# Patient Record
Sex: MALE | Race: WHITE | NOT HISPANIC OR LATINO | Employment: OTHER | URBAN - METROPOLITAN AREA
[De-identification: names, ages, dates, MRNs, and addresses within clinical notes are randomized per-mention and may not be internally consistent; named-entity substitution may affect disease eponyms.]

---

## 2017-08-02 ENCOUNTER — OFFICE VISIT (OUTPATIENT)
Dept: FAMILY MEDICINE | Facility: CLINIC | Age: 72
End: 2017-08-02
Payer: COMMERCIAL

## 2017-08-02 VITALS
OXYGEN SATURATION: 95 % | SYSTOLIC BLOOD PRESSURE: 126 MMHG | TEMPERATURE: 97.4 F | HEART RATE: 94 BPM | DIASTOLIC BLOOD PRESSURE: 84 MMHG | HEIGHT: 67 IN | RESPIRATION RATE: 16 BRPM | WEIGHT: 189 LBS | BODY MASS INDEX: 29.66 KG/M2

## 2017-08-02 DIAGNOSIS — R42 DIZZINESS: ICD-10-CM

## 2017-08-02 DIAGNOSIS — Z11.59 NEED FOR HEPATITIS C SCREENING TEST: ICD-10-CM

## 2017-08-02 DIAGNOSIS — R53.83 FATIGUE, UNSPECIFIED TYPE: Primary | ICD-10-CM

## 2017-08-02 DIAGNOSIS — R97.20 ELEVATED PSA: ICD-10-CM

## 2017-08-02 LAB
ALBUMIN SERPL-MCNC: 3.7 G/DL (ref 3.4–5)
ALP SERPL-CCNC: 54 U/L (ref 40–150)
ALT SERPL W P-5'-P-CCNC: 26 U/L (ref 0–70)
ANION GAP SERPL CALCULATED.3IONS-SCNC: 5 MMOL/L (ref 3–14)
ANISOCYTOSIS BLD QL SMEAR: ABNORMAL
AST SERPL W P-5'-P-CCNC: 13 U/L (ref 0–45)
BASO STIPL BLD QL SMEAR: PRESENT
BASOPHILS # BLD AUTO: 0.1 10E9/L (ref 0–0.2)
BASOPHILS NFR BLD AUTO: 0.8 %
BILIRUB SERPL-MCNC: 1 MG/DL (ref 0.2–1.3)
BUN SERPL-MCNC: 18 MG/DL (ref 7–30)
CALCIUM SERPL-MCNC: 9.5 MG/DL (ref 8.5–10.1)
CHLORIDE SERPL-SCNC: 107 MMOL/L (ref 94–109)
CO2 SERPL-SCNC: 30 MMOL/L (ref 20–32)
CREAT SERPL-MCNC: 0.88 MG/DL (ref 0.66–1.25)
DIFFERENTIAL METHOD BLD: ABNORMAL
EOSINOPHIL # BLD AUTO: 0.1 10E9/L (ref 0–0.7)
EOSINOPHIL NFR BLD AUTO: 1.7 %
ERYTHROCYTE [DISTWIDTH] IN BLOOD BY AUTOMATED COUNT: 19.6 % (ref 10–15)
GFR SERPL CREATININE-BSD FRML MDRD: 85 ML/MIN/1.7M2
GLUCOSE SERPL-MCNC: 154 MG/DL (ref 70–99)
HCT VFR BLD AUTO: 40.1 % (ref 40–53)
HGB BLD-MCNC: 12.7 G/DL (ref 13.3–17.7)
IMM GRANULOCYTES # BLD: 0 10E9/L (ref 0–0.4)
IMM GRANULOCYTES NFR BLD: 0.3 %
LYMPHOCYTES # BLD AUTO: 1.3 10E9/L (ref 0.8–5.3)
LYMPHOCYTES NFR BLD AUTO: 16.6 %
MCH RBC QN AUTO: 19 PG (ref 26.5–33)
MCHC RBC AUTO-ENTMCNC: 31.7 G/DL (ref 31.5–36.5)
MCV RBC AUTO: 60 FL (ref 78–100)
MICROCYTES BLD QL SMEAR: PRESENT
MONOCYTES # BLD AUTO: 0.6 10E9/L (ref 0–1.3)
MONOCYTES NFR BLD AUTO: 7.1 %
NEUTROPHILS # BLD AUTO: 5.7 10E9/L (ref 1.6–8.3)
NEUTROPHILS NFR BLD AUTO: 73.5 %
PLATELET # BLD AUTO: 187 10E9/L (ref 150–450)
PLATELET # BLD EST: NORMAL 10*3/UL
POTASSIUM SERPL-SCNC: 3.6 MMOL/L (ref 3.4–5.3)
PROT SERPL-MCNC: 7.2 G/DL (ref 6.8–8.8)
PSA SERPL-ACNC: 25.3 UG/L (ref 0–4)
RBC # BLD AUTO: 6.68 10E12/L (ref 4.4–5.9)
RBC INCLUSIONS BLD: SLIGHT
RBC MORPH BLD: ABNORMAL
SODIUM SERPL-SCNC: 142 MMOL/L (ref 133–144)
TARGETS BLD QL SMEAR: SLIGHT
TSH SERPL DL<=0.005 MIU/L-ACNC: 1.09 MU/L (ref 0.4–4)
WBC # BLD AUTO: 7.7 10E9/L (ref 4–11)

## 2017-08-02 PROCEDURE — 84153 ASSAY OF PSA TOTAL: CPT | Performed by: FAMILY MEDICINE

## 2017-08-02 PROCEDURE — 86803 HEPATITIS C AB TEST: CPT | Performed by: FAMILY MEDICINE

## 2017-08-02 PROCEDURE — 80050 GENERAL HEALTH PANEL: CPT | Performed by: FAMILY MEDICINE

## 2017-08-02 PROCEDURE — 36415 COLL VENOUS BLD VENIPUNCTURE: CPT | Performed by: FAMILY MEDICINE

## 2017-08-02 PROCEDURE — 99214 OFFICE O/P EST MOD 30 MIN: CPT | Performed by: FAMILY MEDICINE

## 2017-08-02 ASSESSMENT — PAIN SCALES - GENERAL: PAINLEVEL: NO PAIN (0)

## 2017-08-02 NOTE — MR AVS SNAPSHOT
After Visit Summary   8/2/2017    Khang Bailon    MRN: [id number]           Patient Information     Date Of Birth          1945        Visit Information        Provider Department      8/2/2017 1:00 PM Cecilio Trejo MD [facility]        Today's Diagnoses     Elevated PSA    -  1    Fatigue, unspecified type        Dizziness        Need for hepatitis C screening test        BPH (benign prostatic hyperplasia)           Follow-ups after your visit        Additional Services     UROLOGY ADULT REFERRAL       Your provider has referred you to: N: [facility] [phone]   [email or web address]    Please be aware that coverage of these services is subject to the terms and limitations of your health insurance plan.  Call member services at your health plan with any benefit or coverage questions.      Please bring the following with you to your appointment:    (1) Any X-Rays, CTs or MRIs which have been performed.  Contact the facility where they were done to arrange for  prior to your scheduled appointment.    (2) List of current medications  (3) This referral request   (4) Any documents/labs given to you for this referral                  Who to contact     If you have questions or need follow up information about today's clinic visit or your schedule please contact [facility] directly at [phone].  Normal or non-critical lab and imaging results will be communicated to you by MyChart, letter or phone within 4 business days after the clinic has received the results. If you do not hear from us within 7 days, please contact the clinic through MyChart or phone. If you have a critical or abnormal lab result, we will notify you by phone as soon as possible.  Submit refill requests through Weebly or call your pharmacy and they will forward the refill request to us. Please allow 3 business days for your refill to be completed.        "   Additional Information About Your Visit        MyChart Information     Vendor Registry lets you send messages to your doctor, view your test results, renew your prescriptions, schedule appointments and more. To sign up, go to [email or web address] . Click on \"Log in\" on the left side of the screen, which will take you to the Welcome page. Then click on \"Sign up Now\" on the right side of the page.     You will be asked to enter the access code listed below, as well as some personal information. Please follow the directions to create your username and password.     Your access code is: [email or web address]  Expires: 10/31/2017  1:43 PM     Your access code will  in 90 days. If you need help or a new code, please call your [facility] or [phone].        Care EveryWhere ID     This is your Care EveryWhere ID. This could be used by other organizations to access your [facility] medical records  [id number]        Your Vitals Were     Pulse Temperature Respirations Height Pulse Oximetry BMI (Body Mass Index)    94 97.4  F (36.3  C) (Temporal) 16 5' 7\" (1.702 m) 95% 29.6 kg/m2       Blood Pressure from Last 3 Encounters:   17 126/84   16 148/80   10/16/16 (!) 180/104    Weight from Last 3 Encounters:   17 189 lb (85.7 kg)   16 188 lb (85.3 kg)   10/16/16 187 lb (84.8 kg)              We Performed the Following     CBC with platelets differential     Comprehensive metabolic panel (BMP + Alb, Alk Phos, ALT, AST, Total. Bili, TP)     Hepatitis C antibody     Prostate spec antigen screen     TSH with free T4 reflex     UROLOGY ADULT REFERRAL        Primary Care Provider Office Phone # Fax #    Cholo Rivera [phone]       [facility] [address] [place] [address]        Equal Access to Services     GOLDY MELENDEZ : Jacquelyn Vital, richard arguello, abeba ferreira, bob heredia. So [facility] [place] [facility] " [phone].    ATENCIÓN: Si gideon segovia, tiene a carrera disposición servicios gratuitos de asistencia lingüística. Jair al [phone].    We comply with applicable federal civil rights laws and [place] laws. We do not discriminate on the basis of race, color, national origin, age, disability sex, sexual orientation or gender identity.            Thank you!     Thank you for choosing [facility]  for your care. Our goal is always to provide you with excellent care. Hearing back from our patients is one way we can continue to improve our services. Please take a few minutes to complete the written survey that you may receive in the mail after your visit with us. Thank you!             Your Updated Medication List - Protect others around you: Learn how to safely use, store and throw away your medicines at www.disposemymeds.org.          This list is accurate as of: 8/2/17  1:43 PM.  Always use your most recent med list.                   Brand Name Dispense Instructions for use Diagnosis    ibuprofen 600 MG tablet    ADVIL/MOTRIN    30 tablet    Take 1 tablet (600 mg) by mouth every 6 hours as needed for moderate pain        MULTI vitamin  MENS Tabs      Take 1 tablet by mouth daily.        OMEGA-3 FISH OIL PO      Take  by mouth.        psyllium 58.6 % Powd    METAMUCIL     Take 1 Tablespoonful by mouth daily        VITAMIN D3 PO      Take 10,000 Units by mouth daily.

## 2017-08-02 NOTE — PROGRESS NOTES
SUBJECTIVE:                                                    Khang Bailon is a 72 year old male who presents to clinic today for the following health issues:      Chief Complaint   Patient presents with     Neck Pain     Edema     swelling in legs     Fatigue     says he is always tired     Cough     cough and sob             Problem list and histories reviewed & adjusted, as indicated.  Additional history: as documented        Reviewed and updated as needed this visit by clinical staff  Tobacco  Allergies  Meds       Reviewed and updated as needed this visit by Provider        SUBJECTIVE:  Khang  is a 72 year old male who presents for:  Several issues that of an bothering him. He hasn't been seen for quite a while. I have only seen him on several occasions in the last year for acute follow-up situations. A patient of a former retired partner of mine. He really is not on any medications for any conditions. He does have a pacemaker. Nose he has an elevated PSA needs to follow up on this. Complaining of some swelling in his legs. He is complaining of fatigue all the time. Complains of shortness of breath and cough. He is overdue for some lab work.    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    pacemaker at [facility]     Diverticulitis      Injury, other and unspecified, elbow, forearm, and wrist 1970s    Broke right wrist     Past Surgical History:   Procedure Laterality Date     COLONOSCOPY       COLONOSCOPY  12/10/2012     IMPLANT PACEMAKER       Social History   Substance Use Topics     Smoking status: Former Smoker     Years: 50.00     Types: Cigarettes     Quit date: 7/27/2012     Smokeless tobacco: Never Used      Comment: Quit in July     Alcohol use 0.0 oz/week     0 Standard drinks or equivalent per week      Comment: Glass of wine or two daily     Current Outpatient Prescriptions   Medication Sig Dispense Refill     psyllium (METAMUCIL) 58.6 % POWD Take 1 Tablespoonful by mouth daily  "      Cholecalciferol (VITAMIN D3 PO) Take 10,000 Units by mouth daily.       Omega-3 Fatty Acids (OMEGA-3 FISH OIL PO) Take  by mouth.       Multiple Vitamin (MULTI VITAMIN  MENS) TABS Take 1 tablet by mouth daily.       ibuprofen (ADVIL,MOTRIN) 600 MG tablet Take 1 tablet (600 mg) by mouth every 6 hours as needed for moderate pain 30 tablet 0       REVIEW OF SYSTEMS:   5 point ROS negative except as noted above in HPI, including Gen., Resp, CV, GI &  system review.     OBJECTIVE:  Vitals: /84  Pulse 94  Temp 97.4  F (36.3  C) (Temporal)  Resp 16  Ht 5' 7\" (1.702 m)  Wt 189 lb (85.7 kg)  SpO2 95%  BMI 29.6 kg/m2  BMI= Body mass index is 29.6 kg/(m^2).  He is alert and oriented. Appears in no distress. Eyes PERRLA. Mucous membranes moist throat clear. Neck supple. Lungs are clear to auscultation. Heart regular rhythm. Abdomen soft no tenderness. Extremities with minimal edema at this time. Skin clear.    ASSESSMENT:  #1 fatigue #2 history of elevated PSA #3 multiple joint pains #4 history of third-degree heart block status post pacemaker    PLAN:  He needs urology consult and we'll set him up with this. He'll come in fasting for lab work and we'll notify him of the results of this and progress forward as needed. Multiple issues were discussed and addressed today were 25 minutes was spent on this encounter over half of which was face-to-face with patient and his wife discussing need for follow-up in coordinating care.        Cecilio Trejo MD  [facility]            "

## 2017-08-02 NOTE — NURSING NOTE
"Chief Complaint   Patient presents with     Neck Pain     Edema     swelling in legs     Fatigue     says he is always tired     Cough     cough and sob       Initial /84  Pulse 94  Temp 97.4  F (36.3  C) (Temporal)  Resp 16  Ht 5' 7\" (1.702 m)  Wt 189 lb (85.7 kg)  SpO2 95%  BMI 29.6 kg/m2 Estimated body mass index is 29.6 kg/(m^2) as calculated from the following:    Height as of this encounter: 5' 7\" (1.702 m).    Weight as of this encounter: 189 lb (85.7 kg).  Medication Reconciliation: complete    "

## 2017-08-02 NOTE — LETTER
Khang Bailon  [address] [facility] [address]        August 10, 2017          Dear ,    We are writing to inform you of your test results.    Hemoglobin remains slightly low at 12.7. Blood sugar was elevated 154. This needs to be rechecked (fasting) and a hemoglobin A1c to rule out diabetes. Thyroid test was normal.    Resulted Orders   CBC with platelets differential   Result Value Ref Range    WBC 7.7 4.0 - 11.0 10e9/L    RBC Count 6.68 (H) 4.4 - 5.9 10e12/L    Hemoglobin 12.7 (L) 13.3 - 17.7 g/dL    Hematocrit 40.1 40.0 - 53.0 %    MCV 60 (L) 78 - 100 fl    MCH 19.0 (L) 26.5 - 33.0 pg    MCHC 31.7 31.5 - 36.5 g/dL    RDW 19.6 (H) 10.0 - 15.0 %    Platelet Count 187 150 - 450 10e9/L    Diff Method Automated Method     % Neutrophils 73.5 %    % Lymphocytes 16.6 %    % Monocytes 7.1 %    % Eosinophils 1.7 %    % Basophils 0.8 %    % Immature Granulocytes 0.3 %    Absolute Neutrophil 5.7 1.6 - 8.3 10e9/L    Absolute Lymphocytes 1.3 0.8 - 5.3 10e9/L    Absolute Monocytes 0.6 0.0 - 1.3 10e9/L    Absolute Eosinophils 0.1 0.0 - 0.7 10e9/L    Absolute Basophils 0.1 0.0 - 0.2 10e9/L    Abs Immature Granulocytes 0.0 0 - 0.4 10e9/L    Anisocytosis Moderate     RBC Fragments Slight     Target Cells Slight     Microcytes Present     Basophilic Stipling Present     RBC Morphology Consistent with reported results     Platelet Estimate Normal    Comprehensive metabolic panel (BMP + Alb, Alk Phos, ALT, AST, Total. Bili, TP)   Result Value Ref Range    Sodium 142 133 - 144 mmol/L    Potassium 3.6 3.4 - 5.3 mmol/L    Chloride 107 94 - 109 mmol/L    Carbon Dioxide 30 20 - 32 mmol/L    Anion Gap 5 3 - 14 mmol/L    Glucose 154 (H) 70 - 99 mg/dL    Urea Nitrogen 18 7 - 30 mg/dL    Creatinine 0.88 0.66 - 1.25 mg/dL    GFR Estimate 85 >60 mL/min/1.7m2      Comment:      Non  GFR Calc    GFR Estimate If Black >90   GFR Calc   >60 mL/min/1.7m2    Calcium 9.5 8.5 - 10.1 mg/dL     Bilirubin Total 1.0 0.2 - 1.3 mg/dL    Albumin 3.7 3.4 - 5.0 g/dL    Protein Total 7.2 6.8 - 8.8 g/dL    Alkaline Phosphatase 54 40 - 150 U/L    ALT 26 0 - 70 U/L    AST 13 0 - 45 U/L   Prostate spec antigen screen   Result Value Ref Range    PSA 25.30 (H) 0 - 4 ug/L      Comment:      Assay Method:  Chemiluminescence using Siemens Vista analyzer   TSH with free T4 reflex   Result Value Ref Range    TSH 1.09 0.40 - 4.00 mU/L   Hepatitis C antibody   Result Value Ref Range    Hepatitis C Antibody  NR     Nonreactive   Assay performance characteristics have not been established for newborns,   infants, and children         If you have any questions or concerns, please call the clinic at the number listed above.       Sincerely,        Cecilio Trejo MD

## 2017-08-03 LAB — HCV AB SERPL QL IA: NORMAL

## 2017-08-10 NOTE — PROGRESS NOTES
Hemoglobin remains slightly low at 12.7. Blood sugar was elevated 154. This needs to be rechecked fasting and a hemoglobin A1c to rule out diabetes. Thyroid test was normal.

## 2017-08-10 NOTE — PROGRESS NOTES
Letter sent to patient informing of lab results/recommendations.  ................Isrrael Glynn LPN,   August 10, 2017,      4:47 PM,   [facility]

## 2017-08-22 ENCOUNTER — OFFICE VISIT (OUTPATIENT)
Dept: INTERNAL MEDICINE | Facility: CLINIC | Age: 72
End: 2017-08-22
Payer: COMMERCIAL

## 2017-08-22 VITALS
WEIGHT: 188 LBS | HEART RATE: 82 BPM | TEMPERATURE: 97.6 F | RESPIRATION RATE: 20 BRPM | DIASTOLIC BLOOD PRESSURE: 102 MMHG | SYSTOLIC BLOOD PRESSURE: 152 MMHG | BODY MASS INDEX: 29.44 KG/M2 | OXYGEN SATURATION: 98 %

## 2017-08-22 DIAGNOSIS — R06.02 SOB (SHORTNESS OF BREATH): ICD-10-CM

## 2017-08-22 DIAGNOSIS — R05.9 COUGH: ICD-10-CM

## 2017-08-22 DIAGNOSIS — R39.12 BENIGN PROSTATIC HYPERPLASIA WITH WEAK URINARY STREAM: ICD-10-CM

## 2017-08-22 DIAGNOSIS — R97.20 ELEVATED PROSTATE SPECIFIC ANTIGEN (PSA): ICD-10-CM

## 2017-08-22 DIAGNOSIS — J44.9 CHRONIC OBSTRUCTIVE PULMONARY DISEASE, UNSPECIFIED COPD TYPE (H): Primary | ICD-10-CM

## 2017-08-22 DIAGNOSIS — N40.1 BENIGN PROSTATIC HYPERPLASIA WITH WEAK URINARY STREAM: ICD-10-CM

## 2017-08-22 PROCEDURE — 99214 OFFICE O/P EST MOD 30 MIN: CPT | Performed by: INTERNAL MEDICINE

## 2017-08-22 RX ORDER — TAMSULOSIN HYDROCHLORIDE 0.4 MG/1
0.4 CAPSULE ORAL DAILY
Qty: 30 CAPSULE | Refills: 1 | Status: SHIPPED | OUTPATIENT
Start: 2017-08-22 | End: 2017-11-16

## 2017-08-22 RX ORDER — ALBUTEROL SULFATE 90 UG/1
2 AEROSOL, METERED RESPIRATORY (INHALATION) EVERY 6 HOURS PRN
Qty: 1 INHALER | Refills: 3 | Status: SHIPPED | OUTPATIENT
Start: 2017-08-22 | End: 2018-09-12

## 2017-08-22 ASSESSMENT — PAIN SCALES - GENERAL: PAINLEVEL: NO PAIN (0)

## 2017-08-22 NOTE — MR AVS SNAPSHOT
"              After Visit Summary   2017    Khang Bailon    MRN: [id number]           Patient Information     Date Of Birth          1945        Visit Information        Provider Department      2017 10:15 AM Cholo Rivera MD [facility]         Follow-ups after your visit        Who to contact     If you have questions or need follow up information about today's clinic visit or your schedule please contact [facility] directly at [phone].  Normal or non-critical lab and imaging results will be communicated to you by Sphere 3dhart, letter or phone within 4 business days after the clinic has received the results. If you do not hear from us within 7 days, please contact the clinic through Sphere 3dhart or phone. If you have a critical or abnormal lab result, we will notify you by phone as soon as possible.  Submit refill requests through State or call your pharmacy and they will forward the refill request to us. Please allow 3 business days for your refill to be completed.          Additional Information About Your Visit        State Information     State lets you send messages to your doctor, view your test results, renew your prescriptions, schedule appointments and more. To sign up, go to [email or web address] . Click on \"Log in\" on the left side of the screen, which will take you to the Welcome page. Then click on \"Sign up Now\" on the right side of the page.     You will be asked to enter the access code listed below, as well as some personal information. Please follow the directions to create your username and password.     Your access code is: [email or web address]  Expires: 10/31/2017  1:43 PM     Your access code will  in 90 days. If you need help or a new code, please call your [facility] or [phone].        Care EveryWhere ID     This is your Care EveryWhere ID. This could be used by other organizations to access your [facility] medical " records  [id number]        Your Vitals Were     Pulse Temperature Respirations Pulse Oximetry BMI (Body Mass Index)       82 97.6  F (36.4  C) (Temporal) 20 98% 29.44 kg/m2        Blood Pressure from Last 3 Encounters:   08/22/17 (!) 152/102   08/02/17 126/84   12/21/16 148/80    Weight from Last 3 Encounters:   08/22/17 188 lb (85.3 kg)   08/02/17 189 lb (85.7 kg)   12/21/16 188 lb (85.3 kg)              Today, you had the following     No orders found for display       Primary Care Provider Office Phone # Fax #    Cholo Rivera [phone]       [facility] [address] [place] [address]        Equal Access to Services     [facility] Hospital[facility]RIS : Hadii shwetha vega hadasho Soomaali, waaxda luqadaha, qaybta kaalmada adeegyada, bob aguillon . So [facility] [phone].    ATENCIÓN: Si habla español, tiene a carrera disposición servicios gratuitos de asistencia lingüística. Jair al [phone].    We comply with applicable federal civil rights laws and [place] laws. We do not discriminate on the basis of race, color, national origin, age, disability sex, sexual orientation or gender identity.            Thank you!     Thank you for choosing [facility]  for your care. Our goal is always to provide you with excellent care. Hearing back from our patients is one way we can continue to improve our services. Please take a few minutes to complete the written survey that you may receive in the mail after your visit with us. Thank you!             Your Updated Medication List - Protect others around you: Learn how to safely use, store and throw away your medicines at www.disposemymeds.org.          This list is accurate as of: 8/22/17 10:18 AM.  Always use your most recent med list.                   Brand Name Dispense Instructions for use Diagnosis    ibuprofen 600 MG tablet    ADVIL/MOTRIN    30 tablet    Take 1 tablet (600 mg) by mouth every 6 hours as needed  for moderate pain        MULTI vitamin  MENS Tabs      Take 1 tablet by mouth daily.        OMEGA-3 FISH OIL PO      Take  by mouth.        psyllium 58.6 % Powd    METAMUCIL     Take 1 Tablespoonful by mouth daily        VITAMIN D3 PO      Take 10,000 Units by mouth daily.

## 2017-08-22 NOTE — PROGRESS NOTES
"  SUBJECTIVE:   Khang Bailon is a 72 year old male who presents to clinic today for the following health issues:    Chief Complaint   Patient presents with     Prostate Problem     Breathing Problem     \"can't breath\"     Can't breath , he walks up steps and gets sob and some wheezing, can't do gardening.  No chest pain, no tightness, wife can hear him wheezing and coughing up junk.  No fevers or sweats.  No medications.      He did smoke for 52 years and none for the last 5 years.      PSA is high and has been for some time at 23 to 25.  Now having symptoms, takes a long time to urinate, slow stream.          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      Social History   Substance Use Topics     Smoking status: Former Smoker     Years: 50.00     Types: Cigarettes     Quit date: 7/27/2012     Smokeless tobacco: Never Used      Comment: Quit in July     Alcohol use 0.0 oz/week     0 Standard drinks or equivalent per week      Comment: Glass of wine or two daily     Review of Systems  Constitutional-No fevers, chills, or weight changes..  Cardiac-Exertional SOB.  Respiratory-Cough without sputum and SOB.  GI-No nausea, vomitting, diarrhea, constipation, or blood in the stool.  Musculoskeletal-No muscles aches or joint pains.    Physical Exam  BP (!) 152/102  Pulse 82  Temp 97.6  F (36.4  C) (Temporal)  Resp 20  Wt 188 lb (85.3 kg)  SpO2 98%  BMI 29.44 kg/m2  General Appearance-alert, no distress  Cardiac-regular rate and rhythm "  with normal S1, S2 ; no murmur, rub or gallops  Lungs-moderate to severe decreased air movement and moderate to severe expiratory wheezes  Extremities-no peripheral edema, peripheral pulses normal    ASSESSMENT:  Patient is here with his wife. He is doing okay but having more shortness of breath. He does have severe COPD from 52 years of smoking. Thankfully he quit 5 years ago. We will try him on albuterol inhaler. For his cough and shortness of breath I will also do a chest CT. I like to follow-up in one month. I would recommend formal PFTs but he is hesitant for too many tests.    Patient also has had an elevated PSA for quite some time 23 with most recent reading of 25. He also has BPH symptoms with decreased urinary stream. He is not interested in a rectal exam. I will treat him empirically with Flomax and refer him to urology.    The patient has some high blood pressure today at 150/102 oh recheck this in a month. The Flomax may help reduce this.      Electronically signed by Cholo Rivera MD

## 2017-08-22 NOTE — NURSING NOTE
"Chief Complaint   Patient presents with     Prostate Problem     Breathing Problem     \"can't breath\"       Initial BP (!) 152/102  Pulse 82  Temp 97.6  F (36.4  C) (Temporal)  Resp 20  Wt 188 lb (85.3 kg)  SpO2 98%  BMI 29.44 kg/m2 Estimated body mass index is 29.44 kg/(m^2) as calculated from the following:    Height as of 8/2/17: 5' 7\" (1.702 m).    Weight as of this encounter: 188 lb (85.3 kg).  Medication Reconciliation: complete    "

## 2017-08-23 ENCOUNTER — HOSPITAL ENCOUNTER (OUTPATIENT)
Dept: CT IMAGING | Facility: CLINIC | Age: 72
Discharge: HOME | End: 2017-08-23
Attending: INTERNAL MEDICINE | Admitting: INTERNAL MEDICINE
Payer: COMMERCIAL

## 2017-08-23 DIAGNOSIS — J44.9 CHRONIC OBSTRUCTIVE PULMONARY DISEASE, UNSPECIFIED COPD TYPE (H): ICD-10-CM

## 2017-08-23 DIAGNOSIS — R05.9 COUGH: ICD-10-CM

## 2017-08-23 DIAGNOSIS — R06.02 SOB (SHORTNESS OF BREATH): ICD-10-CM

## 2017-08-23 PROCEDURE — 25000125 ZZHC RX 250: Performed by: INTERNAL MEDICINE

## 2017-08-23 PROCEDURE — 25000128 H RX IP 250 OP 636: Performed by: INTERNAL MEDICINE

## 2017-08-23 PROCEDURE — 71260 CT THORAX DX C+: CPT

## 2017-08-23 RX ORDER — IOPAMIDOL 755 MG/ML
500 INJECTION, SOLUTION INTRAVASCULAR ONCE
Status: COMPLETED | OUTPATIENT
Start: 2017-08-23 | End: 2017-08-23

## 2017-08-23 RX ADMIN — IOPAMIDOL 80 ML: 755 INJECTION, SOLUTION INTRAVENOUS at 09:09

## 2017-08-23 RX ADMIN — SODIUM CHLORIDE 60 ML: 9 INJECTION, SOLUTION INTRAVENOUS at 09:09

## 2017-08-31 ENCOUNTER — TRANSFERRED RECORDS (OUTPATIENT)
Dept: HEALTH INFORMATION MANAGEMENT | Facility: CLINIC | Age: 72
End: 2017-08-31

## 2017-08-31 DIAGNOSIS — R97.20 ELEVATED PROSTATE SPECIFIC ANTIGEN (PSA): Primary | ICD-10-CM

## 2017-11-16 ENCOUNTER — OFFICE VISIT (OUTPATIENT)
Dept: FAMILY MEDICINE | Facility: OTHER | Age: 72
End: 2017-11-16
Payer: COMMERCIAL

## 2017-11-16 VITALS
SYSTOLIC BLOOD PRESSURE: 138 MMHG | RESPIRATION RATE: 20 BRPM | DIASTOLIC BLOOD PRESSURE: 88 MMHG | BODY MASS INDEX: 29.4 KG/M2 | TEMPERATURE: 98.2 F | WEIGHT: 187.7 LBS | HEART RATE: 100 BPM

## 2017-11-16 DIAGNOSIS — J20.9 ACUTE BRONCHITIS WITH SYMPTOMS GREATER THAN 10 DAYS: Primary | ICD-10-CM

## 2017-11-16 DIAGNOSIS — J43.9 PULMONARY EMPHYSEMA, UNSPECIFIED EMPHYSEMA TYPE (H): ICD-10-CM

## 2017-11-16 PROCEDURE — 99213 OFFICE O/P EST LOW 20 MIN: CPT | Performed by: FAMILY MEDICINE

## 2017-11-16 RX ORDER — AMOXICILLIN 500 MG/1
500 CAPSULE ORAL 3 TIMES DAILY
Qty: 30 CAPSULE | Refills: 0 | Status: SHIPPED | OUTPATIENT
Start: 2017-11-16 | End: 2018-03-14

## 2017-11-16 ASSESSMENT — PAIN SCALES - GENERAL: PAINLEVEL: MODERATE PAIN (4)

## 2017-11-16 NOTE — NURSING NOTE
"Chief Complaint   Patient presents with     Cough     x's one month       Initial /88 (BP Location: Right arm, Patient Position: Chair, Cuff Size: Adult Large)  Pulse 100  Temp 98.2  F (36.8  C) (Temporal)  Resp 20  Wt 187 lb 11.2 oz (85.1 kg)  BMI 29.4 kg/m2 Estimated body mass index is 29.4 kg/(m^2) as calculated from the following:    Height as of 8/2/17: 5' 7\" (1.702 m).    Weight as of this encounter: 187 lb 11.2 oz (85.1 kg).  Medication Reconciliation: complete  "

## 2017-11-16 NOTE — PROGRESS NOTES
SUBJECTIVE:   Khang Bailon is a 72 year old male who presents to clinic today for the following health issues:      RESPIRATORY SYMPTOMS      Duration: one month    Description  nasal congestion, cough, wheezing, fatigue/malaise, hoarse voice and myalgias    Severity: moderate    Accompanying signs and symptoms: None    History (predisposing factors):  COPD    Precipitating or alleviating factors: None    Therapies tried and outcome:  none      Khang Bailon is a 72 year old male  who presents to the clinic today with a cough with purulent sputum for one month.  No chest pain, shortness of breath or hemoptysis.  He recently had a chest ct which showed emphysema.  He no longer smokes        Patient Active Problem List   Diagnosis     Painful respiration     Personal history of tobacco use, presenting hazards to health     Advanced directives, counseling/discussion     Thalassemia     Hyperlipidemia LDL goal <130     Third degree heart block (H) - s/p pacemaker     Hypertension goal BP (blood pressure) < 140/90     Elevated PSA     Loss of weight     Health Care Home     Elevated prostate specific antigen (PSA)     Multilevel degenerative disc disease     Sigmoid diverticulitis     History of CVA (cerebrovascular accident) - old left internal capsule lacunar infarct on CT 7/2016     Vomiting     Benign neoplasm of colon, unspecified part of colon     Pulmonary emphysema, unspecified emphysema type (H)           Past Medical History:   Diagnosis Date     Alpha thalassemia (H)      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    pacemaker at [facility]     Diverticulitis      Injury, other and unspecified, elbow, forearm, and wrist 1970s    Broke right wrist           Current Outpatient Prescriptions on File Prior to Visit:  albuterol (PROAIR HFA/PROVENTIL HFA/VENTOLIN HFA) 108 (90 BASE) MCG/ACT Inhaler Inhale 2 puffs into the lungs every 6 hours as needed for shortness of breath / dyspnea or wheezing   ibuprofen (ADVIL,MOTRIN) 600 MG tablet Take  1 tablet (600 mg) by mouth every 6 hours as needed for moderate pain   Multiple Vitamin (MULTI VITAMIN  MENS) TABS Take 1 tablet by mouth daily.     No current facility-administered medications on file prior to visit.         Social History     Social History     Marital status:      Spouse name: Nikki     Number of children: 3     Years of education: N/A     Occupational History      Self     Social History Main Topics     Smoking status: Former Smoker     Years: 50.00     Types: Cigarettes     Quit date: 7/27/2012     Smokeless tobacco: Never Used      Comment: Quit in July     Alcohol use 0.0 oz/week     0 Standard drinks or equivalent per week      Comment: Glass of wine or two daily     Drug use: No     Sexual activity: No     Other Topics Concern     Parent/Sibling W/ Cabg, Mi Or Angioplasty Before 65f 55m? No     Social History Narrative        Exam:   /88 (BP Location: Right arm, Patient Position: Chair, Cuff Size: Adult Large)  Pulse 100  Temp 98.2  F (36.8  C) (Temporal)  Resp 20  Wt 187 lb 11.2 oz (85.1 kg)  BMI 29.4 kg/m2    General: healthy, alert and no distress, appears hydarated, vital signs stable     Ears: normal canals, TMs bilaterally, normal TM mobility    Throat: NORMAL - no erythema, no adenopathy, no exudates.    Neck: supple, non-tender, free range of motion, no adenopathy    Cardiac: NORMAL - regular rate and rhythm without murmur.    Respiratory: Normal - Clear to auscultation without rales, rhonchi, or wheezing.        Assessment: Acute Bronchitis, superimposed on copd    Plan: rx amoxacillin.  Symptomatic measures otherwise encouraged, rest,humidity, extra fluids. Recheck in   4-5 days if not improved, sooner if increasing symptoms.    Medications:    Outpatient Encounter Prescriptions as of 11/16/2017   Medication Sig Dispense Refill     amoxicillin (AMOXIL) 500 MG capsule Take 1 capsule (500 mg) by mouth 3 times daily 30 capsule 0     albuterol (PROAIR  HFA/PROVENTIL HFA/VENTOLIN HFA) 108 (90 BASE) MCG/ACT Inhaler Inhale 2 puffs into the lungs every 6 hours as needed for shortness of breath / dyspnea or wheezing 1 Inhaler 3     ibuprofen (ADVIL,MOTRIN) 600 MG tablet Take 1 tablet (600 mg) by mouth every 6 hours as needed for moderate pain 30 tablet 0     Multiple Vitamin (MULTI VITAMIN  MENS) TABS Take 1 tablet by mouth daily.       [DISCONTINUED] tamsulosin (FLOMAX) 0.4 MG capsule Take 1 capsule (0.4 mg) by mouth daily 30 capsule 1     [DISCONTINUED] psyllium (METAMUCIL) 58.6 % POWD Take 1 Tablespoonful by mouth daily       [DISCONTINUED] Cholecalciferol (VITAMIN D3 PO) Take 10,000 Units by mouth daily.       [DISCONTINUED] Omega-3 Fatty Acids (OMEGA-3 FISH OIL PO) Take  by mouth.       No facility-administered encounter medications on file as of 11/16/2017.      dbue

## 2017-11-16 NOTE — MR AVS SNAPSHOT
After Visit Summary   11/16/2017    Khang Bailon    MRN: [id number]           Patient Information     Date Of Birth          1945        Visit Information        Provider Department      11/16/2017 2:00 PM Lenny Giles MD [facility]        Today's Diagnoses     Acute bronchitis with symptoms greater than 10 days    -  1    Pulmonary emphysema, unspecified emphysema type (H)           Follow-ups after your visit        Your next 10 appointments already scheduled     Jan 05, 2018 11:00 AM CST   LAB with NL LAB [facility] ([facility])    [address] [place] [id number] [phone]           Please do not eat 10-12 hours before your appointment if you are coming in fasting for labs on lipids, cholesterol, or glucose (sugar). This does not apply to pregnant women. Water, hot tea and black coffee (with nothing added) are okay. Do not drink other fluids, diet soda or chew gum.              Who to contact     If you have questions or need follow up information about today's clinic visit or your schedule please contact [facility] directly at [phone].  Normal or non-critical lab and imaging results will be communicated to you by Zingkuhart, letter or phone within 4 business days after the clinic has received the results. If you do not hear from us within 7 days, please contact the clinic through Zingkuhart or phone. If you have a critical or abnormal lab result, we will notify you by phone as soon as possible.  Submit refill requests through [employer] or call your pharmacy and they will forward the refill request to us. Please allow 3 business days for your refill to be completed.          Additional Information About Your Visit        [employer] Information     [employer] lets you send messages to your doctor, view your test results, renew your prescriptions, schedule appointments and more. To sign up, go to  [email or web address] . Click on \"Log in\" on the left side of the screen, which will take you to the Welcome page. Then click on \"Sign up Now\" on the right side of the page.     You will be asked to enter the access code listed below, as well as some personal information. Please follow the directions to create your username and password.     Your access code is: [email or web address]  Expires: 2018  2:51 PM     Your access code will  in 90 days. If you need help or a new code, please call your [place] clinic or [phone].        Care EveryWhere ID     This is your Care EveryWhere ID. This could be used by other organizations to access your [place] medical records  [id number]        Your Vitals Were     Pulse Temperature Respirations BMI (Body Mass Index)          100 98.2  F (36.8  C) (Temporal) 20 29.4 kg/m2         Blood Pressure from Last 3 Encounters:   17 138/88   17 (!) 152/102   17 126/84    Weight from Last 3 Encounters:   17 187 lb 11.2 oz (85.1 kg)   17 188 lb (85.3 kg)   17 189 lb (85.7 kg)              Today, you had the following     No orders found for display         Today's Medication Changes          These changes are accurate as of: 17  2:51 PM.  If you have any questions, ask your nurse or doctor.               Start taking these medicines.        Dose/Directions    amoxicillin 500 MG capsule   Commonly known as:  AMOXIL   Used for:  Acute bronchitis with symptoms greater than 10 days, Pulmonary emphysema, unspecified emphysema type (H)   Started by:  Lenny Giles MD        Dose:  500 mg   Take 1 capsule (500 mg) by mouth 3 times daily   Quantity:  30 capsule   Refills:  0            Where to get your medicines      These medications were sent to [facility] [place] [facility]  [place] - [address]  [address] [address]     Phone:  [phone]     amoxicillin 500 MG capsule                Primary Care Provider " Office Phone # Fax #    Cholo Rivera [phone]       [facility] [address] [place] [address]        Equal Access to Services     GOLDY MELENDEZ : Hadcourt shwetha vega estrada Sobritt, waaxda luqadaha, qaybta kaalmada jhon, bob bermudez balbinazane angulo laRobertpatricia heredia. So [facility] [phone].    ATENCIÓN: Si habla español, tiene a carrera disposición servicios gratuitos de asistencia lingüística. Llame al [phone].    We comply with applicable federal civil rights laws and [place] laws. We do not discriminate on the basis of race, color, national origin, age, disability, sex, sexual orientation, or gender identity.            Thank you!     Thank you for choosing [facility]  for your care. Our goal is always to provide you with excellent care. Hearing back from our patients is one way we can continue to improve our services. Please take a few minutes to complete the written survey that you may receive in the mail after your visit with us. Thank you!             Your Updated Medication List - Protect others around you: Learn how to safely use, store and throw away your medicines at www.disposemymeds.org.          This list is accurate as of: 11/16/17  2:51 PM.  Always use your most recent med list.                   Brand Name Dispense Instructions for use Diagnosis    albuterol 108 (90 BASE) MCG/ACT Inhaler    PROAIR HFA/PROVENTIL HFA/VENTOLIN HFA    1 Inhaler    Inhale 2 puffs into the lungs every 6 hours as needed for shortness of breath / dyspnea or wheezing    Chronic obstructive pulmonary disease, unspecified COPD type (H), SOB (shortness of breath)       amoxicillin 500 MG capsule    AMOXIL    30 capsule    Take 1 capsule (500 mg) by mouth 3 times daily    Acute bronchitis with symptoms greater than 10 days, Pulmonary emphysema, unspecified emphysema type (H)       ibuprofen 600 MG tablet    ADVIL/MOTRIN    30 tablet    Take 1 tablet (600 mg) by mouth every 6  hours as needed for moderate pain        MULTI vitamin  MENS Tabs      Take 1 tablet by mouth daily.

## 2018-02-21 ENCOUNTER — OFFICE VISIT (OUTPATIENT)
Dept: FAMILY MEDICINE | Facility: CLINIC | Age: 73
End: 2018-02-21
Payer: COMMERCIAL

## 2018-02-21 VITALS
OXYGEN SATURATION: 96 % | HEART RATE: 85 BPM | HEIGHT: 67 IN | SYSTOLIC BLOOD PRESSURE: 150 MMHG | BODY MASS INDEX: 30.26 KG/M2 | WEIGHT: 192.8 LBS | TEMPERATURE: 97 F | DIASTOLIC BLOOD PRESSURE: 90 MMHG

## 2018-02-21 DIAGNOSIS — I44.2 THIRD DEGREE HEART BLOCK (H): ICD-10-CM

## 2018-02-21 DIAGNOSIS — J43.9 PULMONARY EMPHYSEMA, UNSPECIFIED EMPHYSEMA TYPE (H): Primary | ICD-10-CM

## 2018-02-21 PROCEDURE — 99214 OFFICE O/P EST MOD 30 MIN: CPT | Performed by: FAMILY MEDICINE

## 2018-02-21 RX ORDER — TIOTROPIUM BROMIDE 18 UG/1
CAPSULE ORAL; RESPIRATORY (INHALATION)
Qty: 30 CAPSULE | Refills: 1 | Status: SHIPPED | OUTPATIENT
Start: 2018-02-21 | End: 2018-03-14

## 2018-02-21 NOTE — NURSING NOTE
"Chief Complaint   Patient presents with     COPD     COPD        Initial /90 (BP Location: Right arm, Patient Position: Chair, Cuff Size: Adult Large)  Pulse 85  Temp 97  F (36.1  C) (Temporal)  Ht 5' 7\" (1.702 m)  Wt 192 lb 12.8 oz (87.5 kg)  SpO2 96%  BMI 30.2 kg/m2 Estimated body mass index is 30.2 kg/(m^2) as calculated from the following:    Height as of this encounter: 5' 7\" (1.702 m).    Weight as of this encounter: 192 lb 12.8 oz (87.5 kg).  Medication Reconciliation: complete     "

## 2018-02-21 NOTE — MR AVS SNAPSHOT
After Visit Summary   2/21/2018    Khang Bailon    MRN: [id number]           Patient Information     Date Of Birth          1945        Visit Information        Provider Department      2/21/2018 4:20 PM Cecilio Trejo MD [facility]        Today's Diagnoses     Pulmonary emphysema, unspecified emphysema type (H)    -  1      Care Instructions    Start on new inhaler it has a capsule that you breathe in once a day.  Keep your albuterol inhaler to use along with it if you need.          Follow-ups after your visit        Your next 10 appointments already scheduled     Feb 22, 2018  9:30 AM CST   (Arrive by 9:15 AM)   CT CHEST W CONTRAST with PHCT1   [facility] CT Scan ([facility])    [address] [place] [id number] [phone]           Please bring any scans or X-rays taken at other hospitals, if similar tests were done. Also bring a list of your medicines, including vitamins, minerals and over-the-counter drugs. It is safest to leave personal items at home.  Be sure to tell your doctor:   If you have any allergies.   If there s any chance you are pregnant.   If you are breastfeeding.    If you have diabetes as your medication may need to be adjusted for this exam.  You will have contrast for this exam. To prepare:   Do not eat or drink for 2 hours before your exam. If you need to take medicine, you may take it with small sips of water. (We may ask you to take liquid medicine as well.)   The day before your exam, drink extra fluids at least six 8-ounce glasses (unless your doctor tells you to restrict your fluids).  Patients over 70 or patients with diabetes or kidney problems:   If you haven t had a blood test (creatinine test) within the last 30 days, the Cardiologist/Radiologist may require you to get this test prior to your exam.  Please wear loose clothing, such as a sweat suit or jogging clothes. Avoid snaps, zippers and other  "metal. We may ask you to undress and put on a hospital gown.  If you have any questions, please call the Imaging Department where you will have your exam.              Future tests that were ordered for you today     Open Future Orders        Priority Expected Expires Ordered    CT Chest w Contrast Routine  2019            Who to contact     If you have questions or need follow up information about today's clinic visit or your schedule please contact [facility] directly at [phone].  Normal or non-critical lab and imaging results will be communicated to you by Thrive Sol[employer]art, letter or phone within 4 business days after the clinic has received the results. If you do not hear from us within 7 days, please contact the clinic through [employer] or phone. If you have a critical or abnormal lab result, we will notify you by phone as soon as possible.  Submit refill requests through [employer] or call your pharmacy and they will forward the refill request to us. Please allow 3 business days for your refill to be completed.          Additional Information About Your Visit        [employer] [facility] [employer] Information     [employer] lets you send messages to your doctor, view your test results, renew your prescriptions, schedule appointments and more. To sign up, go to [email or web address] . Click on \"Log in\" on the left side of the screen, which will take you to the Welcome page. Then click on \"Sign up Now\" on the right side of the page.     You will be asked to enter the access code listed below, as well as some personal information. Please follow the directions to create your username and password.     Your access code is: [email or web address]  Expires: 2018  4:43 PM     Your access code will  in 90 days. If you need help or a new code, please call your [place] clinic or [phone].        Care EveryWhere ID     This is your Care EveryWhere ID. This could be used by other organizations to access your [place] " "medical records  [id number]        Your Vitals Were     Pulse Temperature Height Pulse Oximetry BMI (Body Mass Index)       85 97  F (36.1  C) (Temporal) 5' 7\" (1.702 m) 96% 30.2 kg/m2        Blood Pressure from Last 3 Encounters:   02/21/18 150/90   11/16/17 138/88   08/22/17 (!) 152/102    Weight from Last 3 Encounters:   02/21/18 192 lb 12.8 oz (87.5 kg)   11/16/17 187 lb 11.2 oz (85.1 kg)   08/22/17 188 lb (85.3 kg)                 Today's Medication Changes          These changes are accurate as of 2/21/18  4:49 PM.  If you have any questions, ask your nurse or doctor.               Start taking these medicines.        Dose/Directions    tiotropium 18 MCG capsule   Commonly known as:  SPIRIVA HANDIHALER   Used for:  Pulmonary emphysema, unspecified emphysema type (H)   Started by:  Cecilio Trejo MD        Inhale contents of one capsule daily.   Quantity:  30 capsule   Refills:  1            Where to get your medicines      These medications were sent to [facility] [place], [place] - [address] [place] [address]   [address] [place] [address] [facility] [address]     Phone:  [phone]     tiotropium 18 MCG capsule                Primary Care Provider Office Phone # Fax #    Cecilio Trejo [phone]       [address]   [facility] [address]        Equal Access to Services     GOLDY MELENDEZ AH: Hadii shwetha ku hadasho Soomaali, waaxda luqadaha, qaybta kaalmada adeegyada, bob heredia. So [facility] [phone].    ATENCIÓN: Si habla español, tiene a carrera disposición servicios gratuitos de asistencia lingüística. Llame al [phone].    We comply with applicable federal civil rights laws and [place] laws. We do not discriminate on the basis of race, color, national origin, age, disability, sex, sexual orientation, or gender identity.            Thank you!     Thank you for choosing [facility]  for your care. Our goal is always " to provide you with excellent care. Hearing back from our patients is one way we can continue to improve our services. Please take a few minutes to complete the written survey that you may receive in the mail after your visit with us. Thank you!             Your Updated Medication List - Protect others around you: Learn how to safely use, store and throw away your medicines at www.disposemymeds.org.          This list is accurate as of 2/21/18  4:49 PM.  Always use your most recent med list.                   Brand Name Dispense Instructions for use Diagnosis    albuterol 108 (90 BASE) MCG/ACT Inhaler    PROAIR HFA/PROVENTIL HFA/VENTOLIN HFA    1 Inhaler    Inhale 2 puffs into the lungs every 6 hours as needed for shortness of breath / dyspnea or wheezing    Chronic obstructive pulmonary disease, unspecified COPD type (H), SOB (shortness of breath)       amoxicillin 500 MG capsule    AMOXIL    30 capsule    Take 1 capsule (500 mg) by mouth 3 times daily    Acute bronchitis with symptoms greater than 10 days, Pulmonary emphysema, unspecified emphysema type (H)       ibuprofen 600 MG tablet    ADVIL/MOTRIN    30 tablet    Take 1 tablet (600 mg) by mouth every 6 hours as needed for moderate pain        MULTI vitamin  MENS Tabs      Take 1 tablet by mouth daily.        tiotropium 18 MCG capsule    SPIRIVA HANDIHALER    30 capsule    Inhale contents of one capsule daily.    Pulmonary emphysema, unspecified emphysema type (H)

## 2018-02-21 NOTE — PATIENT INSTRUCTIONS
Start on new inhaler it has a capsule that you breathe in once a day.  Keep your albuterol inhaler to use along with it if you need.

## 2018-02-22 ENCOUNTER — HOSPITAL ENCOUNTER (OUTPATIENT)
Dept: CT IMAGING | Facility: CLINIC | Age: 73
Discharge: HOME | End: 2018-02-22
Attending: FAMILY MEDICINE | Admitting: FAMILY MEDICINE
Payer: COMMERCIAL

## 2018-02-22 DIAGNOSIS — J43.9 PULMONARY EMPHYSEMA, UNSPECIFIED EMPHYSEMA TYPE (H): ICD-10-CM

## 2018-02-22 PROCEDURE — 25000128 H RX IP 250 OP 636: Performed by: FAMILY MEDICINE

## 2018-02-22 PROCEDURE — 25000125 ZZHC RX 250: Performed by: FAMILY MEDICINE

## 2018-02-22 PROCEDURE — 71260 CT THORAX DX C+: CPT

## 2018-02-22 RX ORDER — IOPAMIDOL 755 MG/ML
500 INJECTION, SOLUTION INTRAVASCULAR ONCE
Status: COMPLETED | OUTPATIENT
Start: 2018-02-22 | End: 2018-02-22

## 2018-02-22 RX ADMIN — IOPAMIDOL 81 ML: 755 INJECTION, SOLUTION INTRAVENOUS at 09:27

## 2018-02-22 RX ADMIN — SODIUM CHLORIDE 60 ML: 9 INJECTION, SOLUTION INTRAVENOUS at 09:28

## 2018-02-26 ENCOUNTER — TELEPHONE (OUTPATIENT)
Dept: FAMILY MEDICINE | Facility: CLINIC | Age: 73
End: 2018-02-26

## 2018-02-26 NOTE — TELEPHONE ENCOUNTER
Reason for Call:  Request for results:    Name of test or procedure: CT on lungs    Date of test of procedure: 2/22/18    Location of the test or procedure: [facility] to leave the result message on voice mail or with a family member? YES    Phone number Patient can be reached at:  Home number on file [phone] (home)    Additional comments: patient would like to have a call back with these results as soon as possible.  He is having the same breathing problems and would like it resolved.      Call taken on 2/26/2018 at 10:36 AM by Aida Pradhan

## 2018-02-26 NOTE — TELEPHONE ENCOUNTER
Khang is calling to speak with Lorraine, he forgot some of the information she told him and would like to talk to her again.    Thank you,  Renee LANG

## 2018-02-26 NOTE — TELEPHONE ENCOUNTER
Called pharmacy to see if there are any cheaper alternatives to Spiriva.     Patient has a $300 dollar deductible to meet for the yr and then the Spiriva would be $45.    Ipratropium neb would be cheaper but then he would need a nebulizer $50-$60.    En tanner would be just as expensive.

## 2018-02-26 NOTE — PROGRESS NOTES
CT scan shows severe emphysema throughout the lungs and a few nodules.  These look benign but need to be followed up with future CT scan.  Some gallbladder stones were seen.  Would do nothing with this no problems.

## 2018-02-26 NOTE — TELEPHONE ENCOUNTER
Call out to pt again to advise him of the results:  Notes Recorded by Cecilio Trejo MD on 2/26/2018 at 11:07 AM  CT scan shows severe emphysema throughout the lungs and a few nodules.  These look benign but need to be followed up with future CT scan.  Some gallbladder stones were seen.  Would do nothing with this no problems.    Pt states his breathing is getting worse. Pt couldn't afford the $ 249 dollars inhaler. Pt is questioning if oxygen would be an option for him. Routed to Dr. Trejo to review and advise.

## 2018-02-28 NOTE — TELEPHONE ENCOUNTER
Called patient to discuss if he's interested in staying with the spiriva or would like Dr. Trejo to consider the neb treatment. Patient would like to speak to his wife about the options as she knows more about the direction they would like to go in. Khang will call later once she comes home.

## 2018-02-28 NOTE — TELEPHONE ENCOUNTER
Khang's wife called and is interested in the neb route if Dr. Trejo finds this an appropriate alternative. She would really like to avoid the $300 deductible if at all possible. Given to Dr. Trejo for review.

## 2018-03-14 ENCOUNTER — OFFICE VISIT (OUTPATIENT)
Dept: PHARMACY | Facility: CLINIC | Age: 73
End: 2018-03-14
Payer: COMMERCIAL

## 2018-03-14 DIAGNOSIS — E63.9 NUTRITIONAL DEFICIENCY: ICD-10-CM

## 2018-03-14 DIAGNOSIS — J44.9 CHRONIC OBSTRUCTIVE PULMONARY DISEASE, UNSPECIFIED COPD TYPE (H): Primary | ICD-10-CM

## 2018-03-14 PROCEDURE — 99607 MTMS BY PHARM ADDL 15 MIN: CPT | Performed by: PHARMACIST

## 2018-03-14 PROCEDURE — 99605 MTMS BY PHARM NP 15 MIN: CPT | Performed by: PHARMACIST

## 2018-03-14 RX ORDER — IPRATROPIUM BROMIDE AND ALBUTEROL SULFATE 2.5; .5 MG/3ML; MG/3ML
1 SOLUTION RESPIRATORY (INHALATION) EVERY 6 HOURS PRN
Qty: 360 ML | Refills: 2 | Status: SHIPPED | OUTPATIENT
Start: 2018-03-14 | End: 2018-10-25

## 2018-03-14 NOTE — MR AVS SNAPSHOT
After Visit Summary   3/14/2018    Khang Bailon    MRN: [id number]           Patient Information     Date Of Birth          1945        Visit Information        Provider Department      3/14/2018 10:00 AM [facility] MTM        Today's Diagnoses     Chronic obstructive pulmonary disease, unspecified COPD type (H)    -  1      Care Instructions    Recommendations from today's MTM visit:                                                    MTM (medication therapy management) is a service provided by a clinical pharmacist designed to help you get the most of out of your medicines.   Today we reviewed what your medicines are for, how to know if they are working, that your medicines are safe and how to make your medicine regimen as easy as possible.     1. You will likely receive an explanation of benefits/notice of services not covered by your [facility] insurance. This is not a bill. This visit was covered today, but charges were submitted to [facility] in order for [facility] to then write them off. You will not be charged for this visit.    2. Start ipratropium-albuterol nebulizer - every 6 hours as needed.  Start by taking four times daily and then back down to twice daily.      3. If you change your mind you should contact the [facility] Prescription Assistance Program at [phone] to discuss if you qualify for any  programs.  The program is run by Denise Doyle and her assistant Sara.      Next MTM visit: We will call you in 2-4 weeks to check in how things are going.     To schedule another MTM appointment, please call the clinic directly or you may call the MTM scheduling line at [phone] or toll-free at [phone].     My Clinical Pharmacist's contact information:                                                      It was a pleasure seeing you today!  Please feel free to contact me with any questions or concerns you have.      Pepe Philip,  "FELI Teixeira  Medication Therapy Management Pharmacist  Pager: [phone]    You may receive a survey about the [facility] services you received.  I would appreciate your feedback to help me serve you better in the future. Please fill it out and return it when you can. Your comments will be anonymous.                  Follow-ups after your visit        Who to contact     If you have questions or need follow up information about today's clinic visit or your schedule please contact [facility] directly at [phone].  Normal or non-critical lab and imaging results will be communicated to you by Omazehart, letter or phone within 4 business days after the clinic has received the results. If you do not hear from us within 7 days, please contact the clinic through Omazehart or phone. If you have a critical or abnormal lab result, we will notify you by phone as soon as possible.  Submit refill requests through Eventdoo or call your pharmacy and they will forward the refill request to us. Please allow 3 business days for your refill to be completed.          Additional Information About Your Visit        My[employer]Kiva Systems Information     Eventdoo lets you send messages to your doctor, view your test results, renew your prescriptions, schedule appointments and more. To sign up, go to [email or web address] . Click on \"Log in\" on the left side of the screen, which will take you to the Welcome page. Then click on \"Sign up Now\" on the right side of the page.     You will be asked to enter the access code listed below, as well as some personal information. Please follow the directions to create your username and password.     Your access code is: [email or web address]  Expires: 2018  5:43 PM     Your access code will  in 90 days. If you need help or a new code, please call your [facility] or [phone].        Care EveryWhere ID     This is your Care EveryWhere ID. This could be used by other organizations to access " your [facility] medical records  [id number]         Blood Pressure from Last 3 Encounters:   02/21/18 150/90   11/16/17 138/88   08/22/17 (!) 152/102    Weight from Last 3 Encounters:   02/21/18 192 lb 12.8 oz (87.5 kg)   11/16/17 187 lb 11.2 oz (85.1 kg)   08/22/17 188 lb (85.3 kg)              Today, you had the following     No orders found for display         Today's Medication Changes          These changes are accurate as of 3/14/18 10:49 AM.  If you have any questions, ask your nurse or doctor.               Start taking these medicines.        Dose/Directions    ipratropium - albuterol 0.5 mg/2.5 mg/3 mL 0.5-2.5 (3) MG/3ML neb solution   Commonly known as:  DUONEB   Used for:  Chronic obstructive pulmonary disease, unspecified COPD type (H)        Dose:  1 vial   Take 1 vial (3 mLs) by nebulization every 6 hours as needed for shortness of breath / dyspnea or wheezing   Quantity:  360 mL   Refills:  2            Where to get your medicines      These medications were sent to [facility] [place] [id number]   [address] [place] [address] [facility] [address]     Phone:  [phone]     ipratropium - albuterol 0.5 mg/2.5 mg/3 mL 0.5-2.5 (3) MG/3ML neb solution                Primary Care Provider Office Phone # Fax #    Cecilio Trejo [phone]       [address]   [facility] [place] [address]        Equal Access to Services     [facility] NORA : Hadii shwetha vega hadasho Sobritt, waaxda luqadaha, qaybta kaalmada bob ferreira. So [facility] [phone].    ATENCIÓN: Si habla español, tiene a carrera disposición servicios gratuitos de asistencia lingüística. Llame al [phone].    We comply with applicable federal civil rights laws and [place] laws. We do not discriminate on the basis of race, color, national origin, age, disability, sex, sexual orientation, or gender identity.            Thank you!     Thank you for  choosing [facility]  for your care. Our goal is always to provide you with excellent care. Hearing back from our patients is one way we can continue to improve our services. Please take a few minutes to complete the written survey that you may receive in the mail after your visit with us. Thank you!             Your Updated Medication List - Protect others around you: Learn how to safely use, store and throw away your medicines at www.disposemymeds.org.          This list is accurate as of 3/14/18 10:49 AM.  Always use your most recent med list.                   Brand Name Dispense Instructions for use Diagnosis    albuterol 108 (90 BASE) MCG/ACT Inhaler    PROAIR HFA/PROVENTIL HFA/VENTOLIN HFA    1 Inhaler    Inhale 2 puffs into the lungs every 6 hours as needed for shortness of breath / dyspnea or wheezing    Chronic obstructive pulmonary disease, unspecified COPD type (H), SOB (shortness of breath)       B COMPLEX PO      Take by mouth daily        CALCIUM PLUS ADVANCED PO      Take by mouth daily        DIGESTIVE ENZYMES PO      Take by mouth daily        ibuprofen 600 MG tablet    ADVIL/MOTRIN    30 tablet    Take 1 tablet (600 mg) by mouth every 6 hours as needed for moderate pain        ipratropium - albuterol 0.5 mg/2.5 mg/3 mL 0.5-2.5 (3) MG/3ML neb solution    DUONEB    360 mL    Take 1 vial (3 mLs) by nebulization every 6 hours as needed for shortness of breath / dyspnea or wheezing    Chronic obstructive pulmonary disease, unspecified COPD type (H)       MULTI vitamin  MENS Tabs      Take 1 tablet by mouth daily.        ONE-A-DAY PROSTATE PO      Take by mouth daily        * UNABLE TO FIND      MEDICATION NAME: Isotonix        * UNABLE TO FIND      MEDICATION NAME: OPC-3        VITAMIN C PO      Take by mouth daily        * Notice:  This list has 2 medication(s) that are the same as other medications prescribed for you. Read the directions carefully, and ask your doctor or other care  provider to review them with you.

## 2018-03-14 NOTE — PATIENT INSTRUCTIONS
Recommendations from today's MTM visit:                                                    MTM (medication therapy management) is a service provided by a clinical pharmacist designed to help you get the most of out of your medicines.   Today we reviewed what your medicines are for, how to know if they are working, that your medicines are safe and how to make your medicine regimen as easy as possible.     1. You will likely receive an explanation of benefits/notice of services not covered by your [facility] insurance. This is not a bill. This visit was covered today, but charges were submitted to [facility] in order for [facility] to then write them off. You will not be charged for this visit.    2. Start ipratropium-albuterol nebulizer - every 6 hours as needed.  Start by taking four times daily and then back down to twice daily.      3. If you change your mind you should contact the [facility] Prescription Assistance Program at [phone] to discuss if you qualify for any  programs.  The program is run by Denise Doyle and her assistant Sara.      Next MTM visit: We will call you in 2-4 weeks to check in how things are going.     To schedule another MTM appointment, please call the clinic directly or you may call the MTM scheduling line at [phone] or toll-free at [phone].     My Clinical Pharmacist's contact information:                                                      It was a pleasure seeing you today!  Please feel free to contact me with any questions or concerns you have.      Pepe Philip, PharmD, [facility]  Medication Therapy Management Pharmacist  Pager: [phone]    You may receive a survey about the MTM services you received.  I would appreciate your feedback to help me serve you better in the future. Please fill it out and return it when you can. Your comments will be anonymous.

## 2018-03-14 NOTE — PROGRESS NOTES
SUBJECTIVE/OBJECTIVE:                           Khang Bailon is a 73 year old male coming in for an initial visit for Medication Therapy Management.  He was referred to me from Dr. Trejo. Wife katie and daughter May.     Chief Complaint: COPD - patient is having difficulty breathing, but does not like medications and did not want to pay high cost of prescribed controller inhaler.     Allergies/ADRs: Reviewed in Epic  Tobacco: History of tobacco dependence - quit 6 years ago  Alcohol: 10 or more beverages /week  Caffeine: 3-4 cups/day of coffee  Activity: Limited activity due to breathing.    PMH: Reviewed in Epic    Medication Adherence/Access:  Patient takes medications directly from bottles.  Patient takes medications 1 time(s) per day.  Per patient, misses medication 0 times per week.   Medication barriers: fears/concerns about prescriptions medications/government/drug manufacturers and affording medications.   The patient fills medications at [facility]: YES.     COPD: Current medications: Albuterol MDI PRN - rarely uses.  Was prescribed Spiriva, but cost was greater than $350 so he did not  or start. They have a nebulizer machine at home and they are wondering if this would be an option.      Pt is not experiencing side effects.   Pt reports the following symptoms: increased SOB at rest and increased SOB upon exertion. Pt's wife and daughter say his breathing is getting worse.  They say he breathes heavily at rest and has a productive cough especially during the morning.   Pt does not have an COPD Action Plan on file.   Has spirometry been completed: No    Supplements: Pt's daughter is a distributor of [employer]oton[employer] vitamins/supplements. She has a history of Crohn's and was able to get of all her prescription medications by making these changes.  Patient states he feels much better since being on these medications and has no interest in stopping them.  Currently he is taking from [employer] the following  "products: OPC-3, Multivitamin, Calcium Plus, Activated B Complex, Digestive Enzymes with Probiotics, Vitamin D with K2, Vitamin C. Also takes an \"MCP Prostate\" supplement and something called the \"Daisha of the [place]\".     Current labs include:  Today's Vitals: There were no vitals taken for this visit. - vitals declined.  BP Readings from Last 3 Encounters:   02/21/18 150/90   11/16/17 138/88   08/22/17 (!) 152/102     Lab Results   Component Value Date    CHOL 198 08/10/2012     Lab Results   Component Value Date    TRIG 202 08/10/2012     Lab Results   Component Value Date    HDL 43 08/10/2012     Lab Results   Component Value Date     08/10/2012       Liver Function Studies -   Recent Labs   Lab Test  08/02/17   1354   PROTTOTAL  7.2   ALBUMIN  3.7   BILITOTAL  1.0   ALKPHOS  54   AST  13   ALT  26     Last Basic Metabolic Panel:  Lab Results   Component Value Date     08/02/2017      Lab Results   Component Value Date    POTASSIUM 3.6 08/02/2017     Lab Results   Component Value Date    CHLORIDE 107 08/02/2017     Lab Results   Component Value Date    BUN 18 08/02/2017     Lab Results   Component Value Date    CR 0.88 08/02/2017     GFR Estimate   Date Value Ref Range Status   08/02/2017 85 >60 mL/min/1.7m2 Final     Comment:     Non  GFR Calc   07/15/2016 79 >60 mL/min/1.7m2 Final     Comment:     Non  GFR Calc   07/14/2016 67 >60 mL/min/1.7m2 Final     Comment:     Non  GFR Calc     TSH   Date Value Ref Range Status   08/02/2017 1.09 0.40 - 4.00 mU/L Final       There is no immunization history on file for this patient.    ASSESSMENT:                             Current medications were reviewed today.     Medication Adherence: excellent, no issues identified    COPD: Needs Improvement. Patient would benefit from starting MARYCHUY/BRYANT nebulizer due to potential to be covered under Medicare Part B and despite multiple daily dosing patient prefers this " over other controller medications/expresses willingness to use.    Supplements: Stable. Pt is on multiple supplements and likely are contributing to limited funds to put towards breathing medications.   Pt is not interested in discussing stopping any of these products and his family shares this viewpoint. No safety concerns identified at this time.     PLAN:                            1. Stop Spiriva.  2. Start Duonebs every 6 hours PRN (plan patient to start taking 2-4 times daily).  3. Order for DME - nebulizer tubing placed    I spent 40 minutes with this patient today. All changes were made via collaborative practice agreement with Cecilio Trejo. A copy of the visit note was provided to the patient's primary care provider.    Will follow up in 2-4 weeks.    The patient was given a summary of these recommendations as an after visit summary.     Pepe Philip, PharmD, BCACP  Medication Therapy Management Pharmacist  Pager: [phone]

## 2018-04-02 ENCOUNTER — ALLIED HEALTH/NURSE VISIT (OUTPATIENT)
Dept: PHARMACY | Facility: CLINIC | Age: 73
End: 2018-04-02
Payer: COMMERCIAL

## 2018-04-02 DIAGNOSIS — J44.9 CHRONIC OBSTRUCTIVE PULMONARY DISEASE, UNSPECIFIED COPD TYPE (H): Primary | ICD-10-CM

## 2018-04-02 PROCEDURE — 99606 MTMS BY PHARM EST 15 MIN: CPT | Performed by: PHARMACIST

## 2018-04-02 NOTE — MR AVS SNAPSHOT
"              After Visit Summary   2018    Khang Bailon    MRN: [id number]           Patient Information     Date Of Birth          1945        Visit Information        Provider Department      2018 1:00 PM Jefferson Philip, [facility]        Today's Diagnoses     Chronic obstructive pulmonary disease, unspecified COPD type (H)    -  1      Care Instructions    Continue current medications.          Follow-ups after your visit        Who to contact     If you have questions or need follow up information about today's clinic visit or your schedule please contact [facility] directly at [phone].  Normal or non-critical lab and imaging results will be communicated to you by [employer], letter or phone within 4 business days after the clinic has received the results. If you do not hear from us within 7 days, please contact the clinic through Caipiaobaot or phone. If you have a critical or abnormal lab result, we will notify you by phone as soon as possible.  Submit refill requests through [employer] or call your pharmacy and they will forward the refill request to us. Please allow 3 business days for your refill to be completed.          Additional Information About Your Visit        MyChart Information     [employer] lets you send messages to your doctor, view your test results, renew your prescriptions, schedule appointments and more. To sign up, go to [email or web address] . Click on \"Log in\" on the left side of the screen, which will take you to the Welcome page. Then click on \"Sign up Now\" on the right side of the page.     You will be asked to enter the access code listed below, as well as some personal information. Please follow the directions to create your username and password.     Your access code is: [email or web address]  Expires: 2018  5:43 PM     Your access code will  in 90 days. If you need help or a new code, please call your [facility] or " [phone].        Care EveryWhere ID     This is your Care EveryWhere ID. This could be used by other organizations to access your [place] medical records  [id number]         Blood Pressure from Last 3 Encounters:   02/21/18 150/90   11/16/17 138/88   08/22/17 (!) 152/102    Weight from Last 3 Encounters:   02/21/18 192 lb 12.8 oz (87.5 kg)   11/16/17 187 lb 11.2 oz (85.1 kg)   08/22/17 188 lb (85.3 kg)              Today, you had the following     No orders found for display       Primary Care Provider Office Phone # Fax #    Cecilio Trejo [phone]        [facility] [address]        Equal Access to Services     [facility]: Hadii aad gary hadasho Soomaali, waaxda luqadaha, qaybta kaalmada adeegyada, bob alexander haypatricia aguillon . So [facility] [phone].    ATENCIÓN: Si habla español, tiene a carrera disposición servicios gratuitos de asistencia lingüística. Jair al [phone].    We comply with applicable federal civil rights laws and [place] laws. We do not discriminate on the basis of race, color, national origin, age, disability, sex, sexual orientation, or gender identity.            Thank you!     Thank you for choosing [facility]  for your care. Our goal is always to provide you with excellent care. Hearing back from our patients is one way we can continue to improve our services. Please take a few minutes to complete the written survey that you may receive in the mail after your visit with us. Thank you!             Your Updated Medication List - Protect others around you: Learn how to safely use, store and throw away your medicines at www.disposemymeds.org.          This list is accurate as of 4/2/18  2:43 PM.  Always use your most recent med list.                   Brand Name Dispense Instructions for use Diagnosis    albuterol 108 (90 BASE) MCG/ACT Inhaler    PROAIR HFA/PROVENTIL HFA/VENTOLIN HFA    1 Inhaler    Inhale 2  puffs into the lungs every 6 hours as needed for shortness of breath / dyspnea or wheezing    Chronic obstructive pulmonary disease, unspecified COPD type (H), SOB (shortness of breath)       B COMPLEX PO      Take 1 capful by mouth daily (Isotonix)        CALCIUM PLUS ADVANCED PO      Take 1 capful by mouth daily (Isotonix)        DIGESTIVE ENZYMES PO      Take 1 capful by mouth daily (Isotonix - w/ probiotics)        ibuprofen 600 MG tablet    ADVIL/MOTRIN    30 tablet    Take 1 tablet (600 mg) by mouth every 6 hours as needed for moderate pain        ipratropium - albuterol 0.5 mg/2.5 mg/3 mL 0.5-2.5 (3) MG/3ML neb solution    DUONEB    360 mL    Take 1 vial (3 mLs) by nebulization every 6 hours as needed for shortness of breath / dyspnea or wheezing    Chronic obstructive pulmonary disease, unspecified COPD type (H)       MULTI vitamin  MENS Tabs      Take 1 capful by mouth daily (Isotonix)        ONE-A-DAY PROSTATE PO      Take 1 capsule by mouth daily        order for DME     1 Tube    Equipment being ordered: Nebulizer hose    Chronic obstructive pulmonary disease, unspecified COPD type (H)       * UNABLE TO FIND      Take 1 capful by mouth daily MEDICATION NAME: Daisha of the [place]        * UNABLE TO FIND      Take 1 capful by mouth daily MEDICATION NAME: OPC-3 (Isotonix)        VITAMIN C PO      Take 1 capful by mouth daily (Isotonix)        VITAMIN K2-VITAMIN D3 PO      Take 1 capful by mouth daily (Isotonix)        * Notice:  This list has 2 medication(s) that are the same as other medications prescribed for you. Read the directions carefully, and ask your doctor or other care provider to review them with you.

## 2018-04-02 NOTE — PROGRESS NOTES
SUBJECTIVE/OBJECTIVE:                Khang Bailon is a 73 year old male called for a follow-up visit for Medication Therapy Management.  He was referred to me from Dr. Trejo.     Chief Complaint: Follow up from our visit on 3/14/18.  COPD/Neb follow-up  Tobacco: History of tobacco dependence - quit 6 years ago  Alcohol: 10 or more beverages /week    Medication Adherence/Access:  Patient takes medications directly from bottles.  Patient takes medications 1 time(s) per day.  Per patient, misses medication 0 times per week.   Medication barriers: fears/concerns about prescriptions medications/government/drug manufacturers and affording medications.   The patient fills medications at [facility]: YES.     COPD: Current medications: Duonebs three times daily and Albuterol MDI PRN - rarely uses.  Cost of nebulizer/supplies was ~$38 per patient report compared with $350 dollars. Since starting he has felt better at rest and with activity.  Less shortness of breath. Still coughing in the morning, but not as long and much better after using first neb of the day.  Pt is not experiencing side effects.   Pt reports the following symptoms: improving  Pt does not have an COPD Action Plan on file.   Has spirometry been completed: No    Current labs include:  Today's Vitals: There were no vitals taken for this visit. - telephone encounter, no vitals  BP Readings from Last 3 Encounters:   02/21/18 150/90   11/16/17 138/88   08/22/17 (!) 152/102     No results found for: A1C.  Lab Results   Component Value Date    CHOL 198 08/10/2012     Lab Results   Component Value Date    TRIG 202 08/10/2012     Lab Results   Component Value Date    HDL 43 08/10/2012     Lab Results   Component Value Date     08/10/2012       Liver Function Studies -   Recent Labs   Lab Test  08/02/17   1354   PROTTOTAL  7.2   ALBUMIN  3.7   BILITOTAL  1.0   ALKPHOS  54   AST  13   ALT  26     Last Basic Metabolic Panel:  Lab Results   Component Value Date      08/02/2017      Lab Results   Component Value Date    POTASSIUM 3.6 08/02/2017     Lab Results   Component Value Date    CHLORIDE 107 08/02/2017     Lab Results   Component Value Date    BUN 18 08/02/2017     Lab Results   Component Value Date    CR 0.88 08/02/2017     GFR Estimate   Date Value Ref Range Status   08/02/2017 85 >60 mL/min/1.7m2 Final     Comment:     Non  GFR Calc   07/15/2016 79 >60 mL/min/1.7m2 Final     Comment:     Non  GFR Calc   07/14/2016 67 >60 mL/min/1.7m2 Final     Comment:     Non  GFR Calc     TSH   Date Value Ref Range Status   08/02/2017 1.09 0.40 - 4.00 mU/L Final     ASSESSMENT:              Current medications were reviewed today as discussed above.      Medication Adherence: good, no issues identified    COPD: Improved.     PLAN:                  1. Continue current medications    I spent 15 minutes with this patient today. A copy of the visit note was provided to the patient's primary care provider.     Will follow up in 6 months or sooner if needed.    The patient declined a summary of these recommendations as an after visit summary.    Pepe Philip, AlvaD, BCACP  Medication Therapy Management Pharmacist  Pager: [phone]

## 2018-05-16 ENCOUNTER — HOSPITAL ENCOUNTER (EMERGENCY)
Facility: CLINIC | Age: 73
Discharge: HOME | End: 2018-05-16
Attending: FAMILY MEDICINE | Admitting: FAMILY MEDICINE
Payer: COMMERCIAL

## 2018-05-16 ENCOUNTER — APPOINTMENT (OUTPATIENT)
Dept: CT IMAGING | Facility: CLINIC | Age: 73
End: 2018-05-16
Attending: FAMILY MEDICINE
Payer: COMMERCIAL

## 2018-05-16 ENCOUNTER — TELEPHONE (OUTPATIENT)
Dept: FAMILY MEDICINE | Facility: CLINIC | Age: 73
End: 2018-05-16

## 2018-05-16 VITALS
TEMPERATURE: 99.7 F | HEART RATE: 98 BPM | DIASTOLIC BLOOD PRESSURE: 95 MMHG | SYSTOLIC BLOOD PRESSURE: 147 MMHG | RESPIRATION RATE: 16 BRPM | OXYGEN SATURATION: 93 %

## 2018-05-16 DIAGNOSIS — I10 HYPERTENSION GOAL BP (BLOOD PRESSURE) < 140/90: ICD-10-CM

## 2018-05-16 DIAGNOSIS — N40.0 PROSTATE ENLARGEMENT: ICD-10-CM

## 2018-05-16 DIAGNOSIS — R97.20 ELEVATED PSA: ICD-10-CM

## 2018-05-16 DIAGNOSIS — R33.9 URINARY RETENTION: ICD-10-CM

## 2018-05-16 DIAGNOSIS — N30.01 ACUTE CYSTITIS WITH HEMATURIA: ICD-10-CM

## 2018-05-16 LAB
ALBUMIN UR-MCNC: 30 MG/DL
APPEARANCE UR: ABNORMAL
BACTERIA #/AREA URNS HPF: ABNORMAL /HPF
BILIRUB UR QL STRIP: NEGATIVE
COLOR UR AUTO: YELLOW
GLUCOSE UR STRIP-MCNC: NEGATIVE MG/DL
HGB UR QL STRIP: ABNORMAL
KETONES UR STRIP-MCNC: NEGATIVE MG/DL
LEUKOCYTE ESTERASE UR QL STRIP: ABNORMAL
MUCOUS THREADS #/AREA URNS LPF: PRESENT /LPF
NITRATE UR QL: NEGATIVE
PH UR STRIP: 8 PH (ref 5–7)
RBC #/AREA URNS AUTO: >182 /HPF (ref 0–2)
SOURCE: ABNORMAL
SP GR UR STRIP: 1.01 (ref 1–1.03)
UROBILINOGEN UR STRIP-MCNC: 0 MG/DL (ref 0–2)
WBC #/AREA URNS AUTO: 69 /HPF (ref 0–5)

## 2018-05-16 PROCEDURE — 87186 SC STD MICRODIL/AGAR DIL: CPT | Performed by: FAMILY MEDICINE

## 2018-05-16 PROCEDURE — 81001 URINALYSIS AUTO W/SCOPE: CPT | Performed by: FAMILY MEDICINE

## 2018-05-16 PROCEDURE — 87088 URINE BACTERIA CULTURE: CPT | Performed by: FAMILY MEDICINE

## 2018-05-16 PROCEDURE — 99285 EMERGENCY DEPT VISIT HI MDM: CPT | Mod: 25 | Performed by: FAMILY MEDICINE

## 2018-05-16 PROCEDURE — 51798 US URINE CAPACITY MEASURE: CPT | Performed by: FAMILY MEDICINE

## 2018-05-16 PROCEDURE — 87086 URINE CULTURE/COLONY COUNT: CPT | Performed by: FAMILY MEDICINE

## 2018-05-16 PROCEDURE — 74176 CT ABD & PELVIS W/O CONTRAST: CPT

## 2018-05-16 PROCEDURE — 99284 EMERGENCY DEPT VISIT MOD MDM: CPT | Mod: Z6 | Performed by: FAMILY MEDICINE

## 2018-05-16 RX ORDER — TAMSULOSIN HYDROCHLORIDE 0.4 MG/1
0.4 CAPSULE ORAL DAILY
Qty: 30 CAPSULE | Refills: 0 | Status: SHIPPED | OUTPATIENT
Start: 2018-05-16 | End: 2018-06-15

## 2018-05-16 RX ORDER — CIPROFLOXACIN 500 MG/1
500 TABLET, FILM COATED ORAL 2 TIMES DAILY
Qty: 20 TABLET | Refills: 0 | Status: SHIPPED | OUTPATIENT
Start: 2018-05-16 | End: 2018-05-22 | Stop reason: ALTCHOICE

## 2018-05-16 ASSESSMENT — ENCOUNTER SYMPTOMS
CHILLS: 0
LIGHT-HEADEDNESS: 0
VOMITING: 0
DYSURIA: 1
POLYDIPSIA: 0
SHORTNESS OF BREATH: 0
NERVOUS/ANXIOUS: 0
FREQUENCY: 1
HEMATURIA: 0
FEVER: 0
DIZZINESS: 0
CONFUSION: 0
BACK PAIN: 1
DIARRHEA: 0
WEAKNESS: 0
NAUSEA: 0
DIFFICULTY URINATING: 1
EYE PAIN: 0
COUGH: 0
FLANK PAIN: 1
FATIGUE: 0
ABDOMINAL PAIN: 0

## 2018-05-16 NOTE — ED AVS SNAPSHOT
[facility] Emergency Department    [address] [place] [address]    Phone:  [phone]    Fax:  [phone]                                       Khang Bailon   MRN: [id number]    Department:  [facility] Emergency Department   Date of Visit:  5/16/2018           After Visit Summary Signature Page     I have received my discharge instructions, and my questions have been answered. I have discussed any challenges I see with this plan with the nurse or doctor.    ..........................................................................................................................................  Patient/Patient Representative Signature      ..........................................................................................................................................  Patient Representative Print Name and Relationship to Patient    ..................................................               ................................................  Date                                            Time    ..........................................................................................................................................  Reviewed by Signature/Title    ...................................................              ..............................................  Date                                                            Time

## 2018-05-16 NOTE — ED PROVIDER NOTES
"  History     Chief Complaint   Patient presents with     Urinary Frequency     HPI  Khang Bailon is a 73 year old male with history of COPD, 50-pack-year smoking history, diverticulitis with sepsis, elevated PSA since at least 2010, history of urinary retention and UTIs, third-degree heart block status post pacemaker, history of CVA 2016, anemia and alpha  Thalassemia who presents to the emergency room with frequency and dysuria starting yesterday. Patient usually gets up frequently at night as he has for many years. He is doing this more last night and it was burning on urination. He had been on Flomax in the past but quit taking it as \"it was taken off the market or something\". He takes an over-the-counter herbal but does not recall the name. He has a history of sepsis related to his diverticulitis in 2016. He has a history of frequent UTIs. She last saw urology in 2013. He thinks he passed a kidney stone once at home. He has had a left lower lumbar intake which she is attributed to a minor backache which has been present and unchanged for at least 2 months. This bothers him if he lays in a wrong position but otherwise does not cause problems. He denies any hematuria. He and wife state he drinks lots of fluids and his urine is usually yellow.    Problem List:    Patient Active Problem List    Diagnosis Date Noted     Pulmonary emphysema, unspecified emphysema type (H) 11/16/2017     Priority: Medium     Benign neoplasm of colon, unspecified part of colon 07/26/2016     Priority: Medium     Sigmoid diverticulitis 07/12/2016     Priority: Medium     History of CVA (cerebrovascular accident) - old left internal capsule lacunar infarct on CT 7/2016 07/12/2016     Priority: Medium     Vomiting 07/12/2016     Priority: Medium     Multilevel degenerative disc disease 09/21/2015     Priority: Medium     Elevated prostate specific antigen (PSA) 09/11/2015     Priority: Medium     Health Care Home 01/24/2013     Priority: " Medium     Racquel Ortiz RN-PHN  FPA / [facility]   [phone]  DX V65.8 REPLACED WITH [id number] (04/08/2013)       Elevated PSA 08/27/2012     Priority: Medium     Loss of weight 08/27/2012     Priority: Medium     Third degree heart block (H) - s/p pacemaker 08/10/2012     Priority: Medium     Hypertension goal BP (blood pressure) < 140/90 08/10/2012     Priority: Medium     Advanced directives, counseling/discussion 07/27/2012     Priority: Medium     Discussed advance care planning with patient; however, patient declined at this time. 7/27/2012          Thalassemia 07/27/2012     Priority: Medium     Diagnosis updated by automated process. Provider to review and confirm.       Hyperlipidemia LDL goal <130 07/27/2012     Priority: Medium     Painful respiration 03/31/2004     Priority: Medium     Personal history of tobacco use, presenting hazards to health 03/31/2004     Priority: Medium        Past Medical History:    Past Medical History:   Diagnosis Date     Alpha thalassemia (H)      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     Diverticulitis      Injury, other and unspecified, elbow, forearm, and wrist 1970s       Past Surgical History:    Past Surgical History:   Procedure Laterality Date     COLONOSCOPY       COLONOSCOPY  12/10/2012     IMPLANT PACEMAKER         Family History:    No family history on file.    Social History:  Marital Status:   [2]  Social History   Substance Use Topics     Smoking status: Former Smoker     Years: 50.00     Types: Cigarettes     Quit date: 7/27/2012     Smokeless tobacco: Never Used      Comment: Quit in July     Alcohol use No        Medications:      ciprofloxacin (CIPRO) 500 MG tablet   tamsulosin (FLOMAX) 0.4 MG capsule   albuterol (PROAIR HFA/PROVENTIL HFA/VENTOLIN HFA) 108 (90 BASE) MCG/ACT Inhaler   Ascorbic Acid (VITAMIN C PO)   B Complex Vitamins (B COMPLEX PO)   DIGESTIVE ENZYMES PO   ibuprofen (ADVIL,MOTRIN) 600 MG tablet   ipratropium  - albuterol 0.5 mg/2.5 mg/3 mL (DUONEB) 0.5-2.5 (3) MG/3ML neb solution   Misc Natural Products (CALCIUM PLUS ADVANCED PO)   Multiple Vitamin (MULTI VITAMIN  MENS) TABS   order for DME   Specialty Vitamins Products (ONE-A-DAY PROSTATE PO)   UNABLE TO FIND   UNABLE TO FIND   Vitamin D-Vitamin K (VITAMIN K2-VITAMIN D3 PO)         Review of Systems   Constitutional: Negative for chills, fatigue and fever.   HENT: Negative for congestion.    Eyes: Negative for pain.   Respiratory: Negative for cough and shortness of breath.    Cardiovascular: Negative for chest pain.   Gastrointestinal: Negative for abdominal pain, diarrhea, nausea and vomiting.   Endocrine: Negative for polydipsia and polyuria.   Genitourinary: Positive for difficulty urinating, dysuria, flank pain and frequency. Negative for decreased urine volume, discharge and hematuria.   Musculoskeletal: Positive for back pain.   Skin: Negative for rash.   Allergic/Immunologic: Negative for immunocompromised state.   Neurological: Negative for dizziness, weakness and light-headedness.   Psychiatric/Behavioral: Negative for confusion. The patient is not nervous/anxious.        Physical Exam   BP: (!) 185/107  Pulse: 98  Heart Rate: 100  Temp: 99.7  F (37.6  C)  Resp: 16  SpO2: 93 %      Physical Exam   Constitutional: He is oriented to person, place, and time. He appears well-developed and well-nourished. No distress.   HENT:   Head: Normocephalic and atraumatic.   Eyes: Conjunctivae are normal.   Neck: Normal range of motion. Neck supple.   Cardiovascular: Normal rate, regular rhythm and normal heart sounds.    Pulmonary/Chest: Effort normal and breath sounds normal.   Abdominal: Soft. There is no tenderness.   Genitourinary:   Genitourinary Comments: Rectal refused   Musculoskeletal: Normal range of motion.   Neurological: He is alert and oriented to person, place, and time.   Skin: Skin is warm and dry.   Psychiatric: He has a normal mood and affect. His  behavior is normal.   Nursing note and vitals reviewed.      ED Course     ED Course     Results for orders placed or performed during the hospital encounter of 05/16/18 (from the past 48 hour(s))   UA with Microscopic   Result Value Ref Range    Color Urine Yellow     Appearance Urine Cloudy     Glucose Urine Negative NEG^Negative mg/dL    Bilirubin Urine Negative NEG^Negative    Ketones Urine Negative NEG^Negative mg/dL    Specific Gravity Urine 1.013 1.003 - 1.035    Blood Urine Large (A) NEG^Negative    pH Urine 8.0 (H) 5.0 - 7.0 pH    Protein Albumin Urine 30 (A) NEG^Negative mg/dL    Urobilinogen mg/dL 0.0 0.0 - 2.0 mg/dL    Nitrite Urine Negative NEG^Negative    Leukocyte Esterase Urine Large (A) NEG^Negative    Source Midstream Urine     WBC Urine 69 (H) 0 - 5 /HPF    RBC Urine >182 (H) 0 - 2 /HPF    Bacteria Urine Many (A) NEG^Negative /HPF    Mucous Urine Present (A) NEG^Negative /LPF   CT Abdomen Pelvis w/o Contrast    Narrative    CT ABDOMEN AND PELVIS WITHOUT CONTRAST  5/16/2018 11:34 AM    HISTORY:  Right flank pain, history of stones, urinary tract  infection.     TECHNIQUE: Scans obtained from the diaphragm through the pelvis  without oral or IV contrast.   Radiation dose for this scan was reduced using automated exposure  control, adjustment of the mA and/or kV according to patient size, or  iterative reconstruction technique.    COMPARISON:  CT abdomen and pelvis dated 7/12/2016 and 12/19/2012.    FINDINGS:  Bulla is again seen in the medial left lung base.  Visualized portions of the lung bases are otherwise unremarkable.  Probable pacer wires are projected over the right ventricle. There are  coronary artery calcifications and/or stents. Aortic calcifications  are noted. Visualized mediastinal contents are otherwise unremarkable.  Degenerative changes are noted in the spine and to a lesser extent in  the SI and hip joints. There are no aggressive osseous lesions.    Large gallstones are again  noted, similar to the prior study. The  largest measures up to 3.5 cm. The smaller stone appears to be in the  gallbladder neck, measures up to 1.4 cm and could be impacted in this  region.    The liver, pancreas, spleen and bilateral adrenal glands are grossly  normal in appearance for noncontrast CT. Multiple low-attenuation  lesions in bilateral kidneys are again noted. These are most  consistent with cysts. No hydronephrosis, nephrolithiasis, hydroureter  or ureteral calculus is identified. Urinary bladder is grossly  unremarkable but is indented posteriorly and inferiorly by an enlarged  prostate gland. The prostate gland does contain some dystrophic  calcifications but does not appear significantly changed since the  prior study from 2016.    No adenopathy, free fluid or free air is seen in the peritoneal  cavity. There is mild aneurysmal dilatation of the   infrarenal  abdominal aorta measuring up to 3.1 cm in cross-section. This does not  appear significantly changed since the prior study. Extensive  atherosclerosis is noted.    There are scattered colonic diverticuli. These are most prominently  noted in the sigmoid colon. Areas of gas collections are seen around  the sigmoid colon likely in diverticuli. The largest is adjacent to  the left side of the sigmoid diverticulum (image 53 series 2). This is  similar to the prior study dated 2012 and likely represents a large  diverticulum. Appendix is grossly normal in appearance. Colon is  otherwise unremarkable. The small bowel is of normal caliber. Stomach  is mostly decompressed but otherwise unremarkable.      Impression    IMPRESSION:  1. Extensive colonic diverticulosis, worst in the sigmoid colon. Focal  gas collection adjacent sigmoid colon likely represents gas in the  large diverticulum and is similar in size as compared to the prior  study dated 12/19/2012. A superimposed acute mild diverticulitis is  difficult to entirely exclude but is not  definitely seen  2. No evidence for urinary system calculus or obstruction.  3. Infrarenal abdominal aortic aneurysm measuring up to 3.1 cm and is  similar in appearance to most recent prior study.  4. Large gallstones are again noted. The largest measures up to 3.5  cm. The other gallstone appears to be impacted in the neck of the  gallbladder. Recommend clinical correlation.  5. No other etiology for patient's symptoms is seen.  6. Evaluation of the solid organs of the abdomen and pelvis is limited  due to lack of IV contrast.         Procedures               Critical Care time:  none               Results for orders placed or performed during the hospital encounter of 05/16/18 (from the past 24 hour(s))   UA with Microscopic   Result Value Ref Range    Color Urine Yellow     Appearance Urine Cloudy     Glucose Urine Negative NEG^Negative mg/dL    Bilirubin Urine Negative NEG^Negative    Ketones Urine Negative NEG^Negative mg/dL    Specific Gravity Urine 1.013 1.003 - 1.035    Blood Urine Large (A) NEG^Negative    pH Urine 8.0 (H) 5.0 - 7.0 pH    Protein Albumin Urine 30 (A) NEG^Negative mg/dL    Urobilinogen mg/dL 0.0 0.0 - 2.0 mg/dL    Nitrite Urine Negative NEG^Negative    Leukocyte Esterase Urine Large (A) NEG^Negative    Source Midstream Urine     WBC Urine 69 (H) 0 - 5 /HPF    RBC Urine >182 (H) 0 - 2 /HPF    Bacteria Urine Many (A) NEG^Negative /HPF    Mucous Urine Present (A) NEG^Negative /LPF   CT Abdomen Pelvis w/o Contrast    Narrative    CT ABDOMEN AND PELVIS WITHOUT CONTRAST  5/16/2018 11:34 AM    HISTORY:  Right flank pain, history of stones, urinary tract  infection.     TECHNIQUE: Scans obtained from the diaphragm through the pelvis  without oral or IV contrast.   Radiation dose for this scan was reduced using automated exposure  control, adjustment of the mA and/or kV according to patient size, or  iterative reconstruction technique.    COMPARISON:  CT abdomen and pelvis dated 7/12/2016 and  12/19/2012.    FINDINGS:  Bulla is again seen in the medial left lung base.  Visualized portions of the lung bases are otherwise unremarkable.  Probable pacer wires are projected over the right ventricle. There are  coronary artery calcifications and/or stents. Aortic calcifications  are noted. Visualized mediastinal contents are otherwise unremarkable.  Degenerative changes are noted in the spine and to a lesser extent in  the SI and hip joints. There are no aggressive osseous lesions.    Large gallstones are again noted, similar to the prior study. The  largest measures up to 3.5 cm. The smaller stone appears to be in the  gallbladder neck, measures up to 1.4 cm and could be impacted in this  region.    The liver, pancreas, spleen and bilateral adrenal glands are grossly  normal in appearance for noncontrast CT. Multiple low-attenuation  lesions in bilateral kidneys are again noted. These are most  consistent with cysts. No hydronephrosis, nephrolithiasis, hydroureter  or ureteral calculus is identified. Urinary bladder is grossly  unremarkable but is indented posteriorly and inferiorly by an enlarged  prostate gland. The prostate gland does contain some dystrophic  calcifications but does not appear significantly changed since the  prior study from 2016.    No adenopathy, free fluid or free air is seen in the peritoneal  cavity. There is mild aneurysmal dilatation of the   infrarenal  abdominal aorta measuring up to 3.1 cm in cross-section. This does not  appear significantly changed since the prior study. Extensive  atherosclerosis is noted.    There are scattered colonic diverticuli. These are most prominently  noted in the sigmoid colon. Areas of gas collections are seen around  the sigmoid colon likely in diverticuli. The largest is adjacent to  the left side of the sigmoid diverticulum (image 53 series 2). This is  similar to the prior study dated 2012 and likely represents a large  diverticulum. Appendix is  grossly normal in appearance. Colon is  otherwise unremarkable. The small bowel is of normal caliber. Stomach  is mostly decompressed but otherwise unremarkable.      Impression    IMPRESSION:  1. Extensive colonic diverticulosis, worst in the sigmoid colon. Focal  gas collection adjacent sigmoid colon likely represents gas in the  large diverticulum and is similar in size as compared to the prior  study dated 12/19/2012. A superimposed acute mild diverticulitis is  difficult to entirely exclude but is not definitely seen  2. No evidence for urinary system calculus or obstruction.  3. Infrarenal abdominal aortic aneurysm measuring up to 3.1 cm and is  similar in appearance to most recent prior study.  4. Large gallstones are again noted. The largest measures up to 3.5  cm. The other gallstone appears to be impacted in the neck of the  gallbladder. Recommend clinical correlation.  5. No other etiology for patient's symptoms is seen.  6. Evaluation of the solid organs of the abdomen and pelvis is limited  due to lack of IV contrast.       Medications - No data to display    Assessments & Plan (with Medical Decision Making)   MDM--Khang is a pleasant 73-year-old male who presents to the emergency room with difficulty urinating and painful urination. He has had known prostate enlargement for the past 8-10 years. He has had a steady increase in his PSA. Last checked was 8/2/17 when it was 25.3. He has seen urology 5 years ago in 2013. He is not followed up despite this being recommended to him. The patient was on Flomax but discontinued it. He is not sure why. He thinks the prescription just ran out. He gets up numerous times during the night. His bladder scan here post void had 350 cc of urine. The patient refuses any catheter and they think at this point it would be best not to use 1. He definitely has a UTI with hematuria. I also did a CT scan to rule out stone with his hematuria, history of stones and low back pain.  He has extensive diverticulosis without diverticulitis at this time. He has no fever or chills and does not feel sick. We'll treat his urinary tract infection with Cipro 500 b.i.d. and for 10 days given his enlarged prostate. He also agreed to restart Flomax 0.4 mg a day and may have to increase to 2 tablets a day which is what he was on previously. And most importantly I discussed with him the need to follow-up with urology. His wife verbalizes that she will make the appointment. He has a history of sepsis related to his diverticulitis and if he develops fever he needs reevaluation in the emergency room immediately. Patient and wife verbalized understanding and are discharged in good condition.  I have reviewed the nursing notes.    I have reviewed the findings, diagnosis, plan and need for follow up with the patient.       Discharge Medication List as of 5/16/2018 12:10 PM      START taking these medications    Details   ciprofloxacin (CIPRO) 500 MG tablet Take 1 tablet (500 mg) by mouth 2 times daily for 10 days, Disp-20 tablet, R-0, E-Prescribe      tamsulosin (FLOMAX) 0.4 MG capsule Take 1 capsule (0.4 mg) by mouth daily for 30 doses, Disp-30 capsule, R-0, E-Prescribe             Final diagnoses:   Acute cystitis with hematuria   Urinary retention   Prostate enlargement   Elevated PSA   Hypertension goal BP (blood pressure) < 140/90       5/16/2018   [facility] EMERGENCY DEPARTMENT     Marino, Luis PÉREZ MD  05/16/18 [id number]

## 2018-05-16 NOTE — ED AVS SNAPSHOT
" [facility] Emergency Department    [address] [place] [address]    Phone:  [phone]    Fax:  [phone]                                       Khang Bailon   MRN: [id number]    Department:  [facility] Emergency Department   Date of Visit:  5/16/2018           Patient Information     Date Of Birth          1945        Your diagnoses for this visit were:     Acute cystitis with hematuria     Urinary retention     Prostate enlargement     Elevated PSA     Hypertension goal BP (blood pressure) < 140/90        You were seen by MarinoLuis MD.      Follow-up Information     Follow up with Osei Freeman MD. Schedule an appointment as soon as possible for a visit in 1 week.    Specialty:  Urology    Contact information:    [facility]  [address]  [place] [address] [phone]          Follow up with [facility] Emergency Department.    Specialty:  EMERGENCY MEDICINE    Why:  If symptoms worsen    Contact information:    [address] [place] [address] [phone]    Additional information:    From [address]: Exit at [address] on [address] side of [place]. Turn right on [address]. Turn left at stoplight on [email or web address]. [facility] will be in view two blocks ahead        Discharge Instructions          * BLADDER INFECTION: Male (Adult)    A bladder infection (\"cystitis\" or \"UTI\") usually causes a constant urge to urinate, and a burning when passing urine. Urine may be cloudy, smelly or dark. There may be also be pain in the lower abdomen.  Cystitis in males is not common. It may be caused by a partial blockage in the urinary system that keeps the bladder from emptying completely. This is most often related to an enlarged prostate gland.  HOME CARE:  1. Drink lots of fluids (at least 6-8 glasses a day). This will flush the bacteria out of your bladder. Cranberry juice has been shown to help clear " out the bacteria.  2. Avoid sexual intercourse until your symptoms are gone.  3. A bladder infection is treated with antibiotics. You may also be given Pyridium (generic - phenazopyridine) to reduce burning with urination. This will cause urine to become a bright orange color, which can stain clothing.  FOLLOW UP with your doctor or this facility if ALL symptoms have not cleared within five days. It is important to keep your follow up appointment to discuss with your doctor the need for further tests of the urinary tract.  GET PROMPT MEDICAL ATTENTION if any of the following occur:    Fever over 101  F (38.3  C)    No improvement by the third day of treatment    Increasing back or abdominal pain    Repeated vomiting; unable to keep medicine down    Weakness, dizziness or fainting    9966-2552 [employer]. [address] [place] [address], [place] [address]. All rights reserved. This information is not intended as a substitute for professional medical care. Always follow your healthcare professional's instructions.  This information has been modified by your health care provider with permission from the publisher.      BPH (Enlarged Prostate)  The prostate is a gland at the base of the bladder. As some men get older, the prostate may get bigger in size. This problem is called benign prostatic hyperplasia (BPH). BPH puts pressure on the urethra. This is the tube that carries urine from the bladder to the penis. It may interfere with the flow of urine. It may also keep the bladder from emptying fully.    Symptoms of BPH include trouble starting urination and feeling as though the bladder isn t emptying all the way. It also includes a weak urine stream, dribbling and leaking of urine, and frequent and urgent urination (especially at night). BPH can increase the risk of urinary infections. It can also block off urine flow completely. If this occurs, a thin tube (catheter) may be passed into the bladder to help drain  urine.  If symptoms are mild, no treatment may be needed right now. If symptoms are more severe, treatment is likely needed. The goal of treatment is to improve urine flow and reduce symptoms. Treatments can include medicine and procedures. Your healthcare provider will discuss treatment options with you as needed.  Home care  The following guidelines will help you care for yourself at home:    Urinate as soon as you feel the urge. Don't try to hold your urine.    Don't limit your fluid intake during the day. Drink 6 to 8 glasses of water or liquids a day. This prevents bacteria from building up in the bladder.    Avoid drinking fluids after dinner to help reduce urination during the night.    Avoid medicines that can worsen your symptoms. These include certain cold and allergy medicines and antidepressants. Diuretics used for high blood pressure can also worsen symptoms. Talk to your doctor about the medicines you take. Other choices may work better for you.  Prostate cancer screening  BPH does not increase the risk of prostate cancer. But because prostate cancer is a common cancer in men, screening is sometimes recommended. This may help detect the cancer in its early stages when treatment is most effective. Factors that can increase the risk of prostate cancer include being -American or having a father or brother who had prostate cancer. A high-fat diet may also increase the risk of prostate cancer. Talk to your healthcare provider to see whether you should be screened for prostate cancer.  Follow-up care  Follow up with your healthcare provider, or as advised  To learn more, go to:    National Kidney & Urologic Diseases Information Clearinghouse  kidney.niddk.nih.gov, 884.532.4863  When to seek medical advice  Call your healthcare provider right away if any of these occur:    Fever of 100.4 F (38.0 C) or higher, or as advised    Unable to pass urine for 8 hours    Increasing pressure or pain in your  bladder (lower abdomen)    Blood in the urine    Increasing low back pain, not related to injury    Symptoms of urinary infection (increased urge to urinate, burning when passing urine, foul-smelling urine)  Date Last Reviewed: 7/1/2016 2000-2017 [employer]. [address] [place] [address]. All rights reserved. This information is not intended as a substitute for professional medical care. Always follow your healthcare professional's instructions.          24 Hour Appointment Hotline       To make an appointment at any [facility], call [phone] ([phone]). If you don't have a family doctor or clinic, we will help you find one. [facility] are conveniently located to serve the needs of you and your family.             Review of your medicines      START taking        Dose / Directions Last dose taken    ciprofloxacin 500 MG tablet   Commonly known as:  CIPRO   Dose:  500 mg   Quantity:  20 tablet        Take 1 tablet (500 mg) by mouth 2 times daily for 10 days   Refills:  0        tamsulosin 0.4 MG capsule   Commonly known as:  FLOMAX   Dose:  0.4 mg   Quantity:  30 capsule        Take 1 capsule (0.4 mg) by mouth daily for 30 doses   Refills:  0          Our records show that you are taking the medicines listed below. If these are incorrect, please call your family doctor or clinic.        Dose / Directions Last dose taken    albuterol 108 (90 Base) MCG/ACT Inhaler   Commonly known as:  PROAIR HFA/PROVENTIL HFA/VENTOLIN HFA   Dose:  2 puff   Quantity:  1 Inhaler        Inhale 2 puffs into the lungs every 6 hours as needed for shortness of breath / dyspnea or wheezing   Refills:  3        B COMPLEX PO   Dose:  1 capful        Take 1 capful by mouth daily (Isotonix)   Refills:  0        CALCIUM PLUS ADVANCED PO   Dose:  1 capful        Take 1 capful by mouth daily (Isotonix)   Refills:  0        DIGESTIVE ENZYMES PO   Dose:  1 capful        Take 1 capful by mouth daily  (Isotonix - w/ probiotics)   Refills:  0        ibuprofen 600 MG tablet   Commonly known as:  ADVIL/MOTRIN   Dose:  600 mg   Quantity:  30 tablet        Take 1 tablet (600 mg) by mouth every 6 hours as needed for moderate pain   Refills:  0        ipratropium - albuterol 0.5 mg/2.5 mg/3 mL 0.5-2.5 (3) MG/3ML neb solution   Commonly known as:  DUONEB   Dose:  1 vial   Quantity:  360 mL        Take 1 vial (3 mLs) by nebulization every 6 hours as needed for shortness of breath / dyspnea or wheezing   Refills:  2        MULTI vitamin  MENS Tabs   Dose:  1 capful        Take 1 capful by mouth daily (Isotonix)   Refills:  0        ONE-A-DAY PROSTATE PO   Dose:  1 capsule        Take 1 capsule by mouth daily   Refills:  0        order for DME   Quantity:  1 Tube        Equipment being ordered: Nebulizer hose   Refills:  11        * UNABLE TO FIND   Dose:  1 capful        Take 1 capful by mouth daily MEDICATION NAME: [facility]   Refills:  0        * UNABLE TO FIND   Dose:  1 capful        Take 1 capful by mouth daily MEDICATION NAME: OPC-3 (Isotonix)   Refills:  0        VITAMIN C PO   Dose:  1 capful        Take 1 capful by mouth daily (Isotonix)   Refills:  0        VITAMIN K2-VITAMIN D3 PO   Dose:  1 capful        Take 1 capful by mouth daily (Isotonix)   Refills:  0        * Notice:  This list has 2 medication(s) that are the same as other medications prescribed for you. Read the directions carefully, and ask your doctor or other care provider to review them with you.            Prescriptions were sent or printed at these locations (2 Prescriptions)                   [facility], [place] - [address]    [address] [facility] [address]    Telephone:  [phone]   Fax:  [phone]   Hours:                  E-Prescribed (2 of 2)         ciprofloxacin (CIPRO) 500 MG tablet               tamsulosin (FLOMAX) 0.4 MG capsule                Procedures and tests performed during your  "visit     Bladder scan    CT Abdomen Pelvis w/o Contrast    UA with Microscopic    Urine Culture      Orders Needing Specimen Collection     None      Pending Results     Date and Time Order Name Status Description    2018 1119 CT Abdomen Pelvis w/o Contrast Preliminary     2018 1106 Urine Culture In process             Pending Culture Results     Date and Time Order Name Status Description    2018 1106 Urine Culture In process             Pending Results Instructions     If you had any lab results that were not finalized at the time of your Discharge, you can call the ED Lab Result RN at [phone]. You will be contacted by this team for any positive Lab results or changes in treatment. The nurses are available 7 days a week from 10A to 6:30P.  You can leave a message 24 hours per day and they will return your call.        Thank you for choosing [facility]       Thank you for choosing [facility] for your care. Our goal is always to provide you with excellent care. Hearing back from our patients is one way we can continue to improve our services. Please take a few minutes to complete the written survey that you may receive in the mail after you visit with us. Thank you!        Medication Review Information     Medication Review lets you send messages to your doctor, view your test results, renew your prescriptions, schedule appointments and more. To sign up, go to [email or web address] . Click on \"Log in\" on the left side of the screen, which will take you to the Welcome page. Then click on \"Sign up Now\" on the right side of the page.     You will be asked to enter the access code listed below, as well as some personal information. Please follow the directions to create your username and password.     Your access code is: [email or web address]  Expires: 2018  5:43 PM     Your access code will  in 90 days. If you need help or a new code, please call your [facility] clinic or [phone].        Care EveryWhere ID     " This is your Care EveryWhere ID. This could be used by other organizations to access your [facility] medical records  [id number]        Equal Access to Services     GOLDY MELENDEZ : Jacquelyn Vital, richard arguello, bob dawson. So [facility] [phone].    ATENCIÓN: Si habla español, tiene a carrera disposición servicios gratuitos de asistencia lingüística. Llame al [phone].    We comply with applicable federal civil rights laws and [place] laws. We do not discriminate on the basis of race, color, national origin, age, disability, sex, sexual orientation, or gender identity.            After Visit Summary       This is your record. Keep this with you and show to your community pharmacist(s) and doctor(s) at your next visit.

## 2018-05-16 NOTE — TELEPHONE ENCOUNTER
": 1945  PHONE #'s: [phone] (home)     PRESENTING PROBLEM:  Unable to urinate  NURSING ASSESSMENT  Description: \" My  just told me has bee up all night as unable to urinate. What should we do?\"  Onset/duration:   He last urinated yesterday.   Precip. factors:   \" I know he has an enlarged prostate.\"  Assoc. Sx:  \" He said it burns when some urine tries to come out. \"   Improves/worsens Sx:   WORSE  Pain scale (1-10)   NA- speaking with wife.  sitting on toilet trying to void. Unable.   Sx specific meds:  NA  Last exam/Tx:   Has NOT been seen for this.     RECOMMENDED DISPOSITION:  To ED, another person to drive - His wife will bring him in .   Will comply with recommendation: YES  If further questions/concerns or if Sx do not improve, worsen or new Sx develop, call your PCP or [facility] as soon as possible.    NOTES:  Disposition was determined by the first positive assessment question, therefore all previous assessment questions were negative.  Informed to check provider manual or call insurance company to assure coverage.    Guideline used: Urination, Difficult.   Telephone Triage Protocols for Nurses, Fifth Edition, Mellisa Alcala RN  May 16, 2018    "

## 2018-05-16 NOTE — DISCHARGE INSTRUCTIONS
"   * BLADDER INFECTION: Male (Adult)    A bladder infection (\"cystitis\" or \"UTI\") usually causes a constant urge to urinate, and a burning when passing urine. Urine may be cloudy, smelly or dark. There may be also be pain in the lower abdomen.  Cystitis in males is not common. It may be caused by a partial blockage in the urinary system that keeps the bladder from emptying completely. This is most often related to an enlarged prostate gland.  HOME CARE:  1. Drink lots of fluids (at least 6-8 glasses a day). This will flush the bacteria out of your bladder. Cranberry juice has been shown to help clear out the bacteria.  2. Avoid sexual intercourse until your symptoms are gone.  3. A bladder infection is treated with antibiotics. You may also be given Pyridium (generic - phenazopyridine) to reduce burning with urination. This will cause urine to become a bright orange color, which can stain clothing.  FOLLOW UP with your doctor or this facility if ALL symptoms have not cleared within five days. It is important to keep your follow up appointment to discuss with your doctor the need for further tests of the urinary tract.  GET PROMPT MEDICAL ATTENTION if any of the following occur:    Fever over 101  F (38.3  C)    No improvement by the third day of treatment    Increasing back or abdominal pain    Repeated vomiting; unable to keep medicine down    Weakness, dizziness or fainting    0951-2369 [employer] [address] [place] [address]. All rights reserved. This information is not intended as a substitute for professional medical care. Always follow your healthcare professional's instructions.  This information has been modified by your health care provider with permission from the publisher.      BPH (Enlarged Prostate)  The prostate is a gland at the base of the bladder. As some men get older, the prostate may get bigger in size. This problem is called benign prostatic hyperplasia (BPH). BPH " puts pressure on the urethra. This is the tube that carries urine from the bladder to the penis. It may interfere with the flow of urine. It may also keep the bladder from emptying fully.    Symptoms of BPH include trouble starting urination and feeling as though the bladder isn t emptying all the way. It also includes a weak urine stream, dribbling and leaking of urine, and frequent and urgent urination (especially at night). BPH can increase the risk of urinary infections. It can also block off urine flow completely. If this occurs, a thin tube (catheter) may be passed into the bladder to help drain urine.  If symptoms are mild, no treatment may be needed right now. If symptoms are more severe, treatment is likely needed. The goal of treatment is to improve urine flow and reduce symptoms. Treatments can include medicine and procedures. Your healthcare provider will discuss treatment options with you as needed.  Home care  The following guidelines will help you care for yourself at home:    Urinate as soon as you feel the urge. Don't try to hold your urine.    Don't limit your fluid intake during the day. Drink 6 to 8 glasses of water or liquids a day. This prevents bacteria from building up in the bladder.    Avoid drinking fluids after dinner to help reduce urination during the night.    Avoid medicines that can worsen your symptoms. These include certain cold and allergy medicines and antidepressants. Diuretics used for high blood pressure can also worsen symptoms. Talk to your doctor about the medicines you take. Other choices may work better for you.  Prostate cancer screening  BPH does not increase the risk of prostate cancer. But because prostate cancer is a common cancer in men, screening is sometimes recommended. This may help detect the cancer in its early stages when treatment is most effective. Factors that can increase the risk of prostate cancer include being -American or having a father or  brother who had prostate cancer. A high-fat diet may also increase the risk of prostate cancer. Talk to your healthcare provider to see whether you should be screened for prostate cancer.  Follow-up care  Follow up with your healthcare provider, or as advised  To learn more, go to:    National Kidney & Urologic Diseases Information Clearinghouse  kidney.niddk.nih.gov, 491.355.4788  When to seek medical advice  Call your healthcare provider right away if any of these occur:    Fever of 100.4 F (38.0 C) or higher, or as advised    Unable to pass urine for 8 hours    Increasing pressure or pain in your bladder (lower abdomen)    Blood in the urine    Increasing low back pain, not related to injury    Symptoms of urinary infection (increased urge to urinate, burning when passing urine, foul-smelling urine)  Date Last Reviewed: 7/1/2016 2000-2017 [employer]. [address] [place] [address], [place] [address]. All rights reserved. This information is not intended as a substitute for professional medical care. Always follow your healthcare professional's instructions.

## 2018-05-18 ENCOUNTER — TELEPHONE (OUTPATIENT)
Dept: EMERGENCY MEDICINE | Facility: CLINIC | Age: 73
End: 2018-05-18

## 2018-05-18 ENCOUNTER — HOSPITAL ENCOUNTER (EMERGENCY)
Facility: CLINIC | Age: 73
Discharge: HOME | End: 2018-05-18
Attending: EMERGENCY MEDICINE | Admitting: EMERGENCY MEDICINE
Payer: COMMERCIAL

## 2018-05-18 VITALS
SYSTOLIC BLOOD PRESSURE: 150 MMHG | BODY MASS INDEX: 29.76 KG/M2 | WEIGHT: 190 LBS | DIASTOLIC BLOOD PRESSURE: 104 MMHG | OXYGEN SATURATION: 96 % | TEMPERATURE: 97.4 F | HEART RATE: 94 BPM | RESPIRATION RATE: 20 BRPM

## 2018-05-18 DIAGNOSIS — N39.0 URINARY TRACT INFECTION: ICD-10-CM

## 2018-05-18 DIAGNOSIS — R33.8 ACUTE URINARY RETENTION: ICD-10-CM

## 2018-05-18 LAB
ALBUMIN UR-MCNC: 30 MG/DL
APPEARANCE UR: ABNORMAL
BACTERIA #/AREA URNS HPF: ABNORMAL /HPF
BACTERIA SPEC CULT: ABNORMAL
BILIRUB UR QL STRIP: NEGATIVE
COLOR UR AUTO: YELLOW
GLUCOSE UR STRIP-MCNC: NEGATIVE MG/DL
HGB UR QL STRIP: ABNORMAL
KETONES UR STRIP-MCNC: 20 MG/DL
LEUKOCYTE ESTERASE UR QL STRIP: ABNORMAL
Lab: ABNORMAL
NITRATE UR QL: NEGATIVE
PH UR STRIP: 5 PH (ref 5–7)
RBC #/AREA URNS AUTO: 38 /HPF (ref 0–2)
SOURCE: ABNORMAL
SP GR UR STRIP: 1.01 (ref 1–1.03)
SPECIMEN SOURCE: ABNORMAL
SQUAMOUS #/AREA URNS AUTO: <1 /HPF (ref 0–1)
UROBILINOGEN UR STRIP-MCNC: 0 MG/DL (ref 0–2)
WBC #/AREA URNS AUTO: 131 /HPF (ref 0–5)
WBC CLUMPS #/AREA URNS HPF: PRESENT /HPF

## 2018-05-18 PROCEDURE — 25000125 ZZHC RX 250: Performed by: EMERGENCY MEDICINE

## 2018-05-18 PROCEDURE — 25000128 H RX IP 250 OP 636: Performed by: EMERGENCY MEDICINE

## 2018-05-18 PROCEDURE — 81001 URINALYSIS AUTO W/SCOPE: CPT | Performed by: EMERGENCY MEDICINE

## 2018-05-18 PROCEDURE — 87086 URINE CULTURE/COLONY COUNT: CPT | Performed by: EMERGENCY MEDICINE

## 2018-05-18 PROCEDURE — 96375 TX/PRO/DX INJ NEW DRUG ADDON: CPT | Performed by: EMERGENCY MEDICINE

## 2018-05-18 PROCEDURE — 99285 EMERGENCY DEPT VISIT HI MDM: CPT | Mod: Z6 | Performed by: EMERGENCY MEDICINE

## 2018-05-18 PROCEDURE — 51798 US URINE CAPACITY MEASURE: CPT | Performed by: EMERGENCY MEDICINE

## 2018-05-18 PROCEDURE — 96374 THER/PROPH/DIAG INJ IV PUSH: CPT | Performed by: EMERGENCY MEDICINE

## 2018-05-18 PROCEDURE — 99285 EMERGENCY DEPT VISIT HI MDM: CPT | Mod: 25 | Performed by: EMERGENCY MEDICINE

## 2018-05-18 PROCEDURE — 51702 INSERT TEMP BLADDER CATH: CPT | Performed by: EMERGENCY MEDICINE

## 2018-05-18 PROCEDURE — 87186 SC STD MICRODIL/AGAR DIL: CPT | Performed by: EMERGENCY MEDICINE

## 2018-05-18 PROCEDURE — 87088 URINE BACTERIA CULTURE: CPT | Performed by: EMERGENCY MEDICINE

## 2018-05-18 RX ORDER — LORAZEPAM 2 MG/ML
1 INJECTION INTRAMUSCULAR ONCE
Status: COMPLETED | OUTPATIENT
Start: 2018-05-18 | End: 2018-05-18

## 2018-05-18 RX ORDER — HYDROMORPHONE HYDROCHLORIDE 1 MG/ML
0.5 INJECTION, SOLUTION INTRAMUSCULAR; INTRAVENOUS; SUBCUTANEOUS
Status: DISCONTINUED | OUTPATIENT
Start: 2018-05-18 | End: 2018-05-18 | Stop reason: HOSPADM

## 2018-05-18 RX ADMIN — LIDOCAINE HYDROCHLORIDE 10 ML: 20 JELLY TOPICAL at 11:59

## 2018-05-18 RX ADMIN — HYDROMORPHONE HYDROCHLORIDE 0.5 MG: 1 INJECTION, SOLUTION INTRAMUSCULAR; INTRAVENOUS; SUBCUTANEOUS at 13:49

## 2018-05-18 RX ADMIN — LORAZEPAM 1 MG: 2 INJECTION INTRAMUSCULAR; INTRAVENOUS at 13:28

## 2018-05-18 NOTE — ED NOTES
Pt and his wife are instructed on caring for his murrell catheter at home, including cleaning/hygiene, care of tube, changing from leg bag to standard bag, and emptying the bag.

## 2018-05-18 NOTE — ED AVS SNAPSHOT
[facility] Emergency Department    [address] [place] [address]    Phone:  [phone]    Fax:  [phone]                                       Khang Bailon   MRN: [id number]    Department:  [facility] Emergency Department   Date of Visit:  5/18/2018           After Visit Summary Signature Page     I have received my discharge instructions, and my questions have been answered. I have discussed any challenges I see with this plan with the nurse or doctor.    ..........................................................................................................................................  Patient/Patient Representative Signature      ..........................................................................................................................................  Patient Representative Print Name and Relationship to Patient    ..................................................               ................................................  Date                                            Time    ..........................................................................................................................................  Reviewed by Signature/Title    ...................................................              ..............................................  Date                                                            Time

## 2018-05-18 NOTE — ED PROVIDER NOTES
History     Chief Complaint   Patient presents with     Dysuria     The history is provided by the patient and medical records.     This is a 73-year-old male with history of COPD, sepsis secondary to diverticulitis, AV block status post pacemaker placement, CVA, kidney stones and prostate hypertrophy presenting with dysuria.  Patient has been having lower abdominal/suprapubic pain and difficulty urinating for the last 3 days.  He was seen in this ED on 5/16/18 with these symptoms.  Urine analysis and CT were done.  CT showed no acute findings.  Urine showed evidence of possible UTI.  He did not want to have a catheter placed.  He was started on Cipro and Flomax.  He has a follow-up appointment on Monday, 3 days from now with Dr. Freeman.    Patient has not been able to make urine with any steady stream since last night.  He has not noted blood in the urine.  He has been taking his medications as prescribed.  He feels significant lower abdominal/suprapubic pain.  He has had decreased oral intake because of it.  He has bladder spasms.  He has not been able to have a bowel movement because of the discomfort.  He denies any history of bladder or penis surgeries.  He did have a catheter placed during hospitalization 2 years ago.  No fevers or chills, vomiting, chest pain.  He has baseline shortness of breath secondary to emphysema.        ED note 5/16/18:  Assessments & Plan (with Medical Decision Making)   SALVADOR--Khang is a pleasant 73-year-old male who presents to the emergency room with difficulty urinating and painful urination. He has had known prostate enlargement for the past 8-10 years. He has had a steady increase in his PSA. Last checked was 8/2/17 when it was 25.3. He has seen urology 5 years ago in 2013. He is not followed up despite this being recommended to him. The patient was on Flomax but discontinued it. He is not sure why. He thinks the prescription just ran out. He gets up numerous times during the  night. His bladder scan here post void had 350 cc of urine. The patient refuses any catheter and they think at this point it would be best not to use 1. He definitely has a UTI with hematuria. I also did a CT scan to rule out stone with his hematuria, history of stones and low back pain. He has extensive diverticulosis without diverticulitis at this time. He has no fever or chills and does not feel sick. We'll treat his urinary tract infection with Cipro 500 b.i.d. and for 10 days given his enlarged prostate. He also agreed to restart Flomax 0.4 mg a day and may have to increase to 2 tablets a day which is what he was on previously. And most importantly I discussed with him the need to follow-up with urology. His wife verbalizes that she will make the appointment. He has a history of sepsis related to his diverticulitis and if he develops fever he needs reevaluation in the emergency room immediately. Patient and wife verbalized understanding and are discharged in good condition.      Problem List:    Patient Active Problem List    Diagnosis Date Noted     Pulmonary emphysema, unspecified emphysema type (H) 11/16/2017     Priority: Medium     Benign neoplasm of colon, unspecified part of colon 07/26/2016     Priority: Medium     Sigmoid diverticulitis 07/12/2016     Priority: Medium     History of CVA (cerebrovascular accident) - old left internal capsule lacunar infarct on CT 7/2016 07/12/2016     Priority: Medium     Vomiting 07/12/2016     Priority: Medium     Multilevel degenerative disc disease 09/21/2015     Priority: Medium     Elevated prostate specific antigen (PSA) 09/11/2015     Priority: Medium     Health Care Home 01/24/2013     Priority: Medium     Racquel Ortiz RN-PHN  FPA / [facility]   [phone] b  DX V65.8 REPLACED WITH [facility]ALTH CARE HOME (04/08/2013)       Elevated PSA 08/27/2012     Priority: Medium     Loss of weight 08/27/2012     Priority: Medium     Third  degree heart block (H) - s/p pacemaker 08/10/2012     Priority: Medium     Hypertension goal BP (blood pressure) < 140/90 08/10/2012     Priority: Medium     Advanced directives, counseling/discussion 07/27/2012     Priority: Medium     Discussed advance care planning with patient; however, patient declined at this time. 7/27/2012          Thalassemia 07/27/2012     Priority: Medium     Diagnosis updated by automated process. Provider to review and confirm.       Hyperlipidemia LDL goal <130 07/27/2012     Priority: Medium     Painful respiration 03/31/2004     Priority: Medium     Personal history of tobacco use, presenting hazards to health 03/31/2004     Priority: Medium        Past Medical History:    Past Medical History:   Diagnosis Date     Alpha thalassemia (H)      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     Diverticulitis      Injury, other and unspecified, elbow, forearm, and wrist 1970s       Past Surgical History:    Past Surgical History:   Procedure Laterality Date     COLONOSCOPY       COLONOSCOPY  12/10/2012     IMPLANT PACEMAKER         Family History:    No family history on file.    Social History:  Marital Status:   [2]  Social History   Substance Use Topics     Smoking status: Former Smoker     Years: 50.00     Types: Cigarettes     Quit date: 7/27/2012     Smokeless tobacco: Never Used      Comment: Quit in July     Alcohol use No        Medications:      albuterol (PROAIR HFA/PROVENTIL HFA/VENTOLIN HFA) 108 (90 BASE) MCG/ACT Inhaler   Ascorbic Acid (VITAMIN C PO)   B Complex Vitamins (B COMPLEX PO)   ciprofloxacin (CIPRO) 500 MG tablet   DIGESTIVE ENZYMES PO   ipratropium - albuterol 0.5 mg/2.5 mg/3 mL (DUONEB) 0.5-2.5 (3) MG/3ML neb solution   Misc Natural Products (CALCIUM PLUS ADVANCED PO)   Multiple Vitamin (MULTI VITAMIN  MENS) TABS   Specialty Vitamins Products (ONE-A-DAY PROSTATE PO)   tamsulosin (FLOMAX) 0.4 MG capsule   Vitamin D-Vitamin K (VITAMIN K2-VITAMIN D3 PO)   order for DME   UNABLE TO FIND    UNABLE TO FIND         Review of Systems   All other ROS reviewed and are negative or non-contributory except as stated in HPI.     Physical Exam   BP: (!) 150/104  Pulse: 94  Temp: 97.4  F (36.3  C)  Resp: 20  Weight: 86.2 kg (190 lb)  SpO2: 96 %      Physical Exam   Constitutional: He appears well-developed and well-nourished.   Uncomfortable appearing male sitting in the bed   HENT:   Head: Normocephalic.   Nose: Nose normal.   Eyes: Conjunctivae and EOM are normal.   Neck: Normal range of motion.   Cardiovascular: Normal rate, regular rhythm and intact distal pulses.    Pulmonary/Chest: Effort normal.   Mildly tight breath sounds   Abdominal: Soft.   Suprapubic tenderness.  Bladder scan shows 800+ cc   Genitourinary:   Genitourinary Comments: Circumcised   Musculoskeletal: Normal range of motion. He exhibits no edema.   Neurological: He is alert. He exhibits normal muscle tone.   Skin: Skin is warm and dry. He is not diaphoretic.   Psychiatric: His behavior is normal.   Mildly anxious   Vitals reviewed.      ED Course (with Medical Decision Making)    Pt seen and examined by me.  RN and EPIC notes reviewed.      Patient with very retention symptoms.  He had a bladder scan done at bedside which showed greater than 800 cc.  Hernandez catheter attempted to be placed.  Unfortunately this was unsuccessful.  We tried with a smaller catheter but nothing seemed to advance past the glands.  Even a pediatric catheter was tried.    Patient had significant pain, IV placed for pain medications.    He was able to pass a small amount of urine.  It seems to be coming from the underside of the glands.    Initial plan was to do suprapubic catheter as we are unable to pass any catheter through the urethra.  Finally, Lisa Colorado CNP, noted that there was a small opening at the base of the glands.  The pediatric catheter was placed and patient had a large amount of urine in the bag.  This was sent for lab studies.  Unfortunately, I  did not notice that he had abnormal findings from previous urine that were not covered by Cipro until late in the evening.  I will call him tomorrow to change his antibiotics.  He has an appointment already with Dr. Freeman, urology, on Monday and 3 days.  Hernandez catheter care as discussed.  Hernandez will stay in place until he is seen in follow-up.  Return at any time for concerns.  Recommend continuing Flomax.     Procedures    Results for orders placed or performed during the hospital encounter of 05/18/18 (from the past 24 hour(s))   Routine UA with microscopic   Result Value Ref Range    Color Urine Yellow     Appearance Urine Slightly Cloudy     Glucose Urine Negative NEG^Negative mg/dL    Bilirubin Urine Negative NEG^Negative    Ketones Urine 20 (A) NEG^Negative mg/dL    Specific Gravity Urine 1.013 1.003 - 1.035    Blood Urine Moderate (A) NEG^Negative    pH Urine 5.0 5.0 - 7.0 pH    Protein Albumin Urine 30 (A) NEG^Negative mg/dL    Urobilinogen mg/dL 0.0 0.0 - 2.0 mg/dL    Nitrite Urine Negative NEG^Negative    Leukocyte Esterase Urine Large (A) NEG^Negative    Source Unspecified Urine     WBC Urine 131 (H) 0 - 5 /HPF    RBC Urine 38 (H) 0 - 2 /HPF    WBC Clumps Present (A) NEG^Negative /HPF    Bacteria Urine Few (A) NEG^Negative /HPF    Squamous Epithelial /HPF Urine <1 0 - 1 /HPF       Medications   lidocaine 2 % (URO-JET) jelly 10 mL (10 mLs Urethral Given 5/18/18 1159)   LORazepam (ATIVAN) injection 1 mg (1 mg Intravenous Given 5/18/18 1328)       Assessments & Plan     I have reviewed the findings, diagnosis, plan and need for follow up with the patient.  Discharge Medication List as of 5/18/2018  3:01 PM          Final diagnoses:   Acute urinary retention     Disposition: Patient discharged home the care of his wife in stable condition.  Plan as above.  Return for concerns.    Note: Chart documentation done in part with Dragon Voice Recognition software. Although reviewed after completion, some word and  grammatical errors may remain.     5/18/2018   [facility] EMERGENCY DEPARTMENT     Nelia Sun MD  05/19/18 0153

## 2018-05-18 NOTE — DISCHARGE INSTRUCTIONS
Continue the antibiotics and Flomax as prescribed.    Follow-up on Monday with Dr. Freeman as scheduled.    If you have any concerns return at any time.    I am so happy that I got to meet you!!!  I hope you have a great weekend!  Blessings to you!

## 2018-05-18 NOTE — ED AVS SNAPSHOT
[facility] Emergency Department    [address] [place] [address]    Phone:  [phone]    Fax:  [phone]                                       Khang Bailon   MRN: [id number]    Department:  [facility] Emergency Department   Date of Visit:  5/18/2018           Patient Information     Date Of Birth          1945        Your diagnoses for this visit were:     Acute urinary retention        You were seen by Nelia Sun MD.      Follow-up Information     Follow up with Osei Freeman MD On 5/21/2018.    Specialty:  Urology    Contact information:    [facility]  [address]  [place] [address] [phone]          Discharge Instructions       Continue the antibiotics and Flomax as prescribed.    Follow-up on Monday with Dr. Freeman as scheduled.    If you have any concerns return at any time.    I am so happy that I got to meet you!!!  I hope you have a great weekend!  Blessings to you!    Discharge References/Attachments     URINARY TRACT, MALE ANATOMY (ENGLISH)    URINARY RETENTION, MALE (ENGLISH)    INDWELLING URINARY CATHETER, DISCHARGE INSTRUCTIONS (ENGLISH)      24 Hour Appointment Hotline       To make an appointment at any [facility], call [phone] ([phone]). If you don't have a family doctor or clinic, we will help you find one. [facility] are conveniently located to serve the needs of you and your family.             Review of your medicines      Our records show that you are taking the medicines listed below. If these are incorrect, please call your family doctor or clinic.        Dose / Directions Last dose taken    albuterol 108 (90 Base) MCG/ACT Inhaler   Commonly known as:  PROAIR HFA/PROVENTIL HFA/VENTOLIN HFA   Dose:  2 puff   Quantity:  1 Inhaler        Inhale 2 puffs into the lungs every 6 hours as needed for shortness of breath / dyspnea or wheezing   Refills:  3        B COMPLEX PO    Dose:  1 capful        Take 1 capful by mouth daily (Isotonix)   Refills:  0        CALCIUM PLUS ADVANCED PO   Dose:  1 capful        Take 1 capful by mouth daily (Isotonix)   Refills:  0        ciprofloxacin 500 MG tablet   Commonly known as:  CIPRO   Dose:  500 mg   Quantity:  20 tablet        Take 1 tablet (500 mg) by mouth 2 times daily for 10 days   Refills:  0        DIGESTIVE ENZYMES PO   Dose:  1 capful        Take 1 capful by mouth daily (Isotonix - w/ probiotics)   Refills:  0        ipratropium - albuterol 0.5 mg/2.5 mg/3 mL 0.5-2.5 (3) MG/3ML neb solution   Commonly known as:  DUONEB   Dose:  1 vial   Quantity:  360 mL        Take 1 vial (3 mLs) by nebulization every 6 hours as needed for shortness of breath / dyspnea or wheezing   Refills:  2        MULTI vitamin  MENS Tabs   Dose:  1 capful        Take 1 capful by mouth daily (Isotonix)   Refills:  0        ONE-A-DAY PROSTATE PO   Dose:  1 capsule        Take 1 capsule by mouth daily   Refills:  0        order for DME   Quantity:  1 Tube        Equipment being ordered: Nebulizer hose   Refills:  11        tamsulosin 0.4 MG capsule   Commonly known as:  FLOMAX   Dose:  0.4 mg   Quantity:  30 capsule        Take 1 capsule (0.4 mg) by mouth daily for 30 doses   Refills:  0        * UNABLE TO FIND   Dose:  1 capful        Take 1 capful by mouth daily MEDICATION NAME: Daisha of the [place]   Refills:  0        * UNABLE TO FIND   Dose:  1 capful        Take 1 capful by mouth daily MEDICATION NAME: OPC-3 (Isotonix)   Refills:  0        VITAMIN C PO   Dose:  1 capful        Take 1 capful by mouth daily (Isotonix)   Refills:  0        VITAMIN K2-VITAMIN D3 PO   Dose:  1 capful        Take 1 capful by mouth daily (Isotonix)   Refills:  0        * Notice:  This list has 2 medication(s) that are the same as other medications prescribed for you. Read the directions carefully, and ask your doctor or other care provider to review them with you.      STOP taking         "Dose Reason for stopping Comments    ibuprofen 600 MG tablet   Commonly known as:  ADVIL/MOTRIN                      Procedures and tests performed during your visit     Bladder scan    Indwelling bladder catheter (simple)    Obtain affirmation of informed consent      Orders Needing Specimen Collection     Ordered          05/18/18 1144  Routine UA with microscopic - STAT, Prio: STAT, Needs to be Collected     Scheduled Task Status   05/18/18 1144 Collect Routine UA with microscopic Open   Order Class:  PCU Collect                  Pending Results     No orders found from 5/16/2018 to 5/19/2018.            Pending Culture Results     No orders found from 5/16/2018 to 5/19/2018.            Pending Results Instructions     If you had any lab results that were not finalized at the time of your Discharge, you can call the ED Lab Result RN at [phone]. You will be contacted by this team for any positive Lab results or changes in treatment. The nurses are available 7 days a week from 10A to 6:30P.  You can leave a message 24 hours per day and they will return your call.        Thank you for choosing [facility]       Thank you for choosing [facility] for your care. Our goal is always to provide you with excellent care. Hearing back from our patients is one way we can continue to improve our services. Please take a few minutes to complete the written survey that you may receive in the mail after you visit with us. Thank you!        WhoWantsMe Information     WhoWantsMe lets you send messages to your doctor, view your test results, renew your prescriptions, schedule appointments and more. To sign up, go to [email or web address] . Click on \"Log in\" on the left side of the screen, which will take you to the Welcome page. Then click on \"Sign up Now\" on the right side of the page.     You will be asked to enter the access code listed below, as well as some personal information. Please follow the directions to create your username " and password.     Your access code is: [email or web address]  Expires: 2018  5:43 PM     Your access code will  in 90 days. If you need help or a new code, please call your [facility] clinic or [phone].        Care EveryWhere ID     This is your Care EveryWhere ID. This could be used by other organizations to access your [facility] medical records  [id number]        Equal Access to Services     [facility] : Hadii shwetha vega hadasho Soomaali, waaxda luqadaha, qaybta kaalmada adeegyada, bob aguillon . So [facility] [phone].    ATENCIÓN: Si habla español, tiene a carrera disposición servicios gratuitos de asistencia lingüística. Llame al [phone].    We comply with applicable federal civil rights laws and [place] laws. We do not discriminate on the basis of race, color, national origin, age, disability, sex, sexual orientation, or gender identity.            After Visit Summary       This is your record. Keep this with you and show to your community pharmacist(s) and doctor(s) at your next visit.

## 2018-05-18 NOTE — ED NOTES
Attempted catheter with 16 fr coude, 12 Tongan without success. Dr. Sun into assess. 3rd attempt with 8 Tongan without success. Blood noted after 3rd attempt. No further attempts. Dr. Sun notified. Sabine Richmond RN

## 2018-05-18 NOTE — TELEPHONE ENCOUNTER
"[facility] Emergency Department Lab result notification [Adult-Male]    [facility] ED lab result protocol used  Urine Culture    Reason for call  Notify of lab results, assess symptoms,  review ED providers recommendations/discharge instructions (if necessary) and advise per ED lab result f/u protocol    Lab Result (including Rx patient on, if applicable)  Final Urine Culture Report on 5/18/18  Emergency Dept discharge antibiotic prescribed: Ciprofloxacin (Cipro) 500 mg tablet, 1 tablet (500 mg) by mouth 2 times daily for 10 days.  #1. Bacteria, >100,000 colonies/mL Enterococcus faecalis,  is [NOT TESTED] to antibiotic.   Change in treatment as per [place] ED Lab result protocol.    Information table from ED Provider visit on 5/16/18  ED diagnosis:   Acute cystitis with hematuria   ED provider   Marino, Luis PÉREZ MD   Symptoms reported at ED visit (Chief complaint, HPI) Khang Bailon is a 73 year old male with history of COPD, 50-pack-year smoking history, diverticulitis with sepsis, elevated PSA since at least 2010, history of urinary retention and UTIs, third-degree heart block status post pacemaker, history of CVA 2016, anemia and alpha  Thalassemia who presents to the emergency room with frequency and dysuria starting yesterday. Patient usually gets up frequently at night as he has for many years. He is doing this more last night and it was burning on urination. He had been on Flomax in the past but quit taking it as \"it was taken off the market or something\". He takes an over-the-counter herbal but does not recall the name. He has a history of sepsis related to his diverticulitis in 2016. He has a history of frequent UTIs. She last saw urology in 2013. He thinks he passed a kidney stone once at home. He has had a left lower lumbar intake which she is attributed to a minor backache which has been present and unchanged for at least 2 months. This bothers him if he lays in a wrong position but " otherwise does not cause problems. He denies any hematuria. He and wife state he drinks lots of fluids and his urine is usually yellow.   ED providers Impression and Plan (applicable information) Khang is a pleasant 73-year-old male who presents to the emergency room with difficulty urinating and painful urination. He has had known prostate enlargement for the past 8-10 years. He has had a steady increase in his PSA. Last checked was 8/2/17 when it was 25.3. He has seen urology 5 years ago in 2013. He is not followed up despite this being recommended to him. The patient was on Flomax but discontinued it. He is not sure why. He thinks the prescription just ran out. He gets up numerous times during the night. His bladder scan here post void had 350 cc of urine. The patient refuses any catheter and they think at this point it would be best not to use 1. He definitely has a UTI with hematuria. I also did a CT scan to rule out stone with his hematuria, history of stones and low back pain. He has extensive diverticulosis without diverticulitis at this time. He has no fever or chills and does not feel sick. We'll treat his urinary tract infection with Cipro 500 b.i.d. and for 10 days given his enlarged prostate. He also agreed to restart Flomax 0.4 mg a day and may have to increase to 2 tablets a day which is what he was on previously. And most importantly I discussed with him the need to follow-up with urology. His wife verbalizes that she will make the appointment. He has a history of sepsis related to his diverticulitis and if he develops fever he needs reevaluation in the emergency room immediately. Patient and wife verbalized understanding and are discharged in good condition.   Significant Medical hx, if applicable DDD, elevated PSA, diverticulitis, emphysema, HTN   Coumadin/Warfarin [Yes or No] No   Creatinine Level (mg/dl) Not available   Creatinine clearance (ml/min), if applicable Not available   Allergies Flagyl  "  Weight, if applicable 192 #      RN Assessment (Patient s current Symptoms), include time called.  [Insert Left message here if message left]  Khang in bathroom right now, will see Dr. Freeman on Monday 5/21/18 per wife's report.  \"The medication isn't doing anything\".  Spoke with wife who reports Khang is much worse symptomatically, and \"he's so stubborn\", wasn't willing to come back to ED.  Advised wife if Khang is much worse, he should return to ED.  While on phone, Khang is agreeable.  Wife will bring back in for re evaluation.    Please Contact your PCP clinic or return to the Emergency department if your:    Symptoms return.    Symptoms worsen or other concerning symptom's.    PCP follow-up Questions asked: YES       Kerline Blackwell RN    [place] [facility] RN  Lung Nodule and ED Lab Results F/U RN  Epic pool (ED late result f/u RN) : P [id number]   # [phone]     Copy of Lab result   Order   Urine Culture [[id number]] (Order [id number])   Exam Information   Exam Date Exam Time Accession # Results    5/16/18 11:00 AM [id number]    Component Results   Component Collected Lab   Specimen Description 05/16/2018 11:00    Midstream Urine   Special Requests 05/16/2018 11:00 AM 75   Specimen received in preservative   Culture Micro (Abnormal) 05/16/2018 11:00    >100,000 colonies/mL   Enterococcus faecalis      Culture & Susceptibility   ENTEROCOCCUS FAECALIS   Antibiotic Interpretation Sensitivity Unit Method Status   AMPICILLIN Sensitive <=2 ug/mL DARRELL Final   NITROFURANTOIN Sensitive <=16 ug/mL DARRELL Final   PENICILLIN Sensitive 2 ug/mL DARRELL Final   VANCOMYCIN Sensitive 2 ug/mL DARRELL Final            "

## 2018-05-19 LAB
BACTERIA SPEC CULT: NORMAL
SPECIMEN SOURCE: NORMAL

## 2018-05-21 ENCOUNTER — NURSE TRIAGE (OUTPATIENT)
Dept: NURSING | Facility: CLINIC | Age: 73
End: 2018-05-21

## 2018-05-21 ENCOUNTER — TRANSFERRED RECORDS (OUTPATIENT)
Dept: HEALTH INFORMATION MANAGEMENT | Facility: CLINIC | Age: 73
End: 2018-05-21

## 2018-05-22 ENCOUNTER — TELEPHONE (OUTPATIENT)
Dept: EMERGENCY MEDICINE | Facility: CLINIC | Age: 73
End: 2018-05-22

## 2018-05-22 ENCOUNTER — TELEPHONE (OUTPATIENT)
Dept: INTERNAL MEDICINE | Facility: CLINIC | Age: 73
End: 2018-05-22

## 2018-05-22 DIAGNOSIS — N39.0 URINARY TRACT INFECTION: ICD-10-CM

## 2018-05-22 LAB
BACTERIA SPEC CULT: ABNORMAL
BACTERIA SPEC CULT: ABNORMAL
Lab: ABNORMAL
SPECIMEN SOURCE: ABNORMAL

## 2018-05-22 RX ORDER — NITROFURANTOIN 25; 75 MG/1; MG/1
100 CAPSULE ORAL 2 TIMES DAILY
Qty: 10 CAPSULE | Refills: 0 | Status: SHIPPED | OUTPATIENT
Start: 2018-05-22 | End: 2018-05-27

## 2018-05-22 NOTE — TELEPHONE ENCOUNTER
[facility] Emergency Department Lab result notification:    [facility] lab result protocol used  Urine Culture    Reason for call  Notify of lab results, assess symptoms,  review ED providers recommendations/discharge instructions (if necessary) and advise per ED lab result f/u protocol    Lab Result  Final Urine Culture Report on 5/22/18  Emergency Dept discharge antibiotic prescribed:  Ciprofloxacin (Cipro) 500 mg tablet, 1 tablet (500 mg) by mouth 2 times daily for 10 days  #1. Bacteria, 10,000 to 50,000 colonies/mL Coagulase negative Staphylococcus,  is [RESISTANT] to antibiotic.   Change in treatment as per [facility] Lab result protocol.    Information table from ED Provider visit on 5/18/18  Symptoms reported at ED visit (Chief complaint, HPI) This is a 73-year-old male with history of COPD, sepsis secondary to diverticulitis, AV block status post pacemaker placement, CVA, kidney stones and prostate hypertrophy presenting with dysuria.  Patient has been having lower abdominal/suprapubic pain and difficulty urinating for the last 3 days.  He was seen in this ED on 5/16/18 with these symptoms.  Urine analysis and CT were done.  CT showed no acute findings.  Urine showed evidence of possible UTI.  He did not want to have a catheter placed.  He was started on Cipro and Flomax.  He has a follow-up appointment on Monday, 3 days from now with Dr. Freeman.     Patient has not been able to make urine with any steady stream since last night.  He has not noted blood in the urine.  He has been taking his medications as prescribed.  He feels significant lower abdominal/suprapubic pain.  He has had decreased oral intake because of it.  He has bladder spasms.  He has not been able to have a bowel movement because of the discomfort.  He denies any history of bladder or penis surgeries.  He did have a catheter placed during hospitalization 2 years ago.  No fevers or chills, vomiting, chest pain.  He has  baseline shortness of breath secondary to emphysema.   ED providers Impression and Plan (applicable information) Patient with very retention symptoms.  He had a bladder scan done at bedside which showed greater than 800 cc.  Hernandez catheter attempted to be placed.  Unfortunately this was unsuccessful.  We tried with a smaller catheter but nothing seemed to advance past the glands.  Even a pediatric catheter was tried.     Patient had significant pain, IV placed for pain medications.     He was able to pass a small amount of urine.  It seems to be coming from the underside of the glands.     Initial plan was to do suprapubic catheter as we are unable to pass any catheter through the urethra.  Finally, Lisa Colorado, ANA CRISTINA, noted that there was a small opening at the base of the glands.  The pediatric catheter was placed and patient had a large amount of urine in the bag.  This was sent for lab studies.  Unfortunately, I did not notice that he had abnormal findings from previous urine that were not covered by Cipro until late in the evening.  I will call him tomorrow to change his antibiotics.  He has an appointment already with Dr. Freeman, urology, on Monday and 3 days.  Hernandez catheter care as discussed.  Hernandez will stay in place until he is seen in follow-up.  Return at any time for concerns.  Recommend continuing Flomax.   Miscellaneous information N/A     RN Assessment (Patient s current Symptoms), include time called.  [Insert Left message here if message left]  Did see Dr. Freeman yesterday (5/21/18), is to be on Flomax daily which he had not been doing.  Khang does have some burning with urination which has improved.      RN Recommendations/Instructions per [facility] lab result protocol  Did discuss with wes Purvis to start Nitrofurantoin (100 mg PO BID x 5 days).  Medication ordered to [facility] pharmacy, Khang to stop Cipro initially prescribed.      Please Contact your PCP clinic or return to the Emergency  department if your:    Symptoms return.    Symptoms do not resolve after completing antibiotic.    Symptoms worsen or other concerning symptom's.    PCP follow-up Questions asked: YES       Kerline Blackwell, RN    [facility] RN  Lung Nodule and ED Lab Results F/U RN  Epic pool (ED late result f/u RN) : P [id number]   # [phone]    Copy of Lab result   Order   Urine Culture Aerobic Bacterial [[id number]] (Order [id number])   Exam Information   Exam Date Exam Time Accession # Results    5/18/18  3:20 PM     Component Results   Component Collected Lab   Specimen Description 05/18/2018  3:20    Unspecified Urine   Special Requests 05/18/2018  3:20 PM 75   Specimen received in preservative   Culture Micro (Abnormal) 05/18/2018  3:20    10,000 to 50,000 colonies/mL   Coagulase negative Staphylococcus      Culture Micro 05/18/2018  3:20    <10,000 colonies/mL   urogenital kami   Susceptibility testing not routinely done      Culture & Susceptibility   COAGULASE NEGATIVE STAPHYLOCOCCUS   Antibiotic Interpretation Sensitivity Unit Method Status   CIPROFLOXACIN Resistant >=8 ug/mL DARRELL Final   GENTAMICIN Sensitive <=0.5 ug/mL DARRELL Final   LEVOFLOXACIN Resistant >=8 ug/mL DARRELL Final   NITROFURANTOIN Sensitive <=16 ug/mL DARRELL Final   OXACILLIN Resistant 0.5 ug/mL DARRELL Final   PENICILLIN Resistant 0.12 ug/mL DARRELL Final   TETRACYCLINE Sensitive <=1 ug/mL DARRELL Final   VANCOMYCIN Sensitive <=0.5 ug/mL DARRELL Final

## 2018-05-22 NOTE — TELEPHONE ENCOUNTER
"Clinic Action Needed:No    Reason for Call: Wife calling: \"he was seen by Dr. Lowe today and they removed his catheter\". Wife is not with her  at time of call. She reports that Khang has been having issues with urinary retention and was seen at [facility] today, by Dr. Lowe.  His catheter was removed at office and now Khang cannot urinate. Paged on call provider for [facility] via page  to speak to Khang at [phone].  Dr. Winchester is on call, page sent @ 7:52 pm. Caller advised to call back if they have not heard back from on call provider within 20 minutes.  Caller appears to understand directives and agrees with plan.    Routed to: Not routed.    Violet Arndt RN  [facility]        "

## 2018-05-22 NOTE — TELEPHONE ENCOUNTER
Reason for Call:  Other call back    Detailed comments: Brittany is calling stating she has questions about Khang's medications that they had gotten from the ER on 5/18. Please call her back and advise. She is very confused on what she's supposed to give him and if there's medications she should be holding from him. Please call and advise    Phone Number Patient can be reached at: Home number on file [phone] (home)    Best Time: anytime    Can we leave a detailed message on this number? YES     Call taken on 5/22/2018 at 12:26 PM by Ghazal Will

## 2018-05-22 NOTE — TELEPHONE ENCOUNTER
Rn reviewed to STOP Cipro and start Nitrofurantoin, and continue tamsulosin.    Brittany verbalized understanding. No further questions.    /Che Trejo RN

## 2018-06-06 ENCOUNTER — OFFICE VISIT (OUTPATIENT)
Dept: FAMILY MEDICINE | Facility: CLINIC | Age: 73
End: 2018-06-06
Payer: COMMERCIAL

## 2018-06-06 VITALS
HEIGHT: 67 IN | DIASTOLIC BLOOD PRESSURE: 74 MMHG | HEART RATE: 93 BPM | OXYGEN SATURATION: 97 % | WEIGHT: 189.7 LBS | SYSTOLIC BLOOD PRESSURE: 120 MMHG | BODY MASS INDEX: 29.78 KG/M2 | TEMPERATURE: 97.6 F

## 2018-06-06 DIAGNOSIS — R30.0 DYSURIA: Primary | ICD-10-CM

## 2018-06-06 DIAGNOSIS — R33.9 URINARY RETENTION: ICD-10-CM

## 2018-06-06 LAB
ALBUMIN UR-MCNC: NEGATIVE MG/DL
APPEARANCE UR: CLEAR
BILIRUB UR QL STRIP: NEGATIVE
COLOR UR AUTO: YELLOW
GLUCOSE UR STRIP-MCNC: NEGATIVE MG/DL
HGB UR QL STRIP: NEGATIVE
KETONES UR STRIP-MCNC: NEGATIVE MG/DL
LEUKOCYTE ESTERASE UR QL STRIP: NEGATIVE
MUCOUS THREADS #/AREA URNS LPF: PRESENT /LPF
NITRATE UR QL: NEGATIVE
PH UR STRIP: 5 PH (ref 5–7)
RBC #/AREA URNS AUTO: 1 /HPF (ref 0–2)
SOURCE: ABNORMAL
SP GR UR STRIP: 1.02 (ref 1–1.03)
UROBILINOGEN UR STRIP-MCNC: 0 MG/DL (ref 0–2)
WBC #/AREA URNS AUTO: <1 /HPF (ref 0–5)

## 2018-06-06 PROCEDURE — 81001 URINALYSIS AUTO W/SCOPE: CPT | Performed by: FAMILY MEDICINE

## 2018-06-06 PROCEDURE — 99214 OFFICE O/P EST MOD 30 MIN: CPT | Performed by: FAMILY MEDICINE

## 2018-06-06 RX ORDER — TAMSULOSIN HYDROCHLORIDE 0.4 MG/1
0.4 CAPSULE ORAL DAILY
Qty: 30 CAPSULE | Refills: 3 | Status: SHIPPED | OUTPATIENT
Start: 2018-06-06 | End: 2019-01-30

## 2018-06-06 NOTE — PROGRESS NOTES
SUBJECTIVE:   Khang Bailon is a 73 year old male who presents to clinic today for the following health issues:    Patient saw Dr. Lowe 05/21/2018 and had his cath taken out and was told to follow up in 3-4 weeks. Patient does not wish to see this provider. He was not nice.     ED/UC Followup:    Facility:  [facility]  Date of visit: 05/18/018  Reason for visit: Acute urinary retention   Current Status: a little better.              Problem list and histories reviewed & adjusted, as indicated.  Additional history: as documented        Reviewed and updated as needed this visit by clinical staff       Reviewed and updated as needed this visit by Provider        SUBJECTIVE:  Khang  is a 73 year old male who presents for: Follow-up of his urinary retention.  He was in the emergency room in a very difficult time introducing a catheter.  Catheter was successfully removed by urology.  He has been able to pass his urine now.  He was given a prescription they thought for Flomax but they did not pick it up because it was not in.  He is in for recheck of his urine to discuss future of his urinary retention probably secondary to prostatic hypertrophy.    Past Medical History:   Diagnosis Date     Alpha thalassemia (H)      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    pacemaker at [facility]     Diverticulitis      Injury, other and unspecified, elbow, forearm, and wrist 1970s    Broke right wrist     Past Surgical History:   Procedure Laterality Date     COLONOSCOPY       COLONOSCOPY  12/10/2012     IMPLANT PACEMAKER       Social History   Substance Use Topics     Smoking status: Former Smoker     Years: 50.00     Types: Cigarettes     Quit date: 7/27/2012     Smokeless tobacco: Never Used      Comment: Quit in July     Alcohol use No     Current Outpatient Prescriptions   Medication Sig Dispense Refill     albuterol (PROAIR HFA/PROVENTIL HFA/VENTOLIN HFA) 108 (90 BASE) MCG/ACT Inhaler Inhale 2 puffs into the lungs every 6 hours as needed for shortness of  "breath / dyspnea or wheezing 1 Inhaler 3     Ascorbic Acid (VITAMIN C PO) Take 1 capful by mouth daily (Isotonix)       B Complex Vitamins (B COMPLEX PO) Take 1 capful by mouth daily (Isotonix)       DIGESTIVE ENZYMES PO Take 1 capful by mouth daily (Isotonix - w/ probiotics)       ipratropium - albuterol 0.5 mg/2.5 mg/3 mL (DUONEB) 0.5-2.5 (3) MG/3ML neb solution Take 1 vial (3 mLs) by nebulization every 6 hours as needed for shortness of breath / dyspnea or wheezing 360 mL 2     Misc Natural Products (CALCIUM PLUS ADVANCED PO) Take 1 capful by mouth daily (Isotonix)       order for DME Equipment being ordered: Nebulizer hose 1 Tube 11     Specialty Vitamins Products (ONE-A-DAY PROSTATE PO) Take 1 capsule by mouth daily        tamsulosin (FLOMAX) 0.4 MG capsule Take 1 capsule (0.4 mg) by mouth daily 30 capsule 3     tamsulosin (FLOMAX) 0.4 MG capsule Take 1 capsule (0.4 mg) by mouth daily for 30 doses 30 capsule 0     UNABLE TO FIND Take 1 capful by mouth daily MEDICATION NAME: Daisha of the [place]       Vitamin D-Vitamin K (VITAMIN K2-VITAMIN D3 PO) Take 1 capful by mouth daily (Isotonix)       Multiple Vitamin (MULTI VITAMIN  MENS) TABS Take 1 capful by mouth daily (Isotonix)       UNABLE TO FIND Take 1 capful by mouth daily MEDICATION NAME: OPC-3 (Isotonix)         REVIEW OF SYSTEMS:   5 point ROS negative except as noted above in HPI, including Gen., Resp, CV, GI &  system review.     OBJECTIVE:  Vitals: /74 (BP Location: Right arm, Patient Position: Chair, Cuff Size: Adult Large)  Pulse 93  Temp 97.6  F (36.4  C) (Temporal)  Ht 5' 7\" (1.702 m)  Wt 189 lb 11.2 oz (86 kg)  SpO2 97%  BMI 29.71 kg/m2  BMI= Body mass index is 29.71 kg/(m^2).  He is alert and appears in no distress.  Talkative.  Mucous membranes moist.  Neck supple.  Lungs clear.  Heart regular rhythm.  Extremities normal.  Abdomen is flat bowel sounds present no tenderness.    ASSESSMENT:  #1 UTI #2 urinary retention    PLAN:  We will " going to get him on Flomax 0.4 mg a day.  He refuses to go back to urology at this time.  He needs follow-up on his prostatic hypertrophy.  He is agreed to come back and see me in a month we can discuss this.  Probably get another PSA at that time.        Cecilio Trejo MD  [facility]

## 2018-06-06 NOTE — MR AVS SNAPSHOT
"              After Visit Summary   6/6/2018    Khang Bailon    MRN: [id number]           Patient Information     Date Of Birth          1945        Visit Information        Provider Department      6/6/2018 11:40 AM Cecilio Trejo MD [facility]        Today's Diagnoses     Dysuria    -  1    Urinary retention           Follow-ups after your visit        Who to contact     If you have questions or need follow up information about today's clinic visit or your schedule please contact [facility] directly at [phone].  Normal or non-critical lab and imaging results will be communicated to you by MyChart, letter or phone within 4 business days after the clinic has received the results. If you do not hear from us within 7 days, please contact the clinic through MyChart or phone. If you have a critical or abnormal lab result, we will notify you by phone as soon as possible.  Submit refill requests through Zeo or call your pharmacy and they will forward the refill request to us. Please allow 3 business days for your refill to be completed.          Additional Information About Your Visit        Care EveryWhere ID     This is your Care EveryWhere ID. This could be used by other organizations to access your [place] medical records  [id number]        Your Vitals Were     Pulse Temperature Height Pulse Oximetry BMI (Body Mass Index)       93 97.6  F (36.4  C) (Temporal) 5' 7\" (1.702 m) 97% 29.71 kg/m2        Blood Pressure from Last 3 Encounters:   06/06/18 120/74   05/18/18 (!) 150/104   05/16/18 (!) 147/95    Weight from Last 3 Encounters:   06/06/18 189 lb 11.2 oz (86 kg)   05/18/18 190 lb (86.2 kg)   02/21/18 192 lb 12.8 oz (87.5 kg)              We Performed the Following     *UA reflex to Microscopic and Culture ([place]; [facility]; [facility]; [facility]; [facility] [place]; [facility]; [place]; [place])          Today's Medication Changes          These " changes are accurate as of 6/6/18 12:54 PM.  If you have any questions, ask your nurse or doctor.               These medicines have changed or have updated prescriptions.        Dose/Directions    * tamsulosin 0.4 MG capsule   Commonly known as:  FLOMAX   This may have changed:  Another medication with the same name was added. Make sure you understand how and when to take each.   Changed by:  Cecilio Trejo MD        Dose:  0.4 mg   Take 1 capsule (0.4 mg) by mouth daily for 30 doses   Quantity:  30 capsule   Refills:  0       * tamsulosin 0.4 MG capsule   Commonly known as:  FLOMAX   This may have changed:  You were already taking a medication with the same name, and this prescription was added. Make sure you understand how and when to take each.   Used for:  Urinary retention   Changed by:  Cecilio Trejo MD        Dose:  0.4 mg   Take 1 capsule (0.4 mg) by mouth daily   Quantity:  30 capsule   Refills:  3       * Notice:  This list has 2 medication(s) that are the same as other medications prescribed for you. Read the directions carefully, and ask your doctor or other care provider to review them with you.         Where to get your medicines      These medications were sent to [address]  [address] [facility] [address]     Phone:  [phone]     tamsulosin 0.4 MG capsule                Primary Care Provider Office Phone # Fax #    Cecilio Trejo [phone]        [facility] [address]        Equal Access to Services     DARION MELENDEZ : Hadii shwetha ku hadasho Soomaali, waaxda luqadaha, qaybta kaalmada adeegyada, bob heredia. So [facility] [phone].    ATENCIÓN: Si habla español, tiene a carrera disposición servicios gratuitos de asistencia lingüística. Llame al [phone].    We comply with applicable federal civil rights laws and [place] laws. We do not discriminate on the basis  of race, color, national origin, age, disability, sex, sexual orientation, or gender identity.            Thank you!     Thank you for choosing [facility]  for your care. Our goal is always to provide you with excellent care. Hearing back from our patients is one way we can continue to improve our services. Please take a few minutes to complete the written survey that you may receive in the mail after your visit with us. Thank you!             Your Updated Medication List - Protect others around you: Learn how to safely use, store and throw away your medicines at www.disposemymeds.org.          This list is accurate as of 6/6/18 12:54 PM.  Always use your most recent med list.                   Brand Name Dispense Instructions for use Diagnosis    albuterol 108 (90 Base) MCG/ACT Inhaler    PROAIR HFA/PROVENTIL HFA/VENTOLIN HFA    1 Inhaler    Inhale 2 puffs into the lungs every 6 hours as needed for shortness of breath / dyspnea or wheezing    Chronic obstructive pulmonary disease, unspecified COPD type (H), SOB (shortness of breath)       B COMPLEX PO      Take 1 capful by mouth daily (Isotonix)        CALCIUM PLUS ADVANCED PO      Take 1 capful by mouth daily (Isotonix)        DIGESTIVE ENZYMES PO      Take 1 capful by mouth daily (Isotonix - w/ probiotics)        ipratropium - albuterol 0.5 mg/2.5 mg/3 mL 0.5-2.5 (3) MG/3ML neb solution    DUONEB    360 mL    Take 1 vial (3 mLs) by nebulization every 6 hours as needed for shortness of breath / dyspnea or wheezing    Chronic obstructive pulmonary disease, unspecified COPD type (H)       MULTI vitamin  MENS Tabs      Take 1 capful by mouth daily (Isotonix)        ONE-A-DAY PROSTATE PO      Take 1 capsule by mouth daily        order for DME     1 Tube    Equipment being ordered: Nebulizer hose    Chronic obstructive pulmonary disease, unspecified COPD type (H)       * tamsulosin 0.4 MG capsule    FLOMAX    30 capsule    Take 1 capsule (0.4 mg) by  mouth daily for 30 doses        * tamsulosin 0.4 MG capsule    FLOMAX    30 capsule    Take 1 capsule (0.4 mg) by mouth daily    Urinary retention       * UNABLE TO FIND      Take 1 capful by mouth daily MEDICATION NAME: Daisha of the [place]        * UNABLE TO FIND      Take 1 capful by mouth daily MEDICATION NAME: OPC-3 (Isotonix)        VITAMIN C PO      Take 1 capful by mouth daily (Isotonix)        VITAMIN K2-VITAMIN D3 PO      Take 1 capful by mouth daily (Isotonix)        * Notice:  This list has 4 medication(s) that are the same as other medications prescribed for you. Read the directions carefully, and ask your doctor or other care provider to review them with you.

## 2018-07-24 ENCOUNTER — OFFICE VISIT (OUTPATIENT)
Dept: FAMILY MEDICINE | Facility: OTHER | Age: 73
End: 2018-07-24
Payer: COMMERCIAL

## 2018-07-24 ENCOUNTER — TELEPHONE (OUTPATIENT)
Dept: FAMILY MEDICINE | Facility: CLINIC | Age: 73
End: 2018-07-24

## 2018-07-24 VITALS
HEART RATE: 100 BPM | DIASTOLIC BLOOD PRESSURE: 84 MMHG | RESPIRATION RATE: 20 BRPM | WEIGHT: 185.1 LBS | SYSTOLIC BLOOD PRESSURE: 138 MMHG | BODY MASS INDEX: 28.99 KG/M2 | TEMPERATURE: 98 F

## 2018-07-24 DIAGNOSIS — E78.5 HYPERLIPIDEMIA LDL GOAL <130: ICD-10-CM

## 2018-07-24 DIAGNOSIS — R97.20 ELEVATED PROSTATE SPECIFIC ANTIGEN (PSA): ICD-10-CM

## 2018-07-24 DIAGNOSIS — M54.2 NECK PAIN: Primary | ICD-10-CM

## 2018-07-24 LAB
CHOLEST SERPL-MCNC: 187 MG/DL
HDLC SERPL-MCNC: 42 MG/DL
LDLC SERPL CALC-MCNC: 115 MG/DL
NONHDLC SERPL-MCNC: 145 MG/DL
PSA SERPL-MCNC: 42.2 UG/L (ref 0–4)
TRIGL SERPL-MCNC: 149 MG/DL

## 2018-07-24 PROCEDURE — 84153 ASSAY OF PSA TOTAL: CPT | Performed by: FAMILY MEDICINE

## 2018-07-24 PROCEDURE — 36415 COLL VENOUS BLD VENIPUNCTURE: CPT | Performed by: FAMILY MEDICINE

## 2018-07-24 PROCEDURE — 80061 LIPID PANEL: CPT | Performed by: FAMILY MEDICINE

## 2018-07-24 PROCEDURE — 99214 OFFICE O/P EST MOD 30 MIN: CPT | Performed by: FAMILY MEDICINE

## 2018-07-24 RX ORDER — PREDNISONE 20 MG/1
TABLET ORAL
Qty: 13 TABLET | Refills: 0 | Status: SHIPPED | OUTPATIENT
Start: 2018-07-24 | End: 2019-01-30

## 2018-07-24 ASSESSMENT — PAIN SCALES - GENERAL: PAINLEVEL: SEVERE PAIN (6)

## 2018-07-24 NOTE — MR AVS SNAPSHOT
After Visit Summary   7/24/2018    Khang Bailon    MRN: [id number]           Patient Information     Date Of Birth          1945        Visit Information        Provider Department      7/24/2018 3:00 PM Lenny Giles MD [facility]        Today's Diagnoses     Elevated prostate specific antigen (PSA)    -  1    Hyperlipidemia LDL goal <130        Neck pain           Follow-ups after your visit        Who to contact     If you have questions or need follow up information about today's clinic visit or your schedule please contact [facility] directly at [phone].  Normal or non-critical lab and imaging results will be communicated to you by MyChart, letter or phone within 4 business days after the clinic has received the results. If you do not hear from us within 7 days, please contact the clinic through MyChart or phone. If you have a critical or abnormal lab result, we will notify you by phone as soon as possible.  Submit refill requests through Print[employer] or call your pharmacy and they will forward the refill request to us. Please allow 3 business days for your refill to be completed.          Additional Information About Your Visit        Care EveryWhere ID     This is your Care EveryWhere ID. This could be used by other organizations to access your [place] medical records  [id number]        Your Vitals Were     Pulse Temperature Respirations BMI (Body Mass Index)          100 98  F (36.7  C) (Temporal) 20 28.99 kg/m2         Blood Pressure from Last 3 Encounters:   07/24/18 138/84   06/06/18 120/74   05/18/18 (!) 150/104    Weight from Last 3 Encounters:   07/24/18 185 lb 1.6 oz (84 kg)   06/06/18 189 lb 11.2 oz (86 kg)   05/18/18 190 lb (86.2 kg)              We Performed the Following     Lipid Profile     PSA, tumor marker          Today's Medication Changes          These changes are accurate as of 7/24/18  3:55 PM.  If you have any questions, ask your nurse  or doctor.               Start taking these medicines.        Dose/Directions    predniSONE 20 MG tablet   Commonly known as:  DELTASONE   Used for:  Neck pain   Started by:  eLnny Giles MD        Take 2 tabs (40 mg) by mouth daily x 4 days, 1 tabs (20 mg) daily x 4 days,  then 1/2 tab (10 mg) x 2 days.   Quantity:  13 tablet   Refills:  0            Where to get your medicines      These medications were sent to [facility] [address]   [address] [facility] [address]     Phone:  [phone]     predniSONE 20 MG tablet                Primary Care Provider Office Phone # Fax #    Cecilio Trejo [phone]       [address] [facility] [address]        Equal Access to Services     GOLDY MELENDEZ : Hadii aad ku hadasho Soomaali, waaxda luqadaha, qaybta kaalmada adeegyada, bob aguillon . So [facility] [phone].    ATENCIÓN: Si habla español, tiene a carrera disposición servicios gratuitos de asistencia lingüística. Llame al [phone].    We comply with applicable federal civil rights laws and [place] laws. We do not discriminate on the basis of race, color, national origin, age, disability, sex, sexual orientation, or gender identity.            Thank you!     Thank you for choosing [facility]  for your care. Our goal is always to provide you with excellent care. Hearing back from our patients is one way we can continue to improve our services. Please take a few minutes to complete the written survey that you may receive in the mail after your visit with us. Thank you!             Your Updated Medication List - Protect others around you: Learn how to safely use, store and throw away your medicines at www.disposemymeds.org.          This list is accurate as of 7/24/18  3:55 PM.  Always use your most recent med list.                   Brand Name Dispense Instructions for use Diagnosis    albuterol 108  (90 Base) MCG/ACT Inhaler    PROAIR HFA/PROVENTIL HFA/VENTOLIN HFA    1 Inhaler    Inhale 2 puffs into the lungs every 6 hours as needed for shortness of breath / dyspnea or wheezing    Chronic obstructive pulmonary disease, unspecified COPD type (H), SOB (shortness of breath)       B COMPLEX PO      Take 1 capful by mouth daily (Isotonix)        CALCIUM PLUS ADVANCED PO      Take 1 capful by mouth daily (Isotonix)        DIGESTIVE ENZYMES PO      Take 1 capful by mouth daily (Isotonix - w/ probiotics)        ipratropium - albuterol 0.5 mg/2.5 mg/3 mL 0.5-2.5 (3) MG/3ML neb solution    DUONEB    360 mL    Take 1 vial (3 mLs) by nebulization every 6 hours as needed for shortness of breath / dyspnea or wheezing    Chronic obstructive pulmonary disease, unspecified COPD type (H)       MULTI vitamin  MENS Tabs      Take 1 capful by mouth daily (Isotonix)        ONE-A-DAY PROSTATE PO      Take 1 capsule by mouth daily        order for DME     1 Tube    Equipment being ordered: Nebulizer hose    Chronic obstructive pulmonary disease, unspecified COPD type (H)       predniSONE 20 MG tablet    DELTASONE    13 tablet    Take 2 tabs (40 mg) by mouth daily x 4 days, 1 tabs (20 mg) daily x 4 days,  then 1/2 tab (10 mg) x 2 days.    Neck pain       tamsulosin 0.4 MG capsule    FLOMAX    30 capsule    Take 1 capsule (0.4 mg) by mouth daily    Urinary retention       * UNABLE TO FIND      Take 1 capful by mouth daily MEDICATION NAME: Daisha of the [place]        * UNABLE TO FIND      Take 1 capful by mouth daily MEDICATION NAME: OPC-3 (Isotonix)        VITAMIN C PO      Take 1 capful by mouth daily (Isotonix)        VITAMIN K2-VITAMIN D3 PO      Take 1 capful by mouth daily (Isotonix)        * Notice:  This list has 2 medication(s) that are the same as other medications prescribed for you. Read the directions carefully, and ask your doctor or other care provider to review them with you.

## 2018-07-24 NOTE — TELEPHONE ENCOUNTER
Patient thought that they were going to get a muscle relaxant as well as the prednisone. Call patient when ready

## 2018-07-24 NOTE — PROGRESS NOTES
SUBJECTIVE:   Khang Bailon is a 73 year old male who presents to clinic today for the following health issues:  Chief Complaint   Patient presents with     Shoulder left     Pt c/o pain in left shoulder into neck x's 2 weeks, no known injury     SUBJECTIVE:  Khang Bailon, a 73 year old male scheduled an appointment to discuss the following issues:     Neck pain: no obvious injury; mod/severe pain left neck, rad down into shoulder  Elevated prostate specific antigen (PSA): see recent Ed visit with acute urinary retnetion and  psa 25; now okay with nl urination. Declined to f/u with urology.   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    pacemaker at [facility]     Diverticulitis      Injury, other and unspecified, elbow, forearm, and wrist 1970s    Broke right wrist     Current Outpatient Prescriptions   Medication Sig Dispense Refill     Ascorbic Acid (VITAMIN C PO) Take 1 capful by mouth daily (Isotonix)       B Complex Vitamins (B COMPLEX PO) Take 1 capful by mouth daily (Isotonix)       DIGESTIVE ENZYMES PO Take 1 capful by mouth daily (Isotonix - w/ probiotics)       ipratropium - albuterol 0.5 mg/2.5 mg/3 mL (DUONEB) 0.5-2.5 (3) MG/3ML neb solution Take 1 vial (3 mLs) by nebulization every 6 hours as needed for shortness of breath / dyspnea or wheezing 360 mL 2     Misc Natural Products (CALCIUM PLUS ADVANCED PO) Take 1 capful by mouth daily (Isotonix)       Multiple Vitamin (MULTI VITAMIN  MENS) TABS Take 1 capful by mouth daily (Isotonix)       order for DME Equipment being ordered: Nebulizer hose 1 Tube 11     predniSONE (DELTASONE) 20 MG tablet Take 2 tabs (40 mg) by mouth daily x 4 days, 1 tabs (20 mg) daily x 4 days,  then 1/2 tab (10 mg) x 2 days. 13 tablet 0     Specialty Vitamins Products (ONE-A-DAY PROSTATE PO) Take 1 capsule by mouth daily        tamsulosin (FLOMAX) 0.4 MG capsule Take 1 capsule (0.4 mg) by  mouth daily 30 capsule 3     UNABLE TO FIND Take 1 capful by mouth daily MEDICATION NAME: Daisha of the [place]       UNABLE TO FIND Take 1 capful by mouth daily MEDICATION NAME: OPC-3 (Isotonix)       Vitamin D-Vitamin K (VITAMIN K2-VITAMIN D3 PO) Take 1 capful by mouth daily (Isotonix)       albuterol (PROAIR HFA/PROVENTIL HFA/VENTOLIN HFA) 108 (90 BASE) MCG/ACT Inhaler Inhale 2 puffs into the lungs every 6 hours as needed for shortness of breath / dyspnea or wheezing 1 Inhaler 3       REVIEW OF SYSTEMS:         Respiratory: negative    Cardiovascular: negative    Gastrointestinal: negative    Genitourinary: no urinary issues since starting flomax    Musculoskeletal: see above       EXAM:  /84  Pulse 100  Temp 98  F (36.7  C) (Temporal)  Resp 20  Wt 185 lb 1.6 oz (84 kg)  BMI 28.99 kg/m2  NECK:  Some muscle spasm in the the left cervical and trapezius areas    LUNGS:  Clear to auscultation.   HEART:  Regular rhythm, no murmurs.    NEURO: no obvious weakness    Extr: shoulder bursal area none tender, rom left shoulder nl     IMPRESSION/PLAN:  Encounter Diagnoses   Name Primary?     Elevated prostate specific antigen (PSA):psa repeated      Hyperlipidemia LDL goal <130:lipids drawn      Neck pain: prednisone rx, rest, heat, zanaflex. Pt declined PT. MR unable due to pacemaker      Orders Placed This Encounter     PSA, tumor marker     Lipid Profile     predniSONE (DELTASONE) 20 MG tablet       Lenny Giles      Add: ldl 115 will resume statins    psa 42  Pt declined further eval and urology f/u

## 2018-07-25 RX ORDER — ATORVASTATIN CALCIUM 20 MG/1
20 TABLET, FILM COATED ORAL DAILY
Qty: 90 TABLET | Refills: 3 | Status: SHIPPED | OUTPATIENT
Start: 2018-07-25 | End: 2019-01-30

## 2018-07-25 NOTE — TELEPHONE ENCOUNTER
Patient has been complaining about severe pain going down his back. It feels like a heartbeat in his back. Is overwhelmed with pain and his wife would like you to call him to see if you can explain anything more then she can about the prednisone not working right away. Would prefer that you do not tell him that she is asking you to call him.   Thank you,  Thais Carlton   for [facility]

## 2018-07-26 NOTE — TELEPHONE ENCOUNTER
7/26  Call re back pain  I recommended he go to ED now but he now sts the back pain is improved  He dd not have any back pain when I saw him on Tuesday  He does have diverticulosis and a 3.1 cm AAA so I recommended he go to ED now but he declined  dbue

## 2018-07-26 NOTE — TELEPHONE ENCOUNTER
Rotating to Dr. Giles due to Thais Carlton discussing this patient with Dr. Giles 7/25/18. Dr. Trejo does not have any information with what is going on with this particular situation.  Jocelyn Melendez MA

## 2018-08-04 ENCOUNTER — TELEPHONE (OUTPATIENT)
Dept: FAMILY MEDICINE | Facility: OTHER | Age: 73
End: 2018-08-04

## 2018-08-04 NOTE — TELEPHONE ENCOUNTER
He sts his back pain is improved  He now has neck pain  Offered PT, he declined  Reminded he shd have a f/u colonoscopy, he declined  Advised to CB if more or increasing symptoms  dbue

## 2018-09-12 DIAGNOSIS — R06.02 SOB (SHORTNESS OF BREATH): ICD-10-CM

## 2018-09-12 DIAGNOSIS — J44.9 CHRONIC OBSTRUCTIVE PULMONARY DISEASE, UNSPECIFIED COPD TYPE (H): ICD-10-CM

## 2018-09-12 RX ORDER — ALBUTEROL SULFATE 90 UG/1
2 AEROSOL, METERED RESPIRATORY (INHALATION) EVERY 6 HOURS PRN
Qty: 1 INHALER | Refills: 3 | Status: SHIPPED | OUTPATIENT
Start: 2018-09-12 | End: 2019-05-15

## 2018-09-12 NOTE — TELEPHONE ENCOUNTER
/Prescription approved per [facility] Refill Protocol.  Annette Barnes RN. . .  9/12/2018, 11:00 AM

## 2018-09-12 NOTE — TELEPHONE ENCOUNTER
"Requested Prescriptions   Pending Prescriptions Disp Refills     albuterol (PROAIR HFA/PROVENTIL HFA/VENTOLIN HFA) 108 (90 Base) MCG/ACT inhaler 1 Inhaler 3    Last Written Prescription Date:  8/2/17  Last Fill Quantity: 1 inhaler,  # refills: 3   Last office visit: 7/24/2018 with prescribing provider:  2/21/18  Future Office Visit:     Sig: Inhale 2 puffs into the lungs every 6 hours as needed for shortness of breath / dyspnea or wheezing    Asthma Maintenance Inhalers - Anticholinergics Passed    9/12/2018  9:46 AM       Passed - Patient is age 12 years or older       Passed - Recent (12 mo) or future (30 days) visit within the authorizing provider's specialty    Patient had office visit in the last 12 months or has a visit in the next 30 days with authorizing provider or within the authorizing provider's specialty.  See \"Patient Info\" tab in inbasket, or \"Choose Columns\" in Meds & Orders section of the refill encounter.              "

## 2018-10-22 ENCOUNTER — TELEPHONE (OUTPATIENT)
Dept: FAMILY MEDICINE | Facility: OTHER | Age: 73
End: 2018-10-22

## 2018-10-22 NOTE — TELEPHONE ENCOUNTER
called re psa of 42  Advised urology consult, he declined  Advised he may have prostate caner  He still declined  Also declined follow-up with urology or urologist other than Dr gaspar, whom he did not like,he declined  dbue

## 2018-10-25 DIAGNOSIS — J44.9 CHRONIC OBSTRUCTIVE PULMONARY DISEASE, UNSPECIFIED COPD TYPE (H): ICD-10-CM

## 2018-10-26 RX ORDER — IPRATROPIUM BROMIDE AND ALBUTEROL SULFATE 2.5; .5 MG/3ML; MG/3ML
SOLUTION RESPIRATORY (INHALATION)
Qty: 360 ML | Refills: 2 | Status: SHIPPED | OUTPATIENT
Start: 2018-10-26 | End: 2019-04-17

## 2018-10-26 NOTE — TELEPHONE ENCOUNTER
"duoneb  Last Written Prescription Date:  03/14/2018  Last Fill Quantity:360 mL # refills: 360    Last office visit: 4/2/2018 with prescribing provider:  2    Future Office Visit:      Requested Prescriptions   Pending Prescriptions Disp Refills     ipratropium - albuterol 0.5 mg/2.5 mg/3 mL (DUONEB) 0.5-2.5 (3) MG/3ML neb solution [Pharmacy Med Name: IPRATROPIUM-ALBUTEROL 0.5-2.5 SOLN] 360 mL 2     Sig: NEBULIZE CONTENTS OF ONE VIAL EVERY 6 HOURS AS NEEDED FOR SHORTNESS OF BREATH / DYSPNEA OR WHEEZING    Short-Acting Beta Agonist Inhalers Protocol  Passed    10/25/2018 12:28 PM       Passed - Patient is age 12 or older       Passed - Recent (12 mo) or future (30 days) visit within the authorizing provider's specialty    Patient had office visit in the last 12 months or has a visit in the next 30 days with authorizing provider or within the authorizing provider's specialty.  See \"Patient Info\" tab in inbasket, or \"Choose Columns\" in Meds & Orders section of the refill encounter.                Prescription approved per [facility] Refill Protocol.      Che Trejo RN on 10/26/2018 at 3:30 PM    "

## 2018-11-07 DIAGNOSIS — J43.9 PULMONARY EMPHYSEMA, UNSPECIFIED EMPHYSEMA TYPE (H): Primary | ICD-10-CM

## 2019-01-30 ENCOUNTER — OFFICE VISIT (OUTPATIENT)
Dept: FAMILY MEDICINE | Facility: CLINIC | Age: 74
End: 2019-01-30
Payer: COMMERCIAL

## 2019-01-30 DIAGNOSIS — R97.20 ELEVATED PROSTATE SPECIFIC ANTIGEN (PSA): Primary | ICD-10-CM

## 2019-01-30 DIAGNOSIS — I10 HYPERTENSION GOAL BP (BLOOD PRESSURE) < 140/90: ICD-10-CM

## 2019-01-30 DIAGNOSIS — R33.9 URINARY RETENTION: ICD-10-CM

## 2019-01-30 DIAGNOSIS — I44.2 THIRD DEGREE HEART BLOCK (H): ICD-10-CM

## 2019-01-30 DIAGNOSIS — R97.20 ELEVATED PSA: ICD-10-CM

## 2019-01-30 PROCEDURE — 99214 OFFICE O/P EST MOD 30 MIN: CPT | Performed by: FAMILY MEDICINE

## 2019-01-30 RX ORDER — TAMSULOSIN HYDROCHLORIDE 0.4 MG/1
0.4 CAPSULE ORAL DAILY
Qty: 90 CAPSULE | Refills: 3 | Status: SHIPPED | OUTPATIENT
Start: 2019-01-30 | End: 2019-05-06

## 2019-01-30 RX ORDER — TAMSULOSIN HYDROCHLORIDE 0.4 MG/1
0.4 CAPSULE ORAL DAILY
Qty: 90 CAPSULE | Refills: 3 | Status: SHIPPED | OUTPATIENT
Start: 2019-01-30 | End: 2019-01-30

## 2019-01-30 ASSESSMENT — PAIN SCALES - GENERAL: PAINLEVEL: NO PAIN (0)

## 2019-01-30 ASSESSMENT — MIFFLIN-ST. JEOR: SCORE: 1606.05

## 2019-01-30 NOTE — PROGRESS NOTES
SUBJECTIVE:   Khang Bailon is a 73 year old male who presents to clinic today for the following health issues:      Medication Followup of Tamsulosin    Taking Medication as prescribed: yes    Side Effects:  None    Medication Helping Symptoms:  yes     SUBJECTIVE:  Khang Bailon, a 73 year old male scheduled an appointment to discuss the following issues:     Urinary retention: needs refill of flomax  Elevated prostate specific antigen (PSA): recommended repeat,pt declined  Third degree heart block (H): stable, no symptoms  Hypertension goal BP (blood pressure) < 140/90:  Stable, no med issues      Past Medical History:   Diagnosis Date     Alpha thalassemia (H)      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    pacemaker at [facility]     Diverticulitis      Injury, other and unspecified, elbow, forearm, and wrist 1970s    Broke right wrist     Current Outpatient Medications   Medication Sig Dispense Refill     albuterol (PROAIR HFA/PROVENTIL HFA/VENTOLIN HFA) 108 (90 Base) MCG/ACT inhaler Inhale 2 puffs into the lungs every 6 hours as needed for shortness of breath / dyspnea or wheezing 1 Inhaler 3     Ascorbic Acid (VITAMIN C PO) Take 1 capful by mouth daily (Isotonix)       B Complex Vitamins (B COMPLEX PO) Take 1 capful by mouth daily (Isotonix)       DIGESTIVE ENZYMES PO Take 1 capful by mouth daily (Isotonix - w/ probiotics)       fluticasone (FLOVENT DISKUS) 100 MCG/BLIST AEPB Inhale 1 puff into the lungs 2 times daily 1 Inhaler 1     ipratropium - albuterol 0.5 mg/2.5 mg/3 mL (DUONEB) 0.5-2.5 (3) MG/3ML neb solution NEBULIZE CONTENTS OF ONE VIAL EVERY 6 HOURS AS NEEDED FOR SHORTNESS OF BREATH / DYSPNEA OR WHEEZING 360 mL 2     Multiple Vitamin (MULTI VITAMIN  MENS) TABS Take 1 capful by mouth daily (Isotonix)       order for DME Equipment being ordered: Nebulizer hose 1 Tube 11     tamsulosin (FLOMAX) 0.4 MG capsule Take 1 capsule (0.4 mg) by mouth daily 90 capsule 3     Vitamin D-Vitamin K (VITAMIN K2-VITAMIN D3 PO) Take 1 capful  "by mouth daily (Isotonix)         REVIEW OF SYSTEMS:         Respiratory: stable on current meds;flu shot declined    Cardiovascular: negative    Gastrointestinal: negative    Genitourinary: stable symptoms on flomax      EXAM:  /84   Pulse 84   Temp 96.9  F (36.1  C) (Temporal)   Resp 24   Ht 1.715 m (5' 7.5\")   Wt 89.4 kg (197 lb 3.2 oz)   SpO2 94%   BMI 30.43 kg/m    NECK:  Supple with no masses, adenopathy or thyromegaly.    LUNGS:  Clear to auscultation.   HEART:  slightly irregular rhythm, no murmurs.    ABDOMEN:  The abdomen is soft, nontender, grossly neg  EXTREMITIES: No cyanosis or edema.     IMPRESSION:  Encounter Diagnoses   Name Primary?     Urinary retention      Elevated prostate specific antigen (PSA)      Third degree heart block (H) - s/p pacemaker      Hypertension goal BP (blood pressure) < 140/90      Elevated PSA      PLAN:  Orders Placed This Encounter          tamsulosin (FLOMAX) 0.4 MG capsule refilled   Pt declined all other recommendations (psa ananda, flu shot)    Lenny Giles      "

## 2019-01-31 VITALS
OXYGEN SATURATION: 94 % | RESPIRATION RATE: 24 BRPM | TEMPERATURE: 96.9 F | HEIGHT: 68 IN | HEART RATE: 84 BPM | WEIGHT: 197.2 LBS | DIASTOLIC BLOOD PRESSURE: 84 MMHG | BODY MASS INDEX: 29.89 KG/M2 | SYSTOLIC BLOOD PRESSURE: 138 MMHG

## 2019-03-05 ENCOUNTER — TRANSFERRED RECORDS (OUTPATIENT)
Dept: HEALTH INFORMATION MANAGEMENT | Facility: CLINIC | Age: 74
End: 2019-03-05

## 2019-03-22 ENCOUNTER — TELEPHONE (OUTPATIENT)
Dept: FAMILY MEDICINE | Facility: CLINIC | Age: 74
End: 2019-03-22

## 2019-03-22 ENCOUNTER — HOSPITAL ENCOUNTER (EMERGENCY)
Facility: CLINIC | Age: 74
Discharge: HOME | End: 2019-03-22
Attending: FAMILY MEDICINE | Admitting: FAMILY MEDICINE
Payer: COMMERCIAL

## 2019-03-22 VITALS
DIASTOLIC BLOOD PRESSURE: 65 MMHG | BODY MASS INDEX: 29.16 KG/M2 | OXYGEN SATURATION: 95 % | RESPIRATION RATE: 16 BRPM | WEIGHT: 189 LBS | SYSTOLIC BLOOD PRESSURE: 122 MMHG | TEMPERATURE: 99.1 F | HEART RATE: 68 BPM

## 2019-03-22 DIAGNOSIS — R33.9 URINARY RETENTION: ICD-10-CM

## 2019-03-22 LAB
ALBUMIN UR-MCNC: NEGATIVE MG/DL
APPEARANCE UR: ABNORMAL
BILIRUB UR QL STRIP: NEGATIVE
COLOR UR AUTO: YELLOW
GLUCOSE UR STRIP-MCNC: NEGATIVE MG/DL
HGB UR QL STRIP: ABNORMAL
KETONES UR STRIP-MCNC: 20 MG/DL
LEUKOCYTE ESTERASE UR QL STRIP: ABNORMAL
MUCOUS THREADS #/AREA URNS LPF: PRESENT /LPF
NITRATE UR QL: NEGATIVE
PH UR STRIP: 6 PH (ref 5–7)
RBC #/AREA URNS AUTO: 112 /HPF (ref 0–2)
SOURCE: ABNORMAL
SP GR UR STRIP: 1.02 (ref 1–1.03)
UROBILINOGEN UR STRIP-MCNC: 2 MG/DL (ref 0–2)
WBC #/AREA URNS AUTO: 39 /HPF (ref 0–5)

## 2019-03-22 PROCEDURE — 81001 URINALYSIS AUTO W/SCOPE: CPT | Performed by: FAMILY MEDICINE

## 2019-03-22 PROCEDURE — 87186 SC STD MICRODIL/AGAR DIL: CPT | Performed by: FAMILY MEDICINE

## 2019-03-22 PROCEDURE — 99284 EMERGENCY DEPT VISIT MOD MDM: CPT | Mod: Z6 | Performed by: FAMILY MEDICINE

## 2019-03-22 PROCEDURE — 51702 INSERT TEMP BLADDER CATH: CPT | Performed by: FAMILY MEDICINE

## 2019-03-22 PROCEDURE — 51798 US URINE CAPACITY MEASURE: CPT | Performed by: FAMILY MEDICINE

## 2019-03-22 PROCEDURE — 99283 EMERGENCY DEPT VISIT LOW MDM: CPT | Mod: 25 | Performed by: FAMILY MEDICINE

## 2019-03-22 PROCEDURE — 25000125 ZZHC RX 250: Performed by: FAMILY MEDICINE

## 2019-03-22 PROCEDURE — 87086 URINE CULTURE/COLONY COUNT: CPT | Performed by: FAMILY MEDICINE

## 2019-03-22 RX ORDER — CEPHALEXIN 500 MG/1
500 CAPSULE ORAL 2 TIMES DAILY
Qty: 20 CAPSULE | Refills: 0 | Status: SHIPPED | OUTPATIENT
Start: 2019-03-22 | End: 2019-03-26

## 2019-03-22 RX ADMIN — LIDOCAINE HYDROCHLORIDE 10 ML: 20 JELLY TOPICAL at 15:53

## 2019-03-22 NOTE — ED NOTES
Bladder scanned patient and noted 595ml Patient tried to void and was able to void 10cc. Did a second bladder scan 30 minutes later and noted 793ml

## 2019-03-22 NOTE — ED PROVIDER NOTES
History     Chief Complaint   Patient presents with     Urinary Retention     HPI  Khang Bailon is a 74 year old male who presents with concerns of to urinate.  Patient states for last 2 days he has been having increasing trouble urinating.  He states every time he tries to urinate he just gets a little dribbles out.  Patient denies any blood in the urine.  He had a similar problem once before which they think was secondary to a big prostate.  He has been on prostate medications but recently his doctor decreased the dose.  Patient denies any fevers or chills, denies any back pain.    Allergies:  Allergies   Allergen Reactions     Flagyl [Metronidazole] GI Disturbance       Problem List:    Patient Active Problem List    Diagnosis Date Noted     Pulmonary emphysema, unspecified emphysema type (H) 11/16/2017     Priority: Medium     Benign neoplasm of colon, unspecified part of colon 07/26/2016     Priority: Medium     Sigmoid diverticulitis 07/12/2016     Priority: Medium     History of CVA (cerebrovascular accident) - old left internal capsule lacunar infarct on CT 7/2016 07/12/2016     Priority: Medium     Vomiting 07/12/2016     Priority: Medium     Multilevel degenerative disc disease 09/21/2015     Priority: Medium     Elevated prostate specific antigen (PSA) 09/11/2015     Priority: Medium     Health Care Home 01/24/2013     Priority: Medium     Racquel Ortiz RN-PHN  FPA / [facility]   [phone] b  DX V65.8 REPLACED WITH [id number] (04/08/2013)       Elevated PSA 08/27/2012     Priority: Medium     Loss of weight 08/27/2012     Priority: Medium     Third degree heart block (H) - s/p pacemaker 08/10/2012     Priority: Medium     Hypertension goal BP (blood pressure) < 140/90 08/10/2012     Priority: Medium     Advanced directives, counseling/discussion 07/27/2012     Priority: Medium     Discussed advance care planning with patient; however, patient declined at this  time. 2012          Thalassemia 2012     Priority: Medium     Diagnosis updated by automated process. Provider to review and confirm.       Hyperlipidemia LDL goal <130 2012     Priority: Medium     Painful respiration 2004     Priority: Medium     Personal history of tobacco use, presenting hazards to health 2004     Priority: Medium        Past Medical History:    Past Medical History:   Diagnosis Date     Alpha thalassemia (H)      AV block 2012     Diverticulitis      Injury, other and unspecified, elbow, forearm, and wrist 1970s       Past Surgical History:    Past Surgical History:   Procedure Laterality Date     COLONOSCOPY       COLONOSCOPY  12/10/2012     IMPLANT PACEMAKER         Family History:    History reviewed. No pertinent family history.    Social History:  Marital Status:   [2]  Social History     Tobacco Use     Smoking status: Former Smoker     Years: 50.00     Types: Cigarettes     Last attempt to quit: 2012     Years since quittin.6     Smokeless tobacco: Never Used     Tobacco comment: Quit in July   Substance Use Topics     Alcohol use: No     Alcohol/week: 0.0 oz     Drug use: No        Medications:      albuterol (PROAIR HFA/PROVENTIL HFA/VENTOLIN HFA) 108 (90 Base) MCG/ACT inhaler   Ascorbic Acid (VITAMIN C PO)   B Complex Vitamins (B COMPLEX PO)   DIGESTIVE ENZYMES PO   fluticasone (FLOVENT DISKUS) 100 MCG/BLIST AEPB   ipratropium - albuterol 0.5 mg/2.5 mg/3 mL (DUONEB) 0.5-2.5 (3) MG/3ML neb solution   Multiple Vitamin (MULTI VITAMIN  MENS) TABS   order for DME   tamsulosin (FLOMAX) 0.4 MG capsule   Vitamin D-Vitamin K (VITAMIN K2-VITAMIN D3 PO)         Review of Systems   All other systems reviewed and are negative.      Physical Exam   BP: 123/74  Heart Rate: 88  Temp: 99.1  F (37.3  C)  Resp: 16  Weight: 85.7 kg (189 lb)  SpO2: 95 %      Physical Exam   Constitutional: He appears well-developed and well-nourished. No distress.   HENT:    Head: Normocephalic and atraumatic.   Cardiovascular: Normal rate, regular rhythm and normal heart sounds.   No murmur heard.  Pulmonary/Chest: Effort normal and breath sounds normal. No stridor. No respiratory distress. He has no wheezes.   Abdominal: Soft. Bowel sounds are normal. He exhibits no distension. There is no tenderness. There is no guarding.   Skin: He is not diaphoretic.   Nursing note and vitals reviewed.      ED Course        Procedures           .  Results for orders placed or performed during the hospital encounter of 03/22/19   UA reflex to Microscopic and Culture   Result Value Ref Range    Color Urine Yellow     Appearance Urine Cloudy     Glucose Urine Negative NEG^Negative mg/dL    Bilirubin Urine Negative NEG^Negative    Ketones Urine 20 (A) NEG^Negative mg/dL    Specific Gravity Urine 1.019 1.003 - 1.035    Blood Urine Moderate (A) NEG^Negative    pH Urine 6.0 5.0 - 7.0 pH    Protein Albumin Urine Negative NEG^Negative mg/dL    Urobilinogen mg/dL 2.0 0.0 - 2.0 mg/dL    Nitrite Urine Negative NEG^Negative    Leukocyte Esterase Urine Small (A) NEG^Negative    Source Midstream Urine     RBC Urine 112 (H) 0 - 2 /HPF    WBC Urine 39 (H) 0 - 5 /HPF    Mucous Urine Present (A) NEG^Negative /LPF     Urine came back and does show signs of infection including white cells and other infectious markers.  We will plan on discharging the patient home on a course of Keflex.  Patient will follow up with Dr. Freeman in a week to have things reevaluated.  Patient was sent home with a leg bag.    Assessments & Plan (with Medical Decision Making)  Urinary retention     I have reviewed the nursing notes.    I have reviewed the findings, diagnosis, plan and need for follow up with the patient.              3/22/2019   [facility] EMERGENCY DEPARTMENT     Dariusz Naranjo MD  03/22/19 7621

## 2019-03-22 NOTE — TELEPHONE ENCOUNTER
Reason for Call:  Same Day Appointment, Requested Provider:  Cecilio Trejo M.D.    PCP: Cecilio Trejo    Reason for visit: COPD-UTI issues. Pt is shaking.    Duration of symptoms: 1 day    Have you been treated for this in the past? Yes    Additional comments: pts spouse stated pt needs to be seen today    Can we leave a detailed message on this number? YES    Phone number patient can be reached at: Home number on file [phone] (home)    Best Time:     Call taken on 3/22/2019 at 9:19 AM by Aurelia Mathews

## 2019-03-22 NOTE — TELEPHONE ENCOUNTER
Khang Bailon is a 74 year old male who calls with urinary retention.  He states he runs to the bathroom 40 times a day and nothing comes out.    NURSING ASSESSMENT:  Description:  Ongoing urinary retention  Onset/duration:  Two days  Precip. factors:  On tamsulosin  Associated symptoms:  None. Denies fever. Denies pain with urination, abd pain or blood in urine.  Improves/worsens symptoms:  None.  Pain scale (0-10)   0/10  LMP/preg/breast feeding:     Last exam/Treatment:  1/30  Allergies:   Allergies   Allergen Reactions     Flagyl [Metronidazole] GI Disturbance         RECOMMENDED DISPOSITION:  See in 24 hours. No appt avail in [facility] or [facility] today. Patient not able to travel around.  Was fine waiting until Tuesday for appt with Dr Giles.  He was advised If worsens over weekend could go to ED.  Will comply with recommendation: Yes  If further questions/concerns or if symptoms do not improve, worsen or new symptoms develop, call your PCP or [facility] as soon as possible.      Guideline used:  Telephone Triage Protocols for Nurses, Fifth Edition, Mellisa Trejo RN

## 2019-03-22 NOTE — TELEPHONE ENCOUNTER
Per Dr. Trejo patient is non compliant with following urology for retention. Please triage patient. Patient can not be seen by Dr. Trejo in clinic, patient should be seen in ED for further evaluation.   Thank you  Jocelyn Melendez MA

## 2019-03-22 NOTE — ED AVS SNAPSHOT
[facility] Emergency Department  [address] [place] [address]  Phone:  [phone]  Fax:  [phone]                                    Khang Bailon   MRN: [id number]    Department:  [facility] Emergency Department   Date of Visit:  3/22/2019           After Visit Summary Signature Page    I have received my discharge instructions, and my questions have been answered. I have discussed any challenges I see with this plan with the nurse or doctor.    ..........................................................................................................................................  Patient/Patient Representative Signature      ..........................................................................................................................................  Patient Representative Print Name and Relationship to Patient    ..................................................               ................................................  Date                                   Time    ..........................................................................................................................................  Reviewed by Signature/Title    ...................................................              ..............................................  Date                                               Time          22EPIC Rev 08/18

## 2019-03-26 ENCOUNTER — OFFICE VISIT (OUTPATIENT)
Dept: FAMILY MEDICINE | Facility: OTHER | Age: 74
End: 2019-03-26
Payer: COMMERCIAL

## 2019-03-26 VITALS
DIASTOLIC BLOOD PRESSURE: 78 MMHG | SYSTOLIC BLOOD PRESSURE: 138 MMHG | TEMPERATURE: 98 F | OXYGEN SATURATION: 95 % | BODY MASS INDEX: 29.98 KG/M2 | WEIGHT: 194.3 LBS | HEART RATE: 88 BPM | RESPIRATION RATE: 20 BRPM

## 2019-03-26 DIAGNOSIS — R33.9 URINARY RETENTION: ICD-10-CM

## 2019-03-26 DIAGNOSIS — R31.9 URINARY TRACT INFECTION WITH HEMATURIA, SITE UNSPECIFIED: Primary | ICD-10-CM

## 2019-03-26 DIAGNOSIS — N39.0 URINARY TRACT INFECTION WITH HEMATURIA, SITE UNSPECIFIED: Primary | ICD-10-CM

## 2019-03-26 LAB
BACTERIA SPEC CULT: ABNORMAL
Lab: ABNORMAL
SPECIMEN SOURCE: ABNORMAL

## 2019-03-26 PROCEDURE — 99213 OFFICE O/P EST LOW 20 MIN: CPT | Performed by: FAMILY MEDICINE

## 2019-03-26 RX ORDER — CEPHALEXIN 500 MG/1
500 CAPSULE ORAL 2 TIMES DAILY
Qty: 20 CAPSULE | Refills: 0 | Status: SHIPPED | OUTPATIENT
Start: 2019-03-26 | End: 2021-03-30

## 2019-03-26 ASSESSMENT — PAIN SCALES - GENERAL: PAINLEVEL: MILD PAIN (2)

## 2019-03-26 NOTE — PROGRESS NOTES
SUBJECTIVE:   Khang Bailon is a 74 year old male who presents to clinic today for the following health issues:      ED/UC Followup:    Facility:  [facility]  Date of visit: 03/22/19  Reason for visit: Urinary Retention  Current Status: murrell catheter in place     SUBJECTIVE:  Khang Bailon, a 74 year old male scheduled an appointment to discuss the following issues:     Urinary tract infection with hematuria, site unspecified  Urinary retention   He now has a murrell cath in place  Has a small amount of old blood coming from around the catheter  His urine culture showed likley sensitivity to keflex, which he is on now  He has not had fevers  He has a HO elevated psa, has declined f/u or evaluation of this    Past Medical History:   Diagnosis Date     Alpha thalassemia (H)      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    pacemaker at [facility]     Diverticulitis      Injury, other and unspecified, elbow, forearm, and wrist 1970s    Broke right wrist     Current Outpatient Medications   Medication Sig Dispense Refill     albuterol (PROAIR HFA/PROVENTIL HFA/VENTOLIN HFA) 108 (90 Base) MCG/ACT inhaler Inhale 2 puffs into the lungs every 6 hours as needed for shortness of breath / dyspnea or wheezing 1 Inhaler 3     Ascorbic Acid (VITAMIN C PO) Take 1 capful by mouth daily (Isotonix)       B Complex Vitamins (B COMPLEX PO) Take 1 capful by mouth daily (Isotonix)       cephALEXin (KEFLEX) 500 MG capsule Take 1 capsule (500 mg) by mouth 2 times daily 20 capsule 0     DIGESTIVE ENZYMES PO Take 1 capful by mouth daily (Isotonix - w/ probiotics)       fluticasone (FLOVENT DISKUS) 100 MCG/BLIST AEPB Inhale 1 puff into the lungs 2 times daily 1 Inhaler 1     ipratropium - albuterol 0.5 mg/2.5 mg/3 mL (DUONEB) 0.5-2.5 (3) MG/3ML neb solution NEBULIZE CONTENTS OF ONE VIAL EVERY 6 HOURS AS NEEDED FOR SHORTNESS OF BREATH / DYSPNEA OR WHEEZING 360 mL 2     Multiple Vitamin (MULTI VITAMIN  MENS) TABS Take 1 capful by mouth daily (Isotonix)       order  for DME Equipment being ordered: Nebulizer hose 1 Tube 11     tamsulosin (FLOMAX) 0.4 MG capsule Take 1 capsule (0.4 mg) by mouth daily 90 capsule 3     Vitamin D-Vitamin K (VITAMIN K2-VITAMIN D3 PO) Take 1 capful by mouth daily (Isotonix)           EXAM:  /78   Pulse 88   Temp 98  F (36.7  C) (Temporal)   Resp 20   Wt 88.1 kg (194 lb 4.8 oz)   SpO2 95%   BMI 29.98 kg/m    ABDOMEN:  The abdomen is soft, nontender, no hepatosplenomegaly or masses appreciated.  As mentioned above he has a murrell cath in place  IMPRESSION:  Encounter Diagnoses   Name Primary?     Urinary tract infection with hematuria, site unspecified Yes     Urinary retention      PLAN:  Continue present regimen  Urology conult scheduled  Orders Placed This Encounter     cephALEXin (KEFLEX) 500 MG capsule     Lenny Giles

## 2019-03-26 NOTE — RESULT ENCOUNTER NOTE
Final Urine Culture Report on 3/26/19  Emergency Dept/Urgent Care discharge antibiotic prescribed: Cephalexin (Keflex) 500 mg capsule, 1 capsule (500 mg) by mouth 2 times daily for 10 days.  #1. Bacteria, >100,000 colonies/mL aerococcus urinae, is SUSCEPTIBLE to Antibiotic.    As per [facility] ED Lab Result protocol, no change in antibiotic therapy.

## 2019-04-10 ENCOUNTER — OFFICE VISIT (OUTPATIENT)
Dept: UROLOGY | Facility: OTHER | Age: 74
End: 2019-04-10
Payer: COMMERCIAL

## 2019-04-10 VITALS
OXYGEN SATURATION: 94 % | HEART RATE: 94 BPM | SYSTOLIC BLOOD PRESSURE: 147 MMHG | TEMPERATURE: 98.4 F | DIASTOLIC BLOOD PRESSURE: 89 MMHG

## 2019-04-10 DIAGNOSIS — R97.20 ELEVATED PROSTATE SPECIFIC ANTIGEN (PSA): Primary | ICD-10-CM

## 2019-04-10 DIAGNOSIS — N40.1 BENIGN PROSTATIC HYPERPLASIA WITH URINARY RETENTION: ICD-10-CM

## 2019-04-10 DIAGNOSIS — R33.8 BENIGN PROSTATIC HYPERPLASIA WITH URINARY RETENTION: ICD-10-CM

## 2019-04-10 LAB — PSA SERPL-MCNC: 44.4 UG/L (ref 0–4)

## 2019-04-10 PROCEDURE — 99204 OFFICE O/P NEW MOD 45 MIN: CPT | Mod: 25 | Performed by: UROLOGY

## 2019-04-10 PROCEDURE — 36415 COLL VENOUS BLD VENIPUNCTURE: CPT | Performed by: UROLOGY

## 2019-04-10 PROCEDURE — 87077 CULTURE AEROBIC IDENTIFY: CPT | Performed by: UROLOGY

## 2019-04-10 PROCEDURE — 87186 SC STD MICRODIL/AGAR DIL: CPT | Performed by: UROLOGY

## 2019-04-10 PROCEDURE — 87070 CULTURE OTHR SPECIMN AEROBIC: CPT | Performed by: UROLOGY

## 2019-04-10 PROCEDURE — 84153 ASSAY OF PSA TOTAL: CPT | Performed by: UROLOGY

## 2019-04-10 PROCEDURE — 51700 IRRIGATION OF BLADDER: CPT | Performed by: UROLOGY

## 2019-04-10 ASSESSMENT — PAIN SCALES - GENERAL: PAINLEVEL: NO PAIN (1)

## 2019-04-10 ASSESSMENT — MIFFLIN-ST. JEOR: SCORE: 1558.76

## 2019-04-10 NOTE — PROGRESS NOTES
S: Khang Bailon is a pleasant  74 year old male who was requested to be seen by  Cecilio Trejo for a consult with regard to patient's urinary complaints.  Patient complains of retention of urine recently.  He was seen emergency room and had a Hernandez catheter placed.  This happened once in the past.  He is currently on finasteride and Flomax.  He is also currently being treated for urinary tract infection.  He has history of elevated PSA.  He has history of positive biopsy in the past which she does not remember when.  Symptoms have been on going for   many years(s).  Seems to be worsened over time.  His recent PSA was found to be   PSA   Date Value Ref Range Status   07/24/2018 42.20 (H) 0 - 4 ug/L Final     Comment:     Assay Method:  Chemiluminescence using Siemens Vista analyzer     Current Outpatient Medications   Medication Sig Dispense Refill     albuterol (PROAIR HFA/PROVENTIL HFA/VENTOLIN HFA) 108 (90 Base) MCG/ACT inhaler Inhale 2 puffs into the lungs every 6 hours as needed for shortness of breath / dyspnea or wheezing 1 Inhaler 3     Ascorbic Acid (VITAMIN C PO) Take 1 capful by mouth daily (Isotonix)       B Complex Vitamins (B COMPLEX PO) Take 1 capful by mouth daily (Isotonix)       cephALEXin (KEFLEX) 500 MG capsule Take 1 capsule (500 mg) by mouth 2 times daily 20 capsule 0     DIGESTIVE ENZYMES PO Take 1 capful by mouth daily (Isotonix - w/ probiotics)       fluticasone (FLOVENT DISKUS) 100 MCG/BLIST AEPB Inhale 1 puff into the lungs 2 times daily 1 Inhaler 1     ipratropium - albuterol 0.5 mg/2.5 mg/3 mL (DUONEB) 0.5-2.5 (3) MG/3ML neb solution NEBULIZE CONTENTS OF ONE VIAL EVERY 6 HOURS AS NEEDED FOR SHORTNESS OF BREATH / DYSPNEA OR WHEEZING 360 mL 2     Multiple Vitamin (MULTI VITAMIN  MENS) TABS Take 1 capful by mouth daily (Isotonix)       order for DME Equipment being ordered: Nebulizer hose 1 Tube 11     tamsulosin (FLOMAX) 0.4 MG capsule Take 1 capsule (0.4 mg) by mouth daily 90 capsule  3     Vitamin D-Vitamin K (VITAMIN K2-VITAMIN D3 PO) Take 1 capful by mouth daily (Isotonix)       Allergies   Allergen Reactions     Flagyl [Metronidazole] GI Disturbance     Past Medical History:   Diagnosis Date     Alpha thalassemia (H)      AV block 2012    pacemaker at [facility]     Diverticulitis      Injury, other and unspecified, elbow, forearm, and wrist 1970s    Broke right wrist     Past Surgical History:   Procedure Laterality Date     COLONOSCOPY       COLONOSCOPY  12/10/2012     IMPLANT PACEMAKER        History reviewed. No pertinent family history.  He does not have a family history of prostate cancer.  Social History     Socioeconomic History     Marital status:      Spouse name: Nikki     Number of children: 3     Years of education: None     Highest education level: None   Occupational History     Employer: SELF   Social Needs     Financial resource strain: None     Food insecurity:     Worry: None     Inability: None     Transportation needs:     Medical: None     Non-medical: None   Tobacco Use     Smoking status: Former Smoker     Years: 50.00     Types: Cigarettes     Last attempt to quit: 2012     Years since quittin.7     Smokeless tobacco: Never Used     Tobacco comment: Quit in July   Substance and Sexual Activity     Alcohol use: No     Alcohol/week: 0.0 oz     Drug use: No     Sexual activity: Never   Lifestyle     Physical activity:     Days per week: None     Minutes per session: None     Stress: None   Relationships     Social connections:     Talks on phone: None     Gets together: None     Attends Jewish service: None     Active member of club or organization: None     Attends meetings of clubs or organizations: None     Relationship status: None     Intimate partner violence:     Fear of current or ex partner: None     Emotionally abused: None     Physically abused: None     Forced sexual activity: None   Other Topics Concern     Parent/sibling w/ CABG, MI or  "angioplasty before 65F 55M? No   Social History Narrative     None        REVIEW OF SYSTEMS  =================  C: NEGATIVE for fever, chills, change in weight  I: NEGATIVE for worrisome rashes, moles or lesions  E/M: NEGATIVE for ear, mouth and throat problems  R: NEGATIVE for significant cough or SHORTNESS OF BREATH  CV:  NEGATIVE for chest pain, palpitations or peripheral edema  GI: NEGATIVE for nausea, abdominal pain, heartburn, or change in bowel habits  NEURO: NEGATIVE numbness/weakness  : see HPI  PSYCH: NEGATIVE depression/anxiety  LYmph: no new enlarged lymph nodes  Ortho: no new trauma/movements           O: Exam:/89   Pulse 94   Temp 98.4  F (36.9  C) (Oral)   Ht (P) 1.67 m (5' 5.75\")   Wt (P) 88 kg (194 lb)   SpO2 94%   BMI (P) 31.55 kg/m     Constitutional: healthy, alert and no distress  Cardiovascular: negative, PMI normal.   Respiratory: negative, no evidence of respiratory distress  Gastrointestinal: Abdomen soft, non-tender. BS normal. No masses, organomegaly  : Penis with Hernandez cath in place clear urine.  Testes without any masses.  No scrotal skin lesion.  Prostate measures about 50 g plus.  Without abnormal nodule in the right side of the prostate.  Musculoskeletal: extremities normal- no gross deformities noted, gait normal and normal muscle tone  Skin: no suspicious lesions or rashes  Neurologic: Alert and oriented  Psychiatric: mentation appears normal. and affect normal/bright  Hematologic/Lymphatic/Immunologic: normal ant/post cervical, axillary, supraclavicular and inguinal nodes    Assessment/Plan:   (N40.1,  R33.8) Benign prostatic hyperplasia with urinary retention  (primary encounter diagnosis)  Comment: We will do a trial void today.  Patient passed his trial void successfully.  Plan: Continue with Flomax and finasteride.  He probably will need a TURP procedure in the future.    (R97.20) Elevated prostate specific antigen (PSA)  Comment: Discussed prostate cancer.  PSA " is quite elevated since last year.  We will repeat his PSA today.  Schedule patient for a transrectal ultrasound biopsy of the prostate.  Risks of bleeding infection discussed.

## 2019-04-10 NOTE — NURSING NOTE
Patient arrives for catheter removal and trial of void.  300 ml of sterile water was instilled into the bladder through existing murrell catheter. Balloon was deflated and catheter was removed without problem. Patient voided 250 ml clear urine. MD was notified. Patient will return to clinic, or seek medical attention, if unable to urinate.     Nargis Trejo MA

## 2019-04-12 LAB
BACTERIA SPEC CULT: ABNORMAL
Lab: ABNORMAL
SPECIMEN SOURCE: ABNORMAL

## 2019-04-15 ENCOUNTER — TELEPHONE (OUTPATIENT)
Dept: FAMILY MEDICINE | Facility: CLINIC | Age: 74
End: 2019-04-15

## 2019-04-15 DIAGNOSIS — R97.20 ELEVATED PROSTATE SPECIFIC ANTIGEN (PSA): Primary | ICD-10-CM

## 2019-04-15 RX ORDER — SULFAMETHOXAZOLE/TRIMETHOPRIM 800-160 MG
1 TABLET ORAL 2 TIMES DAILY
Qty: 6 TABLET | Refills: 0 | Status: SHIPPED | OUTPATIENT
Start: 2019-04-15 | End: 2021-03-30

## 2019-04-15 NOTE — TELEPHONE ENCOUNTER
Called yesterday, discussed  He has opted to not have biopsy or any treatment at this point.  I discussed the risks of this decision and he understands.  He will f/u with me in the next few months, will consider bone survey, follow-up labs.  DBue      Reason for Call:  Other     Detailed comments: Pt would like a call from Dr. Giles about a procedure he had recently through a different provider. Would not go into detail further with me.     Phone Number Patient can be reached at: Home number on file [phone] (home)    Best Time: any     Can we leave a detailed message on this number? yes    Call taken on 4/15/2019 at 6:51 PM by Arabella José

## 2019-04-17 DIAGNOSIS — J44.9 CHRONIC OBSTRUCTIVE PULMONARY DISEASE, UNSPECIFIED COPD TYPE (H): ICD-10-CM

## 2019-04-19 RX ORDER — IPRATROPIUM BROMIDE AND ALBUTEROL SULFATE 2.5; .5 MG/3ML; MG/3ML
SOLUTION RESPIRATORY (INHALATION)
Qty: 360 ML | Refills: 2 | Status: SHIPPED | OUTPATIENT
Start: 2019-04-19 | End: 2019-10-14

## 2019-04-19 NOTE — TELEPHONE ENCOUNTER
"DUO neb  Last Written Prescription Date:  10/26/2018  Last Fill Quantity: 360,  # refills: 2   Last office visit: 3/26/2019  Future Office Visit:      Requested Prescriptions   Pending Prescriptions Disp Refills     ipratropium - albuterol 0.5 mg/2.5 mg/3 mL (DUONEB) 0.5-2.5 (3) MG/3ML neb solution [Pharmacy Med Name: IPRATROPIUM-ALBUTEROL 0.5-2.5 SOLN] 360 mL 2     Sig: NEBULIZE CONTENTS OF ONE VIAL EVERY 6 HOURS AS NEEDED FOR SHORTNESS OF BREATH / DYSPNEA OR WHEEZING       Short-Acting Beta Agonist Inhalers Protocol  Passed - 4/19/2019 12:03 PM        Passed - Patient is age 12 or older        Passed - Recent (12 mo) or future (30 days) visit within the authorizing provider's specialty     Patient had office visit in the last 12 months or has a visit in the next 30 days with authorizing provider or within the authorizing provider's specialty.  See \"Patient Info\" tab in inbasket, or \"Choose Columns\" in Meds & Orders section of the refill encounter.              Passed - Medication is active on med list           Prescription approved per [facility] Refill Protocol.      Che Trejo RN on 4/19/2019 at 12:15 PM    "

## 2019-04-19 NOTE — TELEPHONE ENCOUNTER
Wife is stating he needs this right now please. Urgently needed, thus reason for High Priority Status.       Thanks  Antonette Reyes [facility]   [phone]

## 2019-05-03 DIAGNOSIS — R33.9 URINARY RETENTION: ICD-10-CM

## 2019-05-06 RX ORDER — TAMSULOSIN HYDROCHLORIDE 0.4 MG/1
0.4 CAPSULE ORAL DAILY
Qty: 90 CAPSULE | Refills: 2 | Status: SHIPPED | OUTPATIENT
Start: 2019-05-06 | End: 2019-05-28

## 2019-05-06 NOTE — TELEPHONE ENCOUNTER
"Requested Prescriptions   Pending Prescriptions Disp Refills     tamsulosin (FLOMAX) 0.4 MG capsule 90 capsule 3     Sig: Take 1 capsule (0.4 mg) by mouth daily   Last Written Prescription Date:  1/30/19  Last Fill Quantity: 90,  # refills: 3   Last office visit: 3/26/2019 with prescribing provider:     Future Office Visit:        Alpha Blockers Failed - 5/3/2019  9:30 AM        Failed - Blood pressure under 140/90 in past 12 months     BP Readings from Last 3 Encounters:   04/10/19 147/89   03/26/19 138/78   03/22/19 122/65           Passed - Recent (12 mo) or future (30 days) visit within the authorizing provider's specialty     Patient had office visit in the last 12 months or has a visit in the next 30 days with authorizing provider or within the authorizing provider's specialty.  See \"Patient Info\" tab in inbasket, or \"Choose Columns\" in Meds & Orders section of the refill encounter.          Passed - Patient does not have Tadalafil, Vardenafil, or Sildenafil on their medication list        Passed - Medication is active on med list        Passed - Patient is 18 years of age or older      Sent current refills to new pharmacy requested.  Jessica Barlow RN    "

## 2019-05-15 ENCOUNTER — OFFICE VISIT (OUTPATIENT)
Dept: FAMILY MEDICINE | Facility: CLINIC | Age: 74
End: 2019-05-15
Payer: COMMERCIAL

## 2019-05-15 ENCOUNTER — APPOINTMENT (OUTPATIENT)
Dept: CT IMAGING | Facility: CLINIC | Age: 74
End: 2019-05-15
Attending: FAMILY MEDICINE
Payer: COMMERCIAL

## 2019-05-15 ENCOUNTER — APPOINTMENT (OUTPATIENT)
Dept: GENERAL RADIOLOGY | Facility: CLINIC | Age: 74
End: 2019-05-15
Attending: FAMILY MEDICINE
Payer: COMMERCIAL

## 2019-05-15 ENCOUNTER — HOSPITAL ENCOUNTER (EMERGENCY)
Facility: CLINIC | Age: 74
Discharge: HOME | End: 2019-05-15
Attending: FAMILY MEDICINE | Admitting: FAMILY MEDICINE
Payer: COMMERCIAL

## 2019-05-15 VITALS
SYSTOLIC BLOOD PRESSURE: 165 MMHG | OXYGEN SATURATION: 92 % | DIASTOLIC BLOOD PRESSURE: 103 MMHG | RESPIRATION RATE: 16 BRPM | HEART RATE: 67 BPM | TEMPERATURE: 99 F

## 2019-05-15 DIAGNOSIS — J44.9 CHRONIC OBSTRUCTIVE PULMONARY DISEASE, UNSPECIFIED COPD TYPE (H): ICD-10-CM

## 2019-05-15 DIAGNOSIS — S20.212A RIB CONTUSION, LEFT, INITIAL ENCOUNTER: ICD-10-CM

## 2019-05-15 DIAGNOSIS — S20.212A CONTUSION OF LEFT CHEST WALL, INITIAL ENCOUNTER: Primary | ICD-10-CM

## 2019-05-15 DIAGNOSIS — R06.02 SOB (SHORTNESS OF BREATH): ICD-10-CM

## 2019-05-15 DIAGNOSIS — R07.89 CHEST WALL PAIN: ICD-10-CM

## 2019-05-15 LAB
ANION GAP SERPL CALCULATED.3IONS-SCNC: 6 MMOL/L (ref 3–14)
BASOPHILS # BLD AUTO: 0.1 10E9/L (ref 0–0.2)
BASOPHILS NFR BLD AUTO: 0.8 %
BUN SERPL-MCNC: 16 MG/DL (ref 7–30)
CALCIUM SERPL-MCNC: 9 MG/DL (ref 8.5–10.1)
CHLORIDE SERPL-SCNC: 105 MMOL/L (ref 94–109)
CO2 SERPL-SCNC: 32 MMOL/L (ref 20–32)
CREAT SERPL-MCNC: 0.93 MG/DL (ref 0.66–1.25)
DIFFERENTIAL METHOD BLD: ABNORMAL
EOSINOPHIL NFR BLD AUTO: 3 %
ERYTHROCYTE [DISTWIDTH] IN BLOOD BY AUTOMATED COUNT: 18.7 % (ref 10–15)
GFR SERPL CREATININE-BSD FRML MDRD: 81 ML/MIN/{1.73_M2}
GLUCOSE SERPL-MCNC: 103 MG/DL (ref 70–99)
HCT VFR BLD AUTO: 39.6 % (ref 40–53)
HGB BLD-MCNC: 12.1 G/DL (ref 13.3–17.7)
IMM GRANULOCYTES # BLD: 0 10E9/L (ref 0–0.4)
IMM GRANULOCYTES NFR BLD: 0.5 %
LYMPHOCYTES # BLD AUTO: 1.2 10E9/L (ref 0.8–5.3)
LYMPHOCYTES NFR BLD AUTO: 15.1 %
MCH RBC QN AUTO: 18.9 PG (ref 26.5–33)
MCHC RBC AUTO-ENTMCNC: 30.6 G/DL (ref 31.5–36.5)
MCV RBC AUTO: 62 FL (ref 78–100)
MONOCYTES # BLD AUTO: 0.7 10E9/L (ref 0–1.3)
MONOCYTES NFR BLD AUTO: 8.1 %
NEUTROPHILS # BLD AUTO: 5.8 10E9/L (ref 1.6–8.3)
NEUTROPHILS NFR BLD AUTO: 72.5 %
NRBC # BLD AUTO: 0 10*3/UL
NRBC BLD AUTO-RTO: 0 /100
PLATELET # BLD AUTO: 206 10E9/L (ref 150–450)
POTASSIUM SERPL-SCNC: 4 MMOL/L (ref 3.4–5.3)
RBC # BLD AUTO: 6.4 10E12/L (ref 4.4–5.9)
SODIUM SERPL-SCNC: 143 MMOL/L (ref 133–144)
WBC # BLD AUTO: 8 10E9/L (ref 4–11)

## 2019-05-15 PROCEDURE — 96374 THER/PROPH/DIAG INJ IV PUSH: CPT | Performed by: FAMILY MEDICINE

## 2019-05-15 PROCEDURE — 96375 TX/PRO/DX INJ NEW DRUG ADDON: CPT | Performed by: FAMILY MEDICINE

## 2019-05-15 PROCEDURE — 85025 COMPLETE CBC W/AUTO DIFF WBC: CPT | Performed by: FAMILY MEDICINE

## 2019-05-15 PROCEDURE — 99285 EMERGENCY DEPT VISIT HI MDM: CPT | Mod: Z6 | Performed by: FAMILY MEDICINE

## 2019-05-15 PROCEDURE — 80048 BASIC METABOLIC PNL TOTAL CA: CPT | Performed by: FAMILY MEDICINE

## 2019-05-15 PROCEDURE — 71046 X-RAY EXAM CHEST 2 VIEWS: CPT | Mod: TC

## 2019-05-15 PROCEDURE — 71260 CT THORAX DX C+: CPT

## 2019-05-15 PROCEDURE — 99285 EMERGENCY DEPT VISIT HI MDM: CPT | Mod: 25 | Performed by: FAMILY MEDICINE

## 2019-05-15 PROCEDURE — 25000128 H RX IP 250 OP 636: Performed by: FAMILY MEDICINE

## 2019-05-15 RX ORDER — ALBUTEROL SULFATE 90 UG/1
2 AEROSOL, METERED RESPIRATORY (INHALATION) EVERY 6 HOURS PRN
Qty: 3 G | Refills: 3 | Status: SHIPPED | OUTPATIENT
Start: 2019-05-15 | End: 2021-09-30

## 2019-05-15 RX ORDER — IOPAMIDOL 755 MG/ML
100 INJECTION, SOLUTION INTRAVASCULAR ONCE
Status: COMPLETED | OUTPATIENT
Start: 2019-05-15 | End: 2019-05-15

## 2019-05-15 RX ORDER — HYDROCODONE BITARTRATE AND ACETAMINOPHEN 5; 325 MG/1; MG/1
1-2 TABLET ORAL EVERY 6 HOURS PRN
Qty: 18 TABLET | Refills: 0 | Status: SHIPPED | OUTPATIENT
Start: 2019-05-15 | End: 2019-05-18

## 2019-05-15 RX ORDER — KETOROLAC TROMETHAMINE 15 MG/ML
15 INJECTION, SOLUTION INTRAMUSCULAR; INTRAVENOUS ONCE
Status: COMPLETED | OUTPATIENT
Start: 2019-05-15 | End: 2019-05-15

## 2019-05-15 RX ADMIN — HYDROMORPHONE HYDROCHLORIDE 1 MG: 1 INJECTION, SOLUTION INTRAMUSCULAR; INTRAVENOUS; SUBCUTANEOUS at 17:51

## 2019-05-15 RX ADMIN — IOPAMIDOL 75 ML: 755 INJECTION, SOLUTION INTRAVENOUS at 19:01

## 2019-05-15 RX ADMIN — KETOROLAC TROMETHAMINE 15 MG: 15 INJECTION, SOLUTION INTRAMUSCULAR; INTRAVENOUS at 18:38

## 2019-05-15 NOTE — ED PROVIDER NOTES
[facility] ED Provider Note   Patient: Khang Bailon  MRN #:  [id number]  Date of Visit: May 15, 2019    CC:     Chief Complaint   Patient presents with     Chest Wall Pain     HPI:  Khang Bailon is a 74 year old male with a history of heart block, status post pacemaker who presented to the emergency department with 2-day history of acute left upper lateral chest wall pain.  Patient states that he and his wife were babysitting their daughter's puppy.  He had a squeaky toy that he dropped over the back of the couch.  Patient was reaching over 2 nights ago, and had a sudden pain in the left upper chest.  The pain has increased each day and he is feeling shortness of breath.  Pain is exacerbated by coughing and deep breathing.  He has some relief when he was able to bring his arm over his head.  Current pain levels rated 10/10.  Patient was seen in the clinic by Dr. Giles and directed to the emergency department for further evaluation.  Patient was complaining of some left-sided neck pain radiating to the left shoulder prior to this.  He has a history of COPD, but has quit smoking for the past 7 years.    Problem List:  Patient Active Problem List    Diagnosis Date Noted     Pulmonary emphysema, unspecified emphysema type (H) 11/16/2017     Priority: Medium     Benign neoplasm of colon, unspecified part of colon 07/26/2016     Priority: Medium     Sigmoid diverticulitis 07/12/2016     Priority: Medium     History of CVA (cerebrovascular accident) - old left internal capsule lacunar infarct on CT 7/2016 07/12/2016     Priority: Medium     Vomiting 07/12/2016     Priority: Medium     Multilevel degenerative disc disease 09/21/2015     Priority: Medium     Elevated prostate specific antigen (PSA) 09/11/2015     Priority: Medium     Health Care Home 01/24/2013     Priority: Medium     Racquel Ortiz RN-PHN  FPLIDIA / EL[facility] Case  Manager  [phone] b  DX V65.8 REPLACED WITH [id number]ALTH CARE HOME (04/08/2013)       Elevated PSA 08/27/2012     Priority: Medium     Loss of weight 08/27/2012     Priority: Medium     Third degree heart block (H) - s/p pacemaker 08/10/2012     Priority: Medium     Hypertension goal BP (blood pressure) < 140/90 08/10/2012     Priority: Medium     Advanced directives, counseling/discussion 07/27/2012     Priority: Medium     Discussed advance care planning with patient; however, patient declined at this time. 7/27/2012          Thalassemia 07/27/2012     Priority: Medium     Diagnosis updated by automated process. Provider to review and confirm.       Hyperlipidemia LDL goal <130 07/27/2012     Priority: Medium     Painful respiration 03/31/2004     Priority: Medium     Personal history of tobacco use, presenting hazards to health 03/31/2004     Priority: Medium       Past Medical History:   Diagnosis Date     Alpha thalassemia (H)      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     Diverticulitis      Injury, other and unspecified, elbow, forearm, and wrist 1970s       MEDS:     HYDROcodone-acetaminophen (NORCO) 5-325 MG tablet   albuterol (PROAIR HFA/PROVENTIL HFA/VENTOLIN HFA) 108 (90 Base) MCG/ACT inhaler   Ascorbic Acid (VITAMIN C PO)   B Complex Vitamins (B COMPLEX PO)   cephALEXin (KEFLEX) 500 MG capsule   DIGESTIVE ENZYMES PO   fluticasone (FLOVENT DISKUS) 100 MCG/BLIST AEPB   ipratropium - albuterol 0.5 mg/2.5 mg/3 mL (DUONEB) 0.5-2.5 (3) MG/3ML neb solution   Multiple Vitamin (MULTI VITAMIN  MENS) TABS   order for DME   sulfamethoxazole-trimethoprim (BACTRIM DS/SEPTRA DS) 800-160 MG tablet   tamsulosin (FLOMAX) 0.4 MG capsule   Vitamin D-Vitamin K (VITAMIN K2-VITAMIN D3 PO)       ALLERGIES:    Allergies   Allergen Reactions     Flagyl [Metronidazole] GI Disturbance       Past Surgical History:   Procedure Laterality Date     COLONOSCOPY       COLONOSCOPY  12/10/2012     IMPLANT PACEMAKER         Social History     Tobacco Use      Smoking status: Former Smoker     Years: 50.00     Types: Cigarettes     Last attempt to quit: 2012     Years since quittin.8     Smokeless tobacco: Never Used     Tobacco comment: Quit in July   Substance Use Topics     Alcohol use: No     Alcohol/week: 0.0 oz     Drug use: No         Review of Systems   Except as noted in HPI, all other systems were reviewed and are negative    Physical Exam     Vitals were reviewed  Patient Vitals for the past 8 hrs:   BP Temp Temp src Pulse Heart Rate Resp SpO2   05/15/19 2007 (!) 165/103 -- -- 67 67 16 92 %   05/15/19 1755 (!) 165/99 -- -- 75 -- -- --   05/15/19 1729 (!) 200/109 99  F (37.2  C) Oral -- 80 -- 96 %     GENERAL APPEARANCE: Alert, moderate to severe distress due to pain  FACE: normal facies  EYES: Pupils are equal  HENT: normal external exam  RESP: normal respiratory effort; clear breath sounds bilaterally  CHEST: Localized left upper lateral chest wall tenderness.  No crepitus; pacemaker in the left upper quadrant is nontender.  CV: regular rate and rhythm; no significant murmurs, gallops or rubs  ABD: soft, no tenderness; no rebound or guarding; bowel sounds are normal  MS: no gross deformities noted; normal muscle tone.  EXT: No calf tenderness or pitting edema  SKIN: no worrisome rash  NEURO: no facial droop; no focal deficits, speech is normal        Available Lab/Imaging Results     Results for orders placed or performed during the hospital encounter of 05/15/19 (from the past 24 hour(s))   XR Chest 2 Views    Narrative    CHEST TWO VIEWS  5/15/2019 6:02 PM     HISTORY: Left upper lateral chest wall pain. History of minor trauma.    COMPARISON: Chest CT 2018.      Impression    IMPRESSION: Left chest wall pacemaker and pacer wires remain in place.  There is blunting of the costophrenic angles and hyperaeration of the  lungs suggestive of COPD. No focal airspace opacities are appreciated.  Previously seen nodules on chest CT are not  appreciated by chest  x-ray. Mild degenerative changes in the spine. Cardiac silhouette is  within normal limits. No significant pleural effusion or pneumothorax.      BAILEY COMER MD   CBC with platelets differential   Result Value Ref Range    WBC 8.0 4.0 - 11.0 10e9/L    RBC Count 6.40 (H) 4.4 - 5.9 10e12/L    Hemoglobin 12.1 (L) 13.3 - 17.7 g/dL    Hematocrit 39.6 (L) 40.0 - 53.0 %    MCV 62 (L) 78 - 100 fl    MCH 18.9 (L) 26.5 - 33.0 pg    MCHC 30.6 (L) 31.5 - 36.5 g/dL    RDW 18.7 (H) 10.0 - 15.0 %    Platelet Count 206 150 - 450 10e9/L    Diff Method Automated Method     % Neutrophils 72.5 %    % Lymphocytes 15.1 %    % Monocytes 8.1 %    % Eosinophils 3.0 %    % Basophils 0.8 %    % Immature Granulocytes 0.5 %    Nucleated RBCs 0 0 /100    Absolute Neutrophil 5.8 1.6 - 8.3 10e9/L    Absolute Lymphocytes 1.2 0.8 - 5.3 10e9/L    Absolute Monocytes 0.7 0.0 - 1.3 10e9/L    Absolute Basophils 0.1 0.0 - 0.2 10e9/L    Abs Immature Granulocytes 0.0 0 - 0.4 10e9/L    Absolute Nucleated RBC 0.0    Basic metabolic panel   Result Value Ref Range    Sodium 143 133 - 144 mmol/L    Potassium 4.0 3.4 - 5.3 mmol/L    Chloride 105 94 - 109 mmol/L    Carbon Dioxide 32 20 - 32 mmol/L    Anion Gap 6 3 - 14 mmol/L    Glucose 103 (H) 70 - 99 mg/dL    Urea Nitrogen 16 7 - 30 mg/dL    Creatinine 0.93 0.66 - 1.25 mg/dL    GFR Estimate 81 >60 mL/min/[1.73_m2]    GFR Estimate If Black >90 >60 mL/min/[1.73_m2]    Calcium 9.0 8.5 - 10.1 mg/dL   CT CHEST W CONTRAST    Narrative    CT CHEST WITH CONTRAST  5/15/2019 7:14 PM     HISTORY: Chest trauma, blunt. Low energy. Negative x-ray. Normal exam  and mental status.    COMPARISON: February 22, 2018    TECHNIQUE: Volumetric helical acquisition of CT images of the chest  from the clavicles to the kidneys were acquired after the  administration of 75 mL Isovue-370 IV contrast. Radiation dose for  this scan was reduced using automated exposure control, adjustment of  the mA and/or kV according  to patient size, or iterative  reconstruction technique.    FINDINGS: At least moderate emphysema. No pneumothorax. No pleural or  pericardial effusion. Small amount of fluid around the heart, likely  physiologic. Centrally calcified nodule in the superior segment of the  right lower lobe is stable, other nodules not significantly changed.  No acute infiltrates. No acute findings in the visualized upper  abdomen. Cholelithiasis without cholecystitis. Renal cysts.      Impression    IMPRESSION: No definite posttraumatic change is demonstrated in the  chest. No definite rib fractures demonstrated.              Impression     Final diagnoses:   Chest wall pain   Rib contusion, left, initial encounter         ED Course & Medical Decision Making   Khang Bailon is a 74 year old male who presented to the emergency department with acute left upper chest wall pain and tenderness.  Pain is worsened by deep breathing, and coughing, and improved with bringing the left arm over his head.  Patient did have some minor trauma, and a chest x-ray was ordered.  Patient received a milligram of Dilaudid IM and reported no significant improvement.  We discussed pursuing further evaluation and treatment.  Patient received a peripheral IV, and we checked his CBC and basic metabolic panel.  White blood count came back at 8.0 with 72% neutrophils.  Hemoglobin is 12.1.  Basic metabolic panel is normal except for a nonfasting glucose of 103.  Kidney function reveals a creatinine of 0.93.  Patient underwent CT imaging of the chest with contrast.  There is at least moderate emphysema.  No pneumothorax, pleural or pericardial effusion.  There is a small amount of fluid around the heart likely physiologic.  There is a centrally calcified nodule in the superior segment of the right lower lobe which is stable.  No acute infiltrates.  No acute findings in the visualized upper abdomen.  She has cholelithiasis without cholecystitis.  There are some  "renal cysts.  Patient received a dose of Toradol 50 mg IV.  Pain was slightly better but he was still having pain with deep breathing and coughing.  I reviewed results of his visit today.  There does not appear to be a serious injury to the chest.  Patient likely has a rib contusion causing his pain.  We discussed his prostate problem, with suspicion for prostate cancer.  He refused to have prostate biopsy and is self treating with molasses and baking soda.  He is still getting up 2-3 times at night to urinate.  I we will have the patient reserve Vicodin for nighttime use for the next several days to treat suspected rib contusion.  Follow-up with Dr. Giles if not better in 5-7 days.  Return to the ED at any time if symptoms worsen.    Addendum: Just before discharge, patient became dizzy and pale.  We sent the patient down, and repeated the blood pressure both sitting and standing.  His standing blood pressure is 191/127.  I discussed the consistently high blood pressures that we obtained here in the ED.  He and his wife insisted that at home, his blood pressure has not been a problem.  Do have a blood pressure cuff and it typically runs around 120/80.  They report that when he has pain, the blood pressure is higher.  He feels better now.  I insisted that he check blood pressure readings more frequently over the next several days and to follow-up with his primary care provider if his blood pressure remains at all elevated.  Patient understands that he is at risk for stroke or heart attack.  He and his wife would prefer to treat \"naturally.\"  Patient was discharged home with instructions for short-term follow-up.           Written after-visit summary and instructions were given at the time of discharge.    Follow up Plan:   Lenny Giles MD  [address] [place] [id number] [phone]    In 5 days  if not improving      Discharge Medications: Vicodin         Disclaimer: This note consists of words and " symbols derived from keyboarding and dictation using voice recognition software.  As a result, there may be errors that have gone undetected.  Please consider this when interpreting information found in this note.       Marisel Tompkins MD  05/15/19 2004       Marisel Tompkins MD  05/15/19 2034

## 2019-05-15 NOTE — ED AVS SNAPSHOT
[facility] Emergency Department  [address] [place] [address]  Phone:  [phone]  Fax:  [phone]                                    Khang Bailon   MRN: [id number]    Department:  [facility] Emergency Department   Date of Visit:  5/15/2019           After Visit Summary Signature Page    I have received my discharge instructions, and my questions have been answered. I have discussed any challenges I see with this plan with the nurse or doctor.    ..........................................................................................................................................  Patient/Patient Representative Signature      ..........................................................................................................................................  Patient Representative Print Name and Relationship to Patient    ..................................................               ................................................  Date                                   Time    ..........................................................................................................................................  Reviewed by Signature/Title    ...................................................              ..............................................  Date                                               Time          22EPIC Rev 08/18

## 2019-05-15 NOTE — ED TRIAGE NOTES
"Pt states \"The day before yesterday, I reached over the couch to get the dog toy.  And I must have it hit something, and boom it hurt.  Now its getting worse and worse.  I am having trouble breathing and when I cough it hurts.\"  "

## 2019-05-16 ENCOUNTER — TELEPHONE (OUTPATIENT)
Dept: FAMILY MEDICINE | Facility: OTHER | Age: 74
End: 2019-05-16

## 2019-05-16 DIAGNOSIS — R97.20 ELEVATED PROSTATE SPECIFIC ANTIGEN (PSA): Primary | ICD-10-CM

## 2019-05-16 NOTE — TELEPHONE ENCOUNTER
Called  His pain is still present, but managable, likely due to a contusion  Also discussed his psa; he declined bx, wishes to have psa repeated, will order. CT last night showed no mets in his chest.  dbue

## 2019-05-16 NOTE — DISCHARGE INSTRUCTIONS
You have a rib contusion to the left side of the chest wall.  Use over-the-counter lidocaine patch and leave this on for no more than 12 hours each day.  Add Vicodin 1-2 tablets especially at nighttime to help with your severe pain.  Follow-up in 5-7 days if not improving.

## 2019-05-16 NOTE — ED NOTES
Patient now tolerating ambulating hallway, c/o L sided chest pain with deep breathing/coughing. Dizziness has subsided. Color improved and no longer nauseous.

## 2019-05-16 NOTE — ED NOTES
While patient standing to discharge, he became dizzy, pale and nauseous. MD notified. Ortho BP's done. Dr. Tompkins speaking with patient and family now. Patient states his dizziness has subsided and color is better. 's.

## 2019-05-24 DIAGNOSIS — R33.9 URINARY RETENTION: ICD-10-CM

## 2019-05-24 NOTE — TELEPHONE ENCOUNTER
Reason for Call:  Other call back and prescription    Detailed comments: Patient wife called back and stated that he was told to take 2 pills a day and is now 30 days short because the dose was never changed. Please change the dose and sent the new script to [facility]AP. Any questions please call the patient or his wife.     Phone Number Patient can be reached at: Home number on file [phone] (home)    Best Time: any    Can we leave a detailed message on this number? YES    Call taken on 5/24/2019 at 12:53 PM by Meliza Rob

## 2019-05-24 NOTE — TELEPHONE ENCOUNTER
Patient requesting change in dose of medication and for prescription to be resent to pharmacy as soon as possible.    This RN reviewed 1/3/2019, 3/26/2019, 5/15/2019 OV and did not see documentation on change in dose.    Will forward to PCP for review.    Jessica Barlow RN

## 2019-05-24 NOTE — TELEPHONE ENCOUNTER
Brittany called reporting that [facility] has not received the prescription for Tamsulosin and wondering if it can be sent today.

## 2019-05-28 RX ORDER — TAMSULOSIN HYDROCHLORIDE 0.4 MG/1
0.4 CAPSULE ORAL DAILY
Qty: 90 CAPSULE | Refills: 2 | Status: SHIPPED | OUTPATIENT
Start: 2019-05-28 | End: 2020-03-17

## 2019-10-14 DIAGNOSIS — J44.9 CHRONIC OBSTRUCTIVE PULMONARY DISEASE, UNSPECIFIED COPD TYPE (H): ICD-10-CM

## 2019-10-15 RX ORDER — IPRATROPIUM BROMIDE AND ALBUTEROL SULFATE 2.5; .5 MG/3ML; MG/3ML
SOLUTION RESPIRATORY (INHALATION)
Qty: 360 ML | Refills: 5 | Status: SHIPPED | OUTPATIENT
Start: 2019-10-15 | End: 2020-06-15

## 2019-10-15 NOTE — TELEPHONE ENCOUNTER
"Duoneb  Last Written Prescription Date:  04/19/2019  Last Fill Quantity: 360,  # refills: 2   Last office visit: 05/15/2019 with prescribing provider:  Chan   Future Office Visit:      Requested Prescriptions   Pending Prescriptions Disp Refills     ipratropium - albuterol 0.5 mg/2.5 mg/3 mL (DUONEB) 0.5-2.5 (3) MG/3ML neb solution [Pharmacy Med Name: IPRATROPIUM-ALBUTEROL 0.5-2.5 SOLN] 360 mL 2     Sig: NEBULIZE CONTENTS OF ONE VIAL EVERY 6 HOURS AS NEEDED FOR SHORTNESS OF BREATH / DYSPNEA OR WHEEZING       Short-Acting Beta Agonist Inhalers Protocol  Passed - 10/14/2019 12:01 PM        Passed - Patient is age 12 or older        Passed - Recent (12 mo) or future (30 days) visit within the authorizing provider's specialty     Patient has had an office visit with the authorizing provider or a provider within the authorizing providers department within the previous 12 mos or has a future within next 30 days. See \"Patient Info\" tab in inbasket, or \"Choose Columns\" in Meds & Orders section of the refill encounter.              Passed - Medication is active on med list          "

## 2019-11-18 ENCOUNTER — TELEPHONE (OUTPATIENT)
Dept: FAMILY MEDICINE | Facility: CLINIC | Age: 74
End: 2019-11-18

## 2019-11-18 NOTE — TELEPHONE ENCOUNTER
Spoke with patient who reports  Burning, swelling, and itching  in his left back area that began about a month ago.  He denies injury and reports that it hurts mostly at night.  Patient states that he had a cyst drained in the area a while back and he thinks it has returned.  Patient wife will watch area for signs of infection and fever.  Patient has an appointment with provider next week and will call sooner if any changes or concerns.  Mae Smith RN BSN

## 2019-11-26 ENCOUNTER — ANCILLARY PROCEDURE (OUTPATIENT)
Dept: GENERAL RADIOLOGY | Facility: OTHER | Age: 74
End: 2019-11-26
Attending: FAMILY MEDICINE
Payer: COMMERCIAL

## 2019-11-26 ENCOUNTER — OFFICE VISIT (OUTPATIENT)
Dept: FAMILY MEDICINE | Facility: OTHER | Age: 74
End: 2019-11-26
Payer: COMMERCIAL

## 2019-11-26 VITALS
OXYGEN SATURATION: 93 % | TEMPERATURE: 98 F | SYSTOLIC BLOOD PRESSURE: 128 MMHG | WEIGHT: 183.1 LBS | DIASTOLIC BLOOD PRESSURE: 72 MMHG | HEART RATE: 88 BPM | BODY MASS INDEX: 28.25 KG/M2 | RESPIRATION RATE: 20 BRPM

## 2019-11-26 DIAGNOSIS — R07.81 RIB PAIN ON LEFT SIDE: ICD-10-CM

## 2019-11-26 DIAGNOSIS — R53.83 FATIGUE, UNSPECIFIED TYPE: Primary | ICD-10-CM

## 2019-11-26 DIAGNOSIS — R63.4 LOSS OF WEIGHT: ICD-10-CM

## 2019-11-26 DIAGNOSIS — R41.3 MEMORY LOSS: ICD-10-CM

## 2019-11-26 DIAGNOSIS — R97.20 ELEVATED PSA: ICD-10-CM

## 2019-11-26 LAB
ERYTHROCYTE [SEDIMENTATION RATE] IN BLOOD BY WESTERGREN METHOD: 7 MM/H (ref 0–20)
HGB BLD-MCNC: 12.8 G/DL (ref 13.3–17.7)
TSH SERPL DL<=0.005 MIU/L-ACNC: 1.12 MU/L (ref 0.4–4)
VIT B12 SERPL-MCNC: 640 PG/ML (ref 193–986)

## 2019-11-26 PROCEDURE — 85652 RBC SED RATE AUTOMATED: CPT | Performed by: FAMILY MEDICINE

## 2019-11-26 PROCEDURE — 36415 COLL VENOUS BLD VENIPUNCTURE: CPT | Performed by: FAMILY MEDICINE

## 2019-11-26 PROCEDURE — 84443 ASSAY THYROID STIM HORMONE: CPT | Performed by: FAMILY MEDICINE

## 2019-11-26 PROCEDURE — 82607 VITAMIN B-12: CPT | Performed by: FAMILY MEDICINE

## 2019-11-26 PROCEDURE — 71101 X-RAY EXAM UNILAT RIBS/CHEST: CPT | Mod: LT

## 2019-11-26 PROCEDURE — 85018 HEMOGLOBIN: CPT | Performed by: FAMILY MEDICINE

## 2019-11-26 PROCEDURE — 99214 OFFICE O/P EST MOD 30 MIN: CPT | Performed by: FAMILY MEDICINE

## 2019-11-26 ASSESSMENT — PAIN SCALES - GENERAL: PAINLEVEL: SEVERE PAIN (6)

## 2019-11-26 NOTE — PROGRESS NOTES
Subjective     Khang Bailon is a 74 year old male who presents to clinic today for the following health issues:      Chief Complaint   Patient presents with     Back Pain     x's 1 month, no known injury

## 2019-11-29 PROBLEM — R41.3 MEMORY LOSS: Status: ACTIVE | Noted: 2019-11-29

## 2019-11-29 PROBLEM — R53.83 FATIGUE, UNSPECIFIED TYPE: Status: ACTIVE | Noted: 2019-11-29

## 2019-11-29 NOTE — PROGRESS NOTES
Subjective     Khang Bailon is a 74 year old male who presents to clinic today for the following health issues:      Chief Complaint   Patient presents with     Back Pain     x's 1 month, no known injury     (R53.83) Fatigue, unspecified type  (primary encounter diagnosis)  Comment: no obvious reason  ROS,(except for back pain) and exam neg  Plan: Erythrocyte sedimentation rate auto,         Hemoglobin, TSH, Vitamin B12        He declined imaging       He has a very elevated psa, has seen urology, declines f/u    (R07.81) Rib pain on left side  Comment: left posterior rib and periscapular pain, somewhat chronic, no obvious injury or overuse  EXAM:   /72   Pulse 88   Temp 98  F (36.7  C) (Temporal)   Resp 20   Wt 83.1 kg (183 lb 1.6 oz)   SpO2 93%   BMI (P) 29.78 kg/m     Neck,heart, lungs all clear  Plan:   XR Ribs & Chest Left G/E 3 Views neg       Also reviewed recent chest CT neg     Likely he has disk disease, declines PT; symptoms could also be related to his high psa, but pt declines to have further evaluation or treatment of what is likely prostate cancer      (R41.3) Memory loss  Comment: gradual onset, mild  Plan: Vitamin B12, esr, b12        Consider neurology consult, mri brain, but pt will likely decline    PSA Elevation:   pt has seen urology, declines f/u     dbue    ADD: 11/30/19    Called with [facility] labs    Offered PT for his back, he declined    Offered further evaluation for his fatigue and memory issues, he declined   offered other urology consultaiton re his PSA, he declined    Advised to CB if he changes his mind on any of these issues  dbue

## 2020-02-11 ENCOUNTER — TRANSFERRED RECORDS (OUTPATIENT)
Dept: HEALTH INFORMATION MANAGEMENT | Facility: CLINIC | Age: 75
End: 2020-02-11

## 2020-03-16 ENCOUNTER — TELEPHONE (OUTPATIENT)
Dept: FAMILY MEDICINE | Facility: CLINIC | Age: 75
End: 2020-03-16

## 2020-03-16 DIAGNOSIS — R31.9 URINARY TRACT INFECTION WITH HEMATURIA, SITE UNSPECIFIED: ICD-10-CM

## 2020-03-16 DIAGNOSIS — R33.9 URINARY RETENTION: ICD-10-CM

## 2020-03-16 DIAGNOSIS — R33.9 URINARY RETENTION: Primary | ICD-10-CM

## 2020-03-16 DIAGNOSIS — J20.9 ACUTE BRONCHITIS WITH SYMPTOMS GREATER THAN 10 DAYS: ICD-10-CM

## 2020-03-16 DIAGNOSIS — J43.9 PULMONARY EMPHYSEMA, UNSPECIFIED EMPHYSEMA TYPE (H): ICD-10-CM

## 2020-03-16 DIAGNOSIS — N39.0 URINARY TRACT INFECTION WITH HEMATURIA, SITE UNSPECIFIED: ICD-10-CM

## 2020-03-16 RX ORDER — TAMSULOSIN HYDROCHLORIDE 0.4 MG/1
0.4 CAPSULE ORAL DAILY
Qty: 90 CAPSULE | Refills: 2 | Status: CANCELLED | OUTPATIENT
Start: 2020-03-16

## 2020-03-16 NOTE — TELEPHONE ENCOUNTER
"Reason for Call:  Other prescription    Detailed comments: Requesting Dr Giles call [facility] to refill his   tamsulosin (FLOMAX) 0.4 MG capsule .  He has enough for about a week at this time.    Phone Number Patient can be reached at: Home number on file [phone] (home)    Khang wife would also like Dr Giles to know that Khang is coughing more than normal and using his nebulizer a lot. Brtitany is asking if Dr Giles will call him to \"check in\" with him about this and not let Khang know that Brittany asked him to do this.  Khang just gets upset with Brittany when she mentions this.    Best Time: any    Can we leave a detailed message on this number? YES    Call taken on 3/16/2020 at 10:38 AM by Anai Arreola      "

## 2020-03-16 NOTE — TELEPHONE ENCOUNTER
"Prescription ordered on another refill. Dr. Giles please address other concern.    Khang wife would also like Dr Giles to know that Khang is coughing more than normal and using his nebulizer a lot. Brittany is asking if Dr Giles will call him to \"check in\" with him about this and not let Khang know that Brittany asked him to do this.  Khang just gets upset with Brittany when she mentions this.  "

## 2020-03-17 RX ORDER — TAMSULOSIN HYDROCHLORIDE 0.4 MG/1
0.4 CAPSULE ORAL DAILY
Qty: 90 CAPSULE | Refills: 3 | Status: SHIPPED | OUTPATIENT
Start: 2020-03-17 | End: 2021-03-26

## 2020-03-17 RX ORDER — AMOXICILLIN 500 MG/1
500 CAPSULE ORAL 3 TIMES DAILY
Qty: 30 CAPSULE | Refills: 0 | Status: SHIPPED | OUTPATIENT
Start: 2020-03-17 | End: 2021-03-30

## 2020-06-15 DIAGNOSIS — J44.9 CHRONIC OBSTRUCTIVE PULMONARY DISEASE, UNSPECIFIED COPD TYPE (H): ICD-10-CM

## 2020-06-16 RX ORDER — IPRATROPIUM BROMIDE AND ALBUTEROL SULFATE 2.5; .5 MG/3ML; MG/3ML
SOLUTION RESPIRATORY (INHALATION)
Qty: 360 ML | Refills: 5 | Status: SHIPPED | OUTPATIENT
Start: 2020-06-16 | End: 2021-03-26

## 2020-06-16 NOTE — TELEPHONE ENCOUNTER
Routing refill request to provider for review/approval because:  Patient needs to be seen because it has been more than 1 year since last office visit with PCP  Last Written Prescription Date:  10/15/19  Last Fill Quantity: 360 vials,  # refills: 5   Last office visit: 5/15/2019 with prescribing provider:     Future Office Visit:      BERNIE SandovalN, RN

## 2021-03-24 DIAGNOSIS — J44.9 CHRONIC OBSTRUCTIVE PULMONARY DISEASE, UNSPECIFIED COPD TYPE (H): ICD-10-CM

## 2021-03-24 DIAGNOSIS — R33.9 URINARY RETENTION: ICD-10-CM

## 2021-03-26 RX ORDER — IPRATROPIUM BROMIDE AND ALBUTEROL SULFATE 2.5; .5 MG/3ML; MG/3ML
SOLUTION RESPIRATORY (INHALATION)
Qty: 360 ML | Refills: 5 | Status: SHIPPED | OUTPATIENT
Start: 2021-03-26 | End: 2021-07-12

## 2021-03-26 RX ORDER — TAMSULOSIN HYDROCHLORIDE 0.4 MG/1
CAPSULE ORAL
Qty: 90 CAPSULE | Refills: 0 | Status: ON HOLD | OUTPATIENT
Start: 2021-03-26 | End: 2021-11-10

## 2021-03-26 NOTE — TELEPHONE ENCOUNTER
Routing refill request to provider for review/approval because:  Marta given x1 and patient did not follow up, please advise  Patient needs to be seen because it has been more than 1 year since last office visit.    BERNIE GriggsN, RN  [facility]

## 2021-03-26 NOTE — TELEPHONE ENCOUNTER
Routing refill request to provider for review/approval because:  Marta given x1 and patient did not follow up, please advise  Labs not current:  BP  Patient needs to be seen because it has been more than 1 year since last office visit.  Has not been seen since 2019    Pallavi Gilman, BERNIEN, RN  [facility]

## 2021-03-30 ENCOUNTER — OFFICE VISIT (OUTPATIENT)
Dept: FAMILY MEDICINE | Facility: CLINIC | Age: 76
End: 2021-03-30
Payer: COMMERCIAL

## 2021-03-30 VITALS
SYSTOLIC BLOOD PRESSURE: 138 MMHG | WEIGHT: 179.4 LBS | HEART RATE: 91 BPM | DIASTOLIC BLOOD PRESSURE: 82 MMHG | RESPIRATION RATE: 14 BRPM | OXYGEN SATURATION: 96 % | BODY MASS INDEX: 27.68 KG/M2 | TEMPERATURE: 96.9 F

## 2021-03-30 DIAGNOSIS — K42.9 UMBILICAL HERNIA WITHOUT OBSTRUCTION AND WITHOUT GANGRENE: Primary | ICD-10-CM

## 2021-03-30 PROCEDURE — 99213 OFFICE O/P EST LOW 20 MIN: CPT | Performed by: PHYSICIAN ASSISTANT

## 2021-03-30 NOTE — PROGRESS NOTES
Assessment & Plan     Umbilical hernia without obstruction and without gangrene    Discussed that his hernia was removed produced today.  If he notes that it protrudes again advised that he attempt reduction at home.  If he is not able to reduce this or if he has associated pain, he will follow-up in the emergency room.  At this time, surgical repair is not indicated however if symptoms are recurrent it may be warranted in the future.      20 minutes spent on the date of the encounter doing chart review, patient visit and documentation     No follow-ups on file.    ANDRAE Cadena [facility]    Mikaela Quiñonez is a 76 year old who presents for the following health issues     HPI     Naval swollen and painful  Onset/Duration: x 2 1/2 days  Description  Location: naval  Color: pink  Border description: red, inflamed  Character: painful  Itching: no  Bleeding:  no  Intensity:  mild  Progression of Symptoms:  worsening  Accompanying signs and symptoms:   Bleeding: no  Scaling: no  Excessive sun exposure/tanning: no    History:           Any previous history of skin cancer: no  Any family history of melanoma: no  Previous episodes of similar lesion: no  Precipitating or alleviating factors: none  Therapies tried and outcome: none      Khang presents to the clinic today for evaluation of a painful lump just superior to his bellybutton.  She notes its been there for about 2-1/2 days the pain seems to be worsening.  It is painful even to light palpation.  He has a history of a hiatal hernia but no other hernias have previously been diagnosed.  He has not had any problems with eating and drinking.    Review of Systems   ROS negative except as noted above      Objective    /82 (BP Location: Left arm)   Pulse 91   Temp 96.9  F (36.1  C) (Temporal)   Resp 14   Wt 81.4 kg (179 lb 6.4 oz)   SpO2 96%   BMI (P) 29.18 kg/m    Body mass index is 29.18 kg/m  (pended).  Physical Exam    GENERAL: healthy, alert and no distress  ABDOMEN: Round palpable nodule about 1 inch in diameter is palpable just superior to the umbilicus.  With gentle massage this was reduced.  A small defect is palpable.  Patient notes that his pain improved significantly after reduction.  Remainder of abdomen is soft, nontender, no hepatosplenomegaly, no masses and bowel sounds normal.

## 2021-03-30 NOTE — PATIENT INSTRUCTIONS
Umbilical hernia was reduced today  Monitor symptoms  If this comes back, try pushing it back in at home  If you're not able to get it back in and it's painful, go to the ER

## 2021-06-27 ENCOUNTER — HOSPITAL ENCOUNTER (EMERGENCY)
Facility: CLINIC | Age: 76
Discharge: HOME | End: 2021-06-27
Attending: FAMILY MEDICINE | Admitting: FAMILY MEDICINE
Payer: COMMERCIAL

## 2021-06-27 ENCOUNTER — APPOINTMENT (OUTPATIENT)
Dept: CT IMAGING | Facility: CLINIC | Age: 76
End: 2021-06-27
Attending: FAMILY MEDICINE
Payer: COMMERCIAL

## 2021-06-27 ENCOUNTER — APPOINTMENT (OUTPATIENT)
Dept: GENERAL RADIOLOGY | Facility: CLINIC | Age: 76
End: 2021-06-27
Attending: FAMILY MEDICINE
Payer: COMMERCIAL

## 2021-06-27 VITALS
TEMPERATURE: 101.6 F | HEART RATE: 95 BPM | OXYGEN SATURATION: 94 % | BODY MASS INDEX: 27.78 KG/M2 | SYSTOLIC BLOOD PRESSURE: 157 MMHG | RESPIRATION RATE: 24 BRPM | DIASTOLIC BLOOD PRESSURE: 81 MMHG | WEIGHT: 180 LBS

## 2021-06-27 DIAGNOSIS — J18.9 PNEUMONIA DUE TO INFECTIOUS ORGANISM, UNSPECIFIED LATERALITY, UNSPECIFIED PART OF LUNG: ICD-10-CM

## 2021-06-27 LAB
ALBUMIN SERPL-MCNC: 4 G/DL (ref 3.4–5)
ALBUMIN UR-MCNC: NEGATIVE MG/DL
ALP SERPL-CCNC: 70 U/L (ref 40–150)
ALT SERPL W P-5'-P-CCNC: 29 U/L (ref 0–70)
ANION GAP SERPL CALCULATED.3IONS-SCNC: 5 MMOL/L (ref 3–14)
ANISOCYTOSIS BLD QL SMEAR: ABNORMAL
APPEARANCE UR: CLEAR
AST SERPL W P-5'-P-CCNC: 16 U/L (ref 0–45)
BASOPHILS # BLD AUTO: 0.1 10E9/L (ref 0–0.2)
BASOPHILS NFR BLD AUTO: 0.4 %
BILIRUB SERPL-MCNC: 0.7 MG/DL (ref 0.2–1.3)
BILIRUB UR QL STRIP: NEGATIVE
BUN SERPL-MCNC: 21 MG/DL (ref 7–30)
CALCIUM SERPL-MCNC: 8.8 MG/DL (ref 8.5–10.1)
CHLORIDE SERPL-SCNC: 104 MMOL/L (ref 94–109)
CO2 SERPL-SCNC: 29 MMOL/L (ref 20–32)
COLOR UR AUTO: YELLOW
CREAT SERPL-MCNC: 0.99 MG/DL (ref 0.66–1.25)
CRP SERPL-MCNC: 11.8 MG/L (ref 0–8)
DACRYOCYTES BLD QL SMEAR: SLIGHT
DIFFERENTIAL METHOD BLD: ABNORMAL
EOSINOPHIL # BLD AUTO: 0.1 10E9/L (ref 0–0.7)
EOSINOPHIL NFR BLD AUTO: 0.6 %
ERYTHROCYTE [DISTWIDTH] IN BLOOD BY AUTOMATED COUNT: 19.3 % (ref 10–15)
GFR SERPL CREATININE-BSD FRML MDRD: 74 ML/MIN/{1.73_M2}
GLUCOSE SERPL-MCNC: 213 MG/DL (ref 70–99)
GLUCOSE UR STRIP-MCNC: NEGATIVE MG/DL
HCT VFR BLD AUTO: 40.1 % (ref 40–53)
HGB BLD-MCNC: 12.3 G/DL (ref 13.3–17.7)
HGB UR QL STRIP: NEGATIVE
IMM GRANULOCYTES # BLD: 0.1 10E9/L (ref 0–0.4)
IMM GRANULOCYTES NFR BLD: 0.4 %
KETONES UR STRIP-MCNC: NEGATIVE MG/DL
LABORATORY COMMENT REPORT: NORMAL
LACTATE BLD-SCNC: 2.1 MMOL/L (ref 0.7–2)
LEUKOCYTE ESTERASE UR QL STRIP: NEGATIVE
LYMPHOCYTES # BLD AUTO: 0.9 10E9/L (ref 0.8–5.3)
LYMPHOCYTES NFR BLD AUTO: 6.5 %
MCH RBC QN AUTO: 18.8 PG (ref 26.5–33)
MCHC RBC AUTO-ENTMCNC: 30.7 G/DL (ref 31.5–36.5)
MCV RBC AUTO: 61 FL (ref 78–100)
MICROCYTES BLD QL SMEAR: PRESENT
MONOCYTES # BLD AUTO: 1.2 10E9/L (ref 0–1.3)
MONOCYTES NFR BLD AUTO: 8.3 %
NEUTROPHILS # BLD AUTO: 11.8 10E9/L (ref 1.6–8.3)
NEUTROPHILS NFR BLD AUTO: 83.8 %
NITRATE UR QL: NEGATIVE
NRBC # BLD AUTO: 0 10*3/UL
NRBC BLD AUTO-RTO: 0 /100
PH UR STRIP: 5 PH (ref 5–7)
PLATELET # BLD AUTO: 182 10E9/L (ref 150–450)
PLATELET # BLD EST: ABNORMAL 10*3/UL
POIKILOCYTOSIS BLD QL SMEAR: SLIGHT
POTASSIUM SERPL-SCNC: 4.2 MMOL/L (ref 3.4–5.3)
PROT SERPL-MCNC: 7.2 G/DL (ref 6.8–8.8)
RBC # BLD AUTO: 6.53 10E12/L (ref 4.4–5.9)
RBC INCLUSIONS BLD: SLIGHT
RBC MORPH BLD: ABNORMAL
SARS-COV-2 RNA RESP QL NAA+PROBE: NEGATIVE
SODIUM SERPL-SCNC: 138 MMOL/L (ref 133–144)
SOURCE: NORMAL
SP GR UR STRIP: 1.02 (ref 1–1.03)
SPECIMEN SOURCE: NORMAL
STOMATOCYTES BLD QL SMEAR: SLIGHT
UROBILINOGEN UR STRIP-MCNC: 0 MG/DL (ref 0–2)
WBC # BLD AUTO: 14.1 10E9/L (ref 4–11)

## 2021-06-27 PROCEDURE — 85025 COMPLETE CBC W/AUTO DIFF WBC: CPT | Performed by: FAMILY MEDICINE

## 2021-06-27 PROCEDURE — 99285 EMERGENCY DEPT VISIT HI MDM: CPT | Performed by: FAMILY MEDICINE

## 2021-06-27 PROCEDURE — 83605 ASSAY OF LACTIC ACID: CPT | Performed by: FAMILY MEDICINE

## 2021-06-27 PROCEDURE — 86140 C-REACTIVE PROTEIN: CPT | Performed by: FAMILY MEDICINE

## 2021-06-27 PROCEDURE — 99285 EMERGENCY DEPT VISIT HI MDM: CPT | Mod: 25 | Performed by: FAMILY MEDICINE

## 2021-06-27 PROCEDURE — 87040 BLOOD CULTURE FOR BACTERIA: CPT | Performed by: FAMILY MEDICINE

## 2021-06-27 PROCEDURE — 81003 URINALYSIS AUTO W/O SCOPE: CPT | Performed by: FAMILY MEDICINE

## 2021-06-27 PROCEDURE — 250N000011 HC RX IP 250 OP 636: Performed by: FAMILY MEDICINE

## 2021-06-27 PROCEDURE — 80053 COMPREHEN METABOLIC PANEL: CPT | Performed by: FAMILY MEDICINE

## 2021-06-27 PROCEDURE — C9803 HOPD COVID-19 SPEC COLLECT: HCPCS | Performed by: FAMILY MEDICINE

## 2021-06-27 PROCEDURE — 258N000003 HC RX IP 258 OP 636: Performed by: FAMILY MEDICINE

## 2021-06-27 PROCEDURE — 250N000013 HC RX MED GY IP 250 OP 250 PS 637: Performed by: FAMILY MEDICINE

## 2021-06-27 PROCEDURE — 71250 CT THORAX DX C-: CPT

## 2021-06-27 PROCEDURE — 96365 THER/PROPH/DIAG IV INF INIT: CPT | Performed by: FAMILY MEDICINE

## 2021-06-27 PROCEDURE — 87635 SARS-COV-2 COVID-19 AMP PRB: CPT | Performed by: FAMILY MEDICINE

## 2021-06-27 PROCEDURE — 71045 X-RAY EXAM CHEST 1 VIEW: CPT

## 2021-06-27 PROCEDURE — 96361 HYDRATE IV INFUSION ADD-ON: CPT | Performed by: FAMILY MEDICINE

## 2021-06-27 RX ORDER — CEFDINIR 300 MG/1
300 CAPSULE ORAL 2 TIMES DAILY
Qty: 20 CAPSULE | Refills: 0 | Status: SHIPPED | OUTPATIENT
Start: 2021-06-27 | End: 2021-07-12

## 2021-06-27 RX ORDER — CEFTRIAXONE 1 G/1
1 INJECTION, POWDER, FOR SOLUTION INTRAMUSCULAR; INTRAVENOUS ONCE
Status: COMPLETED | OUTPATIENT
Start: 2021-06-27 | End: 2021-06-27

## 2021-06-27 RX ORDER — ACETAMINOPHEN 500 MG
1000 TABLET ORAL ONCE
Status: COMPLETED | OUTPATIENT
Start: 2021-06-27 | End: 2021-06-27

## 2021-06-27 RX ADMIN — SODIUM CHLORIDE 1000 ML: 9 INJECTION, SOLUTION INTRAVENOUS at 20:19

## 2021-06-27 RX ADMIN — ACETAMINOPHEN 1000 MG: 500 TABLET, FILM COATED ORAL at 19:35

## 2021-06-27 RX ADMIN — SODIUM CHLORIDE 1000 ML: 9 INJECTION, SOLUTION INTRAVENOUS at 19:36

## 2021-06-27 RX ADMIN — CEFTRIAXONE SODIUM 1 G: 1 INJECTION, POWDER, FOR SOLUTION INTRAMUSCULAR; INTRAVENOUS at 20:20

## 2021-06-28 NOTE — ED PROVIDER NOTES
[facility] ED Provider Note   Patient: Khang Bailon  MRN #:  [id number]  Date of Visit: June 27, 2021    CC:     Chief Complaint   Patient presents with     Cough     HPI:  Khang Bailon is a 76 year old male who presented to the emergency department private vehicle with acute onset of shortness of breath with nonproductive cough and labored breathing that started acutely last night.  Patient states that he first developed a sore throat 2 nights ago.  He states that he prayed, and by the next day the sore throat went away.  Last night, he started to cough, had difficulty breathing, and noted that he was short of breath even at rest.  He has a history of emphysema, having smoked for 52 years, but he has not smoked for the past 25 years.  He did not realize that he had a fever until he arrived in the ED.  Patient has not had any other complaints such as headache, chest pain, nausea, vomiting, diarrhea.  He reports urinary frequency related to his enlarged prostate.  Patient has not noticed loss of taste or smell.  He has not had coronavirus infection nor has he had the vaccination.  Past medical history of alpha thalassemia, diverticulitis, hypertension, history of heart block, status post pacemaker.    Problem List:  Patient Active Problem List    Diagnosis Date Noted     Fatigue, unspecified type 11/29/2019     Priority: Medium     Memory loss 11/29/2019     Priority: Medium     Pulmonary emphysema, unspecified emphysema type (H) 11/16/2017     Priority: Medium     Benign neoplasm of colon, unspecified part of colon 07/26/2016     Priority: Medium     Sigmoid diverticulitis 07/12/2016     Priority: Medium     History of CVA (cerebrovascular accident) - old left internal capsule lacunar infarct on CT 7/2016 07/12/2016     Priority: Medium     Multilevel degenerative disc disease 09/21/2015     Priority: Medium     Elevated prostate specific  antigen (PSA) 09/11/2015     Priority: Medium     Health Care Home 01/24/2013     Priority: Medium     Racquel Ortiz RN-PHN  FPA / [facility]   [phone]  DX V65.8 REPLACED WITH [id number] (04/08/2013)       Elevated PSA 08/27/2012     Priority: Medium     Loss of weight 08/27/2012     Priority: Medium     Third degree heart block (H) - s/p pacemaker 08/10/2012     Priority: Medium     Hypertension goal BP (blood pressure) < 140/90 08/10/2012     Priority: Medium     Advanced directives, counseling/discussion 07/27/2012     Priority: Medium     Discussed advance care planning with patient; however, patient declined at this time. 7/27/2012          Thalassemia 07/27/2012     Priority: Medium     Diagnosis updated by automated process. Provider to review and confirm.       Hyperlipidemia LDL goal <130 07/27/2012     Priority: Medium     Painful respiration 03/31/2004     Priority: Medium     Personal history of tobacco use, presenting hazards to health 03/31/2004     Priority: Medium       Past Medical History:   Diagnosis Date     Alpha thalassemia (H)      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     Diverticulitis      Injury, other and unspecified, elbow, forearm, and wrist 1970s       MEDS: cefdinir (OMNICEF) 300 MG capsule  ipratropium - albuterol 0.5 mg/2.5 mg/3 mL (DUONEB) 0.5-2.5 (3) MG/3ML neb solution  albuterol (PROAIR HFA/PROVENTIL HFA/VENTOLIN HFA) 108 (90 Base) MCG/ACT inhaler  Ascorbic Acid (VITAMIN C PO)  B Complex Vitamins (B COMPLEX PO)  DIGESTIVE ENZYMES PO  fluticasone (FLOVENT DISKUS) 100 MCG/BLIST AEPB  Multiple Vitamin (MULTI VITAMIN  MENS) TABS  order for DME  tamsulosin (FLOMAX) 0.4 MG capsule  Vitamin D-Vitamin K (VITAMIN K2-VITAMIN D3 PO)        ALLERGIES:    Allergies   Allergen Reactions     Flagyl [Metronidazole] GI Disturbance       Past Surgical History:   Procedure Laterality Date     COLONOSCOPY       COLONOSCOPY  12/10/2012     IMPLANT PACEMAKER         Social  History     Tobacco Use     Smoking status: Former Smoker     Years: 50.00     Types: Cigarettes     Quit date: 2012     Years since quittin.9     Smokeless tobacco: Never Used     Tobacco comment: Quit in July   Substance Use Topics     Alcohol use: No     Alcohol/week: 0.0 standard drinks     Drug use: No         Review of Systems   Except as noted in HPI, all other systems were reviewed and are negative    Physical Exam     Vitals were reviewed  Patient Vitals for the past 12 hrs:   BP Temp Temp src Pulse Resp SpO2 Weight   21 (!) 157/81 -- -- 95 -- 94 % --   21 (!) 154/86 -- -- 103 24 95 % --   21 138/80 101.6  F (38.7  C) Oral 109 (!) 35 94 % 81.6 kg (180 lb)     GENERAL APPEARANCE: Alert and oriented x3, mild dyspnea at rest  FACE: normal facies  EYES: Pupils are equal  HENT: normal external exam; the oropharynx is noninjected and patent  NECK: no adenopathy or asymmetry  RESP: Increased respiratory effort, tachypnea, expiratory wheeze with coarse rhonchi  CV: Regular rhythm, rapid rate, no appreciable murmur  ABD: soft, no tenderness; no rebound or guarding; bowel sounds are normal  MS: no gross deformities noted; normal muscle tone.  EXT: No calf tenderness; slight pitting edema around the ankle  SKIN: no worrisome rash  NEURO: no facial droop; no focal deficits, speech is labored  PSYCH: normal mood and affect      Available Lab/Imaging Results     Results for orders placed or performed during the hospital encounter of 21 (from the past 24 hour(s))   CBC with platelets differential   Result Value Ref Range    WBC 14.1 (H) 4.0 - 11.0 10e9/L    RBC Count 6.53 (H) 4.4 - 5.9 10e12/L    Hemoglobin 12.3 (L) 13.3 - 17.7 g/dL    Hematocrit 40.1 40.0 - 53.0 %    MCV 61 (L) 78 - 100 fl    MCH 18.8 (L) 26.5 - 33.0 pg    MCHC 30.7 (L) 31.5 - 36.5 g/dL    RDW 19.3 (H) 10.0 - 15.0 %    Platelet Count 182 150 - 450 10e9/L    Diff Method Automated Method     % Neutrophils  83.8 %    % Lymphocytes 6.5 %    % Monocytes 8.3 %    % Eosinophils 0.6 %    % Basophils 0.4 %    % Immature Granulocytes 0.4 %    Nucleated RBCs 0 0 /100    Absolute Neutrophil 11.8 (H) 1.6 - 8.3 10e9/L    Absolute Lymphocytes 0.9 0.8 - 5.3 10e9/L    Absolute Monocytes 1.2 0.0 - 1.3 10e9/L    Absolute Eosinophils 0.1 0.0 - 0.7 10e9/L    Absolute Basophils 0.1 0.0 - 0.2 10e9/L    Abs Immature Granulocytes 0.1 0 - 0.4 10e9/L    Absolute Nucleated RBC 0.0     Anisocytosis Moderate     Poikilocytosis Slight     RBC Fragments Slight     Teardrop Cells Slight     Stomatocytes Slight     Microcytes Present     RBC Morphology Consistent with reported results     Platelet Estimate       Automated count confirmed.  Platelet morphology is normal.   Comprehensive metabolic panel   Result Value Ref Range    Sodium 138 133 - 144 mmol/L    Potassium 4.2 3.4 - 5.3 mmol/L    Chloride 104 94 - 109 mmol/L    Carbon Dioxide 29 20 - 32 mmol/L    Anion Gap 5 3 - 14 mmol/L    Glucose 213 (H) 70 - 99 mg/dL    Urea Nitrogen 21 7 - 30 mg/dL    Creatinine 0.99 0.66 - 1.25 mg/dL    GFR Estimate 74 >60 mL/min/[1.73_m2]    GFR Estimate If Black 86 >60 mL/min/[1.73_m2]    Calcium 8.8 8.5 - 10.1 mg/dL    Bilirubin Total 0.7 0.2 - 1.3 mg/dL    Albumin 4.0 3.4 - 5.0 g/dL    Protein Total 7.2 6.8 - 8.8 g/dL    Alkaline Phosphatase 70 40 - 150 U/L    ALT 29 0 - 70 U/L    AST 16 0 - 45 U/L   Lactic acid whole blood   Result Value Ref Range    Lactic Acid 2.1 (H) 0.7 - 2.0 mmol/L   CRP inflammation   Result Value Ref Range    CRP Inflammation 11.8 (H) 0.0 - 8.0 mg/L   Symptomatic SARS-CoV-2 COVID-19 Virus (Coronavirus) by PCR    Specimen: Nasopharyngeal   Result Value Ref Range    SARS-CoV-2 Virus Specimen Source Nasopharyngeal     SARS-CoV-2 PCR Result NEGATIVE     SARS-CoV-2 PCR Comment (Note)    XR Chest Port 1 View    Narrative    XR PORTABLE CHEST ONE VIEW   6/27/2021 7:51 PM     HISTORY: Short of breath and cough.    COMPARISON: Chest x-ray  11/26/2019.      Impression    IMPRESSION: Portable chest. Lungs are clear. Heart is normal in size.  No pneumothorax. No definite pleural effusions. Implantable cardiac  device and both leads are unchanged.    LEA YOUNG MD   Chest CT w/o contrast    Narrative    CT CHEST WITHOUT CONTRAST 6/27/2021 9:05 PM    CLINICAL HISTORY: Respiratory illness, nondiagnostic x-ray.    TECHNIQUE: CT chest without IV contrast. Multiplanar reformats were  obtained. Dose reduction techniques were used.  CONTRAST: None.    COMPARISON: CT chest 5/15/2019.    FINDINGS:   LUNGS AND PLEURA: Severe centrilobular emphysema. Calcified granuloma  noted in the right lower lobe on image 35 of series 3. Fissural lymph  node left major fissure noted on image 48. Lungs are otherwise clear.  No infiltrate or consolidation. No pleural effusions.    MEDIASTINUM/AXILLAE: Heart is normal in size. Small pericardial  effusion slightly decreased in size since prior CT. Severe coronary  artery calcification is present. Calcified plaque thoracic aorta  without aneurysm. Implantable cardiac device left upper chest with  leads in the right atrium and right ventricle again noted. Esophagus  is unremarkable. Thyroid gland is normal in size. 1-2 cm subcutaneous  nodules are noted in the upper back on series 2, image 2 and image 14  possibly sebaceous cysts.    UPPER ABDOMEN: Large calcified gallstones are noted in the  gallbladder. Liver and gallbladder are otherwise unremarkable.  Partially imaged renal cortical cysts also noted. Little interval  change.    MUSCULOSKELETAL: Degenerative spine changes. No destructive bone  lesions.      Impression    IMPRESSION:   1.  Severe centrilobular emphysema. No infiltrate or consolidation.  Calcified granuloma right lower lobe.    2.  Severe coronary artery calcification. Interval decrease in the  size of the small pericardial effusion.    3.  Stable cholelithiasis and renal cortical cysts where imaged.   UA reflex to  Microscopic   Result Value Ref Range    Color Urine Yellow     Appearance Urine Clear     Glucose Urine Negative NEG^Negative mg/dL    Bilirubin Urine Negative NEG^Negative    Ketones Urine Negative NEG^Negative mg/dL    Specific Gravity Urine 1.016 1.003 - 1.035    Blood Urine Negative NEG^Negative    pH Urine 5.0 5.0 - 7.0 pH    Protein Albumin Urine Negative NEG^Negative mg/dL    Urobilinogen mg/dL 0.0 0.0 - 2.0 mg/dL    Nitrite Urine Negative NEG^Negative    Leukocyte Esterase Urine Negative NEG^Negative    Source Unspecified Urine                 Impression     Final diagnoses:   Pneumonia due to infectious organism, unspecified laterality, unspecified part of lung         ED Course & Medical Decision Making   Khang Bailon is a 76 year old male who presented to the emergency department with acute respiratory symptoms that started 2 nights ago initially with sore throat, resolving by the next day, but since last night he has had nonproductive cough, difficulty breathing, and chills.  He did not note that he had a fever until he arrived in the emergency department when his temperature was 101.6.  Patient was tachypneic but maintaining oxygen saturation of 94-95%.  Patient was initially tachycardic and tachypneic, raising concern for sepsis.  We initiated a sepsis work-up including a CBC, lactic acid level, and administration of fluids and antibiotics when his lactic acid level came back slightly elevated at 2.1.  Patient had a total of 2 L of normal saline.  We performed a Covid swab and he tested negative.  His white blood count was elevated at 14.1 with 83% neutrophils.  Hemoglobin is 12.3.  Comprehensive metabolic panel is normal except for glucose of 213.  Patient has no known history of diabetes.  He did report polyuria.  His initial chest x-ray revealed clear lungs with no pneumothorax or definite pleural effusions.  He has an implantable cardiac device.  Given the concern for underlying pneumonia, CT  scan of the chest was added.  This reveals severe centrilobular emphysema.  No infiltrate or consolidation.  Calcified granuloma right lower lobe.  There is severe coronary artery calcification.  Interval decrease in the size of the small pericardial effusion.  Stable cholelithiasis and renal cortical cysts.    Patient received a dose of Rocephin 1 g IV shortly after we suspected sepsis due to pneumonia.  Patient returned from CT imaging, removed his IV, and there was blood all over the floor and in the room.  Patient was going to leave AMA, possibly from pressure he was getting from his wife who is still waiting in the car.  Because of his potential Covid status, we were not able to allow her into the ED.  Patient's clinical picture has improved.  He is not as dyspneic, and his vital signs remained stable.  We treated his fever with Tylenol, and he had his first dose of Rocephin.  I would like him to begin Omnicef 300 mg twice a day for 10 days.  Given that his CT scan was unremarkable, his symptoms are more likely related to a COPD exacerbation versus clinical pneumonia.  I had initially advised hospitalization given his initial presentation and concern for acute worsening.  He is refusing hospitalization and wanted to leave.  I asked him to return at any time if his symptoms worsen.  There should be a low threshold for reassessment in the emergency department.  Patient understood the risks of returning home, including acute worsening and he acknowledged that he could die at home.  Patient did not receive nebulization treatments or steroid which would be something we can add easily if his symptoms do not improve within the next 1-2 days.        Written after-visit summary and instructions were given at the time of discharge.      Discharge Instructions:   Begin Omnicef 300 mg twice a day for 10 days.  Return to the emergency department immediately if your symptoms worsen.       Disclaimer: This note consists of  words and symbols derived from keyboarding and dictation using voice recognition software.  As a result, there may be errors that have gone undetected.  Please consider this when interpreting information found in this note.       Marisel Tompkins MD  06/27/21 [id number]

## 2021-06-28 NOTE — ED TRIAGE NOTES
Patient has had cough and feeling poorly for 2 days really bad over the last 24 hours. Has not had the COVID Vaccine, COPD history.

## 2021-06-28 NOTE — DISCHARGE INSTRUCTIONS
Begin Omnicef 300 mg twice a day for 10 days.  Return to the emergency department immediately if your symptoms worsen.

## 2021-07-03 LAB
BACTERIA SPEC CULT: NO GROWTH
SPECIMEN SOURCE: NORMAL

## 2021-07-12 ENCOUNTER — OFFICE VISIT (OUTPATIENT)
Dept: FAMILY MEDICINE | Facility: CLINIC | Age: 76
End: 2021-07-12
Payer: COMMERCIAL

## 2021-07-12 ENCOUNTER — TELEPHONE (OUTPATIENT)
Dept: FAMILY MEDICINE | Facility: CLINIC | Age: 76
End: 2021-07-12

## 2021-07-12 VITALS
TEMPERATURE: 97.1 F | OXYGEN SATURATION: 95 % | HEIGHT: 68 IN | HEART RATE: 70 BPM | BODY MASS INDEX: 26.98 KG/M2 | DIASTOLIC BLOOD PRESSURE: 80 MMHG | WEIGHT: 178 LBS | RESPIRATION RATE: 18 BRPM | SYSTOLIC BLOOD PRESSURE: 118 MMHG

## 2021-07-12 DIAGNOSIS — J44.9 CHRONIC OBSTRUCTIVE PULMONARY DISEASE, UNSPECIFIED COPD TYPE (H): Primary | ICD-10-CM

## 2021-07-12 DIAGNOSIS — J44.9 CHRONIC OBSTRUCTIVE PULMONARY DISEASE, UNSPECIFIED COPD TYPE (H): ICD-10-CM

## 2021-07-12 PROCEDURE — 99214 OFFICE O/P EST MOD 30 MIN: CPT | Performed by: PHYSICIAN ASSISTANT

## 2021-07-12 RX ORDER — IPRATROPIUM BROMIDE AND ALBUTEROL SULFATE 2.5; .5 MG/3ML; MG/3ML
1 SOLUTION RESPIRATORY (INHALATION) EVERY 6 HOURS PRN
Qty: 360 ML | Refills: 5 | Status: SHIPPED | OUTPATIENT
Start: 2021-07-12 | End: 2022-08-05

## 2021-07-12 RX ORDER — PREDNISONE 20 MG/1
40 TABLET ORAL DAILY
Qty: 10 TABLET | Refills: 0 | Status: SHIPPED | OUTPATIENT
Start: 2021-07-12 | End: 2021-07-17

## 2021-07-12 RX ORDER — FLUTICASONE PROPIONATE 220 UG/1
1 AEROSOL, METERED RESPIRATORY (INHALATION) 2 TIMES DAILY
Qty: 36 G | Refills: 3 | Status: SHIPPED | OUTPATIENT
Start: 2021-07-12 | End: 2021-09-30

## 2021-07-12 RX ORDER — CEFDINIR 300 MG/1
300 CAPSULE ORAL 2 TIMES DAILY
Qty: 20 CAPSULE | Refills: 0 | Status: SHIPPED | OUTPATIENT
Start: 2021-07-12 | End: 2021-09-30

## 2021-07-12 ASSESSMENT — MIFFLIN-ST. JEOR: SCORE: 1511.9

## 2021-07-12 NOTE — PATIENT INSTRUCTIONS
Start Cefdinir - take this twice daily with meals    Start Prednisone - take 2 pills once daily for 5 days (take this in the morning)    Restart Flovent inhaler twice daily (this is an increased dose)    Continue nebulizer every 4-6 hours as needed

## 2021-07-12 NOTE — PROGRESS NOTES
Mikaela Quiñonez is a 76 year old who presents for the following health issues    HPI     COPD Follow-Up    Overall, how are your COPD symptoms since your last clinic visit?  Much worse    How much fatigue or shortness of breath do you have when you are walking?  A lot more than usual    How much shortness of breath do you have when you are resting?  Same as usual    How often do you cough? All the time    Have you noticed any change in your sputum/phlegm?  No    Have you experienced a recent fever? Yes June 21    Please describe how far you can walk without stopping to rest:  Less than 1 block    How many flights of stairs are you able to walk up without stopping?  1  Have you had any Emergency Room Visits, Urgent Care Visits, or Hospital Admissions because of your COPD since your last office visit?  Yes      How many times have you gone to the ED, Urgent Care, or been hospitalized for COPD since your last clinic visit?  1     Patient presents today with his wife for follow-up regarding breathing concerns. Patient was recently seen in the ER on 6/27/2021 for cough and shortness of breath. While in the ER he tested negative for COVID. His CXR was negative so a CT was ordered given the concern for pneumonia. This was negative for acute infiltrate or consolidation but dd show severe centrilobular emphysema. Patient does have a 50 pack year smoking history. He was given a dose of IV Rocephon and was started on a course of Omnicef. He reports he was starting to feel better while taking this but as soon as the medication ran out symptoms started back again. He report a productive cough with thick sputum production, wheezing, chest tightness and shortness of breath. He has been using his nebulizer which does help but running low on solution. He denies any fevers or ill contacts. His medication list shows he is on Flovent but patient states he was never given this and does not use a daily inhaler. She has quit smoking  "in 2012.     History   Smoking Status     Former Smoker     Years: 50.00     Types: Cigarettes     Quit date: 7/27/2012   Smokeless Tobacco     Never Used     Comment: Quit in July       Review of Systems   Constitutional, HEENT, cardiovascular, pulmonary, GI, , musculoskeletal, neuro, skin, endocrine and psych systems are negative, except as otherwise noted.      Objective    /80   Pulse 70   Temp 97.1  F (36.2  C) (Temporal)   Resp 18   Ht 1.727 m (5' 8\")   Wt 80.7 kg (178 lb)   SpO2 95%   BMI 27.06 kg/m    Body mass index is 27.06 kg/m .  Physical Exam   GENERAL: healthy, alert and no distress  HENT: ear canals and TM's normal, nose and mouth without ulcers or lesions  NECK: no adenopathy, no asymmetry, masses, or scars and thyroid normal to palpation  RESP: expiratory wheezes throughout and decreased breath sounds throughout  CV: regular rate and rhythm, normal S1 S2, no S3 or S4, no murmur, click or rub, no peripheral edema and peripheral pulses strong  SKIN: no suspicious lesions or rashes  PSYCH: mentation appears normal, affect normal/bright      Assessment & Plan     Chronic obstructive pulmonary disease, unspecified COPD type (H)  Patient with symptoms consistent with COPD exacerbation. Vitals are stable today. Discussed he ideally needs to be started on a daily controller inhaler. Will start additional course of antibiotics as well as a steroid at this time. Neb solution refilled. Will also send Flovent inhaler. Discussed we may need to do step-up therapy with this pending his symptoms. We reviewed red flag symptoms that should prompt immediate care in the ER. Patient was otherwise recommended to follow-up in 4-6 weeks.   - ipratropium - albuterol 0.5 mg/2.5 mg/3 mL (DUONEB) 0.5-2.5 (3) MG/3ML neb solution; Take 1 vial (3 mLs) by nebulization every 6 hours as needed for shortness of breath / dyspnea or wheezing  - fluticasone (FLOVENT HFA) 220 MCG/ACT inhaler; Inhale 1 puff into the lungs 2 " times daily  - cefdinir (OMNICEF) 300 MG capsule; Take 1 capsule (300 mg) by mouth 2 times daily  - predniSONE (DELTASONE) 20 MG tablet; Take 2 tablets (40 mg) by mouth daily for 5 days    The patient indicates understanding of these issues and agrees with the plan.    Dorcas Carbone PA-C  [facility]

## 2021-07-12 NOTE — TELEPHONE ENCOUNTER
Prior Authorization Retail Medication Request    Medication/Dose: flovent  ICD code (if different than what is on RX):     Previously Tried and Failed:     Rationale:       Insurance Name:  [employer]  Insurance ID:  [id number]      Pharmacy Information (if different than what is on RX)  Name:     Phone:

## 2021-07-12 NOTE — TELEPHONE ENCOUNTER
I would be able to see patient on Wednesday morning at 8:40 AM but unfortunately no availability sooner.     Dorcas Crabone PA-C

## 2021-07-12 NOTE — TELEPHONE ENCOUNTER
Patient wanting to be worked in for his COPD. Informed the first available appointment is 8/5. Spouse stating that is to far out. Informed we send a message. Did also inform that she is out of the clinic all next week. Holly Rasheed LPN

## 2021-07-13 NOTE — TELEPHONE ENCOUNTER
Central Prior Authorization Team   Phone: [phone]      PA Initiation    Medication: flovent  Insurance Company: [employer] - Phone [phone] Fax [phone]  Pharmacy Filling the Rx: [address]   Filling Pharmacy Phone: [phone]  Filling Pharmacy Fax:    Start Date: 7/13/2021

## 2021-07-13 NOTE — TELEPHONE ENCOUNTER
PRIOR AUTHORIZATION DENIED    Medication: flovent    Denial Date: 7/13/2021    Denial Rational:  Patient must have a history of trial & failure to the formulary alternative(s) or have a contraindication or intolerance to the formulary alternatives: Qvar & Asmanex.                 Appeal Information:    If you would like to appeal, please supply P/A team with a letter of medical necessity with clinical reason.

## 2021-07-14 NOTE — TELEPHONE ENCOUNTER
I have sent a prescription to the pharmacy for Qvar.  This is at the urgency of his insurance company.    Aye

## 2021-07-15 PROBLEM — J44.9 CHRONIC OBSTRUCTIVE PULMONARY DISEASE, UNSPECIFIED COPD TYPE (H): Status: ACTIVE | Noted: 2017-11-16

## 2021-09-10 ENCOUNTER — TELEPHONE (OUTPATIENT)
Dept: FAMILY MEDICINE | Facility: CLINIC | Age: 76
End: 2021-09-10

## 2021-09-10 DIAGNOSIS — J44.9 CHRONIC OBSTRUCTIVE PULMONARY DISEASE, UNSPECIFIED COPD TYPE (H): Primary | ICD-10-CM

## 2021-09-10 NOTE — TELEPHONE ENCOUNTER
Reason for Call: Request for an order or referral:    Order or referral being requested: Pulmonary consult    Date needed: as soon as possible    Has the patient been seen by the PCP for this problem? YES    Additional comments: Family is wanting him to be seen by pulmonary medicine because he has been in the ER a few times and is just not feeling well    Phone number Patient can be reached at:  Home number on file [phone] (home)    Best Time:  any    Can we leave a detailed message on this number?  YES    Call taken on 9/10/2021 at 2:33 PM by Dorcas Potter

## 2021-09-30 ENCOUNTER — HOSPITAL ENCOUNTER (EMERGENCY)
Facility: CLINIC | Age: 76
Discharge: HOME | End: 2021-09-30
Attending: NURSE PRACTITIONER | Admitting: NURSE PRACTITIONER
Payer: COMMERCIAL

## 2021-09-30 VITALS
BODY MASS INDEX: 26 KG/M2 | HEART RATE: 90 BPM | RESPIRATION RATE: 24 BRPM | OXYGEN SATURATION: 96 % | DIASTOLIC BLOOD PRESSURE: 73 MMHG | SYSTOLIC BLOOD PRESSURE: 141 MMHG | TEMPERATURE: 98.6 F | WEIGHT: 171 LBS

## 2021-09-30 DIAGNOSIS — R33.9 URINARY RETENTION: ICD-10-CM

## 2021-09-30 DIAGNOSIS — N39.0 UTI (URINARY TRACT INFECTION) WITH PYURIA: ICD-10-CM

## 2021-09-30 LAB
ALBUMIN UR-MCNC: 100 MG/DL
ANION GAP SERPL CALCULATED.3IONS-SCNC: 7 MMOL/L (ref 3–14)
APPEARANCE UR: ABNORMAL
BASOPHILS # BLD AUTO: 0 10E3/UL (ref 0–0.2)
BASOPHILS NFR BLD AUTO: 0 %
BILIRUB UR QL STRIP: NEGATIVE
BUN SERPL-MCNC: 21 MG/DL (ref 7–30)
CALCIUM SERPL-MCNC: 9 MG/DL (ref 8.5–10.1)
CHLORIDE BLD-SCNC: 105 MMOL/L (ref 94–109)
CO2 SERPL-SCNC: 24 MMOL/L (ref 20–32)
COLOR UR AUTO: ABNORMAL
CREAT SERPL-MCNC: 0.93 MG/DL (ref 0.66–1.25)
EOSINOPHIL # BLD AUTO: 0 10E3/UL (ref 0–0.7)
EOSINOPHIL NFR BLD AUTO: 0 %
ERYTHROCYTE [DISTWIDTH] IN BLOOD BY AUTOMATED COUNT: 20 % (ref 10–15)
GFR SERPL CREATININE-BSD FRML MDRD: 79 ML/MIN/1.73M2
GLUCOSE BLD-MCNC: 129 MG/DL (ref 70–99)
GLUCOSE UR STRIP-MCNC: NEGATIVE MG/DL
HCT VFR BLD AUTO: 41.7 % (ref 40–53)
HGB BLD-MCNC: 13 G/DL (ref 13.3–17.7)
HGB UR QL STRIP: ABNORMAL
IMM GRANULOCYTES # BLD: 0.2 10E3/UL
IMM GRANULOCYTES NFR BLD: 1 %
KETONES UR STRIP-MCNC: 20 MG/DL
LACTATE SERPL-SCNC: 0.7 MMOL/L (ref 0.7–2)
LEUKOCYTE ESTERASE UR QL STRIP: ABNORMAL
LYMPHOCYTES # BLD AUTO: 0.8 10E3/UL (ref 0.8–5.3)
LYMPHOCYTES NFR BLD AUTO: 3 %
MCH RBC QN AUTO: 18.9 PG (ref 26.5–33)
MCHC RBC AUTO-ENTMCNC: 31.2 G/DL (ref 31.5–36.5)
MCV RBC AUTO: 61 FL (ref 78–100)
MONOCYTES # BLD AUTO: 1.4 10E3/UL (ref 0–1.3)
MONOCYTES NFR BLD AUTO: 5 %
MUCOUS THREADS #/AREA URNS LPF: PRESENT /LPF
NEUTROPHILS # BLD AUTO: 24.5 10E3/UL (ref 1.6–8.3)
NEUTROPHILS NFR BLD AUTO: 91 %
NITRATE UR QL: NEGATIVE
NRBC # BLD AUTO: 0 10E3/UL
NRBC BLD AUTO-RTO: 0 /100
PH UR STRIP: 6 [PH] (ref 5–7)
PLATELET # BLD AUTO: 225 10E3/UL (ref 150–450)
POTASSIUM BLD-SCNC: 3.9 MMOL/L (ref 3.4–5.3)
RBC # BLD AUTO: 6.88 10E6/UL (ref 4.4–5.9)
RBC URINE: 77 /HPF
SODIUM SERPL-SCNC: 136 MMOL/L (ref 133–144)
SP GR UR STRIP: 1.02 (ref 1–1.03)
UROBILINOGEN UR STRIP-MCNC: NORMAL MG/DL
WBC # BLD AUTO: 26.9 10E3/UL (ref 4–11)
WBC CLUMPS #/AREA URNS HPF: PRESENT /HPF
WBC URINE: >182 /HPF

## 2021-09-30 PROCEDURE — 80048 BASIC METABOLIC PNL TOTAL CA: CPT | Performed by: NURSE PRACTITIONER

## 2021-09-30 PROCEDURE — 99284 EMERGENCY DEPT VISIT MOD MDM: CPT | Mod: 25 | Performed by: NURSE PRACTITIONER

## 2021-09-30 PROCEDURE — 96365 THER/PROPH/DIAG IV INF INIT: CPT | Performed by: NURSE PRACTITIONER

## 2021-09-30 PROCEDURE — 250N000009 HC RX 250: Performed by: NURSE PRACTITIONER

## 2021-09-30 PROCEDURE — 99284 EMERGENCY DEPT VISIT MOD MDM: CPT | Performed by: NURSE PRACTITIONER

## 2021-09-30 PROCEDURE — 83605 ASSAY OF LACTIC ACID: CPT | Performed by: NURSE PRACTITIONER

## 2021-09-30 PROCEDURE — 51702 INSERT TEMP BLADDER CATH: CPT | Performed by: NURSE PRACTITIONER

## 2021-09-30 PROCEDURE — 250N000011 HC RX IP 250 OP 636: Performed by: NURSE PRACTITIONER

## 2021-09-30 PROCEDURE — 81001 URINALYSIS AUTO W/SCOPE: CPT | Performed by: NURSE PRACTITIONER

## 2021-09-30 PROCEDURE — 87086 URINE CULTURE/COLONY COUNT: CPT | Performed by: NURSE PRACTITIONER

## 2021-09-30 PROCEDURE — 36415 COLL VENOUS BLD VENIPUNCTURE: CPT | Performed by: NURSE PRACTITIONER

## 2021-09-30 PROCEDURE — 96361 HYDRATE IV INFUSION ADD-ON: CPT | Mod: 59 | Performed by: NURSE PRACTITIONER

## 2021-09-30 PROCEDURE — 258N000003 HC RX IP 258 OP 636: Performed by: NURSE PRACTITIONER

## 2021-09-30 PROCEDURE — 85025 COMPLETE CBC W/AUTO DIFF WBC: CPT | Performed by: NURSE PRACTITIONER

## 2021-09-30 RX ORDER — CEFTRIAXONE 1 G/1
1 INJECTION, POWDER, FOR SOLUTION INTRAMUSCULAR; INTRAVENOUS ONCE
Status: COMPLETED | OUTPATIENT
Start: 2021-09-30 | End: 2021-09-30

## 2021-09-30 RX ORDER — LIDOCAINE HYDROCHLORIDE 20 MG/ML
JELLY TOPICAL ONCE
Status: COMPLETED | OUTPATIENT
Start: 2021-09-30 | End: 2021-09-30

## 2021-09-30 RX ORDER — CEFDINIR 300 MG/1
300 CAPSULE ORAL 2 TIMES DAILY
Qty: 20 CAPSULE | Refills: 0 | Status: SHIPPED | OUTPATIENT
Start: 2021-09-30 | End: 2021-10-03

## 2021-09-30 RX ADMIN — CEFTRIAXONE SODIUM 1 G: 1 INJECTION, POWDER, FOR SOLUTION INTRAMUSCULAR; INTRAVENOUS at 13:36

## 2021-09-30 RX ADMIN — LIDOCAINE HYDROCHLORIDE: 20 JELLY TOPICAL at 12:30

## 2021-09-30 RX ADMIN — SODIUM CHLORIDE 1000 ML: 9 INJECTION, SOLUTION INTRAVENOUS at 13:47

## 2021-09-30 NOTE — ED PROVIDER NOTES
History     Chief Complaint   Patient presents with     anuria     HPI  Khang Bailon is a 76 year old male with history of third-degree heart block (s/p pacemaker), COPD, Htn, CVA, memory loss, hyperlipidemia, alpha thalassemiaelevated PSA, history of urinary retention and UTI who presents with his wife for evaluation of no void in 2 days. Complains of bladder distention and feeling need to void.  He reports dysuria when he has some small amounts of dribbling.  Patient fell during the night d/t dizziness, no injury.   Denies fever or chills.  Denies nausea or vomiting.  Denies chest pain or shortness of breath.  Wife and patient are concerned for bladder infection.  Patient has history of urinary retention with UTI in the past.  I noted in his chart he had UTI with retention in 2019 and required Hernandez catheter placement.  Patient is currently on Flomax. He last saw urology I April 2019.    Pneumonia in July.  Follows with [facility], pacemaker last interrogated 7/30/2021.    Allergies:  Allergies   Allergen Reactions     Flagyl [Metronidazole] GI Disturbance       Problem List:    Patient Active Problem List    Diagnosis Date Noted     Fatigue, unspecified type 11/29/2019     Priority: Medium     Memory loss 11/29/2019     Priority: Medium     Chronic obstructive pulmonary disease, unspecified COPD type (H) 11/16/2017     Priority: Medium     Benign neoplasm of colon, unspecified part of colon 07/26/2016     Priority: Medium     Sigmoid diverticulitis 07/12/2016     Priority: Medium     History of CVA (cerebrovascular accident) - old left internal capsule lacunar infarct on CT 7/2016 07/12/2016     Priority: Medium     Multilevel degenerative disc disease 09/21/2015     Priority: Medium     Elevated prostate specific antigen (PSA) 09/11/2015     Priority: Medium     Health Care Home 01/24/2013     Priority: Medium     Racquel Ortiz RN-PHN  FPA / [facility]   [phone]  b  DX V65.8 REPLACED WITH [id number] CARE HOME (2013)       Elevated PSA 2012     Priority: Medium     Loss of weight 2012     Priority: Medium     Third degree heart block (H) - s/p pacemaker 08/10/2012     Priority: Medium     Hypertension goal BP (blood pressure) < 140/90 08/10/2012     Priority: Medium     Advanced directives, counseling/discussion 2012     Priority: Medium     Discussed advance care planning with patient; however, patient declined at this time. 2012          Thalassemia 2012     Priority: Medium     Diagnosis updated by automated process. Provider to review and confirm.       Hyperlipidemia LDL goal <130 2012     Priority: Medium     Painful respiration 2004     Priority: Medium     Personal history of tobacco use, presenting hazards to health 2004     Priority: Medium        Past Medical History:    Past Medical History:   Diagnosis Date     Alpha thalassemia (H)      AV block 2012     Diverticulitis      Injury, other and unspecified, elbow, forearm, and wrist 1970s       Past Surgical History:    Past Surgical History:   Procedure Laterality Date     COLONOSCOPY       COLONOSCOPY  12/10/2012     IMPLANT PACEMAKER         Family History:    No family history on file.    Social History:  Marital Status:   [2]  Social History     Tobacco Use     Smoking status: Former Smoker     Years: 50.00     Types: Cigarettes     Quit date: 2012     Years since quittin.1     Smokeless tobacco: Never Used     Tobacco comment: Quit in July   Substance Use Topics     Alcohol use: No     Alcohol/week: 0.0 standard drinks     Drug use: No        Medications:    Ascorbic Acid (VITAMIN C PO)  B Complex Vitamins (B COMPLEX PO)  cefdinir (OMNICEF) 300 MG capsule  DIGESTIVE ENZYMES PO  ipratropium - albuterol 0.5 mg/2.5 mg/3 mL (DUONEB) 0.5-2.5 (3) MG/3ML neb solution  Multiple Vitamin (MULTI VITAMIN  MENS) TABS  tamsulosin (FLOMAX) 0.4 MG  capsule  Vitamin D-Vitamin K (VITAMIN K2-VITAMIN D3 PO)  order for DME          Review of Systems  As mentioned above in the history present illness. All other systems were reviewed and are negative.    Physical Exam   BP: (!) 148/90  Pulse: 81  Temp: 98.6  F (37  C)  Resp: 24  Weight: 77.6 kg (171 lb)  SpO2: 93 %      Physical Exam  Constitutional:       General: He is not in acute distress.     Appearance: He is well-developed.   HENT:      Head: Normocephalic and atraumatic.      Right Ear: External ear normal.      Left Ear: External ear normal.      Nose: Nose normal.      Mouth/Throat:      Pharynx: No oropharyngeal exudate.   Eyes:      Conjunctiva/sclera: Conjunctivae normal.   Cardiovascular:      Rate and Rhythm: Normal rate and regular rhythm.      Heart sounds: Normal heart sounds. No murmur heard.     Pulmonary:      Effort: Pulmonary effort is normal. No tachypnea or respiratory distress.      Breath sounds: Decreased air movement (bilateral bases (hx: COPD)) present. Examination of the right-lower field reveals decreased breath sounds. Examination of the left-lower field reveals decreased breath sounds. Decreased breath sounds present.   Abdominal:      General: Bowel sounds are normal. There is distension (suprapubic).      Palpations: Abdomen is soft.      Tenderness: There is abdominal tenderness in the suprapubic area.   Musculoskeletal:         General: Normal range of motion.   Skin:     General: Skin is warm and dry.      Findings: No rash.   Neurological:      General: No focal deficit present.      Mental Status: He is alert and oriented to person, place, and time.         ED Course        Procedures            Results for orders placed or performed during the hospital encounter of 09/30/21 (from the past 24 hour(s))   CBC with platelets differential    Narrative    The following orders were created for panel order CBC with platelets differential.  Procedure                                Abnormality         Status                     ---------                               -----------         ------                     CBC with platelets and d...[[id number]]  Abnormal            Final result                 Please view results for these tests on the individual orders.   Basic metabolic panel   Result Value Ref Range    Sodium 136 133 - 144 mmol/L    Potassium 3.9 3.4 - 5.3 mmol/L    Chloride 105 94 - 109 mmol/L    Carbon Dioxide (CO2) 24 20 - 32 mmol/L    Anion Gap 7 3 - 14 mmol/L    Urea Nitrogen 21 7 - 30 mg/dL    Creatinine 0.93 0.66 - 1.25 mg/dL    Calcium 9.0 8.5 - 10.1 mg/dL    Glucose 129 (H) 70 - 99 mg/dL    GFR Estimate 79 >60 mL/min/1.73m2   CBC with platelets and differential   Result Value Ref Range    WBC Count 26.9 (H) 4.0 - 11.0 10e3/uL    RBC Count 6.88 (H) 4.40 - 5.90 10e6/uL    Hemoglobin 13.0 (L) 13.3 - 17.7 g/dL    Hematocrit 41.7 40.0 - 53.0 %    MCV 61 (L) 78 - 100 fL    MCH 18.9 (L) 26.5 - 33.0 pg    MCHC 31.2 (L) 31.5 - 36.5 g/dL    RDW 20.0 (H) 10.0 - 15.0 %    Platelet Count 225 150 - 450 10e3/uL    % Neutrophils 91 %    % Lymphocytes 3 %    % Monocytes 5 %    % Eosinophils 0 %    % Basophils 0 %    % Immature Granulocytes 1 %    NRBCs per 100 WBC 0 <1 /100    Absolute Neutrophils 24.5 (H) 1.6 - 8.3 10e3/uL    Absolute Lymphocytes 0.8 0.8 - 5.3 10e3/uL    Absolute Monocytes 1.4 (H) 0.0 - 1.3 10e3/uL    Absolute Eosinophils 0.0 0.0 - 0.7 10e3/uL    Absolute Basophils 0.0 0.0 - 0.2 10e3/uL    Absolute Immature Granulocytes 0.2 (H) <=0.0 10e3/uL    Absolute NRBCs 0.0 10e3/uL   UA with Microscopic reflex to Culture    Specimen: Urine, Hernandez Catheter   Result Value Ref Range    Color Urine Anny (A) Colorless, Straw, Light Yellow, Yellow    Appearance Urine Cloudy (A) Clear    Glucose Urine Negative Negative mg/dL    Bilirubin Urine Negative Negative    Ketones Urine 20  (A) Negative mg/dL    Specific Gravity Urine 1.018 1.003 - 1.035    Blood Urine Moderate (A) Negative    pH  Urine 6.0 5.0 - 7.0    Protein Albumin Urine 100  (A) Negative mg/dL    Urobilinogen Urine Normal Normal, 2.0 mg/dL    Nitrite Urine Negative Negative    Leukocyte Esterase Urine Large (A) Negative    WBC Clumps Urine Present (A) None Seen /HPF    Mucus Urine Present (A) None Seen /LPF    RBC Urine 77 (H) <=2 /HPF    WBC Urine >182 (H) <=5 /HPF    Narrative    Urine Culture ordered based on laboratory criteria   Lactic acid whole blood   Result Value Ref Range    Lactic Acid 0.7 0.7 - 2.0 mmol/L       Medications   0.9% sodium chloride BOLUS (1,000 mLs Intravenous New Bag 9/30/21 1347)   lidocaine (XYLOCAINE) 2 % external gel ( Topical Given 9/30/21 1230)   cefTRIAXone (ROCEPHIN) 1 g vial to attach to  mL bag for ADULTS or NS 50 mL bag for PEDS (0 g Intravenous Stopped 9/30/21 1406)     1310: at bedside, assisted RN to get murrell catheter (14 fr) placed. He is a difficult cath.   1325: 775 ml urine output. UA sent.  WBC markedly elevated at 26.9. Lactic Acid ordered.      Assessments & Plan (with Medical Decision Making)   Brief HPI:    76-year-old male who presents with 2 days of urinary retention and concern for bladder infection.  He has a history of urinary retention with UTI and has required Murrell catheter placement.  He denies fever or chills.  Denies nausea or vomiting.    Exam/Lab/Imaging findings:  Patient is afebrile and vital signs stable.    Exam as above, notable for suprapubic tenderness and distention.      Pertinent lab findings WBC 26.9.  Hemoglobin 13.0.  UA with 20 ketones, moderate blood, 100 protein, large leuk esterase, 77 RBCs,> 182 WBCs, and WBC clumps.    Lactic acid is normal.  Electrolytes are normal.  Kidney function labs are normal.      I discussed results with patient and his wife.    ED Course/Procedures:    Murrell catheter was placed given his inability to void and 775 mL return.  Patient was given IV Rocephin 1 g and IV normal saline 1 L bolus.    Patient here in ED for 3 hours.  He has had no fever. No hypotension or tachycardia to suggest urosepsis. I am concerned about his markedly elevated WBC count. Hospital beds are scarce anywhere at this time. Given he appears well and I was able to get him a 24 hour follow-up in clinic he will be discharge home. I did order CBC for recheck tomorrow.    Plan:  Cefdinir 300 mg twice a day for 10 days.  Follow-up in clinic tomorrow for recheck with Dr. Trejo at 11:40 AM and possibly recheck of blood work.  Hernandez catheter left in place.  Urology referral has been placed.  They should call you to set up this appointment.  If you do not hear from them by Tuesday call [phone].  Return to the emergency department for fevers, vomiting, abdominal pain, or worse in any way.    I have reviewed the nursing notes.    I have reviewed the findings, diagnosis, plan and need for follow up with the patient.      New Prescriptions    CEFDINIR (OMNICEF) 300 MG CAPSULE    Take 1 capsule (300 mg) by mouth 2 times daily for 10 days       Final diagnoses:   UTI (urinary tract infection) with pyuria   Urinary retention       9/30/2021   [facility] EMERGENCY DEPT     Marina, VALENTINE Leal CNP  09/30/21 [id number]

## 2021-09-30 NOTE — DISCHARGE INSTRUCTIONS
Cefdinir 300 mg twice a day for 10 days.  Follow-up in clinic tomorrow for recheck with Dr. Trejo at 11:40 AM and possibly recheck of blood work.  Hernandez catheter left in place.  Urology referral has been placed.  They should call you to set up this appointment.  If you do not hear from them by Tuesday call [phone].  Return to the emergency department for fevers, vomiting, abdominal pain, or worse in any way.

## 2021-09-30 NOTE — PROGRESS NOTES
Does Khang have home oxygen?  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  He was taking Qvar in fact.  Currently he is only on duo nebs, and taking that twice a day.  Given his level of disease which is likely somewhere between moderate and severe COPD, this is likely undertreated. I think Trelegy 100 mcg would be a reasonable inhaler for his level of disease and symptoms. I spoke with him about this and he agrees to try taking this medication.  He is concerned about cost.   I told him to ask his pharmacist what the cost to him would be.     His oxygen saturation on room air is 95%.  I would like to get an overnight oximetry study on room air.      He has a few possible co-morbid conditions that may also be affecting his breathing. Coronary artery disease, obstructive sleep apnea.  I offered to refer to cardiology or the sleep clinic and he declined.      Things to consider at future visits would be ECHO, pulmonary rehab, lung cancer screening.     He had many questions about COVID19.  I answered these as best as I could.  He is not immunized.     From a pre-operative standpoint prior to his urologic procedure, he is at an elevated but not prohibitive risk for a respiratory complication in the perioperative period.  Hopefully this can be mitigated with the use of his inhaler in the days leading up to surgery.     RTC in 2 months.       Fransisco Avina MD          Today's visit note:     Chief Complaint: Khang Bailon is a 76 year old year old male who is being seen for Consult (COPD)      HISTORY OF PRESENT ILLNESS:    He has had respiratory symptoms for years now.  Shortness of breath, cough and sputum production.  He  uses duo nebs twice a day to help out with this.  He has been on Qvar in the past, but it did not help his symptoms after couple days and so he stopped using it. He has a chronic cough with phlegm production.      Fatigue during the day. Will usually take a 1 hour nap in the afternoon.  Snores at night.  Unclear if there are apneic episodes.  Overall lack of energy.      He smoked for 52 years, 1 ppd. Has quit.      Does not have exacerbations of his lung disease requiring antibiotics or steroids.      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  Ran a [employer] in [place].     Currently has an indwelling murrell catheter for urinary obstruction.  Plans for urologic surgery in early November.            Past Medical and Surgical History:     Past Medical History:   Diagnosis Date     Alpha thalassemia (H)      AV block 2012    pacemaker at [facility]     Diverticulitis      Injury, other and unspecified, elbow, forearm, and wrist 1970s    Broke right wrist     Past Surgical History:   Procedure Laterality Date     COLONOSCOPY       COLONOSCOPY  12/10/2012     IMPLANT PACEMAKER             Family History:     No family history of lung disease.          Social History:     Social History     Socioeconomic History     Marital status:      Spouse name: Nikki     Number of children: 3     Years of education: Not on file     Highest education level: Not on file   Occupational History     Employer: SELF   Tobacco Use     Smoking status: Former Smoker     Years: 50.00     Types: Cigarettes     Quit date: 2012     Years since quittin.2     Smokeless tobacco: Never Used     Tobacco comment: Quit in July   Substance and Sexual Activity     Alcohol use: No     Alcohol/week: 0.0 standard drinks     Drug use: No     Sexual activity: Never   Other Topics Concern     Parent/sibling w/ CABG, MI or angioplasty before 65F 55M? No   Social History Narrative     Not on file  "    Social Determinants of Health     Financial Resource Strain:      Difficulty of Paying Living Expenses:    Food Insecurity:      Worried About Running Out of Food in the Last Year:      Ran Out of Food in the Last Year:    Transportation Needs:      Lack of Transportation (Medical):      Lack of Transportation (Non-Medical):    Physical Activity:      Days of Exercise per Week:      Minutes of Exercise per Session:    Stress:      Feeling of Stress :    Social Connections:      Frequency of Communication with Friends and Family:      Frequency of Social Gatherings with Friends and Family:      Attends [employer] Services:      Active Member of Clubs or Organizations:      Attends Club or Organization Meetings:      Marital Status:    Intimate Partner Violence:      Fear of Current or Ex-Partner:      Emotionally Abused:      Physically Abused:      Sexually Abused:             Medications:     Current Outpatient Medications   Medication     Ascorbic Acid (VITAMIN C PO)     B Complex Vitamins (B COMPLEX PO)     DIGESTIVE ENZYMES PO     ipratropium - albuterol 0.5 mg/2.5 mg/3 mL (DUONEB) 0.5-2.5 (3) MG/3ML neb solution     Multiple Vitamin (MULTI VITAMIN  MENS) TABS     order for DME     tamsulosin (FLOMAX) 0.4 MG capsule     Vitamin D-Vitamin K (VITAMIN K2-VITAMIN D3 PO)     No current facility-administered medications for this visit.            Review of Systems:       A complete review of systems was otherwise negative except as noted in the HPI.      PHYSICAL EXAM:  Pulse 86   Temp 97.8  F (36.6  C) (Temporal)   Ht 1.715 m (5' 7.5\")   Wt 80.2 kg (176 lb 11.2 oz)   SpO2 95%   BMI 27.27 kg/m       General: Pleasant, NAD  Eyes: Anicteric  Nose: Nasal mucosa with no edema or hyperemia.  No polyps  Ears: Hearing grossly normal  Mouth: Oral mucosa is moist, without any lesions. No oropharyngeal exudate.  Neck: supple, no thyromegaly  Lymphatics: No cervical or supraclavicular nodes  Respiratory: Good air " movement. No crackles. No rhonchi. No wheezes. Coarse cough noted in the clinic.    Cardiac: RRR, normal S1, S2. No murmurs. Pacemaker in left chest wall.   Abdomen: Soft, NT/ND  Musculoskeletal: Extremities normal. No clubbing. No cyanosis. No edema.  Skin: No rash on limited exam  Neuro: Normal mentation. Normal speech.  Psych:Normal affect           Data:   All laboratory and imaging data reviewed.      PFT: Results are not reproducible, so ATS criteria not met.   10/27/2021: FEV1/FVC ratio 0.47. FEV1 1.58 L (58% predicted). FVC 3.39 (90% predicted).     PFT Interpretation:  Moderate obstructive ventilatory defect.  Valid Maneuver      Chest CT: I have reviewed the images and agree with the radiologist interpretation below:  LUNGS AND PLEURA: Severe centrilobular emphysema. Calcified granuloma  noted in the right lower lobe on image 35 of series 3. Fissural lymph  node left major fissure noted on image 48. Lungs are otherwise clear.  No infiltrate or consolidation. No pleural effusions.     MEDIASTINUM/AXILLAE: Heart is normal in size. Small pericardial  effusion slightly decreased in size since prior CT. Severe coronary  artery calcification is present. Calcified plaque thoracic aorta  without aneurysm. Implantable cardiac device left upper chest with  leads in the right atrium and right ventricle again noted. Esophagus  is unremarkable. Thyroid gland is normal in size. 1-2 cm subcutaneous  nodules are noted in the upper back on series 2, image 2 and image 14  possibly sebaceous cysts.     UPPER ABDOMEN: Large calcified gallstones are noted in the  gallbladder. Liver and gallbladder are otherwise unremarkable.  Partially imaged renal cortical cysts also noted. Little interval  change.     MUSCULOSKELETAL: Degenerative spine changes. No destructive bone  lesions.                                                                      IMPRESSION:   1.  Severe centrilobular emphysema. No infiltrate or  consolidation.  Calcified granuloma right lower lobe.     2.  Severe coronary artery calcification. Interval decrease in the  size of the small pericardial effusion.     3.  Stable cholelithiasis and renal cortical cysts where imaged.    Recent Results (from the past 168 hour(s))   PSA tumor marker    Collection Time: 10/20/21 12:03 PM   Result Value Ref Range    PSA Tumor Marker 75.80 (H) 0.00 - 4.00 ug/L   CBC with platelets and differential    Collection Time: 10/20/21 12:03 PM   Result Value Ref Range    WBC Count 9.3 4.0 - 11.0 10e3/uL    RBC Count 6.31 (H) 4.40 - 5.90 10e6/uL    Hemoglobin 11.9 (L) 13.3 - 17.7 g/dL    Hematocrit 39.3 (L) 40.0 - 53.0 %    MCV 62 (L) 78 - 100 fL    MCH 18.9 (L) 26.5 - 33.0 pg    MCHC 30.3 (L) 31.5 - 36.5 g/dL    RDW 19.6 (H) 10.0 - 15.0 %    Platelet Count 256 150 - 450 10e3/uL    % Neutrophils 80 %    % Lymphocytes 11 %    % Monocytes 6 %    % Eosinophils 1 %    % Basophils 1 %    % Immature Granulocytes 1 %    NRBCs per 100 WBC 0 <1 /100    Absolute Neutrophils 7.6 1.6 - 8.3 10e3/uL    Absolute Lymphocytes 1.0 0.8 - 5.3 10e3/uL    Absolute Monocytes 0.5 0.0 - 1.3 10e3/uL    Absolute Eosinophils 0.1 0.0 - 0.7 10e3/uL    Absolute Basophils 0.1 0.0 - 0.2 10e3/uL    Absolute Immature Granulocytes 0.1 (H) <=0.0 10e3/uL    Absolute NRBCs 0.0 10e3/uL   Spirometry, Breathing Capacity: Normal Order, Clinic Performed    Collection Time: 10/27/21  8:46 AM   Result Value Ref Range    FEV-1      FVC      FEV1/FVC      FEF 25/75

## 2021-10-01 ENCOUNTER — OFFICE VISIT (OUTPATIENT)
Dept: FAMILY MEDICINE | Facility: CLINIC | Age: 76
End: 2021-10-01
Payer: COMMERCIAL

## 2021-10-01 VITALS
DIASTOLIC BLOOD PRESSURE: 72 MMHG | OXYGEN SATURATION: 96 % | TEMPERATURE: 98.6 F | HEART RATE: 92 BPM | RESPIRATION RATE: 20 BRPM | SYSTOLIC BLOOD PRESSURE: 130 MMHG | WEIGHT: 167.9 LBS | BODY MASS INDEX: 25.53 KG/M2

## 2021-10-01 DIAGNOSIS — N39.0 UTI (URINARY TRACT INFECTION) WITH PYURIA: ICD-10-CM

## 2021-10-01 DIAGNOSIS — R33.9 URINARY RETENTION: Primary | ICD-10-CM

## 2021-10-01 DIAGNOSIS — N39.0 URINARY TRACT INFECTION WITH HEMATURIA, SITE UNSPECIFIED: ICD-10-CM

## 2021-10-01 DIAGNOSIS — R31.9 URINARY TRACT INFECTION WITH HEMATURIA, SITE UNSPECIFIED: ICD-10-CM

## 2021-10-01 LAB
BASOPHILS # BLD MANUAL: 0 10E3/UL (ref 0–0.2)
BASOPHILS NFR BLD MANUAL: 0 %
ELLIPTOCYTES BLD QL SMEAR: SLIGHT
EOSINOPHIL # BLD MANUAL: 0 10E3/UL (ref 0–0.7)
EOSINOPHIL NFR BLD MANUAL: 0 %
ERYTHROCYTE [DISTWIDTH] IN BLOOD BY AUTOMATED COUNT: 20.2 % (ref 10–15)
HCT VFR BLD AUTO: 43 % (ref 40–53)
HGB BLD-MCNC: 13.5 G/DL (ref 13.3–17.7)
LYMPHOCYTES # BLD MANUAL: 2.1 10E3/UL (ref 0.8–5.3)
LYMPHOCYTES NFR BLD MANUAL: 7 %
MCH RBC QN AUTO: 19 PG (ref 26.5–33)
MCHC RBC AUTO-ENTMCNC: 31.4 G/DL (ref 31.5–36.5)
MCV RBC AUTO: 61 FL (ref 78–100)
MONOCYTES # BLD MANUAL: 2.7 10E3/UL (ref 0–1.3)
MONOCYTES NFR BLD MANUAL: 9 %
NEUTROPHILS # BLD MANUAL: 24.9 10E3/UL (ref 1.6–8.3)
NEUTROPHILS NFR BLD MANUAL: 84 %
PLAT MORPH BLD: ABNORMAL
PLATELET # BLD AUTO: 285 10E3/UL (ref 150–450)
RBC # BLD AUTO: 7.09 10E6/UL (ref 4.4–5.9)
RBC MORPH BLD: ABNORMAL
TARGETS BLD QL SMEAR: SLIGHT
WBC # BLD AUTO: 29.7 10E3/UL (ref 4–11)

## 2021-10-01 PROCEDURE — 99214 OFFICE O/P EST MOD 30 MIN: CPT | Performed by: FAMILY MEDICINE

## 2021-10-01 PROCEDURE — 85027 COMPLETE CBC AUTOMATED: CPT | Performed by: FAMILY MEDICINE

## 2021-10-01 PROCEDURE — 36415 COLL VENOUS BLD VENIPUNCTURE: CPT | Performed by: FAMILY MEDICINE

## 2021-10-01 ASSESSMENT — PAIN SCALES - GENERAL: PAINLEVEL: WORST PAIN (10)

## 2021-10-01 NOTE — PROGRESS NOTES
"     Assessment & Plan     Urinary retention  Follow-up from the emergency room visit yesterday. He presented in the and put in a urinary catheter and they have left it in. He had infections as noted below elevated white count fever I gave him IV antibiotics and discharged him with some Omnicef which he has not even started yet. He has watery diarrhea now. He has not had much to drink he states as soon as he eats or drinks anything he has diarrhea. We are going to have him get some probiotics. Also some Imodium A-D. He is having urine output but if this slows down he is needs to present again to the emergency room. They actually wanted to admit him for observation but there was no beds available.  - Adult Urology Referral; Future    Urinary tract infection with hematuria, site unspecified  We will  the Omnicef right now at the pharmacy and get going on this. His white count was up to 29,000 from 26,000. However he is afebrile and does not look toxic. He to follow-up with his closely next week. Set him up with urology. Once he gets going on the Omnicef and if things are not settling down will have him come in to have the catheter removed.  - CBC with platelets and differential; Future    UTI (urinary tract infection) with pyuria  See discussion above.  - CBC with Platelets & Differential             BMI:   Estimated body mass index is 25.53 kg/m  as calculated from the following:    Height as of 7/12/21: 1.727 m (5' 8\").    Weight as of this encounter: 76.2 kg (167 lb 14.4 oz).           No follow-ups on file.    Cecilio Trejo MD  [facility]    Mikaela Quiñonez is a 76 year old who presents for the following health issues     HPI     ED/UC Followup:    Facility:  [facility]  Date of visit: 9/30/21  Reason for visit: UTI  Current Status: has a cath in place, has not picked up antibiotic from pharmacy            Review of Systems   Constitutional, HEENT, cardiovascular, pulmonary, " gi and gu systems are negative, except as otherwise noted.      Objective    /72 (BP Location: Right arm, Patient Position: Sitting, Cuff Size: Adult Regular)   Pulse 92   Temp 98.6  F (37  C) (Temporal)   Resp 20   Wt 76.2 kg (167 lb 14.4 oz)   SpO2 96%   BMI 25.53 kg/m    Body mass index is 25.53 kg/m .  Physical Exam   GENERAL: healthy, alert and no distress  EYES: Eyes grossly normal to inspection, PERRL and conjunctivae and sclerae normal  RESP: lungs clear to auscultation - no rales, rhonchi or wheezes  ABDOMEN: soft, nontender, no hepatosplenomegaly, no masses and bowel sounds normal  MS: no gross musculoskeletal defects noted, no edema  SKIN: no suspicious lesions or rashes  NEURO: Normal strength and tone, mentation intact and speech normal  PSYCH: mentation appears normal, affect normal/bright    Results for orders placed or performed in visit on 10/01/21 (from the past 24 hour(s))   CBC with Platelets & Differential    Narrative    The following orders were created for panel order CBC with Platelets & Differential.  Procedure                               Abnormality         Status                     ---------                               -----------         ------                     CBC with platelets and d...[[id number]]  Abnormal            Final result               Manual Differential[[id number]]          Abnormal            Final result                 Please view results for these tests on the individual orders.   CBC with platelets and differential   Result Value Ref Range    WBC Count 29.7 (H) 4.0 - 11.0 10e3/uL    RBC Count 7.09 (H) 4.40 - 5.90 10e6/uL    Hemoglobin 13.5 13.3 - 17.7 g/dL    Hematocrit 43.0 40.0 - 53.0 %    MCV 61 (L) 78 - 100 fL    MCH 19.0 (L) 26.5 - 33.0 pg    MCHC 31.4 (L) 31.5 - 36.5 g/dL    RDW 20.2 (H) 10.0 - 15.0 %    Platelet Count 285 150 - 450 10e3/uL   Manual Differential   Result Value Ref Range    % Neutrophils 84 %    % Lymphocytes 7 %    % Monocytes 9  %    % Eosinophils 0 %    % Basophils 0 %    Absolute Neutrophils 24.9 (H) 1.6 - 8.3 10e3/uL    Absolute Lymphocytes 2.1 0.8 - 5.3 10e3/uL    Absolute Monocytes 2.7 (H) 0.0 - 1.3 10e3/uL    Absolute Eosinophils 0.0 0.0 - 0.7 10e3/uL    Absolute Basophils 0.0 0.0 - 0.2 10e3/uL    RBC Morphology Confirmed RBC Indices     Platelet Assessment  Automated Count Confirmed. Platelet morphology is normal.     Automated Count Confirmed. Platelet morphology is normal.    Elliptocytes Slight (A) None Seen    Target Cells Slight (A) None Seen

## 2021-10-01 NOTE — RESULT ENCOUNTER NOTE
[facility] Emergency Dept discharge antibiotic (if prescribed): Cefdinir (Omnicef) 300 mg capsule, 1 capsule (300 mg) by mouth 2 times daily for 10 days  Date of Rx (if applicable):  9/30/21  No changes in treatment per [facility] ED Lab Result Urine culture protocol.

## 2021-10-03 ENCOUNTER — TELEPHONE (OUTPATIENT)
Dept: EMERGENCY MEDICINE | Facility: CLINIC | Age: 76
End: 2021-10-03

## 2021-10-03 LAB — BACTERIA UR CULT: ABNORMAL

## 2021-10-03 NOTE — TELEPHONE ENCOUNTER
[facility] Emergency Department Lab result notification [Adult-Male]    [place] ED lab result protocol used  Urine cx    Reason for call  Notify of lab results, assess symptoms,  review ED providers recommendations/discharge instructions (if necessary) and advise per ED lab result f/u protocol    Lab Result (including Rx patient on, if applicable)  Final Urine Culture Report on 10/3/21  [facility] Emergency Dept discharge antibiotic prescribed: Cefdinir (Omnicef) 300 mg capsule, 1 capsule (300 mg) by mouth 2 times daily for 10 days  #1. Bacteria, >100,000 CFU/mL Enterococcus faecalis,  is [RESISTANT] to antibiotic.   Recommendations in treatment per [facility] ED lab result Urine Culture protocol.    Information table from Emergency Dept Provider visit on 9/30/21  Symptoms reported at ED visit (Chief complaint, HPI) Chief Complaint   Patient presents with     anuria      HPI  Khang Bailon is a 76 year old male with history of third-degree heart block (s/p pacemaker), COPD, Htn, CVA, memory loss, hyperlipidemia, alpha thalassemiaelevated PSA, history of urinary retention and UTI who presents with his wife for evaluation of no void in 2 days. Complains of bladder distention and feeling need to void.  He reports dysuria when he has some small amounts of dribbling.  Patient fell during the night d/t dizziness, no injury.   Denies fever or chills.  Denies nausea or vomiting.  Denies chest pain or shortness of breath.  Wife and patient are concerned for bladder infection.  Patient has history of urinary retention with UTI in the past.  I noted in his chart he had UTI with retention in 2019 and required Hernandez catheter placement.  Patient is currently on Flomax. He last saw urology I April 2019     Significant Medical hx, if applicable (i.e. CKD, diabetes) reviewed   Allergies Allergies   Allergen Reactions     Flagyl [Metronidazole] GI Disturbance      Weight, if applicable Wt Readings from Last  2 Encounters:   10/01/21 76.2 kg (167 lb 14.4 oz)   09/30/21 77.6 kg (171 lb)      Coumadin/Warfarin [Yes /No] No   Creatinine Level (mg/dl) Creatinine   Date Value Ref Range Status   09/30/2021 0.93 0.66 - 1.25 mg/dL Final   06/27/2021 0.99 0.66 - 1.25 mg/dL Final      Creatinine clearance (ml/min), if applicable Serum creatinine: 0.93 mg/dL 09/30/21 1238  Estimated creatinine clearance: 72.8 mL/min   ED providers Impression and Plan (applicable information) 76-year-old male who presents with 2 days of urinary retention and concern for bladder infection.  He has a history of urinary retention with UTI and has required Hernandez catheter placement.  He denies fever or chills.  Denies nausea or vomiting.     Exam/Lab/Imaging findings:  Patient is afebrile and vital signs stable.    Exam as above, notable for suprapubic tenderness and distention.     Pertinent lab findings WBC 26.9.  Hemoglobin 13.0.  UA with 20 ketones, moderate blood, 100 protein, large leuk esterase, 77 RBCs,> 182 WBCs, and WBC clumps.    Lactic acid is normal.  Electrolytes are normal.  Kidney function labs are normal.   I discussed results with patient and his wife.     ED Course/Procedures:    Hernandez catheter was placed given his inability to void and 775 mL return.  Patient was given IV Rocephin 1 g and IV normal saline 1 L bolus.   Patient here in ED for 3 hours. He has had no fever. No hypotension or tachycardia to suggest urosepsis. I am concerned about his markedly elevated WBC count. Hospital beds are scarce anywhere at this time. Given he appears well and I was able to get him a 24 hour follow-up in clinic he will be discharge home. I did order CBC for recheck tomorrow.     Plan:  Cefdinir 300 mg twice a day for 10 days.  Follow-up in clinic tomorrow for recheck with Dr. Trejo at 11:40 AM and possibly recheck of blood work.  Hernandez catheter left in place.  Urology referral has been placed.  They should call you to set up this appointment.  If  you do not hear from them by Tuesday call [phone].  Return to the emergency department for fevers, vomiting, abdominal pain, or worse in any way.   ED diagnosis UTI (urinary tract infection) with pyuria   Urinary retention      ED provider Latanya Gray APRN CNP      RN Assessment (Patient s current Symptoms), include time called.  [Insert Left message here if message left]  Is feeling very tired.  No energy and strength.  I am drinking a lot of fluids.    RN Recommendations/Instructions per [place] ED lab result protocol  Patient notified of lab result and treatment recommendations.  Advised to discontinue the Cefdinir and switch to Augmentin 875/125 mg PO BID for 7 days.  Rx  sent to [Pharmacy - [facility]].      Please Contact your PCP clinic or return to the Emergency department if your:    Symptoms worsen or other concerning symptom's.    PCP follow-up Questions asked: YES       Harmeet Cadena RN  [facility]  Emergency Dept Lab Result RN  Ph# [phone]     Copy of Lab result  Contains abnormal data Urine Culture  Order: [id number]  Collected:  9/30/2021 12:39 PM   Status:  Final result   Visible to patient:  No (inaccessible in MyChart)  Specimen Information: Urine, Hernandez Catheter         3 Result Notes    Culture >100,000 CFU/mL Enterococcus faecalisAbnormal             Resulting Agency: [facility]       Susceptibility     Enterococcus faecalis     DARRELL     Ampicillin <=2.0 ug/mL Susceptible     Nitrofurantoin <=16.0 ug/mL Susceptible     Penicillin 2.0 ug/mL Susceptible     Vancomycin 1.0 ug/mL Susceptible                 Specimen Collected: 09/30/21 12:39 PM   Last Resulted: 10/03/21  2:54 AM

## 2021-10-03 NOTE — RESULT ENCOUNTER NOTE
Final Urine Culture Report on 10/3/21  [facility] Emergency Dept discharge antibiotic prescribed: Cefdinir (Omnicef) 300 mg capsule, 1 capsule (300 mg) by mouth 2 times daily for 10 days  #1. Bacteria, >100,000 CFU/mL Enterococcus faecalis,  is [RESISTANT] to antibiotic.   Recommendations in treatment per [facility] ED lab result Urine Culture protocol.

## 2021-10-03 NOTE — RESULT ENCOUNTER NOTE
Patient notified of lab result and treatment recommendations.  Advised to discontinue the Cefdinir and switch to Augmentin 875/125 mg PO BID for 7 days.  Rx  sent to [Pharmacy - [facility]].

## 2021-10-04 ENCOUNTER — TELEPHONE (OUTPATIENT)
Dept: UROLOGY | Facility: CLINIC | Age: 76
End: 2021-10-04

## 2021-10-04 NOTE — TELEPHONE ENCOUNTER
Reason for Call:  Other call back    Detailed comments: pt is in a great amount of pain with urinary retention- does not want to wait until next opening of 12/01 (next opening in [place]) could pt be worked in?    Phone Number Patient can be reached at: Home number on file [phone] (home)    Best Time: any    Can we leave a detailed message on this number? YES    Call taken on 10/4/2021 at 9:32 AM by Samantha Espinosa

## 2021-10-04 NOTE — TELEPHONE ENCOUNTER
Patient declined 10/5 appointment in [facility].  Patient scheduled 10/6 in [place].  Patient last seen in 2019. Patient cancelled TRUSP biopsy at that time.  Arabella Saldana, CMA

## 2021-10-06 ENCOUNTER — PREP FOR PROCEDURE (OUTPATIENT)
Dept: UROLOGY | Facility: CLINIC | Age: 76
End: 2021-10-06

## 2021-10-06 ENCOUNTER — OFFICE VISIT (OUTPATIENT)
Dept: UROLOGY | Facility: CLINIC | Age: 76
End: 2021-10-06
Payer: COMMERCIAL

## 2021-10-06 ENCOUNTER — TELEPHONE (OUTPATIENT)
Dept: UROLOGY | Facility: CLINIC | Age: 76
End: 2021-10-06

## 2021-10-06 VITALS
OXYGEN SATURATION: 96 % | DIASTOLIC BLOOD PRESSURE: 72 MMHG | SYSTOLIC BLOOD PRESSURE: 128 MMHG | WEIGHT: 175 LBS | BODY MASS INDEX: 26.61 KG/M2

## 2021-10-06 DIAGNOSIS — N40.1 BENIGN PROSTATIC HYPERPLASIA WITH URINARY RETENTION: Primary | ICD-10-CM

## 2021-10-06 DIAGNOSIS — R97.20 ELEVATED PROSTATE SPECIFIC ANTIGEN (PSA): ICD-10-CM

## 2021-10-06 DIAGNOSIS — N21.0 BLADDER STONES: ICD-10-CM

## 2021-10-06 DIAGNOSIS — R33.8 BENIGN PROSTATIC HYPERPLASIA WITH URINARY RETENTION: Primary | ICD-10-CM

## 2021-10-06 DIAGNOSIS — N39.0 URINARY TRACT INFECTION WITHOUT HEMATURIA, SITE UNSPECIFIED: ICD-10-CM

## 2021-10-06 PROCEDURE — 52000 CYSTOURETHROSCOPY: CPT | Performed by: UROLOGY

## 2021-10-06 PROCEDURE — 99215 OFFICE O/P EST HI 40 MIN: CPT | Mod: 25 | Performed by: UROLOGY

## 2021-10-06 PROCEDURE — 51700 IRRIGATION OF BLADDER: CPT | Mod: 59 | Performed by: UROLOGY

## 2021-10-06 RX ORDER — CEPHALEXIN 500 MG/1
500 CAPSULE ORAL 3 TIMES DAILY
Qty: 30 CAPSULE | Refills: 0 | Status: SHIPPED | OUTPATIENT
Start: 2021-10-06 | End: 2021-10-16

## 2021-10-06 RX ORDER — CEFAZOLIN SODIUM 2 G/100ML
2 INJECTION, SOLUTION INTRAVENOUS
Status: CANCELLED | OUTPATIENT
Start: 2021-10-06

## 2021-10-06 RX ORDER — CEFAZOLIN SODIUM 2 G/100ML
2 INJECTION, SOLUTION INTRAVENOUS SEE ADMIN INSTRUCTIONS
Status: CANCELLED | OUTPATIENT
Start: 2021-10-06

## 2021-10-06 ASSESSMENT — PAIN SCALES - GENERAL: PAINLEVEL: NO PAIN (0)

## 2021-10-06 NOTE — PROGRESS NOTES
No chief complaint on file.      Khang Bailon is a 76 year old male who presents today for follow up of No chief complaint on file.  Patient is a 76-year-old male who was seen the office today for a consultation with regard to several urological issues.  Patient was seen emergency room recently with urine retention and has had Hernandez catheter placed.  This has happened many times in the past.  He was also diagnosed with urinary tract infection with urine culture growing Enterococcus faecalis.  He is finishing up his antibiotics.  He has no fever nausea or vomiting.  He has a long history of BPH with urinary tension.  He is currently on Flomax.  His PSA was very elevated at 44 several years ago.  He was scheduled for prostate biopsy but did not go through with it.    Current Outpatient Medications   Medication Sig Dispense Refill     cephALEXin (KEFLEX) 500 MG capsule Take 1 capsule (500 mg) by mouth 3 times daily for 10 days 30 capsule 0     amoxicillin-clavulanate (AUGMENTIN) 875-125 MG tablet Take 1 tablet by mouth 2 times daily for 7 days 14 tablet 0     Ascorbic Acid (VITAMIN C PO) Take 1 capful by mouth daily (Isotonix)       B Complex Vitamins (B COMPLEX PO) Take 1 capful by mouth daily (Isotonix)       DIGESTIVE ENZYMES PO Take 1 capful by mouth daily (Isotonix - w/ probiotics)       ipratropium - albuterol 0.5 mg/2.5 mg/3 mL (DUONEB) 0.5-2.5 (3) MG/3ML neb solution Take 1 vial (3 mLs) by nebulization every 6 hours as needed for shortness of breath / dyspnea or wheezing (Patient taking differently: Take 1 vial by nebulization 2 times daily as needed for shortness of breath / dyspnea or wheezing ) 360 mL 5     Multiple Vitamin (MULTI VITAMIN  MENS) TABS Take 1 capful by mouth daily (Isotonix)       order for DME Equipment being ordered: Nebulizer hose 1 Tube 11     tamsulosin (FLOMAX) 0.4 MG capsule TAKE ONE CAPSULE BY MOUTH ONCE DAILY (Patient taking differently: Take 0.4 mg by mouth 2 times daily ) 90  capsule 0     Vitamin D-Vitamin K (VITAMIN K2-VITAMIN D3 PO) Take 1 capful by mouth daily (Isotonix)       Allergies   Allergen Reactions     Flagyl [Metronidazole] GI Disturbance      Past Medical History:   Diagnosis Date     Alpha thalassemia (H)      AV block 2012    pacemaker at [facility]     Diverticulitis      Injury, other and unspecified, elbow, forearm, and wrist 1970s    Broke right wrist     Past Surgical History:   Procedure Laterality Date     COLONOSCOPY       COLONOSCOPY  12/10/2012     IMPLANT PACEMAKER       No family history on file.  Social History     Socioeconomic History     Marital status:      Spouse name: Nikki     Number of children: 3     Years of education: None     Highest education level: None   Occupational History     Employer: SEL[employer]   Tobacco Use     Smoking status: Former Smoker     Years: 50.00     Types: Cigarettes     Quit date: 2012     Years since quittin.2     Smokeless tobacco: Never Used     Tobacco comment: Quit in July   Substance and Sexual Activity     Alcohol use: No     Alcohol/week: 0.0 standard drinks     Drug use: No     Sexual activity: Never   Other Topics Concern     Parent/sibling w/ CABG, MI or angioplasty before 65F 55M? No   Social History Narrative     None     Social Determinants of Health     Financial Resource Strain:      Difficulty of Paying Living Expenses:    Food Insecurity:      Worried About Running Out of Food in the Last Year:      Ran Out of Food in the Last Year:    Transportation Needs:      Lack of Transportation (Medical):      Lack of Transportation (Non-Medical):    Physical Activity:      Days of Exercise per Week:      Minutes of Exercise per Session:    Stress:      Feeling of Stress :    Social Connections:      Frequency of Communication with Friends and Family:      Frequency of Social Gatherings with Friends and Family:      Attends Anabaptist Services:      Active Member of Clubs or Organizations:      Attends  Club or Organization Meetings:      Marital Status:    Intimate Partner Violence:      Fear of Current or Ex-Partner:      Emotionally Abused:      Physically Abused:      Sexually Abused:        REVIEW OF SYSTEMS  =================  C: NEGATIVE for fever, chills, change in weight  I: NEGATIVE for worrisome rashes, moles or lesions  E/M: NEGATIVE for ear, mouth and throat problems  R: NEGATIVE for significant cough or SHORTNESS OF BREATH  CV:  NEGATIVE for chest pain, palpitations or peripheral edema  GI: NEGATIVE for nausea, abdominal pain, heartburn, or change in bowel habits  NEURO: NEGATIVE numbness/weakness  : see HPI  PSYCH: NEGATIVE depression/anxiety  LYmph: no new enlarged lymph nodes  Ortho: no new trauma/movements    Physical Exam:  /72 (BP Location: Right arm, Patient Position: Sitting, Cuff Size: Adult Regular)   Wt 79.4 kg (175 lb)   SpO2 96%   BMI 26.61 kg/m     Patient is pleasant, in no acute distress, good general condition.  Lung: no evidence of respiratory distress    Abdomen: Soft, nondistended, non tender. No masses. No rebound or guarding.   Exam: No CVA tenderness.  Hernandez catheter in place.  Skin: Warm and dry.  No redness.  Psych: normal mood and affect  Neuro: alert and oriented  Musculaskeletal: moving all extremities      Trial void performed today.  Water was placed into the into the bladder and patient was unable to void at all.    Cystoscopy performed today.  Patient is draped and prepped.  Flexible cystoscopy placed under direct vision.      The anterior urethra is normal   The prostatic urethra showed bilateral lobe enlargement.     The length is 3cm,  the coaptation is 3 cm.  Slightly inflamed looking   in the bladder there is trabeculation grade 2.  Multiple small bladder stones seen.    Assessment/Plan:  (N40.1,  R33.8) Benign prostatic hyperplasia with urinary retention  (primary encounter diagnosis)  Comment: Unable to void  Plan: New Hernandez catheter placed.              Discussed TURP.  Pros and cons discussed.  Information about surgery provided.  We will schedule for this in the near future.  Risks of bleeding infection retrograde ejaculation incontinence ED.    (N39.0) Urinary tract infection without hematuria, site unspecified  Comment:    Plan: cephALEXin (KEFLEX) 500 MG capsule        For 10 more days.    (R97.20) Elevated prostate specific antigen (PSA)  Comment: Discussed prostate biopsy which patient declined.  Plan: As needed.    Over 1 hour spent with patient today.    (N21.0) Bladder stones  Comment:    Plan: Small stones seen within the bladder will remove at time of TURP.

## 2021-10-06 NOTE — TELEPHONE ENCOUNTER
Type of surgery: TRANSURETHRAL RESECTION (TUR) PROSTATE, USING KTP LASER (N/A)     Location of surgery: [facility] OR  Date and time of surgery: 11/10  Surgeon: Pat  Pre-Op Appt Date: 11/1  Post-Op Appt Date: msg to urology to schedule   Packet sent out: Yes  Pre-cert/Authorization completed:  Not Applicable  Date: na

## 2021-10-07 DIAGNOSIS — Z11.59 ENCOUNTER FOR SCREENING FOR OTHER VIRAL DISEASES: ICD-10-CM

## 2021-10-18 ENCOUNTER — TELEPHONE (OUTPATIENT)
Dept: UROLOGY | Facility: CLINIC | Age: 76
End: 2021-10-18

## 2021-10-18 ENCOUNTER — TELEPHONE (OUTPATIENT)
Dept: FAMILY MEDICINE | Facility: CLINIC | Age: 76
End: 2021-10-18

## 2021-10-18 ENCOUNTER — NURSE TRIAGE (OUTPATIENT)
Dept: FAMILY MEDICINE | Facility: CLINIC | Age: 76
End: 2021-10-18

## 2021-10-18 DIAGNOSIS — R97.20 ELEVATED PROSTATE SPECIFIC ANTIGEN (PSA): Primary | ICD-10-CM

## 2021-10-18 DIAGNOSIS — R79.89 ABNORMAL CBC: Primary | ICD-10-CM

## 2021-10-18 NOTE — TELEPHONE ENCOUNTER
----- Message from Cecilio Trejo MD sent at 10/12/2021  2:07 PM CDT -----  Blood work from when we carie him last showed some very off numbers.  Count was very high and this may not be related to his infection.  He needs to come in this week for a repeat CBC and differential please put the order in and I will sign it and let them know.

## 2021-10-18 NOTE — TELEPHONE ENCOUNTER
Reason for Call:  Other appointment    Detailed comments: Wife Brittany calling because Khang is having a lot of pain with his catheter and it looks reddish at the site. Wondering if there is something that can be done? Please call them     Phone Number Patient can be reached at: Home number on file [phone] (home)    Best Time: any    Can we leave a detailed message on this number? YES     Call taken on 10/18/2021 at 10:51 AM by Kristina Villalpando

## 2021-10-18 NOTE — TELEPHONE ENCOUNTER
"Patient stated he is having some pain and burning where the catheter is coming out of the opening of his penis.  He states he is having orange colored urine during the day while up and moving around.  He stated he had the catheter placed in the ER this past week and has been having issues with the catheter since he has been home.      Due to symptoms and concerns, will forward to PCP for orders on catheter assessment and/or change for tomorrow or Wednesday October 19 or 20th, 2021.      Will forward to MD Martine for orders on further testing requested by patient.  Patient stated he would like to know what his PSA blood level is, along with biopsies and further testing on any spread of possible cancer with his inflamed prostate.  Patient stated he would like to be more informed on tests and results prior to decisions on surgery.    Reason for Disposition    Tea-colored or slightly red urine lasts > 24 hours that is not cleared by increasing fluid intake, and no recent prostate or bladder surgery    Additional Information    Negative: Shock suspected (e.g., cold/pale/clammy skin, too weak to stand, low BP, rapid pulse)    Negative: Sounds like a life-threatening emergency to the triager    Negative: Catheter was accidentally pulled-out and bright red continuous bleeding    Negative: SEVERE abdominal pain    Negative: Fever > 100.4 F (38.0 C)    Negative: Drinking very little and dehydration suspected (e.g., no urine > 12 hours, very dry mouth, very lightheaded)    Negative: Patient sounds very sick or weak to the triager    Negative: Catheter was accidentally pulled-out    Negative: Bleeding around catheter (e.g., from penis or female urethra)    Negative: Lower abdominal pain or distention    Answer Assessment - Initial Assessment Questions  1. SYMPTOMS: \"What symptoms are you concerned about?\"      Catheter causing pain at end of penis    2. ONSET:  \"When did the symptoms start?\"      Approximately 1 week " "ago    3. FEVER: \"Is there a fever?\" If so, ask: \"What is the temperature, how was it measured, and when did it start?\"      No    4. ABDOMINAL PAIN: \"Is there any abdominal pain?\" (e.g., Scale 1-10; or mild, moderate, severe)      No    5. URINE COLOR: \"What color is the urine?\"  \"Is there blood present in the urine?\" (e.g., clear, yellow, cloudy, tea-colored, blood streaks, bright red)      Night light yellow, during day orange    6. ONSET: \"When was the catheter inserted?\"      Approximately Oct 6th 2021    7. OTHER SYMPTOMS: \"Do you have any other symptoms?\" (e.g., back pain, bad urine odor)       Burns all the time, feels like its leaking out    8. PREGNANCY: \"Is there any chance you are pregnant?\" \"When was your last menstrual period?\"      NA    Protocols used: URINARY CATHETER SYMPTOMS AND [id number]  Jessica Barlow RN      "

## 2021-10-18 NOTE — TELEPHONE ENCOUNTER
Attempted to call patient back and phone just rings, unable to leave a VM.     Mayi MCCURDY RN Urology 10/18/2021 1:36 PM

## 2021-10-19 NOTE — TELEPHONE ENCOUNTER
"Called and spoke with Brittany, wife. She was frustrated that the patient is having such \"a difficult time with his catheter.\" Per her report, catheter is functioning appropriately. No s/s of infection noted per Brittany. She is requsting a PSA level to be ordered so that tomorrow when they are in the lab, they can get this drawn at that time.    Brittany did not know anything about needing/wanting a biopsy done at this time.     She also states he has difficulty with the catheter in regards to pain and irritation at the head of the penis. Writer explained this is common and Dr. Stewart will not change out the catheter. He will come to clinic tomorrow for a leg strap to help with securement of device. Update sent to  urology clinic RN and MA for awareness of this.    Mayi MCCURDY RN Urology 10/19/2021 2:23 PM    "

## 2021-10-19 NOTE — TELEPHONE ENCOUNTER
Per Dr. Stewart, pt does not need to be seen in clinic.     PSA ordered (which will be drawn tomorrow) and pt's wife does not know anything about a biopsy.    Mayi MCCURDY RN Urology 10/19/2021 2:25 PM

## 2021-10-20 ENCOUNTER — LAB (OUTPATIENT)
Dept: LAB | Facility: CLINIC | Age: 76
End: 2021-10-20
Payer: COMMERCIAL

## 2021-10-20 ENCOUNTER — TELEPHONE (OUTPATIENT)
Dept: UROLOGY | Facility: CLINIC | Age: 76
End: 2021-10-20

## 2021-10-20 ENCOUNTER — OFFICE VISIT (OUTPATIENT)
Dept: UROLOGY | Facility: CLINIC | Age: 76
End: 2021-10-20
Payer: COMMERCIAL

## 2021-10-20 DIAGNOSIS — R33.8 BENIGN PROSTATIC HYPERPLASIA WITH URINARY RETENTION: Primary | ICD-10-CM

## 2021-10-20 DIAGNOSIS — N40.1 BENIGN PROSTATIC HYPERPLASIA WITH URINARY RETENTION: Primary | ICD-10-CM

## 2021-10-20 DIAGNOSIS — R79.89 ABNORMAL CBC: ICD-10-CM

## 2021-10-20 DIAGNOSIS — R97.20 ELEVATED PROSTATE SPECIFIC ANTIGEN (PSA): ICD-10-CM

## 2021-10-20 DIAGNOSIS — R97.20 ELEVATED PROSTATE SPECIFIC ANTIGEN (PSA): Primary | ICD-10-CM

## 2021-10-20 LAB
BASOPHILS # BLD AUTO: 0.1 10E3/UL (ref 0–0.2)
BASOPHILS NFR BLD AUTO: 1 %
EOSINOPHIL # BLD AUTO: 0.1 10E3/UL (ref 0–0.7)
EOSINOPHIL NFR BLD AUTO: 1 %
ERYTHROCYTE [DISTWIDTH] IN BLOOD BY AUTOMATED COUNT: 19.6 % (ref 10–15)
HCT VFR BLD AUTO: 39.3 % (ref 40–53)
HGB BLD-MCNC: 11.9 G/DL (ref 13.3–17.7)
IMM GRANULOCYTES # BLD: 0.1 10E3/UL
IMM GRANULOCYTES NFR BLD: 1 %
LYMPHOCYTES # BLD AUTO: 1 10E3/UL (ref 0.8–5.3)
LYMPHOCYTES NFR BLD AUTO: 11 %
MCH RBC QN AUTO: 18.9 PG (ref 26.5–33)
MCHC RBC AUTO-ENTMCNC: 30.3 G/DL (ref 31.5–36.5)
MCV RBC AUTO: 62 FL (ref 78–100)
MONOCYTES # BLD AUTO: 0.5 10E3/UL (ref 0–1.3)
MONOCYTES NFR BLD AUTO: 6 %
NEUTROPHILS # BLD AUTO: 7.6 10E3/UL (ref 1.6–8.3)
NEUTROPHILS NFR BLD AUTO: 80 %
NRBC # BLD AUTO: 0 10E3/UL
NRBC BLD AUTO-RTO: 0 /100
PLATELET # BLD AUTO: 256 10E3/UL (ref 150–450)
PSA SERPL-MCNC: 75.8 UG/L (ref 0–4)
RBC # BLD AUTO: 6.31 10E6/UL (ref 4.4–5.9)
WBC # BLD AUTO: 9.3 10E3/UL (ref 4–11)

## 2021-10-20 PROCEDURE — 85025 COMPLETE CBC W/AUTO DIFF WBC: CPT

## 2021-10-20 PROCEDURE — 36415 COLL VENOUS BLD VENIPUNCTURE: CPT

## 2021-10-20 PROCEDURE — 84153 ASSAY OF PSA TOTAL: CPT

## 2021-10-20 NOTE — PROGRESS NOTES
Patient presents with catheter questions. Patient comes to clinic carrying his attached night bag in his hand. RN changed night bag to leg bag and educated patient on catheter care and how to switch. Patient verbalized understanding. Patient discharged from clinic with no further questions. Shelley Solorzano RN, BSN [facility]

## 2021-10-20 NOTE — TELEPHONE ENCOUNTER
----- Message from Guanaco Stewart MD sent at 10/20/2021  1:26 PM CDT -----  Please CC patient of results.  Schedule patient for bone scan/CT scan next

## 2021-10-20 NOTE — TELEPHONE ENCOUNTER
Left message for patient to call [phone] to advise of PSA result and that patient needs CT and bone scan. Orders entered.  Patient to call back to scheduled follow up after tests set up.  [place] patient.  Arabella Saldana, [facility]

## 2021-10-21 NOTE — TELEPHONE ENCOUNTER
Wife Brittany called back and wanted to get the results of the PSA as well as clarify the CT abd/pelvis and Bone scan.    Mayi MCCURDY RN Urology 10/21/2021 11:17 AM

## 2021-10-21 NOTE — TELEPHONE ENCOUNTER
Patient advised of result and given contact info to schedule CT and bone scan. Patient is asking if his wife can call back and we can explain to her. Will wait for her call.  Arabella Saldana, CMA

## 2021-10-27 ENCOUNTER — TELEPHONE (OUTPATIENT)
Dept: UROLOGY | Facility: CLINIC | Age: 76
End: 2021-10-27

## 2021-10-27 ENCOUNTER — TELEPHONE (OUTPATIENT)
Facility: CLINIC | Age: 76
End: 2021-10-27

## 2021-10-27 ENCOUNTER — OFFICE VISIT (OUTPATIENT)
Dept: PULMONOLOGY | Facility: CLINIC | Age: 76
End: 2021-10-27
Payer: COMMERCIAL

## 2021-10-27 VITALS
BODY MASS INDEX: 26.78 KG/M2 | HEIGHT: 68 IN | OXYGEN SATURATION: 95 % | WEIGHT: 176.7 LBS | HEART RATE: 86 BPM | TEMPERATURE: 97.8 F

## 2021-10-27 DIAGNOSIS — J44.9 MODERATE COPD (CHRONIC OBSTRUCTIVE PULMONARY DISEASE) (H): Primary | ICD-10-CM

## 2021-10-27 LAB
FEF 25/75: NORMAL
FEV-1: NORMAL
FEV1/FVC: NORMAL
FVC: NORMAL

## 2021-10-27 PROCEDURE — 94010 BREATHING CAPACITY TEST: CPT

## 2021-10-27 PROCEDURE — 99205 OFFICE O/P NEW HI 60 MIN: CPT | Mod: 25

## 2021-10-27 ASSESSMENT — MIFFLIN-ST. JEOR: SCORE: 1498.07

## 2021-10-27 NOTE — TELEPHONE ENCOUNTER
Reason for Call:  Other call back    Detailed comments: Patient had an appointment today and nurse wanted to know the name of inhaler Qvar twice a day    Phone Number Patient can be reached at: Home number on file [phone] (home) or Cell number on file:    Telephone Information:   Mobile NONE       Best Time: any    Can we leave a detailed message on this number? YES    Call taken on 10/27/2021 at 10:38 AM by Kerline Raza

## 2021-10-27 NOTE — TELEPHONE ENCOUNTER
"Called Pt. Back to get information on the inhaler that he \"does not like to use\"     Pt. States that it is the Qvar Redihaler 80mcg.    Routing information to .    Angeli Andrade on 10/27/2021 at 10:48 AM      "

## 2021-10-27 NOTE — TELEPHONE ENCOUNTER
Reason for Call:  Other call back    Detailed comments: patient calling with questions regarding procedure with Stewart    Phone Number Patient can be reached at: Home number on file [phone] (home)    Best Time: any    Can we leave a detailed message on this number? YES    Call taken on 10/27/2021 at 9:46 AM by Samantha Espinosa

## 2021-10-28 NOTE — TELEPHONE ENCOUNTER
Called patient back and he states that there is swelling around the head of the penis, slimy discharge ad occassional blood in his catheter bag. After discussion with other urology staff, reiterated to patient that these things are WDL.    Pt is having second thoughts about surgery and if this procedure is the best for him. Virtual appointment made at the request of the patient on 11/4.    Mayi MCCURDY RN Urology 10/28/2021 11:05 AM

## 2021-10-28 NOTE — TELEPHONE ENCOUNTER
Patient question about surgery answered.  Patient has questions about catheter issue.  Will have RN call and address.  Arabella Saldana CMA

## 2021-11-01 ENCOUNTER — OFFICE VISIT (OUTPATIENT)
Dept: FAMILY MEDICINE | Facility: CLINIC | Age: 76
End: 2021-11-01
Payer: COMMERCIAL

## 2021-11-01 VITALS
SYSTOLIC BLOOD PRESSURE: 126 MMHG | WEIGHT: 176.8 LBS | TEMPERATURE: 97.4 F | OXYGEN SATURATION: 96 % | HEART RATE: 82 BPM | BODY MASS INDEX: 27.28 KG/M2 | DIASTOLIC BLOOD PRESSURE: 62 MMHG | RESPIRATION RATE: 18 BRPM

## 2021-11-01 DIAGNOSIS — R21 RASH: ICD-10-CM

## 2021-11-01 DIAGNOSIS — R97.20 BPH WITH ELEVATED PSA: ICD-10-CM

## 2021-11-01 DIAGNOSIS — N40.0 BPH WITH ELEVATED PSA: ICD-10-CM

## 2021-11-01 DIAGNOSIS — Z01.818 PREOP GENERAL PHYSICAL EXAM: Primary | ICD-10-CM

## 2021-11-01 PROCEDURE — 99214 OFFICE O/P EST MOD 30 MIN: CPT | Performed by: FAMILY MEDICINE

## 2021-11-01 RX ORDER — TRIAMCINOLONE ACETONIDE 1 MG/G
CREAM TOPICAL 2 TIMES DAILY
Qty: 15 G | Refills: 0 | Status: SHIPPED | OUTPATIENT
Start: 2021-11-01 | End: 2023-08-13

## 2021-11-01 ASSESSMENT — PAIN SCALES - GENERAL: PAINLEVEL: NO PAIN (0)

## 2021-11-01 NOTE — PATIENT INSTRUCTIONS

## 2021-11-01 NOTE — PROGRESS NOTES
[address] [address]  Phone: [phone]  Fax: [phone]  Primary Provider: Wang Trejo  Pre-op Performing Provider: WANG TREJO      PREOPERATIVE EVALUATION:  Today's date: 11/1/2021    Khang Bailon is a 76 year old male who presents for a preoperative evaluation.    Surgical Information:  Surgery/Procedure: TRANSURETHRAL RESECTION (TUR) PROSTATE  Surgery Location: [facility]   Surgeon: Guanaco Brothers MD  Surgery Date: 11/10/21  Time of Surgery: 1:15  Where patient plans to recover: At home with family  Fax number for surgical facility: Note does not need to be faxed, will be available electronically in Epic.    Type of Anesthesia Anticipated: General    Assessment & Plan     The proposed surgical procedure is considered INTERMEDIATE risk.    Preop general physical exam      BPH with elevated PSA      Rash  This is on the urethral meatus irritation from the catheter.  - triamcinolone (KENALOG) 0.1 % external cream; Apply topically 2 times daily                RECOMMENDATION:  APPROVAL GIVEN to proceed with proposed procedure, without further diagnostic evaluation.                      Subjective     HPI related to upcoming procedure: Prostatic hypertrophy and urinary obstruction.  He has an indwelling catheter.  His PSA is 75.      Preop Questions 11/1/2021   1. Have you ever had a heart attack or stroke? UNKNOWN -    2. Have you ever had surgery on your heart or blood vessels, such as a stent placement, a coronary artery bypass, or surgery on an artery in your head, neck, heart, or legs? No   3. Do you have chest pain with activity? No   4. Do you have a history of  heart failure? No   5. Do you currently have a cold, bronchitis or symptoms of other infection? No   6. Do you have a cough, shortness of breath, or wheezing? YES -    7. Do you or anyone in your family have previous history of blood clots? No   8.  Do you or does anyone in your family have a serious bleeding problem such as prolonged bleeding following surgeries or cuts? No   9. Have you ever had problems with anemia or been told to take iron pills? No   10. Have you had any abnormal blood loss such as black, tarry or bloody stools? No   11. Have you ever had a blood transfusion? No   12. Are you willing to have a blood transfusion if it is medically needed before, during, or after your surgery? Yes   13. Have you or any of your relatives ever had problems with anesthesia? No   14. Do you have sleep apnea, excessive snoring or daytime drowsiness? No   15. Do you have any artifical heart valves or other implanted medical devices like a pacemaker, defibrillator, or continuous glucose monitor? YES -pacemaker   15b. Name of the clinic that manages your device:  [facility]   16. Do you have artificial joints? No   17. Are you allergic to latex? No     Health Care Directive:  Patient has a Health Care Directive on file      Preoperative Review of :   reviewed - no record of controlled substances prescribed.          Review of Systems  Constitutional, neuro, ENT, endocrine, pulmonary, cardiac, gastrointestinal, genitourinary, musculoskeletal, integument and psychiatric systems are negative, except as otherwise noted.    Patient Active Problem List    Diagnosis Date Noted     Fatigue, unspecified type 11/29/2019     Priority: Medium     Memory loss 11/29/2019     Priority: Medium     Moderate COPD (chronic obstructive pulmonary disease) (H) 11/16/2017     Priority: Medium     Benign neoplasm of colon, unspecified part of colon 07/26/2016     Priority: Medium     Sigmoid diverticulitis 07/12/2016     Priority: Medium     History of CVA (cerebrovascular accident) - old left internal capsule lacunar infarct on CT 7/2016 07/12/2016     Priority: Medium     Multilevel degenerative disc disease 09/21/2015     Priority: Medium     Elevated prostate specific antigen  (PSA) 09/11/2015     Priority: Medium     Health Care Home 01/24/2013     Priority: Medium     Racquel Ortiz RN-PHN  [facility]   [phone]  DX V65.8 REPLACED WITH [facility] (04/08/2013)       Elevated PSA 08/27/2012     Priority: Medium     Loss of weight 08/27/2012     Priority: Medium     Third degree heart block (H) - s/p pacemaker 08/10/2012     Priority: Medium     Hypertension goal BP (blood pressure) < 140/90 08/10/2012     Priority: Medium     Advanced directives, counseling/discussion 07/27/2012     Priority: Medium     Discussed advance care planning with patient; however, patient declined at this time. 7/27/2012          Thalassemia 07/27/2012     Priority: Medium     Diagnosis updated by automated process. Provider to review and confirm.       Hyperlipidemia LDL goal <130 07/27/2012     Priority: Medium     Painful respiration 03/31/2004     Priority: Medium     Personal history of tobacco use, presenting hazards to health 03/31/2004     Priority: Medium      Past Medical History:   Diagnosis Date     Alpha thalassemia (H)      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    pacemaker at [facility]     Diverticulitis      Injury, other and unspecified, elbow, forearm, and wrist 1970s    Broke right wrist     Past Surgical History:   Procedure Laterality Date     COLONOSCOPY       COLONOSCOPY  12/10/2012     IMPLANT PACEMAKER       Current Outpatient Medications   Medication Sig Dispense Refill     Ascorbic Acid (VITAMIN C PO) Take 1 capful by mouth daily (Isotonix)       B Complex Vitamins (B COMPLEX PO) Take 1 capful by mouth daily (Isotonix)       DIGESTIVE ENZYMES PO Take 1 capful by mouth daily (Isotonix - w/ probiotics)       Fluticasone-Umeclidin-Vilanterol (TRELEGY ELLIPTA) 100-62.5-25 MCG/INH oral inhaler Inhale 1 puff into the lungs daily 1 each 11     ipratropium - albuterol 0.5 mg/2.5 mg/3 mL (DUONEB) 0.5-2.5 (3) MG/3ML neb solution Take 1 vial (3 mLs) by nebulization every  6 hours as needed for shortness of breath / dyspnea or wheezing (Patient taking differently: Take 1 vial by nebulization 2 times daily as needed for shortness of breath / dyspnea or wheezing ) 360 mL 5     Multiple Vitamin (MULTI VITAMIN  MENS) TABS Take 1 capful by mouth daily (Isotonix)       order for DME Equipment being ordered: Nebulizer hose 1 Tube 11     Vitamin D-Vitamin K (VITAMIN K2-VITAMIN D3 PO) Take 1 capful by mouth daily (Isotonix)       tamsulosin (FLOMAX) 0.4 MG capsule TAKE ONE CAPSULE BY MOUTH ONCE DAILY (Patient not taking: Reported on 2021) 90 capsule 0       Allergies   Allergen Reactions     Flagyl [Metronidazole] GI Disturbance        Social History     Tobacco Use     Smoking status: Former Smoker     Years: 50.00     Types: Cigarettes     Quit date: 2012     Years since quittin.2     Smokeless tobacco: Never Used     Tobacco comment: Quit in July   Substance Use Topics     Alcohol use: No     Alcohol/week: 0.0 standard drinks     No family history on file.  History   Drug Use No         Objective     /62   Pulse 82   Temp 97.4  F (36.3  C) (Temporal)   Resp 18   Wt 80.2 kg (176 lb 12.8 oz)   SpO2 96%   BMI 27.28 kg/m      Physical Exam    GENERAL APPEARANCE: healthy, alert and no distress     EYES: EOMI,  PERRL     HENT: ear canals and TM's normal and nose and mouth without ulcers or lesions     NECK: no adenopathy, no asymmetry, masses, or scars and thyroid normal to palpation     RESP: decreased breath sounds throughout     CV: regular rates and rhythm, normal S1 S2, no S3 or S4 and no murmur, click or rub     ABDOMEN:  soft, nontender, no HSM or masses and bowel sounds normal     MS: extremities normal- no gross deformities noted, no evidence of inflammation in joints, FROM in all extremities.     SKIN: no suspicious lesions or rashes     NEURO: Normal strength and tone, sensory exam grossly normal, mentation intact and speech normal     PSYCH: mentation appears  normal. and affect normal/bright     LYMPHATICS: No cervical adenopathy    Recent Labs   Lab Test 10/20/21  1203 10/01/21  1139 09/30/21  1238 09/30/21  1238 06/27/21  1941   HGB 11.9* 13.5   < > 13.0* 12.3*    285   < > 225 182   NA  --   --   --  136 138   POTASSIUM  --   --   --  3.9 4.2   CR  --   --   --  0.93 0.99    < > = values in this interval not displayed.        Diagnostics:  No labs were ordered during this visit.    EKG  Will schedule future when comes in for Radiology visit Nv. 3rd    Revised Cardiac Risk Index (RCRI):  The patient has the following serious cardiovascular risks for perioperative complications:   - High risk surgery (>5% cardiac complication risk) = 1 point     RCRI Interpretation: 1 point: Class II (low risk - 0.9% complication rate)           Signed Electronically by: Cecilio Trejo MD  Copy of this evaluation report is provided to requesting physician.

## 2021-11-03 ENCOUNTER — HOSPITAL ENCOUNTER (OUTPATIENT)
Dept: NUCLEAR MEDICINE | Facility: CLINIC | Age: 76
Setting detail: NUCLEAR MEDICINE
End: 2021-11-03
Attending: UROLOGY
Payer: COMMERCIAL

## 2021-11-03 ENCOUNTER — TELEPHONE (OUTPATIENT)
Dept: FAMILY MEDICINE | Facility: CLINIC | Age: 76
End: 2021-11-03

## 2021-11-03 ENCOUNTER — HOSPITAL ENCOUNTER (OUTPATIENT)
Dept: CT IMAGING | Facility: CLINIC | Age: 76
End: 2021-11-03
Attending: UROLOGY
Payer: COMMERCIAL

## 2021-11-03 DIAGNOSIS — R97.20 ELEVATED PROSTATE SPECIFIC ANTIGEN (PSA): ICD-10-CM

## 2021-11-03 DIAGNOSIS — R79.89 ABNORMAL CBC: Primary | ICD-10-CM

## 2021-11-03 PROCEDURE — 74177 CT ABD & PELVIS W/CONTRAST: CPT

## 2021-11-03 PROCEDURE — 78306 BONE IMAGING WHOLE BODY: CPT

## 2021-11-03 PROCEDURE — 343N000001 HC RX 343: Performed by: UROLOGY

## 2021-11-03 PROCEDURE — 82565 ASSAY OF CREATININE: CPT

## 2021-11-03 PROCEDURE — A9561 TC99M OXIDRONATE: HCPCS | Performed by: UROLOGY

## 2021-11-03 PROCEDURE — 250N000009 HC RX 250: Performed by: UROLOGY

## 2021-11-03 PROCEDURE — 250N000011 HC RX IP 250 OP 636: Performed by: UROLOGY

## 2021-11-03 RX ORDER — IOPAMIDOL 755 MG/ML
500 INJECTION, SOLUTION INTRAVASCULAR ONCE
Status: COMPLETED | OUTPATIENT
Start: 2021-11-03 | End: 2021-11-03

## 2021-11-03 RX ADMIN — Medication 24.8 MILLICURIE: at 08:50

## 2021-11-03 RX ADMIN — IOPAMIDOL 86 ML: 755 INJECTION, SOLUTION INTRAVENOUS at 10:28

## 2021-11-03 RX ADMIN — SODIUM CHLORIDE 60 ML: 9 INJECTION, SOLUTION INTRAVENOUS at 10:29

## 2021-11-03 NOTE — TELEPHONE ENCOUNTER
Spoke with patient and informed of results below. Patient understood. Orders pended for future lab work. Please sign and close encounter.   Lyla Jacome MA

## 2021-11-03 NOTE — TELEPHONE ENCOUNTER
----- Message from Cecilio Trejo MD sent at 11/3/2021 12:29 PM CDT -----  Forgot to mention to him at his physical the other day that his white blood count is now back to normal from a test 2 weeks ago but his hemoglobin is down some at 11.9.  This should all be rechecked after his surgery.

## 2021-11-04 ENCOUNTER — TELEPHONE (OUTPATIENT)
Dept: UROLOGY | Facility: CLINIC | Age: 76
End: 2021-11-04

## 2021-11-04 NOTE — TELEPHONE ENCOUNTER
Pt cancelled his appointment today thinking it was IN PERSON and he stated he couldn't get there in time.    He was calling to clarify how the TURP procedure will be done. All questions answered.     Mayi MCCURDY RN Urology 11/4/2021 10:56 AM

## 2021-11-04 NOTE — TELEPHONE ENCOUNTER
Reason for Call:  Other call back    Detailed comments: Pt has concerns about upcoming surgery and would like to discuss with Stewart or someone from his care ott. Pt is confused why his appt today was cancelled and would appreciate a call back  Phone Number Patient can be reached at: Home number on file [phone] (home)    Best Time: any    Can we leave a detailed message on this number? YES    Call taken on 11/4/2021 at 9:24 AM by Jodie Valdes

## 2021-11-06 ENCOUNTER — LAB (OUTPATIENT)
Dept: LAB | Facility: CLINIC | Age: 76
End: 2021-11-06
Payer: COMMERCIAL

## 2021-11-06 DIAGNOSIS — Z11.59 ENCOUNTER FOR SCREENING FOR OTHER VIRAL DISEASES: ICD-10-CM

## 2021-11-06 PROCEDURE — U0003 INFECTIOUS AGENT DETECTION BY NUCLEIC ACID (DNA OR RNA); SEVERE ACUTE RESPIRATORY SYNDROME CORONAVIRUS 2 (SARS-COV-2) (CORONAVIRUS DISEASE [COVID-19]), AMPLIFIED PROBE TECHNIQUE, MAKING USE OF HIGH THROUGHPUT TECHNOLOGIES AS DESCRIBED BY CMS-2020-01-R: HCPCS

## 2021-11-06 PROCEDURE — U0005 INFEC AGEN DETEC AMPLI PROBE: HCPCS

## 2021-11-07 LAB — SARS-COV-2 RNA RESP QL NAA+PROBE: NEGATIVE

## 2021-11-09 ENCOUNTER — ANESTHESIA EVENT (OUTPATIENT)
Dept: SURGERY | Facility: CLINIC | Age: 76
End: 2021-11-09
Payer: COMMERCIAL

## 2021-11-09 ASSESSMENT — COPD QUESTIONNAIRES
COPD: 1
CAT_SEVERITY: MODERATE

## 2021-11-09 ASSESSMENT — LIFESTYLE VARIABLES: TOBACCO_USE: 1

## 2021-11-10 ENCOUNTER — ANESTHESIA (OUTPATIENT)
Dept: SURGERY | Facility: CLINIC | Age: 76
End: 2021-11-10
Payer: COMMERCIAL

## 2021-11-10 ENCOUNTER — HOSPITAL ENCOUNTER (OUTPATIENT)
Facility: CLINIC | Age: 76
Discharge: HOME | End: 2021-11-10
Attending: UROLOGY | Admitting: UROLOGY
Payer: COMMERCIAL

## 2021-11-10 VITALS
SYSTOLIC BLOOD PRESSURE: 140 MMHG | TEMPERATURE: 97.9 F | DIASTOLIC BLOOD PRESSURE: 80 MMHG | HEART RATE: 73 BPM | OXYGEN SATURATION: 95 % | RESPIRATION RATE: 16 BRPM

## 2021-11-10 DIAGNOSIS — R33.8 BENIGN PROSTATIC HYPERPLASIA WITH URINARY RETENTION: ICD-10-CM

## 2021-11-10 DIAGNOSIS — N40.1 BENIGN PROSTATIC HYPERPLASIA WITH URINARY RETENTION: ICD-10-CM

## 2021-11-10 LAB
CREAT BLD-MCNC: 0.9 MG/DL (ref 0.7–1.3)
GFR SERPL CREATININE-BSD FRML MDRD: >60 ML/MIN/1.73M2

## 2021-11-10 PROCEDURE — 710N000012 HC RECOVERY PHASE 2, PER MINUTE: Performed by: UROLOGY

## 2021-11-10 PROCEDURE — 999N000141 HC STATISTIC PRE-PROCEDURE NURSING ASSESSMENT: Performed by: UROLOGY

## 2021-11-10 PROCEDURE — 258N000003 HC RX IP 258 OP 636: Performed by: NURSE ANESTHETIST, CERTIFIED REGISTERED

## 2021-11-10 PROCEDURE — 250N000009 HC RX 250: Performed by: NURSE ANESTHETIST, CERTIFIED REGISTERED

## 2021-11-10 PROCEDURE — 250N000011 HC RX IP 250 OP 636: Performed by: UROLOGY

## 2021-11-10 PROCEDURE — 250N000013 HC RX MED GY IP 250 OP 250 PS 637: Performed by: UROLOGY

## 2021-11-10 PROCEDURE — 710N000010 HC RECOVERY PHASE 1, LEVEL 2, PER MIN: Performed by: UROLOGY

## 2021-11-10 PROCEDURE — 258N000003 HC RX IP 258 OP 636: Performed by: UROLOGY

## 2021-11-10 PROCEDURE — 250N000025 HC SEVOFLURANE, PER MIN: Performed by: UROLOGY

## 2021-11-10 PROCEDURE — 250N000011 HC RX IP 250 OP 636: Performed by: NURSE ANESTHETIST, CERTIFIED REGISTERED

## 2021-11-10 PROCEDURE — 250N000013 HC RX MED GY IP 250 OP 250 PS 637: Performed by: NURSE ANESTHETIST, CERTIFIED REGISTERED

## 2021-11-10 PROCEDURE — 370N000017 HC ANESTHESIA TECHNICAL FEE, PER MIN: Performed by: UROLOGY

## 2021-11-10 PROCEDURE — 360N000077 HC SURGERY LEVEL 4, PER MIN: Performed by: UROLOGY

## 2021-11-10 PROCEDURE — 52648 LASER SURGERY OF PROSTATE: CPT | Performed by: UROLOGY

## 2021-11-10 PROCEDURE — 272N000001 HC OR GENERAL SUPPLY STERILE: Performed by: UROLOGY

## 2021-11-10 RX ORDER — FENTANYL CITRATE-0.9 % NACL/PF 10 MCG/ML
PLASTIC BAG, INJECTION (ML) INTRAVENOUS PRN
Status: DISCONTINUED | OUTPATIENT
Start: 2021-11-10 | End: 2021-11-10

## 2021-11-10 RX ORDER — CEFAZOLIN SODIUM 2 G/100ML
2 INJECTION, SOLUTION INTRAVENOUS SEE ADMIN INSTRUCTIONS
Status: DISCONTINUED | OUTPATIENT
Start: 2021-11-10 | End: 2021-11-10 | Stop reason: HOSPADM

## 2021-11-10 RX ORDER — HYDROCODONE BITARTRATE AND ACETAMINOPHEN 5; 325 MG/1; MG/1
1-2 TABLET ORAL ONCE
Status: COMPLETED | OUTPATIENT
Start: 2021-11-10 | End: 2021-11-10

## 2021-11-10 RX ORDER — PROPOFOL 10 MG/ML
INJECTION, EMULSION INTRAVENOUS PRN
Status: DISCONTINUED | OUTPATIENT
Start: 2021-11-10 | End: 2021-11-10

## 2021-11-10 RX ORDER — ALBUTEROL SULFATE 90 UG/1
AEROSOL, METERED RESPIRATORY (INHALATION) PRN
Status: DISCONTINUED | OUTPATIENT
Start: 2021-11-10 | End: 2021-11-10

## 2021-11-10 RX ORDER — ONDANSETRON 2 MG/ML
INJECTION INTRAMUSCULAR; INTRAVENOUS PRN
Status: DISCONTINUED | OUTPATIENT
Start: 2021-11-10 | End: 2021-11-10

## 2021-11-10 RX ORDER — SODIUM CHLORIDE, SODIUM LACTATE, POTASSIUM CHLORIDE, CALCIUM CHLORIDE 600; 310; 30; 20 MG/100ML; MG/100ML; MG/100ML; MG/100ML
INJECTION, SOLUTION INTRAVENOUS CONTINUOUS
Status: DISCONTINUED | OUTPATIENT
Start: 2021-11-10 | End: 2021-11-10 | Stop reason: HOSPADM

## 2021-11-10 RX ORDER — FENTANYL CITRATE-0.9 % NACL/PF 10 MCG/ML
PLASTIC BAG, INJECTION (ML) INTRAVENOUS CONTINUOUS PRN
Status: DISCONTINUED | OUTPATIENT
Start: 2021-11-10 | End: 2021-11-10

## 2021-11-10 RX ORDER — DEXAMETHASONE SODIUM PHOSPHATE 10 MG/ML
INJECTION, SOLUTION INTRAMUSCULAR; INTRAVENOUS PRN
Status: DISCONTINUED | OUTPATIENT
Start: 2021-11-10 | End: 2021-11-10

## 2021-11-10 RX ORDER — LIDOCAINE HYDROCHLORIDE 20 MG/ML
INJECTION, SOLUTION INFILTRATION; PERINEURAL PRN
Status: DISCONTINUED | OUTPATIENT
Start: 2021-11-10 | End: 2021-11-10

## 2021-11-10 RX ORDER — HYDROMORPHONE HYDROCHLORIDE 1 MG/ML
0.5 INJECTION, SOLUTION INTRAMUSCULAR; INTRAVENOUS; SUBCUTANEOUS EVERY 5 MIN PRN
Status: DISCONTINUED | OUTPATIENT
Start: 2021-11-10 | End: 2021-11-10 | Stop reason: HOSPADM

## 2021-11-10 RX ORDER — ONDANSETRON 4 MG/1
4 TABLET, ORALLY DISINTEGRATING ORAL EVERY 30 MIN PRN
Status: DISCONTINUED | OUTPATIENT
Start: 2021-11-10 | End: 2021-11-10 | Stop reason: HOSPADM

## 2021-11-10 RX ORDER — FENTANYL CITRATE 50 UG/ML
INJECTION, SOLUTION INTRAMUSCULAR; INTRAVENOUS PRN
Status: DISCONTINUED | OUTPATIENT
Start: 2021-11-10 | End: 2021-11-10

## 2021-11-10 RX ORDER — SODIUM CHLORIDE, SODIUM LACTATE, POTASSIUM CHLORIDE, CALCIUM CHLORIDE 600; 310; 30; 20 MG/100ML; MG/100ML; MG/100ML; MG/100ML
INJECTION, SOLUTION INTRAVENOUS CONTINUOUS PRN
Status: DISCONTINUED | OUTPATIENT
Start: 2021-11-10 | End: 2021-11-10

## 2021-11-10 RX ORDER — HYDROCODONE BITARTRATE AND ACETAMINOPHEN 5; 325 MG/1; MG/1
1-2 TABLET ORAL EVERY 4 HOURS PRN
Qty: 15 TABLET | Refills: 0 | Status: SHIPPED | OUTPATIENT
Start: 2021-11-10 | End: 2023-08-13

## 2021-11-10 RX ORDER — OXYCODONE HYDROCHLORIDE 5 MG/1
5 TABLET ORAL EVERY 4 HOURS PRN
Status: DISCONTINUED | OUTPATIENT
Start: 2021-11-10 | End: 2021-11-10 | Stop reason: HOSPADM

## 2021-11-10 RX ORDER — ONDANSETRON 2 MG/ML
4 INJECTION INTRAMUSCULAR; INTRAVENOUS EVERY 30 MIN PRN
Status: DISCONTINUED | OUTPATIENT
Start: 2021-11-10 | End: 2021-11-10 | Stop reason: HOSPADM

## 2021-11-10 RX ORDER — FENTANYL CITRATE 50 UG/ML
25 INJECTION, SOLUTION INTRAMUSCULAR; INTRAVENOUS
Status: DISCONTINUED | OUTPATIENT
Start: 2021-11-10 | End: 2021-11-10 | Stop reason: HOSPADM

## 2021-11-10 RX ORDER — FENTANYL CITRATE 50 UG/ML
50 INJECTION, SOLUTION INTRAMUSCULAR; INTRAVENOUS EVERY 5 MIN PRN
Status: DISCONTINUED | OUTPATIENT
Start: 2021-11-10 | End: 2021-11-10 | Stop reason: HOSPADM

## 2021-11-10 RX ORDER — CEFAZOLIN SODIUM 2 G/100ML
2 INJECTION, SOLUTION INTRAVENOUS
Status: COMPLETED | OUTPATIENT
Start: 2021-11-10 | End: 2021-11-10

## 2021-11-10 RX ORDER — CIPROFLOXACIN 500 MG/1
500 TABLET, FILM COATED ORAL 2 TIMES DAILY
Qty: 14 TABLET | Refills: 0 | Status: SHIPPED | OUTPATIENT
Start: 2021-11-10 | End: 2021-11-17

## 2021-11-10 RX ADMIN — ONDANSETRON 4 MG: 2 INJECTION INTRAMUSCULAR; INTRAVENOUS at 13:52

## 2021-11-10 RX ADMIN — CEFAZOLIN SODIUM 2 G: 2 INJECTION, SOLUTION INTRAVENOUS at 12:48

## 2021-11-10 RX ADMIN — LIDOCAINE HYDROCHLORIDE 1 ML: 10 INJECTION, SOLUTION EPIDURAL; INFILTRATION; INTRACAUDAL; PERINEURAL at 12:08

## 2021-11-10 RX ADMIN — Medication 100 MCG: at 13:21

## 2021-11-10 RX ADMIN — Medication 20 MCG/MIN: at 13:38

## 2021-11-10 RX ADMIN — LIDOCAINE HYDROCHLORIDE 80 MG: 20 INJECTION, SOLUTION INFILTRATION; PERINEURAL at 13:08

## 2021-11-10 RX ADMIN — ALBUTEROL SULFATE 8 PUFF: 90 AEROSOL, METERED RESPIRATORY (INHALATION) at 14:03

## 2021-11-10 RX ADMIN — Medication 100 MCG: at 13:23

## 2021-11-10 RX ADMIN — FENTANYL CITRATE 25 MCG: 50 INJECTION, SOLUTION INTRAMUSCULAR; INTRAVENOUS at 15:19

## 2021-11-10 RX ADMIN — SODIUM CHLORIDE, POTASSIUM CHLORIDE, SODIUM LACTATE AND CALCIUM CHLORIDE: 600; 310; 30; 20 INJECTION, SOLUTION INTRAVENOUS at 12:07

## 2021-11-10 RX ADMIN — GENTAMICIN SULFATE 80 MG: 40 INJECTION, SOLUTION INTRAMUSCULAR; INTRAVENOUS at 13:43

## 2021-11-10 RX ADMIN — ROCURONIUM BROMIDE 50 MG: 50 INJECTION, SOLUTION INTRAVENOUS at 13:08

## 2021-11-10 RX ADMIN — HYDROCODONE BITARTRATE AND ACETAMINOPHEN 1 TABLET: 5; 325 TABLET ORAL at 16:25

## 2021-11-10 RX ADMIN — PROPOFOL 160 MG: 10 INJECTION, EMULSION INTRAVENOUS at 13:08

## 2021-11-10 RX ADMIN — SUGAMMADEX 200 MG: 100 INJECTION, SOLUTION INTRAVENOUS at 14:03

## 2021-11-10 RX ADMIN — Medication 150 MCG: at 13:32

## 2021-11-10 RX ADMIN — FENTANYL CITRATE 50 MCG: 50 INJECTION, SOLUTION INTRAMUSCULAR; INTRAVENOUS at 13:17

## 2021-11-10 RX ADMIN — OXYCODONE HYDROCHLORIDE 5 MG: 5 TABLET ORAL at 14:54

## 2021-11-10 RX ADMIN — FENTANYL CITRATE 50 MCG: 50 INJECTION, SOLUTION INTRAMUSCULAR; INTRAVENOUS at 13:08

## 2021-11-10 RX ADMIN — SODIUM CHLORIDE, POTASSIUM CHLORIDE, SODIUM LACTATE AND CALCIUM CHLORIDE: 600; 310; 30; 20 INJECTION, SOLUTION INTRAVENOUS at 12:59

## 2021-11-10 RX ADMIN — DEXAMETHASONE SODIUM PHOSPHATE 4 MG: 10 INJECTION, SOLUTION INTRAMUSCULAR; INTRAVENOUS at 13:19

## 2021-11-10 NOTE — DISCHARGE INSTRUCTIONS
[facility]   Adult Discharge Orders & Instructions     For 24 hours after surgery    1. Get plenty of rest.  A responsible adult must stay with you for at least 24 hours after you leave the hospital.     2. Do not drive or use heavy equipment.  If you have weakness or tingling, don't drive or use heavy equipment until this feeling goes away.    3. Do not drink alcohol.    4. Avoid strenuous or risky activities.  Ask for help when climbing stairs.     5. You may feel lightheaded.  If so, sit for a few minutes before standing.  Have someone help you get up.     6. You may have a slight fever. Call the doctor if your fever is over 100 F (37.7 C) (taken under the tongue) or lasts longer than 24 hours.    7. You may have a dry mouth, a sore throat, muscle aches or trouble sleeping.  These should go away after 24 hours.    8. Do not make important or legal decisions.    Based on the surgery/procedure that you had today, we do not expect that you will have any problems.  However, we want you to know what to do if you have pain, nausea, bleeding, or infection:    To control pain:  Take medicines your physician has prescribed or or over-the counter medicine he or she advises.  Ice packs and periods of rest are often helpful.  For surgery on an arm or leg, raise it on a pillow to ease swelling.  If your pain is not managed with the above methods, contact your physician.    Copyright [employer], Licensed under [employer]    To control nausea:  Take anti-nausea medicine approved by your physician.  Drink clear liquids such as apple juice, ginger ale, broth or 7-Up. Be sure to drink enough fluids.  Move to a regular diet as you feel able.  Rest may also help.        Copyright [employer], Licensed under [employer]    Infection: Please contact your physician if you have any of the following signs:  redness, swelling, heat, increasing pain or foul-smelling drainage at your surgery  site, fever or chills.    Call your doctor for any of the followin.  It has been over 8 to 10 hours since surgery and you are still not able to urinate (pass water).    2.  Headache for over 24 hours.        Nurse advice line: [phone] - 24 hours

## 2021-11-10 NOTE — ANESTHESIA POSTPROCEDURE EVALUATION
Patient: Khang Bailon    Procedure: Procedure(s):  TRANSURETHRAL RESECTION (TUR) PROSTATE, USING KTP LASER  Cystoscopy, Bladder Stone Removal       Diagnosis:Benign prostatic hyperplasia with urinary retention [N40.1, R33.8]  Diagnosis Additional Information: No value filed.    Anesthesia Type:  General    Note:  Disposition: Outpatient   Postop Pain Control: Uneventful            Sign Out: Well controlled pain   PONV: No   Neuro/Psych: Uneventful            Sign Out: Acceptable/Baseline neuro status   Airway/Respiratory: Uneventful            Sign Out: AIRWAY IN SITU/Resp. Support   CV/Hemodynamics: Uneventful            Sign Out: Acceptable CV status   Other NRE: NONE   DID A NON-ROUTINE EVENT OCCUR? No    Event details/Postop Comments:  Pt was happy with anesthesia care.  No complications.  I will follow up with the pt if needed.           Last vitals:  Vitals Value Taken Time   /80 11/10/21 1445   Temp 97.88  F (36.6  C) 11/10/21 1457   Pulse 67 11/10/21 1457   Resp 20 11/10/21 1457   SpO2 96 % 11/10/21 1457   Vitals shown include unvalidated device data.    Electronically Signed By: VALENTINE Benjamin CRNA  November 10, 2021  4:14 PM

## 2021-11-10 NOTE — ANESTHESIA PREPROCEDURE EVALUATION
Anesthesia Pre-Procedure Evaluation    Patient: Khang Bailon   MRN: [id number] : 1945        Preoperative Diagnosis: Benign prostatic hyperplasia with urinary retention [N40.1, R33.8]    Procedure : Procedure(s):  TRANSURETHRAL RESECTION (TUR) PROSTATE, USING KTP LASER          Past Medical History:   Diagnosis Date     Alpha thalassemia (H)      AV block 2012    pacemaker at [facility]     Diverticulitis      Injury, other and unspecified, elbow, forearm, and wrist 1970s    Broke right wrist      Past Surgical History:   Procedure Laterality Date     COLONOSCOPY       COLONOSCOPY  12/10/2012     IMPLANT PACEMAKER        Allergies   Allergen Reactions     Flagyl [Metronidazole] GI Disturbance      Social History     Tobacco Use     Smoking status: Former Smoker     Years: 50.00     Types: Cigarettes     Quit date: 2012     Years since quittin.2     Smokeless tobacco: Never Used     Tobacco comment: Quit in July   Substance Use Topics     Alcohol use: No     Alcohol/week: 0.0 standard drinks      Wt Readings from Last 1 Encounters:   21 80.2 kg (176 lb 12.8 oz)        Anesthesia Evaluation   Pt has had prior anesthetic. Type: MAC.        ROS/MED HX  ENT/Pulmonary:     (+) tobacco use, Past use, 50  Pack-Year Hx,  moderate,  COPD,     Neurologic:     (+) dementia, CVA (old left internal capsule lacunar infarct on CT 2016), date: ,     Cardiovascular:     (+) hypertension-----pacemaker, Reason placed: complete heart block. Previous cardiac testing   Echo: Date:  Results:  EF 65%  Stress Test: Date: Results:    ECG Reviewed: Date:  Results:  Paced SR LBBB  Cath: Date: Results:      METS/Exercise Tolerance:     Hematologic:     (+) anemia (Thalassemia),     Musculoskeletal:  - neg musculoskeletal ROS     GI/Hepatic:  - neg GI/hepatic ROS     Renal/Genitourinary:  - neg Renal ROS     Endo:  - neg endo ROS     Psychiatric/Substance Use:  - neg psychiatric ROS     Infectious Disease:   - neg infectious disease ROS     Malignancy:  - neg malignancy ROS     Other:  - neg other ROS          Physical Exam    Airway  airway exam normal      Mallampati: II   TM distance: > 3 FB   Neck ROM: full   Mouth opening: > 3 cm    Respiratory Devices and Support         Dental       (+) upper dentures and lower dentures      Cardiovascular   cardiovascular exam normal       Rhythm and rate: regular and normal     Pulmonary           breath sounds clear to auscultation   (+) decreased breath sounds           OUTSIDE LABS:  CBC:   Lab Results   Component Value Date    WBC 9.3 10/20/2021    WBC 29.7 (H) 10/01/2021    HGB 11.9 (L) 10/20/2021    HGB 13.5 10/01/2021    HCT 39.3 (L) 10/20/2021    HCT 43.0 10/01/2021     10/20/2021     10/01/2021     BMP:   Lab Results   Component Value Date     09/30/2021     06/27/2021    POTASSIUM 3.9 09/30/2021    POTASSIUM 4.2 06/27/2021    CHLORIDE 105 09/30/2021    CHLORIDE 104 06/27/2021    CO2 24 09/30/2021    CO2 29 06/27/2021    BUN 21 09/30/2021    BUN 21 06/27/2021    CR 0.93 09/30/2021    CR 0.99 06/27/2021     (H) 09/30/2021     (H) 06/27/2021     COAGS:   Lab Results   Component Value Date    PTT 25 07/12/2016    INR 0.98 07/12/2016     POC: No results found for: BGM, HCG, HCGS  HEPATIC:   Lab Results   Component Value Date    ALBUMIN 4.0 06/27/2021    PROTTOTAL 7.2 06/27/2021    ALT 29 06/27/2021    AST 16 06/27/2021    ALKPHOS 70 06/27/2021    BILITOTAL 0.7 06/27/2021     OTHER:   Lab Results   Component Value Date    LACT 0.7 09/30/2021    RENU 9.0 09/30/2021    MAG 2.4 (H) 03/26/2004    LIPASE 36 11/19/2012    TSH 1.12 11/26/2019    CRP 11.8 (H) 06/27/2021    SED 7 11/26/2019       Anesthesia Plan    ASA Status:  3   NPO Status:  NPO Appropriate    Anesthesia Type: General.     - Airway: ETT   Induction: Intravenous, Propofol.   Maintenance: Balanced.        Consents    Anesthesia Plan(s) and associated risks, benefits, and  realistic alternatives discussed. Questions answered and patient/representative(s) expressed understanding.     - Discussed with:  Patient         Postoperative Care    Pain management: IV analgesics, Oral pain medications, Multi-modal analgesia.   PONV prophylaxis: Ondansetron (or other 5HT-3), Promethazine or metoclopramide     Comments:    The risks and benefits of anesthesia, and the alternatives where applicable, have been discussed with the patient, and they wish to proceed.            Sonya Munoz

## 2021-11-10 NOTE — OP NOTE
PREOPERATIVE DIAGNOSIS:  Benign prostatic hyperplasia with obstruction      POSTOPERATIVE DIAGNOSIS:  Benign prostatic hyperplasia with obstruction/bladder stones      PROCEDURE DONE:  XPS laser vaporization of prostate/ multiple bladder stone removal      DESCRIPTION OF PROCEDURE:  He was brought to the operating room and after general anesthesia was induced, he was placed in the dorsal lithotomy position.  He was draped and prepped in the usual sterile surgical fashion.  Preop enema was given.  A cystoscope was then placed into the bladder.  The patient has lateral prostatic obstruction with the lateral lobes measured about 2.5 cm in length.  Using the XPS laser fiber, I proceeded to vaporize the prostate from the bladder neck toward the verumontanum.  A total of [id number] joules of energy was used.  At the end of the procedure, the prostatic fossa was wide open.  He has multiple bladder stones seen in the bladder.  These were evacuated with evacuator.  A 22-Australian 3-way catheter was then placed.  The patient tolerated the procedure well.  There were no complications identified during the procedure.  There was minimal bleeding during the procedure.           SANDRA MILLER MD

## 2021-11-10 NOTE — ANESTHESIA CARE TRANSFER NOTE
Patient: Khang Bailon    Procedure: Procedure(s):  TRANSURETHRAL RESECTION (TUR) PROSTATE, USING KTP LASER  Cystoscopy, Bladder Stone Removal       Diagnosis: Benign prostatic hyperplasia with urinary retention [N40.1, R33.8]  Diagnosis Additional Information: No value filed.    Anesthesia Type:   General     Note:    Oropharynx: oropharynx clear of all foreign objects and spontaneously breathing  Level of Consciousness: drowsy  Oxygen Supplementation: face mask  Level of Supplemental Oxygen (L/min / FiO2): 8  Independent Airway: airway patency satisfactory and stable  Dentition: dentition unchanged  Vital Signs Stable: post-procedure vital signs reviewed and stable  Report to RN Given: handoff report given  Patient transferred to: PACU    Handoff Report: Identifed the Patient, Identified the Reponsible Provider, Reviewed the pertinent medical history, Discussed the surgical course, Reviewed Intra-OP anesthesia mangement and issues during anesthesia, Set expectations for post-procedure period and Allowed opportunity for questions and acknowledgement of understanding      Vitals:  Vitals Value Taken Time   BP     Temp     Pulse 61 11/10/21 1414   Resp 21 11/10/21 1414   SpO2 98 % 11/10/21 1414   Vitals shown include unvalidated device data.    Electronically Signed By: VALENTINE Benjamin CRNA  November 10, 2021  2:16 PM

## 2021-11-10 NOTE — ANESTHESIA PROCEDURE NOTES
Airway       Patient location during procedure: OR       Procedure Start/Stop Times: 11/10/2021 1:12 PM  Staff -        CRNA: Marielle Guillen APRN CRNA       Performed By: with CRNAs and CASSANDRA       Procedure performed by resident/fellow/CRNA in presence of a teaching physician.    Consent for Airway        Urgency: elective  Indications and Patient Condition       Indications for airway management: evelyn-procedural         Mask difficulty assessment: 3 - difficult mask (inadequate, unstable, or two providers) +/- NMBA    Final Airway Details       Final airway type: endotracheal airway       Successful airway: ETT - single  Endotracheal Airway Details        ETT size (mm): 7.5       Successful intubation technique: direct laryngoscopy       DL Blade Type: MAC 3       Grade View of Cords: 1       Adjucts: stylet       Position: Right       Measured from: gums/teeth       Secured at (cm): 20       Bite block used: None    Post intubation assessment        Placement verified by: capnometry, equal breath sounds and chest rise        Number of attempts at approach: 1       Number of other approaches attempted: 0       Secured with: silk tape       Ease of procedure: easy       Dentition: Dental injury

## 2021-11-15 ENCOUNTER — DOCUMENTATION ONLY (OUTPATIENT)
Dept: UROLOGY | Facility: CLINIC | Age: 76
End: 2021-11-15
Payer: COMMERCIAL

## 2021-11-15 NOTE — PROGRESS NOTES
Khang presents to clinic today for a cath removal post TURP DOS 11/10/21.     Per Dr Stewart -  Trial Void . Inserted 250 mL via cath, removed cath .     Patient was unable to void in clinic, I explained to patient and wife they would need to come back tomorrow morning for a recath if patient is unable to void today. Wife and patient understood and agreed with this plan.    I will follow up with patient via phone at 3 pm today to see if he was able to void       Jyothi Pradhan CMA on 11/15/2021 at 11:21 AM

## 2021-12-07 ENCOUNTER — HOSPITAL ENCOUNTER (EMERGENCY)
Facility: CLINIC | Age: 76
Discharge: HOME | End: 2021-12-07
Attending: FAMILY MEDICINE | Admitting: FAMILY MEDICINE
Payer: COMMERCIAL

## 2021-12-07 ENCOUNTER — APPOINTMENT (OUTPATIENT)
Dept: GENERAL RADIOLOGY | Facility: CLINIC | Age: 76
End: 2021-12-07
Attending: FAMILY MEDICINE
Payer: COMMERCIAL

## 2021-12-07 VITALS
WEIGHT: 175 LBS | SYSTOLIC BLOOD PRESSURE: 139 MMHG | TEMPERATURE: 99.5 F | DIASTOLIC BLOOD PRESSURE: 88 MMHG | HEART RATE: 68 BPM | RESPIRATION RATE: 18 BRPM | OXYGEN SATURATION: 95 % | BODY MASS INDEX: 27 KG/M2

## 2021-12-07 DIAGNOSIS — J44.1 COPD EXACERBATION (H): ICD-10-CM

## 2021-12-07 DIAGNOSIS — U07.1 INFECTION DUE TO 2019 NOVEL CORONAVIRUS: ICD-10-CM

## 2021-12-07 DIAGNOSIS — Z20.822 SUSPECTED COVID-19 VIRUS INFECTION: ICD-10-CM

## 2021-12-07 LAB
FLUAV RNA SPEC QL NAA+PROBE: NEGATIVE
FLUBV RNA RESP QL NAA+PROBE: NEGATIVE
SARS-COV-2 RNA RESP QL NAA+PROBE: POSITIVE

## 2021-12-07 PROCEDURE — 258N000003 HC RX IP 258 OP 636: Performed by: FAMILY MEDICINE

## 2021-12-07 PROCEDURE — 87636 SARSCOV2 & INF A&B AMP PRB: CPT | Performed by: FAMILY MEDICINE

## 2021-12-07 PROCEDURE — 99284 EMERGENCY DEPT VISIT MOD MDM: CPT | Mod: 25 | Performed by: FAMILY MEDICINE

## 2021-12-07 PROCEDURE — 250N000011 HC RX IP 250 OP 636: Performed by: FAMILY MEDICINE

## 2021-12-07 PROCEDURE — 250N000013 HC RX MED GY IP 250 OP 250 PS 637: Performed by: FAMILY MEDICINE

## 2021-12-07 PROCEDURE — 94640 AIRWAY INHALATION TREATMENT: CPT | Performed by: FAMILY MEDICINE

## 2021-12-07 PROCEDURE — C9803 HOPD COVID-19 SPEC COLLECT: HCPCS | Performed by: FAMILY MEDICINE

## 2021-12-07 PROCEDURE — M0243 CASIRIVI AND IMDEVI INFUSION: HCPCS

## 2021-12-07 PROCEDURE — 99284 EMERGENCY DEPT VISIT MOD MDM: CPT | Performed by: FAMILY MEDICINE

## 2021-12-07 PROCEDURE — 71045 X-RAY EXAM CHEST 1 VIEW: CPT

## 2021-12-07 RX ORDER — ALBUTEROL SULFATE 90 UG/1
4 AEROSOL, METERED RESPIRATORY (INHALATION) ONCE
Status: COMPLETED | OUTPATIENT
Start: 2021-12-07 | End: 2021-12-07

## 2021-12-07 RX ORDER — ACETAMINOPHEN 500 MG
1000 TABLET ORAL ONCE
Status: COMPLETED | OUTPATIENT
Start: 2021-12-07 | End: 2021-12-07

## 2021-12-07 RX ADMIN — ACETAMINOPHEN 1000 MG: 500 TABLET, FILM COATED ORAL at 13:30

## 2021-12-07 RX ADMIN — CASIRIVIMAB AND IMDEVIMAB: 600; 600 INJECTION, SOLUTION, CONCENTRATE INTRAVENOUS at 15:53

## 2021-12-07 RX ADMIN — ALBUTEROL SULFATE 4 PUFF: 90 AEROSOL, METERED RESPIRATORY (INHALATION) at 13:17

## 2021-12-07 NOTE — ED PROVIDER NOTES
History     Chief Complaint   Patient presents with     Shortness of Breath     HPI  Khang Bailon is a 76 year old male who presents with concerns of worsening cough and increasing shortness of breath over the last couple of days.  Patient wife was recently diagnosed with COVID-19 and is currently at home on home oxygen.  Patient states over the last 3 days he has had a worsening productive cough.  He has been feeling more short of breath with activity.  Denies any nausea or vomiting.  Denies any abdominal pain or back pain.  Nothing makes his symptoms better or worse.  Patient denies any dysuria or hematuria.  Patient has not vaccinated for COVID-19.    Allergies:  Allergies   Allergen Reactions     Flagyl [Metronidazole] GI Disturbance       Problem List:    Patient Active Problem List    Diagnosis Date Noted     BPH with elevated PSA 11/01/2021     Priority: Medium     Fatigue, unspecified type 11/29/2019     Priority: Medium     Memory loss 11/29/2019     Priority: Medium     Moderate COPD (chronic obstructive pulmonary disease) (H) 11/16/2017     Priority: Medium     Benign neoplasm of colon, unspecified part of colon 07/26/2016     Priority: Medium     Sigmoid diverticulitis 07/12/2016     Priority: Medium     History of CVA (cerebrovascular accident) - old left internal capsule lacunar infarct on CT 7/2016 07/12/2016     Priority: Medium     Multilevel degenerative disc disease 09/21/2015     Priority: Medium     Elevated prostate specific antigen (PSA) 09/11/2015     Priority: Medium     Health Care Home 01/24/2013     Priority: Medium     Racquel Ortiz RN-PHN  FPA / [facility]   [phone]  DX V65.8 REPLACED WITH [id number] [facility]ALTH CARE HOME (04/08/2013)       Elevated PSA 08/27/2012     Priority: Medium     Loss of weight 08/27/2012     Priority: Medium     Third degree heart block (H) - s/p pacemaker 08/10/2012     Priority: Medium     Hypertension goal BP (blood pressure) <  140/90 08/10/2012     Priority: Medium     Advanced directives, counseling/discussion 2012     Priority: Medium     Discussed advance care planning with patient; however, patient declined at this time. 2012          Thalassemia 2012     Priority: Medium     Diagnosis updated by automated process. Provider to review and confirm.       Hyperlipidemia LDL goal <130 2012     Priority: Medium     Painful respiration 2004     Priority: Medium     Personal history of tobacco use, presenting hazards to health 2004     Priority: Medium        Past Medical History:    Past Medical History:   Diagnosis Date     Alpha thalassemia (H)      AV block 2012     Diverticulitis      Injury, other and unspecified, elbow, forearm, and wrist 1970s       Past Surgical History:    Past Surgical History:   Procedure Laterality Date     COLONOSCOPY       COLONOSCOPY  12/10/2012     CYSTOSCOPY, LITHOLAPAXY, COMBINED N/A 11/10/2021    Procedure: Cystoscopy, Bladder Stone Removal;  Surgeon: Guanaco Stewart MD;  Location: [facility] OR     IMPLANT PACEMAKER       LASER KTP TRANSURETHRAL RESECTION (TUR) PROSTATE N/A 11/10/2021    Procedure: TRANSURETHRAL RESECTION (TUR) PROSTATE, USING KTP LASER;  Surgeon: Guanaco Stewart MD;  Location: [facility] OR       Family History:    No family history on file.    Social History:  Marital Status:   [2]  Social History     Tobacco Use     Smoking status: Former Smoker     Years: 50.00     Types: Cigarettes     Quit date: 2012     Years since quittin.3     Smokeless tobacco: Never Used     Tobacco comment: Quit in July   Substance Use Topics     Alcohol use: No     Alcohol/week: 0.0 standard drinks     Drug use: No        Medications:    Ascorbic Acid (VITAMIN C PO)  B Complex Vitamins (B COMPLEX PO)  DIGESTIVE ENZYMES PO  Fluticasone-Umeclidin-Vilanterol (TRELEGY ELLIPTA) 100-62.5-25 MCG/INH oral inhaler  HYDROcodone-acetaminophen (NORCO) 5-325 MG  tablet  ipratropium - albuterol 0.5 mg/2.5 mg/3 mL (DUONEB) 0.5-2.5 (3) MG/3ML neb solution  Multiple Vitamin (MULTI VITAMIN  MENS) TABS  order for DME  triamcinolone (KENALOG) 0.1 % external cream  Vitamin D-Vitamin K (VITAMIN K2-VITAMIN D3 PO)          Review of Systems   All other systems reviewed and are negative.      Physical Exam   BP: (!) 152/96  Pulse: 90  Temp: 99.5  F (37.5  C)  Resp: 18  Weight: 79.4 kg (175 lb)  SpO2: 96 %      Physical Exam  Vitals and nursing note reviewed.   Constitutional:       General: He is not in acute distress.     Appearance: He is well-developed. He is not diaphoretic.   HENT:      Head: Normocephalic and atraumatic.   Eyes:      Conjunctiva/sclera: Conjunctivae normal.   Cardiovascular:      Rate and Rhythm: Normal rate and regular rhythm.      Heart sounds: Normal heart sounds. No murmur heard.      Pulmonary:      Effort: Pulmonary effort is normal. No respiratory distress.      Breath sounds: Normal breath sounds. No stridor. No wheezing.   Abdominal:      General: Bowel sounds are normal. There is no distension.      Palpations: Abdomen is soft.      Tenderness: There is no abdominal tenderness. There is no guarding.   Musculoskeletal:         General: Normal range of motion.      Cervical back: Normal range of motion.   Skin:     General: Skin is warm and dry.      Findings: No rash.   Neurological:      Mental Status: He is alert and oriented to person, place, and time.   Psychiatric:         Judgment: Judgment normal.         ED Course       Results for orders placed or performed during the hospital encounter of 12/07/21 (from the past 24 hour(s))   Symptomatic Influenza A/B & SARS-CoV2 (COVID-19) Virus PCR Multiplex Nasopharyngeal    Specimen: Nasopharyngeal; Swab   Result Value Ref Range    Influenza A PCR Negative Negative    Influenza B PCR Negative Negative    SARS CoV2 PCR Positive (A) Negative    Narrative    Testing was performed using the yazan SARS-CoV-2 &  Influenza A/B Assay on the yazan Octavia System. This test should be ordered for the detection of SARS-CoV-2 and influenza viruses in individuals who meet clinical and/or epidemiological criteria. Test performance is unknown in asymptomatic patients. This test is for in vitro diagnostic use under the FDA EUA for laboratories certified under CLIA to perform moderate and/or high complexity testing. This test has not been FDA cleared or approved. A negative result does not rule out the presence of PCR inhibitors in the specimen or target RNA in concentration below the limit of detection for the assay. If only one viral target is positive but coinfection with multiple targets is suspected, the sample should be re-tested with another FDA cleared, approved or authorized test, if coinfection would change clinical management. [facility] are certified under the Clinical Laboratory Improvement Amendments of 1988 (CLIA-88) as  qualified to perform moderate and/or high complexity laboratory testing.   XR Chest Port 1 View    Narrative    XR CHEST PORT 1 VIEW   12/7/2021 2:26 PM     HISTORY: cough, sob, possible covid    COMPARISON: Chest CT 6/27/2021.      Impression    IMPRESSION: Hyperinflation consistent with COPD. Scattered scarring in  both lungs. No acute infiltrate. No pleural effusion. Normal heart  size. Left pacemaker leads unchanged in position. Aortic  calcifications.    JENISE JOHNSON MD         SYSTEM ID:  [id number]       Medications   casirivimab-imdevimab (REGEN-COV) 1,200 mg in sodium chloride 0.9 % 60 mL intermittent infusion (has no administration in time range)   acetaminophen (TYLENOL) tablet 1,000 mg (1,000 mg Oral Given 12/7/21 1330)   albuterol (PROVENTIL HFA/VENTOLIN HFA) inhaler (4 puffs Inhalation Given 12/7/21 1317)       This is a 76-year-old male who has a history of COPD, history of CVA and a history of thalassemia presents with COVID-19.  Patient is on day 4 of symptoms.  Chest  x-ray shows no significant findings.  Covid test did come back positive.  Patient was monitored here for almost 3 hours and maintained his sats above 90%.  Because of his significant risk factors and his age he does qualify for monoclonal antibodies.  I had a long discussion with the patient about this and he would like to proceed.  We will get this ordered and start this here in the emergency department.  This hopefully will help turn him around faster and keep him by the emergency department.  Patient will be discharged after the infusion is done.      Assessments & Plan (with Medical Decision Making)  COVID-19 infection     I have reviewed the nursing notes.    I have reviewed the findings, diagnosis, plan and need for follow up with the patient.              12/7/2021   [facility] EMERGENCY DEPT     Dariusz Naranjo MD  12/07/21 2081

## 2021-12-08 ENCOUNTER — PATIENT OUTREACH (OUTPATIENT)
Dept: CARE COORDINATION | Facility: CLINIC | Age: 76
End: 2021-12-08
Payer: COMMERCIAL

## 2021-12-08 DIAGNOSIS — U07.1 INFECTION DUE TO 2019 NOVEL CORONAVIRUS: Primary | ICD-10-CM

## 2021-12-08 NOTE — PROGRESS NOTES
Clinic Care Coordination Contact  [facility]/Voicemail       Clinical Data: Care Coordinator Outreach  Outreach attempted x 1.  Left message on patient's voicemail with call back information of ([phone]).  Instructed for patient to call if in need of a 24 hour nurse triage.  Also instructed to call and schedule a telephone/virtual visit within the next week.    Plan:  Care Coordinator will do no further outreaches at this time.  [facility] referral placed.  Covid [facility] invitation pending

## 2021-12-09 ENCOUNTER — PATIENT OUTREACH (OUTPATIENT)
Dept: FAMILY MEDICINE | Facility: CLINIC | Age: 76
End: 2021-12-09
Payer: COMMERCIAL

## 2021-12-09 NOTE — PROGRESS NOTES
Care Coordination Hospital/ED Discharge Follow up Note    Hospital/ED Discharge date: 12/8/21    Reason/Diagnosis for Hospital/ED visit: Covid +    Are you feeling better, the same, or worse since your Hospital/ED visit? better    Symptoms:     Cough -  none    Shortness of breath:  no shortness of breath     Chest pain:  No    Fever: No    Current temperature:  not taken but does not feel like he has one.     Headache:  No    Sore throat:  No    Nasal congestion:  No    Nausea/vomiting/diarrhea:  No    Body aches/joint pains:  No    Fatigue:  No    Home treatment measures used and outcome:  eating and staying hydrated.    Pulse Oximeter/Oxygen Questions:    Were you sent home with a pulse oximeter?  No    Oxygen saturation levels in ED/IP:  96%    Were you sent home with home oxygen?  No    RN to delete remaining Oxygen questions if N/A    Medications:    Were you prescribed any new medications?  No    Follow Up:    Do you have a follow up appointment scheduled with your PCP or specialist?  No    Do you have a plan in place in the event of an emergency?  Yes    If patient is established or planning to establish primary care within [facility], Care Coordination was offered. Care Coordination accepted/declined:  No    GetWell Loop invitation received?  No    GetWell Loop invitation accepted, pending patient activation or declined: declined    Gita VALENTINO, RN, PHN, CCM  Primary Clinic Care Coordination    [facility]  Primary Care Clinics  [email or web address]   Office: [phone]  Employed by [employer]

## 2021-12-15 ENCOUNTER — VIRTUAL VISIT (OUTPATIENT)
Dept: UROLOGY | Facility: CLINIC | Age: 76
End: 2021-12-15
Attending: FAMILY MEDICINE
Payer: COMMERCIAL

## 2021-12-15 DIAGNOSIS — R97.20 ELEVATED PROSTATE SPECIFIC ANTIGEN (PSA): ICD-10-CM

## 2021-12-15 DIAGNOSIS — R33.8 BENIGN PROSTATIC HYPERPLASIA WITH URINARY RETENTION: Primary | ICD-10-CM

## 2021-12-15 DIAGNOSIS — N40.1 BENIGN PROSTATIC HYPERPLASIA WITH URINARY RETENTION: Primary | ICD-10-CM

## 2021-12-15 NOTE — PROGRESS NOTES
Khang is a 76 year old who is being evaluated via a billable video visit.      How would you like to obtain your AVS? MyChart  If the video visit is dropped, the invitation should be resent by: Text to cell phone:    Will anyone else be joining your video visit? No      Video Start Time: 1040         Subjective   Khang is a 76 year old who presents for the following health issues f/u post TURP    HPI     Patient is s/p TURP for urinary retention.  He is able to void but has spraying.    Review of Systems   Constitutional, HEENT, cardiovascular, pulmonary, gi and gu systems are negative, except as otherwise noted.      Objective           Vitals:  No vitals were obtained today due to virtual visit.    Physical Exam   GENERAL: Healthy, alert and no distress  EYES: Eyes grossly normal to inspection.  No discharge or erythema, or obvious scleral/conjunctival abnormalities.  RESP: No audible wheeze, cough, or visible cyanosis.  No visible retractions or increased work of breathing.    SKIN: Visible skin clear. No significant rash, abnormal pigmentation or lesions.  NEURO: Cranial nerves grossly intact.  Mentation and speech appropriate for age.  PSYCH: Mentation appears normal, affect normal/bright, judgement and insight intact, normal speech and appearance well-groomed.    BPH: s/p TURP. F/u in 2 months for bladder scan.    Elevated psa:  Patient is not interested in biopsy.             Video-Visit Details    Type of service:  Video Visit    Video End Time:10:49 AM    Originating Location (pt. Location): Home    Distant Location (provider location):  [facility]     Platform used for Video Visit: [employer]

## 2022-01-12 ENCOUNTER — TELEPHONE (OUTPATIENT)
Dept: PULMONOLOGY | Facility: CLINIC | Age: 77
End: 2022-01-12
Payer: COMMERCIAL

## 2022-01-12 NOTE — TELEPHONE ENCOUNTER
Had scheduling call to set up follow up appointment with  with a Sleep study prior.     Pt. Declined to set up sleep study appointment and follow up with .     Taking Pt. Off call list to schedule.     Angeli Andrade on 1/12/2022 at 11:06 AM

## 2022-04-09 ENCOUNTER — HEALTH MAINTENANCE LETTER (OUTPATIENT)
Age: 77
End: 2022-04-09

## 2022-07-29 ENCOUNTER — TELEPHONE (OUTPATIENT)
Dept: FAMILY MEDICINE | Facility: CLINIC | Age: 77
End: 2022-07-29

## 2022-07-29 NOTE — TELEPHONE ENCOUNTER
Reason for Call:  Appt Request    Detailed comments: Would like to see Dorcas Carbone for COPD, earliest appt is 9/19/22. Pt's spouse is wondering if there is a way he can get in ASAP.     Phone Number Patient can be reached at: [phone]    Best Time: ASAP    Can we leave a detailed message on this number? Yes    Call taken on 7/29/2022 at 2:50 PM by Tej Snyder

## 2022-08-01 NOTE — TELEPHONE ENCOUNTER
DEWEY    I called patient and advised him that Dorcas Carbone could see him tomorrow 08/02 at 2:40 pm, arrive at 2:20 pm patient was happy with this.  I hang up the phone start scheduling the appt and notice the patient already has an appt on 08/02 with Dr. Degroot, so I call the patient back and he told me to keep the appt with Dr. Schmidt because his daughter must have made the appointment and we dont want to mess with that.     Anu Herbert CMA

## 2022-08-02 ENCOUNTER — TELEPHONE (OUTPATIENT)
Dept: FAMILY MEDICINE | Facility: CLINIC | Age: 77
End: 2022-08-02

## 2022-08-02 ENCOUNTER — OFFICE VISIT (OUTPATIENT)
Dept: FAMILY MEDICINE | Facility: CLINIC | Age: 77
End: 2022-08-02
Payer: COMMERCIAL

## 2022-08-02 VITALS
RESPIRATION RATE: 24 BRPM | DIASTOLIC BLOOD PRESSURE: 74 MMHG | TEMPERATURE: 97.6 F | OXYGEN SATURATION: 84 % | SYSTOLIC BLOOD PRESSURE: 130 MMHG | HEART RATE: 84 BPM | BODY MASS INDEX: 28.44 KG/M2 | WEIGHT: 184.3 LBS

## 2022-08-02 DIAGNOSIS — I44.2 THIRD DEGREE HEART BLOCK (H): ICD-10-CM

## 2022-08-02 DIAGNOSIS — J44.9 CHRONIC OBSTRUCTIVE PULMONARY DISEASE, UNSPECIFIED COPD TYPE (H): ICD-10-CM

## 2022-08-02 DIAGNOSIS — J44.9 MODERATE COPD (CHRONIC OBSTRUCTIVE PULMONARY DISEASE) (H): Primary | ICD-10-CM

## 2022-08-02 PROCEDURE — 99214 OFFICE O/P EST MOD 30 MIN: CPT | Performed by: STUDENT IN AN ORGANIZED HEALTH CARE EDUCATION/TRAINING PROGRAM

## 2022-08-02 RX ORDER — PREDNISONE 20 MG/1
40 TABLET ORAL DAILY
Qty: 10 TABLET | Refills: 0 | Status: SHIPPED | OUTPATIENT
Start: 2022-08-02 | End: 2022-08-07

## 2022-08-02 ASSESSMENT — PATIENT HEALTH QUESTIONNAIRE - PHQ9
SUM OF ALL RESPONSES TO PHQ QUESTIONS 1-9: 8
SUM OF ALL RESPONSES TO PHQ QUESTIONS 1-9: 8
10. IF YOU CHECKED OFF ANY PROBLEMS, HOW DIFFICULT HAVE THESE PROBLEMS MADE IT FOR YOU TO DO YOUR WORK, TAKE CARE OF THINGS AT HOME, OR GET ALONG WITH OTHER PEOPLE: VERY DIFFICULT

## 2022-08-02 ASSESSMENT — PAIN SCALES - GENERAL: PAINLEVEL: SEVERE PAIN (7)

## 2022-08-02 NOTE — NURSING NOTE
O2 saturation:    Resting without O2- 94%  Walking 2 minutes- 86%  Resting with supplemental oxygen set at 2 LPM- 98%    Fiorella Perez MA     8/2/2022

## 2022-08-02 NOTE — TELEPHONE ENCOUNTER
Inhaler that was prescribed was over $400 - can you send another Rx to see if it is covered.    Pharmacy - [facility]    Lisbeth Arredondo XRO/

## 2022-08-02 NOTE — PROGRESS NOTES
"  Assessment & Plan     Chronic obstructive pulmonary disease, unspecified COPD type (H)  Patient with severe COPD.  Patient with progressive chronic worsening of his breathing with shortness of breath wheezing and sputum production.  We will continue with duo nebs but encouraged to try controller inhaler.  Difficulty with cost and Trelegy but will try another today.  Would also give course of prednisone to see if this improves things for him but will hold off on antibiotics at this time.  Uncertain of acuity and in his worsening of symptoms.  We did discuss possibility of respiratory rehab therapy as well.  I did recommend a follow-up for sleep study and with pulmonology as previously planned.  Patient with face-to-face today and O2 tolerance test done.  Patient clearly would benefit from oxygen use and should work to keep sats above 90.  Given desaturation with minimal exertion he will benefit from this greatly.  Upcoming follow-up with cardiology and I would like to see him back in 2 months.  Sooner if new or worsening issues.  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        BMI:   Estimated body mass index is 28.44 kg/m  as calculated from the following:    Height as of 10/27/21: 1.715 m (5' 7.5\").    Weight as of this encounter: 83.6 kg (184 lb 4.8 oz).       Neville Degroot MD  [facility]    Mikaela Quiñonez is a 77 year old, presenting for the following health issues:  COPD (Discuss need for oxygen)      History of Present Illness       COPD:  He presents for follow up of COPD.  Overall, COPD symptoms are much worse since last visit. He has a lot more than usual fatigue or shortness of breath " with exertion and a lot more than usual shortness of breath at rest.  He often coughs and does have change in sputum. Patient has had recent fever. He can walk the length of 1-2 rooms without stopping to rest. He can not walk a flight of stairs without resting. The patient has had no ED, urgent care, or hospital admissions because of COPD since the last visit.     Reason for visit:  COPD is worst and pain in chest (have pacemaker) I get dizzy all the time and I have difficulty breathing and functioning.    He eats 2-3 servings of fruits and vegetables daily.He consumes 0 sweetened beverage(s) daily.He exercises with enough effort to increase his heart rate 9 or less minutes per day.  He exercises with enough effort to increase his heart rate 3 or less days per week.   He is taking medications regularly.    Today's PHQ-9         PHQ-9 Total Score: 8    PHQ-9 Q9 Thoughts of better off dead/self-harm past 2 weeks :   Not at all    How difficult have these problems made it for you to do your work, take care of things at home, or get along with other people: Very difficult     O2 saturation:    Resting without O2- 94%  Walking 2 minutes- 86%  Resting with supplemental oxygen set at 2 LPM- 98%    Patient with severe COPD and appears to have oxygen requirements now.  Has had lightheadedness with exertion but no increase in chest pain.  Does have history of heart block with pacemaker in place and does have upcoming follow-up with cardiology.  Wheezing with increased sputum production lately.  Has not been on oxygen in the past.  Was previously placed on Trelegy but cannot afford this.  Was also on Qvar in the past without consistent use and discontinued.  Has been doing duo nebs twice daily.  Reports needing these more often lately and daughter and spouse very concerned and with him today.  Quit smoking years ago but did smoke for over 50 years.  No other recent illness or fevers.  Reports not being on prednisone for  exacerbation in the past.  Was recommended to have sleep study and follow-up with pulmonology previously but he did not do so.  He is willing to do home sleep study now.    Review of Systems   Constitutional, HEENT, cardiovascular, pulmonary, gi and gu systems are negative, except as otherwise noted.      Objective    /74 (BP Location: Left arm, Patient Position: Sitting, Cuff Size: Adult Regular)   Pulse 84   Temp 97.6  F (36.4  C) (Temporal)   Resp 24   Wt 83.6 kg (184 lb 4.8 oz)   SpO2 (!) 84%   BMI 28.44 kg/m    Body mass index is 28.44 kg/m .  Physical Exam   GENERAL: Increased work of breathing  EYES: Eyes grossly normal to inspection, PERRL and conjunctivae and sclerae normal  HENT:  nose and mouth without ulcers or lesions  NECK: no adenopathy, no asymmetry, masses, or scars and thyroid normal to palpation  RESP: Diffuse expiratory wheezes  CV: Distant, regular rate and rhythm, normal S1 S2 no murmur, click or rub, no peripheral edema and peripheral pulses strong  ABDOMEN: soft, nontender, no hepatosplenomegaly, no masses and bowel sounds normal  MS: no gross musculoskeletal defects noted, no edema  SKIN: no suspicious lesions or rashes  NEURO: mentation intact and speech normal  PSYCH: mentation appears normal, affect normal/bright              .  ..

## 2022-10-09 ENCOUNTER — HEALTH MAINTENANCE LETTER (OUTPATIENT)
Age: 77
End: 2022-10-09

## 2023-04-18 DIAGNOSIS — J44.9 CHRONIC OBSTRUCTIVE PULMONARY DISEASE, UNSPECIFIED COPD TYPE (H): ICD-10-CM

## 2023-04-19 RX ORDER — IPRATROPIUM BROMIDE AND ALBUTEROL SULFATE 2.5; .5 MG/3ML; MG/3ML
SOLUTION RESPIRATORY (INHALATION)
Qty: 360 ML | Refills: 1 | Status: SHIPPED | OUTPATIENT
Start: 2023-04-19 | End: 2023-07-12

## 2023-04-19 NOTE — TELEPHONE ENCOUNTER
Prescription approved per [facility] Refill Protocol.  Mayi Lowery RN on 4/19/2023 at 1:32 PM

## 2023-04-24 ENCOUNTER — APPOINTMENT (OUTPATIENT)
Dept: GENERAL RADIOLOGY | Facility: CLINIC | Age: 78
End: 2023-04-24
Attending: FAMILY MEDICINE
Payer: COMMERCIAL

## 2023-04-24 ENCOUNTER — HOSPITAL ENCOUNTER (EMERGENCY)
Facility: CLINIC | Age: 78
Discharge: HOME | End: 2023-04-24
Attending: FAMILY MEDICINE | Admitting: FAMILY MEDICINE
Payer: COMMERCIAL

## 2023-04-24 VITALS
TEMPERATURE: 98 F | BODY MASS INDEX: 26.36 KG/M2 | RESPIRATION RATE: 18 BRPM | HEIGHT: 70 IN | HEART RATE: 68 BPM | DIASTOLIC BLOOD PRESSURE: 88 MMHG | SYSTOLIC BLOOD PRESSURE: 153 MMHG | OXYGEN SATURATION: 94 % | WEIGHT: 184.1 LBS

## 2023-04-24 DIAGNOSIS — K80.20 GALLSTONES: ICD-10-CM

## 2023-04-24 DIAGNOSIS — S39.012A BACK STRAIN, INITIAL ENCOUNTER: ICD-10-CM

## 2023-04-24 DIAGNOSIS — K59.00 CONSTIPATION, UNSPECIFIED CONSTIPATION TYPE: ICD-10-CM

## 2023-04-24 LAB
ALBUMIN SERPL BCG-MCNC: 3.8 G/DL (ref 3.5–5.2)
ALP SERPL-CCNC: 55 U/L (ref 40–129)
ALT SERPL W P-5'-P-CCNC: 21 U/L (ref 10–50)
ANION GAP SERPL CALCULATED.3IONS-SCNC: 9 MMOL/L (ref 7–15)
AST SERPL W P-5'-P-CCNC: 24 U/L (ref 10–50)
BASOPHILS # BLD AUTO: 0.1 10E3/UL (ref 0–0.2)
BASOPHILS NFR BLD AUTO: 1 %
BILIRUB DIRECT SERPL-MCNC: <0.2 MG/DL (ref 0–0.3)
BILIRUB SERPL-MCNC: 0.6 MG/DL
BUN SERPL-MCNC: 20 MG/DL (ref 8–23)
CALCIUM SERPL-MCNC: 9.2 MG/DL (ref 8.8–10.2)
CHLORIDE SERPL-SCNC: 103 MMOL/L (ref 98–107)
CREAT SERPL-MCNC: 0.91 MG/DL (ref 0.67–1.17)
DEPRECATED HCO3 PLAS-SCNC: 27 MMOL/L (ref 22–29)
EOSINOPHIL # BLD AUTO: 0.1 10E3/UL (ref 0–0.7)
EOSINOPHIL NFR BLD AUTO: 1 %
ERYTHROCYTE [DISTWIDTH] IN BLOOD BY AUTOMATED COUNT: 19.5 % (ref 10–15)
GFR SERPL CREATININE-BSD FRML MDRD: 86 ML/MIN/1.73M2
GLUCOSE SERPL-MCNC: 96 MG/DL (ref 70–99)
HCT VFR BLD AUTO: 40.1 % (ref 40–53)
HGB BLD-MCNC: 12.2 G/DL (ref 13.3–17.7)
IMM GRANULOCYTES # BLD: 0.1 10E3/UL
IMM GRANULOCYTES NFR BLD: 1 %
LIPASE SERPL-CCNC: 57 U/L (ref 13–60)
LYMPHOCYTES # BLD AUTO: 1.1 10E3/UL (ref 0.8–5.3)
LYMPHOCYTES NFR BLD AUTO: 11 %
MCH RBC QN AUTO: 19 PG (ref 26.5–33)
MCHC RBC AUTO-ENTMCNC: 30.4 G/DL (ref 31.5–36.5)
MCV RBC AUTO: 62 FL (ref 78–100)
MONOCYTES # BLD AUTO: 0.8 10E3/UL (ref 0–1.3)
MONOCYTES NFR BLD AUTO: 8 %
NEUTROPHILS # BLD AUTO: 7.7 10E3/UL (ref 1.6–8.3)
NEUTROPHILS NFR BLD AUTO: 78 %
NRBC # BLD AUTO: 0 10E3/UL
NRBC BLD AUTO-RTO: 0 /100
PLATELET # BLD AUTO: 221 10E3/UL (ref 150–450)
POTASSIUM SERPL-SCNC: 4.4 MMOL/L (ref 3.4–5.3)
PROT SERPL-MCNC: 6.5 G/DL (ref 6.4–8.3)
RBC # BLD AUTO: 6.43 10E6/UL (ref 4.4–5.9)
SODIUM SERPL-SCNC: 139 MMOL/L (ref 136–145)
WBC # BLD AUTO: 9.7 10E3/UL (ref 4–11)

## 2023-04-24 PROCEDURE — 84155 ASSAY OF PROTEIN SERUM: CPT | Performed by: FAMILY MEDICINE

## 2023-04-24 PROCEDURE — 74019 RADEX ABDOMEN 2 VIEWS: CPT

## 2023-04-24 PROCEDURE — 99285 EMERGENCY DEPT VISIT HI MDM: CPT | Performed by: FAMILY MEDICINE

## 2023-04-24 PROCEDURE — 82248 BILIRUBIN DIRECT: CPT | Performed by: FAMILY MEDICINE

## 2023-04-24 PROCEDURE — 83690 ASSAY OF LIPASE: CPT | Performed by: FAMILY MEDICINE

## 2023-04-24 PROCEDURE — 99284 EMERGENCY DEPT VISIT MOD MDM: CPT | Performed by: FAMILY MEDICINE

## 2023-04-24 PROCEDURE — 71101 X-RAY EXAM UNILAT RIBS/CHEST: CPT | Mod: RT

## 2023-04-24 PROCEDURE — 36415 COLL VENOUS BLD VENIPUNCTURE: CPT | Performed by: FAMILY MEDICINE

## 2023-04-24 PROCEDURE — 85025 COMPLETE CBC W/AUTO DIFF WBC: CPT | Performed by: FAMILY MEDICINE

## 2023-04-24 PROCEDURE — 80053 COMPREHEN METABOLIC PANEL: CPT | Performed by: FAMILY MEDICINE

## 2023-04-24 ASSESSMENT — ACTIVITIES OF DAILY LIVING (ADL): ADLS_ACUITY_SCORE: 35

## 2023-04-24 NOTE — DISCHARGE INSTRUCTIONS
1.  For your constipation I want you to  some over-the-counter MiraLAX and take this daily until you have normal bowel movements.  2.  For your back pain, it seems to just be more of a strain of your muscle.  I would recommend applying ice and/or heat to your back.  Alternate Tylenol and Aleve to help with your back pain.  3.  We found incidental gallstones on x-ray, I would recommend follow-up with your doctor to discuss definitive management of this.  I would recommend staying on a low-fat diet until you can follow-up.

## 2023-04-24 NOTE — ED TRIAGE NOTES
PT reports having the diarrhea on Wednesday and Thursday but states he hasn't been able to have a BM since. Pt also reports that on Wednesday when he blew his nose he developed right sided back pain that hasn't gone away.      Triage Assessment     Row Name 04/24/23 1120       Triage Assessment (Adult)    Airway WDL WDL       Respiratory WDL    Respiratory WDL WDL       Skin Circulation/Temperature WDL    Skin Circulation/Temperature WDL WDL       Cardiac WDL    Cardiac WDL WDL       Peripheral/Neurovascular WDL    Peripheral Neurovascular WDL WDL       Cognitive/Neuro/Behavioral WDL    Cognitive/Neuro/Behavioral WDL WDL       [place] Coma Scale    Best Eye Response 4-->(E4) spontaneous    Best Motor Response 6-->(M6) obeys commands    Best Verbal Response 5-->(V5) oriented    [place] Coma Scale Score 15

## 2023-04-24 NOTE — ED PROVIDER NOTES
History     Chief Complaint   Patient presents with     Back Pain     Constipation     HPI  Khang Bailon is a 78 year old male who presents with concerns of constipation and some back pain that started after sneezing.  Patient states he sneezed really hard and started having pretty intense pain in the right side of his back.  Movement and taking a deep breath makes the pain worse.  Patient is also concerned because he had some loose stools last week but for the past 4 days has not been able to have a bowel movement.  Patient has not taken anything like a laxative.  Denies any fevers or chills.  Denies any recent dysuria or hematuria.    Allergies:  Allergies   Allergen Reactions     Flagyl [Metronidazole] GI Disturbance       Problem List:    Patient Active Problem List    Diagnosis Date Noted     BPH with elevated PSA 11/01/2021     Priority: Medium     Fatigue, unspecified type 11/29/2019     Priority: Medium     Memory loss 11/29/2019     Priority: Medium     Moderate COPD (chronic obstructive pulmonary disease) (H) 11/16/2017     Priority: Medium     Benign neoplasm of colon, unspecified part of colon 07/26/2016     Priority: Medium     Sigmoid diverticulitis 07/12/2016     Priority: Medium     History of CVA (cerebrovascular accident) - old left internal capsule lacunar infarct on CT 7/2016 07/12/2016     Priority: Medium     Multilevel degenerative disc disease 09/21/2015     Priority: Medium     Elevated prostate specific antigen (PSA) 09/11/2015     Priority: Medium     Health Care Home 01/24/2013     Priority: Medium     Racquel Ortiz RN-PHN  FPA / [facility]   [phone] b  DX V65.8 REPLACED WITH [id number] CARE HOME (04/08/2013)       Elevated PSA 08/27/2012     Priority: Medium     Loss of weight 08/27/2012     Priority: Medium     Third degree heart block (H) - s/p pacemaker 08/10/2012     Priority: Medium     Hypertension goal BP (blood pressure) < 140/90 08/10/2012      Priority: Medium     Advanced directives, counseling/discussion 07/27/2012     Priority: Medium     Discussed advance care planning with patient; however, patient declined at this time. 7/27/2012          Thalassemia 07/27/2012     Priority: Medium     Diagnosis updated by automated process. Provider to review and confirm.       Hyperlipidemia LDL goal <130 07/27/2012     Priority: Medium     Painful respiration 03/31/2004     Priority: Medium     Personal history of tobacco use, presenting hazards to health 03/31/2004     Priority: Medium        Past Medical History:    Past Medical History:   Diagnosis Date     Alpha thalassemia (H)      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     Diverticulitis      Injury, other and unspecified, elbow, forearm, and wrist 1970s       Past Surgical History:    Past Surgical History:   Procedure Laterality Date     COLONOSCOPY       COLONOSCOPY  12/10/2012     CYSTOSCOPY, LITHOLAPAXY, COMBINED N/A 11/10/2021    Procedure: Cystoscopy, Bladder Stone Removal;  Surgeon: Guanaco Stewart MD;  Location: [facility]     IMPLANT PACEMAKER       LASER KTP TRANSURETHRAL RESECTION (TUR) PROSTATE N/A 11/10/2021    Procedure: TRANSURETHRAL RESECTION (TUR) PROSTATE, USING KTP LASER;  Surgeon: Guanaco Stewart MD;  Location: [facility] OR       Family History:    History reviewed. No pertinent family history.    Social History:  Marital Status:   [2]  Social History     Tobacco Use     Smoking status: Former     Years: 50.00     Types: Cigarettes     Quit date: 7/27/2012     Years since quitting: 10.7     Smokeless tobacco: Never     Tobacco comments:     Quit in July   Vaping Use     Vaping status: Never Used   Substance Use Topics     Alcohol use: No     Alcohol/week: 0.0 standard drinks of alcohol     Drug use: No        Medications:    Ascorbic Acid (VITAMIN C PO)  B Complex Vitamins (B COMPLEX PO)  DIGESTIVE ENZYMES PO  Fluticasone-Umeclidin-Vilanterol (TRELEGY ELLIPTA) 100-62.5-25 MCG/INH oral  "inhaler  HYDROcodone-acetaminophen (NORCO) 5-325 MG tablet  ipratropium - albuterol 0.5 mg/2.5 mg/3 mL (DUONEB) 0.5-2.5 (3) MG/3ML neb solution  Multiple Vitamin (MULTI VITAMIN  MENS) TABS  order for DME  triamcinolone (KENALOG) 0.1 % external cream  umeclidinium (INCRUSE ELLIPTA) 62.5 MCG/INH inhaler  Vitamin D-Vitamin K (VITAMIN K2-VITAMIN D3 PO)          Review of Systems   All other systems reviewed and are negative.      Physical Exam   BP: (!) 161/97  Pulse: 82  Temp: 98  F (36.7  C)  Resp: 20  Height: 177.8 cm (5' 10\")  Weight: 83.5 kg (184 lb 1.6 oz)  SpO2: 95 %      Physical Exam  Vitals and nursing note reviewed.   Constitutional:       General: He is not in acute distress.     Appearance: He is well-developed. He is not diaphoretic.   HENT:      Head: Normocephalic and atraumatic.      Nose: Nose normal.      Mouth/Throat:      Pharynx: No oropharyngeal exudate.   Eyes:      General: No scleral icterus.     Conjunctiva/sclera: Conjunctivae normal.      Pupils: Pupils are equal, round, and reactive to light.   Cardiovascular:      Rate and Rhythm: Normal rate and regular rhythm.      Heart sounds: Normal heart sounds. No murmur heard.     No friction rub.   Pulmonary:      Effort: Pulmonary effort is normal. No respiratory distress.      Breath sounds: Normal breath sounds. No wheezing or rales.   Abdominal:      General: Bowel sounds are normal. There is no distension.      Palpations: Abdomen is soft. There is no mass.      Tenderness: There is no abdominal tenderness. There is no guarding or rebound.   Musculoskeletal:         General: Normal range of motion.      Cervical back: Normal range of motion.      Lumbar back: Spasms and tenderness present.        Back:    Skin:     General: Skin is warm.      Findings: No rash.   Neurological:      Mental Status: He is alert and oriented to person, place, and time.   Psychiatric:         Judgment: Judgment normal.         ED Course               "   Procedures           Results for orders placed or performed during the hospital encounter of 04/24/23 (from the past 24 hour(s))   XR Abdomen 2 Views    Narrative    XR ABDOMEN 2 VIEWS 4/24/2023 12:36 PM    HISTORY: constipation    COMPARISON: 11/3/2021      Impression    IMPRESSION: Large calcified stones are noted within the gallbladder.  Moderate stool noted throughout the colon. No evidence of obstruction.  Scoliosis and degenerative changes of the spine.    TYRONE KELLY MD         SYSTEM ID:  [id number]   Ribs XR, unilat 3 views + PA chest, right    Narrative    XR RIBS & CHEST RT 3VW 4/24/2023 12:36 PM    HISTORY: right rib and back pain    COMPARISON: None.      Impression    IMPRESSION: Left pacemaker. Heart normal in size. Lungs are clear.    TYRONE KELLY MD         SYSTEM ID:  [id number]   CBC with platelets differential    Narrative    The following orders were created for panel order CBC with platelets differential.  Procedure                               Abnormality         Status                     ---------                               -----------         ------                     CBC with platelets and d...[[id number]]  Abnormal            Final result                 Please view results for these tests on the individual orders.   Basic metabolic panel   Result Value Ref Range    Sodium 139 136 - 145 mmol/L    Potassium 4.4 3.4 - 5.3 mmol/L    Chloride 103 98 - 107 mmol/L    Carbon Dioxide (CO2) 27 22 - 29 mmol/L    Anion Gap 9 7 - 15 mmol/L    Urea Nitrogen 20.0 8.0 - 23.0 mg/dL    Creatinine 0.91 0.67 - 1.17 mg/dL    Calcium 9.2 8.8 - 10.2 mg/dL    Glucose 96 70 - 99 mg/dL    GFR Estimate 86 >60 mL/min/1.73m2   Hepatic panel   Result Value Ref Range    Protein Total 6.5 6.4 - 8.3 g/dL    Albumin 3.8 3.5 - 5.2 g/dL    Bilirubin Total 0.6 <=1.2 mg/dL    Alkaline Phosphatase 55 40 - 129 U/L    AST 24 10 - 50 U/L    ALT 21 10 - 50 U/L    Bilirubin Direct <0.20 0.00 - 0.30 mg/dL   Lipase   Result  Value Ref Range    Lipase 57 13 - 60 U/L   CBC with platelets and differential   Result Value Ref Range    WBC Count 9.7 4.0 - 11.0 10e3/uL    RBC Count 6.43 (H) 4.40 - 5.90 10e6/uL    Hemoglobin 12.2 (L) 13.3 - 17.7 g/dL    Hematocrit 40.1 40.0 - 53.0 %    MCV 62 (L) 78 - 100 fL    MCH 19.0 (L) 26.5 - 33.0 pg    MCHC 30.4 (L) 31.5 - 36.5 g/dL    RDW 19.5 (H) 10.0 - 15.0 %    Platelet Count 221 150 - 450 10e3/uL    % Neutrophils 78 %    % Lymphocytes 11 %    % Monocytes 8 %    % Eosinophils 1 %    % Basophils 1 %    % Immature Granulocytes 1 %    NRBCs per 100 WBC 0 <1 /100    Absolute Neutrophils 7.7 1.6 - 8.3 10e3/uL    Absolute Lymphocytes 1.1 0.8 - 5.3 10e3/uL    Absolute Monocytes 0.8 0.0 - 1.3 10e3/uL    Absolute Eosinophils 0.1 0.0 - 0.7 10e3/uL    Absolute Basophils 0.1 0.0 - 0.2 10e3/uL    Absolute Immature Granulocytes 0.1 <=0.4 10e3/uL    Absolute NRBCs 0.0 10e3/uL       Medications - No data to display     X-ray shows patient is constipated but did show significant calcified gallstones.  X-ray of the ribs were unremarkable.  Because of these gallstones I was not sure if this was the cause of his back pain or if it was more muscular like I was initially postulating.  I did blood work but patient has normal liver function test so I think this is muscular like I thought.  For his constipation would have him do MiraLAX until he has normal bowel movements.  For his back recommend conservative care including ice and/or Tylenol and ibuprofen.  Patient will follow-up with his doctor to discuss treatment options for the gallstones that were found.    Assessments & Plan (with Medical Decision Making)  Gallstones, back strain, constipation     I have reviewed the nursing notes.    I have reviewed the findings, diagnosis, plan and need for follow up with the patient.      New Prescriptions    No medications on file       Final diagnoses:   Constipation, unspecified constipation type   Back strain, initial encounter    Gallstones       4/24/2023   [facility] EMERGENCY DEPT     Dariusz Naranjo MD  04/24/23 3623

## 2023-05-21 ENCOUNTER — HEALTH MAINTENANCE LETTER (OUTPATIENT)
Age: 78
End: 2023-05-21

## 2023-07-11 DIAGNOSIS — J44.9 CHRONIC OBSTRUCTIVE PULMONARY DISEASE, UNSPECIFIED COPD TYPE (H): ICD-10-CM

## 2023-07-12 ENCOUNTER — MYC MEDICAL ADVICE (OUTPATIENT)
Dept: FAMILY MEDICINE | Facility: CLINIC | Age: 78
End: 2023-07-12
Payer: COMMERCIAL

## 2023-07-12 RX ORDER — IPRATROPIUM BROMIDE AND ALBUTEROL SULFATE 2.5; .5 MG/3ML; MG/3ML
SOLUTION RESPIRATORY (INHALATION)
Qty: 360 ML | Refills: 0 | Status: SHIPPED | OUTPATIENT
Start: 2023-07-12 | End: 2023-08-25

## 2023-07-12 NOTE — LETTER
7/12/2023        RE: Khang Bailon  [address]  [facility] [address]        We are concerned about your health care.  We recently provided you with a medication refill.  Many medications require routine follow-up with your Doctor.       At this time we ask that: You schedule an appointment for your annual physical. Call the clinic at [phone] Option 1 to schedule.      Your prescription:  Has been filled. Please schedule a follow up visit for first available appointment. Courtesy refills will be given if needed until your scheduled appointment.         Thank you   [facility]  [phone]

## 2023-08-13 ENCOUNTER — HOSPITAL ENCOUNTER (OUTPATIENT)
Facility: CLINIC | Age: 78
Setting detail: OBSERVATION
Discharge: HOME | End: 2023-08-14
Attending: FAMILY MEDICINE | Admitting: STUDENT IN AN ORGANIZED HEALTH CARE EDUCATION/TRAINING PROGRAM
Payer: COMMERCIAL

## 2023-08-13 ENCOUNTER — APPOINTMENT (OUTPATIENT)
Dept: GENERAL RADIOLOGY | Facility: CLINIC | Age: 78
End: 2023-08-13
Attending: FAMILY MEDICINE
Payer: COMMERCIAL

## 2023-08-13 DIAGNOSIS — U07.1 COVID-19: ICD-10-CM

## 2023-08-13 LAB
ALBUMIN SERPL BCG-MCNC: 4 G/DL (ref 3.5–5.2)
ALBUMIN UR-MCNC: NEGATIVE MG/DL
ALP SERPL-CCNC: 68 U/L (ref 40–129)
ALT SERPL W P-5'-P-CCNC: 20 U/L (ref 0–70)
ANION GAP SERPL CALCULATED.3IONS-SCNC: 16 MMOL/L (ref 7–15)
APPEARANCE UR: ABNORMAL
AST SERPL W P-5'-P-CCNC: 25 U/L (ref 0–45)
BASE EXCESS BLDV CALC-SCNC: 0.6 MMOL/L (ref -7.7–1.9)
BASOPHILS # BLD AUTO: 0.1 10E3/UL (ref 0–0.2)
BASOPHILS NFR BLD AUTO: 0 %
BILIRUB SERPL-MCNC: 0.8 MG/DL
BILIRUB UR QL STRIP: NEGATIVE
BUN SERPL-MCNC: 23.7 MG/DL (ref 8–23)
CALCIUM SERPL-MCNC: 9.2 MG/DL (ref 8.8–10.2)
CHLORIDE SERPL-SCNC: 100 MMOL/L (ref 98–107)
COLOR UR AUTO: YELLOW
CREAT SERPL-MCNC: 1.14 MG/DL (ref 0.67–1.17)
CRP SERPL-MCNC: 31.16 MG/L
D DIMER PPP FEU-MCNC: 0.59 UG/ML FEU (ref 0–0.5)
DEPRECATED HCO3 PLAS-SCNC: 22 MMOL/L (ref 22–29)
EOSINOPHIL # BLD AUTO: 0 10E3/UL (ref 0–0.7)
EOSINOPHIL NFR BLD AUTO: 0 %
ERYTHROCYTE [DISTWIDTH] IN BLOOD BY AUTOMATED COUNT: 20.6 % (ref 10–15)
FLUAV RNA SPEC QL NAA+PROBE: NEGATIVE
FLUBV RNA RESP QL NAA+PROBE: NEGATIVE
GFR SERPL CREATININE-BSD FRML MDRD: 66 ML/MIN/1.73M2
GLUCOSE SERPL-MCNC: 121 MG/DL (ref 70–99)
GLUCOSE UR STRIP-MCNC: NEGATIVE MG/DL
HCO3 BLDV-SCNC: 27 MMOL/L (ref 21–28)
HCT VFR BLD AUTO: 42.5 % (ref 40–53)
HGB BLD-MCNC: 13.2 G/DL (ref 13.3–17.7)
HGB UR QL STRIP: NEGATIVE
IMM GRANULOCYTES # BLD: 0.1 10E3/UL
IMM GRANULOCYTES NFR BLD: 1 %
KETONES UR STRIP-MCNC: 20 MG/DL
LACTATE SERPL-SCNC: 0.7 MMOL/L (ref 0.7–2)
LACTATE SERPL-SCNC: 2.2 MMOL/L (ref 0.7–2)
LEUKOCYTE ESTERASE UR QL STRIP: NEGATIVE
LYMPHOCYTES # BLD AUTO: 1.5 10E3/UL (ref 0.8–5.3)
LYMPHOCYTES NFR BLD AUTO: 8 %
MCH RBC QN AUTO: 19.4 PG (ref 26.5–33)
MCHC RBC AUTO-ENTMCNC: 31.1 G/DL (ref 31.5–36.5)
MCV RBC AUTO: 62 FL (ref 78–100)
MONOCYTES # BLD AUTO: 0.5 10E3/UL (ref 0–1.3)
MONOCYTES NFR BLD AUTO: 3 %
MUCOUS THREADS #/AREA URNS LPF: PRESENT /LPF
NEUTROPHILS # BLD AUTO: 16.6 10E3/UL (ref 1.6–8.3)
NEUTROPHILS NFR BLD AUTO: 88 %
NITRATE UR QL: NEGATIVE
NRBC # BLD AUTO: 0.1 10E3/UL
NRBC BLD AUTO-RTO: 0 /100
O2/TOTAL GAS SETTING VFR VENT: 36 %
PCO2 BLDV: 47 MM HG (ref 40–50)
PH BLDV: 7.36 [PH] (ref 7.32–7.43)
PH UR STRIP: 5 [PH] (ref 5–7)
PLAT MORPH BLD: NORMAL
PLATELET # BLD AUTO: 190 10E3/UL (ref 150–450)
PO2 BLDV: 40 MM HG (ref 25–47)
POTASSIUM SERPL-SCNC: 4.4 MMOL/L (ref 3.4–5.3)
PROT SERPL-MCNC: 7 G/DL (ref 6.4–8.3)
RBC # BLD AUTO: 6.81 10E6/UL (ref 4.4–5.9)
RBC MORPH BLD: NORMAL
RBC URINE: 8 /HPF
RSV RNA SPEC NAA+PROBE: NEGATIVE
SARS-COV-2 RNA RESP QL NAA+PROBE: POSITIVE
SODIUM SERPL-SCNC: 138 MMOL/L (ref 136–145)
SP GR UR STRIP: 1.02 (ref 1–1.03)
TROPONIN T SERPL HS-MCNC: 31 NG/L
TROPONIN T SERPL HS-MCNC: 33 NG/L
TROPONIN T SERPL HS-MCNC: 38 NG/L
UROBILINOGEN UR STRIP-MCNC: NORMAL MG/DL
WBC # BLD AUTO: 18.7 10E3/UL (ref 4–11)
WBC URINE: 1 /HPF

## 2023-08-13 PROCEDURE — 258N000003 HC RX IP 258 OP 636: Performed by: FAMILY MEDICINE

## 2023-08-13 PROCEDURE — 83605 ASSAY OF LACTIC ACID: CPT | Performed by: FAMILY MEDICINE

## 2023-08-13 PROCEDURE — 99285 EMERGENCY DEPT VISIT HI MDM: CPT | Mod: 25 | Performed by: FAMILY MEDICINE

## 2023-08-13 PROCEDURE — 82803 BLOOD GASES ANY COMBINATION: CPT | Performed by: FAMILY MEDICINE

## 2023-08-13 PROCEDURE — 96365 THER/PROPH/DIAG IV INF INIT: CPT | Performed by: FAMILY MEDICINE

## 2023-08-13 PROCEDURE — 250N000013 HC RX MED GY IP 250 OP 250 PS 637: Performed by: NURSE PRACTITIONER

## 2023-08-13 PROCEDURE — 81001 URINALYSIS AUTO W/SCOPE: CPT | Performed by: NURSE PRACTITIONER

## 2023-08-13 PROCEDURE — 999N000157 HC STATISTIC RCP TIME EA 10 MIN

## 2023-08-13 PROCEDURE — 87040 BLOOD CULTURE FOR BACTERIA: CPT | Performed by: FAMILY MEDICINE

## 2023-08-13 PROCEDURE — 99207 PR APP CREDIT; MD BILLING SHARED VISIT: CPT | Performed by: NURSE PRACTITIONER

## 2023-08-13 PROCEDURE — 80053 COMPREHEN METABOLIC PANEL: CPT | Performed by: FAMILY MEDICINE

## 2023-08-13 PROCEDURE — 99222 1ST HOSP IP/OBS MODERATE 55: CPT | Mod: FS | Performed by: STUDENT IN AN ORGANIZED HEALTH CARE EDUCATION/TRAINING PROGRAM

## 2023-08-13 PROCEDURE — 250N000013 HC RX MED GY IP 250 OP 250 PS 637: Performed by: FAMILY MEDICINE

## 2023-08-13 PROCEDURE — 36415 COLL VENOUS BLD VENIPUNCTURE: CPT | Performed by: FAMILY MEDICINE

## 2023-08-13 PROCEDURE — 96361 HYDRATE IV INFUSION ADD-ON: CPT | Performed by: FAMILY MEDICINE

## 2023-08-13 PROCEDURE — G0378 HOSPITAL OBSERVATION PER HR: HCPCS

## 2023-08-13 PROCEDURE — 86140 C-REACTIVE PROTEIN: CPT | Performed by: NURSE PRACTITIONER

## 2023-08-13 PROCEDURE — 258N000003 HC RX IP 258 OP 636: Performed by: NURSE PRACTITIONER

## 2023-08-13 PROCEDURE — 96366 THER/PROPH/DIAG IV INF ADDON: CPT | Performed by: FAMILY MEDICINE

## 2023-08-13 PROCEDURE — 87637 SARSCOV2&INF A&B&RSV AMP PRB: CPT | Performed by: FAMILY MEDICINE

## 2023-08-13 PROCEDURE — 96375 TX/PRO/DX INJ NEW DRUG ADDON: CPT | Performed by: FAMILY MEDICINE

## 2023-08-13 PROCEDURE — 84484 ASSAY OF TROPONIN QUANT: CPT | Performed by: NURSE PRACTITIONER

## 2023-08-13 PROCEDURE — 84484 ASSAY OF TROPONIN QUANT: CPT | Mod: 91 | Performed by: FAMILY MEDICINE

## 2023-08-13 PROCEDURE — 36415 COLL VENOUS BLD VENIPUNCTURE: CPT | Performed by: NURSE PRACTITIONER

## 2023-08-13 PROCEDURE — 85025 COMPLETE CBC W/AUTO DIFF WBC: CPT | Performed by: FAMILY MEDICINE

## 2023-08-13 PROCEDURE — 71045 X-RAY EXAM CHEST 1 VIEW: CPT

## 2023-08-13 PROCEDURE — 96372 THER/PROPH/DIAG INJ SC/IM: CPT | Performed by: NURSE PRACTITIONER

## 2023-08-13 PROCEDURE — 93010 ELECTROCARDIOGRAM REPORT: CPT | Performed by: FAMILY MEDICINE

## 2023-08-13 PROCEDURE — 250N000011 HC RX IP 250 OP 636: Mod: JZ | Performed by: FAMILY MEDICINE

## 2023-08-13 PROCEDURE — 82306 VITAMIN D 25 HYDROXY: CPT | Performed by: NURSE PRACTITIONER

## 2023-08-13 PROCEDURE — 85379 FIBRIN DEGRADATION QUANT: CPT | Performed by: NURSE PRACTITIONER

## 2023-08-13 PROCEDURE — 93005 ELECTROCARDIOGRAM TRACING: CPT | Performed by: FAMILY MEDICINE

## 2023-08-13 PROCEDURE — 96376 TX/PRO/DX INJ SAME DRUG ADON: CPT

## 2023-08-13 PROCEDURE — 250N000011 HC RX IP 250 OP 636: Mod: JZ | Performed by: NURSE PRACTITIONER

## 2023-08-13 PROCEDURE — M0247 HC IV INFUSION, SOTROVIMAB, INCL INFUSION AND POST ADMIN MONITORING: HCPCS

## 2023-08-13 RX ORDER — PIPERACILLIN SODIUM, TAZOBACTAM SODIUM 4; .5 G/20ML; G/20ML
4.5 INJECTION, POWDER, LYOPHILIZED, FOR SOLUTION INTRAVENOUS ONCE
Status: COMPLETED | OUTPATIENT
Start: 2023-08-13 | End: 2023-08-13

## 2023-08-13 RX ORDER — IRON/VIT C/FRUCTOOLIGOSACCHARD 30-10-25MG
2 TABLET,CHEWABLE ORAL DAILY
COMMUNITY
End: 2024-01-31

## 2023-08-13 RX ORDER — GUAIFENESIN 600 MG/1
600 TABLET, EXTENDED RELEASE ORAL 2 TIMES DAILY
Status: DISCONTINUED | OUTPATIENT
Start: 2023-08-13 | End: 2023-08-14 | Stop reason: HOSPADM

## 2023-08-13 RX ORDER — GUAIFENESIN/DEXTROMETHORPHAN 100-10MG/5
10 SYRUP ORAL EVERY 4 HOURS PRN
Status: DISCONTINUED | OUTPATIENT
Start: 2023-08-13 | End: 2023-08-13

## 2023-08-13 RX ORDER — PIPERACILLIN SODIUM, TAZOBACTAM SODIUM 3; .375 G/15ML; G/15ML
3.38 INJECTION, POWDER, LYOPHILIZED, FOR SOLUTION INTRAVENOUS EVERY 6 HOURS
Status: DISCONTINUED | OUTPATIENT
Start: 2023-08-13 | End: 2023-08-14 | Stop reason: HOSPADM

## 2023-08-13 RX ORDER — PIPERACILLIN SODIUM, TAZOBACTAM SODIUM 3; .375 G/15ML; G/15ML
3.38 INJECTION, POWDER, LYOPHILIZED, FOR SOLUTION INTRAVENOUS EVERY 6 HOURS
Status: DISCONTINUED | OUTPATIENT
Start: 2023-08-13 | End: 2023-08-13

## 2023-08-13 RX ORDER — DEXAMETHASONE SODIUM PHOSPHATE 10 MG/ML
10 INJECTION, SOLUTION INTRAMUSCULAR; INTRAVENOUS ONCE
Status: COMPLETED | OUTPATIENT
Start: 2023-08-13 | End: 2023-08-13

## 2023-08-13 RX ORDER — VITAMIN B COMPLEX
25 TABLET ORAL DAILY
Status: DISCONTINUED | OUTPATIENT
Start: 2023-08-13 | End: 2023-08-14 | Stop reason: HOSPADM

## 2023-08-13 RX ORDER — BENZONATATE 100 MG/1
100 CAPSULE ORAL 3 TIMES DAILY PRN
Status: DISCONTINUED | OUTPATIENT
Start: 2023-08-13 | End: 2023-08-14 | Stop reason: HOSPADM

## 2023-08-13 RX ORDER — ACETAMINOPHEN 650 MG/1
650 SUPPOSITORY RECTAL EVERY 6 HOURS PRN
Status: DISCONTINUED | OUTPATIENT
Start: 2023-08-13 | End: 2023-08-14 | Stop reason: HOSPADM

## 2023-08-13 RX ORDER — ACETAMINOPHEN 325 MG/1
975 TABLET ORAL EVERY 6 HOURS PRN
Status: DISCONTINUED | OUTPATIENT
Start: 2023-08-13 | End: 2023-08-14 | Stop reason: HOSPADM

## 2023-08-13 RX ORDER — COVID-19 ANTIGEN TEST
440 KIT MISCELLANEOUS DAILY PRN
COMMUNITY
End: 2023-11-27

## 2023-08-13 RX ORDER — ENOXAPARIN SODIUM 100 MG/ML
40 INJECTION SUBCUTANEOUS EVERY 24 HOURS
Status: DISCONTINUED | OUTPATIENT
Start: 2023-08-13 | End: 2023-08-14 | Stop reason: HOSPADM

## 2023-08-13 RX ORDER — GUAIFENESIN 600 MG/1
600 TABLET, EXTENDED RELEASE ORAL 2 TIMES DAILY
Status: DISCONTINUED | OUTPATIENT
Start: 2023-08-13 | End: 2023-08-13

## 2023-08-13 RX ORDER — ASCORBIC ACID 500 MG
500 TABLET ORAL 2 TIMES DAILY
Status: DISCONTINUED | OUTPATIENT
Start: 2023-08-13 | End: 2023-08-14 | Stop reason: HOSPADM

## 2023-08-13 RX ORDER — ONDANSETRON 2 MG/ML
4 INJECTION INTRAMUSCULAR; INTRAVENOUS EVERY 30 MIN PRN
Status: DISCONTINUED | OUTPATIENT
Start: 2023-08-13 | End: 2023-08-14 | Stop reason: HOSPADM

## 2023-08-13 RX ORDER — ALBUTEROL SULFATE 90 UG/1
6 AEROSOL, METERED RESPIRATORY (INHALATION) ONCE
Status: COMPLETED | OUTPATIENT
Start: 2023-08-13 | End: 2023-08-13

## 2023-08-13 RX ORDER — ONDANSETRON 2 MG/ML
4 INJECTION INTRAMUSCULAR; INTRAVENOUS EVERY 6 HOURS PRN
Status: DISCONTINUED | OUTPATIENT
Start: 2023-08-13 | End: 2023-08-14 | Stop reason: HOSPADM

## 2023-08-13 RX ORDER — ZINC SULFATE 50(220)MG
220 CAPSULE ORAL DAILY
Status: DISCONTINUED | OUTPATIENT
Start: 2023-08-13 | End: 2023-08-14 | Stop reason: HOSPADM

## 2023-08-13 RX ORDER — DEXAMETHASONE SODIUM PHOSPHATE 10 MG/ML
8 INJECTION, SOLUTION INTRAMUSCULAR; INTRAVENOUS ONCE
Status: DISCONTINUED | OUTPATIENT
Start: 2023-08-13 | End: 2023-08-13

## 2023-08-13 RX ORDER — ALBUTEROL SULFATE 90 UG/1
2 AEROSOL, METERED RESPIRATORY (INHALATION) EVERY 4 HOURS PRN
Status: DISCONTINUED | OUTPATIENT
Start: 2023-08-13 | End: 2023-08-14 | Stop reason: HOSPADM

## 2023-08-13 RX ADMIN — PIPERACILLIN AND TAZOBACTAM 4.5 G: 4; .5 INJECTION, POWDER, FOR SOLUTION INTRAVENOUS at 15:02

## 2023-08-13 RX ADMIN — Medication 25 MCG: at 18:31

## 2023-08-13 RX ADMIN — ENOXAPARIN SODIUM 40 MG: 40 INJECTION SUBCUTANEOUS at 19:23

## 2023-08-13 RX ADMIN — OXYCODONE HYDROCHLORIDE AND ACETAMINOPHEN 500 MG: 500 TABLET ORAL at 22:05

## 2023-08-13 RX ADMIN — Medication 220 MG: at 18:31

## 2023-08-13 RX ADMIN — REMDESIVIR 200 MG: 100 INJECTION, POWDER, LYOPHILIZED, FOR SOLUTION INTRAVENOUS at 18:35

## 2023-08-13 RX ADMIN — DEXAMETHASONE SODIUM PHOSPHATE 10 MG: 10 INJECTION, SOLUTION INTRAMUSCULAR; INTRAVENOUS at 14:23

## 2023-08-13 RX ADMIN — SODIUM CHLORIDE 50 ML: 9 INJECTION, SOLUTION INTRAVENOUS at 19:33

## 2023-08-13 RX ADMIN — PIPERACILLIN AND TAZOBACTAM 3.38 G: 3; .375 INJECTION, POWDER, FOR SOLUTION INTRAVENOUS at 22:05

## 2023-08-13 RX ADMIN — ONDANSETRON 4 MG: 2 INJECTION INTRAMUSCULAR; INTRAVENOUS at 14:20

## 2023-08-13 RX ADMIN — SODIUM CHLORIDE 1000 ML: 9 INJECTION, SOLUTION INTRAVENOUS at 14:20

## 2023-08-13 RX ADMIN — ALBUTEROL SULFATE 6 PUFF: 90 AEROSOL, METERED RESPIRATORY (INHALATION) at 14:27

## 2023-08-13 RX ADMIN — GUAIFENESIN 600 MG: 600 TABLET ORAL at 22:05

## 2023-08-13 ASSESSMENT — ACTIVITIES OF DAILY LIVING (ADL)
ADLS_ACUITY_SCORE: 35
ADLS_ACUITY_SCORE: 23
DOING_ERRANDS_INDEPENDENTLY_DIFFICULTY: NO
TOILETING_ISSUES: NO
EQUIPMENT_CURRENTLY_USED_AT_HOME: CANE, STRAIGHT;OTHER (SEE COMMENTS)
WALKING_OR_CLIMBING_STAIRS_DIFFICULTY: NO
HEARING_DIFFICULTY_OR_DEAF: NO
ADLS_ACUITY_SCORE: 23
ADLS_ACUITY_SCORE: 35
DRESSING/BATHING_DIFFICULTY: NO
DIFFICULTY_EATING/SWALLOWING: NO
CONCENTRATING,_REMEMBERING_OR_MAKING_DECISIONS_DIFFICULTY: NO
WERE_AUXILIARY_AIDS_OFFERED?: NO
DIFFICULTY_COMMUNICATING: NO
CHANGE_IN_FUNCTIONAL_STATUS_SINCE_ONSET_OF_CURRENT_ILLNESS/INJURY: YES
WEAR_GLASSES_OR_BLIND: YES
FALL_HISTORY_WITHIN_LAST_SIX_MONTHS: NO
ADLS_ACUITY_SCORE: 23

## 2023-08-13 NOTE — ED PROVIDER NOTES
History     Chief Complaint   Patient presents with    Shortness of Breath     HPI  Khang Bailon is a 78 year old male who presents with increasing shortness of breath, chills and body aches and not feeling well.  Patient started feeling like this last night.  Patient's wife was recent diagnosed with COVID and is currently hospitalized.  Patient denies any current chest pain.  Patient has had some nausea and then proceeded to vomit when he arrived in the department.  Patient has not checked his temperature at home with a thermometer.  Denies any back pain.  Is having a little bit of a sore throat.  Patient is having a stuffy nose.  Nothing makes his symptoms better or worse.  Patient does have a history of COPD.  When patient arrived in the room he did have an episode of emesis and appeared to lose consciousness while in the chair.  We got the patient very quickly to the bed and patient came around very quickly.    Allergies:  Allergies   Allergen Reactions    Flagyl [Metronidazole] GI Disturbance       Problem List:    Patient Active Problem List    Diagnosis Date Noted    COVID-19 08/13/2023     Priority: Medium    BPH with elevated PSA 11/01/2021     Priority: Medium    Fatigue, unspecified type 11/29/2019     Priority: Medium    Memory loss 11/29/2019     Priority: Medium    Moderate COPD (chronic obstructive pulmonary disease) (H) 11/16/2017     Priority: Medium    Benign neoplasm of colon, unspecified part of colon 07/26/2016     Priority: Medium    Sigmoid diverticulitis 07/12/2016     Priority: Medium    History of CVA (cerebrovascular accident) - old left internal capsule lacunar infarct on CT 7/2016 07/12/2016     Priority: Medium    Multilevel degenerative disc disease 09/21/2015     Priority: Medium    Elevated prostate specific antigen (PSA) 09/11/2015     Priority: Medium    Health Care Home 01/24/2013     Priority: Medium     Racquel Ortiz RN-PHN  FPA / [facility] Case  Manager  [phone] b  DX V65.8 REPLACED WITH [id number] (2013)      Elevated PSA 2012     Priority: Medium    Loss of weight 2012     Priority: Medium    Third degree heart block (H) - s/p pacemaker 08/10/2012     Priority: Medium    Hypertension goal BP (blood pressure) < 140/90 08/10/2012     Priority: Medium    Advanced directives, counseling/discussion 2012     Priority: Medium     Discussed advance care planning with patient; however, patient declined at this time. 2012         Thalassemia 2012     Priority: Medium     Diagnosis updated by automated process. Provider to review and confirm.      Hyperlipidemia LDL goal <130 2012     Priority: Medium    Painful respiration 2004     Priority: Medium    Personal history of tobacco use, presenting hazards to health 2004     Priority: Medium        Past Medical History:    Past Medical History:   Diagnosis Date    Alpha thalassemia (H)     AV block 2012    Diverticulitis     Injury, other and unspecified, elbow, forearm, and wrist 1970s       Past Surgical History:    Past Surgical History:   Procedure Laterality Date    COLONOSCOPY      COLONOSCOPY  12/10/2012    CYSTOSCOPY, LITHOLAPAXY, COMBINED N/A 11/10/2021    Procedure: Cystoscopy, Bladder Stone Removal;  Surgeon: Guanaco Stewart MD;  Location: [facility] OR    IMPLANT PACEMAKER      LASER KTP TRANSURETHRAL RESECTION (TUR) PROSTATE N/A 11/10/2021    Procedure: TRANSURETHRAL RESECTION (TUR) PROSTATE, USING KTP LASER;  Surgeon: Guanaco Stewart MD;  Location: [facility] OR       Family History:    History reviewed. No pertinent family history.    Social History:  Marital Status:   [2]  Social History     Tobacco Use    Smoking status: Former     Years: 50.00     Types: Cigarettes     Quit date: 2012     Years since quittin.0    Smokeless tobacco: Never    Tobacco comments:     Quit in July   Vaping Use    Vaping Use: Never used    Substance Use Topics    Alcohol use: No     Alcohol/week: 0.0 standard drinks of alcohol    Drug use: No        Medications:    Ascorbic Acid (VITAMIN C PO)  B Complex Vitamins (B COMPLEX PO)  DIGESTIVE ENZYMES PO  ipratropium - albuterol 0.5 mg/2.5 mg/3 mL (DUONEB) 0.5-2.5 (3) MG/3ML neb solution  Iron Carbonyl-Vitamin C-FOS (CHEWABLE IRON) 30-10-25 MG CHEW  Multiple Vitamin (MULTI VITAMIN  MENS) TABS  naproxen sodium 220 MG capsule  order for DME  Vitamin D-Vitamin K (VITAMIN K2-VITAMIN D3 PO)          Review of Systems   All other systems reviewed and are negative.      Physical Exam   BP: 119/75  Pulse: 113  Temp: 98.5  F (36.9  C)  Resp: 26  SpO2: 91 %      Physical Exam  Vitals and nursing note reviewed.   Constitutional:       General: He is not in acute distress.     Appearance: He is well-developed. He is not diaphoretic.   HENT:      Head: Normocephalic and atraumatic.      Nose: Nose normal.      Mouth/Throat:      Pharynx: No oropharyngeal exudate.   Eyes:      General: No scleral icterus.     Conjunctiva/sclera: Conjunctivae normal.      Pupils: Pupils are equal, round, and reactive to light.   Cardiovascular:      Rate and Rhythm: Normal rate and regular rhythm.      Heart sounds: Normal heart sounds. No murmur heard.     No friction rub.   Pulmonary:      Effort: Pulmonary effort is normal. No respiratory distress.      Breath sounds: Rhonchi present. No wheezing or rales.   Abdominal:      General: Bowel sounds are normal. There is no distension.      Palpations: Abdomen is soft. There is no mass.      Tenderness: There is no abdominal tenderness. There is no guarding or rebound.   Musculoskeletal:         General: No tenderness. Normal range of motion.      Cervical back: Normal range of motion.   Skin:     General: Skin is warm.      Findings: No rash.   Neurological:      Mental Status: He is alert and oriented to person, place, and time.   Psychiatric:         Judgment: Judgment normal.          ED Course                 Procedures    EKG: Reviewed by me  Ventricular paced rhythm  No further interpretation available  Impression: Ventricular paced rhythm      Results for orders placed or performed during the hospital encounter of 08/13/23 (from the past 24 hour(s))   CBC with platelets differential    Narrative    The following orders were created for panel order CBC with platelets differential.  Procedure                               Abnormality         Status                     ---------                               -----------         ------                     CBC with platelets and d...[[id number]]  Abnormal            Final result               RBC and Platelet Morphology[[id number]]                      Final result                 Please view results for these tests on the individual orders.   Comprehensive metabolic panel   Result Value Ref Range    Sodium 138 136 - 145 mmol/L    Potassium 4.4 3.4 - 5.3 mmol/L    Chloride 100 98 - 107 mmol/L    Carbon Dioxide (CO2) 22 22 - 29 mmol/L    Anion Gap 16 (H) 7 - 15 mmol/L    Urea Nitrogen 23.7 (H) 8.0 - 23.0 mg/dL    Creatinine 1.14 0.67 - 1.17 mg/dL    Calcium 9.2 8.8 - 10.2 mg/dL    Glucose 121 (H) 70 - 99 mg/dL    Alkaline Phosphatase 68 40 - 129 U/L    AST 25 0 - 45 U/L    ALT 20 0 - 70 U/L    Protein Total 7.0 6.4 - 8.3 g/dL    Albumin 4.0 3.5 - 5.2 g/dL    Bilirubin Total 0.8 <=1.2 mg/dL    GFR Estimate 66 >60 mL/min/1.73m2   Lactic acid whole blood   Result Value Ref Range    Lactic Acid 2.2 (H) 0.7 - 2.0 mmol/L   Troponin T, High Sensitivity   Result Value Ref Range    Troponin T, High Sensitivity 38 (H) <=22 ng/L   Blood gas venous   Result Value Ref Range    pH Venous 7.36 7.32 - 7.43    pCO2 Venous 47 40 - 50 mm Hg    pO2 Venous 40 25 - 47 mm Hg    Bicarbonate Venous 27 21 - 28 mmol/L    Base Excess/Deficit (+/-) 0.6 -7.7 - 1.9 mmol/L    FIO2 36    CBC with platelets and differential   Result Value Ref Range    WBC Count 18.7 (H) 4.0 -  11.0 10e3/uL    RBC Count 6.81 (H) 4.40 - 5.90 10e6/uL    Hemoglobin 13.2 (L) 13.3 - 17.7 g/dL    Hematocrit 42.5 40.0 - 53.0 %    MCV 62 (L) 78 - 100 fL    MCH 19.4 (L) 26.5 - 33.0 pg    MCHC 31.1 (L) 31.5 - 36.5 g/dL    RDW 20.6 (H) 10.0 - 15.0 %    Platelet Count 190 150 - 450 10e3/uL    % Neutrophils 88 %    % Lymphocytes 8 %    % Monocytes 3 %    % Eosinophils 0 %    % Basophils 0 %    % Immature Granulocytes 1 %    NRBCs per 100 WBC 0 <1 /100    Absolute Neutrophils 16.6 (H) 1.6 - 8.3 10e3/uL    Absolute Lymphocytes 1.5 0.8 - 5.3 10e3/uL    Absolute Monocytes 0.5 0.0 - 1.3 10e3/uL    Absolute Eosinophils 0.0 0.0 - 0.7 10e3/uL    Absolute Basophils 0.1 0.0 - 0.2 10e3/uL    Absolute Immature Granulocytes 0.1 <=0.4 10e3/uL    Absolute NRBCs 0.1 10e3/uL   RBC and Platelet Morphology   Result Value Ref Range    Platelet Assessment  Automated Count Confirmed. Platelet morphology is normal.     Automated Count Confirmed. Platelet morphology is normal.    RBC Morphology Confirmed RBC Indices    Symptomatic Influenza A/B, RSV, & SARS-CoV2 PCR (COVID-19) Nasopharyngeal    Specimen: Nasopharyngeal; Swab   Result Value Ref Range    Influenza A PCR Negative Negative    Influenza B PCR Negative Negative    RSV PCR Negative Negative    SARS CoV2 PCR Positive (A) Negative    Narrative    Testing was performed using the Xpert Xpress CoV2/Flu/RSV Assay on the Cepheid GeneXpert Instrument. This test should be ordered for the detection of SARS-CoV-2, influenza, and RSV viruses in individuals who meet clinical and/or epidemiological criteria. Test performance is unknown in asymptomatic patients. This test is for in vitro diagnostic use under the FDA EUA for laboratories certified under CLIA to perform high or moderate complexity testing. This test has not been FDA cleared or approved. A negative result does not rule out the presence of PCR inhibitors in the specimen or target RNA in concentration below the limit of detection for  the assay. If only one viral target is positive but coinfection with multiple targets is suspected, the sample should be re-tested with another FDA cleared, approved, or authorized test, if coinfection would change clinical management. This test was validated by the [facility]. These laboratories are certified under the Clinical Laboratory Improvement Amendments of 1988 (CLIA-88) as qualified to perform high complexity laboratory testing.   Extra Tube    Narrative    The following orders were created for panel order Extra Tube.  Procedure                               Abnormality         Status                     ---------                               -----------         ------                     Extra Blue Top Tube[[id number]]                                                           Please view results for these tests on the individual orders.   XR Chest Port 1 View    Narrative    EXAM: XR CHEST PORT 1 VIEW  LOCATION: [facility]  DATE: 8/13/2023    INDICATION: Shortness of breath  COMPARISON: Chest radiograph 04/24/2023      Impression    IMPRESSION: Stable cardiomediastinal silhouette with pacemaker leads overlying the right atrium and right ventricle. Upper lobe predominant oligemia compatible with known emphysema. No definite airspace consolidation, pleural effusion or pneumothorax. No   acute bony abnormality.   Lactic acid whole blood   Result Value Ref Range    Lactic Acid 0.7 0.7 - 2.0 mmol/L   Troponin T, High Sensitivity   Result Value Ref Range    Troponin T, High Sensitivity 33 (H) <=22 ng/L       Medications   ondansetron (ZOFRAN) injection 4 mg (4 mg Intravenous $Given 8/13/23 1420)   albuterol (PROVENTIL HFA/VENTOLIN HFA) inhaler (6 puffs Inhalation $Given 8/13/23 1427)   0.9% sodium chloride BOLUS (0 mLs Intravenous Stopped 8/13/23 1633)   dexAMETHasone PF (DECADRON) injection 10 mg (10 mg Intravenous $Given 8/13/23 1423)    piperacillin-tazobactam (ZOSYN) 4.5 g vial to attach to  mL bag (4.5 g Intravenous $New Bag 8/13/23 0234)     Labs have come back and patient's COVID did come back positive which is not surprising.  Patient's initial lactic was elevated at 2.2.  I am not sure if patient necessarily is a septic but I did give the patient a liter of fluids and 1 dose of Zosyn to cover for possible bacterial sepsis.  Recheck of the lactic shows that it has normalized down to 0.7.  X-ray was clear and the remainder of the labs were unremarkable.  Patient's troponin was slightly elevated but repeat after 2 hours was unchanged.  We were able to wean the patient down off oxygen but occasionally he would still drift down into the upper 80s.  He does seem better compared to earlier when he first arrived.  At this point because he is still requiring some oxygen at times, would recommend observation in the hospital overnight.  Patient was given 10 of Decadron for the COVID.  I have not started remdesivir or anything like that.  I have discussed the case with the hospitalist and they are okay with admission.    Assessments & Plan (with Medical Decision Making)  COVID-19     I have reviewed the nursing notes.    I have reviewed the findings, diagnosis, plan and need for follow up with the patient.        8/13/2023   [facility] EMERGENCY DEPT       Dariusz Naranjo MD  08/13/23 2527

## 2023-08-13 NOTE — H&P
[facility]    History and Physical - Hospitalist Service       Date of Admission:  8/13/2023    Assessment & Plan   Khang Bailon is a 78 year old male admitted on 8/13/2023 with past medical history of Moderate COPD, pacemaker in situ, History of Atrioventricular block complete presents to ED with complaints of increasing shortness of breath, chills, body aches, diarrhea, vomiting. Patient found to be COVID 19 positive. Patient wife currently admitted inpatient with COVID 19 infection.     # Confirmed COVID-19 infection    Sepsis secondary to above   Leukocytosis   History of moderate COPD      Symptom Onset 8/12/23   Date of 1st Positive Test 8/13/23   Vaccination Status Declines Vaccine       - COVID-19 special precautions, continuous pulse-ox  - currently RA , Oxygen as needed. titrate to keep SpO2 between 90-96%  -encourage use IS   - Labs: Standard COVID admission labs ordered (CBC with diff, CMP, INR, D-dimer, CRP).   -lactic acid on admission 2.2, repeat 0.7  - Imaging: CXR: Stable cardiomediastinal silhouette with pacemaker leads overlying the right atrium and right ventricle. Upper lobe predominant oligemia compatible with known emphysema. No definite airspace consolidation, pleural effusion or pneumothorax. No acute bony abnormality.  - Breathing treatments:  albuterol inhaler as needed  ; avoid nebulizers in favor of MDIs   - IV fluids:  1 L bolus; hydrate cautiously to avoid worsening respiratory status with volume overload.  - Antibiotics:  Zosyn     - COVID-Focused Medications: Dexamethasone 6 mg x 10 days or until hospital discharge, started on 8/13/23 and Remdesivir x 3 days or until hospital discharge, started on 8/13/23  -tele monitoring   -EKG as needed   - DVT Prophylaxis:         - At high risk of thrombotic complications due to COVID-19 (DDimer = N/A )         - - PROPHYLACTIC dosing if d-dimer less than 10x upper limit of normal  -supportive care  -blood  cultures pending  -empiric vitamin C, vitamin D, Zinc     Elevated troponin   -likely secondary to COVID 19 infection  -on admission troponin 38, 33, 31  -No complaints of Chest pain  -will collect another troponin to trend level, no further work up needed unless status change    Pacemaker in situ  History of Atrioventricular block complete     Diet: Regular   DVT Prophylaxis: Enoxaparin (Lovenox) SQ  Hernandez Catheter: Not present  Lines: None     Cardiac Monitoring: None  Code Status: full     Clinically Significant Risk Factors Present on Admission                  # Hypertension: Noted on problem list               Disposition Plan      Expected Discharge Date: 08/14/2023                The patient's care was discussed with the Attending Physician, Dr. Daily .    Elliot Mo, CNP  Hospitalist Service  [facility]  Securely message with Vocera (more info)  Text page via [facility] Paging/Directory     ______________________________________________________________________    Chief Complaint   Increasing shortness of breath, chills, body aches, diarrhea, vomiting.    History is obtained from the patient and medical records.     History of Present Illness   Khang Bailon is a 78 year old male admitted on 8/13/2023 with past medical history of Moderate COPD, pacemaker in situ, History of Atrioventricular block complete presents to ED with complaints of increasing shortness of breath, chills, body aches, diarrhea, vomiting. Patient found to be COVID 19 positive. Patient wife currently admitted inpatient with COVID 19 infection. Patient reported he started not to feel well last night. Patient stated he had some nausea and then proceed to vomit prior to hospital arrival. Patient doesn't know if he had a fever due to him not checking his temperature at home. Patient also reports having stuff nose and unable to taste. Denies any chest ain or back pain. In the ED, provider notes patient had  episode of emesis and appeared to loss consciousness while in the chair. Patient was put in bed and patient came around very quickly.       Past Medical History    Past Medical History:   Diagnosis Date    Alpha thalassemia (H)     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    pacemaker at [facility]    Diverticulitis     Injury, other and unspecified, elbow, forearm, and wrist 1970s    Broke right wrist       Past Surgical History   Past Surgical History:   Procedure Laterality Date    COLONOSCOPY      COLONOSCOPY  12/10/2012    CYSTOSCOPY, LITHOLAPAXY, COMBINED N/A 11/10/2021    Procedure: Cystoscopy, Bladder Stone Removal;  Surgeon: Guanaco Stewart MD;  Location: [facility] OR    IMPLANT PACEMAKER      LASER KTP TRANSURETHRAL RESECTION (TUR) PROSTATE N/A 11/10/2021    Procedure: TRANSURETHRAL RESECTION (TUR) PROSTATE, USING KTP LASER;  Surgeon: Guanaco Stewart MD;  Location: [facility] OR       Prior to Admission Medications   Prior to Admission Medications   Prescriptions Last Dose Informant Patient Reported? Taking?   Ascorbic Acid (VITAMIN C PO) 8/13/2023 at am Self Yes Yes   Sig: Take 1 capful by mouth daily (Isotonix)   B Complex Vitamins (B COMPLEX PO) 8/13/2023 at am Self Yes Yes   Sig: Take 1 capful by mouth daily (Isotonix)   DIGESTIVE ENZYMES PO 8/13/2023 at am Self Yes Yes   Sig: Take 1 capful by mouth daily (Isotonix - w/ probiotics)   Iron Carbonyl-Vitamin C-FOS (CHEWABLE IRON) 30-10-25 MG CHEW 8/13/2023 at am  Yes Yes   Sig: Take 2 chew tab by mouth daily   Multiple Vitamin (MULTI VITAMIN  MENS) TABS 8/13/2023 at am Self Yes Yes   Sig: Take 1 capful by mouth daily (Isotonix)   Vitamin D-Vitamin K (VITAMIN K2-VITAMIN D3 PO) 8/13/2023 at am Self Yes Yes   Sig: Take 1 capful by mouth daily (Isotonix)   ipratropium - albuterol 0.5 mg/2.5 mg/3 mL (DUONEB) 0.5-2.5 (3) MG/3ML neb solution 8/13/2023 at 0620  No Yes   Sig: NEBULIZE CONTENTS OF ONE VIAL EVERY 6 HOURS AS NEEDED FOR SHORTNESS OF BREATH / DYSPNEA OR WHEEZING   Patient taking  differently: Take 1 vial by nebulization every 4 hours as needed for shortness of breath or wheezing   naproxen sodium 220 MG capsule 8/13/2023 at am  Yes Yes   Sig: Take 440 mg by mouth daily as needed (pain)   order for DME 8/13/2023 at am Self No Yes   Sig: Equipment being ordered: Nebulizer hose      Facility-Administered Medications: None          Physical Exam   Vital Signs: Temp: 98.5  F (36.9  C) Temp src: Temporal BP: 127/81 Pulse: 77   Resp: 16 SpO2: 92 % O2 Device: None (Room air)    Weight: 0 lbs 0 oz    Constitutional: no distress, cooperative, alert, oriented, normal mood  Cardiovascular:RRR  Respiratory:  breath sounds rhonchi present. Currently RA.   Gastrointestinal: Abdomen soft, non-tender. BS normal. No masses.  Skin: warm, dry, intact, no edema      Medical Decision Making       70 MINUTES SPENT BY ME on the date of service doing chart review, history, exam, documentation & further activities per the note.  MANAGEMENT DISCUSSED with the following over the past 24 hours: patient and Dr. Daily       Data     I have personally reviewed the following data over the past 24 hrs:    18.7 (H)  \   13.2 (L)   / 190     138 100 23.7 (H) /  121 (H)   4.4 22 1.14 \     ALT: 20 AST: 25 AP: 68 TBILI: 0.8   ALB: 4.0 TOT PROTEIN: 7.0 LIPASE: N/A     Trop: 33 (H) BNP: N/A     Procal: N/A CRP: N/A Lactic Acid: 0.7         Imaging results reviewed over the past 24 hrs:   Recent Results (from the past 24 hour(s))   XR Chest Port 1 View    Narrative    EXAM: XR CHEST PORT 1 VIEW  LOCATION: [facility]  DATE: 8/13/2023    INDICATION: Shortness of breath  COMPARISON: Chest radiograph 04/24/2023      Impression    IMPRESSION: Stable cardiomediastinal silhouette with pacemaker leads overlying the right atrium and right ventricle. Upper lobe predominant oligemia compatible with known emphysema. No definite airspace consolidation, pleural effusion or pneumothorax. No   acute bony  abnormality.

## 2023-08-13 NOTE — MEDICATION SCRIBE - ADMISSION MEDICATION HISTORY
Medication Scribe Admission Medication History    Admission medication history is complete. The information provided in this note is only as accurate as the sources available at the time of the update.    Medication reconciliation/reorder completed by provider prior to medication history? No    Information Source(s): Patient via in-person    Pertinent Information: n/a    Changes made to PTA medication list:  Added: iron and aleve  Deleted: inhalers, cream and norco  Changed: None    Medication Affordability:  Not including over the counter (OTC) medications, was there a time in the past 3 months when you did not take your medications as prescribed because of cost?: Unable to Assess    Allergies reviewed with patient and updates made in EHR: yes    Medication History Completed By: CAESAR ANGELA 8/13/2023 2:37 PM    Prior to Admission medications    Medication Sig Last Dose Taking? Auth Provider Long Term End Date   Ascorbic Acid (VITAMIN C PO) Take 1 capful by mouth daily (Isotonix) 8/13/2023 at am Yes Reported, Patient     B Complex Vitamins (B COMPLEX PO) Take 1 capful by mouth daily (Isotonix) 8/13/2023 at am Yes Reported, Patient     DIGESTIVE ENZYMES PO Take 1 capful by mouth daily (Isotonix - w/ probiotics) 8/13/2023 at am Yes Reported, Patient     ipratropium - albuterol 0.5 mg/2.5 mg/3 mL (DUONEB) 0.5-2.5 (3) MG/3ML neb solution NEBULIZE CONTENTS OF ONE VIAL EVERY 6 HOURS AS NEEDED FOR SHORTNESS OF BREATH / DYSPNEA OR WHEEZING  Patient taking differently: Take 1 vial by nebulization every 4 hours as needed for shortness of breath or wheezing 8/13/2023 at 0620 Yes Dorcas Carbone PA-C Yes    Iron Carbonyl-Vitamin C-FOS (CHEWABLE IRON) 30-10-25 MG CHEW Take 2 chew tab by mouth daily 8/13/2023 at am Yes Reported, Patient     Multiple Vitamin (MULTI VITAMIN  MENS) TABS Take 1 capful by mouth daily (Isotonix) 8/13/2023 at am Yes Reported, Patient     naproxen sodium 220 MG capsule Take 440 mg by mouth daily as  needed (pain) 8/13/2023 at am Yes Reported, Patient     order for DME Equipment being ordered: Nebulizer hose 8/13/2023 at am Yes Cecilio Trejo MD     Vitamin D-Vitamin K (VITAMIN K2-VITAMIN D3 PO) Take 1 capful by mouth daily (Isotonix) 8/13/2023 at am Yes Reported, Patient

## 2023-08-13 NOTE — ED TRIAGE NOTES
COPD worsening sob last few days - nebs and inhaler at home. Coughing, fevers. Pt is tachypneic in triage.      Triage Assessment       Row Name 08/13/23 2611       Triage Assessment (Adult)    Airway WDL WDL       Respiratory WDL    Respiratory WDL WDL       Cardiac WDL    Cardiac WDL WDL

## 2023-08-13 NOTE — PROGRESS NOTES
S-(situation): Patient registered to Observation. Patient arrived to room 250 via cart at 1722 from ED and transferred self to bed with assistance after being weighed.    B-(background): Shortness of breath with aching, chills, sore throat, emesis; Covid positive    A-(assessment): Fatigued and poor appetite. Lungs with pleural rub and loose cough. Afebrile. Saline locked. Weak. Oriented x4.    R-(recommendations): Orders and observation goals reviewed with patient.    Nursing Observation criteria listed below was met:    Skin issues/needs documented:NA  Isolation needs addressed and Signage up: Yes  Fall Prevention: Education given and documented: Yes  Education Assessment documented:Yes  Admission Education Documented: Yes  New medication patient education completed and documented (Possible Side Effects of Common Medications handout): Yes  OBS video/handout Reviewed & Documented: Yes  Allergies Reviewed: Yes  Medication Reconciliation Complete: Yes  Home medications if not able to send immediately home with family stored here: NA  Reminder note placed in discharge instructions of home meds: NA  Patient has discharge needs (If yes, please explain): No  Patient discharge preferences addressed and charted on white board:  Yes  Provider notified that patient has arrived to the unit: Yes

## 2023-08-13 NOTE — ED NOTES
PT passed out in wheel chair,  staff picked him up and put him in to bed, placed on O2 NC at 4LPM.  Pt became alert again., kept on 4L pt at 97% SAT reading.

## 2023-08-13 NOTE — ED NOTES
ED Nursing criteria listed below was addressed during verbal handoff:     Abnormal vitals: Yes  Abnormal results: Yes  Med Reconciliation completed: Yes  Meds given in ED: Yes  Any Overdue Meds: No  Core Measures: N/A  Isolation: Yes  Special needs: No  Skin assessment: Yes, skin intact    Observation Patient  Education provided: Yes

## 2023-08-14 ENCOUNTER — APPOINTMENT (OUTPATIENT)
Dept: CT IMAGING | Facility: CLINIC | Age: 78
End: 2023-08-14
Attending: NURSE PRACTITIONER
Payer: COMMERCIAL

## 2023-08-14 ENCOUNTER — APPOINTMENT (OUTPATIENT)
Dept: PHYSICAL THERAPY | Facility: CLINIC | Age: 78
End: 2023-08-14
Attending: NURSE PRACTITIONER
Payer: COMMERCIAL

## 2023-08-14 VITALS
TEMPERATURE: 98.7 F | BODY MASS INDEX: 25.3 KG/M2 | DIASTOLIC BLOOD PRESSURE: 89 MMHG | RESPIRATION RATE: 22 BRPM | HEIGHT: 71 IN | WEIGHT: 180.7 LBS | SYSTOLIC BLOOD PRESSURE: 156 MMHG | HEART RATE: 79 BPM | OXYGEN SATURATION: 96 %

## 2023-08-14 LAB
ANION GAP SERPL CALCULATED.3IONS-SCNC: 11 MMOL/L (ref 7–15)
BUN SERPL-MCNC: 19.6 MG/DL (ref 8–23)
CALCIUM SERPL-MCNC: 8.9 MG/DL (ref 8.8–10.2)
CHLORIDE SERPL-SCNC: 105 MMOL/L (ref 98–107)
CREAT SERPL-MCNC: 1 MG/DL (ref 0.67–1.17)
CRP SERPL-MCNC: 59.57 MG/L
D DIMER PPP FEU-MCNC: 0.61 UG/ML FEU (ref 0–0.5)
DEPRECATED HCO3 PLAS-SCNC: 25 MMOL/L (ref 22–29)
ERYTHROCYTE [DISTWIDTH] IN BLOOD BY AUTOMATED COUNT: 20.8 % (ref 10–15)
GFR SERPL CREATININE-BSD FRML MDRD: 77 ML/MIN/1.73M2
GLUCOSE SERPL-MCNC: 103 MG/DL (ref 70–99)
HCT VFR BLD AUTO: 43.3 % (ref 40–53)
HGB BLD-MCNC: 13.3 G/DL (ref 13.3–17.7)
INR PPP: 1.13 (ref 0.85–1.15)
MCH RBC QN AUTO: 19.4 PG (ref 26.5–33)
MCHC RBC AUTO-ENTMCNC: 30.7 G/DL (ref 31.5–36.5)
MCV RBC AUTO: 63 FL (ref 78–100)
PLATELET # BLD AUTO: 160 10E3/UL (ref 150–450)
POTASSIUM SERPL-SCNC: 4.4 MMOL/L (ref 3.4–5.3)
PROCALCITONIN SERPL IA-MCNC: 0.65 NG/ML
RBC # BLD AUTO: 6.87 10E6/UL (ref 4.4–5.9)
SODIUM SERPL-SCNC: 141 MMOL/L (ref 136–145)
TROPONIN T SERPL HS-MCNC: 32 NG/L
WBC # BLD AUTO: 10.7 10E3/UL (ref 4–11)

## 2023-08-14 PROCEDURE — 71275 CT ANGIOGRAPHY CHEST: CPT

## 2023-08-14 PROCEDURE — 84145 PROCALCITONIN (PCT): CPT | Performed by: NURSE PRACTITIONER

## 2023-08-14 PROCEDURE — 250N000009 HC RX 250: Performed by: NURSE PRACTITIONER

## 2023-08-14 PROCEDURE — 85610 PROTHROMBIN TIME: CPT | Performed by: NURSE PRACTITIONER

## 2023-08-14 PROCEDURE — 84484 ASSAY OF TROPONIN QUANT: CPT | Performed by: NURSE PRACTITIONER

## 2023-08-14 PROCEDURE — 250N000011 HC RX IP 250 OP 636: Performed by: NURSE PRACTITIONER

## 2023-08-14 PROCEDURE — 96376 TX/PRO/DX INJ SAME DRUG ADON: CPT

## 2023-08-14 PROCEDURE — 250N000011 HC RX IP 250 OP 636: Mod: JZ | Performed by: NURSE PRACTITIONER

## 2023-08-14 PROCEDURE — 97161 PT EVAL LOW COMPLEX 20 MIN: CPT | Mod: GP | Performed by: PHYSICAL THERAPIST

## 2023-08-14 PROCEDURE — 85379 FIBRIN DEGRADATION QUANT: CPT | Performed by: NURSE PRACTITIONER

## 2023-08-14 PROCEDURE — 250N000013 HC RX MED GY IP 250 OP 250 PS 637: Performed by: NURSE PRACTITIONER

## 2023-08-14 PROCEDURE — G0378 HOSPITAL OBSERVATION PER HR: HCPCS

## 2023-08-14 PROCEDURE — 99231 SBSQ HOSP IP/OBS SF/LOW 25: CPT | Performed by: NURSE PRACTITIONER

## 2023-08-14 PROCEDURE — 80048 BASIC METABOLIC PNL TOTAL CA: CPT | Performed by: NURSE PRACTITIONER

## 2023-08-14 PROCEDURE — 250N000012 HC RX MED GY IP 250 OP 636 PS 637: Performed by: NURSE PRACTITIONER

## 2023-08-14 PROCEDURE — 36415 COLL VENOUS BLD VENIPUNCTURE: CPT | Performed by: NURSE PRACTITIONER

## 2023-08-14 PROCEDURE — 85027 COMPLETE CBC AUTOMATED: CPT | Performed by: NURSE PRACTITIONER

## 2023-08-14 PROCEDURE — 86140 C-REACTIVE PROTEIN: CPT | Performed by: NURSE PRACTITIONER

## 2023-08-14 RX ORDER — IOPAMIDOL 755 MG/ML
500 INJECTION, SOLUTION INTRAVASCULAR ONCE
Status: COMPLETED | OUTPATIENT
Start: 2023-08-14 | End: 2023-08-14

## 2023-08-14 RX ADMIN — OXYCODONE HYDROCHLORIDE AND ACETAMINOPHEN 500 MG: 500 TABLET ORAL at 08:43

## 2023-08-14 RX ADMIN — Medication 220 MG: at 08:43

## 2023-08-14 RX ADMIN — IOPAMIDOL 75 ML: 755 INJECTION, SOLUTION INTRAVENOUS at 10:28

## 2023-08-14 RX ADMIN — PIPERACILLIN AND TAZOBACTAM 3.38 G: 3; .375 INJECTION, POWDER, FOR SOLUTION INTRAVENOUS at 08:44

## 2023-08-14 RX ADMIN — GUAIFENESIN 600 MG: 600 TABLET ORAL at 08:43

## 2023-08-14 RX ADMIN — SODIUM CHLORIDE 70 ML: 9 INJECTION, SOLUTION INTRAVENOUS at 10:29

## 2023-08-14 RX ADMIN — PIPERACILLIN AND TAZOBACTAM 3.38 G: 3; .375 INJECTION, POWDER, FOR SOLUTION INTRAVENOUS at 03:47

## 2023-08-14 RX ADMIN — ALBUTEROL SULFATE 2 PUFF: 90 AEROSOL, METERED RESPIRATORY (INHALATION) at 09:44

## 2023-08-14 RX ADMIN — DEXAMETHASONE 6 MG: 2 TABLET ORAL at 08:43

## 2023-08-14 RX ADMIN — Medication 25 MCG: at 08:43

## 2023-08-14 ASSESSMENT — ACTIVITIES OF DAILY LIVING (ADL)
ADLS_ACUITY_SCORE: 25
ADLS_ACUITY_SCORE: 23

## 2023-08-14 NOTE — PROGRESS NOTES
PRIMARY DIAGNOSIS: GENERALIZED WEAKNESS    OUTPATIENT/OBSERVATION GOALS TO BE MET BEFORE DISCHARGE  1. Orthostatic performed: N/A    2. Tolerating PO medications: Yes    3. Return to near baseline physical activity: No    4. Cleared for discharge by consultants (if involved): No    Discharge Planner Nurse   Safe discharge environment identified: Yes  Barriers to discharge: Yes       Entered by: Betzaida Capone RN 08/13/2023 10:44 PM     Please review provider order for any additional goals.   Nurse to notify provider when observation goals have been met and patient is ready for discharge.

## 2023-08-14 NOTE — PROGRESS NOTES
"PRIMARY DIAGNOSIS: \"GENERIC\" NURSING  OUTPATIENT/OBSERVATION GOALS TO BE MET BEFORE DISCHARGE:  ADLs back to baseline: Yes    Activity and level of assistance: SBA    Pain status: Pain free.    Return to near baseline physical activity: Yes     Discharge Planner Nurse   Safe discharge environment identified: Yes  Barriers to discharge: Yes       Entered by: Racquel Bhatt RN 08/14/2023 3:56 AM     Please review provider order for any additional goals.   Nurse to notify provider when observation goals have been met and patient is ready for discharge.  "

## 2023-08-14 NOTE — PROGRESS NOTES
"PRIMARY DIAGNOSIS: COVID 19  OUTPATIENT/OBSERVATION GOALS TO BE MET BEFORE DISCHARGE:  Dyspnea improved and O2 sats >88% on RA or back to baseline O2 levels: Yes   SpO2: 96 %, O2 Device: None (Room air)    Tolerating oral abx or appropriate plans made outpatient infusion:  Patient has Zosyn IV and an IV Remdesivir.    Vitals signs normal or return to baseline: Yes    Tolerate oral intake to maintain hydration:  patient is refusing to  eat    Return to near baseline physical activity: Yes    Discharge Planner Nurse   Safe discharge environment identified: Yes  Barriers to discharge: Yes, home with wife and wife is admitted to this hospital       Entered by: Alcides Torres RN 08/14/2023 11:17 AM   Pt refused to eat breakfast saying \"no appetite\" and  having to go to the bathroom for loose stool. Pt informed he is not also eating lunch. Educated about the importance of nutrition and electrolytes. Pt is wheezing on auscultation and has audible wheezing, SOB at rest and with activity. PRN inhaler given. Pt reported taking nebulization at home three times daily. Pt expressing repeatedly of wanting to go home. Writer explained his situation and the need to stay but still insist of going home now/today.  BP (!) 156/89 (BP Location: Left arm)   Pulse 79   Temp 97.8  F (36.6  C) (Oral)   Resp 22   Ht 1.803 m (5' 11\")   Wt 82 kg (180 lb 11.2 oz)   SpO2 96%   BMI 25.20 kg/m      "

## 2023-08-14 NOTE — PROGRESS NOTES
"PRIMARY DIAGNOSIS: \"GENERIC\" NURSING  OUTPATIENT/OBSERVATION GOALS TO BE MET BEFORE DISCHARGE:  ADLs back to baseline: Yes    Activity and level of assistance: Up with standby assistance.    Pain status: Pain free.    Return to near baseline physical activity: Yes     Discharge Planner Nurse   Safe discharge environment identified: Yes  Barriers to discharge: Yes       Entered by: Racquel Bhatt RN 08/14/2023 6:35 AM   Pt A/O x4. VSS on RA, with O2 above 90%. Pt denies pain. SBA. Zosyn given. Tele shows Paced Rhythm.  Please review provider order for any additional goals.   Nurse to notify provider when observation goals have been met and patient is ready for discharge.  "

## 2023-08-14 NOTE — PROGRESS NOTES
Antimicrobial Stewardship Team Note    Antimicrobial Stewardship Program - A joint venture between [facility] and  Physicians to optimize antibiotic management.  NOT a formal consult - Restricted Antimicrobial Review     Patient: Khang Bailon  MRN: [id number]  Allergies: Flagyl [metronidazole]    Brief Summary: Khang Bailon is a 78 year old male with PMHx of COPD (not on baseline oxygen). He was admitted on 8/13/23 with concerns for shortness of breath, chills and body aches.    History of Present Illness: He started not feeling well on 8/12/23 which progressed the next day for increasing shortness of breath, chills, and body aches. His wife is currently hospitalized with COVID-19. He denies back pain or chest pain. Had an episode of emesis in the ED. Upon presentation, he was afebrile, normotensive, tachypneic at 26 bpm, and SpO2 of 92%. His initial labs include an elevated WBC of 18.7, ANC of 16.6, CRP of 31.16, lactic acid of 2.2, sCr of 1.14, and D-dimer of 0.59. COVID-19 PCR was positive. Previous history of COVID 12/2021. Patient is unvaccinated against COVID-19. Blood cultures are no growth to date. Chest xray showed stable cardiomediastinal silhouette with pacemaker. No definite airspace consolidation, pleural effusion, or pneumothorax. CT chest is pending from this morning. UA was unremarkable. Zosyn, dexamethasone, and remdesivir were started on 8/13/23.          Active Anti-infective Medications   (From admission, onward)                 Start     Stop    08/14/23 1700  remdesivir 100 mg IV soln  100 mg,   Intravenous,   125-500 mL/hr,   EVERY 24 HOURS        COVID-19      See Hyperspace for full Linked Orders Report.    08/18/23 1649    08/13/23 2100  piperacillin-tazobactam  3.375 g,   Intravenous,   EVERY 6 HOURS        leukocytosis       --                  Assessment: COVID-19 infection  Today is day 2 of Zosyn and remdesivir therapy. Patient is a community-dweller, with no  antibiotic use or microbiological history within the last 6 months. He remains afebrile, normotensive, and otherwise vitally stable. He has not required supplemental oxygen and has maintained SpO2's greater than 90%. Labs include a downtrending WBC of 10.7, CRP of 59.57, sCr of 1, and lactic acid of 0.7 after giving fluid. Given the acute onset of patient symptoms and imaging (both CXR and chest CT) lacking lobar consolidations that would align better with a bacterial process, recommend to stop Zosyn. Procalcitonin is unreliable in this scenario as COVID-19 infection can cause artificial rise as can renal dysfunction, noting slight elevation in creatinine on presentation. Patient has mild to moderate COVID-19 infection as he has not required supplemental oxygen. Given this, Paxlovid would be the first-line agent in this patient as he has a risk factor of COPD. Looking at his kidney function and PTA medications, use would not be contraindicated. Recommend stopping remdesivir and dexamethasone and transition to Paxlovid 300/100 mg PO 2 times daily for 5 days which may be started in the hospitalized setting. Dexamethasone has proven benefit in patient's requiring supplemental oxygen (RECOVERY trial, [facility] 2021). The known adverse effects of dexamethasone (e.g., hyperglycemia, psychosis, etc.) presently outweigh the benefits of continued use. If Paxlovid is unavailable, recommend continuing remdesivir for total duration of 3 days.    Recommendations:  Stop Zosyn  Stop remdesivir and dexamethasone, and transition to Paxlovid 300 mg/100 mg PO 2 times daily for 5 days (8/14-8/18)  If Paxlovid is unavailable, continue remdesivir for total duration of 3 days (8/13-8/15)    Pharmacy took the following actions: Sticky note reminder created, Electronic note created.    Discussed with ID Staff: Saúl Burkett MD and Arabella Montano, PharmD, BCIDP    Jacqui Ayala, 2024 PharmD Candidate  Phone: [phone]    Vital Signs/Clinical  Features:  Vitals         08/12 0700  08/13 0659 08/13 0700  08/14 0659 08/14 0700  08/14 1205   Most Recent      Temp ( F)   97.7 -  100.4    97.8 -  98.2     97.8 (36.6) 08/14 1111    Pulse   64 -  113    71 -  79     79 08/14 1111    Resp   12 -  29      22     22 08/14 1111    BP   114/66 -  152/70    156/89 -  156/94     156/89 08/14 1111    SpO2 (%)   90 -  98    94 -  96     96 08/14 1111            Labs  Estimated Creatinine Clearance: 70.6 mL/min (based on SCr of 1 mg/dL).  Recent Labs   Lab Test 06/27/21  1941 09/30/21  1238 11/03/21  1030 04/24/23  1308 08/13/23  1401 08/14/23  0635   CR 0.99 0.93 0.9 0.91 1.14 1.00       Recent Labs   Lab Test 02/08/16  1617 07/12/16  1500 07/13/16  0526 07/18/16  1059 08/02/17  1354 05/15/19  1833 11/26/19  1350 06/27/21  1941 09/30/21  1238 09/30/21  1238 10/01/21  1139 10/20/21  1203 04/24/23  1308 08/13/23  1401 08/14/23  0634   WBC 9.7 19.0*   < > 11.2* 7.7 8.0  --  14.1* 26.9*  --  29.7* 9.3 9.7 18.7* 10.7   ANEU 7.1 13.3*  --  8.0 5.7 5.8  --  11.8*  --   --  24.9*  --   --   --   --    ALYM 1.7 4.6  --  1.5 1.3 1.2  --  0.9  --   --  2.1  --   --   --   --    CURT 0.8 0.6  --  1.2 0.6 0.7  --  1.2  --   --  2.7*  --   --   --   --    AEOS 0.1 0.2  --  0.2 0.1  --   --  0.1  --   --  0.0  --   --   --   --    HGB 13.0* 12.9*   < > 12.5* 12.7* 12.1*   < > 12.3* 13.0*  --  13.5 11.9* 12.2* 13.2* 13.3   HCT 40.2 39.5*   < > 39.0* 40.1 39.6*  --  40.1 41.7  --  43.0 39.3* 40.1 42.5 43.3   MCV 59* 58*   < > 57* 60* 62*  --  61* 61*  --  61* 62* 62* 62* 63*    217   < > 220 187 206  --  182 225   < > 285 256 221 190 160    < > = values in this interval not displayed.       Recent Labs   Lab Test 07/12/16  1500 08/02/17  1354 06/27/21  1941 04/24/23  1308 08/13/23  1401   BILITOTAL 1.4* 1.0 0.7 0.6 0.8   ALKPHOS 56 54 70 55 68   ALBUMIN 3.7 3.7 4.0 3.8 4.0   AST 15 13 16 24 25   ALT 22 26 29 21 20       Recent Labs   Lab Test 02/08/16  1617 07/12/16  1500  07/12/16 2010 07/13/16  0526 11/26/19  1350 06/27/21  1941 09/30/21  1330 08/13/23  1401 08/13/23  1547 08/14/23  0635   PCAL  --   --   --   --   --   --   --   --   --  0.65*   LACT  --   --  1.0 0.8  --  2.1* 0.7 2.2* 0.7  --    CRP  --  <2.9  --   --   --  11.8*  --   --   --   --    CRPI  --   --   --   --   --   --   --   --  31.16* 59.57*   SED 5  --   --   --  7  --   --   --   --   --              Culture Results:  7-Day Micro Results       Procedure Component Value Units Date/Time    Blood Culture Hand, Left [[id number]]  (Normal) Collected: 08/13/23 1455    Order Status: Completed Lab Status: Preliminary result Updated: 08/14/23 0901    Specimen: Blood from Hand, Left      Culture No growth after 12 hours    Blood Culture Arm, Right [[id number]]  (Normal) Collected: 08/13/23 1440    Order Status: Completed Lab Status: Preliminary result Updated: 08/14/23 0901    Specimen: Blood from Arm, Right      Culture No growth after 12 hours            Recent Labs   Lab Test 05/18/18  1543 06/06/18  1141 03/22/19  1518 06/27/21  2110 09/30/21  1239 08/13/23  2157   URINEPH 5.0 5.0 6.0 5.0 6.0 5.0   NITRITE Negative Negative Negative Negative Negative Negative   LEUKEST Large* Negative Small* Negative Large* Negative   WBCU 131* <1 39*  --  >182* 1                         Imaging: XR Chest Port 1 View    Result Date: 8/13/2023  EXAM: XR CHEST PORT 1 VIEW LOCATION: [facility] DATE: 8/13/2023 INDICATION: Shortness of breath COMPARISON: Chest radiograph 04/24/2023     IMPRESSION: Stable cardiomediastinal silhouette with pacemaker leads overlying the right atrium and right ventricle. Upper lobe predominant oligemia compatible with known emphysema. No definite airspace consolidation, pleural effusion or pneumothorax. No  acute bony abnormality.

## 2023-08-14 NOTE — DISCHARGE SUMMARY
Patient is demanding to leave the hospital at this time against medical advice.     Admitted with COVID 19, at this time work up still pending:    CT chest angio- checked due to elevated procalcitonin, d dimer.  This am patient had increased resp effort with pursed lip breathing.    Explained to patient my concerns he is not ready for discharge as work up not complete, not eating or drinking very well continues to insist on leaving hospital.    I will discharge with order for Paxlovid and recommended by antibiotic stewardship team.  Patient informed of this and states he will not take as he doesn't have COVID 19.    Kristina Song, ANA CRISTINA on 8/14/2023 at 1:23 PM

## 2023-08-14 NOTE — PROGRESS NOTES
[facility]    Medicine Progress Note - Hospitalist Service    Date of Admission:  8/13/2023    Assessment & Plan   Khang Bailon is a 78 year old male admitted on 8/13/2023 with past medical history of Moderate COPD, pacemaker in situ, History of Atrioventricular block complete presents to ED with complaints of increasing shortness of breath, chills, body aches, diarrhea, vomiting. Patient found to be COVID 19 positive. Patient wife currently admitted inpatient with COVID 19 infection.     # Confirmed COVID-19 infection    Sepsis secondary to above   Leukocytosis   History of moderate COPD     Needed supplemental 02 per NC, but was able to be weened by this morning.  I noted increased WOB with conversation.  Patient states this is slightly worse than normal for him.  He has remained afebrile.   WBC 18.7 on admission, this am down to 10.7.  Procalcitionin 0.65 which is moderate risk for systemic infection.  Denies other s/s of infection such as dysuria, skin intact.  With procalcitionin and d dimer slightly elevated and increased WOB with check chest CT angio.  CRP is trending up.      Symptom Onset 8/12/23   Date of 1st Positive Test 8/13/23   Vaccination Status Declines Vaccine       PLAN  - COVID-19 special precautions, continuous pulse-ox  - currently RA , Oxygen as needed. titrate to keep SpO2 between 90-96%  -encourage use IS   - Labs: Standard [facility] admission labs ordered (CBC with diff, CMP, INR, D-dimer, CRP).   -lactic acid on admission 2.2, repeat 0.7  - Imaging: CXR: Stable cardiomediastinal silhouette with pacemaker leads overlying the right atrium and right ventricle. Upper lobe predominant oligemia compatible with known emphysema. No definite airspace consolidation, pleural effusion or pneumothorax. No acute bony abnormality.  - Breathing treatments:  albuterol inhaler as needed  ; avoid nebulizers in favor of MDIs   - IV fluids:  1 L bolus given; hydrate cautiously to  "avoid worsening respiratory status with volume overload.  - Antibiotics:  Zosyn - blood cultures pending, UA - ok     - COVID-Focused Medications: Dexamethasone 6 mg x 10 days or until hospital discharge, started on 8/13/23 and Remdesivir x 3 days or until hospital discharge, started on 8/13/23  -tele monitoring   -EKG as needed   - DVT Prophylaxis:         - At high risk of thrombotic complications due to COVID-19 (DDimer = N/A)    -blood cultures pending  -empiric vitamin C, vitamin D, Zinc     Elevated troponin   -likely secondary to COVID 19 infection  -on admission troponin 38, 33, 31-  today 32  -No complaints of Chest pain  -will collect another troponin to trend level, no further work up needed unless status change    Pacemaker in situ  History of Atrioventricular block complete  -continue cardiac monitoring     Diet: Regular Diet Adult    DVT Prophylaxis: Enoxaparin (Lovenox) SQ  Hernandez Catheter: Not present  Lines: None     Cardiac Monitoring: ACTIVE order. Indication: elevated troponin  Code Status: Full Code      Clinically Significant Risk Factors Present on Admission                  # Hypertension: Noted on problem list      # Overweight: Estimated body mass index is 25.2 kg/m  as calculated from the following:    Height as of this encounter: 1.803 m (5' 11\").    Weight as of this encounter: 82 kg (180 lb 11.2 oz).              Disposition Plan      Expected Discharge Date: 08/14/2023      Destination: home with family          The patient's care was discussed with the Attending Physician, Dr. Quigley and Patient.    Kristina Song CNP  Hospitalist Service  [facility]  Securely message with [employer] (more info)  Text page via [employer] P[employer]/Directory   ______________________________________________________________________    Interval History   No acute events overnight.  Patient has poor appetite, loose stool x 1.  No further vomiting.  Denies nausea. Reports feeling a little " more SOB than usual.  He would like to go home today, but understood we have a few more tests to do.      Physical Exam   Vital Signs: Temp: 97.8  F (36.6  C) Temp src: Oral BP: (!) 156/89 Pulse: 79   Resp: 22 SpO2: 96 % O2 Device: None (Room air)    Weight: 180 lbs 11.2 oz    General Appearance: Alert lying in bed, no acute distress  Respiratory: purse lip breathing noted, especially with talking and moving.  Respiratory rate WNL at rest.  No cough.  Lung sounds diminished in bilateral bases.  No wheezes, rales or rhonchi noted  Cardiovascular: RRR  GI: BS normal, abdomen is soft and nontender  Skin: intack, no rashes noted,  Other: -     Medical Decision Making       40 MINUTES SPENT BY ME on the date of service doing chart review, history, exam, documentation & further activities per the note.      Data     I have personally reviewed the following data over the past 24 hrs:    10.7  \   13.3   / 160     141 105 19.6 /  103 (H)   4.4 25 1.00 \     ALT: 20 AST: 25 AP: 68 TBILI: 0.8   ALB: 4.0 TOT PROTEIN: 7.0 LIPASE: N/A     Trop: 32 (H) BNP: N/A     Procal: 0.65 (H) CRP: 59.57 (H) Lactic Acid: 0.7       INR:  1.13 PTT:  N/A   D-dimer:  0.61 (H) Fibrinogen:  N/A       Imaging results reviewed over the past 24 hrs:   Recent Results (from the past 24 hour(s))   XR Chest Port 1 View    Narrative    EXAM: XR CHEST PORT 1 VIEW  LOCATION: [facility]  DATE: 8/13/2023    INDICATION: Shortness of breath  COMPARISON: Chest radiograph 04/24/2023      Impression    IMPRESSION: Stable cardiomediastinal silhouette with pacemaker leads overlying the right atrium and right ventricle. Upper lobe predominant oligemia compatible with known emphysema. No definite airspace consolidation, pleural effusion or pneumothorax. No   acute bony abnormality.

## 2023-08-14 NOTE — PROGRESS NOTES
"S-(situation): Patient signed AMA, going home with grandson via private vehicle.    B-(background): Patient was diagnosed with COVID and not believing he has one, \"everything is COVID.\" \"This is just cold and flu.\" Pt was educated, with NP speaking to him but insisted to go home.    A-(assessment): pt is A&O x 4. SBA with ambulation. Audible wheezing, SOB with rest and activity, pursed lip. Pt refused to eat breakfast and lunch saying \"I am not hungry.\" Repeated request to go home, signed AMA.  BP (!) 156/89 (BP Location: Left arm)   Pulse 79   Temp 98.7  F (37.1  C) (Oral)   Resp 22   Ht 1.803 m (5' 11\")   Wt 82 kg (180 lb 11.2 oz)   SpO2 96%   BMI 25.20 kg/m      R-(recommendations): Patient was instructed to let his grandson to get the prescription from the pharmacy.   belongings gathered and returned to patient.yes  Patient Education resolved: Yes  Home medications returned to patient NA  Medication Bin checked and emptied on discharge Yes      "

## 2023-08-14 NOTE — PROGRESS NOTES
08/14/23 1200   Appointment Info   Signing Clinician's Name / Credentials (PT) Zeeshan Solano PT   Rehab Comments (PT) COVID+   Living Environment   People in Home spouse   Current Living Arrangements apartment;house  (Pt reports that the garage to the house has been converted to an apartment where he and his wife spend their day. Pt and wife sleep in the house at night.)   Home Accessibility stairs to enter home;stairs within home   General Information   Onset of Illness/Injury or Date of Surgery 08/13/23   Referring Physician Dariusz Naranjo MD   Patient/Family Therapy Goals Statement (PT) Pt states that he wants to go home right now, no matter what.   Pertinent History of Current Problem (include personal factors and/or comorbidities that impact the POC) Pt is a 77 yo male who presents to PT with deconditioning and weakness secondary to COVID-19 dx. Pt has a significant hx of COPD, occ home O2 use. Pt is currently denying his COVID dx, stating it is only the flu.   Existing Precautions/Restrictions other (see comments)  (Isolation)   Cognition   Behavioral Issues other (see comments)  (Stubborn, refuses to accept his medical dx, states he is jayla home today)   Bed Mobility   Bed Mobility no deficits identified   Transfers   Transfers no deficits identified   Gait/Stairs (Locomotion)   [place] Level (Gait) supervision   Distance in Feet 50   Pattern (Gait) 2-point;swing-through   Comment, (Gait/Stairs) Pt agreeable to gait assessment, but puts in minimal effort, enough to walk around household distance   Balance   Balance Comments Tinetti = 24/28, indicates low falls risk, however, pt demos some instability in direction change   Clinical Impression   Criteria for Skilled Therapeutic Intervention Evaluation only;No problems identified which require skilled intervention   PT Diagnosis (PT) Deconditioning   Influenced by the following impairments COVID+   Functional limitations due to impairments None identified    Clinical Presentation (PT Evaluation Complexity) Stable/Uncomplicated   Clinical Decision Making (Complexity) low complexity   Risk & Benefits of therapy have been explained evaluation/treatment results reviewed;risks/benefits reviewed;patient   Clinical Impression Comments Pt presents to PT with minimal deficits, close to baseline performance. Pt is very stubborn and through conversation indicates that he is likely somewhat non-compliant with medical recommendations (denies his COVID dx, states he never uses AD even though he demos slight unsteadiness, adamantly wanting to return home today, has home O2 but only uses rarely). Pt doesn't feel he needs to be here in the hospital and is not receptive to education about his condition. Pt demos ability to mobilize as he would at home.   PT Total Evaluation Time   PT Eval, Low Complexity Minutes (99721) 30   Physical Therapy Goals   PT Frequency One time eval and treatment only   PT Discharge Planning   PT Plan Eval only   PT Discharge Recommendation (DC Rec) home;home with home care physical therapy  (Recommend HHPT, however, likely pt will refuse)   PT Rationale for DC Rec Pt is from home with his wife, spend their daytime hours in their [id number]-mppydbcrb and sleep in the main house at night. Pt has supplemental O2 available to him, however, he does not use it. Pt amb without AD at home, but owns canes and walkers should he need them. Pt is very stubborn, takes some convincing to participate in his PT eval. Pt demos slight unsteadiness in his gait, somewhat careless in mobility. Pt is not agreeable to interventions, would benefit from HHPT in order to ensure safety in the home if agreeable. Pt demos ability to mobilize as he would at baseline.   PT Brief overview of current status Pt is Caroline to distant supervision for all functional mobility, refuses use of AD   Total Session Time   Total Session Time (sum of timed and untimed services) 30

## 2023-08-15 LAB — DEPRECATED CALCIDIOL+CALCIFEROL SERPL-MC: 34 UG/L (ref 20–75)

## 2023-08-18 LAB
BACTERIA BLD CULT: NO GROWTH
BACTERIA BLD CULT: NO GROWTH

## 2023-08-25 DIAGNOSIS — J44.9 CHRONIC OBSTRUCTIVE PULMONARY DISEASE, UNSPECIFIED COPD TYPE (H): ICD-10-CM

## 2023-08-25 RX ORDER — IPRATROPIUM BROMIDE AND ALBUTEROL SULFATE 2.5; .5 MG/3ML; MG/3ML
1 SOLUTION RESPIRATORY (INHALATION) EVERY 6 HOURS PRN
Qty: 180 ML | Refills: 0 | Status: SHIPPED | OUTPATIENT
Start: 2023-08-25 | End: 2023-09-19

## 2023-08-25 NOTE — TELEPHONE ENCOUNTER
Nebs refilled x1.  Needs appointment for further refills.  Will have staff notify patient.    Stiven Quigley MD

## 2023-08-28 ENCOUNTER — MYC MEDICAL ADVICE (OUTPATIENT)
Dept: FAMILY MEDICINE | Facility: CLINIC | Age: 78
End: 2023-08-28
Payer: COMMERCIAL

## 2023-08-28 NOTE — TELEPHONE ENCOUNTER
Patient notified via mychart of the note below. Reminder set for 3 days to send letter if not read

## 2023-08-28 NOTE — LETTER
8/28/2023        RE: Khang Bailon  [address] [facility] [address]        We are concerned about your health care.  We recently provided you with a medication refill.  Many medications require routine follow-up with your Doctor.       At this time we ask that: You schedule a routine office visit with your physician to follow your medications.  Call the clinic at [phone] Option 1 to schedule.      Your prescription:  Has been filled. Please schedule a follow up visit for first available appointment. Courtesy refills will be given if needed until your scheduled appointment.         Thank you   [facility] Care Team  [phone]

## 2023-09-14 NOTE — PROGRESS NOTES
[facility]  OUTPATIENT PHYSICAL THERAPY EVALUATION  PLAN OF TREATMENT FOR OUTPATIENT REHABILITATION  (COMPLETE FOR INITIAL CLAIMS ONLY)  Patient's Last Name, First Name, M.I.  YOB: 1945  Khang Bailon                        Provider's Name  [facility] Medical Record No.  [id number]                             Onset Date:  08/13/23   Start of Care Date:   8/14/23   Type:     _X_PT   ___OT   ___SLP Medical Diagnosis:  COVID-19               PT Diagnosis:  Deconditioning Visits from SOC:  1     See note for plan of treatment, functional goals and certification details    I CERTIFY THE NEED FOR THESE SERVICES FURNISHED UNDER        THIS PLAN OF TREATMENT AND WHILE UNDER MY CARE     (Physician co-signature of this document indicates review and certification of the therapy plan).                08/14/23 1200   Appointment Info   Signing Clinician's Name / Credentials (PT) Zeeshan Solano PT   Rehab Comments (PT) COVID+   Living Environment   People in Home spouse   Current Living Arrangements apartment;house  (Pt reports that the garage to the house has been converted to an apartment where he and his wife spend their day. Pt and wife sleep in the house at night.)   Home Accessibility stairs to enter home;stairs within home   General Information   Onset of Illness/Injury or Date of Surgery 08/13/23   Referring Physician Dariusz Naranjo MD   Patient/Family Therapy Goals Statement (PT) Pt states that he wants to go home right now, no matter what.   Pertinent History of Current Problem (include personal factors and/or comorbidities that impact the POC) Pt is a 77 yo male who presents to PT with deconditioning and weakness secondary to COVID-19 dx. Pt has a significant hx of COPD, occ home O2 use. Pt is currently denying his COVID dx, stating it is only the flu.   Existing Precautions/Restrictions other (see comments)  (Isolation)   Cognition    Behavioral Issues other (see comments)  (Stubborn, refuses to accept his medical dx, states he is jayla home today)   Bed Mobility   Bed Mobility no deficits identified   Transfers   Transfers no deficits identified   Gait/Stairs (Locomotion)   [place] Level (Gait) supervision   Distance in Feet 50   Pattern (Gait) 2-point;swing-through   Comment, (Gait/Stairs) Pt agreeable to gait assessment, but puts in minimal effort, enough to walk around household distance   Balance   Balance Comments Tinetti = 24/28, indicates low falls risk, however, pt demos some instability in direction change   Clinical Impression   Criteria for Skilled Therapeutic Intervention Evaluation only;No problems identified which require skilled intervention   PT Diagnosis (PT) Deconditioning   Influenced by the following impairments COVID+   Functional limitations due to impairments None identified   Clinical Presentation (PT Evaluation Complexity) Stable/Uncomplicated   Clinical Decision Making (Complexity) low complexity   Risk & Benefits of therapy have been explained evaluation/treatment results reviewed;risks/benefits reviewed;patient   Clinical Impression Comments Pt presents to PT with minimal deficits, close to baseline performance. Pt is very stubborn and through conversation indicates that he is likely somewhat non-compliant with medical recommendations (denies his COVID dx, states he never uses AD even though he demos slight unsteadiness, adamantly wanting to return home today, has home O2 but only uses rarely). Pt doesn't feel he needs to be here in the hospital and is not receptive to education about his condition. Pt demos ability to mobilize as he would at home.   PT Total Evaluation Time   PT Eval, Low Complexity Minutes (11676) 30   Physical Therapy Goals   PT Frequency One time eval and treatment only   PT Discharge Planning   PT Plan Eval only   PT Discharge Recommendation (DC Rec) home;home with home care physical  therapy  (Recommend HHPT, however, likely pt will refuse)   PT Rationale for DC Rec Pt is from home with his wife, spend their daytime hours in their dkhhey-efevmy-ftqszdpjy and sleep in the main house at night. Pt has supplemental O2 available to him, however, he does not use it. Pt amb without AD at home, but owns canes and walkers should he need them. Pt is very stubborn, takes some convincing to participate in his PT eval. Pt demos slight unsteadiness in his gait, somewhat careless in mobility. Pt is not agreeable to interventions, would benefit from HHPT in order to ensure safety in the home if agreeable. Pt demos ability to mobilize as he would at baseline.   PT Brief overview of current status Pt is Caroline to distant supervision for all functional mobility, refuses use of AD   Total Session Time   Total Session Time (sum of timed and untimed services) 30

## 2023-09-19 DIAGNOSIS — J44.9 CHRONIC OBSTRUCTIVE PULMONARY DISEASE, UNSPECIFIED COPD TYPE (H): ICD-10-CM

## 2023-09-19 RX ORDER — IPRATROPIUM BROMIDE AND ALBUTEROL SULFATE 2.5; .5 MG/3ML; MG/3ML
SOLUTION RESPIRATORY (INHALATION)
Qty: 180 ML | Refills: 0 | Status: SHIPPED | OUTPATIENT
Start: 2023-09-19 | End: 2023-10-17

## 2023-09-19 NOTE — TELEPHONE ENCOUNTER
Patient is OUT of medication.  Would like refill today as soon as possible.    Lisbeth Arredondo XRO/

## 2023-10-17 DIAGNOSIS — J44.9 CHRONIC OBSTRUCTIVE PULMONARY DISEASE, UNSPECIFIED COPD TYPE (H): ICD-10-CM

## 2023-10-17 RX ORDER — IPRATROPIUM BROMIDE AND ALBUTEROL SULFATE 2.5; .5 MG/3ML; MG/3ML
SOLUTION RESPIRATORY (INHALATION)
Qty: 180 ML | Refills: 0 | Status: SHIPPED | OUTPATIENT
Start: 2023-10-17 | End: 2023-11-01

## 2023-11-01 DIAGNOSIS — J44.9 CHRONIC OBSTRUCTIVE PULMONARY DISEASE, UNSPECIFIED COPD TYPE (H): ICD-10-CM

## 2023-11-01 RX ORDER — IPRATROPIUM BROMIDE AND ALBUTEROL SULFATE 2.5; .5 MG/3ML; MG/3ML
SOLUTION RESPIRATORY (INHALATION)
Qty: 180 ML | Refills: 0 | Status: SHIPPED | OUTPATIENT
Start: 2023-11-01 | End: 2023-11-29

## 2023-11-14 ENCOUNTER — TELEPHONE (OUTPATIENT)
Dept: INTERNAL MEDICINE | Facility: CLINIC | Age: 78
End: 2023-11-14
Payer: COMMERCIAL

## 2023-11-14 NOTE — TELEPHONE ENCOUNTER
----- Message from Cholo Rivera MD sent at 11/14/2023 10:20 AM CST -----  Please schedule a follow up visit to discuss your CT scan and repeat it.    Please make sure he knows he needs to see some one and get a repeat ct scan of his lungs

## 2023-11-14 NOTE — TELEPHONE ENCOUNTER
I contacted Khang and repeatedly tried to get him to schedule an appointment with a Provider to discuss his CT scan results, Khang declined. Khang asked if he could just get some oxygen. I explained to him that he would need to make an appointment to discuss that with a Provider. Again he declined. He said he would think about it.    I will call him again next week.

## 2023-11-24 ENCOUNTER — TELEPHONE (OUTPATIENT)
Dept: FAMILY MEDICINE | Facility: CLINIC | Age: 78
End: 2023-11-24
Payer: COMMERCIAL

## 2023-11-24 NOTE — TELEPHONE ENCOUNTER
"Wife called concerned about patient having chest pain.    Patient reports having the following symptoms:  - burping - belching  - chest pain - middle of his chest, sometimes it hurts so bad that his body feels so numb and will belch for hours, last week or so  - sob that is worse than normal  - at times feels dizzy and lightheaded  - on nebulizer due to COPD  - pain down left arm, just once (before thanksgiving - 5 days before)  - felt like heartburn  - has a pacemaker and gets it checked on 12/07/23 - thinks the battery may need to be replaced.    Highly recommended that he go to ED now.  Patient refused.  Wife very upset about recommendation to ED.  Mentioned patient has an appointment on Monday with Dr. Irizarry and fill follow up then.    RN asked if there was anything further I could help with and wife/patient answered \"NO.\"  Attempted to again instruct the importance of going to ED and they again said they needed to see a provider to diagnose patient and wanted to end the call.  "

## 2023-11-27 ENCOUNTER — HOSPITAL ENCOUNTER (EMERGENCY)
Facility: CLINIC | Age: 78
Discharge: OTHER INSTITUTION | End: 2023-11-27
Attending: EMERGENCY MEDICINE | Admitting: EMERGENCY MEDICINE
Payer: COMMERCIAL

## 2023-11-27 ENCOUNTER — APPOINTMENT (OUTPATIENT)
Dept: GENERAL RADIOLOGY | Facility: CLINIC | Age: 78
End: 2023-11-27
Attending: EMERGENCY MEDICINE
Payer: COMMERCIAL

## 2023-11-27 ENCOUNTER — OFFICE VISIT (OUTPATIENT)
Dept: FAMILY MEDICINE | Facility: CLINIC | Age: 78
End: 2023-11-27
Payer: COMMERCIAL

## 2023-11-27 VITALS
SYSTOLIC BLOOD PRESSURE: 129 MMHG | HEIGHT: 71 IN | BODY MASS INDEX: 25.62 KG/M2 | WEIGHT: 183 LBS | RESPIRATION RATE: 18 BRPM | TEMPERATURE: 98 F | DIASTOLIC BLOOD PRESSURE: 89 MMHG | OXYGEN SATURATION: 96 % | HEART RATE: 89 BPM

## 2023-11-27 VITALS
HEART RATE: 95 BPM | BODY MASS INDEX: 25.62 KG/M2 | TEMPERATURE: 97.5 F | WEIGHT: 183 LBS | HEIGHT: 71 IN | SYSTOLIC BLOOD PRESSURE: 130 MMHG | RESPIRATION RATE: 22 BRPM | OXYGEN SATURATION: 96 % | DIASTOLIC BLOOD PRESSURE: 74 MMHG

## 2023-11-27 DIAGNOSIS — R07.9 CHEST PAIN, UNSPECIFIED TYPE: Primary | ICD-10-CM

## 2023-11-27 DIAGNOSIS — J44.9 CHRONIC OBSTRUCTIVE PULMONARY DISEASE, UNSPECIFIED COPD TYPE (H): ICD-10-CM

## 2023-11-27 DIAGNOSIS — I21.4 NON-STEMI (NON-ST ELEVATED MYOCARDIAL INFARCTION) (H): ICD-10-CM

## 2023-11-27 LAB
ALBUMIN SERPL BCG-MCNC: 3.9 G/DL (ref 3.5–5.2)
ALP SERPL-CCNC: 68 U/L (ref 40–150)
ALT SERPL W P-5'-P-CCNC: 17 U/L (ref 0–70)
ANION GAP SERPL CALCULATED.3IONS-SCNC: 8 MMOL/L (ref 7–15)
AST SERPL W P-5'-P-CCNC: 24 U/L (ref 0–45)
BASOPHILS # BLD AUTO: 0.1 10E3/UL (ref 0–0.2)
BASOPHILS NFR BLD AUTO: 1 %
BILIRUB SERPL-MCNC: 0.7 MG/DL
BUN SERPL-MCNC: 12.9 MG/DL (ref 8–23)
CALCIUM SERPL-MCNC: 9.3 MG/DL (ref 8.8–10.2)
CHLORIDE SERPL-SCNC: 101 MMOL/L (ref 98–107)
CREAT SERPL-MCNC: 0.93 MG/DL (ref 0.67–1.17)
DEPRECATED HCO3 PLAS-SCNC: 28 MMOL/L (ref 22–29)
EGFRCR SERPLBLD CKD-EPI 2021: 84 ML/MIN/1.73M2
EOSINOPHIL # BLD AUTO: 0.1 10E3/UL (ref 0–0.7)
EOSINOPHIL NFR BLD AUTO: 1 %
ERYTHROCYTE [DISTWIDTH] IN BLOOD BY AUTOMATED COUNT: 19 % (ref 10–15)
GLUCOSE SERPL-MCNC: 132 MG/DL (ref 70–99)
HCT VFR BLD AUTO: 41 % (ref 40–53)
HGB BLD-MCNC: 12.7 G/DL (ref 13.3–17.7)
IMM GRANULOCYTES # BLD: 0.1 10E3/UL
IMM GRANULOCYTES NFR BLD: 1 %
LYMPHOCYTES # BLD AUTO: 1.3 10E3/UL (ref 0.8–5.3)
LYMPHOCYTES NFR BLD AUTO: 13 %
MCH RBC QN AUTO: 19.3 PG (ref 26.5–33)
MCHC RBC AUTO-ENTMCNC: 31 G/DL (ref 31.5–36.5)
MCV RBC AUTO: 62 FL (ref 78–100)
MONOCYTES # BLD AUTO: 0.8 10E3/UL (ref 0–1.3)
MONOCYTES NFR BLD AUTO: 7 %
NEUTROPHILS # BLD AUTO: 8.2 10E3/UL (ref 1.6–8.3)
NEUTROPHILS NFR BLD AUTO: 77 %
NRBC # BLD AUTO: 0 10E3/UL
NRBC BLD AUTO-RTO: 0 /100
NT-PROBNP SERPL-MCNC: 3523 PG/ML (ref 0–1800)
PLATELET # BLD AUTO: 230 10E3/UL (ref 150–450)
POTASSIUM SERPL-SCNC: 4 MMOL/L (ref 3.4–5.3)
PROT SERPL-MCNC: 6.4 G/DL (ref 6.4–8.3)
RBC # BLD AUTO: 6.57 10E6/UL (ref 4.4–5.9)
SODIUM SERPL-SCNC: 137 MMOL/L (ref 135–145)
TROPONIN T SERPL HS-MCNC: 179 NG/L
TROPONIN T SERPL HS-MCNC: 191 NG/L
WBC # BLD AUTO: 10.6 10E3/UL (ref 4–11)

## 2023-11-27 PROCEDURE — 99285 EMERGENCY DEPT VISIT HI MDM: CPT | Mod: 25 | Performed by: EMERGENCY MEDICINE

## 2023-11-27 PROCEDURE — 250N000013 HC RX MED GY IP 250 OP 250 PS 637: Performed by: EMERGENCY MEDICINE

## 2023-11-27 PROCEDURE — 99213 OFFICE O/P EST LOW 20 MIN: CPT | Performed by: FAMILY MEDICINE

## 2023-11-27 PROCEDURE — 36415 COLL VENOUS BLD VENIPUNCTURE: CPT | Performed by: EMERGENCY MEDICINE

## 2023-11-27 PROCEDURE — 93010 ELECTROCARDIOGRAM REPORT: CPT | Performed by: EMERGENCY MEDICINE

## 2023-11-27 PROCEDURE — 80053 COMPREHEN METABOLIC PANEL: CPT | Performed by: EMERGENCY MEDICINE

## 2023-11-27 PROCEDURE — 71046 X-RAY EXAM CHEST 2 VIEWS: CPT

## 2023-11-27 PROCEDURE — 83880 ASSAY OF NATRIURETIC PEPTIDE: CPT | Performed by: EMERGENCY MEDICINE

## 2023-11-27 PROCEDURE — 96375 TX/PRO/DX INJ NEW DRUG ADDON: CPT | Mod: 25 | Performed by: EMERGENCY MEDICINE

## 2023-11-27 PROCEDURE — 96374 THER/PROPH/DIAG INJ IV PUSH: CPT | Performed by: EMERGENCY MEDICINE

## 2023-11-27 PROCEDURE — 250N000011 HC RX IP 250 OP 636: Performed by: EMERGENCY MEDICINE

## 2023-11-27 PROCEDURE — 93005 ELECTROCARDIOGRAM TRACING: CPT | Performed by: EMERGENCY MEDICINE

## 2023-11-27 PROCEDURE — 84484 ASSAY OF TROPONIN QUANT: CPT | Performed by: EMERGENCY MEDICINE

## 2023-11-27 PROCEDURE — 85025 COMPLETE CBC W/AUTO DIFF WBC: CPT | Performed by: EMERGENCY MEDICINE

## 2023-11-27 RX ORDER — MAGNESIUM HYDROXIDE/ALUMINUM HYDROXICE/SIMETHICONE 120; 1200; 1200 MG/30ML; MG/30ML; MG/30ML
15 SUSPENSION ORAL ONCE
Status: COMPLETED | OUTPATIENT
Start: 2023-11-27 | End: 2023-11-27

## 2023-11-27 RX ORDER — RESPIRATORY SYNCYTIAL VIRUS VACCINE 120MCG/0.5
0.5 KIT INTRAMUSCULAR ONCE
Qty: 1 EACH | Refills: 0 | Status: CANCELLED | OUTPATIENT
Start: 2023-11-27 | End: 2023-11-27

## 2023-11-27 RX ORDER — HEPARIN SODIUM 10000 [USP'U]/100ML
0-5000 INJECTION, SOLUTION INTRAVENOUS CONTINUOUS
Status: DISCONTINUED | OUTPATIENT
Start: 2023-11-27 | End: 2023-11-27 | Stop reason: HOSPADM

## 2023-11-27 RX ORDER — LORAZEPAM 2 MG/ML
0.5 INJECTION INTRAMUSCULAR ONCE
Status: COMPLETED | OUTPATIENT
Start: 2023-11-27 | End: 2023-11-27

## 2023-11-27 RX ADMIN — LORAZEPAM 0.5 MG: 2 INJECTION INTRAMUSCULAR; INTRAVENOUS at 18:26

## 2023-11-27 RX ADMIN — ALUMINUM HYDROXIDE, MAGNESIUM HYDROXIDE, AND SIMETHICONE 15 ML: 200; 200; 20 SUSPENSION ORAL at 17:12

## 2023-11-27 RX ADMIN — HEPARIN SODIUM 1000 UNITS/HR: 10000 INJECTION, SOLUTION INTRAVENOUS at 18:38

## 2023-11-27 ASSESSMENT — ACTIVITIES OF DAILY LIVING (ADL)
ADLS_ACUITY_SCORE: 35
ADLS_ACUITY_SCORE: 35

## 2023-11-27 ASSESSMENT — PAIN SCALES - GENERAL: PAINLEVEL: EXTREME PAIN (8)

## 2023-11-27 NOTE — PROGRESS NOTES
"  Assessment & Plan     Chest pain       Hung Irizarry MD, MD  [facility] MALINI Quiñonez is a 78 year old, presenting for the following health issues:  Recheck Medication      History of Present Illness       Reason for visit:  Burping chest pain  Symptom onset:  3-7 days ago  Symptom intensity:  Moderate  Symptom progression:  Staying the same  Had these symptoms before:  No    He eats 0-1 servings of fruits and vegetables daily.He consumes 1 sweetened beverage(s) daily.He exercises with enough effort to increase his heart rate 9 or less minutes per day.       With 5 days of left-sided chest pain radiating into his left arm.  He may have had some shortness of breath and diaphoresis with that also.  Was very stubborn when not go to the hospital for evaluation but would come in and see me to discuss it today.  After conversation he did agree to go over to the emergency room and have this evaluated as I am concerned that this is cardiac chest pain.        Review of Systems   Constitutional, HEENT, cardiovascular, pulmonary, gi and gu systems are negative, except as otherwise noted.      Objective    /74   Pulse 95   Temp 97.5  F (36.4  C) (Temporal)   Resp 22   Ht 1.803 m (5' 11\")   Wt 83 kg (183 lb)   SpO2 96%   BMI 25.52 kg/m    Body mass index is 25.52 kg/m .  Physical Exam   GENERAL: healthy, alert and no distress                    "

## 2023-11-27 NOTE — ED PROVIDER NOTES
History     Chief Complaint   Patient presents with    Chest Pain     HPI  Khang Bailon is a 78 year old male who with a history of complete heart block with pacemaker placement in 2012, diverticulitis, copd, hyperlipidemia presents to the emergency department via the clinic secondary to chest pain.  He has been experiencing chest pain for the last 5 days.  It is associated with frequent belching.  Per family it has been much longer than 5 days, more like 2 weeks.  It does get worse with exertion.  He has been belching way more than usual.  He has relief of the chest discomfort with belching.  No history of known coronary artery disease.  No recent stress test or angiogram.  No history of having an angiogram.  His family states that he has oxygen dependent COPD but he has not been using oxygen and needs an order for it.  Primary care is [facility].  He saw Dr. May today.  No PND or lower extremity edema or calf discomfort.    Allergies:  Allergies   Allergen Reactions    Flagyl [Metronidazole] GI Disturbance       Problem List:    Patient Active Problem List    Diagnosis Date Noted    COVID-19 08/13/2023     Priority: Medium    BPH with elevated PSA 11/01/2021     Priority: Medium    Fatigue, unspecified type 11/29/2019     Priority: Medium    Memory loss 11/29/2019     Priority: Medium    Moderate COPD (chronic obstructive pulmonary disease) (H) 11/16/2017     Priority: Medium    Benign neoplasm of colon, unspecified part of colon 07/26/2016     Priority: Medium    Sigmoid diverticulitis 07/12/2016     Priority: Medium    History of CVA (cerebrovascular accident) - old left internal capsule lacunar infarct on CT 7/2016 07/12/2016     Priority: Medium    Multilevel degenerative disc disease 09/21/2015     Priority: Medium    Elevated prostate specific antigen (PSA) 09/11/2015     Priority: Medium    Health Care Home 01/24/2013     Priority: Medium     Racquel Ortiz RN-PHN  FPA / [facility]    [phone] b  DX V65.8 REPLACED WITH [facility] C[facility] (2013)      Elevated PSA 2012     Priority: Medium    Loss of weight 2012     Priority: Medium    Third degree heart block (H) - s/p pacemaker 08/10/2012     Priority: Medium    Hypertension goal BP (blood pressure) < 140/90 08/10/2012     Priority: Medium    Advanced directives, counseling/discussion 2012     Priority: Medium     Discussed advance care planning with patient; however, patient declined at this time. 2012         Thalassemia 2012     Priority: Medium     Diagnosis updated by automated process. Provider to review and confirm.      Hyperlipidemia LDL goal <130 2012     Priority: Medium    Painful respiration 2004     Priority: Medium    Personal history of tobacco use, presenting hazards to health 2004     Priority: Medium        Past Medical History:    Past Medical History:   Diagnosis Date    Alpha thalassemia (H24)     AV block 2012    Diverticulitis     Injury, other and unspecified, elbow, forearm, and wrist 1970s       Past Surgical History:    Past Surgical History:   Procedure Laterality Date    COLONOSCOPY      COLONOSCOPY  12/10/2012    CYSTOSCOPY, LITHOLAPAXY, COMBINED N/A 11/10/2021    Procedure: Cystoscopy, Bladder Stone Removal;  Surgeon: Guanaco Stewart MD;  Location: [facility] OR    IMPLANT PACEMAKER      LASER KTP TRANSURETHRAL RESECTION (TUR) PROSTATE N/A 11/10/2021    Procedure: TRANSURETHRAL RESECTION (TUR) PROSTATE, USING KTP LASER;  Surgeon: Guanaco Stewart MD;  Location: [facility] OR       Family History:    No family history on file.    Social History:  Marital Status:   [2]  Social History     Tobacco Use    Smoking status: Former     Years: 50     Types: Cigarettes     Quit date: 2012     Years since quittin.3    Smokeless tobacco: Never    Tobacco comments:     Quit in July   Vaping Use    Vaping Use: Never used   Substance Use Topics  "   Alcohol use: No     Alcohol/week: 0.0 standard drinks of alcohol    Drug use: No        Medications:    Ascorbic Acid (VITAMIN C PO)  B Complex Vitamins (B COMPLEX PO)  DIGESTIVE ENZYMES PO  ipratropium - albuterol 0.5 mg/2.5 mg/3 mL (DUONEB) 0.5-2.5 (3) MG/3ML neb solution  Iron Carbonyl-Vitamin C-FOS (CHEWABLE IRON) 30-10-25 MG CHEW  Vitamin D-Vitamin K (VITAMIN K2-VITAMIN D3 PO)  order for DME          Review of Systems   All other systems reviewed and are negative.      Physical Exam   BP: 129/89  Pulse: 89  Temp: 98  F (36.7  C)  Resp: 18  Height: 180.3 cm (5' 11\")  Weight: 83 kg (183 lb)  SpO2: 96 %      Physical Exam  Vitals and nursing note reviewed.   Constitutional:       General: He is not in acute distress.     Appearance: He is well-developed. He is not diaphoretic.   HENT:      Head: Normocephalic and atraumatic.      Nose: No congestion.      Mouth/Throat:      Mouth: Mucous membranes are moist.   Eyes:      General: No scleral icterus.     Conjunctiva/sclera: Conjunctivae normal.   Cardiovascular:      Rate and Rhythm: Normal rate and regular rhythm.   Pulmonary:      Effort: Pulmonary effort is normal. No respiratory distress.      Breath sounds: No stridor.      Comments: Decreased breath sounds at the bases.  No wheezes or crackles.  Chest:      Chest wall: No mass, tenderness or edema.   Abdominal:      General: Abdomen is flat.   Musculoskeletal:      Cervical back: Normal range of motion and neck supple.   Skin:     General: Skin is warm and dry.      Capillary Refill: Capillary refill takes less than 2 seconds.      Findings: No rash.   Neurological:      Mental Status: He is alert and oriented to person, place, and time.         ED Course                 Procedures              EKG Interpretation:      Interpreted by Cooper Rosas MD  Time reviewed: 1537  Symptoms at time of EKG: LBBB   Rhythm: normal sinus   Rate: normal  Axis: normal  Ectopy: none  Conduction: normal  ST Segments/ T " Waves: No ST-T wave changes  Q Waves: none  Comparison to prior: Unchanged    Clinical Impression: normal EKG             Results for orders placed or performed during the hospital encounter of 11/27/23 (from the past 24 hour(s))   CBC with platelets differential    Narrative    The following orders were created for panel order CBC with platelets differential.  Procedure                               Abnormality         Status                     ---------                               -----------         ------                     CBC with platelets and d...[[id number]]  Abnormal            Final result                 Please view results for these tests on the individual orders.   Comprehensive metabolic panel   Result Value Ref Range    Sodium 137 135 - 145 mmol/L    Potassium 4.0 3.4 - 5.3 mmol/L    Carbon Dioxide (CO2) 28 22 - 29 mmol/L    Anion Gap 8 7 - 15 mmol/L    Urea Nitrogen 12.9 8.0 - 23.0 mg/dL    Creatinine 0.93 0.67 - 1.17 mg/dL    GFR Estimate 84 >60 mL/min/1.73m2    Calcium 9.3 8.8 - 10.2 mg/dL    Chloride 101 98 - 107 mmol/L    Glucose 132 (H) 70 - 99 mg/dL    Alkaline Phosphatase 68 40 - 150 U/L    AST 24 0 - 45 U/L    ALT 17 0 - 70 U/L    Protein Total 6.4 6.4 - 8.3 g/dL    Albumin 3.9 3.5 - 5.2 g/dL    Bilirubin Total 0.7 <=1.2 mg/dL   Troponin T, High Sensitivity   Result Value Ref Range    Troponin T, High Sensitivity 179 (HH) <=22 ng/L   Nt probnp inpatient (BNP)   Result Value Ref Range    N terminal Pro BNP Inpatient 3,523 (H) 0 - 1,800 pg/mL   CBC with platelets and differential   Result Value Ref Range    WBC Count 10.6 4.0 - 11.0 10e3/uL    RBC Count 6.57 (H) 4.40 - 5.90 10e6/uL    Hemoglobin 12.7 (L) 13.3 - 17.7 g/dL    Hematocrit 41.0 40.0 - 53.0 %    MCV 62 (L) 78 - 100 fL    MCH 19.3 (L) 26.5 - 33.0 pg    MCHC 31.0 (L) 31.5 - 36.5 g/dL    RDW 19.0 (H) 10.0 - 15.0 %    Platelet Count 230 150 - 450 10e3/uL    % Neutrophils 77 %    % Lymphocytes 13 %    % Monocytes 7 %    % Eosinophils  1 %    % Basophils 1 %    % Immature Granulocytes 1 %    NRBCs per 100 WBC 0 <1 /100    Absolute Neutrophils 8.2 1.6 - 8.3 10e3/uL    Absolute Lymphocytes 1.3 0.8 - 5.3 10e3/uL    Absolute Monocytes 0.8 0.0 - 1.3 10e3/uL    Absolute Eosinophils 0.1 0.0 - 0.7 10e3/uL    Absolute Basophils 0.1 0.0 - 0.2 10e3/uL    Absolute Immature Granulocytes 0.1 <=0.4 10e3/uL    Absolute NRBCs 0.0 10e3/uL   XR Chest 2 Views    Narrative    EXAM: XR CHEST 2 VIEWS  LOCATION: [facility]  DATE: 11/27/2023    INDICATION: chest pain  COMPARISON: 08/13/2023      Impression    IMPRESSION: Cardiac pacemaker. Normal heart size. Lungs are hyperinflated with linear scarring or fibrosis in both lung bases. No CHF, lobar or effusions. No acute abnormalities.       Medications   heparin loading dose for LOW INTENSITY TREATMENT * Give BEFORE starting heparin infusion (has no administration in time range)   heparin 25,000 units in 0.45% NaCl 250 mL ANTICOAGULANT infusion (has no administration in time range)   LORazepam (ATIVAN) injection 0.5 mg (has no administration in time range)   alum & mag hydroxide-simethicone (MAALOX) suspension 15 mL (15 mLs Oral $Given 11/27/23 1712)       Assessments & Plan (with Medical Decision Making)  78-year-old male with a history of COPD, complete heart block with pacemaker placement presents with frequent belching, chest discomfort relieved by belching.  Symptoms have been present for 2 weeks.  It is not related to what he eats.  Patient does have history of hiatal hernia.  Chest x-ray ordered to evaluate that.  Patient was given a GI cocktail.  Labs and EKG performed.  EKG shows wide-complex sinus rhythm which is equal to previous ECG and most likely related to pacemaker placement.  Patient has elevated troponin consistent with a diagnosis of non-STEMI.  Patient was started on heparin.  His preferred hospital was [place] where he had his pacemaker placed.  He is not currently  having active chest discomfort therefore he was not given any other medicine for pain.  Will do a delta 2-hour troponin at this point.  He does not have any renal insufficiency.6 oxygen saturations look good.  Heart rate is in the 80s.  Blood pressure is 129/89.  BNP is elevated but chest x-ray is clear.  D/[facility] Dr[facility] Pettit at [facility] who accepts patient in transfer and agrees with care.  Patient was a little bit agitated therefore was given 0.5 mg of Ativan IV.  He is in agreement with the treatment plan.     I have reviewed the nursing notes.    I have reviewed the findings, diagnosis, plan and need for follow up with the patient.          New Prescriptions    No medications on file       Final diagnoses:   Non-STEMI (non-ST elevated myocardial infarction) (H)       11/27/2023   [facility] EMERGENCY DEPT       Cooper Rosas MD  11/27/23 0986

## 2023-11-27 NOTE — ED TRIAGE NOTES
Pt presents with concerns of chest pain.  Pt states that it has been worse for the past week, but has had for a few weeks.  Pt states that he is belching a lot as well.  Pt states that when he eats it gets worse.      Triage Assessment (Adult)       Row Name 11/27/23 1522          Triage Assessment    Airway WDL WDL        Respiratory WDL    Respiratory WDL WDL        Skin Circulation/Temperature WDL    Skin Circulation/Temperature WDL WDL        Cardiac WDL    Cardiac WDL X;chest pain  pt states that he has had the pain for weeks, worse the last week

## 2023-11-28 NOTE — MEDICATION SCRIBE - ADMISSION MEDICATION HISTORY
Medication Scribe Admission Medication History    Admission medication history is complete. The information provided in this note is only as accurate as the sources available at the time of the update.    Information Source(s): Patient via in-person    Pertinent Information:     Changes made to PTA medication list:  Added: None  Deleted: Multivitamin - not taking   Naproxen 220 mg - not taking   Changed: None    Medication Affordability:  Not including over the counter (OTC) medications, was there a time in the past 3 months when you did not take your medications as prescribed because of cost?: No    Allergies reviewed with patient and updates made in EHR: yes    Medication History Completed By: POLLO VALERO 11/27/2023 6:21 PM    PTA Med List   Medication Sig Last Dose    Ascorbic Acid (VITAMIN C PO) Take 1 tablet by mouth daily (Isotonix) 11/27/2023 at am    B Complex Vitamins (B COMPLEX PO) Take 1 tablet by mouth daily (Isotonix) 11/27/2023 at am    DIGESTIVE ENZYMES PO Take 1 capful by mouth daily (Isotonix - w/ probiotics) 11/27/2023 at am    ipratropium - albuterol 0.5 mg/2.5 mg/3 mL (DUONEB) 0.5-2.5 (3) MG/3ML neb solution USE 1 VIAL IN NEBULIZER EVERY 6 HOURS AS NEEDED FOR FOR SHORTNESS OF BREATH, WHEEZING OR COUGH (Patient taking differently: Take 1 vial by nebulization every 6 hours as needed for wheezing or shortness of breath) 11/27/2023 at 1300    Iron Carbonyl-Vitamin C-FOS (CHEWABLE IRON) 30-10-25 MG CHEW Take 2 chew tab by mouth daily 11/27/2023 at am    Vitamin D-Vitamin K (VITAMIN K2-VITAMIN D3 PO) Take 1 tablet by mouth daily (Isotonix) 11/27/2023 at am

## 2023-11-29 RX ORDER — IPRATROPIUM BROMIDE AND ALBUTEROL SULFATE 2.5; .5 MG/3ML; MG/3ML
SOLUTION RESPIRATORY (INHALATION)
Qty: 180 ML | Refills: 0 | Status: SHIPPED | OUTPATIENT
Start: 2023-11-29 | End: 2023-12-18

## 2023-11-30 ENCOUNTER — TELEPHONE (OUTPATIENT)
Dept: FAMILY MEDICINE | Facility: CLINIC | Age: 78
End: 2023-11-30
Payer: COMMERCIAL

## 2023-11-30 NOTE — TELEPHONE ENCOUNTER
"Spoke with patient's wife who states that he was discharged from hospital yesterday and sent home without oxygen.    She feels that he needs oxygen, especially at night as she can hear when he is \"gasping\" for air.  She states that he uses a neb and has COPD.    Message handled by Nurse Triage with [facility] - provider name: Dr. Irizarry .  He recommended that patient get a home care referral for evaluation for O2.  He also mentioned that patient should also decide who he plans to follow for PC.    Wife preferred call back vs calling patient - T: [phone]  "

## 2023-11-30 NOTE — TELEPHONE ENCOUNTER
Problem: Oxygenation/Respiratory Function  Goal: Optimized respiratory function and gas exchange  Outcome: Outcome Met, Continue evaluating goal progress toward completion  Note: Intermittent tachypnea and retractions present, aeration slightlydiminished.  Saturations upper 90's all shift,  cbg wnl      Problem: Alteration in Family Bonding  Goal: Family Interaction is supported  Outcome: Outcome Met, Continue evaluating goal progress toward completion  Note: No family contact this shift       Problem: Nutrition  Goal: Tolerates feedings  Outcome: Outcome Not Met, Continue to Monitor  Note: Patient npo.   Abdomen soft with +bs,  small reddish brown mucous in og.  No emesis, girth stable, am labs sent and pending,  ivf per uvc      Problem: Hyperbilirubinemia Requiring Phototherapy  Goal: Infant will receive sufficient treatment to produce a decrease in total serum bilirubin  Outcome: Outcome Met, Continue evaluating goal progress toward completion  Note: Under single phototherapy, skin loida peace sent and pending       He would like any provider except [facility] to follow him for home care. They do not have home care right now.    Mayi Lowery RN on 11/30/2023 at 2:45 PM

## 2023-11-30 NOTE — TELEPHONE ENCOUNTER
Would you be willing to follow patient and put in home care referral?  Would like home oxygen eval.

## 2023-12-04 ENCOUNTER — TELEPHONE (OUTPATIENT)
Dept: FAMILY MEDICINE | Facility: CLINIC | Age: 78
End: 2023-12-04
Payer: COMMERCIAL

## 2023-12-04 ENCOUNTER — TRANSCRIBE ORDERS (OUTPATIENT)
Dept: OTHER | Age: 78
End: 2023-12-04

## 2023-12-04 DIAGNOSIS — I21.4 NSTEMI (NON-ST ELEVATED MYOCARDIAL INFARCTION) (H): Primary | ICD-10-CM

## 2023-12-04 NOTE — TELEPHONE ENCOUNTER
General Call      Reason for Call:  Dr. Quigley told me to send you a message this Patient needs an oxygen tank referral stat. Thank you.    What are your questions or concerns:  Needs an Oxygen tank referral    Date of last appointment with provider: NA    Could we send this information to you in [employer] or would you prefer to receive a phone call?:   Patient would prefer a phone call   Okay to leave a detailed message?: Yes at Home number on file [phone] (home)

## 2023-12-04 NOTE — TELEPHONE ENCOUNTER
General Call      Reason for Call:  Patient needs orders for oxygen tank. ASAP - Dr. Quigley said to do STAT    What are your questions or concerns:  Needs orders for oxygen tank.    Date of last appointment with provider:na    Could we send this information to you [facility] or would you prefer to receive a phone call?:   No preference   Okay to leave a detailed message?: Yes at Home number on file [phone] (home)

## 2023-12-05 ENCOUNTER — OFFICE VISIT (OUTPATIENT)
Dept: FAMILY MEDICINE | Facility: CLINIC | Age: 78
End: 2023-12-05
Payer: COMMERCIAL

## 2023-12-05 VITALS
OXYGEN SATURATION: 94 % | TEMPERATURE: 97.5 F | RESPIRATION RATE: 20 BRPM | HEIGHT: 71 IN | DIASTOLIC BLOOD PRESSURE: 60 MMHG | BODY MASS INDEX: 25.68 KG/M2 | SYSTOLIC BLOOD PRESSURE: 100 MMHG | WEIGHT: 183.4 LBS | HEART RATE: 76 BPM

## 2023-12-05 DIAGNOSIS — Z09 HOSPITAL DISCHARGE FOLLOW-UP: Primary | ICD-10-CM

## 2023-12-05 DIAGNOSIS — I44.2 COMPLETE HEART BLOCK (H): ICD-10-CM

## 2023-12-05 DIAGNOSIS — Z87.891 FORMER SMOKER: ICD-10-CM

## 2023-12-05 DIAGNOSIS — J44.9 MODERATE COPD (CHRONIC OBSTRUCTIVE PULMONARY DISEASE) (H): ICD-10-CM

## 2023-12-05 DIAGNOSIS — I21.4 NSTEMI (NON-ST ELEVATED MYOCARDIAL INFARCTION) (H): ICD-10-CM

## 2023-12-05 DIAGNOSIS — Z86.73 HISTORY OF CVA (CEREBROVASCULAR ACCIDENT): ICD-10-CM

## 2023-12-05 DIAGNOSIS — E78.5 HYPERLIPIDEMIA LDL GOAL <130: ICD-10-CM

## 2023-12-05 DIAGNOSIS — I10 HYPERTENSION GOAL BP (BLOOD PRESSURE) < 140/90: ICD-10-CM

## 2023-12-05 DIAGNOSIS — N40.0 BENIGN PROSTATIC HYPERPLASIA, UNSPECIFIED WHETHER LOWER URINARY TRACT SYMPTOMS PRESENT: ICD-10-CM

## 2023-12-05 DIAGNOSIS — D56.8 OTHER THALASSEMIA (H): ICD-10-CM

## 2023-12-05 DIAGNOSIS — I25.10 CORONARY ARTERY DISEASE INVOLVING NATIVE CORONARY ARTERY OF NATIVE HEART WITHOUT ANGINA PECTORIS: ICD-10-CM

## 2023-12-05 DIAGNOSIS — J44.1 COPD EXACERBATION (H): ICD-10-CM

## 2023-12-05 DIAGNOSIS — I50.20 SYSTOLIC CONGESTIVE HEART FAILURE, UNSPECIFIED HF CHRONICITY (H): ICD-10-CM

## 2023-12-05 PROCEDURE — 99214 OFFICE O/P EST MOD 30 MIN: CPT | Performed by: STUDENT IN AN ORGANIZED HEALTH CARE EDUCATION/TRAINING PROGRAM

## 2023-12-05 RX ORDER — PREDNISONE 20 MG/1
40 TABLET ORAL DAILY
Qty: 10 TABLET | Refills: 0 | Status: SHIPPED | OUTPATIENT
Start: 2023-12-05 | End: 2023-12-10

## 2023-12-05 RX ORDER — CLOPIDOGREL BISULFATE 75 MG/1
75 TABLET ORAL
COMMUNITY
Start: 2023-11-30 | End: 2024-04-16

## 2023-12-05 RX ORDER — CALCIUM CARBONATE/VITAMIN D3 600 MG-10
1 TABLET ORAL 2 TIMES DAILY
COMMUNITY

## 2023-12-05 RX ORDER — PANTOPRAZOLE SODIUM 40 MG/1
1 TABLET, DELAYED RELEASE ORAL
COMMUNITY
Start: 2023-11-29 | End: 2024-04-26

## 2023-12-05 RX ORDER — LOSARTAN POTASSIUM 25 MG/1
25 TABLET ORAL DAILY
COMMUNITY
Start: 2023-11-30 | End: 2024-04-16

## 2023-12-05 RX ORDER — CARVEDILOL 25 MG/1
12.5 TABLET ORAL 2 TIMES DAILY WITH MEALS
COMMUNITY
Start: 2023-11-29 | End: 2024-04-16

## 2023-12-05 RX ORDER — RESPIRATORY SYNCYTIAL VIRUS VACCINE 120MCG/0.5
0.5 KIT INTRAMUSCULAR ONCE
Qty: 1 EACH | Refills: 0 | Status: CANCELLED | OUTPATIENT
Start: 2023-12-05 | End: 2023-12-05

## 2023-12-05 RX ORDER — ASPIRIN 81 MG/1
81 TABLET, CHEWABLE ORAL
Status: ON HOLD | COMMUNITY
Start: 2023-11-30 | End: 2024-05-02

## 2023-12-05 ASSESSMENT — PAIN SCALES - GENERAL: PAINLEVEL: NO PAIN (0)

## 2023-12-05 NOTE — NURSING NOTE
I did the Portable o2 test:  Khang walked on room air and became tired and asked to stop the test.

## 2023-12-05 NOTE — PROGRESS NOTES
Assessment & Plan   Problem List Items Addressed This Visit          Respiratory    Moderate COPD (chronic obstructive pulmonary disease) (H)    Relevant Medications    predniSONE (DELTASONE) 20 MG tablet    umeclidinium (INCRUSE ELLIPTA) 62.5 MCG/ACT inhaler    Other Relevant Orders    Primary Care - Care Coordination Referral    Home Oxygen Order for DME - ONLY FOR DME       Endocrine    Hyperlipidemia LDL goal <130    Relevant Orders    Lipid panel reflex to direct LDL Non-fasting       Circulatory    Hypertension goal BP (blood pressure) < 140/90    Relevant Medications    losartan (COZAAR) 25 MG tablet    Coronary artery disease involving native coronary artery of native heart without angina pectoris       Urinary    Benign prostatic hyperplasia, unspecified whether lower urinary tract symptoms present       Hematologic    Thalassemia       Other    History of CVA (cerebrovascular accident) - old left internal capsule lacunar infarct on CT 7/2016     Other Visit Diagnoses       Hospital discharge follow-up    -  Primary    NSTEMI (non-ST elevated myocardial infarction) (H)        Relevant Orders    Primary Care - Care Coordination Referral    Home Oxygen Order for DME - ONLY FOR DME    COPD exacerbation (H)        Relevant Medications    predniSONE (DELTASONE) 20 MG tablet    umeclidinium (INCRUSE ELLIPTA) 62.5 MCG/ACT inhaler    Systolic congestive heart failure, unspecified HF chronicity (H)        Former smoker        Complete heart block (H)               Patient does not appear to have signs of fluid overload at this time.  Suspect his underlying lung disease contributing to current respiratory symptoms as well as significant cardiac deconditioning since PCI.  High risk former smoker.  Not doing his daily umeclidinium which I have prescribed now.  Will have him continue DuoNebs regularly as well as course of prednisone given suspected COPD exacerbation.  No crackles or other signs of fluid overload noted  "today and echo with IVC variation acceptable during hospitalization.  Will plan to decrease his carvedilol to 12 mg twice daily as his pressures are slightly low potentially contributing to his lightheadedness.  He was scheduled follow-up with cardiology and cardiac rehab here.  Actually satting okay in clinic but got winded and unable to complete O2 tolerance test.  Was previously on oxygen and hypoxic so we will send for oxygen supplies now.  Patient was waiting for home oxygen supplies here but wanted to leave prior to obtaining this.  I would like to see him back in 1 month.  Sooner if symptoms not improving.  He will be seen immediately if worsening respiratory symptoms or any signs of fluid overload.  Plan to start home oxygen.    I certify that this patient, Khang Bailon has been under my care (or a nurse practitioner or physican's assistant working with me). This is the face-to-face encounter for oxygen medical necessity.      At the time of this encounter supplemental oxygen is reasonable and necessary and is expected to improve the patient's condition in a home setting.       Patient has continued oxygen desaturation due to CAD I25.10  Emphysema J43.9.    If portability is ordered, is the patient mobile within the home? yes        MED REC REQUIRED  Post Medication Reconciliation Status:  Discharge medications reconciled and changed, see notes/orders  BMI:   Estimated body mass index is 25.58 kg/m  as calculated from the following:    Height as of this encounter: 1.803 m (5' 11\").    Weight as of this encounter: 83.2 kg (183 lb 6.4 oz).   Weight management plan: Discussed healthy diet and exercise guidelines        Neville Degroot MD  [facility]    Mikaela Quiñonez is a 78 year old, presenting for the following health issues:  Hospital F/U      [facility]         Hospital Follow-up Visit:    Hospital/Nursing Home/IP Rehab Facility: [facility]   Date " of Admission: 11/27/23  Date of Discharge: 11/29/23  Reason(s) for Admission: chest pain    Was your hospitalization related to COVID-19? No   Problems taking medications regularly:  None  Medication changes since discharge: Carvedilol, pantoprazole, Losartan,vit C,vit. D,plavix, 81 mg aspirin  Problems adhering to non-medication therapy:  None    Summary of hospitalization:  See outside records, reviewed and scanned  Diagnostic Tests/Treatments reviewed.  Follow up needed: cardiology, cardiac rehab  Other Healthcare Providers Involved in Patient s Care:         Specialist appointment - cardiology and Physical Therapy  Update since discharge: stable.       Patient here today with continued shortness of breath cough and sputum production after discharge from hospital where he was diagnosed with an NSTEMI.  Underwent angiogram with severe distal apical LAD stenosis SP PCI to mid circumflex and ramus with drug-eluting stent.  Resolution of his chest pain and was discharged with plan to obtain cardiac rehab and cardiology follow-up.  Slight reduction in EF on echo but no signs of fluid overload at that time.  Borderline pulmonary hypertension noted.  He does have COPD and not taking daily inhaler as prescribed previously.  Does have DuoNebs with benefit and use now.  Breathing not improved since discharge but resolution of chest pain no palpitations.  He does have pacemaker in place from 2012.  Coughing up sputum significant amount of sputum.  No fevers.  No significant congestion.  No sick contacts he is aware of.  He was given oxygen in the hospital and has been prescribed this in the past at home but was not using prior to admission.  Sats at 88% noted on discharge summary.  They have had trouble obtaining home oxygen and was not sent with them.  They were in clinic yesterday upset trying to figure out how to get oxygen supplies.  Noted significant improvement in his breathing while on oxygen in the hospital.   "Wanting to have compressor for as needed use at home that is portable.  They did not like previous times.  He does note occasional lightheadedness with change in position and low blood pressure since starting new medications.  No vertigo or focal deficits.  No new edema.    Plan of care communicated with patient and family                   Review of Systems   Constitutional, HEENT, cardiovascular, pulmonary, GI, , musculoskeletal, neuro, skin, endocrine and psych systems are negative, except as otherwise noted.      Objective    /60 (BP Location: Right arm, Patient Position: Chair, Cuff Size: Adult Large)   Pulse 76   Temp 97.5  F (36.4  C) (Temporal)   Resp 20   Ht 1.803 m (5' 11\")   Wt 83.2 kg (183 lb 6.4 oz)   SpO2 94%   BMI 25.58 kg/m    Body mass index is 25.58 kg/m .  Physical Exam   GENERAL: alert and no distress  EYES: Eyes grossly normal to inspection, PERRL and conjunctivae and sclerae normal  HENT: nose and mouth without ulcers or lesions  NECK: no adenopathy, no asymmetry, masses, or scars and thyroid normal to palpation  RESP: lungs clear to auscultation - no rales, rhonchi or wheezes.  Breath sounds distant.  Pursed lip breathing  CV: regular rate and rhythm, normal S1 S2, no peripheral edema   ABDOMEN: soft, nontender, no hepatosplenomegaly, no masses and bowel sounds normal  MS: no gross musculoskeletal defects noted, no edema  SKIN: no suspicious lesions or rashes  NEURO:  mentation intact and speech normal  PSYCH: mentation appears normal, affect normal/bright              "

## 2023-12-07 ENCOUNTER — PATIENT OUTREACH (OUTPATIENT)
Dept: CARE COORDINATION | Facility: CLINIC | Age: 78
End: 2023-12-07
Payer: COMMERCIAL

## 2023-12-07 NOTE — PROGRESS NOTES
"Clinic Care Coordination Contact  Community Health Worker Initial Outreach    CHW Initial Information Gathering:  Referral Source: PCP  Preferred Hospital: [facility], [place]  [phone]  Preferred Urgent Care: Other  Current living arrangement:: I live in a private home with spouse  Type of residence:: Private home - stairs  Community Resources: Financial/Insurance  Supplies Currently Used at Home: None  Equipment Currently Used at Home: walker, standard, grab bar, tub/shower  Informal Support system:: Spouse  No PCP office visit in Past Year: Yes  Transportation means:: Regular car  CHW Additional Questions  If ED/Hospital discharge, follow-up appointment scheduled as recommended?: Yes  Medication changes made following ED/Hospital discharge?: No  MyChart active?: Yes  Patient sent Social Determinants of Health questionnaire?: No    Patient accepts CC: Yes. Patient scheduled for assessment with Pallavi Gilman RN on 12/8/2023 at 10am. Patient noted desire to discuss Oxygen at home that isn't a big tank but one that \"wraps around him\".     Rama Le  Community Health Worker  [facility] Transitions Program  [facility]  [email or web address]  Office: [phone]    "

## 2023-12-08 ENCOUNTER — PATIENT OUTREACH (OUTPATIENT)
Dept: CARE COORDINATION | Facility: CLINIC | Age: 78
End: 2023-12-08

## 2023-12-08 NOTE — PROGRESS NOTES
Clinic Care Coordination Contact  [facility]/Voicemail    Clinical Data: Care Coordinator Outreach    Outreach Documentation Number of Outreach Attempt   12/8/2023  10:00 AM 1   12/8/2023  10:11 AM 2       Unable to leave message, no voicemail picked up.     Patient was scheduled today at 10 AM with RN CC for initial assessment. RN CC attempted to call patient at 10 AM, no answer and unable to leave a message. RN CC attempted to call patient 10 minutes later and again no answer and no voicemail.    Plan: Care Coordinator will try to reach patient again in 1-2 business days.    Pallavi Gilman, DONNA Care Coordination   [facility]  Email: [email or web address]  Phone: [phone]

## 2023-12-11 NOTE — PROGRESS NOTES
Clinic Care Coordination Contact  Care Team Conversations    Patient's grandson called and left RN CC a message. He said to call patient's wife as the other number is not working.     RN CC called and spoke to patient's wife Brittany (consent to communicate on file). Brittany said the patient had a recent hospital stay. She said the patient has COPD and has some oxygen needs. Brittany said the patient was seen last week and oxygen was ordered. Brittany said [facility] called and told Brittany the patient needs to do the 6 minute walk test. RN CC did tell Brittany the patient's oxygen wasn't in the range of needing it at his appointment last week. Brittany said the patient had just used his nebulizer and she thinks that threw off his oxygen numbers.     Brittany said patient has a follow up appointment on Friday with Dr. Griffiths. Brittany is wanting patient to do another 6 minute walking test at his appointment on Friday. RN CC did add note to appointment note. Brittany said they have no other needs right now. She agrees to talk about oxygen at patient's appointment. Brittany has RN CC's contact information if she needs anything.     RN CC will call wife and patient after his appointment on Friday to see if they have further needs.     Pallavi Gilman RN Care Coordination   [facility]  Email: [email or web address]  Phone: [phone]

## 2023-12-15 ENCOUNTER — OFFICE VISIT (OUTPATIENT)
Dept: INTERNAL MEDICINE | Facility: CLINIC | Age: 78
End: 2023-12-15
Payer: COMMERCIAL

## 2023-12-15 VITALS
OXYGEN SATURATION: 96 % | BODY MASS INDEX: 25.52 KG/M2 | RESPIRATION RATE: 14 BRPM | TEMPERATURE: 98.2 F | SYSTOLIC BLOOD PRESSURE: 108 MMHG | DIASTOLIC BLOOD PRESSURE: 60 MMHG | WEIGHT: 183 LBS | HEART RATE: 74 BPM

## 2023-12-15 DIAGNOSIS — I10 HYPERTENSION GOAL BP (BLOOD PRESSURE) < 140/90: ICD-10-CM

## 2023-12-15 DIAGNOSIS — I44.2 THIRD DEGREE HEART BLOCK (H): ICD-10-CM

## 2023-12-15 DIAGNOSIS — D56.8 OTHER THALASSEMIA (H): ICD-10-CM

## 2023-12-15 DIAGNOSIS — I25.10 CORONARY ARTERY DISEASE INVOLVING NATIVE CORONARY ARTERY OF NATIVE HEART WITHOUT ANGINA PECTORIS: Primary | ICD-10-CM

## 2023-12-15 DIAGNOSIS — E78.5 HYPERLIPIDEMIA LDL GOAL <130: ICD-10-CM

## 2023-12-15 LAB
ANION GAP SERPL CALCULATED.3IONS-SCNC: 8 MMOL/L (ref 7–15)
BUN SERPL-MCNC: 20 MG/DL (ref 8–23)
CALCIUM SERPL-MCNC: 9.1 MG/DL (ref 8.8–10.2)
CHLORIDE SERPL-SCNC: 102 MMOL/L (ref 98–107)
CHOLEST SERPL-MCNC: 185 MG/DL
CREAT SERPL-MCNC: 1.05 MG/DL (ref 0.67–1.17)
DEPRECATED HCO3 PLAS-SCNC: 28 MMOL/L (ref 22–29)
EGFRCR SERPLBLD CKD-EPI 2021: 73 ML/MIN/1.73M2
ERYTHROCYTE [DISTWIDTH] IN BLOOD BY AUTOMATED COUNT: 18.7 % (ref 10–15)
FASTING STATUS PATIENT QL REPORTED: NO
GLUCOSE SERPL-MCNC: 131 MG/DL (ref 70–99)
HCT VFR BLD AUTO: 38.9 % (ref 40–53)
HDLC SERPL-MCNC: 55 MG/DL
HGB BLD-MCNC: 12.1 G/DL (ref 13.3–17.7)
LDLC SERPL CALC-MCNC: 86 MG/DL
MCH RBC QN AUTO: 19.3 PG (ref 26.5–33)
MCHC RBC AUTO-ENTMCNC: 31.1 G/DL (ref 31.5–36.5)
MCV RBC AUTO: 62 FL (ref 78–100)
NONHDLC SERPL-MCNC: 130 MG/DL
PLATELET # BLD AUTO: 238 10E3/UL (ref 150–450)
POTASSIUM SERPL-SCNC: 4.6 MMOL/L (ref 3.4–5.3)
RBC # BLD AUTO: 6.27 10E6/UL (ref 4.4–5.9)
SODIUM SERPL-SCNC: 138 MMOL/L (ref 135–145)
TRIGL SERPL-MCNC: 218 MG/DL
WBC # BLD AUTO: 14 10E3/UL (ref 4–11)

## 2023-12-15 PROCEDURE — 80061 LIPID PANEL: CPT | Performed by: INTERNAL MEDICINE

## 2023-12-15 PROCEDURE — 99203 OFFICE O/P NEW LOW 30 MIN: CPT | Performed by: INTERNAL MEDICINE

## 2023-12-15 PROCEDURE — 85027 COMPLETE CBC AUTOMATED: CPT | Performed by: INTERNAL MEDICINE

## 2023-12-15 PROCEDURE — 36415 COLL VENOUS BLD VENIPUNCTURE: CPT | Performed by: INTERNAL MEDICINE

## 2023-12-15 PROCEDURE — 80048 BASIC METABOLIC PNL TOTAL CA: CPT | Performed by: INTERNAL MEDICINE

## 2023-12-15 RX ORDER — RESPIRATORY SYNCYTIAL VIRUS VACCINE 120MCG/0.5
0.5 KIT INTRAMUSCULAR ONCE
Qty: 1 EACH | Refills: 0 | Status: CANCELLED | OUTPATIENT
Start: 2023-12-15 | End: 2023-12-15

## 2023-12-15 NOTE — PROGRESS NOTES
Assessment & Plan     Coronary artery disease involving native coronary artery of native heart without angina pectoris      Third degree heart block (H) - s/p pacemaker      Hypertension goal BP (blood pressure) < 140/90    - Basic metabolic panel  (Ca, Cl, CO2, Creat, Gluc, K, Na, BUN); Future  - Basic metabolic panel  (Ca, Cl, CO2, Creat, Gluc, K, Na, BUN)    Other thalassemia (H24)    - CBC with platelets; Future  - CBC with platelets    Hyperlipidemia LDL goal <130    - Lipid panel reflex to direct LDL Non-fasting           MED REC REQUIRED  Post Medication Reconciliation Status: discharge medications reconciled, continue medications without change      Dick Griffiths DO  [facility]    Mikaela Bailon is a 78 year old male who presents to clinic today for the following health issues accompanied by his  navicular bones :    HPI       Hospital Follow-up Visit:    Hospital/Nursing Home/IP Rehab Facility:  [facility]   Date of Admission: 11/27/2023  Date of Discharge: 11/29/2023  Reason(s) for Admission: STEMI    Was your hospitalization related to COVID-19? No   Problems taking medications regularly:  None  Medication changes since discharge: None  Problems adhering to non-medication therapy:  None    Summary of hospitalization:  CareEverywhere information obtained and reviewed  Diagnostic Tests/Treatments reviewed.  Follow up needed: Follow up with surgeon and PCP  Other Healthcare Providers Involved in Patient s Care:         Surgical follow-up appointment - TBD  Update since discharge: improved.         Plan of care communicated with patient                 Review of Systems   CONSTITUTIONAL: NEGATIVE for fever, chills, change in weight  INTEGUMENTARY/SKIN: NEGATIVE for worrisome rashes, moles or lesions  EYES: NEGATIVE for vision changes or irritation  ENT/MOUTH: NEGATIVE for ear, mouth and throat problems  RESP: NEGATIVE for significant cough or SOB  BREAST:  NEGATIVE for masses, tenderness or discharge  CV: NEGATIVE for chest pain, palpitations or peripheral edema  GI: NEGATIVE for nausea, abdominal pain, heartburn, or change in bowel habits  : NEGATIVE for frequency, dysuria, or hematuria  MUSCULOSKELETAL: NEGATIVE for significant arthralgias or myalgia  NEURO: NEGATIVE for weakness, dizziness or paresthesias  ENDOCRINE: NEGATIVE for temperature intolerance, skin/hair changes  HEME: NEGATIVE for bleeding problems  PSYCHIATRIC: NEGATIVE for changes in mood or affect      Objective    /60   Pulse 74   Temp 98.2  F (36.8  C)   Resp 14   Wt 83 kg (183 lb)   SpO2 96%   BMI 25.52 kg/m    Body mass index is 25.52 kg/m .  Physical Exam   GENERAL: healthy, alert and no distress  EYES: Eyes grossly normal to inspection, PERRL and conjunctivae and sclerae normal  HENT: ear canals and TM's normal, nose and mouth without ulcers or lesions  NECK: no adenopathy, no asymmetry, masses, or scars and thyroid normal to palpation  RESP: lungs clear to auscultation - no rales, rhonchi or wheezes  CV: regular rate and rhythm, normal S1 S2, no S3 or S4, no murmur, click or rub, no peripheral edema and peripheral pulses strong  ABDOMEN: soft, nontender, no hepatosplenomegaly, no masses and bowel sounds normal  MS: no gross musculoskeletal defects noted, no edema  SKIN: no suspicious lesions or rashes  NEURO: Normal strength and tone, mentation intact and speech normal  PSYCH: mentation appears normal, affect normal/bright    Lab work radiographs performed during the hospitalization are discussed with the patient.                I have carefully explained the diagnosis and treatment options to the patient.  The patient has displayed an understanding of the above, and all subsequent questions were answered.      DO DIO Patton    Portions of this note were produced using [employer]  Although every attempt at real-time proof reading has been made, occasional  grammar/syntax errors may have been missed.

## 2023-12-16 DIAGNOSIS — J44.9 CHRONIC OBSTRUCTIVE PULMONARY DISEASE, UNSPECIFIED COPD TYPE (H): ICD-10-CM

## 2023-12-18 ENCOUNTER — PATIENT OUTREACH (OUTPATIENT)
Dept: CARE COORDINATION | Facility: CLINIC | Age: 78
End: 2023-12-18
Payer: COMMERCIAL

## 2023-12-18 RX ORDER — IPRATROPIUM BROMIDE AND ALBUTEROL SULFATE 2.5; .5 MG/3ML; MG/3ML
SOLUTION RESPIRATORY (INHALATION)
Qty: 180 ML | Refills: 0 | Status: SHIPPED | OUTPATIENT
Start: 2023-12-18 | End: 2024-01-03

## 2023-12-18 NOTE — LETTER
[facility] CARE COORDINATION  [address] [facility] [address]  Phone: [phone]      December 19, 2023      Khang Bailon  [address] [place] [id number]    Dear Khang,    We have been trying to reach you to introduce you to [facility] s Care Coordination program.  The goal of care coordination is to help you manage your health and improve access to the [facility] system in the most efficient manner.  The Care Coordinator is a nurse who understands the healthcare system and will assist you in improving your access to care.     As your Physician and Care Coordinator we partner to help you achieve your health care goals.     We will continue to reach out; however, if you are able to call your Care Coordinator at [phone], that would be appreciated.  We at [facility] are focused on providing you with the highest-quality healthcare experience possible.      It is a pleasure to partner with you as we work towards achieving your optimal state of wellness.        Sincerely,    Pallavi Gilman RN         No Ref-Primary, Physician  None   No address on file

## 2023-12-18 NOTE — PROGRESS NOTES
Clinic Care Coordination Contact  [facility]    Clinical Data: Care Coordinator Outreach    Outreach Documentation Number of Outreach Attempt   12/8/2023  10:00 AM 1   12/8/2023  10:11 AM 2   12/11/2023  11:32 AM 3   12/18/2023  11:28 AM 1       Unable to leave message as line was busy.     Plan: Care Coordinator will try to reach patient again in 1-2 business days.    Pallavi Gilman RN Care Coordination   [facility]  Email: [email or web address]  Phone: [phone]

## 2023-12-19 ENCOUNTER — TELEPHONE (OUTPATIENT)
Dept: INTERNAL MEDICINE | Facility: CLINIC | Age: 78
End: 2023-12-19
Payer: COMMERCIAL

## 2023-12-19 NOTE — PROGRESS NOTES
Clinic Care Coordination Contact  [facility]/Voicemail    Clinical Data: Care Coordinator Outreach    Outreach Documentation Number of Outreach Attempt   12/8/2023  10:11 AM 2   12/11/2023  11:32 AM 3   12/18/2023  11:28 AM 1   12/19/2023   1:28 PM 2       Unable to leave message, no voicemail picked up.     Plan: Care Coordinator will send unable to contact letter with care coordinator contact information via [employer]. Care Coordinator will do no further outreaches at this time.    Pallavi Gilman RN Care Coordination   [facility]  Email: [email or web address]  Phone: [phone]

## 2023-12-19 NOTE — TELEPHONE ENCOUNTER
Patient office visit notes need to be completed in relation to his need for supplemental oxygen.    Patient was in to see you and Dr Degroot, walking O2 tests were done for oxygen.    Patient passed these per staff at clinic.  Home medical is needing your OV note completed.    Lisbeth Arredondo XRO/

## 2023-12-20 ENCOUNTER — TELEPHONE (OUTPATIENT)
Dept: FAMILY MEDICINE | Facility: CLINIC | Age: 78
End: 2023-12-20
Payer: COMMERCIAL

## 2023-12-20 NOTE — TELEPHONE ENCOUNTER
My note is complete.  It would seem that the office visit was set up as a post hospital follow-up for both my visit and Dr. Davila's.  I did not do oxygen saturation testing during my visit.  In fact, it is probably incorrectly coded as I coded it as a hospital follow-up.  Apparently, it was simply a visit.    Aye

## 2023-12-20 NOTE — TELEPHONE ENCOUNTER
Wife calling on this and is very frustrated. She notes that insurance is denying the oxygen because sats were not below 87%, even though provider said patient needs it.     Can provider advise if anything else can be done?    Arabella Casas, BERNIEN, RN

## 2023-12-21 NOTE — TELEPHONE ENCOUNTER
Patient was unable to complete O2 tolerance in clinic. Was saturating on room air but symptomatic. Previous oxygen and symptomatic improvement with this. They can complete home oxygen assessment now. If he is not having desaturations with exertion then he likely does not need this. I did speak with patient and still significantly SOB with exertion without oxygen.

## 2023-12-22 NOTE — TELEPHONE ENCOUNTER
Required documentation for   Room air resting sat of 88% or less  Room air resting Sat ------------  Room air walking sat _______  And  \Sat of walking with O2 on that shows improvement.    Patient notified and appointment scheduled.

## 2023-12-28 ENCOUNTER — ALLIED HEALTH/NURSE VISIT (OUTPATIENT)
Dept: FAMILY MEDICINE | Facility: CLINIC | Age: 78
End: 2023-12-28
Payer: COMMERCIAL

## 2023-12-28 VITALS — OXYGEN SATURATION: 98 %

## 2023-12-28 DIAGNOSIS — J44.9 MODERATE COPD (CHRONIC OBSTRUCTIVE PULMONARY DISEASE) (H): Primary | ICD-10-CM

## 2023-12-28 PROCEDURE — 99207 PR NO CHARGE NURSE ONLY: CPT

## 2023-12-28 NOTE — PROGRESS NOTES
Patient came in and he was ad 97-98 when starting exercise we walked at he only dropped to 93 Dr Degroot went and spoke to patient and wife     Isela Branham MA 12/28/2023

## 2024-01-03 DIAGNOSIS — J44.9 CHRONIC OBSTRUCTIVE PULMONARY DISEASE, UNSPECIFIED COPD TYPE (H): ICD-10-CM

## 2024-01-03 RX ORDER — IPRATROPIUM BROMIDE AND ALBUTEROL SULFATE 2.5; .5 MG/3ML; MG/3ML
SOLUTION RESPIRATORY (INHALATION)
Qty: 180 ML | Refills: 0 | Status: SHIPPED | OUTPATIENT
Start: 2024-01-03 | End: 2024-01-25

## 2024-01-09 ENCOUNTER — OFFICE VISIT (OUTPATIENT)
Dept: FAMILY MEDICINE | Facility: CLINIC | Age: 79
End: 2024-01-09
Payer: COMMERCIAL

## 2024-01-09 VITALS
SYSTOLIC BLOOD PRESSURE: 115 MMHG | HEART RATE: 84 BPM | BODY MASS INDEX: 25.9 KG/M2 | RESPIRATION RATE: 14 BRPM | WEIGHT: 185.7 LBS | OXYGEN SATURATION: 96 % | DIASTOLIC BLOOD PRESSURE: 79 MMHG | TEMPERATURE: 97.2 F

## 2024-01-09 DIAGNOSIS — Z86.73 HISTORY OF CVA (CEREBROVASCULAR ACCIDENT): ICD-10-CM

## 2024-01-09 DIAGNOSIS — R91.8 PULMONARY NODULES: ICD-10-CM

## 2024-01-09 DIAGNOSIS — N40.0 BENIGN PROSTATIC HYPERPLASIA WITHOUT LOWER URINARY TRACT SYMPTOMS: ICD-10-CM

## 2024-01-09 DIAGNOSIS — I10 HYPERTENSION GOAL BP (BLOOD PRESSURE) < 140/90: ICD-10-CM

## 2024-01-09 DIAGNOSIS — I50.20 SYSTOLIC CONGESTIVE HEART FAILURE, UNSPECIFIED HF CHRONICITY (H): ICD-10-CM

## 2024-01-09 DIAGNOSIS — I44.2 THIRD DEGREE HEART BLOCK (H): ICD-10-CM

## 2024-01-09 DIAGNOSIS — D56.8 OTHER THALASSEMIA (H): ICD-10-CM

## 2024-01-09 DIAGNOSIS — I25.10 CORONARY ARTERY DISEASE INVOLVING NATIVE CORONARY ARTERY OF NATIVE HEART WITHOUT ANGINA PECTORIS: ICD-10-CM

## 2024-01-09 DIAGNOSIS — J44.9 MODERATE COPD (CHRONIC OBSTRUCTIVE PULMONARY DISEASE) (H): Primary | ICD-10-CM

## 2024-01-09 DIAGNOSIS — I21.4 NSTEMI (NON-ST ELEVATED MYOCARDIAL INFARCTION) (H): ICD-10-CM

## 2024-01-09 DIAGNOSIS — Z87.891 FORMER SMOKER: ICD-10-CM

## 2024-01-09 PROCEDURE — 99214 OFFICE O/P EST MOD 30 MIN: CPT | Performed by: STUDENT IN AN ORGANIZED HEALTH CARE EDUCATION/TRAINING PROGRAM

## 2024-01-09 RX ORDER — RESPIRATORY SYNCYTIAL VIRUS VACCINE 120MCG/0.5
0.5 KIT INTRAMUSCULAR ONCE
Qty: 1 EACH | Refills: 0 | Status: CANCELLED | OUTPATIENT
Start: 2024-01-09 | End: 2024-01-09

## 2024-01-09 ASSESSMENT — PAIN SCALES - GENERAL: PAINLEVEL: NO PAIN (0)

## 2024-01-09 NOTE — PROGRESS NOTES
Assessment & Plan   Problem List Items Addressed This Visit          Respiratory    Moderate COPD (chronic obstructive pulmonary disease) (H) - Primary       Circulatory    Third degree heart block (H) - s/p pacemaker    Hypertension goal BP (blood pressure) < 140/90    Coronary artery disease involving native coronary artery of native heart without angina pectoris    Systolic congestive heart failure, unspecified HF chronicity (H)       Hematologic    Other thalassemia (H24)       Other    History of CVA (cerebrovascular accident) - old left internal capsule lacunar infarct on CT 7/2016     Other Visit Diagnoses       Former smoker        NSTEMI (non-ST elevated myocardial infarction) (H)        Pulmonary nodules        Relevant Orders    CT Chest w Contrast    Benign prostatic hyperplasia without lower urinary tract symptoms               Patient without recurrent chest pain.  Feels like breathing continues to improve and satting well on room air now.  Thinks his exercise tolerance is improving and still not wanting to do cardiac rehab which I have strongly recommended.  He has not been on steroids for quite some time.  Tolerating aspirin and Plavix but does have some mild bruising.  No further lightheaded episodes with decrease carvedilol dose and blood pressure stable.  Continues on Incruse and does have DuoNebs available.  Not needing oxygen based on previous exercise tolerance testing.  He does have upcoming follow-up with cardiology.  Needs repeat imaging given previous CT findings of pulmonary nodule that is concerning and his smoking history which he quit greater than 10 years ago.    Neville Degroot MD  [facility]    Mikaela Quiñonez is a 78 year old, presenting for the following health issues:  RECHECK (Talk about CT)        1/9/2024     1:48 PM   Additional Questions   Roomed by Richard Butterfield [facility]     Patient following up regarding his lung disease and heart  disease with previous hospital discharge noted.  Tolerate medications but does have some bruising.  No chest pain coughing or wheezing now but still gets winded with exertion.  No longer having lightheaded episodes with medication change.  No recent infection.  No weight changes fevers night sweats or chills.      Review of Systems   Constitutional, HEENT, cardiovascular, pulmonary, GI, , musculoskeletal, neuro, skin, endocrine and psych systems are negative, except as otherwise noted.      Objective    /79 (BP Location: Left arm, Patient Position: Chair, Cuff Size: Adult Regular)   Pulse 84   Temp 97.2  F (36.2  C) (Temporal)   Resp 14   Wt 84.2 kg (185 lb 11.2 oz)   SpO2 96%   BMI 25.90 kg/m    Body mass index is 25.9 kg/m .  Physical Exam   GENERAL: healthy, alert and no distress  EYES: Eyes grossly normal to inspection, PERRL and conjunctivae and sclerae normal  HENT: ear canals and TM's normal, nose and mouth without ulcers or lesions  NECK: no adenopathy, no asymmetry, masses, or scars and thyroid normal to palpation  RESP: lungs clear to auscultation - no rales, rhonchi or wheezes  CV: regular rate and rhythm, normal S1 S2, no S3 or S4, no murmur, click or rub, no peripheral edema and peripheral pulses strong  ABDOMEN: soft, nontender, no hepatosplenomegaly, no masses and bowel sounds normal  MS: no gross musculoskeletal defects noted, no edema  SKIN: no suspicious lesions or rashes  NEURO: Normal strength and tone, mentation intact and speech normal  PSYCH: mentation appears normal, affect normal/bright

## 2024-01-16 ENCOUNTER — HOSPITAL ENCOUNTER (OUTPATIENT)
Dept: CT IMAGING | Facility: CLINIC | Age: 79
Discharge: HOME | End: 2024-01-16
Attending: STUDENT IN AN ORGANIZED HEALTH CARE EDUCATION/TRAINING PROGRAM | Admitting: STUDENT IN AN ORGANIZED HEALTH CARE EDUCATION/TRAINING PROGRAM
Payer: COMMERCIAL

## 2024-01-16 DIAGNOSIS — R91.8 PULMONARY NODULES: ICD-10-CM

## 2024-01-16 LAB — RADIOLOGIST FLAGS: ABNORMAL

## 2024-01-16 PROCEDURE — 71250 CT THORAX DX C-: CPT

## 2024-01-16 NOTE — PROGRESS NOTES
Spoke with patient regarding concerning imaging findings. Urgent consult for nodule clinic placed and will need biopsy or resection. I will also discuss further with radiology tomorrow who was not available. Patient understanding of results and need for follow up.

## 2024-01-17 ENCOUNTER — PATIENT OUTREACH (OUTPATIENT)
Dept: ONCOLOGY | Facility: CLINIC | Age: 79
End: 2024-01-17
Payer: COMMERCIAL

## 2024-01-17 DIAGNOSIS — R91.8 PULMONARY MASS: Primary | ICD-10-CM

## 2024-01-17 NOTE — PROGRESS NOTES
New Patient Oncology Nurse Navigator Note     Referring provider: Dr Degroot, PCP    Referring Clinic/Organization: [facility]     Referred to: Lung Nodule Clinic    Requested provider (if applicable): First available - did not specify     Referral Received: 01/16/24       Evaluation for : enlarging lung nodule     Clinical History (per Nurse review of records provided):      * 8/14/2023 CT chest ([facility])  IMPRESSION:  1.  No pulmonary embolism demonstrated.  2.  1.2 x 0.7 cm nodule in the superior segment of the right lower  lobe new since the comparison.          * 1/16/2024 CT chest ([facility])  FINDINGS:   LUNGS AND PLEURA: A 16 x 13 mm lobulated nodule in the right lower  lobe superior segment on series 4 image 132 has enlarged having  measured 12 x 7 mm on 8/14/2023. It is new from 6/27/2021. Margins are  somewhat lobulated and spiculated. Findings are highly suspicious for  malignancy. Much less likely is a progressive infectious process such  as fungal infection. A partially calcified nodule in the right lower  lobe superior segment on image 170 is unchanged and compatible with a  benign calcified granuloma. Fissural nodules in the left lung  measuring up to 6 x 3 mm are unchanged from 2021 and can be considered  benign. Emphysema with advanced destructive changes. No pleural  effusions. Mild scarring in the lung bases.     MEDIASTINUM/AXILLAE: No adenopathy. Normal heart size. No pericardial  effusions. Cardiac leads in the RA and RV. Moderate calcified plaque  in the thoracic aorta.     CORONARY ARTERY CALCIFICATION: Extensive.     UPPER ABDOMEN: No adrenal nodules. Benign-appearing renal cysts. No  follow-up needed. Cholelithiasis.     MUSCULOSKELETAL: The bones are demineralized. Degenerative  hypertrophic changes in the thoracic spine.                                                                      IMPRESSION:   1.  The new nodule identified in the right lower lobe superior segment  on  8/14/2023 demonstrates an interval increase in size. This is highly  suspicious for a primary lung malignancy. An atypical infectious or  inflammatory process such as fungal/granulomatous infection is  considered much less likely.  2.  Additional calcified and noncalcified nodules are unchanged from  2021 and can be considered benign.  3.  Emphysema with advanced destructive changes.     [Access Center: Pulmonary nodule]        Clinical Assessment / Barriers to Care (Per Nurse):    Pt has 50pk/yr smoking history, quit 2012 per chart. Lung nodule is enlarging on serial imaging; will offer next available consult w/ Lung Nodule team (1/24/24 Dr Nic Feliciano at [facility]). Will get PFTs prior to.        Records Location: [facility]     Records Needed:     N/A    Additional testing needed prior to consult:     PFT        Sridhar Quintero RN, BSN, OCN  Oncology New Patient Nurse Navigator   [facility]  [phone]

## 2024-01-18 PROBLEM — I21.4 NSTEMI (NON-ST ELEVATED MYOCARDIAL INFARCTION) (H): Status: ACTIVE | Noted: 2023-11-27

## 2024-01-18 RX ORDER — CHOLECALCIFEROL (VITAMIN D3) 25 MCG
25 CAPSULE ORAL DAILY
COMMUNITY
End: 2024-05-22

## 2024-01-18 RX ORDER — CHLORAL HYDRATE 500 MG
1 CAPSULE ORAL
COMMUNITY
End: 2024-01-31

## 2024-01-18 RX ORDER — MULTIVITAMIN,THERAPEUTIC
1 TABLET ORAL EVERY MORNING
COMMUNITY
End: 2024-01-31

## 2024-01-19 NOTE — TELEPHONE ENCOUNTER
RECORDS STATUS - ALL OTHER DIAGNOSIS      RECORDS RECEIVED FROM: Epic   DATE RECEIVED:    NOTES STATUS DETAILS   OFFICE NOTE from referring provider Epic 01/09/24: Dr. Neville Degroot   OFFICE NOTE from other specialist Epic 10/27/21: Dr. Gigi Avina   DISCHARGE SUMMARY from hospital [facility] 08/13/23: [facility]   DISCHARGE REPORT from the ER [facility] 12/07/21, 06/27/21, 05/15/19: [facility] ED   MEDICATION LIST [facility]    LABS     PATHOLOGY REPORTS     ANYTHING RELATED TO DIAGNOSIS Epic Most recent 12/15/23   IMAGING (NEED IMAGES & REPORT)     CT SCANS PACS 01/16/24-08/23/17: CT Chest   XRAYS PACS 11/27/23-01/15/13: XR Chest  04/24/23-10/16/16: XR Ribs & Chest

## 2024-01-23 ENCOUNTER — OFFICE VISIT (OUTPATIENT)
Dept: FAMILY MEDICINE | Facility: CLINIC | Age: 79
End: 2024-01-23
Payer: COMMERCIAL

## 2024-01-23 ENCOUNTER — HOSPITAL ENCOUNTER (OUTPATIENT)
Dept: GENERAL RADIOLOGY | Facility: CLINIC | Age: 79
Discharge: HOME | End: 2024-01-23
Attending: STUDENT IN AN ORGANIZED HEALTH CARE EDUCATION/TRAINING PROGRAM | Admitting: STUDENT IN AN ORGANIZED HEALTH CARE EDUCATION/TRAINING PROGRAM
Payer: COMMERCIAL

## 2024-01-23 VITALS
DIASTOLIC BLOOD PRESSURE: 70 MMHG | RESPIRATION RATE: 22 BRPM | BODY MASS INDEX: 24.97 KG/M2 | TEMPERATURE: 98.2 F | OXYGEN SATURATION: 97 % | SYSTOLIC BLOOD PRESSURE: 115 MMHG | HEART RATE: 84 BPM | WEIGHT: 179 LBS

## 2024-01-23 DIAGNOSIS — M53.9 MULTILEVEL DEGENERATIVE DISC DISEASE: ICD-10-CM

## 2024-01-23 DIAGNOSIS — M54.9 ACUTE BILATERAL BACK PAIN, UNSPECIFIED BACK LOCATION: ICD-10-CM

## 2024-01-23 DIAGNOSIS — K59.09 OTHER CONSTIPATION: ICD-10-CM

## 2024-01-23 DIAGNOSIS — J44.9 MODERATE COPD (CHRONIC OBSTRUCTIVE PULMONARY DISEASE) (H): ICD-10-CM

## 2024-01-23 DIAGNOSIS — I10 HYPERTENSION GOAL BP (BLOOD PRESSURE) < 140/90: ICD-10-CM

## 2024-01-23 DIAGNOSIS — D64.9 ANEMIA, UNSPECIFIED TYPE: ICD-10-CM

## 2024-01-23 DIAGNOSIS — D56.8 OTHER THALASSEMIA (H): ICD-10-CM

## 2024-01-23 DIAGNOSIS — I44.2 THIRD DEGREE HEART BLOCK (H): ICD-10-CM

## 2024-01-23 DIAGNOSIS — M54.9 ACUTE BILATERAL BACK PAIN, UNSPECIFIED BACK LOCATION: Primary | ICD-10-CM

## 2024-01-23 LAB
ALBUMIN SERPL BCG-MCNC: 4 G/DL (ref 3.5–5.2)
ALP SERPL-CCNC: 87 U/L (ref 40–150)
ALT SERPL W P-5'-P-CCNC: 14 U/L (ref 0–70)
ANION GAP SERPL CALCULATED.3IONS-SCNC: 10 MMOL/L (ref 7–15)
AST SERPL W P-5'-P-CCNC: 15 U/L (ref 0–45)
BILIRUB SERPL-MCNC: 0.7 MG/DL
BUN SERPL-MCNC: 13 MG/DL (ref 8–23)
CALCIUM SERPL-MCNC: 9.2 MG/DL (ref 8.8–10.2)
CHLORIDE SERPL-SCNC: 102 MMOL/L (ref 98–107)
CREAT SERPL-MCNC: 0.83 MG/DL (ref 0.67–1.17)
DEPRECATED HCO3 PLAS-SCNC: 25 MMOL/L (ref 22–29)
EGFRCR SERPLBLD CKD-EPI 2021: 90 ML/MIN/1.73M2
ERYTHROCYTE [DISTWIDTH] IN BLOOD BY AUTOMATED COUNT: 19.1 % (ref 10–15)
GLUCOSE SERPL-MCNC: 114 MG/DL (ref 70–99)
HCT VFR BLD AUTO: 38.5 % (ref 40–53)
HGB BLD-MCNC: 11.8 G/DL (ref 13.3–17.7)
IRON BINDING CAPACITY (ROCHE): 218 UG/DL (ref 240–430)
IRON SATN MFR SERPL: 12 % (ref 15–46)
IRON SERPL-MCNC: 27 UG/DL (ref 61–157)
MCH RBC QN AUTO: 19 PG (ref 26.5–33)
MCHC RBC AUTO-ENTMCNC: 30.6 G/DL (ref 31.5–36.5)
MCV RBC AUTO: 62 FL (ref 78–100)
PLATELET # BLD AUTO: 292 10E3/UL (ref 150–450)
POTASSIUM SERPL-SCNC: 4.1 MMOL/L (ref 3.4–5.3)
PROT SERPL-MCNC: 7.2 G/DL (ref 6.4–8.3)
RBC # BLD AUTO: 6.2 10E6/UL (ref 4.4–5.9)
SODIUM SERPL-SCNC: 137 MMOL/L (ref 135–145)
WBC # BLD AUTO: 11.9 10E3/UL (ref 4–11)

## 2024-01-23 PROCEDURE — 83550 IRON BINDING TEST: CPT | Performed by: STUDENT IN AN ORGANIZED HEALTH CARE EDUCATION/TRAINING PROGRAM

## 2024-01-23 PROCEDURE — 80053 COMPREHEN METABOLIC PANEL: CPT | Performed by: STUDENT IN AN ORGANIZED HEALTH CARE EDUCATION/TRAINING PROGRAM

## 2024-01-23 PROCEDURE — 72080 X-RAY EXAM THORACOLMB 2/> VW: CPT

## 2024-01-23 PROCEDURE — 99214 OFFICE O/P EST MOD 30 MIN: CPT | Performed by: STUDENT IN AN ORGANIZED HEALTH CARE EDUCATION/TRAINING PROGRAM

## 2024-01-23 PROCEDURE — 36415 COLL VENOUS BLD VENIPUNCTURE: CPT | Performed by: STUDENT IN AN ORGANIZED HEALTH CARE EDUCATION/TRAINING PROGRAM

## 2024-01-23 PROCEDURE — 83540 ASSAY OF IRON: CPT | Performed by: STUDENT IN AN ORGANIZED HEALTH CARE EDUCATION/TRAINING PROGRAM

## 2024-01-23 PROCEDURE — 85027 COMPLETE CBC AUTOMATED: CPT | Performed by: STUDENT IN AN ORGANIZED HEALTH CARE EDUCATION/TRAINING PROGRAM

## 2024-01-23 RX ORDER — OXYCODONE HYDROCHLORIDE 5 MG/1
2.5-5 TABLET ORAL EVERY 6 HOURS PRN
Qty: 12 TABLET | Refills: 0 | Status: SHIPPED | OUTPATIENT
Start: 2024-01-23 | End: 2024-02-20

## 2024-01-23 RX ORDER — FERROUS SULFATE 325(65) MG
325 TABLET ORAL
Qty: 30 TABLET | Refills: 0 | Status: SHIPPED | OUTPATIENT
Start: 2024-01-23 | End: 2024-03-26

## 2024-01-23 RX ORDER — RESPIRATORY SYNCYTIAL VIRUS VACCINE 120MCG/0.5
0.5 KIT INTRAMUSCULAR ONCE
Qty: 1 EACH | Refills: 0 | Status: CANCELLED | OUTPATIENT
Start: 2024-01-23 | End: 2024-01-23

## 2024-01-23 ASSESSMENT — ENCOUNTER SYMPTOMS: COUGH: 1

## 2024-01-23 ASSESSMENT — PAIN SCALES - GENERAL: PAINLEVEL: EXTREME PAIN (8)

## 2024-01-23 NOTE — PROGRESS NOTES
Assessment & Plan   Problem List Items Addressed This Visit          Respiratory    Moderate COPD (chronic obstructive pulmonary disease) (H)    Relevant Orders    Overnight oximetry study for DME - ONLY FOR DME       Circulatory    Third degree heart block (H) - s/p pacemaker    Hypertension goal BP (blood pressure) < 140/90       Musculoskeletal and Integumentary    Multilevel degenerative disc disease    Relevant Medications    oxyCODONE (ROXICODONE) 5 MG tablet       Hematologic    Other thalassemia (H24)    Relevant Medications    ferrous sulfate (FEROSUL) 325 (65 Fe) MG tablet     Other Visit Diagnoses       Acute bilateral back pain, unspecified back location    -  Primary    Relevant Medications    oxyCODONE (ROXICODONE) 5 MG tablet    Other Relevant Orders    XR Thoracic Lumbar Spine 2 Views (Completed)    Other constipation        Anemia, unspecified type        Relevant Medications    ferrous sulfate (FEROSUL) 325 (65 Fe) MG tablet    Other Relevant Orders    CBC with platelets (Completed)    Comprehensive metabolic panel (BMP + Alb, Alk Phos, ALT, AST, Total. Bili, TP) (Completed)    Iron and iron binding capacity (Completed)             Upcoming appointment with nodule clinic.  No new or worsening respiratory symptoms or increase sputum production that would be concerning for COPD exacerbation at this time.  I do think he needs overnight to oximetry testing based on his history.  Hemoglobin and iron level decreased now likely contributing to symptoms.  Will start replacement at this time.  Discussed potential need for repeat colonoscopy.  Continue inhalers at this time.  Back pain his main concern and x-ray today with degenerative changes without significant metastatic lesions.  Plan for trial of oxycodone with home ice and stretching.  May need follow-up with physical therapy but await his nodule evaluation.  Follow-up with me in 2 weeks.      Mikaela   Khang is a 78 year old, presenting for the  following health issues:  Cough        1/23/2024     2:02 PM   Additional Questions   Roomed by Drocas RAMEY   Accompanied by Wife-Nikki     History of Present Illness       Reason for visit:  F he needs some pain meds, treatment for his cough, O2 therapy at night. And may help to expedite some of his workup for his lung and back issues  Symptom onset:  1-2 weeks ago  Symptoms include:  Back pain, chest pain, sob  Symptom progression:  Worsening  What makes it worse:  Cough, moving, breathing, sneezing  What makes it better:  N/a    He eats 0-1 servings of fruits and vegetables daily.He consumes 0 sweetened beverage(s) daily.He exercises with enough effort to increase his heart rate 9 or less minutes per day.  He exercises with enough effort to increase his heart rate 3 or less days per week.   He is taking medications regularly.       No further chest pain but back pain his biggest concern.  Movement deep breaths cause discomfort.  No radicular symptoms.  No significant weight changes.  No fevers.        Review of Systems  Constitutional, neuro, ENT, endocrine, pulmonary, cardiac, gastrointestinal, genitourinary, musculoskeletal, integument and psychiatric systems are negative, except as otherwise noted.      Objective    /70   Pulse 84   Temp 98.2  F (36.8  C) (Temporal)   Resp 22   Wt 81.2 kg (179 lb)   SpO2 97%   BMI 24.97 kg/m    Body mass index is 24.97 kg/m .  Physical Exam   GENERAL: alert and no distress  EYES: Eyes grossly normal to inspection, PERRL and conjunctivae and sclerae normal  HENT: ear canals and TM's normal, nose and mouth without ulcers or lesions  NECK: no adenopathy, no asymmetry, masses, or scars  RESP: lungs clear to auscultation - no rales, rhonchi or wheezes  CV: regular rate and rhythm, no peripheral edema  ABDOMEN: soft, nontender, no hepatosplenomegaly, no masses and bowel sounds normal  MS: no gross musculoskeletal defects noted, no edema, thoracic paraspinal tenderness  bilaterally.   NEURO: Normal strength and tone, mentation intact and speech normal  PSYCH: mentation appears normal, affect normal/bright  LYMPH: no cervical, supraclavicular          Signed Electronically by: Neville Degroot MD

## 2024-01-24 ENCOUNTER — PRE VISIT (OUTPATIENT)
Dept: PULMONOLOGY | Facility: CLINIC | Age: 79
End: 2024-01-24
Payer: COMMERCIAL

## 2024-01-25 DIAGNOSIS — J44.9 CHRONIC OBSTRUCTIVE PULMONARY DISEASE, UNSPECIFIED COPD TYPE (H): ICD-10-CM

## 2024-01-25 RX ORDER — IPRATROPIUM BROMIDE AND ALBUTEROL SULFATE 2.5; .5 MG/3ML; MG/3ML
SOLUTION RESPIRATORY (INHALATION)
Qty: 180 ML | Refills: 0 | Status: SHIPPED | OUTPATIENT
Start: 2024-01-25 | End: 2024-02-12

## 2024-01-31 ENCOUNTER — HOSPITAL ENCOUNTER (INPATIENT)
Facility: CLINIC | Age: 79
LOS: 2 days | Discharge: LEFT BEFORE DISCHARGE | DRG: 871 | End: 2024-02-02
Attending: EMERGENCY MEDICINE | Admitting: INTERNAL MEDICINE
Payer: COMMERCIAL

## 2024-01-31 ENCOUNTER — APPOINTMENT (OUTPATIENT)
Dept: GENERAL RADIOLOGY | Facility: CLINIC | Age: 79
DRG: 871 | End: 2024-01-31
Attending: EMERGENCY MEDICINE
Payer: COMMERCIAL

## 2024-01-31 ENCOUNTER — HOSPITAL ENCOUNTER (OUTPATIENT)
Dept: RESPIRATORY THERAPY | Facility: CLINIC | Age: 79
Discharge: HOME | End: 2024-01-31
Payer: COMMERCIAL

## 2024-01-31 ENCOUNTER — APPOINTMENT (OUTPATIENT)
Dept: CT IMAGING | Facility: CLINIC | Age: 79
DRG: 871 | End: 2024-01-31
Attending: EMERGENCY MEDICINE
Payer: COMMERCIAL

## 2024-01-31 DIAGNOSIS — K57.92 DIVERTICULITIS: ICD-10-CM

## 2024-01-31 DIAGNOSIS — N32.89 MASS OF URINARY BLADDER: ICD-10-CM

## 2024-01-31 DIAGNOSIS — R91.8 PULMONARY MASS: ICD-10-CM

## 2024-01-31 DIAGNOSIS — R07.9 CHEST PAIN, UNSPECIFIED TYPE: ICD-10-CM

## 2024-01-31 DIAGNOSIS — R91.1 PULMONARY NODULE: ICD-10-CM

## 2024-01-31 DIAGNOSIS — J44.9 COPD WITH HYPOXIA (H): ICD-10-CM

## 2024-01-31 DIAGNOSIS — R31.0 GROSS HEMATURIA: Primary | ICD-10-CM

## 2024-01-31 DIAGNOSIS — N30.01 ACUTE CYSTITIS WITH HEMATURIA: ICD-10-CM

## 2024-01-31 LAB
ALBUMIN SERPL BCG-MCNC: 4.1 G/DL (ref 3.5–5.2)
ALBUMIN UR-MCNC: 100 MG/DL
ALP SERPL-CCNC: 87 U/L (ref 40–150)
ALT SERPL W P-5'-P-CCNC: 13 U/L (ref 0–70)
ANION GAP SERPL CALCULATED.3IONS-SCNC: 12 MMOL/L (ref 7–15)
APPEARANCE UR: ABNORMAL
AST SERPL W P-5'-P-CCNC: 18 U/L (ref 0–45)
BASE EXCESS BLDV CALC-SCNC: 4.5 MMOL/L (ref -3–3)
BASOPHILS # BLD AUTO: 0.1 10E3/UL (ref 0–0.2)
BASOPHILS NFR BLD AUTO: 0 %
BILIRUB SERPL-MCNC: 1 MG/DL
BILIRUB UR QL STRIP: NEGATIVE
BUN SERPL-MCNC: 17.1 MG/DL (ref 8–23)
CALCIUM SERPL-MCNC: 9.5 MG/DL (ref 8.8–10.2)
CHLORIDE SERPL-SCNC: 102 MMOL/L (ref 98–107)
COLOR UR AUTO: YELLOW
CREAT SERPL-MCNC: 0.9 MG/DL (ref 0.67–1.17)
DEPRECATED HCO3 PLAS-SCNC: 25 MMOL/L (ref 22–29)
EGFRCR SERPLBLD CKD-EPI 2021: 87 ML/MIN/1.73M2
EOSINOPHIL # BLD AUTO: 0 10E3/UL (ref 0–0.7)
EOSINOPHIL NFR BLD AUTO: 0 %
ERYTHROCYTE [DISTWIDTH] IN BLOOD BY AUTOMATED COUNT: 18.4 % (ref 10–15)
GLUCOSE SERPL-MCNC: 127 MG/DL (ref 70–99)
GLUCOSE UR STRIP-MCNC: NEGATIVE MG/DL
HCO3 BLDV-SCNC: 29 MMOL/L (ref 21–28)
HCT VFR BLD AUTO: 38.2 % (ref 40–53)
HGB BLD-MCNC: 11.9 G/DL (ref 13.3–17.7)
HGB UR QL STRIP: ABNORMAL
HOLD SPECIMEN: NORMAL
IMM GRANULOCYTES # BLD: 0.1 10E3/UL
IMM GRANULOCYTES NFR BLD: 1 %
KETONES UR STRIP-MCNC: 5 MG/DL
LACTATE SERPL-SCNC: 1 MMOL/L (ref 0.7–2)
LEUKOCYTE ESTERASE UR QL STRIP: ABNORMAL
LYMPHOCYTES # BLD AUTO: 0.7 10E3/UL (ref 0.8–5.3)
LYMPHOCYTES NFR BLD AUTO: 3 %
MCH RBC QN AUTO: 19.1 PG (ref 26.5–33)
MCHC RBC AUTO-ENTMCNC: 31.2 G/DL (ref 31.5–36.5)
MCV RBC AUTO: 61 FL (ref 78–100)
MONOCYTES # BLD AUTO: 1 10E3/UL (ref 0–1.3)
MONOCYTES NFR BLD AUTO: 5 %
NEUTROPHILS # BLD AUTO: 19.2 10E3/UL (ref 1.6–8.3)
NEUTROPHILS NFR BLD AUTO: 91 %
NITRATE UR QL: POSITIVE
NRBC # BLD AUTO: 0 10E3/UL
NRBC BLD AUTO-RTO: 0 /100
NT-PROBNP SERPL-MCNC: 1202 PG/ML (ref 0–1800)
O2/TOTAL GAS SETTING VFR VENT: 21 %
OXYHGB MFR BLDV: 78 % (ref 70–75)
PCO2 BLDV: 40 MM HG (ref 40–50)
PH BLDV: 7.47 [PH] (ref 7.32–7.43)
PH UR STRIP: 5 [PH] (ref 5–7)
PLATELET # BLD AUTO: 351 10E3/UL (ref 150–450)
PO2 BLDV: 44 MM HG (ref 25–47)
POTASSIUM SERPL-SCNC: 4.2 MMOL/L (ref 3.4–5.3)
PROT SERPL-MCNC: 7.3 G/DL (ref 6.4–8.3)
RBC # BLD AUTO: 6.22 10E6/UL (ref 4.4–5.9)
RBC URINE: >182 /HPF
SAO2 % BLDV: 79.7 % (ref 70–75)
SODIUM SERPL-SCNC: 139 MMOL/L (ref 135–145)
SP GR UR STRIP: >1.05 (ref 1–1.03)
TROPONIN T SERPL HS-MCNC: 30 NG/L
TROPONIN T SERPL HS-MCNC: 30 NG/L
UROBILINOGEN UR STRIP-MCNC: NORMAL MG/DL
WBC # BLD AUTO: 21.2 10E3/UL (ref 4–11)
WBC URINE: >182 /HPF
YEAST #/AREA URNS HPF: ABNORMAL /HPF

## 2024-01-31 PROCEDURE — 83880 ASSAY OF NATRIURETIC PEPTIDE: CPT | Performed by: EMERGENCY MEDICINE

## 2024-01-31 PROCEDURE — 250N000012 HC RX MED GY IP 250 OP 636 PS 637: Performed by: INTERNAL MEDICINE

## 2024-01-31 PROCEDURE — 84484 ASSAY OF TROPONIN QUANT: CPT | Performed by: EMERGENCY MEDICINE

## 2024-01-31 PROCEDURE — 93010 ELECTROCARDIOGRAM REPORT: CPT | Performed by: EMERGENCY MEDICINE

## 2024-01-31 PROCEDURE — 250N000011 HC RX IP 250 OP 636: Performed by: INTERNAL MEDICINE

## 2024-01-31 PROCEDURE — 82040 ASSAY OF SERUM ALBUMIN: CPT | Performed by: EMERGENCY MEDICINE

## 2024-01-31 PROCEDURE — 99223 1ST HOSP IP/OBS HIGH 75: CPT | Performed by: INTERNAL MEDICINE

## 2024-01-31 PROCEDURE — 87040 BLOOD CULTURE FOR BACTERIA: CPT | Performed by: EMERGENCY MEDICINE

## 2024-01-31 PROCEDURE — 85004 AUTOMATED DIFF WBC COUNT: CPT | Performed by: EMERGENCY MEDICINE

## 2024-01-31 PROCEDURE — 71045 X-RAY EXAM CHEST 1 VIEW: CPT

## 2024-01-31 PROCEDURE — 999N000156 HC STATISTIC RCP CONSULT EA 30 MIN

## 2024-01-31 PROCEDURE — 87186 SC STD MICRODIL/AGAR DIL: CPT | Performed by: PEDIATRICS

## 2024-01-31 PROCEDURE — 96375 TX/PRO/DX INJ NEW DRUG ADDON: CPT | Performed by: EMERGENCY MEDICINE

## 2024-01-31 PROCEDURE — 96376 TX/PRO/DX INJ SAME DRUG ADON: CPT | Performed by: EMERGENCY MEDICINE

## 2024-01-31 PROCEDURE — 250N000013 HC RX MED GY IP 250 OP 250 PS 637: Performed by: EMERGENCY MEDICINE

## 2024-01-31 PROCEDURE — 87086 URINE CULTURE/COLONY COUNT: CPT | Performed by: PEDIATRICS

## 2024-01-31 PROCEDURE — 81001 URINALYSIS AUTO W/SCOPE: CPT | Performed by: EMERGENCY MEDICINE

## 2024-01-31 PROCEDURE — 36415 COLL VENOUS BLD VENIPUNCTURE: CPT | Performed by: EMERGENCY MEDICINE

## 2024-01-31 PROCEDURE — 99285 EMERGENCY DEPT VISIT HI MDM: CPT | Mod: 25 | Performed by: EMERGENCY MEDICINE

## 2024-01-31 PROCEDURE — 93005 ELECTROCARDIOGRAM TRACING: CPT | Performed by: EMERGENCY MEDICINE

## 2024-01-31 PROCEDURE — 96367 TX/PROPH/DG ADDL SEQ IV INF: CPT | Performed by: EMERGENCY MEDICINE

## 2024-01-31 PROCEDURE — 120N000001 HC R&B MED SURG/OB

## 2024-01-31 PROCEDURE — 258N000003 HC RX IP 258 OP 636: Performed by: EMERGENCY MEDICINE

## 2024-01-31 PROCEDURE — 250N000013 HC RX MED GY IP 250 OP 250 PS 637: Performed by: INTERNAL MEDICINE

## 2024-01-31 PROCEDURE — 96366 THER/PROPH/DIAG IV INF ADDON: CPT | Performed by: EMERGENCY MEDICINE

## 2024-01-31 PROCEDURE — 82805 BLOOD GASES W/O2 SATURATION: CPT | Performed by: EMERGENCY MEDICINE

## 2024-01-31 PROCEDURE — 83605 ASSAY OF LACTIC ACID: CPT | Performed by: EMERGENCY MEDICINE

## 2024-01-31 PROCEDURE — 96365 THER/PROPH/DIAG IV INF INIT: CPT | Mod: 59 | Performed by: EMERGENCY MEDICINE

## 2024-01-31 PROCEDURE — 250N000011 HC RX IP 250 OP 636: Performed by: EMERGENCY MEDICINE

## 2024-01-31 PROCEDURE — 71275 CT ANGIOGRAPHY CHEST: CPT

## 2024-01-31 PROCEDURE — 250N000009 HC RX 250: Performed by: EMERGENCY MEDICINE

## 2024-01-31 PROCEDURE — 999N000157 HC STATISTIC RCP TIME EA 10 MIN

## 2024-01-31 RX ORDER — IOPAMIDOL 755 MG/ML
500 INJECTION, SOLUTION INTRAVASCULAR ONCE
Status: COMPLETED | OUTPATIENT
Start: 2024-01-31 | End: 2024-01-31

## 2024-01-31 RX ORDER — PIPERACILLIN SODIUM, TAZOBACTAM SODIUM 4; .5 G/20ML; G/20ML
4.5 INJECTION, POWDER, LYOPHILIZED, FOR SOLUTION INTRAVENOUS ONCE
Status: COMPLETED | OUTPATIENT
Start: 2024-01-31 | End: 2024-01-31

## 2024-01-31 RX ORDER — ENOXAPARIN SODIUM 100 MG/ML
40 INJECTION SUBCUTANEOUS EVERY 24 HOURS
Status: DISCONTINUED | OUTPATIENT
Start: 2024-01-31 | End: 2024-02-02 | Stop reason: HOSPADM

## 2024-01-31 RX ORDER — NALOXONE HYDROCHLORIDE 0.4 MG/ML
0.4 INJECTION, SOLUTION INTRAMUSCULAR; INTRAVENOUS; SUBCUTANEOUS
Status: DISCONTINUED | OUTPATIENT
Start: 2024-01-31 | End: 2024-02-02 | Stop reason: HOSPADM

## 2024-01-31 RX ORDER — ACETAMINOPHEN 325 MG/1
650 TABLET ORAL EVERY 4 HOURS PRN
Status: DISCONTINUED | OUTPATIENT
Start: 2024-01-31 | End: 2024-02-02 | Stop reason: HOSPADM

## 2024-01-31 RX ORDER — NALOXONE HYDROCHLORIDE 0.4 MG/ML
0.2 INJECTION, SOLUTION INTRAMUSCULAR; INTRAVENOUS; SUBCUTANEOUS
Status: DISCONTINUED | OUTPATIENT
Start: 2024-01-31 | End: 2024-01-31

## 2024-01-31 RX ORDER — NALOXONE HYDROCHLORIDE 0.4 MG/ML
0.2 INJECTION, SOLUTION INTRAMUSCULAR; INTRAVENOUS; SUBCUTANEOUS
Status: DISCONTINUED | OUTPATIENT
Start: 2024-01-31 | End: 2024-02-02 | Stop reason: HOSPADM

## 2024-01-31 RX ORDER — ASPIRIN 81 MG/1
81 TABLET, CHEWABLE ORAL DAILY
Status: DISCONTINUED | OUTPATIENT
Start: 2024-01-31 | End: 2024-02-02 | Stop reason: HOSPADM

## 2024-01-31 RX ORDER — CEFTRIAXONE 2 G/1
2 INJECTION, POWDER, FOR SOLUTION INTRAMUSCULAR; INTRAVENOUS EVERY 24 HOURS
Status: DISCONTINUED | OUTPATIENT
Start: 2024-01-31 | End: 2024-01-31

## 2024-01-31 RX ORDER — ROSUVASTATIN CALCIUM 5 MG/1
5 TABLET, COATED ORAL EVERY MORNING
COMMUNITY
End: 2024-08-10

## 2024-01-31 RX ORDER — ONDANSETRON 2 MG/ML
4 INJECTION INTRAMUSCULAR; INTRAVENOUS EVERY 6 HOURS PRN
Status: DISCONTINUED | OUTPATIENT
Start: 2024-01-31 | End: 2024-02-02 | Stop reason: HOSPADM

## 2024-01-31 RX ORDER — PIPERACILLIN SODIUM, TAZOBACTAM SODIUM 3; .375 G/15ML; G/15ML
3.38 INJECTION, POWDER, LYOPHILIZED, FOR SOLUTION INTRAVENOUS EVERY 6 HOURS
Status: DISCONTINUED | OUTPATIENT
Start: 2024-01-31 | End: 2024-01-31

## 2024-01-31 RX ORDER — HYDROMORPHONE HYDROCHLORIDE 1 MG/ML
.3-.5 INJECTION, SOLUTION INTRAMUSCULAR; INTRAVENOUS; SUBCUTANEOUS
Status: DISCONTINUED | OUTPATIENT
Start: 2024-01-31 | End: 2024-02-01

## 2024-01-31 RX ORDER — PIPERACILLIN SODIUM, TAZOBACTAM SODIUM 3; .375 G/15ML; G/15ML
3.38 INJECTION, POWDER, LYOPHILIZED, FOR SOLUTION INTRAVENOUS EVERY 6 HOURS
Status: DISCONTINUED | OUTPATIENT
Start: 2024-02-01 | End: 2024-02-02 | Stop reason: HOSPADM

## 2024-01-31 RX ORDER — AMOXICILLIN 250 MG
2 CAPSULE ORAL 2 TIMES DAILY PRN
Status: DISCONTINUED | OUTPATIENT
Start: 2024-01-31 | End: 2024-02-02 | Stop reason: HOSPADM

## 2024-01-31 RX ORDER — LIDOCAINE 40 MG/G
CREAM TOPICAL
Status: DISCONTINUED | OUTPATIENT
Start: 2024-01-31 | End: 2024-02-02 | Stop reason: HOSPADM

## 2024-01-31 RX ORDER — VANCOMYCIN HYDROCHLORIDE 1 G/200ML
1000 INJECTION, SOLUTION INTRAVENOUS EVERY 12 HOURS
Status: DISCONTINUED | OUTPATIENT
Start: 2024-01-31 | End: 2024-02-02

## 2024-01-31 RX ORDER — OXYCODONE HYDROCHLORIDE 5 MG/1
5 TABLET ORAL EVERY 4 HOURS PRN
Status: DISCONTINUED | OUTPATIENT
Start: 2024-01-31 | End: 2024-01-31

## 2024-01-31 RX ORDER — ROSUVASTATIN CALCIUM 5 MG/1
5 TABLET, COATED ORAL EVERY MORNING
Status: DISCONTINUED | OUTPATIENT
Start: 2024-02-01 | End: 2024-02-02 | Stop reason: HOSPADM

## 2024-01-31 RX ORDER — CLOPIDOGREL BISULFATE 75 MG/1
75 TABLET ORAL DAILY
Status: DISCONTINUED | OUTPATIENT
Start: 2024-01-31 | End: 2024-02-02 | Stop reason: HOSPADM

## 2024-01-31 RX ORDER — AMOXICILLIN 250 MG
1 CAPSULE ORAL 2 TIMES DAILY PRN
Status: DISCONTINUED | OUTPATIENT
Start: 2024-01-31 | End: 2024-02-02 | Stop reason: HOSPADM

## 2024-01-31 RX ORDER — CARVEDILOL 6.25 MG/1
12.5 TABLET ORAL 2 TIMES DAILY WITH MEALS
Status: DISCONTINUED | OUTPATIENT
Start: 2024-01-31 | End: 2024-02-02 | Stop reason: HOSPADM

## 2024-01-31 RX ORDER — CEFTRIAXONE SODIUM 1 G
2 VIAL (EA) INJECTION EVERY 24 HOURS
Status: DISCONTINUED | OUTPATIENT
Start: 2024-02-01 | End: 2024-01-31

## 2024-01-31 RX ORDER — NALOXONE HYDROCHLORIDE 0.4 MG/ML
0.4 INJECTION, SOLUTION INTRAMUSCULAR; INTRAVENOUS; SUBCUTANEOUS
Status: DISCONTINUED | OUTPATIENT
Start: 2024-01-31 | End: 2024-01-31

## 2024-01-31 RX ORDER — PROCHLORPERAZINE MALEATE 5 MG
5 TABLET ORAL EVERY 6 HOURS PRN
Status: DISCONTINUED | OUTPATIENT
Start: 2024-01-31 | End: 2024-02-02 | Stop reason: HOSPADM

## 2024-01-31 RX ORDER — PANTOPRAZOLE SODIUM 40 MG/1
40 TABLET, DELAYED RELEASE ORAL
Status: DISCONTINUED | OUTPATIENT
Start: 2024-02-01 | End: 2024-02-02 | Stop reason: HOSPADM

## 2024-01-31 RX ORDER — IPRATROPIUM BROMIDE AND ALBUTEROL SULFATE 2.5; .5 MG/3ML; MG/3ML
1 SOLUTION RESPIRATORY (INHALATION) EVERY 6 HOURS PRN
Status: DISCONTINUED | OUTPATIENT
Start: 2024-01-31 | End: 2024-02-02 | Stop reason: HOSPADM

## 2024-01-31 RX ORDER — PROCHLORPERAZINE 25 MG
12.5 SUPPOSITORY, RECTAL RECTAL EVERY 12 HOURS PRN
Status: DISCONTINUED | OUTPATIENT
Start: 2024-01-31 | End: 2024-02-02 | Stop reason: HOSPADM

## 2024-01-31 RX ORDER — PREDNISONE 20 MG/1
40 TABLET ORAL DAILY
Status: DISCONTINUED | OUTPATIENT
Start: 2024-01-31 | End: 2024-02-02 | Stop reason: HOSPADM

## 2024-01-31 RX ORDER — ACETAMINOPHEN 325 MG/1
650 TABLET ORAL EVERY 4 HOURS PRN
Status: DISCONTINUED | OUTPATIENT
Start: 2024-01-31 | End: 2024-01-31

## 2024-01-31 RX ORDER — CALCIUM CARBONATE 500 MG/1
1000 TABLET, CHEWABLE ORAL 4 TIMES DAILY PRN
Status: DISCONTINUED | OUTPATIENT
Start: 2024-01-31 | End: 2024-01-31

## 2024-01-31 RX ORDER — HYDROCODONE BITARTRATE AND ACETAMINOPHEN 5; 325 MG/1; MG/1
1-2 TABLET ORAL EVERY 4 HOURS PRN
Status: DISCONTINUED | OUTPATIENT
Start: 2024-01-31 | End: 2024-02-01

## 2024-01-31 RX ORDER — CALCIUM CARBONATE 500 MG/1
1000 TABLET, CHEWABLE ORAL 4 TIMES DAILY PRN
Status: DISCONTINUED | OUTPATIENT
Start: 2024-01-31 | End: 2024-02-02 | Stop reason: HOSPADM

## 2024-01-31 RX ORDER — SODIUM CHLORIDE 9 MG/ML
INJECTION, SOLUTION INTRAVENOUS CONTINUOUS
Status: DISCONTINUED | OUTPATIENT
Start: 2024-01-31 | End: 2024-02-01

## 2024-01-31 RX ORDER — LOSARTAN POTASSIUM 25 MG/1
25 TABLET ORAL DAILY
Status: DISCONTINUED | OUTPATIENT
Start: 2024-01-31 | End: 2024-02-02 | Stop reason: HOSPADM

## 2024-01-31 RX ORDER — HYDROMORPHONE HYDROCHLORIDE 1 MG/ML
0.5 INJECTION, SOLUTION INTRAMUSCULAR; INTRAVENOUS; SUBCUTANEOUS EVERY 30 MIN PRN
Status: DISCONTINUED | OUTPATIENT
Start: 2024-01-31 | End: 2024-01-31

## 2024-01-31 RX ORDER — ONDANSETRON 4 MG/1
4 TABLET, ORALLY DISINTEGRATING ORAL EVERY 6 HOURS PRN
Status: DISCONTINUED | OUTPATIENT
Start: 2024-01-31 | End: 2024-02-02 | Stop reason: HOSPADM

## 2024-01-31 RX ADMIN — HYDROMORPHONE HYDROCHLORIDE 0.5 MG: 1 INJECTION, SOLUTION INTRAMUSCULAR; INTRAVENOUS; SUBCUTANEOUS at 15:04

## 2024-01-31 RX ADMIN — CARVEDILOL 12.5 MG: 6.25 TABLET, FILM COATED ORAL at 21:59

## 2024-01-31 RX ADMIN — PIPERACILLIN AND TAZOBACTAM 4.5 G: 4; .5 INJECTION, POWDER, FOR SOLUTION INTRAVENOUS at 14:32

## 2024-01-31 RX ADMIN — LOSARTAN POTASSIUM 25 MG: 25 TABLET, FILM COATED ORAL at 21:59

## 2024-01-31 RX ADMIN — SODIUM CHLORIDE: 9 INJECTION, SOLUTION INTRAVENOUS at 22:01

## 2024-01-31 RX ADMIN — PREDNISONE 40 MG: 20 TABLET ORAL at 21:59

## 2024-01-31 RX ADMIN — HYDROMORPHONE HYDROCHLORIDE 0.5 MG: 1 INJECTION, SOLUTION INTRAMUSCULAR; INTRAVENOUS; SUBCUTANEOUS at 13:53

## 2024-01-31 RX ADMIN — VANCOMYCIN HYDROCHLORIDE 1000 MG: 1 INJECTION, SOLUTION INTRAVENOUS at 16:20

## 2024-01-31 RX ADMIN — HYDROCODONE BITARTRATE AND ACETAMINOPHEN 1 TABLET: 5; 325 TABLET ORAL at 21:20

## 2024-01-31 RX ADMIN — ASPIRIN 81 MG CHEWABLE TABLET 81 MG: 81 TABLET CHEWABLE at 21:59

## 2024-01-31 RX ADMIN — PIPERACILLIN AND TAZOBACTAM 3.38 G: 3; .375 INJECTION, POWDER, FOR SOLUTION INTRAVENOUS at 22:01

## 2024-01-31 RX ADMIN — ENOXAPARIN SODIUM 40 MG: 40 INJECTION SUBCUTANEOUS at 21:59

## 2024-01-31 RX ADMIN — CLOPIDOGREL BISULFATE 75 MG: 75 TABLET ORAL at 21:59

## 2024-01-31 RX ADMIN — SODIUM CHLORIDE 70 ML: 9 INJECTION, SOLUTION INTRAVENOUS at 14:42

## 2024-01-31 RX ADMIN — IOPAMIDOL 89 ML: 755 INJECTION, SOLUTION INTRAVENOUS at 14:43

## 2024-01-31 ASSESSMENT — ACTIVITIES OF DAILY LIVING (ADL)
WEAR_GLASSES_OR_BLIND: NO
ADLS_ACUITY_SCORE: 35
DRESSING/BATHING_DIFFICULTY: NO
FALL_HISTORY_WITHIN_LAST_SIX_MONTHS: NO
DOING_ERRANDS_INDEPENDENTLY_DIFFICULTY: NO
DIFFICULTY_COMMUNICATING: NO
TOILETING_ISSUES: NO
HEARING_DIFFICULTY_OR_DEAF: NO
WALKING_OR_CLIMBING_STAIRS_DIFFICULTY: NO
DIFFICULTY_EATING/SWALLOWING: NO
ADLS_ACUITY_SCORE: 20
ADLS_ACUITY_SCORE: 35
ADLS_ACUITY_SCORE: 18
CONCENTRATING,_REMEMBERING_OR_MAKING_DECISIONS_DIFFICULTY: NO
CHANGE_IN_FUNCTIONAL_STATUS_SINCE_ONSET_OF_CURRENT_ILLNESS/INJURY: NO
ADLS_ACUITY_SCORE: 35

## 2024-01-31 NOTE — MEDICATION SCRIBE - ADMISSION MEDICATION HISTORY
"Medication Scribe Admission Medication History    Admission medication history is complete. The information provided in this note is only as accurate as the sources available at the time of the update.    Information Source(s): Patient, Family member, and CareEverywhere/SureScripts via in-person    Pertinent Information: wife Brittany present, patient takes care of his own meds. He has a list, but does not remember them. PTA list completed by patient list and dispense report and speaking with patient and spouse Brittany. Plavix left on list as \"unknown\" due to semi-recent dispense, but it was not on patient list and he does not recognize the name Plavix or Clopidogrel.     Changes made to PTA medication list:  Added: Rosuvastatin  Deleted: Incruse Ellipta, fish oil, digestive enzymes, chewable iron+C, Multivitamin  Changed: None    Medication Affordability:       Allergies reviewed with patient and updates made in EHR: unable to assess    Medication History Completed By: BRITTANY ANGELA 1/31/2024 2:33 PM    PTA Med List   Medication Sig Last Dose    Ascorbic Acid (VITAMIN C PO) Take 1 tablet by mouth daily (Isotonix) 1/30/2024 at am    aspirin (ASA) 81 MG chewable tablet Take 81 mg by mouth 1/30/2024 at am    B Complex Vitamins (B COMPLEX PO) Take 1 tablet by mouth daily (Isotonix) 1/30/2024 at am    calcium carbonate-vitamin D (CALTRATE) 600-10 MG-MCG per tablet Take 1 tablet by mouth daily 1/30/2024 at am    carvedilol (COREG) 25 MG tablet Take 12.5 mg by mouth 2 times daily (with meals) 1/30/2024 at hs    Cholecalciferol (VITAMIN D-3 PO) Take 5,000 Units by mouth 1/30/2024 at am    clopidogrel (PLAVIX) 75 MG tablet Take 75 mg by mouth Unknown at D/Cd?    ferrous sulfate (FEROSUL) 325 (65 Fe) MG tablet Take 1 tablet (325 mg) by mouth daily (with breakfast) 1/30/2024 at am    ipratropium - albuterol 0.5 mg/2.5 mg/3 mL (DUONEB) 0.5-2.5 (3) MG/3ML neb solution USE 1 VIAL IN NEBULIZER EVERY 6 HOURS AS NEEDED FOR SHORTNESS OF " BREATH, WHEEZING OR COUGH (Patient taking differently: Take 1 vial by nebulization every 6 hours as needed for shortness of breath, wheezing or cough) 1/31/2024 at 0345    losartan (COZAAR) 25 MG tablet Take 25 mg by mouth daily 1/30/2024 at am    order for DME Equipment being ordered: Nebulizer hose 1/31/2024 at am    oxyCODONE (ROXICODONE) 5 MG tablet Take 0.5-1 tablets (2.5-5 mg) by mouth every 6 hours as needed for severe pain 1/30/2024 at pm    pantoprazole (PROTONIX) 40 MG EC tablet Take 1 tablet by mouth daily before breakfast 1/30/2024 at am    rosuvastatin (CRESTOR) 5 MG tablet Take 5 mg by mouth every morning 1/30/2024 at am

## 2024-01-31 NOTE — PHARMACY-VANCOMYCIN DOSING SERVICE
"Pharmacy Vancomycin Initial Note  Date of Service 2024  Patient's  1945  78 year old, male    Indication: Sepsis    Current estimated CrCl = Estimated Creatinine Clearance: 84.7 mL/min (based on SCr of 0.83 mg/dL).    Creatinine for last 3 days  No results found for requested labs within last 3 days.    Recent Vancomycin Level(s) for last 3 days  No results found for requested labs within last 3 days.      Vancomycin IV Administrations (past 72 hours)        No vancomycin orders with administrations in past 72 hours.                    Nephrotoxins and other renal medications (From now, onward)      Start     Dose/Rate Route Frequency Ordered Stop    24 1430  vancomycin (VANCOCIN) 1,000 mg in 200 mL dextrose intermittent infusion         1,000 mg  200 mL/hr over 1 Hours Intravenous EVERY 12 HOURS 24 1419      24 1415  piperacillin-tazobactam (ZOSYN) 4.5 g vial to attach to  mL bag        Note to Pharmacy: For [facility], [facility] and [facility]: For Zosyn-naive patients, use the \"Zosyn initial dose + extended infusion\" order panel.    4.5 g  over 30 Minutes Intravenous ONCE 24 1413              Contrast Orders - past 72 hours (72h ago, onward)      None            [employer] Prediction of Planned Initial Vancomycin Regimen  Loading dose: N/A  Regimen: 1000 mg IV every 12 hours.  Start time: 14:18 on 2024  Exposure target: AUC24 (range)400-600 mg/L.hr   AUC24,ss: 562 mg/L.hr  Probability of AUC24 > 400: 84 %  Ctrough,ss: 18.4 mg/L  Probability of Ctrough,ss > 20: 42 %  Probability of nephrotoxicity (Lodise LORENZO ): 15 %        Plan:  Start vancomycin  1000 mg IV q12h.   Vancomycin monitoring method: AUC  Vancomycin therapeutic monitoring goal: 400-600 mg*h/L  Pharmacy will check vancomycin levels as appropriate in 1-3 Days.    Serum creatinine levels will be ordered daily for the first week of therapy and at least twice weekly for subsequent weeks.      Lazarus Schwab [facility]    "

## 2024-01-31 NOTE — ED TRIAGE NOTES
Pt here with chest pain going in to back, shortness of breath    For 3 days         Triage Assessment (Adult)       Row Name 01/31/24 1331          Triage Assessment    Airway WDL WDL

## 2024-01-31 NOTE — PROGRESS NOTES
Patient arrived and check in at [facility]  Met with the patient and his wife in [facility],  Patient was in hospital's wheelchair, patient complaining of Severe chest pain and dyspnea.  Patient's wife is very upset that patient was discharged 1/22/2024 from [facility] with a note from the doctor stating patient desaturated during the night and will need home oxygen.  Patient is grimacing, respiratory rate 40 bpm and purse lip breathing.  On room air SpO2 = 92% at rest.  Talked with patient and his wife that patient needs to go to the E.D. for his severe chest pain for three days uncontrolled by oxycodone  with history of MI and stents.  They were in agreement and patient brought to E.D.  He was triaged into the ED immediately upon arrival .   No testing attempted today.

## 2024-01-31 NOTE — ED PROVIDER NOTES
History     Chief Complaint   Patient presents with    Chest Pain    Shortness of Breath     HPI  Khang Bailon is a 78 year old male who presents with wife over concern of chest pain and shortness of breath.  He had been hospitalized in November [place] for NSTEMI.  His wife states that during that visit it was noted that Khang needed oxygen overnight.  He was not discharged with home oxygen.  They had followed up with his cardiologist in clinic and with his primary doctor, both of whom state that Khang needs to be wearing oxygen at night, but no orders have been placed.  He was coming to the hospital today to have pulmonary function test.  See RT note from earlier today.  Patient and his wife arrived at the hospital and he was significantly short of breath, complaining of chest pain that radiated to his back, and says that symptoms have been ongoing for 3 days.  Pain is so bad that he does not want to cough or take a deep breath since it makes it worse.  He is also complaining of abdominal pain.  Has not been vomiting.    Allergies:  Allergies   Allergen Reactions    Flagyl [Metronidazole] GI Disturbance       Problem List:    Patient Active Problem List    Diagnosis Date Noted    Systolic congestive heart failure, unspecified HF chronicity (H) 01/09/2024     Priority: Medium    Coronary artery disease involving native coronary artery of native heart without angina pectoris 12/05/2023     Priority: Medium    NSTEMI (non-ST elevated myocardial infarction) (H) 11/27/2023     Priority: Medium    COVID-19 08/13/2023     Priority: Medium    Benign prostatic hyperplasia, unspecified whether lower urinary tract symptoms present 11/01/2021     Priority: Medium    Fatigue, unspecified type 11/29/2019     Priority: Medium    Memory loss 11/29/2019     Priority: Medium    Moderate COPD (chronic obstructive pulmonary disease) (H) 11/16/2017     Priority: Medium    Benign neoplasm of colon, unspecified part of colon  07/26/2016     Priority: Medium    Sigmoid diverticulitis 07/12/2016     Priority: Medium    History of CVA (cerebrovascular accident) - old left internal capsule lacunar infarct on CT 7/2016 07/12/2016     Priority: Medium    Multilevel degenerative disc disease 09/21/2015     Priority: Medium    Elevated prostate specific antigen (PSA) 09/11/2015     Priority: Medium    Health Care Home 01/24/2013     Priority: Medium     Racquel Ortiz RN-PHN  FPA / EL[facility]   [phone] b  DX V65.8 REPLACED WITH [id number] (04/08/2013)      Elevated PSA 08/27/2012     Priority: Medium    Loss of weight 08/27/2012     Priority: Medium    Third degree heart block (H) - s/p pacemaker 08/10/2012     Priority: Medium    Hypertension goal BP (blood pressure) < 140/90 08/10/2012     Priority: Medium    Advanced directives, counseling/discussion 07/27/2012     Priority: Medium     Discussed advance care planning with patient; however, patient declined at this time. 7/27/2012         Other thalassemia (H24) 07/27/2012     Priority: Medium     Diagnosis updated by automated process. Provider to review and confirm.      Hyperlipidemia LDL goal <130 07/27/2012     Priority: Medium    Heart block 07/27/2012     Priority: Medium    Cardiac abnormality 07/27/2012     Priority: Medium    Complete heart block (H) 07/27/2012     Priority: Medium    Painful respiration 03/31/2004     Priority: Medium    Personal history of tobacco use, presenting hazards to health 03/31/2004     Priority: Medium        Past Medical History:    Past Medical History:   Diagnosis Date    Alpha thalassemia (H24)     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    Diverticulitis     Injury, other and unspecified, elbow, forearm, and wrist 1970s       Past Surgical History:    Past Surgical History:   Procedure Laterality Date    COLONOSCOPY      COLONOSCOPY  12/10/2012    CYSTOSCOPY, LITHOLAPAXY, COMBINED N/A 11/10/2021    Procedure: Cystoscopy, Bladder  Stone Removal;  Surgeon: Guanaco Stewart MD;  Location: [facility] OR    IMPLANT PACEMAKER      LASER KTP TRANSURETHRAL RESECTION (TUR) PROSTATE N/A 11/10/2021    Procedure: TRANSURETHRAL RESECTION (TUR) PROSTATE, USING KTP LASER;  Surgeon: Guanaco Stewart MD;  Location: [facility] OR       Family History:    No family history on file.    Social History:  Marital Status:   [2]  Social History     Tobacco Use    Smoking status: Former     Years: 50     Types: Cigarettes     Quit date: 2012     Years since quittin.5    Smokeless tobacco: Never    Tobacco comments:     Quit in July   Vaping Use    Vaping Use: Never used   Substance Use Topics    Alcohol use: No     Alcohol/week: 0.0 standard drinks of alcohol    Drug use: No        Medications:    Ascorbic Acid (VITAMIN C PO)  aspirin (ASA) 81 MG chewable tablet  B Complex Vitamins (B COMPLEX PO)  calcium carbonate-vitamin D (CALTRATE) 600-10 MG-MCG per tablet  carvedilol (COREG) 25 MG tablet  Cholecalciferol (VITAMIN D-3 PO)  clopidogrel (PLAVIX) 75 MG tablet  ferrous sulfate (FEROSUL) 325 (65 Fe) MG tablet  ipratropium - albuterol 0.5 mg/2.5 mg/3 mL (DUONEB) 0.5-2.5 (3) MG/3ML neb solution  losartan (COZAAR) 25 MG tablet  order for DME  oxyCODONE (ROXICODONE) 5 MG tablet  pantoprazole (PROTONIX) 40 MG EC tablet  rosuvastatin (CRESTOR) 5 MG tablet          Review of Systems   All other systems reviewed and are negative.      Physical Exam   BP: (!) 162/92  Pulse: 107  Temp: 100.3  F (37.9  C)  Resp: 24  Weight: 81.6 kg (180 lb)  SpO2: 92 %      Physical Exam  Vitals and nursing note reviewed.   Constitutional:       General: He is in acute distress (In pain).      Appearance: He is not diaphoretic.   Cardiovascular:      Rate and Rhythm: Normal rate.      Comments: paced  Pulmonary:      Breath sounds: Decreased breath sounds present.      Comments: Increased work of breathing  Chest:      Chest wall: No tenderness.   Abdominal:      General: Bowel sounds are  normal.      Tenderness: There is abdominal tenderness (Generalized). There is guarding. There is no rebound.   Musculoskeletal:      Right lower leg: No edema.      Left lower leg: No edema.   Skin:     General: Skin is warm and dry.   Neurological:      Mental Status: He is alert.         ED Course                 Procedures              EKG Interpretation:      Interpreted by Samantha Quigley DO  Time reviewed: 1350  Symptoms at time of EKG: Dyspnea  Rhythm: paced  Rate: 102 bpm  Comparison to prior: Unchanged    Clinical Impression: no acute changes            Critical Care time:  none               Results for orders placed or performed during the hospital encounter of 01/31/24 (from the past 24 hour(s))   Lactic Acid Whole Blood w/ Reflex in 3 hrs when >2   Result Value Ref Range    Lactic Acid, Initial 1.0 0.7 - 2.0 mmol/L   CBC with platelets differential    Narrative    The following orders were created for panel order CBC with platelets differential.  Procedure                               Abnormality         Status                     ---------                               -----------         ------                     CBC with platelets and d...[[id number]]  Abnormal            Final result                 Please view results for these tests on the individual orders.   Comprehensive metabolic panel   Result Value Ref Range    Sodium 139 135 - 145 mmol/L    Potassium 4.2 3.4 - 5.3 mmol/L    Carbon Dioxide (CO2) 25 22 - 29 mmol/L    Anion Gap 12 7 - 15 mmol/L    Urea Nitrogen 17.1 8.0 - 23.0 mg/dL    Creatinine 0.90 0.67 - 1.17 mg/dL    GFR Estimate 87 >60 mL/min/1.73m2    Calcium 9.5 8.8 - 10.2 mg/dL    Chloride 102 98 - 107 mmol/L    Glucose 127 (H) 70 - 99 mg/dL    Alkaline Phosphatase 87 40 - 150 U/L    AST 18 0 - 45 U/L    ALT 13 0 - 70 U/L    Protein Total 7.3 6.4 - 8.3 g/dL    Albumin 4.1 3.5 - 5.2 g/dL    Bilirubin Total 1.0 <=1.2 mg/dL   Troponin T, High Sensitivity   Result Value Ref Range     Troponin T, High Sensitivity 30 (H) <=22 ng/L   Blood gas venous   Result Value Ref Range    pH Venous 7.47 (H) 7.32 - 7.43    pCO2 Venous 40 40 - 50 mm Hg    pO2 Venous 44 25 - 47 mm Hg    Bicarbonate Venous 29 (H) 21 - 28 mmol/L    Base Excess/Deficit Venous 4.5 (H) -3.0 - 3.0 mmol/L    FIO2 21     Oxyhemoglobin Venous 78 (H) 70 - 75 %    O2 Sat, Venous 79.7 (H) 70.0 - 75.0 %    Narrative    In healthy individuals, oxyhemoglobin (O2Hb) and oxygen saturation (SO2) are approximately equal. In the presence of dyshemoglobins, oxyhemoglobin can be considerably lower than oxygen saturation.   Nt probnp inpatient (BNP)   Result Value Ref Range    N terminal Pro BNP Inpatient 1,202 0 - 1,800 pg/mL   CBC with platelets and differential   Result Value Ref Range    WBC Count 21.2 (H) 4.0 - 11.0 10e3/uL    RBC Count 6.22 (H) 4.40 - 5.90 10e6/uL    Hemoglobin 11.9 (L) 13.3 - 17.7 g/dL    Hematocrit 38.2 (L) 40.0 - 53.0 %    MCV 61 (L) 78 - 100 fL    MCH 19.1 (L) 26.5 - 33.0 pg    MCHC 31.2 (L) 31.5 - 36.5 g/dL    RDW 18.4 (H) 10.0 - 15.0 %    Platelet Count 351 150 - 450 10e3/uL    % Neutrophils 91 %    % Lymphocytes 3 %    % Monocytes 5 %    % Eosinophils 0 %    % Basophils 0 %    % Immature Granulocytes 1 %    NRBCs per 100 WBC 0 <1 /100    Absolute Neutrophils 19.2 (H) 1.6 - 8.3 10e3/uL    Absolute Lymphocytes 0.7 (L) 0.8 - 5.3 10e3/uL    Absolute Monocytes 1.0 0.0 - 1.3 10e3/uL    Absolute Eosinophils 0.0 0.0 - 0.7 10e3/uL    Absolute Basophils 0.1 0.0 - 0.2 10e3/uL    Absolute Immature Granulocytes 0.1 <=0.4 10e3/uL    Absolute NRBCs 0.0 10e3/uL   Extra Tube (Cord Draw)    Narrative    The following orders were created for panel order Extra Tube (Cord Draw).  Procedure                               Abnormality         Status                     ---------                               -----------         ------                     Extra Blue Top Tube[[id number]]                              Final result                  Please view results for these tests on the individual orders.   Extra Blue Top Tube   Result Value Ref Range    Hold Specimen JIC    XR Chest Port 1 View    Narrative    XR CHEST PORT 1 VIEW  1/31/2024 2:27 PM       INDICATION: Chest pain  COMPARISON: 1/16/2024       Impression    IMPRESSION: Dual-lead cardiac pacemaker is stable. Bilateral  atelectasis in the lung bases also similar to previous. The lungs are  otherwise clear.    EDUARDO POOL MD         SYSTEM ID:  [id number]   CT Chest (PE) Abdomen Pelvis w Contrast    Narrative    CT CHEST PE ABDOMEN AND PELVIS WITH CONTRAST 1/31/2024 2:59 PM    CLINICAL HISTORY: Chest pain, fever, abdominal pain.    TECHNIQUE: CT angiogram chest and routine CT abdomen pelvis with IV  contrast. Arterial phase through the chest and venous phase through  the abdomen and pelvis. 2D and 3D MIP reconstructions were performed  by the CT technologist. Dose reduction techniques were used.     CONTRAST: ISOVUE-370, 89 mL    COMPARISON: CT chest 1/16/2024, CT abdomen and pelvis 11/3/2021.    FINDINGS:  ANGIOGRAM CHEST: Pulmonary arteries are normal caliber and negative  for pulmonary emboli. Thoracic aorta is negative for dissection. No CT  evidence of right heart strain.     LUNGS AND PLEURA: No effusions. Stable but indeterminant lobulated  solid nodule along the superior segment right lower lobe measuring 1.6  cm series 6 image 110. Stable calcified nodule suggesting a right  lower lobe granuloma image 158. Moderate atelectasis of the right lung  base. Motion artifact limits detail assessment of the lungs.  Additional stable left fissural nodule versus lymph node that is 0.6  cm series 6 image 208. Small aspirated material within the trachea.    MEDIASTINUM/AXILLAE: Small pericardial effusion appears stable. No new  adenopathy. Left chest pacemaker.    CORONARY ARTERY CALCIFICATION: Severe and/or stents.    HEPATOBILIARY: Multiple gallstones. No acute liver  abnormality  identified.    PANCREAS: Normal.    SPLEEN: Small hypodensity could just be a cyst at the spleen series 12  image 45. It is too small for characterization.    ADRENAL GLANDS: Normal.    KIDNEYS/BLADDER: Multiple bilateral renal cysts again noted without  specific imaging follow-up recommended. No hydronephrosis. No visible  stone. Lobulated and partially calcified mass at the right posterior  bladder wall is approximately 2.2 x 2 cm series 12 image 169, newly  identified. Bladder stones noted image 175. Indentation upon the  bladder from the prostate that is 5.8 cm.    BOWEL: Wall thickening at the sigmoid colon containing numerous  diverticula series 12 image 147. There is some mild adjacent edema in  this region as well. This may represent diverticulitis. There is a  stable extraluminal tubular gas containing structure that may be a  matured fistula in this region. This is approximately 2 cm on image  143. This is also present on the CT from 11/3/2021. There is no acute  abscess identified. No evidence for appendicitis. Moderate stool. No  small bowel obstruction.    LYMPH NODES: No enlarged lymph nodes are seen.    PELVIC ORGANS: Normal.    OTHER: Vascular calcifications. Lobulated infrarenal abdominal aortic  aneurysm measuring up to 3.9 cm, stable series 12 image 84.    MUSCULOSKELETAL: Spine degenerative changes. No acute abnormality. No  focal suspicious bony lesion.      Impression    IMPRESSION:  1.  No evidence for pulmonary embolism.  2.  Suggestion of sigmoid diverticulitis. No abscess. 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  3.  New finding of a nodular lesion along the right bladder wall.  Recommend workup for bladder neoplasm.  4.  Stable lobulated prominent pulmonary nodule at the right lower  lobe that may represent a neoplasm. Recommend oncologic workup.  5.  Small aspirated material within the trachea.  6.  Cholelithiasis.    7.  Stable infrarenal lobulated abdominal aortic aneurysm measuring up  to 3.9 cm.    Recommendations for an incidental lung nodule > 8mm:    Low risk patients: Consider follow-up CT in 3 months, PET/CT, and/or  tissue sampling.    High risk patients: Same as for low risk patients.    *Low Risk: Minimal or absent history of smoking or other known risk  factors.  *Nonsolid (ground-glass) or partly solid nodules may require longer  follow-up to exclude indolent adenocarcinoma.  *Recommendations based on Guidelines for the Management of Incidental  Pulmonary Nodules Detected at CT: From the Fleischner Society 2017,  Radiology 2017.    SOLO VALERO MD         SYSTEM ID:  [id number]   Troponin T, High Sensitivity   Result Value Ref Range    Troponin T, High Sensitivity 30 (H) <=22 ng/L   UA with Microscopic   Result Value Ref Range    Color Urine Yellow Colorless, Straw, Light Yellow, Yellow    Appearance Urine Cloudy (A) Clear    Glucose Urine Negative Negative mg/dL    Bilirubin Urine Negative Negative    Ketones Urine 5 (A) Negative mg/dL    Specific Gravity Urine >1.050 (H) 1.003 - 1.035    Blood Urine Large (A) Negative    pH Urine 5.0 5.0 - 7.0    Protein Albumin Urine 100 (A) Negative mg/dL    Urobilinogen Urine Normal Normal, 2.0 mg/dL    Nitrite Urine Positive (A) Negative    Leukocyte Esterase Urine Large (A) Negative    Budding Yeast Urine Few (A) None Seen /HPF    RBC Urine >182 (H) <=2 /HPF    WBC Urine >182 (H) <=5 /HPF       Medications   HYDROmorphone (PF) (DILAUDID) injection 0.5 mg (0.5 mg Intravenous $Given 1/31/24 1504)   vancomycin (VANCOCIN) 1,000 mg in 200 mL dextrose intermittent infusion (1,000 mg Intravenous $New Bag 1/31/24 1620)   piperacillin-tazobactam (ZOSYN) 4.5 g vial to attach to  mL bag (0 g Intravenous Stopped 1/31/24 1555)   iopamidol (ISOVUE-370) solution 500 mL (89 mLs Intravenous $Given 1/31/24 1443)   sodium chloride 0.9 % bag 100mL for CT scan flush use (70 mLs Intravenous  $Given 1/31/24 6414)       Assessments & Plan (with Medical Decision Making)  Khang is a 78-year-old male with shortness of breath, chest pain radiating to his back, abdominal pain, and worsening symptoms for the last 3 days.  Initially presented for PFTs to get home oxygen, but due to symptoms was encouraged to come to the ED for evaluation.  See history physical exam as above  Ill-appearing 78-year-old male in no acute respiratory distress, is wincing in pain with deep breaths or if occasional cough, is not diaphoretic.  Has elevated temp of 100.3  F, and is hypertensive with blood pressure of 162/92.  Has decreased breath sounds without wheezing.  He has generalized abdominal tenderness to palpation, though he was complaining more of pain in his chest when he first arrived.  Will plan to have IV inserted to get labs, and will plan to get CT of the chest, abdomen, pelvis for further evaluation.  Labs and imaging as above.  Findings suggest diverticulitis, urinary tract infection, and COPD with acute exacerbation now requiring supplemental oxygen to maintain saturations.  Ordered for IV Zosyn and vancomycin to cover for infection.  Not see indications for sepsis at this time.  Dilaudid also helped his pain.  At first, patient was insistent that he be discharged home.  However, after long discussion with patient, his , and his wife were at the bedside, he is agreeable to being admitted.  I had suggested admission due to new oxygen need as well as his ongoing nighttime oxygen need, and wife's inability to care for him at home due to these needs.  We can control his pain and continue antibiotics.  CT head ruled out pulmonary embolism.  He remained stable.  After discussing case with hospitalist, will admit for ongoing treatment.  Transition orders were written in the ED       I have reviewed the nursing notes.    I have reviewed the findings, diagnosis, plan and need for follow up with the patient.       ED to  Inpatient Handoff:    Discussed with Dr. Pulido at 1820  Patient accepted for Inpatient Stay  Pending studies include N/A  Code Status: Not Addressed             Medical Decision Making  The patient's presentation was of moderate complexity (a chronic illness mild to moderate exacerbation, progression, or side effect of treatment).    The patient's evaluation involved:  ordering and/or review of 3+ test(s) in this encounter (see separate area of note for details)    The patient's management necessitated high risk (a decision regarding hospitalization).        New Prescriptions    No medications on file       Final diagnoses:   Chest pain, unspecified type   Acute cystitis with hematuria   Diverticulitis   COPD with hypoxia (H)   Mass of urinary bladder   Pulmonary nodule       1/31/2024   [facility] EMERGENCY DEPT       Samantha Quigley DO  02/01/24 [id number]

## 2024-02-01 PROBLEM — I71.43 INFRARENAL ABDOMINAL AORTIC ANEURYSM (AAA) WITHOUT RUPTURE (H): Status: ACTIVE | Noted: 2024-02-01

## 2024-02-01 PROBLEM — R65.10 SIRS (SYSTEMIC INFLAMMATORY RESPONSE SYNDROME) (H): Status: ACTIVE | Noted: 2024-02-01

## 2024-02-01 PROBLEM — R31.0 GROSS HEMATURIA: Status: ACTIVE | Noted: 2024-02-01

## 2024-02-01 PROBLEM — J44.1 COPD EXACERBATION (H): Status: ACTIVE | Noted: 2024-01-31

## 2024-02-01 PROBLEM — K80.20 CALCULUS OF GALLBLADDER WITHOUT CHOLECYSTITIS WITHOUT OBSTRUCTION: Status: ACTIVE | Noted: 2024-02-01

## 2024-02-01 PROBLEM — D69.1 ASPIRIN-LIKE PLATELET FUNCTION DEFECT (H): Status: ACTIVE | Noted: 2024-02-01

## 2024-02-01 PROBLEM — T17.908A ASPIRATION INTO AIRWAY, INITIAL ENCOUNTER: Status: ACTIVE | Noted: 2024-02-01

## 2024-02-01 PROBLEM — R82.90 ABNORMAL URINALYSIS: Status: ACTIVE | Noted: 2024-01-31

## 2024-02-01 PROBLEM — K63.2: Status: ACTIVE | Noted: 2024-02-01

## 2024-02-01 PROBLEM — J96.01 ACUTE RESPIRATORY FAILURE WITH HYPOXIA (H): Status: ACTIVE | Noted: 2024-02-01

## 2024-02-01 LAB
ANION GAP SERPL CALCULATED.3IONS-SCNC: 13 MMOL/L (ref 7–15)
BUN SERPL-MCNC: 22.8 MG/DL (ref 8–23)
CALCIUM SERPL-MCNC: 8.6 MG/DL (ref 8.8–10.2)
CHLORIDE SERPL-SCNC: 103 MMOL/L (ref 98–107)
CREAT SERPL-MCNC: 1.01 MG/DL (ref 0.67–1.17)
DEPRECATED HCO3 PLAS-SCNC: 23 MMOL/L (ref 22–29)
EGFRCR SERPLBLD CKD-EPI 2021: 76 ML/MIN/1.73M2
ERYTHROCYTE [DISTWIDTH] IN BLOOD BY AUTOMATED COUNT: 17.1 % (ref 10–15)
GLUCOSE SERPL-MCNC: 204 MG/DL (ref 70–99)
HCT VFR BLD AUTO: 32.9 % (ref 40–53)
HGB BLD-MCNC: 10.1 G/DL (ref 13.3–17.7)
HOLD SPECIMEN: NORMAL
LACTATE SERPL-SCNC: 1.4 MMOL/L (ref 0.7–2)
MCH RBC QN AUTO: 19.1 PG (ref 26.5–33)
MCHC RBC AUTO-ENTMCNC: 30.7 G/DL (ref 31.5–36.5)
MCV RBC AUTO: 62 FL (ref 78–100)
MRSA DNA SPEC QL NAA+PROBE: NEGATIVE
PLATELET # BLD AUTO: 211 10E3/UL (ref 150–450)
POTASSIUM SERPL-SCNC: 3.8 MMOL/L (ref 3.4–5.3)
PROCALCITONIN SERPL IA-MCNC: 1.09 NG/ML
RBC # BLD AUTO: 5.29 10E6/UL (ref 4.4–5.9)
SA TARGET DNA: POSITIVE
SODIUM SERPL-SCNC: 139 MMOL/L (ref 135–145)
TROPONIN T SERPL HS-MCNC: 32 NG/L
WBC # BLD AUTO: 25.5 10E3/UL (ref 4–11)

## 2024-02-01 PROCEDURE — 94640 AIRWAY INHALATION TREATMENT: CPT | Mod: 76

## 2024-02-01 PROCEDURE — 250N000011 HC RX IP 250 OP 636: Performed by: INTERNAL MEDICINE

## 2024-02-01 PROCEDURE — 36415 COLL VENOUS BLD VENIPUNCTURE: CPT | Performed by: INTERNAL MEDICINE

## 2024-02-01 PROCEDURE — 87640 STAPH A DNA AMP PROBE: CPT | Performed by: PEDIATRICS

## 2024-02-01 PROCEDURE — 93005 ELECTROCARDIOGRAM TRACING: CPT

## 2024-02-01 PROCEDURE — 87641 MR-STAPH DNA AMP PROBE: CPT | Performed by: PEDIATRICS

## 2024-02-01 PROCEDURE — 94640 AIRWAY INHALATION TREATMENT: CPT

## 2024-02-01 PROCEDURE — 120N000001 HC R&B MED SURG/OB

## 2024-02-01 PROCEDURE — 999N000157 HC STATISTIC RCP TIME EA 10 MIN

## 2024-02-01 PROCEDURE — 999N000156 HC STATISTIC RCP CONSULT EA 30 MIN

## 2024-02-01 PROCEDURE — 80048 BASIC METABOLIC PNL TOTAL CA: CPT | Performed by: INTERNAL MEDICINE

## 2024-02-01 PROCEDURE — 85027 COMPLETE CBC AUTOMATED: CPT | Performed by: INTERNAL MEDICINE

## 2024-02-01 PROCEDURE — 250N000009 HC RX 250: Performed by: INTERNAL MEDICINE

## 2024-02-01 PROCEDURE — 258N000003 HC RX IP 258 OP 636: Performed by: EMERGENCY MEDICINE

## 2024-02-01 PROCEDURE — 99233 SBSQ HOSP IP/OBS HIGH 50: CPT | Performed by: PEDIATRICS

## 2024-02-01 PROCEDURE — 84484 ASSAY OF TROPONIN QUANT: CPT | Performed by: INTERNAL MEDICINE

## 2024-02-01 PROCEDURE — 250N000009 HC RX 250: Performed by: PEDIATRICS

## 2024-02-01 PROCEDURE — 250N000011 HC RX IP 250 OP 636: Performed by: EMERGENCY MEDICINE

## 2024-02-01 PROCEDURE — 83605 ASSAY OF LACTIC ACID: CPT | Performed by: INTERNAL MEDICINE

## 2024-02-01 PROCEDURE — 250N000013 HC RX MED GY IP 250 OP 250 PS 637: Performed by: INTERNAL MEDICINE

## 2024-02-01 PROCEDURE — 250N000012 HC RX MED GY IP 250 OP 636 PS 637: Performed by: INTERNAL MEDICINE

## 2024-02-01 PROCEDURE — 84145 PROCALCITONIN (PCT): CPT | Performed by: PEDIATRICS

## 2024-02-01 RX ORDER — HYDROMORPHONE HYDROCHLORIDE 1 MG/ML
0.3 INJECTION, SOLUTION INTRAMUSCULAR; INTRAVENOUS; SUBCUTANEOUS
Status: DISCONTINUED | OUTPATIENT
Start: 2024-02-01 | End: 2024-02-01

## 2024-02-01 RX ORDER — HYDROCODONE BITARTRATE AND ACETAMINOPHEN 5; 325 MG/1; MG/1
1 TABLET ORAL EVERY 4 HOURS PRN
Status: DISCONTINUED | OUTPATIENT
Start: 2024-02-01 | End: 2024-02-02 | Stop reason: HOSPADM

## 2024-02-01 RX ORDER — HYDROCODONE BITARTRATE AND ACETAMINOPHEN 5; 325 MG/1; MG/1
2 TABLET ORAL EVERY 4 HOURS PRN
Status: DISCONTINUED | OUTPATIENT
Start: 2024-02-01 | End: 2024-02-02 | Stop reason: HOSPADM

## 2024-02-01 RX ORDER — HYDROMORPHONE HYDROCHLORIDE 1 MG/ML
0.5 INJECTION, SOLUTION INTRAMUSCULAR; INTRAVENOUS; SUBCUTANEOUS
Status: DISCONTINUED | OUTPATIENT
Start: 2024-02-01 | End: 2024-02-01

## 2024-02-01 RX ORDER — IPRATROPIUM BROMIDE AND ALBUTEROL SULFATE 2.5; .5 MG/3ML; MG/3ML
3 SOLUTION RESPIRATORY (INHALATION)
Status: DISCONTINUED | OUTPATIENT
Start: 2024-02-01 | End: 2024-02-02

## 2024-02-01 RX ADMIN — VANCOMYCIN HYDROCHLORIDE 1000 MG: 1 INJECTION, SOLUTION INTRAVENOUS at 14:34

## 2024-02-01 RX ADMIN — ENOXAPARIN SODIUM 40 MG: 40 INJECTION SUBCUTANEOUS at 20:11

## 2024-02-01 RX ADMIN — IPRATROPIUM BROMIDE AND ALBUTEROL SULFATE 3 ML: .5; 3 SOLUTION RESPIRATORY (INHALATION) at 17:57

## 2024-02-01 RX ADMIN — ROSUVASTATIN CALCIUM 5 MG: 5 TABLET, FILM COATED ORAL at 08:17

## 2024-02-01 RX ADMIN — PIPERACILLIN AND TAZOBACTAM 3.38 G: 3; .375 INJECTION, POWDER, FOR SOLUTION INTRAVENOUS at 10:06

## 2024-02-01 RX ADMIN — CLOPIDOGREL BISULFATE 75 MG: 75 TABLET ORAL at 08:17

## 2024-02-01 RX ADMIN — PREDNISONE 40 MG: 20 TABLET ORAL at 08:17

## 2024-02-01 RX ADMIN — PIPERACILLIN AND TAZOBACTAM 3.38 G: 3; .375 INJECTION, POWDER, FOR SOLUTION INTRAVENOUS at 04:50

## 2024-02-01 RX ADMIN — ASPIRIN 81 MG CHEWABLE TABLET 81 MG: 81 TABLET CHEWABLE at 08:17

## 2024-02-01 RX ADMIN — PIPERACILLIN AND TAZOBACTAM 3.38 G: 3; .375 INJECTION, POWDER, FOR SOLUTION INTRAVENOUS at 16:18

## 2024-02-01 RX ADMIN — IPRATROPIUM BROMIDE AND ALBUTEROL SULFATE 3 ML: .5; 3 SOLUTION RESPIRATORY (INHALATION) at 09:30

## 2024-02-01 RX ADMIN — SODIUM CHLORIDE: 9 INJECTION, SOLUTION INTRAVENOUS at 10:05

## 2024-02-01 RX ADMIN — VANCOMYCIN HYDROCHLORIDE 1000 MG: 1 INJECTION, SOLUTION INTRAVENOUS at 03:48

## 2024-02-01 RX ADMIN — CARVEDILOL 12.5 MG: 6.25 TABLET, FILM COATED ORAL at 18:11

## 2024-02-01 RX ADMIN — LOSARTAN POTASSIUM 25 MG: 25 TABLET, FILM COATED ORAL at 08:16

## 2024-02-01 RX ADMIN — PANTOPRAZOLE SODIUM 40 MG: 40 TABLET, DELAYED RELEASE ORAL at 08:16

## 2024-02-01 RX ADMIN — CARVEDILOL 12.5 MG: 6.25 TABLET, FILM COATED ORAL at 08:16

## 2024-02-01 RX ADMIN — PIPERACILLIN AND TAZOBACTAM 3.38 G: 3; .375 INJECTION, POWDER, FOR SOLUTION INTRAVENOUS at 22:13

## 2024-02-01 ASSESSMENT — ACTIVITIES OF DAILY LIVING (ADL)
ADLS_ACUITY_SCORE: 22
ADLS_ACUITY_SCORE: 22
ADLS_ACUITY_SCORE: 20
ADLS_ACUITY_SCORE: 22
ADLS_ACUITY_SCORE: 22
DEPENDENT_IADLS:: CLEANING;LAUNDRY
ADLS_ACUITY_SCORE: 22
ADLS_ACUITY_SCORE: 20
ADLS_ACUITY_SCORE: 22
ADLS_ACUITY_SCORE: 20
ADLS_ACUITY_SCORE: 20

## 2024-02-01 NOTE — PLAN OF CARE
Goal Outcome Evaluation:      Plan of Care Reviewed With: patient    Overall Patient Progress: no changeOverall Patient Progress: no change    Outcome Evaluation: Pt A/Ox4. Max temp of 100.8. O2 weaned off from 3L to RA with oxygen above 90%. Soft BP's; asymptomatic, provider notified, continue to monitor. PRN Norco x1 given for all over body pain. Tele is paced. Scheduled Zosyn and Vancomycin given. NS infusing at 100 ml/hr. Pt reported chest pain at 0650, noting a pressure sensation. Dr. Arriaza notified and an EKG and troponin. Pt also flagged for lactic acid, results are still pending.

## 2024-02-01 NOTE — CONSULTS
Care Management Initial Consult    General Information  Assessment completed with: Patient,    Type of CM/SW Visit: Initial Assessment    Primary Care Provider verified and updated as needed: Yes   Readmission within the last 30 days: no previous admission in last 30 days      Reason for Consult: other (see comments) (elevated risk for hospital readmission)  Advance Care Planning: Advance Care Planning Reviewed: no concerns identified          Communication Assessment  Patient's communication style: spoken language (English or Bilingual)    Hearing Difficulty or Deaf: no   Wear Glasses or Blind: no    Cognitive  Cognitive/Neuro/Behavioral: WDL                      Living Environment:   People in home: spouse  Brittany  Current living Arrangements: house      Able to return to prior arrangements: yes       Family/Social Support:  Care provided by: self  Provides care for: no one  Marital Status:   Wife, Children  Brittany       Description of Support System: Supportive, Involved    Support Assessment: Adequate family and caregiver support, Adequate social supports    Current Resources:   Patient receiving home care services: No     Community Resources: None  Equipment currently used at home:  (Pt has a cane and walker at home if needed.)  Supplies currently used at home: None    Employment/Financial:  Employment Status: retired        Financial Concerns: none           Does the patient's insurance plan have a 3 day qualifying hospital stay waiver?  Yes     Which insurance plan 3 day waiver is available? Alternative insurance waiver    Will the waiver be used for post-acute placement? No    Lifestyle & Psychosocial Needs:  Social Determinants of Health     Food Insecurity: Low Risk  (11/27/2023)    Food Insecurity     Within the past 12 months, did you worry that your food would run out before you got money to buy more?: Patient refused     Within the past 12 months, did the food you bought just not last and you  didn t have money to get more?: No   Depression: Not at risk (1/9/2024)    PHQ-2     PHQ-2 Score: 0   Housing Stability: Low Risk  (11/27/2023)    Housing Stability     Do you have housing? : Yes     Are you worried about losing your housing?: No   Tobacco Use: Medium Risk (1/23/2024)    Patient History     Smoking Tobacco Use: Former     Smokeless Tobacco Use: Never     Passive Exposure: Not on file   Financial Resource Strain: Low Risk  (11/27/2023)    Financial Resource Strain     Within the past 12 months, have you or your family members you live with been unable to get utilities (heat, electricity) when it was really needed?: No   Alcohol Use: Not on file   Transportation Needs: Low Risk  (11/27/2023)    Transportation Needs     Within the past 12 months, has lack of transportation kept you from medical appointments, getting your medicines, non-medical meetings or appointments, work, or from getting things that you need?: No   Physical Activity: Not on file   Interpersonal Safety: Low Risk  (11/27/2023)    Interpersonal Safety     Do you feel physically and emotionally safe where you currently live?: Yes     Within the past 12 months, have you been hit, slapped, kicked or otherwise physically hurt by someone?: No     Within the past 12 months, have you been humiliated or emotionally abused in other ways by your partner or ex-partner?: No   Stress: Not on file   Social Connections: Not on file       Functional Status:  Prior to admission patient needed assistance:   Dependent ADLs:: Independent  Dependent IADLs:: Cleaning, Laundry  Assesssment of Functional Status: At functional baseline    Mental Health Status:  Mental Health Status: No Current Concerns       Chemical Dependency Status:  Chemical Dependency Status: No Current Concerns             Values/Beliefs:  Spiritual, Cultural Beliefs, Latter-day Practices, Values that affect care:            Values/Beliefs Comment: Unknown    Additional Information:  Care  "Management consulted due to elevated risk for hospital readmission.    Writer visited with patient.  Patient lives with his wife, Brittany, in their home in [place].  Per chart review, patient has 12 stairs in the home to get to the main level and then everything patient needs is on that level of the home.      Patient denies having any discharge needs and stated, \"I just want to get out of here\".  Discussed readmission risk and follow up phone call by clinic care coordination.  Patient stated, \"I guess that will be ok.\"      No discharge needs have been identified at this time.  Referral has been sent to the [facility] team to schedule post hospital follow up visit with patient's PCP within 5-7 days of discharge.      CM will continue to follow during this hospitalization.      Family to transport day of discharge.    ROCIO Major  [facility]   [phone]     LAY ZUNIGA LSW     "

## 2024-02-01 NOTE — PROGRESS NOTES
[facility]    Medicine Progress Note - Hospitalist Service    Date of Admission:  1/31/2024    Assessment & Plan      Khang Bailon is a 78 year old male admitted on 1/31/2024. He presented to the ED for evaluation of chest pain and shortness of breath and found to have COPDE and UTI.    Principal Problem:    Aspiration into airway, initial encounter  Active Problems:    COPD exacerbation (H)    Acute respiratory failure with hypoxia (H)    Hypertension goal BP (blood pressure) < 140/90    Pulmonary nodule    Mass of urinary bladder    Abnormal urinalysis    Chest pain, unspecified type    SIRS (systemic inflammatory response syndrome) (H)    Gross hematuria    Aspirin-like platelet function defect (H)    Third degree heart block (H) - s/p pacemaker    Calculus of gallbladder without cholecystitis without obstruction    Infrarenal abdominal aortic aneurysm (AAA) without rupture (H24)    Fistula of sigmoid colon    Aspiration into trachea with COPD exacerbation, acute respiratory failure with hypoxia, and SIRS  Known COPD and with additional discussion patient describing aspiration event yesterday immediately preceding his worsening breathing problems.  Radiographic findings on CT were suspicious for aspiration of material into trachea.  Suspect aspiration event precipitated COPD exacerbation with acute hypoxic respiratory failure.  He also presented with fever and leukocytosis concerning for SIRS.  Sepsis is possible although he has not demonstrated signs of acute organ dysfunction except for respiratory failure which may be due to aspiration itself.  Procalcitonin is elevated at 1.09.  He is rapidly improving with resolved clinical signs of COPD exacerbation and acute hypoxic respiratory failure.  Fever has resolved although leukocytosis persists.  -Continue empiric IV Zosyn, anticipate transition to oral Augmentin at discharge  -Discontinue IV fluids  -Titrate and/or wean oxygen  supplementation to keep saturations 90% and higher  -Ordered home oxygen assessment and if he is not requiring oxygen while awake will order continuous overnight oximetry to assess for possible need for oxygen supplementation at night  -Continue prednisone  -Use bronchodilators as needed  -Advance diet as tolerated  -Advance activity as tolerated    Abnormal UA  Recent gross hematuria  Bladder mass  Patient reporting recent episode of gross hematuria at home although not currently.  Since then he has had increased urinary frequency.  Radiographic findings demonstrated bladder mass which appears to be new.  UA was abnormal and UTI is possible.  -Continuing empiric IV Zosyn and vancomycin while awaiting UC results  -Discussed and ordered outpatient urology referral    CAD with coronary artery stents  History of MI  Elevated troponin  Platelet function defect due to aspirin and Plavix  Hospitalized in November 2023 for non-ST elevation MI at which time he had drug-eluting stents placed in the circumflex and ramus arteries.  Troponin is mildly elevated during hospitalization but stable.  EKG does not demonstrate acute ischemic changes although interpretation may be limited by his underlying conduction disturbance.  He is chronically treated with beta-blocker, statin, and dual antiplatelet therapy.  Both aspirin and Plavix causes platelet function defect that increases his bleeding risk.  -Continue chronic doses of Coreg, Crestor, aspirin and Plavix    Chest wall pain  Patient reports chronic chest wall pain reproducible with palpation.  Clinically this has been stable overall but probably exacerbated with increased cough after aspiration event.  -Analgesics as needed    Hypertension  Chronic and stable  -Continue chronic medications including Coreg and losartan    Complete heart block  Status post pacemaker placement in 2012  EKG and telemetry demonstrate normal sinus rhythm with underlying bifascicular  block.  -Discontinue cardiac monitoring    Hyperlipidemia  Chronic and presumed stable on Crestor   -Continue chronic Crestor dose    RLL Lobulated pulmonary nodule  Known right lower lobe lobulated pulmonary nodule for which outpatient follow-up with pulmonology is scheduled for February 5  -Recommend outpatient follow-up with pulmonology on February fifth as scheduled    Infrarenal abdominal aortic aneurysm, 3.9 cm.  Stable  Known chronic infrarenal abdominal aortic aneurysm.  No clinical or radiographic signs of dissection or rupture.  -Anticipate outpatient follow-up    Sigmoid diverticulitis  Chronic mature sigmoid fistula   Known chronic mature sigmoid fistula since November 2021 or longer.  Radiographic findings on admission were suspicious for diverticulitis, but clinical course is not at all suspicious for diverticulitis.  He is being treated with antibiotics for other reasons.  -Continuing current antibiotics  -May advance diet as tolerated    Gallstones  Incidental finding of gallbladder gallstones on CT without clinical or radiographic suspicion of cholecystitis or biliary obstruction.  -Monitor clinically          Diet: Regular Diet Adult    DVT Prophylaxis: Enoxaparin (Lovenox) SQ  Hernandez Catheter: Not present  Lines: None     Cardiac Monitoring: None  Code Status: Full Code      Clinically Significant Risk Factors Present on Admission                # Drug Induced Platelet Defect: home medication list includes an antiplatelet medication   # Hypertension: Noted on problem list   # Acute Respiratory Failure: Documented O2 saturation < 91%.  Continue supplemental oxygen as needed         # Financial/Environmental Concerns: none         Disposition Plan     Expected Discharge Date: 02/01/2024      Destination: home with family              Khang Gillis MD  Hospitalist Service  [facility]  Securely message with [employer] (more info)  Text page via [employer] Paging/Directory    ______________________________________________________________________    Interval History   He had some chest pain earlier this morning that resolved without specific intervention.  There otherwise were no significant overnight events.  He feels much better overall and wonders if he can go home.  After fever to 100.8 last evening, he has been afebrile since then.  He was initially tachycardic and tachypneic at admission, but heart rate has since normalized.  Blood pressure has been generally stable after some initial fluctuations.  He was severely hypoxic at admission requiring oxygen supplementation, but oxygenation has improved today and he is weaned off of oxygen supplementation earlier this morning.  Nursing reports that he is not eating or drinking much if anything today.  He has been voiding spontaneously.    Additional history is provided by the patient today.  He says that he cooked himself some hamburger meat mixed with spaghetti yesterday and does recall having had a choking episode while he was trying to eat that meal.  It was shortly after that choking episode that he had onset of his breathing problems yesterday.  He also says that he recently had seen blood in his urine on and off for a few days.  Since then, he noted that his urinary habits changed where he is voiding small amounts more frequently.    Physical Exam   Vital Signs: Temp: 97.8  F (36.6  C) Temp src: Oral BP: 100/54 Pulse: 65   Resp: 20 SpO2: 94 % O2 Device: None (Room air)   Patient Vitals for the past 24 hrs:   BP Temp Temp src Pulse Resp SpO2 Height Weight   02/01/24 1057 100/54 -- -- 65 -- 94 % -- --   02/01/24 1055 90/45 97.8  F (36.6  C) Oral 64 -- 94 % -- --   02/01/24 0630 114/64 97.6  F (36.4  C) Oral 62 20 94 % -- --   02/01/24 0619 -- -- -- -- -- -- -- 77.6 kg (171 lb)   02/01/24 0605 109/59 98  F (36.7  C) Oral 67 20 92 % -- --   02/01/24 0354 98/57 98.7  F (37.1  C) Oral 65 20 95 % -- --   02/01/24 0109 -- -- -- -- -- 96 %  "-- --   01/31/24 2355 99/57 -- -- -- -- -- -- --   01/31/24 2354 92/52 -- -- -- -- -- -- --   01/31/24 2344 (!) 81/39 98.4  F (36.9  C) Oral -- 20 97 % -- --   01/31/24 2159 118/50 -- -- 89 -- -- -- --   01/31/24 2054 (!) 155/92 (!) 100.8  F (38.2  C) Oral 94 22 96 % 1.803 m (5' 11\") 76.9 kg (169 lb 8.5 oz)   01/31/24 1717 121/72 -- -- 88 -- 95 % -- --   01/31/24 1702 125/70 -- -- 89 -- 96 % -- --   01/31/24 1647 123/77 -- -- 94 -- 95 % -- --   01/31/24 1632 139/85 -- -- 98 -- 96 % -- --   01/31/24 1547 134/74 -- -- 97 -- 96 % -- --   01/31/24 1513 (!) 153/85 -- -- 101 -- (!) 87 % -- --   01/31/24 1510 -- -- -- -- -- (!) 87 % -- --   01/31/24 1500 (!) 172/91 -- -- 103 -- (!) 88 % -- --     Weight: 171 lbs 0 oz  Wt Readings from Last 4 Encounters:   02/01/24 77.6 kg (171 lb)   01/23/24 81.2 kg (179 lb)   01/09/24 84.2 kg (185 lb 11.2 oz)   12/15/23 83 kg (183 lb)       Intake/Output Summary (Last 24 hours) at 2/1/2024 1449  Last data filed at 2/1/2024 1300  Gross per 24 hour   Intake 0 ml   Output 200 ml   Net -200 ml       General Appearance: Chronically ill-appearing pale elderly man without signs of acute distress resting in bed  Respiratory: Normal respiratory effort, clear lungs, diminished breath sounds throughout  Cardiovascular: Regular rate and rhythm, good radial pulse, brisk capillary refill  GI: Nondistended abdomen, soft, no abdominal tenderness     Medical Decision Making       54 MINUTES SPENT BY ME on the date of service doing chart review, history, exam, documentation & further activities per the note.      Reviewed hospital discharge summary from hospitalization November 27 and November 29, 2023 for non-ST elevation MI  Reviewed report of thoracolumbar spine x-ray performed January 23 which did not demonstrate any fractures although there was incidental note of scoliosis and osteopenia    Data     I have personally reviewed the following data over the past 24 hrs:    25.5 (H)  \   10.1 (L)   / 211 "     139 103 22.8 /  204 (H)   3.8 23 1.01 \     Trop: 32 (H) BNP: N/A     Procal: 1.09 (H) CRP: N/A Lactic Acid: 1.4         Urine culture is pending  Nasal testing for MRSA is pending  Blood culture is negative so far    EKG performed this morning demonstrates normal sinus rhythm with bifascicular block and Q waves inferiorly and anteriorly but no acute ischemic changes    Cardiac monitoring demonstrates normal sinus rhythm    Imaging results reviewed over the past 24 hrs:   Recent Results (from the past 24 hour(s))   CT Chest (PE) Abdomen Pelvis w Contrast    Narrative    CT CHEST PE ABDOMEN AND PELVIS WITH CONTRAST 1/31/2024 2:59 PM    CLINICAL HISTORY: Chest pain, fever, abdominal pain.    TECHNIQUE: CT angiogram chest and routine CT abdomen pelvis with IV  contrast. Arterial phase through the chest and venous phase through  the abdomen and pelvis. 2D and 3D MIP reconstructions were performed  by the CT technologist. Dose reduction techniques were used.     CONTRAST: ISOVUE-370, 89 mL    COMPARISON: CT chest 1/16/2024, CT abdomen and pelvis 11/3/2021.    FINDINGS:  ANGIOGRAM CHEST: Pulmonary arteries are normal caliber and negative  for pulmonary emboli. Thoracic aorta is negative for dissection. No CT  evidence of right heart strain.     LUNGS AND PLEURA: No effusions. Stable but indeterminant lobulated  solid nodule along the superior segment right lower lobe measuring 1.6  cm series 6 image 110. Stable calcified nodule suggesting a right  lower lobe granuloma image 158. Moderate atelectasis of the right lung  base. Motion artifact limits detail assessment of the lungs.  Additional stable left fissural nodule versus lymph node that is 0.6  cm series 6 image 208. Small aspirated material within the trachea.    MEDIASTINUM/AXILLAE: Small pericardial effusion appears stable. No new  adenopathy. Left chest pacemaker.    CORONARY ARTERY CALCIFICATION: Severe and/or stents.    HEPATOBILIARY: Multiple gallstones. No  acute liver abnormality  identified.    PANCREAS: Normal.    SPLEEN: Small hypodensity could just be a cyst at the spleen series 12  image 45. It is too small for characterization.    ADRENAL GLANDS: Normal.    KIDNEYS/BLADDER: Multiple bilateral renal cysts again noted without  specific imaging follow-up recommended. No hydronephrosis. No visible  stone. Lobulated and partially calcified mass at the right posterior  bladder wall is approximately 2.2 x 2 cm series 12 image 169, newly  identified. Bladder stones noted image 175. Indentation upon the  bladder from the prostate that is 5.8 cm.    BOWEL: Wall thickening at the sigmoid colon containing numerous  diverticula series 12 image 147. There is some mild adjacent edema in  this region as well. This may represent diverticulitis. There is a  stable extraluminal tubular gas containing structure that may be a  matured fistula in this region. This is approximately 2 cm on image  143. This is also present on the CT from 11/3/2021. There is no acute  abscess identified. No evidence for appendicitis. Moderate stool. No  small bowel obstruction.    LYMPH NODES: No enlarged lymph nodes are seen.    PELVIC ORGANS: Normal.    OTHER: Vascular calcifications. Lobulated infrarenal abdominal aortic  aneurysm measuring up to 3.9 cm, stable series 12 image 84.    MUSCULOSKELETAL: Spine degenerative changes. No acute abnormality. No  focal suspicious bony lesion.      Impression    IMPRESSION:  1.  No evidence for pulmonary embolism.  2.  Suggestion of sigmoid diverticulitis. No abscess. 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  3.  New finding of a nodular lesion along the right bladder wall.  Recommend workup for bladder neoplasm.  4.  Stable lobulated prominent pulmonary nodule at the right lower  lobe that may represent a neoplasm. Recommend oncologic workup.  5.  Small aspirated material within the trachea.  6.   Cholelithiasis.   7.  Stable infrarenal lobulated abdominal aortic aneurysm measuring up  to 3.9 cm.    Recommendations for an incidental lung nodule > 8mm:    Low risk patients: Consider follow-up CT in 3 months, PET/CT, and/or  tissue sampling.    High risk patients: Same as for low risk patients.    *Low Risk: Minimal or absent history of smoking or other known risk  factors.  *Nonsolid (ground-glass) or partly solid nodules may require longer  follow-up to exclude indolent adenocarcinoma.  *Recommendations based on Guidelines for the Management of Incidental  Pulmonary Nodules Detected at CT: From the Fleischner Society 2017,  Radiology 2017.    SOLO VALERO MD         SYSTEM ID:  [id number]     Recent Labs   Lab 02/01/24  0532 01/31/24  1348   WBC 25.5* 21.2*   HGB 10.1* 11.9*   MCV 62* 61*    351    139   POTASSIUM 3.8 4.2   CHLORIDE 103 102   CO2 23 25   BUN 22.8 17.1   CR 1.01 0.90   ANIONGAP 13 12   RENU 8.6* 9.5   * 127*   ALBUMIN  --  4.1   PROTTOTAL  --  7.3   BILITOTAL  --  1.0   ALKPHOS  --  87   ALT  --  13   AST  --  18

## 2024-02-01 NOTE — PROGRESS NOTES
S-(situation): Patient arrives to room 258 via cart from ED    B-(background): COPD and UTI    A-(assessment): Pt A/Ox4. Febrile 100.8, on 3L of O2. All over body pain. Diminished breath sounds. Has a pacemaker.     R-(recommendations): Orders reviewed with patient. Will monitor patient per MD orders.     Inpatient nursing criteria listed below were met:    Health care directives status obtained and documented: No  VTE ordered/documented: Yes  Skin issues/needs documented:NA  Isolation addressed and Signage used: NA  Fall Prevention: Care plan updated Yes Education given and documented Yes  Care Plan initiated and Co-Morbidities added: Yes  Education Assessment documented:Yes  Admission Education Documented: Yes  If present CAUTI/CLABI Education done: NA  New medication patient education completed and documented (Possible Side Effects of Common Medications handout): Yes  Allergies Reviewed: Yes  Admission Medication Reconciliation completed: Yes  Home medications if not able to send immediately home with family stored here: NA  Reminder note placed in discharge instructions regarding home meds: NA  Individualized care needs/preferences addressed and charted: Yes  Provider Notified that patient has arrived to the unit: Yes

## 2024-02-01 NOTE — PROVIDER NOTIFICATION
Dr. Arriaza notified of Pt's BP at 81/39 MAP of 51. Pt was laying on his side, using upper arm. Had him lay on his back and got a reading of 92/52 MAP 65, rechecked a few minutes later and it was 99/57 MAP 69. Pt is asymptomatic      Order: Continue to monitor

## 2024-02-02 ENCOUNTER — TELEPHONE (OUTPATIENT)
Dept: UROLOGY | Facility: CLINIC | Age: 79
End: 2024-02-02
Payer: COMMERCIAL

## 2024-02-02 VITALS
DIASTOLIC BLOOD PRESSURE: 45 MMHG | SYSTOLIC BLOOD PRESSURE: 124 MMHG | HEART RATE: 66 BPM | HEIGHT: 71 IN | OXYGEN SATURATION: 96 % | RESPIRATION RATE: 22 BRPM | TEMPERATURE: 97.5 F | WEIGHT: 172.3 LBS | BODY MASS INDEX: 24.12 KG/M2

## 2024-02-02 LAB
ANION GAP SERPL CALCULATED.3IONS-SCNC: 11 MMOL/L (ref 7–15)
BACTERIA UR CULT: ABNORMAL
BUN SERPL-MCNC: 22.7 MG/DL (ref 8–23)
CALCIUM SERPL-MCNC: 8.8 MG/DL (ref 8.8–10.2)
CHLORIDE SERPL-SCNC: 102 MMOL/L (ref 98–107)
CREAT SERPL-MCNC: 1.08 MG/DL (ref 0.67–1.17)
DEPRECATED HCO3 PLAS-SCNC: 26 MMOL/L (ref 22–29)
EGFRCR SERPLBLD CKD-EPI 2021: 70 ML/MIN/1.73M2
GLUCOSE SERPL-MCNC: 113 MG/DL (ref 70–99)
HGB BLD-MCNC: 10.5 G/DL (ref 13.3–17.7)
LACTATE SERPL-SCNC: 0.8 MMOL/L (ref 0.7–2)
POTASSIUM SERPL-SCNC: 3.9 MMOL/L (ref 3.4–5.3)
PROCALCITONIN SERPL IA-MCNC: 0.74 NG/ML
SODIUM SERPL-SCNC: 139 MMOL/L (ref 135–145)
WBC # BLD AUTO: 24.9 10E3/UL (ref 4–11)

## 2024-02-02 PROCEDURE — 250N000012 HC RX MED GY IP 250 OP 636 PS 637: Performed by: INTERNAL MEDICINE

## 2024-02-02 PROCEDURE — 36415 COLL VENOUS BLD VENIPUNCTURE: CPT | Performed by: INTERNAL MEDICINE

## 2024-02-02 PROCEDURE — 250N000011 HC RX IP 250 OP 636: Performed by: EMERGENCY MEDICINE

## 2024-02-02 PROCEDURE — 85018 HEMOGLOBIN: CPT | Performed by: PEDIATRICS

## 2024-02-02 PROCEDURE — 80048 BASIC METABOLIC PNL TOTAL CA: CPT | Performed by: PEDIATRICS

## 2024-02-02 PROCEDURE — 83605 ASSAY OF LACTIC ACID: CPT | Performed by: INTERNAL MEDICINE

## 2024-02-02 PROCEDURE — 36415 COLL VENOUS BLD VENIPUNCTURE: CPT | Performed by: PEDIATRICS

## 2024-02-02 PROCEDURE — 250N000011 HC RX IP 250 OP 636: Performed by: INTERNAL MEDICINE

## 2024-02-02 PROCEDURE — 84145 PROCALCITONIN (PCT): CPT | Performed by: PEDIATRICS

## 2024-02-02 PROCEDURE — 250N000013 HC RX MED GY IP 250 OP 250 PS 637: Performed by: INTERNAL MEDICINE

## 2024-02-02 PROCEDURE — 85048 AUTOMATED LEUKOCYTE COUNT: CPT | Performed by: PEDIATRICS

## 2024-02-02 RX ADMIN — CARVEDILOL 12.5 MG: 6.25 TABLET, FILM COATED ORAL at 10:42

## 2024-02-02 RX ADMIN — LOSARTAN POTASSIUM 25 MG: 25 TABLET, FILM COATED ORAL at 10:23

## 2024-02-02 RX ADMIN — VANCOMYCIN HYDROCHLORIDE 1000 MG: 1 INJECTION, SOLUTION INTRAVENOUS at 01:38

## 2024-02-02 RX ADMIN — PREDNISONE 40 MG: 20 TABLET ORAL at 10:23

## 2024-02-02 RX ADMIN — PANTOPRAZOLE SODIUM 40 MG: 40 TABLET, DELAYED RELEASE ORAL at 06:40

## 2024-02-02 RX ADMIN — PIPERACILLIN AND TAZOBACTAM 3.38 G: 3; .375 INJECTION, POWDER, FOR SOLUTION INTRAVENOUS at 03:02

## 2024-02-02 RX ADMIN — ASPIRIN 81 MG CHEWABLE TABLET 81 MG: 81 TABLET CHEWABLE at 10:23

## 2024-02-02 RX ADMIN — CLOPIDOGREL BISULFATE 75 MG: 75 TABLET ORAL at 10:23

## 2024-02-02 RX ADMIN — ROSUVASTATIN CALCIUM 5 MG: 5 TABLET, FILM COATED ORAL at 10:23

## 2024-02-02 RX ADMIN — PIPERACILLIN AND TAZOBACTAM 3.38 G: 3; .375 INJECTION, POWDER, FOR SOLUTION INTRAVENOUS at 10:24

## 2024-02-02 ASSESSMENT — ACTIVITIES OF DAILY LIVING (ADL)
ADLS_ACUITY_SCORE: 22

## 2024-02-02 NOTE — PROGRESS NOTES
"S-(situation): against medical advice    B-(background): COPD exacerbation, UTI    A-(assessment): pt A/Ox4. Dyspneic on exertion noted. Despite writer explanation for staying until MD rounds on him, pt insisted \" I'm leaving now\".     R-(recommendations): pt agreed to sign against medical advice paperwork. MD notified. Pt ambulated to elevator.  "

## 2024-02-02 NOTE — PLAN OF CARE
Goal Outcome Evaluation:      Plan of Care Reviewed With: patient    Overall Patient Progress: no changeOverall Patient Progress: no change     VSS on RA. Denies pain. Tolerating IV vanco and zosyn. External cath in place with good uo. Saline locked.

## 2024-02-02 NOTE — PROGRESS NOTES
The patient left the hospital AMA, despite his nurse trying to convince him to stay. He didn't want to wait for the rounding physician to see him.

## 2024-02-02 NOTE — PROGRESS NOTES
Patient has been assessed for Home Oxygen needs. Oxygen readings:    *Pulse oximetry (SpO2) = 92% on room air at rest while awake.      *SpO2 = 90% on room air during activity/with exercise.

## 2024-02-02 NOTE — PROGRESS NOTES
02/01/24 1757   Nebulizer Assessment & Treatment   $RT Use ONLY Delivery Method Nebulizer - Additional   Nebulizer Device Mask   Pretreatment Heart Rate (beats/min) 77   Pretreatment Resp Rate (breaths/min) 26   Pretreat Breath Sounds - Bilat - All Lobes diminished   Pretreat Breath Sounds - LIBRA diminished   Pretreat Breath Sounds - LLL diminished   Pretreat Breath Sounds - RUL diminished   Pretreat Breath Sounds - RML diminished   Pretreat Breath Sounds - RLL diminished   Patient Position semi-Goldberg's   Respiratory Treatment Status (SVN) given   Breath Sounds Post-Respiratory Treatment   Posttreatment Heart Rate (beats/min) 78   Posttreatment Resp Rate (breaths/min) 18   Posttreatment Assessment (SVN) patient reports breathing improved   Signs of Intolerance (SVN) none   Breath Sounds Posttreatment All Fields diminished   Breath Sounds Posttreatment LIBRA diminished   Breath Sounds Posttreatment LLL diminished   Breath Sounds Posttreatment RUL diminished   Breath Sounds Posttreatment RML diminished   Breath Sounds Posttreatment RLL diminished     Dyspnea, COPD  Lung nodule  Patient feels benefit from PRN neb treatments , decreased dyspnea and chest tightness  Will change frequency to QID for Duo nebs  Room Air SpO2 = 96%

## 2024-02-02 NOTE — TELEPHONE ENCOUNTER
[facility] Call Center    Phone Message    May a detailed message be left on voicemail: yes     Reason for Call: Other: Patient being referred for Gross Hematuria by referring provider, but in the notes it also mentions Mass of urinary bladder. Writer sending encounter message for clinic review and follow-up with Pt for scheduling. Thank you!     Action Taken: Other: PH Uro    Travel Screening: Not Applicable

## 2024-02-02 NOTE — PLAN OF CARE
"Goal Outcome Evaluation:      Plan of Care Reviewed With: patient    Overall Patient Progress: no changeOverall Patient Progress: no change    Outcome Evaluation: Alert and oriented x 4. Vitals stable on RA. Reports some chest pain/ discomfort this AM. MD aware EKG ordered. Regular diet, very poor did not eat any meals today. Home O2 test performed today. Continues to receive IV antibiotics. Up with assist x 1 to the bathroom. External catheter removed. Will use urinal at bedside    /66   Pulse 69   Temp 97.8  F (36.6  C) (Oral)   Resp 20   Ht 1.803 m (5' 11\")   Wt 77.6 kg (171 lb)   SpO2 95%   BMI 23.85 kg/m      "

## 2024-02-03 NOTE — DISCHARGE SUMMARY
[facility]  Hospitalist Discharge Summary      Date of Admission:  1/31/2024  Date of Discharge:  2/2/2024  1:00 PM  Discharging Provider: Adam Lorenzo MD  Discharge Service: Hospitalist Service    Discharge Diagnoses     Aspiration into airway, initial encounter    COPD exacerbation (H)    Acute respiratory failure with hypoxia (H)    Hypertension goal BP (blood pressure) < 140/90    Pulmonary nodule    Mass of urinary bladder    Abnormal urinalysis    Chest pain, unspecified type    Gram negative UTI    SIRS (systemic inflammatory response syndrome) (H)    Gross hematuria    Aspirin-like platelet function defect (H)    Third degree heart block (H) - s/p pacemaker    Calculus of gallbladder without cholecystitis without obstruction    Infrarenal abdominal aortic aneurysm (AAA) without rupture (H24)    Fistula of sigmoid colon    Clinically Significant Risk Factors          Follow-ups Needed After Discharge       Unresulted Labs Ordered in the Past 30 Days of this Admission       Date and Time Order Name Status Description    1/31/2024  1:46 PM Blood Culture Peripheral Blood Preliminary     1/31/2024  1:46 PM Blood Culture Peripheral Blood Preliminary         These results will be followed up by primary care provider.    Discharge Disposition   Patient left AMA (against medical advice).  Condition at discharge: Stable    Hospital Course   Khang Bailon is a 78 year old male admitted on 1/31/2024. He presented to the ED for evaluation of chest pain and shortness of breath and found to have COPD exacerbation and UTI. With additional discussion patient describing aspiration event prior to being admitted immediately preceding his worsening breathing problems.  Radiographic findings on CT were suspicious for aspiration of material into trachea.  Suspect aspiration event precipitated COPD exacerbation with acute hypoxic respiratory failure.  He also presented with fever and  leukocytosis concerning for SIRS.  Sepsis was considered, although he has not demonstrated signs of acute organ dysfunction except for respiratory failure which may be due to aspiration itself.  Procalcitonin is elevated at 1.09.  He is rapidly improving with resolved clinical signs of COPD exacerbation and acute hypoxic respiratory failure.  Fever has resolved although leukocytosis persists.    Patient was treated with IV antibiotics. Blood cultures remained negative. Urine culture was growing Gram negative bacteria. Sepsis was possibly due to Gram negative UTI. ID and sensitivity were still ending on 2/2/24.    The patient left the hospital without being seen by rounding physician, without any prescriptions or scheduled follow up.    Consultations This Hospital Stay   PHARMACY TO DOSE VANCO  CARE MANAGEMENT / SOCIAL WORK IP CONSULT    Code Status   Prior    Time Spent on this Encounter          Adam Lorenzo MD  [address] SURGICAL  [address] [place] [address]  Phone: [phone]  ______________________________________________________________________    Physical Exam   Vital Signs:                    Weight: 172 lbs 4.8 oz  Patient not seen and not examined.       Primary Care Physician   Neville Degroot    Discharge Orders       Significant Results and Procedures       Discharge Medications   Discharge Medication List as of 2/2/2024  1:09 PM        CONTINUE these medications which have NOT CHANGED    Details   Ascorbic Acid (VITAMIN C PO) Take 1 tablet by mouth daily (Isotonix), Historical      aspirin (ASA) 81 MG chewable tablet Take 81 mg by mouth, Historical      B Complex Vitamins (B COMPLEX PO) Take 1 tablet by mouth daily (Isotonix), Historical      calcium carbonate-vitamin D (CALTRATE) 600-10 MG-MCG per tablet Take 1 tablet by mouth daily, Historical      carvedilol (COREG) 25 MG tablet Take 12.5 mg by mouth 2 times daily (with meals), Historical       Cholecalciferol (VITAMIN D-3 PO) Take 5,000 Units by mouth, Historical      clopidogrel (PLAVIX) 75 MG tablet Take 75 mg by mouth, Historical      ferrous sulfate (FEROSUL) 325 (65 Fe) MG tablet Take 1 tablet (325 mg) by mouth daily (with breakfast), Disp-30 tablet, R-0, E-Prescribe      ipratropium - albuterol 0.5 mg/2.5 mg/3 mL (DUONEB) 0.5-2.5 (3) MG/3ML neb solution USE 1 VIAL IN NEBULIZER EVERY 6 HOURS AS NEEDED FOR SHORTNESS OF BREATH, WHEEZING OR COUGH, Disp-180 mL, R-0, E-Prescribe      losartan (COZAAR) 25 MG tablet Take 25 mg by mouth daily, Historical      order for DME Equipment being ordered: Nebulizer hoseDisp-1 Tube, R-11, Local Print      oxyCODONE (ROXICODONE) 5 MG tablet Take 0.5-1 tablets (2.5-5 mg) by mouth every 6 hours as needed for severe pain, Disp-12 tablet, R-0, E-Prescribe      pantoprazole (PROTONIX) 40 MG EC tablet Take 1 tablet by mouth daily before breakfast, Historical      rosuvastatin (CRESTOR) 5 MG tablet Take 5 mg by mouth every morning, Historical           Allergies   Allergies   Allergen Reactions    Flagyl [Metronidazole] GI Disturbance

## 2024-02-05 ENCOUNTER — ONCOLOGY VISIT (OUTPATIENT)
Dept: PULMONOLOGY | Facility: CLINIC | Age: 79
End: 2024-02-05
Payer: COMMERCIAL

## 2024-02-05 VITALS
OXYGEN SATURATION: 95 % | DIASTOLIC BLOOD PRESSURE: 81 MMHG | SYSTOLIC BLOOD PRESSURE: 153 MMHG | HEIGHT: 71 IN | HEART RATE: 73 BPM | BODY MASS INDEX: 24.08 KG/M2 | RESPIRATION RATE: 16 BRPM | WEIGHT: 172 LBS

## 2024-02-05 DIAGNOSIS — R91.8 PULMONARY NODULES: ICD-10-CM

## 2024-02-05 DIAGNOSIS — J44.9 MODERATE COPD (CHRONIC OBSTRUCTIVE PULMONARY DISEASE) (H): Primary | ICD-10-CM

## 2024-02-05 LAB
BACTERIA BLD CULT: NO GROWTH
BACTERIA BLD CULT: NO GROWTH

## 2024-02-05 PROCEDURE — 99215 OFFICE O/P EST HI 40 MIN: CPT | Performed by: INTERNAL MEDICINE

## 2024-02-05 RX ORDER — ALBUTEROL SULFATE 90 UG/1
2 AEROSOL, METERED RESPIRATORY (INHALATION) EVERY 6 HOURS PRN
COMMUNITY
End: 2024-04-26

## 2024-02-05 RX ORDER — GUAIFENESIN 600 MG/1
1200 TABLET, EXTENDED RELEASE ORAL 2 TIMES DAILY
Qty: 120 TABLET | Refills: 1 | Status: SHIPPED | OUTPATIENT
Start: 2024-02-05 | End: 2024-04-03

## 2024-02-05 ASSESSMENT — PAIN SCALES - GENERAL: PAINLEVEL: WORST PAIN (10)

## 2024-02-05 NOTE — PATIENT INSTRUCTIONS
- Start Breztri 2 puffs twice daily  - PET scan ordered  - PFT breathing test ordered  - Will call with the results of these       Follow up in General pulmonary clinic in [place] 3 months (with Fransisco Avina)

## 2024-02-05 NOTE — TELEPHONE ENCOUNTER
Pt refusing an appointment with Dr. Stewart. He is also refusing the phone number for central urology scheduling  to make an appointment with another provider.    Routing to PCP for FYI.    Mayi MCCURDY RN   [facility], Urology   2/5/2024 3:07 PM

## 2024-02-05 NOTE — PROGRESS NOTES
"Khang Bailon's goals for this visit include: Consult  He requests these members of his care team be copied on today's visit information: PCP    PCP: Neville Degroot    Referring Provider:  Neville Degroot MD  [address] [place] [address]    BP (!) 153/81   Pulse 73   Resp 16   Ht 1.803 m (5' 10.98\")   Wt 78 kg (172 lb)   SpO2 95%   BMI 24.00 kg/m      Do you need any medication refills at today's visit? GEREMIAS Bran LPN  Pulmonary Medicine:  [facility]  Phone: [phone] Fax: [phone]    LUNG NODULE & INTERVENTIONAL PULMONARY CLINIC  [facility]     Khang Bailon MRN# [id number]   Age: 78 year old YOB: 1945     Reason for Consultation: lung nodule(s)    Requesting Physician: Neville Degroot MD  [address] [place] [address]       Assessment and Plan:    1. Pulmonary Nodules. CT 8/2023 during hospitalization with COVID-19 demonstrated a 1.2 x 0.7cm nodule in the superior segment of the RLL. I can faintly see it on a CT from 6/2021. Repeat CT 1/16/2024 does show small interval growth (1.6 x 1.3 cm). Also with a stable appearing calcified RLL nodule also seen on CT 6/2021. Case discussed with my colleague, Dr. Feliciano. He has multiple co-morbidities and recently had a drug eluding stent placed 11/2023. Therefore, recommend a PET/CT. If appears concerning on PET will consider biopsy with IR vs referral to Oncology for consideration of SBRT. Will review with patient over the phone once imaging and PFTs have been completed.     2. COPD- Unable to discuss at length today as he is here to discuss pulmonary nodules. In summary. He has a 52 pack year history. Quit smoking ~15 years ago. Emphysematous changes noted on CT and spirometry in 2021 demonstrated moderate obstruction with FEV1 50%. Endorses dyspnea on exertion and persistent severe cough. Unable to bring up sputum. Multiple COPD exacerbations requiring " hospitalization recently. Currently using duoneb q4h. Was prescribed a diskus inhaler but too expensive. Will try to see if breztri (ICS/LAMA/LABA) is covered. If too expensive could consider pulmicort neb 0.5mg BID and DuoNeb QID. Start mucinex 1200mg BID. Ordered PFTs with 6 minute walk test and follow up General Pulmonology in [place].     3. Sleep related hypoxia- noted to have desaturations during previous hospitalizations. I recommended referral to Sleep Medicine but he declined. Will revisit if amenable to sleep evaluation.    Total time: 45 minutes (incudes prep time, reviewing outside records, visit time, and post visit work on the day of the encounter).        Gracy Martínez PA-C  General Pulmonology            History:     Khang Bailon is a 78 year old male with sig h/o for NSTEMI s/p drug-eluding stent placement 11/2023, heart block s/p pacemaker (2012), CHF, CAD, HTN, HLD, COPD, CVA in 2016 who is here for lung nodule(s).    - Has a significant cough, mostly dry as he has difficulty bringing up sputum. Coughs so often that he has had subsequent severe back pain. Using DuoNeb every 6 hours, which helps. Endorses dyspnea on exertion. His PCP prescribed an inhaler recently, ?trelegy, but was too expensive ($300/month) so he didn't purchase it.   - was told he might need oxygen at night when sleeping. Doesn't have oxygen right now.   - Denies aspiration  - Personal hx of cancer: no  - Family hx of cancer: no  - Exposure hx: Denies asbestos or radon exposure   - Tobacco hx: Past Smoker: 1ppd for 52years. Quit 15yrs ago.   - My interpretation of the images relevant for this visit includes: CT chest PE 1/31/2024 with a lobulated nodule along the superior segment of the right lower lobe measuring 16 x 13mm increased in size from 1.2 x 0.7cm 8/2023. Faintly seen on CT 6/2021. Also with a stable nodule of the right lower lobe which appears calcified, unchanged since 6/2021.   - My interpretation of the PFT's  relevant for this visit includes: he arrived to his PFT appt on 2024 but was unable to attempt due to presenting with severe chest pain, sent to ED.  Spirometry 10/2021 demonstrated moderate obstruction, FEV1 50%      Other active medical problems include:   - COPD, unable to do PFTs.   - NSTEMI s/p JESSICA 2023  - History of CVA in 2016  - Heart block s/p pacemaker ()  - CHF  - HTN  - CAD  - HLD           Past Medical History:      Past Medical History:   Diagnosis Date    Alpha thalassemia (H24)     AV block 2012    pacemaker at [facility]    Diverticulitis     Injury, other and unspecified, elbow, forearm, and wrist 1970s    Broke right wrist           Past Surgical History:      Past Surgical History:   Procedure Laterality Date    COLONOSCOPY      COLONOSCOPY  12/10/2012    CYSTOSCOPY, LITHOLAPAXY, COMBINED N/A 11/10/2021    Procedure: Cystoscopy, Bladder Stone Removal;  Surgeon: Guanaco Stewart MD;  Location: [facility] OR    IMPLANT PACEMAKER      LASER KTP TRANSURETHRAL RESECTION (TUR) PROSTATE N/A 11/10/2021    Procedure: TRANSURETHRAL RESECTION (TUR) PROSTATE, USING KTP LASER;  Surgeon: Guanaco Stewart MD;  Location: [facility] OR          Social History:     Social History     Tobacco Use    Smoking status: Former     Years: 50     Types: Cigarettes     Quit date: 2012     Years since quittin.5    Smokeless tobacco: Never    Tobacco comments:     Quit in July   Substance Use Topics    Alcohol use: No     Alcohol/week: 0.0 standard drinks of alcohol          Family History:   No family history on file.        Allergies:      Allergies   Allergen Reactions    Flagyl [Metronidazole] GI Disturbance          Medications:     Current Outpatient Medications   Medication Sig    albuterol (PROAIR HFA/PROVENTIL HFA/VENTOLIN HFA) 108 (90 Base) MCG/ACT inhaler Inhale 2 puffs into the lungs every 6 hours as needed for shortness of breath, wheezing or cough    Ascorbic Acid (VITAMIN C PO) Take 1 tablet by  "mouth daily (Isotonix)    aspirin (ASA) 81 MG chewable tablet Take 81 mg by mouth    B Complex Vitamins (B COMPLEX PO) Take 1 tablet by mouth daily (Isotonix)    calcium carbonate-vitamin D (CALTRATE) 600-10 MG-MCG per tablet Take 1 tablet by mouth daily    carvedilol (COREG) 25 MG tablet Take 12.5 mg by mouth 2 times daily (with meals)    Cholecalciferol (VITAMIN D-3 PO) Take 5,000 Units by mouth    clopidogrel (PLAVIX) 75 MG tablet Take 75 mg by mouth    ferrous sulfate (FEROSUL) 325 (65 Fe) MG tablet Take 1 tablet (325 mg) by mouth daily (with breakfast)    ipratropium - albuterol 0.5 mg/2.5 mg/3 mL (DUONEB) 0.5-2.5 (3) MG/3ML neb solution USE 1 VIAL IN NEBULIZER EVERY 6 HOURS AS NEEDED FOR SHORTNESS OF BREATH, WHEEZING OR COUGH (Patient taking differently: Take 1 vial by nebulization every 6 hours as needed for shortness of breath, wheezing or cough)    losartan (COZAAR) 25 MG tablet Take 25 mg by mouth daily    order for DME Equipment being ordered: Nebulizer hose    oxyCODONE (ROXICODONE) 5 MG tablet Take 0.5-1 tablets (2.5-5 mg) by mouth every 6 hours as needed for severe pain    pantoprazole (PROTONIX) 40 MG EC tablet Take 1 tablet by mouth daily before breakfast    rosuvastatin (CRESTOR) 5 MG tablet Take 5 mg by mouth every morning     No current facility-administered medications for this visit.            Physical Exam:   BP (!) 153/81   Pulse 73   Resp 16   Ht 1.803 m (5' 10.98\")   Wt 78 kg (172 lb)   SpO2 95%   BMI 24.00 kg/m    Wt Readings from Last 4 Encounters:   02/05/24 78 kg (172 lb)   02/02/24 78.2 kg (172 lb 4.8 oz)   01/23/24 81.2 kg (179 lb)   01/09/24 84.2 kg (185 lb 11.2 oz)     General: Well appearing  Lungs: Nonlabored breathing. Frequent dry cough.   Neuro: Answering questions appropriately  Psych: Normal affect       Imaging/Lab Data   All laboratory and imaging data reviewed.    No results found for this or any previous visit (from the past 24 hour(s)).            "

## 2024-02-07 NOTE — TELEPHONE ENCOUNTER
Khang had a bad experience with Dr Stewart and he does not want to see Dr Stewart again. Brittany and Khang will discuss it with Dr Degroot at the 2/20/24 appointment.

## 2024-02-09 ENCOUNTER — TELEPHONE (OUTPATIENT)
Dept: FAMILY MEDICINE | Facility: CLINIC | Age: 79
End: 2024-02-09
Payer: COMMERCIAL

## 2024-02-09 NOTE — TELEPHONE ENCOUNTER
I am uncertain what patient is talking about. He should have overnight oxymetry testing done and follow up with sleep medicine at some point, so that maybe what she is referring to?

## 2024-02-09 NOTE — TELEPHONE ENCOUNTER
Routing to team    Pt wife calling frustrated states eddie nurse called her to  a pulse o2 or a machine to  from the clinic but nobody has called them back     Upon chart review writer unable to find what pt wife is talking about. Writer asked if this was from specialty and pt wife is certain it is FP.    Please advise?    Mirta Quinones RN

## 2024-02-11 DIAGNOSIS — J44.9 CHRONIC OBSTRUCTIVE PULMONARY DISEASE, UNSPECIFIED COPD TYPE (H): ICD-10-CM

## 2024-02-12 ENCOUNTER — TELEPHONE (OUTPATIENT)
Dept: PULMONOLOGY | Facility: CLINIC | Age: 79
End: 2024-02-12
Payer: COMMERCIAL

## 2024-02-12 RX ORDER — IPRATROPIUM BROMIDE AND ALBUTEROL SULFATE 2.5; .5 MG/3ML; MG/3ML
SOLUTION RESPIRATORY (INHALATION)
Qty: 180 ML | Refills: 0 | Status: SHIPPED | OUTPATIENT
Start: 2024-02-12 | End: 2024-02-29

## 2024-02-12 NOTE — TELEPHONE ENCOUNTER
RN TRIAGE CALL:    Patient Contact    Attempt # 1    Was call answered?  No.  Left message on voicemail with information to call any one of the triage nurses back regarding a message she left with clinic last week, following up and helping confirm questions asked.    Jessica Barlow RN

## 2024-02-12 NOTE — TELEPHONE ENCOUNTER
[facility] Call Center    Phone Message    May a detailed message be left on voicemail: yes     Reason for Call: Other: Call received from pt's wife Brittany requesting to cancel pt's appt's for PFT's and 6-minute walk test on 2/14/24. She reports that pt was originally scheduled to have these done on 1/31/24, but was unable to complete them at that time due to severe pain in his back (which they suspect is possibly from him coughing too hard), and was sent from the PFT location straight to the ER.     Pt's wife reports he is still experiencing that same pain, and does not believe he would be able to complete his PFT's this time either-she also did not want to reschedule these tests for another day at the time of her call. She reports that they have an appt with another doctor on 2/20/24 to follow-up on his ER visit, and says that unless they are able to do something for him that could provide some sort of relief, she does not see the point in rescheduling his PFT's at this time.     Pt is not expecting a call back, routing to care team just as an FYI.     Action Taken: Other: Pulm    Travel Screening: Not Applicable

## 2024-02-13 NOTE — TELEPHONE ENCOUNTER
RN TRIAGE CALL:    Patient Contact    Attempt # 2    Was call answered?  No.  Left message on voicemail with information to call any one of the triage nurses back regarding a message that was left regarding Cale Barlow RN

## 2024-02-14 NOTE — TELEPHONE ENCOUNTER
RN Triage    Patient Contact    Attempt # 3    Was call answered?  No.  Left message on voicemail with information to call me back.    Mayi Lowery RN on 2/14/2024 at 8:42 AM\

## 2024-02-14 NOTE — TELEPHONE ENCOUNTER
PFT appointment has been cancelled.    Santa Bran LPN  Pulmonary Medicine:  [facility]  Phone: [phone] Fax: [phone]

## 2024-02-15 NOTE — TELEPHONE ENCOUNTER
RN TRIAGE CALL:    Patient Contact    Attempt # 4     Was call answered?  No.  Left message on voicemail with information to call me back.    Fourth attempt to contact wife back. Closing encounter.     BERNIE FragosoN, RN

## 2024-02-20 ENCOUNTER — HOSPITAL ENCOUNTER (OUTPATIENT)
Dept: GENERAL RADIOLOGY | Facility: CLINIC | Age: 79
Discharge: HOME | End: 2024-02-20
Attending: STUDENT IN AN ORGANIZED HEALTH CARE EDUCATION/TRAINING PROGRAM | Admitting: STUDENT IN AN ORGANIZED HEALTH CARE EDUCATION/TRAINING PROGRAM
Payer: COMMERCIAL

## 2024-02-20 ENCOUNTER — OFFICE VISIT (OUTPATIENT)
Dept: FAMILY MEDICINE | Facility: CLINIC | Age: 79
End: 2024-02-20
Payer: COMMERCIAL

## 2024-02-20 ENCOUNTER — TELEPHONE (OUTPATIENT)
Dept: FAMILY MEDICINE | Facility: CLINIC | Age: 79
End: 2024-02-20

## 2024-02-20 VITALS
SYSTOLIC BLOOD PRESSURE: 126 MMHG | BODY MASS INDEX: 24.64 KG/M2 | HEART RATE: 64 BPM | HEIGHT: 71 IN | OXYGEN SATURATION: 96 % | RESPIRATION RATE: 24 BRPM | WEIGHT: 176 LBS | DIASTOLIC BLOOD PRESSURE: 70 MMHG | TEMPERATURE: 97.6 F

## 2024-02-20 DIAGNOSIS — Z09 HOSPITAL DISCHARGE FOLLOW-UP: Primary | ICD-10-CM

## 2024-02-20 DIAGNOSIS — R91.1 PULMONARY NODULE: ICD-10-CM

## 2024-02-20 DIAGNOSIS — Z23 NEED FOR TDAP VACCINATION: ICD-10-CM

## 2024-02-20 DIAGNOSIS — I10 HYPERTENSION GOAL BP (BLOOD PRESSURE) < 140/90: ICD-10-CM

## 2024-02-20 DIAGNOSIS — M54.9 ACUTE BILATERAL BACK PAIN, UNSPECIFIED BACK LOCATION: ICD-10-CM

## 2024-02-20 DIAGNOSIS — I44.2 THIRD DEGREE HEART BLOCK (H): ICD-10-CM

## 2024-02-20 DIAGNOSIS — N32.89 MASS OF URINARY BLADDER: Primary | ICD-10-CM

## 2024-02-20 DIAGNOSIS — I25.10 CORONARY ARTERY DISEASE INVOLVING NATIVE CORONARY ARTERY OF NATIVE HEART WITHOUT ANGINA PECTORIS: ICD-10-CM

## 2024-02-20 DIAGNOSIS — Z23 NEED FOR SHINGLES VACCINE: ICD-10-CM

## 2024-02-20 DIAGNOSIS — R06.00 DYSPNEA, UNSPECIFIED TYPE: ICD-10-CM

## 2024-02-20 DIAGNOSIS — I71.43 INFRARENAL ABDOMINAL AORTIC ANEURYSM (AAA) WITHOUT RUPTURE (H): ICD-10-CM

## 2024-02-20 DIAGNOSIS — D69.1 ASPIRIN-LIKE PLATELET FUNCTION DEFECT (H): ICD-10-CM

## 2024-02-20 DIAGNOSIS — R31.0 GROSS HEMATURIA: ICD-10-CM

## 2024-02-20 DIAGNOSIS — N32.89 MASS OF URINARY BLADDER: ICD-10-CM

## 2024-02-20 DIAGNOSIS — J18.9 PNEUMONIA OF RIGHT LOWER LOBE DUE TO INFECTIOUS ORGANISM: ICD-10-CM

## 2024-02-20 DIAGNOSIS — J44.1 COPD EXACERBATION (H): ICD-10-CM

## 2024-02-20 DIAGNOSIS — Z29.11 NEED FOR VACCINATION AGAINST RESPIRATORY SYNCYTIAL VIRUS: ICD-10-CM

## 2024-02-20 PROBLEM — J96.01 ACUTE RESPIRATORY FAILURE WITH HYPOXIA (H): Status: RESOLVED | Noted: 2024-02-01 | Resolved: 2024-02-20

## 2024-02-20 PROCEDURE — 71046 X-RAY EXAM CHEST 2 VIEWS: CPT

## 2024-02-20 PROCEDURE — 99214 OFFICE O/P EST MOD 30 MIN: CPT | Performed by: STUDENT IN AN ORGANIZED HEALTH CARE EDUCATION/TRAINING PROGRAM

## 2024-02-20 RX ORDER — RESPIRATORY SYNCYTIAL VIRUS VACCINE 120MCG/0.5
0.5 KIT INTRAMUSCULAR ONCE
Qty: 1 EACH | Refills: 0 | Status: CANCELLED | OUTPATIENT
Start: 2024-02-20 | End: 2024-02-20

## 2024-02-20 RX ORDER — PREDNISONE 20 MG/1
TABLET ORAL
Qty: 15 TABLET | Refills: 0 | Status: SHIPPED | OUTPATIENT
Start: 2024-02-20 | End: 2024-03-02

## 2024-02-20 RX ORDER — AZITHROMYCIN 250 MG/1
TABLET, FILM COATED ORAL
Qty: 6 TABLET | Refills: 0 | Status: SHIPPED | OUTPATIENT
Start: 2024-02-20 | End: 2024-02-25

## 2024-02-20 RX ORDER — OXYCODONE HYDROCHLORIDE 5 MG/1
2.5-5 TABLET ORAL EVERY 6 HOURS PRN
Qty: 12 TABLET | Refills: 0 | Status: SHIPPED | OUTPATIENT
Start: 2024-02-20 | End: 2024-03-05

## 2024-02-20 NOTE — TELEPHONE ENCOUNTER
----- Message from Neville Degroot MD sent at 2/20/2024 12:25 PM CST -----  Please call and help schedule follow up with me next Tuesday. Ok to see whenever. Also please help him get urology follow up in [place].

## 2024-02-20 NOTE — TELEPHONE ENCOUNTER
New referral needed for patient, location of external referral was not chosen on previous referral.    Please complete, Dx and Sign.    Lisbeth Arredondo XRO/

## 2024-02-20 NOTE — PROGRESS NOTES
Assessment & Plan   Problem List Items Addressed This Visit          Respiratory    Pulmonary nodule    COPD exacerbation (H)    Relevant Medications    predniSONE (DELTASONE) 20 MG tablet       Circulatory    Third degree heart block (H) - s/p pacemaker    Hypertension goal BP (blood pressure) < 140/90    Coronary artery disease involving native coronary artery of native heart without angina pectoris    Infrarenal abdominal aortic aneurysm (AAA) without rupture (H24)       Urinary    Mass of urinary bladder    Relevant Orders    Adult Urology  Referral    Gross hematuria       Hematologic    Aspirin-like platelet function defect (H)     Other Visit Diagnoses       Hospital discharge follow-up    -  Primary    Need for shingles vaccine        Need for Tdap vaccination        Need for vaccination against respiratory syncytial virus        Acute bilateral back pain, unspecified back location        Relevant Medications    oxyCODONE (ROXICODONE) 5 MG tablet    predniSONE (DELTASONE) 20 MG tablet    Dyspnea, unspecified type        Relevant Orders    XR Chest 2 Views (Completed)    Pneumonia of right lower lobe due to infectious organism        Relevant Medications    amoxicillin-clavulanate (AUGMENTIN) 875-125 MG tablet    azithromycin (ZITHROMAX) 250 MG tablet           Patient has since been seen by pulmonology and planning for PET scan given concerning pulmonary nodule.  Patient transition to [facility] and using DuoNebs every 6 hours.  Chest pain sharp and with coughs likely musculoskeletal from his coughing and mostly left sided.  Does get winded easily but no increased pain with exertion.  No palpitations.  Appears to be breathing comfortably and oxygenating well on exam.  Chest x-ray with opacity today that does appear new since admission.  Potential pneumonitis versus atelectasis with no clear bronchogram but given recent hospitalization and high risk we will plan to cover for pneumonia now and treat  COPD exacerbation.  Patient and wife comfortable with treating as outpatient now but precautions and risk factors discussed for worsening disease.  Continue nebulizer scheduled.  Plan to follow-up with me in 1 week but be seen sooner if new or worsening issues arise or things not responding.  They will follow-up with urology in [place] per their wishes.  No further urinary symptoms at this time.  Small amount of oxycodone given for discomfort and to help sleep at night given back and rib pain with coughing.  Risk of opioid use and potential for respiratory suppression, death and addiction discussed as well as interactions with other medications like benzodiazepines and alcohol.       MED REC REQUIRED  Post Medication Reconciliation Status:  Discharge medications reconciled and changed, see notes/orders      Mikaela Quiñonez is a 78 year old, presenting for the following health issues:  Hospital F/U    HPI       Hospital Follow-up Visit:    Hospital/Nursing Home/IP Rehab Facility: [facility]  Date of Admission: 1/31/24  Date of Discharge: 2/2/24  Reason(s) for Admission: COPD exacerbation    Was your hospitalization related to COVID-19? No   Problems taking medications regularly:  None  Medication changes since discharge: None  Problems adhering to non-medication therapy:  None    Summary of hospitalization:  [facility] discharge summary reviewed  Diagnostic Tests/Treatments reviewed.  Follow up needed: Pulmonology  Other Healthcare Providers Involved in Patient s Care:         Specialist appointment - pulmonology and PET scan, urology  Update since discharge: stable.     Patient with no recurrent urinary symptoms since discharge but continued cough and shortness of breath.  Does have pleuritic pain with coughing and also into his mid back with movement and rotation.  No fevers.  Wheezing but has not had as much sputum.  Has been seen since by pulmonology.    Plan of care  "communicated with patient and family                 Review of Systems  Constitutional, HEENT, cardiovascular, pulmonary, GI, , musculoskeletal, neuro, skin, endocrine and psych systems are negative, except as otherwise noted.      Objective    /70   Pulse 64   Temp 97.6  F (36.4  C) (Temporal)   Resp 24   Ht 1.791 m (5' 10.5\")   Wt 79.8 kg (176 lb)   SpO2 96%   BMI 24.90 kg/m    Body mass index is 24.9 kg/m .  Physical Exam   GENERAL: alert and no distress  EYES: Eyes grossly normal to inspection, PERRL and conjunctivae and sclerae normal  HENT: ear canals and TM's normal, nose and mouth without ulcers or lesions  NECK: no adenopathy, no asymmetry, masses, or scars  RESP: lungs clear to auscultation - no rales, rhonchi or wheezes  CV: regular rate and rhythm, normal S1 S2,  no murmur, click or rub, no peripheral edema  ABDOMEN: soft, nontender, no hepatosplenomegaly, no masses and bowel sounds normal  MS: no gross musculoskeletal defects noted, no edema  SKIN: no suspicious lesions or rashes  NEURO: Normal strength and tone, mentation intact and speech normal  PSYCH: mentation appears normal, affect normal/bright          Signed Electronically by: Neville Degroot MD    "

## 2024-02-26 ENCOUNTER — TRANSFERRED RECORDS (OUTPATIENT)
Dept: HEALTH INFORMATION MANAGEMENT | Facility: CLINIC | Age: 79
End: 2024-02-26

## 2024-02-27 ENCOUNTER — OFFICE VISIT (OUTPATIENT)
Dept: FAMILY MEDICINE | Facility: CLINIC | Age: 79
End: 2024-02-27
Payer: COMMERCIAL

## 2024-02-27 ENCOUNTER — TELEPHONE (OUTPATIENT)
Dept: FAMILY MEDICINE | Facility: CLINIC | Age: 79
End: 2024-02-27

## 2024-02-27 ENCOUNTER — HOSPITAL ENCOUNTER (OUTPATIENT)
Dept: GENERAL RADIOLOGY | Facility: CLINIC | Age: 79
Discharge: HOME | End: 2024-02-27
Attending: STUDENT IN AN ORGANIZED HEALTH CARE EDUCATION/TRAINING PROGRAM | Admitting: STUDENT IN AN ORGANIZED HEALTH CARE EDUCATION/TRAINING PROGRAM
Payer: COMMERCIAL

## 2024-02-27 VITALS
WEIGHT: 180 LBS | BODY MASS INDEX: 25.2 KG/M2 | DIASTOLIC BLOOD PRESSURE: 72 MMHG | HEIGHT: 71 IN | HEART RATE: 72 BPM | RESPIRATION RATE: 36 BRPM | TEMPERATURE: 98 F | OXYGEN SATURATION: 92 % | SYSTOLIC BLOOD PRESSURE: 128 MMHG

## 2024-02-27 DIAGNOSIS — R06.00 DYSPNEA, UNSPECIFIED TYPE: ICD-10-CM

## 2024-02-27 DIAGNOSIS — I25.10 CORONARY ARTERY DISEASE INVOLVING NATIVE CORONARY ARTERY OF NATIVE HEART WITHOUT ANGINA PECTORIS: ICD-10-CM

## 2024-02-27 DIAGNOSIS — E78.5 HYPERLIPIDEMIA LDL GOAL <130: ICD-10-CM

## 2024-02-27 DIAGNOSIS — I10 HYPERTENSION GOAL BP (BLOOD PRESSURE) < 140/90: ICD-10-CM

## 2024-02-27 DIAGNOSIS — R31.0 GROSS HEMATURIA: ICD-10-CM

## 2024-02-27 DIAGNOSIS — I44.2 THIRD DEGREE HEART BLOCK (H): ICD-10-CM

## 2024-02-27 DIAGNOSIS — N32.89 MASS OF URINARY BLADDER: ICD-10-CM

## 2024-02-27 DIAGNOSIS — J44.9 MODERATE COPD (CHRONIC OBSTRUCTIVE PULMONARY DISEASE) (H): ICD-10-CM

## 2024-02-27 DIAGNOSIS — J44.1 COPD EXACERBATION (H): Primary | ICD-10-CM

## 2024-02-27 LAB
ACANTHOCYTES BLD QL SMEAR: NORMAL
ALBUMIN SERPL BCG-MCNC: 3.8 G/DL (ref 3.5–5.2)
ALBUMIN UR-MCNC: 100 MG/DL
ALP SERPL-CCNC: 90 U/L (ref 40–150)
ALT SERPL W P-5'-P-CCNC: 16 U/L (ref 0–70)
ANION GAP SERPL CALCULATED.3IONS-SCNC: 7 MMOL/L (ref 7–15)
APPEARANCE UR: ABNORMAL
AST SERPL W P-5'-P-CCNC: 17 U/L (ref 0–45)
AUER BODIES BLD QL SMEAR: NORMAL
BACTERIA #/AREA URNS HPF: ABNORMAL /HPF
BASO STIPL BLD QL SMEAR: NORMAL
BILIRUB SERPL-MCNC: 0.6 MG/DL
BILIRUB UR QL STRIP: NEGATIVE
BITE CELLS BLD QL SMEAR: NORMAL
BLISTER CELLS BLD QL SMEAR: NORMAL
BUN SERPL-MCNC: 20.1 MG/DL (ref 8–23)
BURR CELLS BLD QL SMEAR: NORMAL
CALCIUM SERPL-MCNC: 9.2 MG/DL (ref 8.8–10.2)
CHLORIDE SERPL-SCNC: 102 MMOL/L (ref 98–107)
COLOR UR AUTO: ABNORMAL
CREAT SERPL-MCNC: 0.91 MG/DL (ref 0.67–1.17)
CRP SERPL-MCNC: 3.9 MG/L
DACRYOCYTES BLD QL SMEAR: NORMAL
DEPRECATED HCO3 PLAS-SCNC: 31 MMOL/L (ref 22–29)
EGFRCR SERPLBLD CKD-EPI 2021: 86 ML/MIN/1.73M2
ELLIPTOCYTES BLD QL SMEAR: NORMAL
ERYTHROCYTE [DISTWIDTH] IN BLOOD BY AUTOMATED COUNT: 19.9 % (ref 10–15)
FRAGMENTS BLD QL SMEAR: NORMAL
GIANT PLATELETS BLD QL SMEAR: NORMAL
GLUCOSE SERPL-MCNC: 96 MG/DL (ref 70–99)
GLUCOSE UR STRIP-MCNC: NEGATIVE MG/DL
HCT VFR BLD AUTO: 35.4 % (ref 40–53)
HGB BLD-MCNC: 10.6 G/DL (ref 13.3–17.7)
HGB C CRYSTALS: NORMAL
HGB UR QL STRIP: ABNORMAL
HOWELL-JOLLY BOD BLD QL SMEAR: NORMAL
KETONES UR STRIP-MCNC: NEGATIVE MG/DL
LACTATE SERPL-SCNC: 0.8 MMOL/L (ref 0.7–2)
LEUKOCYTE ESTERASE UR QL STRIP: ABNORMAL
MCH RBC QN AUTO: 18.9 PG (ref 26.5–33)
MCHC RBC AUTO-ENTMCNC: 29.9 G/DL (ref 31.5–36.5)
MCV RBC AUTO: 63 FL (ref 78–100)
MUCOUS THREADS #/AREA URNS LPF: PRESENT /LPF
NEUTS HYPERSEG BLD QL SMEAR: NORMAL
NITRATE UR QL: NEGATIVE
PATH REV: NORMAL
PH UR STRIP: 6 [PH] (ref 5–7)
PLAT MORPH BLD: NORMAL
PLATELET # BLD AUTO: 263 10E3/UL (ref 150–450)
POLYCHROMASIA BLD QL SMEAR: NORMAL
POTASSIUM SERPL-SCNC: 4.2 MMOL/L (ref 3.4–5.3)
PROT SERPL-MCNC: 6.7 G/DL (ref 6.4–8.3)
RBC # BLD AUTO: 5.61 10E6/UL (ref 4.4–5.9)
RBC AGGLUT BLD QL: NORMAL
RBC MORPH BLD: NORMAL
RBC URINE: >182 /HPF
ROULEAUX BLD QL SMEAR: NORMAL
SICKLE CELLS BLD QL SMEAR: NORMAL
SMUDGE CELLS BLD QL SMEAR: NORMAL
SODIUM SERPL-SCNC: 140 MMOL/L (ref 135–145)
SP GR UR STRIP: 1.02 (ref 1–1.03)
SPHEROCYTES BLD QL SMEAR: NORMAL
STOMATOCYTES BLD QL SMEAR: NORMAL
TARGETS BLD QL SMEAR: NORMAL
TOXIC GRANULES BLD QL SMEAR: NORMAL
TROPONIN T SERPL HS-MCNC: 29 NG/L
UROBILINOGEN UR STRIP-MCNC: 2 MG/DL
VARIANT LYMPHS BLD QL SMEAR: NORMAL
WBC # BLD AUTO: 15.5 10E3/UL (ref 4–11)
WBC URINE: 181 /HPF

## 2024-02-27 PROCEDURE — 87086 URINE CULTURE/COLONY COUNT: CPT | Performed by: STUDENT IN AN ORGANIZED HEALTH CARE EDUCATION/TRAINING PROGRAM

## 2024-02-27 PROCEDURE — 93000 ELECTROCARDIOGRAM COMPLETE: CPT | Performed by: STUDENT IN AN ORGANIZED HEALTH CARE EDUCATION/TRAINING PROGRAM

## 2024-02-27 PROCEDURE — 84484 ASSAY OF TROPONIN QUANT: CPT | Performed by: STUDENT IN AN ORGANIZED HEALTH CARE EDUCATION/TRAINING PROGRAM

## 2024-02-27 PROCEDURE — 36415 COLL VENOUS BLD VENIPUNCTURE: CPT | Performed by: STUDENT IN AN ORGANIZED HEALTH CARE EDUCATION/TRAINING PROGRAM

## 2024-02-27 PROCEDURE — 85027 COMPLETE CBC AUTOMATED: CPT | Performed by: STUDENT IN AN ORGANIZED HEALTH CARE EDUCATION/TRAINING PROGRAM

## 2024-02-27 PROCEDURE — 86140 C-REACTIVE PROTEIN: CPT | Performed by: STUDENT IN AN ORGANIZED HEALTH CARE EDUCATION/TRAINING PROGRAM

## 2024-02-27 PROCEDURE — 83605 ASSAY OF LACTIC ACID: CPT | Performed by: STUDENT IN AN ORGANIZED HEALTH CARE EDUCATION/TRAINING PROGRAM

## 2024-02-27 PROCEDURE — 99214 OFFICE O/P EST MOD 30 MIN: CPT | Performed by: STUDENT IN AN ORGANIZED HEALTH CARE EDUCATION/TRAINING PROGRAM

## 2024-02-27 PROCEDURE — 80053 COMPREHEN METABOLIC PANEL: CPT | Performed by: STUDENT IN AN ORGANIZED HEALTH CARE EDUCATION/TRAINING PROGRAM

## 2024-02-27 PROCEDURE — 81001 URINALYSIS AUTO W/SCOPE: CPT | Performed by: STUDENT IN AN ORGANIZED HEALTH CARE EDUCATION/TRAINING PROGRAM

## 2024-02-27 PROCEDURE — 71046 X-RAY EXAM CHEST 2 VIEWS: CPT

## 2024-02-27 NOTE — TELEPHONE ENCOUNTER
Wife is calling stating the [facility] urology did not get patient's referral.    # [phone]- this is the number the patient has on her phone.  Fax# [phone]    Please call wife when she can make an appointment.    Mayi Lowery RN on 2/27/2024 at 2:36 PM

## 2024-02-27 NOTE — PROGRESS NOTES
Assessment & Plan   Problem List Items Addressed This Visit          Respiratory    Moderate COPD (chronic obstructive pulmonary disease) (H)    COPD exacerbation (H) - Primary       Endocrine    Hyperlipidemia LDL goal <130       Circulatory    Third degree heart block (H) - s/p pacemaker    Hypertension goal BP (blood pressure) < 140/90    Coronary artery disease involving native coronary artery of native heart without angina pectoris       Urinary    Mass of urinary bladder    Gross hematuria    Relevant Orders    UA Macroscopic with reflex to Microscopic and Culture - Lab Collect (Completed)    Urine Culture     Other Visit Diagnoses       Dyspnea, unspecified type        Relevant Orders    XR Chest 2 Views (Completed)    Comprehensive metabolic panel (BMP + Alb, Alk Phos, ALT, AST, Total. Bili, TP) (Completed)    CBC with platelets (Completed)    CRP, inflammation (Completed)    Lactic acid whole blood (Completed)    EKG 12-lead complete w/read - Clinics (Completed)    Troponin T, High Sensitivity (Completed)    RBC and Platelet Morphology (Completed)           Chest x-ray today improving with negative CRP.  Slight white blood cell count elevation like related to his prednisone.  No change in troponin and continued bundle-branch block noted.  Suspect ongoing symptoms related to his COPD and low concern for pneumonia worsening at this time.  He does have upcoming PET scan with suspected lung cancer and concern for bladder cancer given mass.  Call and schedule with urology today as they have not done so yet.  Number provided. Complete antibiotics and prednisone taper now.  Patient breathing comfortably on exam today.  If he has worsening respiratory symptoms or increased blood in the urine he should be seen in the ER.  Follow-up in 1 to 2 weeks.     MED REC REQUIRED  Post Medication Reconciliation Status:  Discharge medications reconciled and changed, see notes/orders        Mikaela   Khang is a 79 year old,  "presenting for the following health issues:  RECHECK        2/27/2024    10:53 AM   Additional Questions   Roomed by Rani HEATON     History of Present Illness       Reason for visit:  Pain in my left side of my body my back i cant cough or sneeze or blow my nose without great pain    He eats 0-1 servings of fruits and vegetables daily.He consumes 0 sweetened beverage(s) daily. He is missing 7 dose(s) of medications per week.     Patient continues to feel short of breath with cough but no increased production.  No fevers.  No new congestion.  Using DuoNebs and on prednisone taper now.  No chest pain or palpitations.  Most bothersome as his left-sided back pain with coughing spells but otherwise comfortable.  Did use oxycodone to help sleep and has several left.  Completing antibiotics now.  No new edema.        Review of Systems  Constitutional, HEENT, cardiovascular, pulmonary, GI, , musculoskeletal, neuro, skin, endocrine and psych systems are negative, except as otherwise noted.      Objective    /72   Pulse 72   Temp 98  F (36.7  C) (Temporal)   Resp (!) 36   Ht 1.791 m (5' 10.5\")   Wt 81.6 kg (180 lb)   SpO2 92%   BMI 25.46 kg/m    Body mass index is 25.46 kg/m .  Physical Exam   GENERAL: alert and no distress  EYES: Eyes grossly normal to inspection, PERRL and conjunctivae and sclerae normal  HENT: ear canals and TM's normal, nose and mouth without ulcers or lesions  NECK: no adenopathy, no asymmetry, masses, or scars  RESP: lungs clear to auscultation without obvious wheezing on exam today but distant lung sounds  CV: regular rate and rhythm, normal S1 S2, distant heart sounds ABDOMEN: soft, nontender, no hepatosplenomegaly, no masses and bowel sounds normal  MS: no gross musculoskeletal defects noted, no edema  SKIN: no suspicious lesions or rashes  NEURO: No focal deficits, mentation intact and speech normal  PSYCH: mentation appears normal, affect normal/bright            Signed " Electronically by: Neville Degroot MD

## 2024-02-28 LAB — BACTERIA UR CULT: NO GROWTH

## 2024-02-29 DIAGNOSIS — J44.9 CHRONIC OBSTRUCTIVE PULMONARY DISEASE, UNSPECIFIED COPD TYPE (H): ICD-10-CM

## 2024-02-29 RX ORDER — IPRATROPIUM BROMIDE AND ALBUTEROL SULFATE 2.5; .5 MG/3ML; MG/3ML
SOLUTION RESPIRATORY (INHALATION)
Qty: 180 ML | Refills: 2 | Status: ON HOLD | OUTPATIENT
Start: 2024-02-29 | End: 2024-05-01

## 2024-03-04 ENCOUNTER — TELEPHONE (OUTPATIENT)
Dept: FAMILY MEDICINE | Facility: CLINIC | Age: 79
End: 2024-03-04
Payer: COMMERCIAL

## 2024-03-04 DIAGNOSIS — M54.9 ACUTE BILATERAL BACK PAIN, UNSPECIFIED BACK LOCATION: ICD-10-CM

## 2024-03-04 NOTE — TELEPHONE ENCOUNTER
"Patient called in following up on if he is supposed to be getting pain medication, as he thought Dr. Degroot said he would be sending some after his last visit on 2/27/24. Writer notes there has not been any pain medications sent since before his appointment.     Patient states his pain is so bad it hurts if he coughs or sneezes and he needs to do something \"before he kills himself.\" Writer asked patient if he really meant that, and if he has thoughts of harming or killing himself, or if he has a plan to do so. Patient denied having thoughts of harming himself or killing himself, states he does not have a plan to do so, and said he should have never said that but that the pain is so severe at times so he just said it.     He requested writer send a message to Dr. Degroot for review. Advised patient Dr. Degroot is not in clinic today, but that I will send a message to him and the covering providers for review. Advised patient that if his pain is severe and he can't handle it, he should go to the emergency room.     Patient states he has 2 tablets of his 5 mg oxycodone prescription left from 2/20/24. He states he is going to take one of them now, and the other tablet in 6 hours if he still needs it.     Sonia Terrazas, BSN, RN          "

## 2024-03-05 RX ORDER — OXYCODONE HYDROCHLORIDE 5 MG/1
2.5-5 TABLET ORAL EVERY 6 HOURS PRN
Qty: 12 TABLET | Refills: 0 | Status: SHIPPED | OUTPATIENT
Start: 2024-03-05 | End: 2024-03-19

## 2024-03-16 ENCOUNTER — HOSPITAL ENCOUNTER (OUTPATIENT)
Dept: PET IMAGING | Facility: CLINIC | Age: 79
Discharge: HOME | End: 2024-03-16
Attending: PHYSICIAN ASSISTANT | Admitting: PHYSICIAN ASSISTANT
Payer: COMMERCIAL

## 2024-03-16 DIAGNOSIS — R91.8 PULMONARY NODULES: ICD-10-CM

## 2024-03-16 PROCEDURE — A9552 F18 FDG: HCPCS | Performed by: PHYSICIAN ASSISTANT

## 2024-03-16 PROCEDURE — 78816 PET IMAGE W/CT FULL BODY: CPT | Mod: PI

## 2024-03-16 PROCEDURE — 343N000001 HC RX 343: Performed by: PHYSICIAN ASSISTANT

## 2024-03-16 RX ADMIN — FLUDEOXYGLUCOSE F-18 13 MILLICURIE: 500 INJECTION, SOLUTION INTRAVENOUS at 13:01

## 2024-03-18 ENCOUNTER — NURSE TRIAGE (OUTPATIENT)
Dept: NURSING | Facility: CLINIC | Age: 79
End: 2024-03-18
Payer: COMMERCIAL

## 2024-03-19 ENCOUNTER — PATIENT OUTREACH (OUTPATIENT)
Dept: ONCOLOGY | Facility: CLINIC | Age: 79
End: 2024-03-19

## 2024-03-19 ENCOUNTER — OFFICE VISIT (OUTPATIENT)
Dept: FAMILY MEDICINE | Facility: CLINIC | Age: 79
End: 2024-03-19
Payer: COMMERCIAL

## 2024-03-19 VITALS
BODY MASS INDEX: 24.05 KG/M2 | HEIGHT: 70 IN | RESPIRATION RATE: 16 BRPM | WEIGHT: 168 LBS | HEART RATE: 82 BPM | TEMPERATURE: 97.7 F | OXYGEN SATURATION: 96 % | SYSTOLIC BLOOD PRESSURE: 132 MMHG | DIASTOLIC BLOOD PRESSURE: 86 MMHG

## 2024-03-19 DIAGNOSIS — R07.89 OTHER CHEST PAIN: ICD-10-CM

## 2024-03-19 DIAGNOSIS — R91.1 PULMONARY NODULE: Primary | ICD-10-CM

## 2024-03-19 DIAGNOSIS — M54.9 ACUTE BILATERAL BACK PAIN, UNSPECIFIED BACK LOCATION: ICD-10-CM

## 2024-03-19 PROCEDURE — 99214 OFFICE O/P EST MOD 30 MIN: CPT | Performed by: FAMILY MEDICINE

## 2024-03-19 RX ORDER — RESPIRATORY SYNCYTIAL VIRUS VACCINE 120MCG/0.5
0.5 KIT INTRAMUSCULAR ONCE
Qty: 1 EACH | Refills: 0 | Status: CANCELLED | OUTPATIENT
Start: 2024-03-19 | End: 2024-03-19

## 2024-03-19 RX ORDER — OXYCODONE HYDROCHLORIDE 5 MG/1
5-10 TABLET ORAL EVERY 6 HOURS PRN
Qty: 40 TABLET | Refills: 0 | Status: SHIPPED | OUTPATIENT
Start: 2024-03-19 | End: 2024-04-16

## 2024-03-19 ASSESSMENT — PAIN SCALES - GENERAL: PAINLEVEL: WORST PAIN (10)

## 2024-03-19 NOTE — TELEPHONE ENCOUNTER
"Pulmonary Nodule Conference - Friday, March 22nd, 2024      Patient Name: Khang Bailon    Reason for conference discussion (brief overview): 78 year old male with sig h/o NSTEMI s/p drug-eluding stent placement 11/2023, heart block s/p pacemaker (2012), CHF, CAD, HTN, HLD, COPD, CVA in 2016, nicotine use (55 pack year history, quit >15 years ago). Unable to do PFTs d/t chest pain.    CT chest PE 1/31/2024 with a lobulated nodule along the superior segment of the right lower lobe measuring 16 x 13mm increased in size from 1.2 x 0.7cm 8/2023. Faintly seen on CT 6/2021. He has multiple co-morbidities and recently had a drug eluding stent placed 11/2023. Therefore, Dr. Feliciano recommended a PET/CT (3/16/24) which demonstrated \"An irregular pulmonary nodule in the superior segment right lower lobe measures 1.3 x 1.5 cm, slightly larger than 01/31/2024 when it measured 1.2 x 1.5 cm and new since 06/27/2021, and demonstrates moderate focal uptake (SUVmax 6.3), suspicious for malignancy. No FDG avid adenopathy in the chest or elsewhere.\"     Specific Question:  Can IR bx RLL nodule?    Pertinent Histology:  n/a    Referring Physician: Gracy Martínez PA-C / Dr. Nic Feliciano    The patient's case was presented at the multidisciplinary conference for the above noted reason.  There was a consensus recommendation for the following actions:     Team consensus is for IR to biopsy the RLL nodule. Pending results will refer patient to SBRT. Pt is not a surgical candidate d/t lung issues +/- non compliance.       Case Lead:  Gracy Martínez PA-C / [facility]    Interventional Radiology Staff Present: MD Gracy Oliver will touch base with patient's cardiologist to determine hold parameters for asa + plavix and place IR referral after discussing with them. Will let patient know plan as well.   "

## 2024-03-19 NOTE — PROGRESS NOTES
Assessment & Plan       ICD-10-CM    1. Pulmonary nodule  R91.1 Adult Oncology/Hematology  Referral     oxyCODONE (ROXICODONE) 5 MG tablet      2. Other chest pain  R07.89 oxyCODONE (ROXICODONE) 5 MG tablet      3. Acute bilateral back pain, unspecified back location  M54.9 oxyCODONE (ROXICODONE) 5 MG tablet             For his urgent pain needs, I gave him a prescription for oxycodone 5 to 10 mg every 6 hours as needed for pain.  We discussed pain medication use, including the risks of sedation, respiratory suppression and death.  However his pain needs are great right now and I advised him that if his oral medication is not adequate for acute pain relief he should be brought to the emergency room and consider inpatient admission for more aggressive pain control and urgent workup of his suspected lung cancer.  He is going to try the oral medication first.  His wife asked about using a muscle relaxer but I do not suspect that is going to help him much in this case and potentially would add to his sedation risk.    Although he does have a cardiac history and has left sided chest pain, his pain is much more consistent with a musculoskeletal cause, is constant and progressive for a month, so I did not feel acute cardiac workup was indicated at this time.     I also placed an order for an oncology referral and we will try to get him in for urgent evaluation with possible biopsy.  He wants to be aggressive about his workup for this possible/probable lung cancer.    Will notify his primary MD of this condition for ongoing follow-up.  I offered to follow-up as needed in the future as well but encouraged him to stay with his primary.    Mikaela Quiñonez is a 79 year old, presenting for the following health issues:  Pain        3/19/2024     2:11 PM   Additional Questions   Roomed by Dorcas RAMEY   Accompanied by Wife-Brittany     Pain    History of Present Illness       Reason for visit:  Pain management, lower back and  chest, symptoms started after stents in his chest 1/31/24  Symptom onset:  More than a month  Symptom intensity:  Severe  Symptom progression:  Worsening  What makes it worse:  Coughing, sneezing        Khang presents with his wife today with a complaint of acute pain.  He states his whole back and his left chest hurt.  It hurts all the time, worse with touching and extremely worse with any coughing sneezing or movements.  He states that he thinks it is from his lung cancer.  He just had a PET scan 3 days ago and has not had a chance to review the results with his primary doctor but was told that it is probably lung cancer.  No further action has been taken in terms of referring him in for further workup but he wants to have a biopsy.  His wife states that he wants to live another 30 years.    He has COPD and heart disease history, has a pacemaker but doesn't feel like that's causing him any pain. The location of his pain is near where his pacemaker is but it is a new pain.  He has had his pacemaker for quite a while.  He denies feeling short of breath, and is using his inhaler as scheduled and his neb frequently as ordered.  He is taking all of his medications as prescribed.    Early March he was given a prescription for oxycodone 5 mg tablets and he says they did nothing.  He had to take 2 or 3 at a time to get any pain relief.  He was only given a few tablets and does not have any left.  Pain is getting worse.  He denies any recent fall or other injury.  No trauma.    He also has a history of diverticulitis but that is not an issue today.  It was noted on the PET scan and seems to be improving at that time.    There was noted to be some prostate enlargement on the PET scan as well and he states that he had a history of prostate enlargement for a lot of years but never been told he has prostate cancer and he is not dealing with any symptoms.    7 pt ROS is otherwise negative except as noted in HPI.       "  Objective    /86   Pulse 82   Temp 97.7  F (36.5  C) (Temporal)   Resp 16   Ht 1.778 m (5' 10\")   Wt 76.2 kg (168 lb)   SpO2 96%   BMI 24.11 kg/m    Body mass index is 24.11 kg/m .  Physical Exam   Vitals noted.  Patient alert, oriented, and in no acute distress.   He is very pleasant today but in obvious pain.  He cannot sit still, is rocking forward a little bit and moaning with the pain at times.  Neck:  Supple without lymphadenopathy, JVD or masses.   CV:  heart sounds quiet but sound RRR without murmur.   Lung sounds diminished but sound clear, no wheezing or rales heard.   No obvious chest wall or back trauma, lumps, or significant deformity other than slight scoliosis noted on exam. He is mildly tender with palpation of the back/ribs.     EXAM: PET ONCOLOGY WHOLE BODY  LOCATION: [facility]  DATE: 3/16/2024     INDICATION: Solitary pulmonary nodule. Pulmonary nodule, right lower lobe. Initial treatment strategy.  COMPARISON: CT chest abdomen pelvis 01/31/2024, CT chest 08/14/2023, CT chest 06/27/2021 reviewed.  TECHNIQUE: Serum glucose level 107 mg/dL. One hour post intravenous administration of 13.0 mCi F-18 FDG, PET imaging was performed from the skull vertex to feet, utilizing attenuation correction with concurrent axial CT and PET/CT image fusion. Dose   reduction techniques were used.     FINDINGS: An irregular pulmonary nodule in the superior segment right lower lobe measures 1.3 x 1.5 cm, slightly larger than 01/31/2024 when it measured 1.2 x 1.5 cm and new since 06/27/2021, and demonstrates moderate focal uptake (SUVmax 6.3),   suspicious for malignancy. No FDG avid adenopathy in the chest or elsewhere. No suspicious focal uptake in the liver or skeleton. Normal adrenal glands.     Marked prostate gland enlargement with some lobulated impression upon the bladder base. An ovoid focus of increased marked uptake involves the mid left lateral prostate gland " (axial fused image 281) estimated at 1.2 x 2.4 cm (SUVmax 15.2), indeterminate.     Moderate sigmoid colonic diverticulosis with some mild surrounding mural thickening and inflammatory changes with increased FDG activity, overall improved since 01/31/2024 suggesting improving diverticulitis.     Moderate senescent intracranial changes. Left chest cardiac pacemaker. Advanced emphysema. Mild bandlike scarring or atelectasis in the lower lungs. Benign calcified granuloma right lower lobe. Moderate atherosclerotic calcifications coronary arteries.   Cholelithiasis. Numerous benign simple renal cysts, no follow-up needed. Saccular infrarenal abdominal aortic aneurysm, which measures 3.3 x 4.2 cm. Right spermatic cord lipoma. Moderate scattered hypertrophic degenerative changes in the spine.                                                                      IMPRESSION:  1. An irregular FDG avid 1.3 x 1.5 cm pulmonary nodule in the right lower lobe is suspicious for malignancy, likely lung cancer. If desired, this is amenable to percutaneous CT-guided biopsy. No evidence of indira or distant metastasis.  2. Marked prostate gland enlargement. Ovoid focus of FDG activity in the left lateral mid prostate gland is indeterminate, could be inflammatory or represent underlying prostate neoplasm. Please correlate clinically. If desired, this could be further   evaluated with prostate MRI.  3. Improving acute diverticulitis mid sigmoid colon.        Signed Electronically by: Yen Holbrook MD

## 2024-03-19 NOTE — TELEPHONE ENCOUNTER
Nurse Triage SBAR    Situation: Calling back from missed call    Background: Significant Other, Brittany, calling. Consent in the chart. He has been getting oxycodone - not helping with his pain.    Assessment: Can't eat, can't sleep. Severe pain to his chest and back. Constant pain. He is not able to get out of bed due to the pain.     Protocol Recommended Disposition: Call 911    Recommendation: According to the protocol, Patient should Call 911. Advised Significant Other to Call 911. Care advice given. Significant Other verbalizes that she is not going to call 911 nor bring him into the ED.     Lyla Bull RN Nursing Advisor 3/18/2024 9:52 PM     Reason for Disposition   Sounds like a life-threatening emergency to the triager   Chest pain lasting longer than 5 minutes and ANY of the following:    history of heart disease  (i.e., heart attack, bypass surgery, angina, angioplasty, CHF; not just a heart murmur)    described as crushing, pressure-like, or heavy    age > 50    age > 30 AND at least one cardiac risk factor (i.e., hypertension, diabetes, obesity, smoker or strong family history of heart disease)    not relieved with nitroglycerin    Protocols used: Chest Pain-A-AH

## 2024-03-19 NOTE — PROGRESS NOTES
New Patient Oncology Nurse Navigator Note     Referring provider: Dr. Yen Holbrook     Referring Clinic/Organization: [facility]  Referred to: Medical Oncology, Thoracic Surgery, and Radiation Oncology  Requested provider (if applicable): First available - did not specify   Referral Received: 03/19/24       Evaluation for :   Diagnosis   R91.1 (ICD-10-CM) - Pulmonary nodule     My Clinical Question Is: possible lung cancer, eval/treatment     Clinical History (per Nurse review of records provided):      02/05/2024 OV note from Interventional Pulmonology (bookmarked)    03/16/2024 PET (bookmarked) showed:   IMPRESSION:  1. An irregular FDG avid 1.3 x 1.5 cm pulmonary nodule in the right lower lobe is suspicious for malignancy, likely lung cancer. If desired, this is amenable to percutaneous CT-guided biopsy. No evidence of indira or distant metastasis.  2. Marked prostate gland enlargement. Ovoid focus of FDG activity in the left lateral mid prostate gland is indeterminate, could be inflammatory or represent underlying prostate neoplasm. Please correlate clinically. If desired, this could be further   evaluated with prostate MRI.  3. Improving acute diverticulitis mid sigmoid colon.    Clinical Assessment / Barriers to Care (Per Nurse):  Tobacco History    Smoking Status  Former Quit Date  7/27/2012 Smoking Tobacco Type  Cigarettes quit in 7/27/2012   Khang is a 80 y/o male with a growing nodule in the RLL that is PET avid. He also has prostate enlargement and is scheduled with Dr. Michael (Urology) through [facility] on 3/21/24.     Records Location: [facility]   Records Needed: None  Additional testing needed prior to consult: TBD  Referral updates and Plan:     3/20: Writer called Khang to discuss the referral. There was no answer. Unable to leave a voicemail. Per chart review, pt is scheduled for discussion in nodule conference on Friday, 3/22. He is also scheduled with outside Urology team 3/21. Will  watch for recommendations from nodule conference on next steps.     Khang Jordan's spouse called writer back. She reports she would like the referrals sent to [place] because that is what Khang's daughter had recommended. She expressed unease not knowing what the next steps should be. Writer explained Dr. Feliciano's team will be discussing his case in nodule conference on Friday 3/22 to discuss what the best next step is. Nikki reported she was relieved to hear that and will wait for an update from Dr. Feliciano/Gracy on Friday.     She reports he is scheduled with Urology in [place] tomorrow to follow up on a bladder mass. All questions were answered. Writer will update Dr. Feliciano's RNCC.     BERNIE ChambersN, RN, OCN  [facility] Oncology Nurse Navigator  [phone] / [phone]

## 2024-03-21 PROBLEM — I50.20 SYSTOLIC CONGESTIVE HEART FAILURE (H): Status: ACTIVE | Noted: 2024-01-09

## 2024-03-21 PROBLEM — N40.0 BENIGN PROSTATIC HYPERPLASIA: Status: ACTIVE | Noted: 2021-11-01

## 2024-03-22 ENCOUNTER — DOCUMENTATION ONLY (OUTPATIENT)
Dept: INTERVENTIONAL RADIOLOGY/VASCULAR | Facility: CLINIC | Age: 79
End: 2024-03-22
Payer: COMMERCIAL

## 2024-03-22 ENCOUNTER — MYC MEDICAL ADVICE (OUTPATIENT)
Dept: PULMONOLOGY | Facility: CLINIC | Age: 79
End: 2024-03-22
Payer: COMMERCIAL

## 2024-03-22 ENCOUNTER — TEAM CONFERENCE (OUTPATIENT)
Dept: PULMONOLOGY | Facility: CLINIC | Age: 79
End: 2024-03-22
Payer: COMMERCIAL

## 2024-03-22 NOTE — TELEPHONE ENCOUNTER
I called the patient and his wife to review the recommendations as discussed in the nodule conference. We discussed undergoing a percutaneous biopsy with IR. His wife is hesitant about a biopsy and concerned about the potential for a pneumothorax. She decided they will go to [facility] for potential biopsy and treatment. I recommended she call [facility] to make an appt and we can send the records there. However, if they change their mind she will call or send me a [employer] message and at that time I will reach out to his cardiologist (Dr. Razo) to discuss anticoagulation recommendations, and then refer to IR for biopsy and consideration of cryotherapy with Dr. Monzon and Dr. Enrique.    Gracy Martínez PA-C  Interventional and General Pulmonology  Department of Pulmonary, Allergy, Critical Care and Sleep Medicine   [facility]

## 2024-03-22 NOTE — PROGRESS NOTES
History:  78 year old male with history of 55 pack year smoking history quit 15 years ago, NSTEMI s/p drug-eluding stent placement 11/2023, heart block s/p pacemaker (2012), CHF, CAD, HTN, HLD, COPD, CVA in 2016 now with new 1.2 x 0.7 cm nodule in the superior segment of the right lower lobe noted in CT chest 8/13/2023 and then on 1/16/2024 demonstrating interval increase in size.    Presented at Pulmonary nodule conference 03/22/24.  IR can biopsy (CT guided right lung nodule biopsy) however would be high risk for pneumothorax. See CT below series 4 image 120.  PET  image 120 or CT 1/31/2024  Meliza will speak to cardiology regarding ASA and Plavix hold and if family and team wants to proceed with biopsy, an IR referral will be placed.    A PA clinic visit will be offered to discuss procedure, along with risks and benefits.      This patient may also be considered for cryoablation which can be discussed at pulmonary nodule clinic. [employer] message to Meliza Chow RN.    Addendum:  03/22/24 12:42 PM  Received message from Gracy Martínez PA-C who reviewed this with patient's wife and they are going to pursue care at [place].      Pertinent Imaging:          Rolanda Burkett PA-C  Interventional Radiology   IR on-call pager: [phone]

## 2024-03-26 ENCOUNTER — OFFICE VISIT (OUTPATIENT)
Dept: FAMILY MEDICINE | Facility: CLINIC | Age: 79
End: 2024-03-26
Payer: COMMERCIAL

## 2024-03-26 ENCOUNTER — TELEPHONE (OUTPATIENT)
Dept: FAMILY MEDICINE | Facility: CLINIC | Age: 79
End: 2024-03-26

## 2024-03-26 VITALS
DIASTOLIC BLOOD PRESSURE: 70 MMHG | HEART RATE: 90 BPM | RESPIRATION RATE: 24 BRPM | SYSTOLIC BLOOD PRESSURE: 128 MMHG | OXYGEN SATURATION: 94 % | TEMPERATURE: 98.1 F

## 2024-03-26 DIAGNOSIS — N99.114 POSTPROCEDURAL STRICTURE OF ANTERIOR URETHRA: ICD-10-CM

## 2024-03-26 DIAGNOSIS — N32.89 MASS OF URINARY BLADDER: ICD-10-CM

## 2024-03-26 DIAGNOSIS — Z01.818 PRE-OP EXAM: Primary | ICD-10-CM

## 2024-03-26 LAB
HGB BLD-MCNC: 12.1 G/DL (ref 13.3–17.7)
POTASSIUM SERPL-SCNC: 3.9 MMOL/L (ref 3.4–5.3)

## 2024-03-26 PROCEDURE — 99215 OFFICE O/P EST HI 40 MIN: CPT

## 2024-03-26 PROCEDURE — 36415 COLL VENOUS BLD VENIPUNCTURE: CPT

## 2024-03-26 PROCEDURE — 84132 ASSAY OF SERUM POTASSIUM: CPT

## 2024-03-26 PROCEDURE — 85018 HEMOGLOBIN: CPT

## 2024-03-26 ASSESSMENT — PAIN SCALES - GENERAL: PAINLEVEL: WORST PAIN (10)

## 2024-03-26 NOTE — TELEPHONE ENCOUNTER
----- Message from Marcy Rodriguez PA-C sent at 3/26/2024 12:17 PM CDT -----  Team - please call patient with results.    Due to cardiac risk, I recommend the patient continue his Plavix and aspirin as directed.  Patient states he was instructed to stop his Plavix and aspirin 5 days prior to the procedure.  I recommend that patient contact his surgical team and clarify    Your potassium is normal.

## 2024-03-26 NOTE — RESULT ENCOUNTER NOTE
Team - please call patient with results.    Due to cardiac risk, I recommend the patient continue his Plavix and aspirin as directed.  Patient states he was instructed to stop his Plavix and aspirin 5 days prior to the procedure.  I recommend that patient contact his surgical team and clarify    Your potassium is normal.

## 2024-03-26 NOTE — RESULT ENCOUNTER NOTE
Hello -    Here are my comments about the recent results.  Hemoglobin is low at 12.1.  Your hemoglobin has increased since your last check.    Please let us know if you have any questions or concerns.    Regards,  Marcy Rodriguez PA-C

## 2024-03-26 NOTE — TELEPHONE ENCOUNTER
Spoke with patient and wife and informed of note below. They will contact surgery team again with [facility] to clarify on those medications below.   Lyla Jacome MA

## 2024-03-26 NOTE — PATIENT INSTRUCTIONS
Preop completed today     Lab work     Medication instructions:  Stop taking all vitamins and supplements 1 week prior to the procedure  Continue taking inhalers as directed    Due to cardiac risk, I recommend the patient continue his Plavix and aspirin as directed.  Patient states he was instructed to stop his Plavix and aspirin 5 days prior to the procedure.  I recommend that patient contact his surgical team and clarify    The morning of your procedure you may take: Carvedilol, and Protonix    Hold all of your other medications the day of the procedure     Contact your surgical team with any questions or concerns     Do not eat after midnight the night prior to procedure.  You may take a small sip of water in the morning if needed.     Follow-up if you become ill or have a change in health status between now and your procedure     Follow-up with any questions or concerns     Follow-up with any new, worsening, or persistent symptoms     Go to the ER or call 911 in the case of an emergency     Instructed the patient to cancel surgery and reschedule if develops high fever or is vomiting 1-3 days before surgery. Preoperative History and Physical is good for 30 days. May need additional blood work--contact primary provider.    Other Pre-Op Orders for when to stop eating the night before surgery, time of surgery, where & when to check in, parking, and any other specific pre-operative orders come from the surgeon's office. You should hear from them at least one day before surgery if not sooner for these specific instructions.    STOP any types of over the counter blood thinning medications (such as, Motrin/ibuprofen, Aleve/naproxen, vitamin E, or fish oil) 1 week prior to surgery. Tylenol (or acetaminophen) is okay to take for pain if needed   Recommend no alcohol consumption at least 48 hours prior to surgery    Ureteroscopy: Before Your Procedure  What is a ureteroscopy?  A ureteroscopy is a type of procedure. Your  doctor may do it to remove kidney stones from one of your ureters. These are the tubes that carry urine from your kidneys to your bladder. Your doctor may also do it to help find the reason for a urinary infection or blood in your urine.  Your doctor puts a thin scope with very small tools in it into your urethra. This is the tube that carries urine from your bladder to the outside of your body. Then the doctor moves the scope through your urethra and bladder into your ureter.  The doctor may use a wire with a tiny basket on the end to take out a kidney stone. Or the doctor may use a laser to break up a stone. If this is done, the pieces will wash out of your body in your urine.  You may be awake during the procedure. Or you may have medicine to make you sleep. In either case, you will not feel pain.  How do you prepare for the procedure?  Procedures can be stressful. This information will help you understand what you can expect. And it will help you safely prepare for your procedure.  Preparing for the procedure    Be sure you have someone to take you home. Anesthesia and pain medicine will make it unsafe for you to drive or get home on your own.    Understand exactly what procedure is planned, along with the risks, benefits, and other options.     Tell your doctor ALL the medicines, vitamins, supplements, and herbal remedies you take. Some may increase the risk of problems during your procedure. Your doctor will tell you if you should stop taking any of them before the procedure and how soon to do it.     If you take a medicine that prevents blood clots, your doctor may tell you to stop taking it before your procedure. Or your doctor may tell you to keep taking it. (These medicines include aspirin and other blood thinners.) Make sure that you understand exactly what your doctor wants you to do.     Make sure your doctor and the hospital have a copy of your advance directive. If you don't have one, you may want to  prepare one. It lets others know your health care wishes. It's a good thing to have before any type of surgery or procedure.   What happens on the day of the procedure?   Follow the instructions exactly about when to stop eating and drinking. If you don't, your procedure may be canceled. If your doctor told you to take your medicines on the day of the procedure, take them with only a sip of water.     Take a bath or shower before you come in for your procedure. Do not apply lotions, perfumes, deodorants, or nail polish.     Take off all jewelry and piercings. And take out contact lenses, if you wear them.   At the hospital or surgery center   Bring a picture ID.     You may need to give a urine sample. This is to make sure you don't have an infection.     Before the procedure, a health professional will clean the area around your urethra. He or she will also put numbing gel inside your urethra.     You will be kept comfortable and safe by your anesthesia provider. The anesthesia may make you sleep. Or it may just numb the area being worked on.   When should you call your doctor?   You have questions or concerns.     You don't understand how to prepare for your procedure.     You become ill before the procedure (such as fever, flu, or a cold).     You need to reschedule or have changed your mind about having the procedure.     Patient understood and verbally consented to the treatment plan. Discussed symptoms that would warrant an urgent or emergent visit. All of the patients' questions were answered. Patient was instructed to contact the clinic if questions or concerns arise. Recommend follow up appointments if symptoms worsen or fail to improve. Recommend follow up as needed. Recommend ER in the case of an emergency.    Marcy Rodriguez PA-C    Please note: Voice recognition software may have been used in preparing this note, unintended word substitutions may be present.

## 2024-03-26 NOTE — PROGRESS NOTES
Preoperative Evaluation  [address] [address]  Phone: [phone]  Fax: [phone]  Primary Provider: Neville Degroot  Pre-op Performing Provider: KIMI COLON  Mar 26, 2024       Khang is a 79 year old, presenting for the following:  Pre-Op Exam        3/26/2024    10:46 AM   Additional Questions   Roomed by Lyla SAM   Accompanied by spouse     Surgical Information  Surgery/Procedure: cystoscopy, urethral dilation  Surgery Location: [facility]  Surgeon: Dr. Manriquez  Surgery Date: 3/29/2024  Time of Surgery: TBD  Where patient plans to recover: At home with family  Fax number for surgical facility: [phone]    Assessment & Plan     The proposed surgical procedure is considered INTERMEDIATE risk.    Pre-op exam  - Hemoglobin; Future  - Potassium; Future  - Hemoglobin  - Potassium    Postprocedural stricture of anterior urethra    Mass of urinary bladder      Possible Sleep Apnea: Has BINH and uses CPAP      Implanted Device   - Type of device: Pacemaker Patient advised to bring device information on day of surgery.      Risks and Recommendations  The patient has the following additional risks and recommendations for perioperative complications:   - No identified additional risk factors other than previously addressed    Antiplatelet or Anticoagulation Medication Instructions   - aspirin: Bleeding risk is low for this procedure and daily aspirin may be continued without modification.    - clopidrogel (Plavix), prasugrel (Effient), ticagrelor (Brilinta): Patient has a cardiac stent. Medication will NOT be stopped until cleared by cardiology.     Additional Medication Instructions    Medication instructions:  Stop taking all vitamins and supplements 1 week prior to the procedure  Continue taking inhalers as directed    Due to cardiac risk, I recommend the patient continue his Plavix and aspirin as  directed.  Patient states he was instructed to stop his Plavix and aspirin 5 days prior to the procedure.  I recommend that patient contact his surgical team and clarify    The morning of your procedure you may take: Carvedilol, and Protonix    Hold all of your other medications the day of the procedure    Recommendation  APPROVAL GIVEN to proceed with proposed procedure pending review of diagnostic evaluation.    Ordering of each unique test  I spent a total of 45 minutes on the day of the visit.   Time spent by me doing chart review, history and exam, documentation and further activities per the note    Patient instructions:  Preop completed today     Lab work     Medication instructions:  Stop taking all vitamins and supplements 1 week prior to the procedure  Continue taking inhalers as directed    Due to cardiac risk, I recommend the patient continue his Plavix and aspirin as directed.  Patient states he was instructed to stop his Plavix and aspirin 5 days prior to the procedure.  I recommend that patient contact his surgical team and clarify    The morning of your procedure you may take: Carvedilol, and Protonix    Hold all of your other medications the day of the procedure     Contact your surgical team with any questions or concerns     Do not eat after midnight the night prior to procedure.  You may take a small sip of water in the morning if needed.     Follow-up if you become ill or have a change in health status between now and your procedure     Follow-up with any questions or concerns     Follow-up with any new, worsening, or persistent symptoms     Go to the ER or call 911 in the case of an emergency     Instructed the patient to cancel surgery and reschedule if develops high fever or is vomiting 1-3 days before surgery. Preoperative History and Physical is good for 30 days. May need additional blood work--contact primary provider.    Other Pre-Op Orders for when to stop eating the night before  surgery, time of surgery, where & when to check in, parking, and any other specific pre-operative orders come from the surgeon's office. You should hear from them at least one day before surgery if not sooner for these specific instructions.    STOP any types of over the counter blood thinning medications (such as, Motrin/ibuprofen, Aleve/naproxen, vitamin E, or fish oil) 1 week prior to surgery. Tylenol (or acetaminophen) is okay to take for pain if needed   Recommend no alcohol consumption at least 48 hours prior to surgery    Ureteroscopy: Before Your Procedure  What is a ureteroscopy?  A ureteroscopy is a type of procedure. Your doctor may do it to remove kidney stones from one of your ureters. These are the tubes that carry urine from your kidneys to your bladder. Your doctor may also do it to help find the reason for a urinary infection or blood in your urine.  Your doctor puts a thin scope with very small tools in it into your urethra. This is the tube that carries urine from your bladder to the outside of your body. Then the doctor moves the scope through your urethra and bladder into your ureter.  The doctor may use a wire with a tiny basket on the end to take out a kidney stone. Or the doctor may use a laser to break up a stone. If this is done, the pieces will wash out of your body in your urine.  You may be awake during the procedure. Or you may have medicine to make you sleep. In either case, you will not feel pain.  How do you prepare for the procedure?  Procedures can be stressful. This information will help you understand what you can expect. And it will help you safely prepare for your procedure.  Preparing for the procedure    Be sure you have someone to take you home. Anesthesia and pain medicine will make it unsafe for you to drive or get home on your own.    Understand exactly what procedure is planned, along with the risks, benefits, and other options.     Tell your doctor ALL the medicines,  vitamins, supplements, and herbal remedies you take. Some may increase the risk of problems during your procedure. Your doctor will tell you if you should stop taking any of them before the procedure and how soon to do it.     If you take a medicine that prevents blood clots, your doctor may tell you to stop taking it before your procedure. Or your doctor may tell you to keep taking it. (These medicines include aspirin and other blood thinners.) Make sure that you understand exactly what your doctor wants you to do.     Make sure your doctor and the hospital have a copy of your advance directive. If you don't have one, you may want to prepare one. It lets others know your health care wishes. It's a good thing to have before any type of surgery or procedure.   What happens on the day of the procedure?   Follow the instructions exactly about when to stop eating and drinking. If you don't, your procedure may be canceled. If your doctor told you to take your medicines on the day of the procedure, take them with only a sip of water.     Take a bath or shower before you come in for your procedure. Do not apply lotions, perfumes, deodorants, or nail polish.     Take off all jewelry and piercings. And take out contact lenses, if you wear them.   At the hospital or surgery center   Bring a picture ID.     You may need to give a urine sample. This is to make sure you don't have an infection.     Before the procedure, a health professional will clean the area around your urethra. He or she will also put numbing gel inside your urethra.     You will be kept comfortable and safe by your anesthesia provider. The anesthesia may make you sleep. Or it may just numb the area being worked on.   When should you call your doctor?   You have questions or concerns.     You don't understand how to prepare for your procedure.     You become ill before the procedure (such as fever, flu, or a cold).     You need to reschedule or have changed  your mind about having the procedure.     Patient understood and verbally consented to the treatment plan. Discussed symptoms that would warrant an urgent or emergent visit. All of the patients' questions were answered. Patient was instructed to contact the clinic if questions or concerns arise. Recommend follow up appointments if symptoms worsen or fail to improve. Recommend follow up as needed. Recommend ER in the case of an emergency.    Marcy Rodriguez PA-C    Please note: Voice recognition software may have been used in preparing this note, unintended word substitutions may be present.        Subjective       HPI related to upcoming procedure:  cystoscopy, urethral dilation        3/26/2024    10:46 AM   Preop Questions   1. Have you ever had a heart attack or stroke? YES - had a pacemaker, history of MI, stents   2. Have you ever had surgery on your heart or blood vessels, such as a stent placement, a coronary artery bypass, or surgery on an artery in your head, neck, heart, or legs? YES - stenting   3. Do you have chest pain with activity? YES - gets chest pain with coughing   4. Do you have a history of  heart failure? No   5. Do you currently have a cold, bronchitis or symptoms of other infection? No   6. Do you have a cough, shortness of breath, or wheezing? No   7. Do you or anyone in your family have previous history of blood clots? No   8. Do you or does anyone in your family have a serious bleeding problem such as prolonged bleeding following surgeries or cuts? No   9. Have you ever had problems with anemia or been told to take iron pills? No   10. Have you had any abnormal blood loss such as black, tarry or bloody stools? No   11. Have you ever had a blood transfusion? No   12. Are you willing to have a blood transfusion if it is medically needed before, during, or after your surgery? Yes   13. Have you or any of your relatives ever had problems with anesthesia? No   14. Do you have sleep apnea,  excessive snoring or daytime drowsiness? YES - decreased sleep due to pain, usesCPAP   14a. Do you have a CPAP machine? Yes   15. Do you have any artifical heart valves or other implanted medical devices like a pacemaker, defibrillator, or continuous glucose monitor? YES - pacemaker   15a. What type of device do you have? Pacemaker   15b. Name of the clinic that manages your device:  [facility]   16. Do you have artificial joints? No   17. Are you allergic to latex? No       Health Care Directive  Patient has a Health Care Directive on file      Preoperative Review of    reviewed - controlled substances reflected in medication list.          Patient Active Problem List    Diagnosis Date Noted    Postprocedural stricture of anterior urethra 03/21/2024     Priority: Medium    Acute bilateral back pain, unspecified back location 03/19/2024     Priority: Medium    SIRS (systemic inflammatory response syndrome) (H) 02/01/2024     Priority: Medium    Calculus of gallbladder without cholecystitis without obstruction 02/01/2024     Priority: Medium    Infrarenal abdominal aortic aneurysm (AAA) without rupture (H24) 02/01/2024     Priority: Medium    Fistula of sigmoid colon 02/01/2024     Priority: Medium    Aspiration into airway, initial encounter 02/01/2024     Priority: Medium    Gross hematuria 02/01/2024     Priority: Medium    Aspirin-like platelet function defect (H) 02/01/2024     Priority: Medium    Diverticulitis 01/31/2024     Priority: Medium    Pulmonary nodule 01/31/2024     Priority: Medium    Mass of urinary bladder 01/31/2024     Priority: Medium    Abnormal urinalysis 01/31/2024     Priority: Medium    Other chest pain 01/31/2024     Priority: Medium    COPD exacerbation (H) 01/31/2024     Priority: Medium    Systolic congestive heart failure, unspecified HF chronicity (H) 01/09/2024     Priority: Medium    Systolic congestive heart failure (H) 01/09/2024     Priority: Medium    Coronary  artery disease involving native coronary artery of native heart without angina pectoris 12/05/2023     Priority: Medium    NSTEMI (non-ST elevated myocardial infarction) (H) 11/27/2023     Priority: Medium    COVID-19 08/13/2023     Priority: Medium    Benign prostatic hyperplasia, unspecified whether lower urinary tract symptoms present 11/01/2021     Priority: Medium    Benign prostatic hyperplasia 11/01/2021     Priority: Medium    Fatigue, unspecified type 11/29/2019     Priority: Medium    Memory loss 11/29/2019     Priority: Medium    Moderate COPD (chronic obstructive pulmonary disease) (H) 11/16/2017     Priority: Medium    Benign neoplasm of colon, unspecified part of colon 07/26/2016     Priority: Medium    Sigmoid diverticulitis 07/12/2016     Priority: Medium    History of CVA (cerebrovascular accident) - old left internal capsule lacunar infarct on CT 7/2016 07/12/2016     Priority: Medium    Multilevel degenerative disc disease 09/21/2015     Priority: Medium    Elevated prostate specific antigen (PSA) 09/11/2015     Priority: Medium    Health Care Home 01/24/2013     Priority: Medium     Racquel Ortiz RN-PHN  FPA / [facility]   [phone] b  DX V65.8 REPLACED WITH [id number] (04/08/2013)      Elevated PSA 08/27/2012     Priority: Medium    Loss of weight 08/27/2012     Priority: Medium    Third degree heart block (H) - s/p pacemaker 08/10/2012     Priority: Medium    Hypertension goal BP (blood pressure) < 140/90 08/10/2012     Priority: Medium    Advanced directives, counseling/discussion 07/27/2012     Priority: Medium     Discussed advance care planning with patient; however, patient declined at this time. 7/27/2012         Other thalassemia (H24) 07/27/2012     Priority: Medium     Diagnosis updated by automated process. Provider to review and confirm.      Hyperlipidemia LDL goal <130 07/27/2012     Priority: Medium    Heart block 07/27/2012     Priority:  Medium    Cardiac abnormality 07/27/2012     Priority: Medium    Complete heart block (H) 07/27/2012     Priority: Medium    Thalassemia 07/27/2012     Priority: Medium     Formatting of this note might be different from the original.   Formatting of this note might be different from the original.   Diagnosis updated by automated process. Provider to review and confirm.      Painful respiration 03/31/2004     Priority: Medium    Personal history of tobacco use, presenting hazards to health 03/31/2004     Priority: Medium      Past Medical History:   Diagnosis Date    Alpha thalassemia (H24)     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    pacemaker at [facility]    Diverticulitis     Injury, other and unspecified, elbow, forearm, and wrist 1970s    Broke right wrist     Past Surgical History:   Procedure Laterality Date    COLONOSCOPY      COLONOSCOPY  12/10/2012    CYSTOSCOPY, LITHOLAPAXY, COMBINED N/A 11/10/2021    Procedure: Cystoscopy, Bladder Stone Removal;  Surgeon: Guanaco Stewart MD;  Location: [facility] OR    IMPLANT PACEMAKER      LASER KTP TRANSURETHRAL RESECTION (TUR) PROSTATE N/A 11/10/2021    Procedure: TRANSURETHRAL RESECTION (TUR) PROSTATE, USING KTP LASER;  Surgeon: Guanaco Stewart MD;  Location: [facility] OR     Current Outpatient Medications   Medication Sig Dispense Refill    albuterol (PROAIR HFA/PROVENTIL HFA/VENTOLIN HFA) 108 (90 Base) MCG/ACT inhaler Inhale 2 puffs into the lungs every 6 hours as needed for shortness of breath, wheezing or cough      Ascorbic Acid (VITAMIN C PO) Take 1 tablet by mouth daily (Isotonix)      aspirin (ASA) 81 MG chewable tablet Take 81 mg by mouth      B Complex Vitamins (B COMPLEX PO) Take 1 tablet by mouth daily (Isotonix)      budeson-glycopyrrol-formoterol (BREZTRI AEROSPHERE) 160-9-4.8 MCG/ACT AERO inhaler Inhale 2 puffs into the lungs 2 times daily 10.7 g 4    calcium carbonate-vitamin D (CALTRATE) 600-10 MG-MCG per tablet Take 1 tablet by mouth daily      carvedilol (COREG) 25 MG tablet  "Take 12.5 mg by mouth 2 times daily (with meals)      Cholecalciferol (VITAMIN D-3 PO) Take 5,000 Units by mouth      clopidogrel (PLAVIX) 75 MG tablet Take 75 mg by mouth      guaiFENesin (MUCINEX) 600 MG 12 hr tablet Take 2 tablets (1,200 mg) by mouth 2 times daily 120 tablet 1    ipratropium - albuterol 0.5 mg/2.5 mg/3 mL (DUONEB) 0.5-2.5 (3) MG/3ML neb solution USE 1 VIAL IN NEBULIZER EVERY 6 HOURS AS NEEDED FOR SHORTNESS OF BREATH, WHEEZING OR COUGH 180 mL 2    losartan (COZAAR) 25 MG tablet Take 25 mg by mouth daily      order for DME Equipment being ordered: Nebulizer hose 1 Tube 11    oxyCODONE (ROXICODONE) 5 MG tablet Take 1-2 tablets (5-10 mg) by mouth every 6 hours as needed for moderate to severe pain 40 tablet 0    pantoprazole (PROTONIX) 40 MG EC tablet Take 1 tablet by mouth daily before breakfast      rosuvastatin (CRESTOR) 5 MG tablet Take 5 mg by mouth every morning         Allergies   Allergen Reactions    Flagyl [Metronidazole] GI Disturbance        Social History     Tobacco Use    Smoking status: Former     Years: 50     Types: Cigarettes     Quit date: 2012     Years since quittin.6    Smokeless tobacco: Never    Tobacco comments:     Quit in July   Substance Use Topics    Alcohol use: No     Alcohol/week: 0.0 standard drinks of alcohol     No family history on file.  History   Drug Use No         Review of Systems    Review of Systems  Constitutional, HEENT, cardiovascular, pulmonary, GI, , musculoskeletal, neuro, skin, endocrine and psych systems are negative, except as otherwise noted.    Objective    /70   Pulse 90   Temp 98.1  F (36.7  C) (Temporal)   Resp 24   SpO2 94%    Estimated body mass index is 24.11 kg/m  as calculated from the following:    Height as of 3/19/24: 1.778 m (5' 10\").    Weight as of 3/19/24: 76.2 kg (168 lb).  Physical Exam  GENERAL: alert and no distress, sitting in a wheelchair  EYES: Eyes grossly normal to inspection, PERRL and conjunctivae " and sclerae normal  HENT: ear canals and TM's normal, nose and mouth without ulcers or lesions  RESP: lungs clear to auscultation - no rales, rhonchi or wheezes  CV: regular rate and rhythm, normal S1 S2, no S3 or S4, no murmur, click or rub, no peripheral edema  MS: no gross musculoskeletal defects noted, no edema  SKIN: no suspicious lesions or rashes  NEURO: Normal strength and tone, mentation intact and speech normal  PSYCH: mentation appears normal, affect normal/bright    Recent Labs   Lab Test 02/27/24  1210 02/02/24  0645 02/01/24  0532 11/27/23  1628 08/14/23  0635   HGB 10.6* 10.5* 10.1*   < >  --      --  211   < >  --    INR  --   --   --   --  1.13    139 139   < > 141   POTASSIUM 4.2 3.9 3.8   < > 4.4   CR 0.91 1.08 1.01   < > 1.00    < > = values in this interval not displayed.        Diagnostics  Recent Results (from the past 720 hour(s))   Comprehensive metabolic panel (BMP + Alb, Alk Phos, ALT, AST, Total. Bili, TP)    Collection Time: 02/27/24 12:10 PM   Result Value Ref Range    Sodium 140 135 - 145 mmol/L    Potassium 4.2 3.4 - 5.3 mmol/L    Carbon Dioxide (CO2) 31 (H) 22 - 29 mmol/L    Anion Gap 7 7 - 15 mmol/L    Urea Nitrogen 20.1 8.0 - 23.0 mg/dL    Creatinine 0.91 0.67 - 1.17 mg/dL    GFR Estimate 86 >60 mL/min/1.73m2    Calcium 9.2 8.8 - 10.2 mg/dL    Chloride 102 98 - 107 mmol/L    Glucose 96 70 - 99 mg/dL    Alkaline Phosphatase 90 40 - 150 U/L    AST 17 0 - 45 U/L    ALT 16 0 - 70 U/L    Protein Total 6.7 6.4 - 8.3 g/dL    Albumin 3.8 3.5 - 5.2 g/dL    Bilirubin Total 0.6 <=1.2 mg/dL   CBC with platelets    Collection Time: 02/27/24 12:10 PM   Result Value Ref Range    WBC Count 15.5 (H) 4.0 - 11.0 10e3/uL    RBC Count 5.61 4.40 - 5.90 10e6/uL    Hemoglobin 10.6 (L) 13.3 - 17.7 g/dL    Hematocrit 35.4 (L) 40.0 - 53.0 %    MCV 63 (L) 78 - 100 fL    MCH 18.9 (L) 26.5 - 33.0 pg    MCHC 29.9 (L) 31.5 - 36.5 g/dL    RDW 19.9 (H) 10.0 - 15.0 %    Platelet Count 263 150 - 450  10e3/uL   CRP, inflammation    Collection Time: 02/27/24 12:10 PM   Result Value Ref Range    CRP Inflammation 3.90 <5.00 mg/L   Lactic acid whole blood    Collection Time: 02/27/24 12:10 PM   Result Value Ref Range    Lactic Acid 0.8 0.7 - 2.0 mmol/L   Troponin T, High Sensitivity    Collection Time: 02/27/24 12:10 PM   Result Value Ref Range    Troponin T, High Sensitivity 29 (H) <=22 ng/L   RBC and Platelet Morphology    Collection Time: 02/27/24 12:10 PM   Result Value Ref Range    RBC Morphology Confirmed RBC Indices     Platelet Assessment  Automated Count Confirmed. Platelet morphology is normal.     Automated Count Confirmed. Platelet morphology is normal.    Giant Platelets      Acanthocytes      Shawna Rods      Basophilic Stippling      Bite Cells      Blister Cells      Bernardino Cells      Elliptocytes      Hgb C Crystals      Desai-Jolly Bodies      Hypersegmented Neutrophils      Polychromasia      RBC agglutination      RBC Fragments      Reactive Lymphocytes      Rouleaux      Sickle Cells      Smudge Cells      Spherocytes      Stomatocytes      Target Cells      Teardrop Cells      Toxic Neutrophils      Pathologist Review Comments (Blood)     UA Macroscopic with reflex to Microscopic and Culture - Lab Collect    Collection Time: 02/27/24 12:25 PM    Specimen: Urine, NOS   Result Value Ref Range    Color Urine Red (A) Colorless, Straw, Light Yellow, Yellow    Appearance Urine Cloudy (A) Clear    Glucose Urine Negative Negative mg/dL    Bilirubin Urine Negative Negative    Ketones Urine Negative Negative mg/dL    Specific Gravity Urine 1.019 1.003 - 1.035    Blood Urine Large (A) Negative    pH Urine 6.0 5.0 - 7.0    Protein Albumin Urine 100 (A) Negative mg/dL    Urobilinogen Urine 2.0 Normal, 2.0 mg/dL    Nitrite Urine Negative Negative    Leukocyte Esterase Urine Small (A) Negative    Bacteria Urine Moderate (A) None Seen /HPF    Mucus Urine Present (A) None Seen /LPF    RBC Urine >182 (H) <=2 /HPF     WBC Urine 181 (H) <=5 /HPF   Urine Culture    Collection Time: 02/27/24 12:25 PM    Specimen: Urine, NOS   Result Value Ref Range    Culture No Growth    Hemoglobin    Collection Time: 03/26/24 11:29 AM   Result Value Ref Range    Hemoglobin 12.1 (L) 13.3 - 17.7 g/dL   Potassium    Collection Time: 03/26/24 11:29 AM   Result Value Ref Range    Potassium 3.9 3.4 - 5.3 mmol/L      No EKG this visit, completed in the last 90 days.    Revised Cardiac Risk Index (RCRI)  The patient has the following serious cardiovascular risks for perioperative complications:   - Coronary Artery Disease (MI, positive stress test, angina, Qs on EKG) = 1 point   - Congestive Heart Failure (pulmonary edema, PND, s3 mor, CXR with pulmonary congestion, basilar rales) = 1 point     RCRI Interpretation: 2 points: Class III (moderate risk - 6.6% complication rate)     Estimated Functional Capacity: CANNOT perform 4 METS without symptoms           Signed Electronically by: Marcy Rodriguez PA-C  Copy of this evaluation report is provided to requesting physician.

## 2024-03-29 ENCOUNTER — TELEPHONE (OUTPATIENT)
Dept: NURSING | Facility: CLINIC | Age: 79
End: 2024-03-29
Payer: COMMERCIAL

## 2024-03-30 NOTE — TELEPHONE ENCOUNTER
Patient's spouse Brittany calling reporting the patient has a catheter bag following surgery and is unable to drain the bag. Consent to communicate is on file. Reports they have not tried to call [facility] or [facility] who is managing patient's urology care. Advised if unable to drain the bag and the bag will overflow, will need to go to the ER now. Brittany to discuss with patient.     Ruth Currie RN 03/29/24 10:58 PM    Health Triage Nurse Advisor

## 2024-04-03 ENCOUNTER — HOSPITAL ENCOUNTER (EMERGENCY)
Facility: CLINIC | Age: 79
Discharge: OTHER INSTITUTION | End: 2024-04-03
Attending: EMERGENCY MEDICINE | Admitting: EMERGENCY MEDICINE
Payer: COMMERCIAL

## 2024-04-03 ENCOUNTER — APPOINTMENT (OUTPATIENT)
Dept: CT IMAGING | Facility: CLINIC | Age: 79
End: 2024-04-03
Attending: EMERGENCY MEDICINE
Payer: COMMERCIAL

## 2024-04-03 ENCOUNTER — APPOINTMENT (OUTPATIENT)
Dept: GENERAL RADIOLOGY | Facility: CLINIC | Age: 79
End: 2024-04-03
Attending: EMERGENCY MEDICINE
Payer: COMMERCIAL

## 2024-04-03 VITALS
RESPIRATION RATE: 15 BRPM | HEART RATE: 68 BPM | TEMPERATURE: 97.8 F | HEIGHT: 69 IN | WEIGHT: 164.8 LBS | DIASTOLIC BLOOD PRESSURE: 84 MMHG | OXYGEN SATURATION: 94 % | SYSTOLIC BLOOD PRESSURE: 126 MMHG | BODY MASS INDEX: 24.41 KG/M2

## 2024-04-03 DIAGNOSIS — S72.001A CLOSED DISPLACED FRACTURE OF RIGHT FEMORAL NECK (H): ICD-10-CM

## 2024-04-03 DIAGNOSIS — S22.41XA CLOSED FRACTURE OF MULTIPLE RIBS OF RIGHT SIDE, INITIAL ENCOUNTER: ICD-10-CM

## 2024-04-03 DIAGNOSIS — K63.89 COLONIC MASS: ICD-10-CM

## 2024-04-03 LAB
ACANTHOCYTES BLD QL SMEAR: ABNORMAL
ALBUMIN SERPL BCG-MCNC: 2.9 G/DL (ref 3.5–5.2)
ALP SERPL-CCNC: 73 U/L (ref 40–150)
ALT SERPL W P-5'-P-CCNC: 13 U/L (ref 0–70)
ANION GAP SERPL CALCULATED.3IONS-SCNC: 10 MMOL/L (ref 7–15)
AST SERPL W P-5'-P-CCNC: 26 U/L (ref 0–45)
AUER BODIES BLD QL SMEAR: ABNORMAL
BASO STIPL BLD QL SMEAR: ABNORMAL
BASOPHILS # BLD AUTO: 0.1 10E3/UL (ref 0–0.2)
BASOPHILS NFR BLD AUTO: 0 %
BILIRUB SERPL-MCNC: 0.6 MG/DL
BITE CELLS BLD QL SMEAR: ABNORMAL
BLISTER CELLS BLD QL SMEAR: ABNORMAL
BUN SERPL-MCNC: 12.6 MG/DL (ref 8–23)
BURR CELLS BLD QL SMEAR: ABNORMAL
CALCIUM SERPL-MCNC: 8.9 MG/DL (ref 8.8–10.2)
CHLORIDE SERPL-SCNC: 99 MMOL/L (ref 98–107)
CREAT SERPL-MCNC: 0.95 MG/DL (ref 0.67–1.17)
DACRYOCYTES BLD QL SMEAR: ABNORMAL
DEPRECATED HCO3 PLAS-SCNC: 25 MMOL/L (ref 22–29)
EGFRCR SERPLBLD CKD-EPI 2021: 81 ML/MIN/1.73M2
ELLIPTOCYTES BLD QL SMEAR: ABNORMAL
EOSINOPHIL # BLD AUTO: 0.1 10E3/UL (ref 0–0.7)
EOSINOPHIL NFR BLD AUTO: 1 %
ERYTHROCYTE [DISTWIDTH] IN BLOOD BY AUTOMATED COUNT: 18 % (ref 10–15)
FRAGMENTS BLD QL SMEAR: ABNORMAL
GIANT PLATELETS BLD QL SMEAR: ABNORMAL
GLUCOSE SERPL-MCNC: 124 MG/DL (ref 70–99)
HCT VFR BLD AUTO: 32.3 % (ref 40–53)
HGB BLD-MCNC: 9.9 G/DL (ref 13.3–17.7)
HGB C CRYSTALS: ABNORMAL
HOLD SPECIMEN: NORMAL
HOWELL-JOLLY BOD BLD QL SMEAR: ABNORMAL
IMM GRANULOCYTES # BLD: 0.2 10E3/UL
IMM GRANULOCYTES NFR BLD: 2 %
LYMPHOCYTES # BLD AUTO: 1.2 10E3/UL (ref 0.8–5.3)
LYMPHOCYTES NFR BLD AUTO: 9 %
MCH RBC QN AUTO: 18.9 PG (ref 26.5–33)
MCHC RBC AUTO-ENTMCNC: 30.7 G/DL (ref 31.5–36.5)
MCV RBC AUTO: 62 FL (ref 78–100)
MONOCYTES # BLD AUTO: 0.9 10E3/UL (ref 0–1.3)
MONOCYTES NFR BLD AUTO: 7 %
NEUTROPHILS # BLD AUTO: 10.1 10E3/UL (ref 1.6–8.3)
NEUTROPHILS NFR BLD AUTO: 81 %
NEUTS HYPERSEG BLD QL SMEAR: ABNORMAL
NRBC # BLD AUTO: 0 10E3/UL
NRBC BLD AUTO-RTO: 0 /100
PATH REV: ABNORMAL
PLAT MORPH BLD: ABNORMAL
PLATELET # BLD AUTO: 301 10E3/UL (ref 150–450)
POLYCHROMASIA BLD QL SMEAR: ABNORMAL
POTASSIUM SERPL-SCNC: 3.7 MMOL/L (ref 3.4–5.3)
PROT SERPL-MCNC: 6 G/DL (ref 6.4–8.3)
RBC # BLD AUTO: 5.23 10E6/UL (ref 4.4–5.9)
RBC AGGLUT BLD QL: ABNORMAL
RBC MORPH BLD: ABNORMAL
ROULEAUX BLD QL SMEAR: ABNORMAL
SICKLE CELLS BLD QL SMEAR: ABNORMAL
SMUDGE CELLS BLD QL SMEAR: ABNORMAL
SODIUM SERPL-SCNC: 134 MMOL/L (ref 135–145)
SPHEROCYTES BLD QL SMEAR: ABNORMAL
STOMATOCYTES BLD QL SMEAR: ABNORMAL
TARGETS BLD QL SMEAR: ABNORMAL
TOXIC GRANULES BLD QL SMEAR: ABNORMAL
VARIANT LYMPHS BLD QL SMEAR: ABNORMAL
WBC # BLD AUTO: 12.5 10E3/UL (ref 4–11)

## 2024-04-03 PROCEDURE — 73502 X-RAY EXAM HIP UNI 2-3 VIEWS: CPT

## 2024-04-03 PROCEDURE — 99285 EMERGENCY DEPT VISIT HI MDM: CPT | Mod: 25 | Performed by: EMERGENCY MEDICINE

## 2024-04-03 PROCEDURE — 71250 CT THORAX DX C-: CPT

## 2024-04-03 PROCEDURE — 93010 ELECTROCARDIOGRAM REPORT: CPT | Performed by: EMERGENCY MEDICINE

## 2024-04-03 PROCEDURE — 99285 EMERGENCY DEPT VISIT HI MDM: CPT | Performed by: EMERGENCY MEDICINE

## 2024-04-03 PROCEDURE — 82374 ASSAY BLOOD CARBON DIOXIDE: CPT | Performed by: EMERGENCY MEDICINE

## 2024-04-03 PROCEDURE — 85025 COMPLETE CBC W/AUTO DIFF WBC: CPT | Performed by: EMERGENCY MEDICINE

## 2024-04-03 PROCEDURE — 96376 TX/PRO/DX INJ SAME DRUG ADON: CPT | Performed by: EMERGENCY MEDICINE

## 2024-04-03 PROCEDURE — 93005 ELECTROCARDIOGRAM TRACING: CPT | Performed by: EMERGENCY MEDICINE

## 2024-04-03 PROCEDURE — 250N000011 HC RX IP 250 OP 636: Performed by: EMERGENCY MEDICINE

## 2024-04-03 PROCEDURE — 96374 THER/PROPH/DIAG INJ IV PUSH: CPT | Performed by: EMERGENCY MEDICINE

## 2024-04-03 PROCEDURE — 36415 COLL VENOUS BLD VENIPUNCTURE: CPT | Performed by: EMERGENCY MEDICINE

## 2024-04-03 RX ORDER — OXYBUTYNIN CHLORIDE 5 MG/1
5 TABLET ORAL 3 TIMES DAILY PRN
COMMUNITY
Start: 2024-03-29 | End: 2024-05-15

## 2024-04-03 RX ORDER — HYDROMORPHONE HCL IN WATER/PF 6 MG/30 ML
0.2 PATIENT CONTROLLED ANALGESIA SYRINGE INTRAVENOUS EVERY 30 MIN PRN
Status: COMPLETED | OUTPATIENT
Start: 2024-04-03 | End: 2024-04-03

## 2024-04-03 RX ORDER — HYDROMORPHONE HYDROCHLORIDE 1 MG/ML
0.5 INJECTION, SOLUTION INTRAMUSCULAR; INTRAVENOUS; SUBCUTANEOUS EVERY 30 MIN PRN
Status: DISCONTINUED | OUTPATIENT
Start: 2024-04-03 | End: 2024-04-03 | Stop reason: HOSPADM

## 2024-04-03 RX ADMIN — HYDROMORPHONE HYDROCHLORIDE 0.5 MG: 1 INJECTION, SOLUTION INTRAMUSCULAR; INTRAVENOUS; SUBCUTANEOUS at 16:32

## 2024-04-03 RX ADMIN — HYDROMORPHONE HYDROCHLORIDE 0.2 MG: 0.2 INJECTION, SOLUTION INTRAMUSCULAR; INTRAVENOUS; SUBCUTANEOUS at 16:02

## 2024-04-03 RX ADMIN — HYDROMORPHONE HYDROCHLORIDE 0.2 MG: 0.2 INJECTION, SOLUTION INTRAMUSCULAR; INTRAVENOUS; SUBCUTANEOUS at 14:50

## 2024-04-03 RX ADMIN — HYDROMORPHONE HYDROCHLORIDE 0.2 MG: 0.2 INJECTION, SOLUTION INTRAMUSCULAR; INTRAVENOUS; SUBCUTANEOUS at 13:38

## 2024-04-03 ASSESSMENT — ACTIVITIES OF DAILY LIVING (ADL)
ADLS_ACUITY_SCORE: 38

## 2024-04-03 ASSESSMENT — COLUMBIA-SUICIDE SEVERITY RATING SCALE - C-SSRS
2. HAVE YOU ACTUALLY HAD ANY THOUGHTS OF KILLING YOURSELF IN THE PAST MONTH?: NO
6. HAVE YOU EVER DONE ANYTHING, STARTED TO DO ANYTHING, OR PREPARED TO DO ANYTHING TO END YOUR LIFE?: NO
1. IN THE PAST MONTH, HAVE YOU WISHED YOU WERE DEAD OR WISHED YOU COULD GO TO SLEEP AND NOT WAKE UP?: NO

## 2024-04-03 NOTE — MEDICATION SCRIBE - ADMISSION MEDICATION HISTORY
Medication Scribe Admission Medication History    Admission medication history is complete. The information provided in this note is only as accurate as the sources available at the time of the update.    Information Source(s): Patient via in-person    Pertinent Information: Patient reports cannot recall all medications that he takes regularly. He does confirm those he knows for sure.     Changes made to PTA medication list:  Added: Oxybutynin 5 mg - added from reconcile lit, confirmed by family as having picked up   Deleted: Guaifenesin 600 mg - confirms not taking   Changed: None    Allergies reviewed with patient and updates made in EHR: yes    Medication History Completed By: POLLO VALERO 4/3/2024 4:15 PM    PTA Med List   Medication Sig Note Last Dose    albuterol (PROAIR HFA/PROVENTIL HFA/VENTOLIN HFA) 108 (90 Base) MCG/ACT inhaler Inhale 2 puffs into the lungs every 6 hours as needed for shortness of breath, wheezing or cough  4/3/2024 at am    Ascorbic Acid (VITAMIN C PO) Take 1 tablet by mouth daily (Isotonix)  4/3/2024 at am    aspirin (ASA) 81 MG chewable tablet Take 81 mg by mouth  Past Week at am    B Complex Vitamins (B COMPLEX PO) Take 1 tablet by mouth daily (Isotonix)  4/3/2024 at am    budeson-glycopyrrol-formoterol (BREZTRI AEROSPHERE) 160-9-4.8 MCG/ACT AERO inhaler Inhale 2 puffs into the lungs 2 times daily  4/2/2024 at hs    calcium carbonate-vitamin D (CALTRATE) 600-10 MG-MCG per tablet Take 1 tablet by mouth daily  4/3/2024 at am    carvedilol (COREG) 25 MG tablet Take 12.5 mg by mouth 2 times daily (with meals)  4/2/2024 at hs    Cholecalciferol (VITAMIN D-3 PO) Take 5,000 Units by mouth  Unknown at unknown    clopidogrel (PLAVIX) 75 MG tablet Take 75 mg by mouth  Past Week at am    ipratropium - albuterol 0.5 mg/2.5 mg/3 mL (DUONEB) 0.5-2.5 (3) MG/3ML neb solution USE 1 VIAL IN NEBULIZER EVERY 6 HOURS AS NEEDED FOR SHORTNESS OF BREATH, WHEEZING OR COUGH (Patient taking differently: 1 vial  every 6 hours as needed for shortness of breath, wheezing or cough)  4/3/2024 at am    losartan (COZAAR) 25 MG tablet Take 25 mg by mouth daily 4/3/2024: Unsure if still taking  Unknown at unknown    oxyBUTYnin (DITROPAN) 5 MG tablet Take 5 mg by mouth 3 times daily as needed for bladder spasms 4/3/2024: Picked up RX, has not started taking  not started at on hand    oxyCODONE (ROXICODONE) 5 MG tablet Take 1-2 tablets (5-10 mg) by mouth every 6 hours as needed for moderate to severe pain 4/3/2024: Took last one this past week  Past Week at out    pantoprazole (PROTONIX) 40 MG EC tablet Take 1 tablet by mouth daily before breakfast  Unknown at unknown    rosuvastatin (CRESTOR) 5 MG tablet Take 5 mg by mouth every morning  Unknown at unknown

## 2024-04-03 NOTE — ED PROVIDER NOTES
History     Chief Complaint   Patient presents with    Hip Pain     HPI  Khang Bailon is a 79 year old male who presents for evaluation of right hip pain after a fall.  Apparently he was out in the garage when he fell.  Patient is a poor historian.  He has a skin tear to the left elbow.  Paramedics report main injury appears to be right hip and leg is shortened.    Allergies:  Allergies   Allergen Reactions    Flagyl [Metronidazole] GI Disturbance       Problem List:    Patient Active Problem List    Diagnosis Date Noted    Postprocedural stricture of anterior urethra 03/21/2024     Priority: Medium    Acute bilateral back pain, unspecified back location 03/19/2024     Priority: Medium    SIRS (systemic inflammatory response syndrome) (H) 02/01/2024     Priority: Medium    Calculus of gallbladder without cholecystitis without obstruction 02/01/2024     Priority: Medium    Infrarenal abdominal aortic aneurysm (AAA) without rupture (H24) 02/01/2024     Priority: Medium    Fistula of sigmoid colon 02/01/2024     Priority: Medium    Aspiration into airway, initial encounter 02/01/2024     Priority: Medium    Gross hematuria 02/01/2024     Priority: Medium    Aspirin-like platelet function defect (H) 02/01/2024     Priority: Medium    Diverticulitis 01/31/2024     Priority: Medium    Pulmonary nodule 01/31/2024     Priority: Medium    Mass of urinary bladder 01/31/2024     Priority: Medium    Abnormal urinalysis 01/31/2024     Priority: Medium    Other chest pain 01/31/2024     Priority: Medium    COPD exacerbation (H) 01/31/2024     Priority: Medium    Systolic congestive heart failure, unspecified HF chronicity (H) 01/09/2024     Priority: Medium    Systolic congestive heart failure (H) 01/09/2024     Priority: Medium    Coronary artery disease involving native coronary artery of native heart without angina pectoris 12/05/2023     Priority: Medium    NSTEMI (non-ST elevated myocardial infarction) (H) 11/27/2023      Priority: Medium    COVID-19 08/13/2023     Priority: Medium    Benign prostatic hyperplasia, unspecified whether lower urinary tract symptoms present 11/01/2021     Priority: Medium    Benign prostatic hyperplasia 11/01/2021     Priority: Medium    Fatigue, unspecified type 11/29/2019     Priority: Medium    Memory loss 11/29/2019     Priority: Medium    Moderate COPD (chronic obstructive pulmonary disease) (H) 11/16/2017     Priority: Medium    Benign neoplasm of colon, unspecified part of colon 07/26/2016     Priority: Medium    Sigmoid diverticulitis 07/12/2016     Priority: Medium    History of CVA (cerebrovascular accident) - old left internal capsule lacunar infarct on CT 7/2016 07/12/2016     Priority: Medium    Multilevel degenerative disc disease 09/21/2015     Priority: Medium    Elevated prostate specific antigen (PSA) 09/11/2015     Priority: Medium    Health Care Home 01/24/2013     Priority: Medium     Racquel Ortiz RN-PHN  FPA / [facility]   [phone] b  DX V65.8 REPLACED WITH [facility] (04/08/2013)      Elevated PSA 08/27/2012     Priority: Medium    Loss of weight 08/27/2012     Priority: Medium    Third degree heart block (H) - s/p pacemaker 08/10/2012     Priority: Medium    Hypertension goal BP (blood pressure) < 140/90 08/10/2012     Priority: Medium    Advanced directives, counseling/discussion 07/27/2012     Priority: Medium     Discussed advance care planning with patient; however, patient declined at this time. 7/27/2012         Other thalassemia (H24) 07/27/2012     Priority: Medium     Diagnosis updated by automated process. Provider to review and confirm.      Hyperlipidemia LDL goal <130 07/27/2012     Priority: Medium    Heart block 07/27/2012     Priority: Medium    Cardiac abnormality 07/27/2012     Priority: Medium    Complete heart block (H) 07/27/2012     Priority: Medium    Thalassemia 07/27/2012     Priority: Medium     Formatting of  this note might be different from the original.   Formatting of this note might be different from the original.   Diagnosis updated by automated process. Provider to review and confirm.      Painful respiration 2004     Priority: Medium    Personal history of tobacco use, presenting hazards to health 2004     Priority: Medium        Past Medical History:    Past Medical History:   Diagnosis Date    Alpha thalassemia (H24)     AV block 2012    Diverticulitis     Injury, other and unspecified, elbow, forearm, and wrist 1970s       Past Surgical History:    Past Surgical History:   Procedure Laterality Date    COLONOSCOPY      COLONOSCOPY  12/10/2012    CYSTOSCOPY, LITHOLAPAXY, COMBINED N/A 11/10/2021    Procedure: Cystoscopy, Bladder Stone Removal;  Surgeon: Guanaco Stewart MD;  Location: [facility] OR    IMPLANT PACEMAKER      LASER KTP TRANSURETHRAL RESECTION (TUR) PROSTATE N/A 11/10/2021    Procedure: TRANSURETHRAL RESECTION (TUR) PROSTATE, USING KTP LASER;  Surgeon: Guanaco Stewart MD;  Location: [facility] OR       Family History:    No family history on file.    Social History:  Marital Status:   [2]  Social History     Tobacco Use    Smoking status: Former     Years: 50     Types: Cigarettes     Quit date: 2012     Years since quittin.6    Smokeless tobacco: Never    Tobacco comments:     Quit in July   Vaping Use    Vaping Use: Never used   Substance Use Topics    Alcohol use: No     Alcohol/week: 0.0 standard drinks of alcohol    Drug use: No        Medications:    albuterol (PROAIR HFA/PROVENTIL HFA/VENTOLIN HFA) 108 (90 Base) MCG/ACT inhaler  Ascorbic Acid (VITAMIN C PO)  aspirin (ASA) 81 MG chewable tablet  B Complex Vitamins (B COMPLEX PO)  budeson-glycopyrrol-formoterol (BREZTRI AEROSPHERE) 160-9-4.8 MCG/ACT AERO inhaler  calcium carbonate-vitamin D (CALTRATE) 600-10 MG-MCG per tablet  carvedilol (COREG) 25 MG tablet  Cholecalciferol (VITAMIN D-3 PO)  clopidogrel (PLAVIX) 75 MG  "tablet  guaiFENesin (MUCINEX) 600 MG 12 hr tablet  ipratropium - albuterol 0.5 mg/2.5 mg/3 mL (DUONEB) 0.5-2.5 (3) MG/3ML neb solution  losartan (COZAAR) 25 MG tablet  order for DME  oxyCODONE (ROXICODONE) 5 MG tablet  pantoprazole (PROTONIX) 40 MG EC tablet  rosuvastatin (CRESTOR) 5 MG tablet          Review of Systems   Unable to perform ROS: Mental status change   Patient and wife do endorse some ongoing right-sided chest discomfort  Physical Exam   BP: (!) 79/58  Pulse: 68  Temp: 97.8  F (36.6  C)  Resp: 15  Height: 175.3 cm (5' 9\")  Weight: 74.8 kg (164 lb 12.8 oz)  SpO2: 93 %      Physical Exam  Constitutional:       General: He is not in acute distress.     Appearance: He is not diaphoretic.   HENT:      Head: Atraumatic.   Eyes:      Pupils: Pupils are equal, round, and reactive to light.   Cardiovascular:      Rate and Rhythm: Regular rhythm.      Heart sounds: Normal heart sounds.   Pulmonary:      Effort: No respiratory distress.      Breath sounds: Normal breath sounds.   Chest:      Chest wall: Tenderness (right anterior) present.   Abdominal:      General: Bowel sounds are normal.      Palpations: Abdomen is soft.      Tenderness: There is no abdominal tenderness.   Musculoskeletal:      Cervical back: No tenderness.      Thoracic back: No tenderness.      Lumbar back: No tenderness.      Comments: Skin tear,  Superficial, over left elbow no obvious bony injury, elbow moves well.  Right hip foreshortened and painful attempt at range of motion.,   Skin:     Findings: No abrasion or laceration.   Neurological:      Mental Status: He is alert and oriented to person, place, and time.         ED Course        Procedures         EKG reveals left bundle branch block at 64 bpm.  No obvious consistent pacer spike noted.  This may represent a sinus rhythm interpreted by myself.    Results for orders placed or performed during the hospital encounter of 04/03/24 (from the past 24 hour(s))   CBC with platelets " differential    Narrative    The following orders were created for panel order CBC with platelets differential.  Procedure                               Abnormality         Status                     ---------                               -----------         ------                     CBC with platelets and d...[[id number]]  Abnormal            Final result               RBC and Platelet Morphology[[id number]]  Abnormal            Final result                 Please view results for these tests on the individual orders.   CBC with platelets and differential   Result Value Ref Range    WBC Count 12.5 (H) 4.0 - 11.0 10e3/uL    RBC Count 5.23 4.40 - 5.90 10e6/uL    Hemoglobin 9.9 (L) 13.3 - 17.7 g/dL    Hematocrit 32.3 (L) 40.0 - 53.0 %    MCV 62 (L) 78 - 100 fL    MCH 18.9 (L) 26.5 - 33.0 pg    MCHC 30.7 (L) 31.5 - 36.5 g/dL    RDW 18.0 (H) 10.0 - 15.0 %    Platelet Count 301 150 - 450 10e3/uL    % Neutrophils 81 %    % Lymphocytes 9 %    % Monocytes 7 %    % Eosinophils 1 %    % Basophils 0 %    % Immature Granulocytes 2 %    NRBCs per 100 WBC 0 <1 /100    Absolute Neutrophils 10.1 (H) 1.6 - 8.3 10e3/uL    Absolute Lymphocytes 1.2 0.8 - 5.3 10e3/uL    Absolute Monocytes 0.9 0.0 - 1.3 10e3/uL    Absolute Eosinophils 0.1 0.0 - 0.7 10e3/uL    Absolute Basophils 0.1 0.0 - 0.2 10e3/uL    Absolute Immature Granulocytes 0.2 <=0.4 10e3/uL    Absolute NRBCs 0.0 10e3/uL   Extra Tube    Narrative    The following orders were created for panel order Extra Tube.  Procedure                               Abnormality         Status                     ---------                               -----------         ------                     Extra Green Top (Lithium...[[id number]]                      Final result                 Please view results for these tests on the individual orders.   Extra Green Top (Lithium Heparin) Tube   Result Value Ref Range    Hold Specimen JI    RBC and Platelet Morphology   Result Value Ref Range    RBC  Morphology Confirmed RBC Indices     Platelet Assessment  Automated Count Confirmed. Platelet morphology is normal.     Automated Count Confirmed. Platelet morphology is normal.    Giant Platelets      Acanthocytes      Shawna Rods      Basophilic Stippling      Bite Cells      Blister Cells      Bethel Cells      Elliptocytes      Hgb C Crystals      Desai-Jolly Bodies      Hypersegmented Neutrophils      Polychromasia      RBC agglutination      RBC Fragments      Reactive Lymphocytes      Rouleaux      Sickle Cells      Smudge Cells      Spherocytes      Stomatocytes      Target Cells Moderate (A) None Seen    Teardrop Cells      Toxic Neutrophils      Pathologist Review Comments (Blood)     Comprehensive metabolic panel   Result Value Ref Range    Sodium 134 (L) 135 - 145 mmol/L    Potassium 3.7 3.4 - 5.3 mmol/L    Carbon Dioxide (CO2) 25 22 - 29 mmol/L    Anion Gap 10 7 - 15 mmol/L    Urea Nitrogen 12.6 8.0 - 23.0 mg/dL    Creatinine 0.95 0.67 - 1.17 mg/dL    GFR Estimate 81 >60 mL/min/1.73m2    Calcium 8.9 8.8 - 10.2 mg/dL    Chloride 99 98 - 107 mmol/L    Glucose 124 (H) 70 - 99 mg/dL    Alkaline Phosphatase 73 40 - 150 U/L    AST 26 0 - 45 U/L    ALT 13 0 - 70 U/L    Protein Total 6.0 (L) 6.4 - 8.3 g/dL    Albumin 2.9 (L) 3.5 - 5.2 g/dL    Bilirubin Total 0.6 <=1.2 mg/dL   XR Pelvis w Hip Right 1 View    Narrative    Examination:  XR PELVIS AND HIP RIGHT 1 VIEW    Date:  4/3/2024 12:16 PM     Clinical Information: Right hip pain after trauma.    Comparison: 1/31/2024.      Impression    Impression:    1.  Acute moderately displaced transverse fracture through the right  femoral neck, an oblique fracture involving the mid cervical portion  of the femoral neck. The femoral head articular surface remains  intact. The trochanters remain intact.    2.  No additional fracture in the pelvis or left hip.    3.  Mild degenerative arthritic changes in both hips, including  subchondral sclerosis and marginal osteophytes.  Joint spacing is  maintained.    4.  Moderate-severe degenerative disc and facet changes in the lumbar  spine.    5.  Aortoiliac atherosclerotic calcification.    6.  Additional calcifications projecting over the right upper abdomen,  reflecting known cholelithiasis.    ANA DILLON MD         SYSTEM ID:  [id number]   CT Chest Abdomen Pelvis w/o Contrast    Narrative    CT CHEST ABDOMEN PELVIS W/O CONTRAST 4/3/2024 1:58 PM    CLINICAL HISTORY: trauma, right chest tenderness    TECHNIQUE: CT scan of the chest, abdomen, and pelvis was performed  without IV contrast. Multiplanar reformats were obtained. Dose  reduction techniques were used.   CONTRAST: None.    COMPARISON: Same day/pelvis radiographs, PET CT 3/16/2024, CT chest,  abdomen and pelvis 1/31/2024    FINDINGS:   LUNGS AND PLEURA: Severe upper lobe predominant emphysema, not  significantly changed in comparison to prior CT. Stable size of  spiculated 1.3 cm right lower lobe nodule, as seen and characterized  on recent PET (series 4 image 123). Calcified right lower lobe nodule  is also unchanged. There is new scarring and/or atelectasis centered  in the right middle lobe. No definite pleural effusion or  pneumothorax.    MEDIASTINUM/AXILLAE: No suspicious lymphadenopathy. No evidence of  acute injury on this noncontrast exam. Pacemaker leads in the right  atrium and right ventricle. Trace pericardial fluid.    CORONARY ARTERY CALCIFICATION: Previous intervention (stents).    HEPATOBILIARY: No focal liver lesion on this noncontrast CT.  Cholelithiasis without evidence of acute cholecystitis or biliary  obstruction.    PANCREAS: Normal.    SPLEEN: Normal.    ADRENAL GLANDS: Normal.    KIDNEYS/BLADDER: Multiple bilateral renal cysts, no specific follow-up  recommended. No hydronephrosis. Urinary bladder demonstrates diffuse  wall thickening with some subtle adjacent fat stranding. Intraluminal  gas also noted in the bladder.    BOWEL: Extensive colonic  diverticulosis with continued decrease in  inflammatory changes adjacent to the sigmoid colon. Colocolonic  fistula again noted in the sigmoid colon without evidence of  obstruction or drainable fluid collection. No definite colovesicular  fistula visualized. There is a likely soft tissue mass near the  junction of the sigmoid colon and rectum which was FDG avid in  retrospect on prior exam (series 3 image 277).    LYMPH NODES: No suspicious lymphadenopathy. No ascites.    VASCULATURE: No significant change in size of 3.9 cm saccular  abdominal aortic aneurysm (series 3 image 26). No evidence of acute  vascular injury on this noncontrast exam.    PELVIC ORGANS: Prostatomegaly.    OTHER: No free fluid or pneumoperitoneum. Small fat-containing  periumbilical hernia without evidence of acute complication.    MUSCULOSKELETAL: Likely nondisplaced fractures of the right anterior  fifth and seventh ribs. Increased T6 and T7 compression deformities in  comparison to prior CT. There is also a displaced fracture of the  right femoral neck with varus angulation and foreshortening, as seen  on same day radiographs.      Impression    IMPRESSION:  1.  Likely nondisplaced fractures of the right anterior fifth and  seventh ribs. No pleural effusion or pneumothorax.  2.  Displaced right femoral neck fracture, as seen on same-day  radiographs.  3.  Increased T6 and T7 compression deformities comparison to prior  CT.  4.  Increased bladder wall thickening with some surrounding fat  stranding and intraluminal gas, could be seen in the setting of  urinary tract infection. No definite colovesicular fistula is  visualized.  5.  Continued decrease in inflammation adjacent to the sigmoid colon.  6.  Intraluminal soft tissue mass near the junction of the sigmoid  colon and rectum is suspicious for neoplasm, recommend direct  visualization with colonoscopy.  7.  Stable size of 1.3 cm right lower lobe pulmonary nodule as  described on recent  PET.    ENEIDA FRASER MD         SYSTEM ID:  [id number]       Medications   HYDROmorphone (DILAUDID) injection 0.2 mg (0.2 mg Intravenous $Given 4/3/24 7090)       Assessments & Plan (with Medical Decision Making)  79-year-old male who presents after a fall.  Right femoral neck fracture.  2 anterior right rib fractures as well.  Incidental finding of sigmoid mass.  I reviewed the case with orthopedic and hospitalist.  They felt he was more complicated and not appropriate for our facility due to his COPD, heart block, lung tumors and now new finding of sigmoid mass..  Patient requested transfer to [facility] where he is received quite a bit of his other care.  He is excepted in the emergency department by Dr. Dickerson.     I have reviewed the nursing notes.    I have reviewed the findings, diagnosis, plan and need for follow up with the patient.        New Prescriptions    No medications on file       Final diagnoses:   Closed displaced fracture of right femoral neck (H)   Closed fracture of multiple ribs of right side, initial encounter   Colonic mass       4/3/2024   [facility] EMERGENCY DEPT       Laureano Colorado MD  04/03/24 [id number]       Laureano Colorado MD  04/03/24 [id number]

## 2024-04-03 NOTE — ED TRIAGE NOTES
Was lying on a couch in blankets in the shop and felt too warm tried getting up and ended up falling onto right hip.  States no LOC but does not remember the fall, states he laid on the concrete for about 30 minutes before family heard him.  Right leg is slightly shortened.  Does have skin tear to left elbow arm, denies blood thinners

## 2024-04-15 ENCOUNTER — DOCUMENTATION ONLY (OUTPATIENT)
Dept: GERIATRICS | Facility: CLINIC | Age: 79
End: 2024-04-15
Payer: COMMERCIAL

## 2024-04-15 ENCOUNTER — MEDICAL CORRESPONDENCE (OUTPATIENT)
Dept: HEALTH INFORMATION MANAGEMENT | Facility: CLINIC | Age: 79
End: 2024-04-15
Payer: COMMERCIAL

## 2024-04-15 VITALS
OXYGEN SATURATION: 96 % | SYSTOLIC BLOOD PRESSURE: 128 MMHG | HEART RATE: 80 BPM | DIASTOLIC BLOOD PRESSURE: 78 MMHG | TEMPERATURE: 98 F | RESPIRATION RATE: 18 BRPM

## 2024-04-16 ENCOUNTER — LAB REQUISITION (OUTPATIENT)
Dept: LAB | Facility: CLINIC | Age: 79
End: 2024-04-16

## 2024-04-16 ENCOUNTER — DOCUMENTATION ONLY (OUTPATIENT)
Dept: OTHER | Facility: CLINIC | Age: 79
End: 2024-04-16

## 2024-04-16 ENCOUNTER — TRANSITIONAL CARE UNIT VISIT (OUTPATIENT)
Dept: GERIATRICS | Facility: CLINIC | Age: 79
End: 2024-04-16
Payer: COMMERCIAL

## 2024-04-16 DIAGNOSIS — I10 HYPERTENSION GOAL BP (BLOOD PRESSURE) < 140/90: ICD-10-CM

## 2024-04-16 DIAGNOSIS — I50.20 SYSTOLIC CONGESTIVE HEART FAILURE, UNSPECIFIED HF CHRONICITY (H): ICD-10-CM

## 2024-04-16 DIAGNOSIS — R52 PAIN: ICD-10-CM

## 2024-04-16 DIAGNOSIS — K59.03 DRUG-INDUCED CONSTIPATION: ICD-10-CM

## 2024-04-16 DIAGNOSIS — R91.1 PULMONARY NODULE: ICD-10-CM

## 2024-04-16 DIAGNOSIS — N32.89 MASS OF URINARY BLADDER: ICD-10-CM

## 2024-04-16 DIAGNOSIS — J44.9 MODERATE COPD (CHRONIC OBSTRUCTIVE PULMONARY DISEASE) (H): Primary | ICD-10-CM

## 2024-04-16 DIAGNOSIS — D64.9 ANEMIA, UNSPECIFIED: ICD-10-CM

## 2024-04-16 DIAGNOSIS — S72.001D CLOSED FRACTURE OF RIGHT HIP WITH ROUTINE HEALING: ICD-10-CM

## 2024-04-16 DIAGNOSIS — Z11.1 ENCOUNTER FOR SCREENING FOR RESPIRATORY TUBERCULOSIS: ICD-10-CM

## 2024-04-16 DIAGNOSIS — D62 ABLA (ACUTE BLOOD LOSS ANEMIA): ICD-10-CM

## 2024-04-16 DIAGNOSIS — I25.10 ATHEROSCLEROTIC HEART DISEASE OF NATIVE CORONARY ARTERY WITHOUT ANGINA PECTORIS: ICD-10-CM

## 2024-04-16 DIAGNOSIS — I71.43 INFRARENAL ABDOMINAL AORTIC ANEURYSM (AAA) WITHOUT RUPTURE (H): ICD-10-CM

## 2024-04-16 PROCEDURE — 99309 SBSQ NF CARE MODERATE MDM 30: CPT | Performed by: NURSE PRACTITIONER

## 2024-04-16 RX ORDER — HYDROMORPHONE HYDROCHLORIDE 2 MG/1
1 TABLET ORAL EVERY 4 HOURS PRN
Status: ON HOLD | COMMUNITY
End: 2024-05-02

## 2024-04-16 RX ORDER — FERROUS SULFATE 324(65)MG
1 TABLET, DELAYED RELEASE (ENTERIC COATED) ORAL DAILY
COMMUNITY
End: 2024-05-22

## 2024-04-16 RX ORDER — ACETAMINOPHEN 500 MG
1000 TABLET ORAL 3 TIMES DAILY
COMMUNITY
End: 2024-04-23

## 2024-04-16 NOTE — PROGRESS NOTES
[facility] GERIATRICS    PRIMARY CARE PROVIDER AND CLINIC:  Neville Degroot MD, [address] [facility]  / [place] [address]  Chief Complaint   Patient presents with    Hospital F/U      [place] Medical Record Number:  [id number]  Place of Service where encounter took place:  [facility] ([facility]) [[id number]]    Khang Bailon  is a 79 year old  (1945), admitted to the above facility from  [facility] in [facility]. Hospital stay 4/3/24 through 4/15/24..     HPI:    This is a 80 yo CAD, COPD, hypertension, infrarenal AAA, MI, pulmonary nodule presents to an outside facility for evaluation of traumatic injury sustained during a fall.  Found to have nondisplaced right-sided rib fractures and right femoral neck fracture.  Transferred to [facility] for operative management.  Underwent right hip hemiarthroplasty, weightbearing as tolerated and Tylenol and Dilaudid for pain control.  SOB requiring oxygen, CTA negative for PE, given course of antibiotics for possible pneumonia.  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  Discharged to [facility] for rehab.    CODE STATUS/ADVANCE DIRECTIVES DISCUSSION:  Prior  CPR/Full code   ALLERGIES:   Allergies   Allergen Reactions    Flagyl [Metronidazole] GI Disturbance      PAST MEDICAL HISTORY:   Past Medical History:   Diagnosis Date    Alpha thalassemia (H24)     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    pacemaker at [facility]    Diverticulitis     Injury, other and unspecified, elbow, forearm, and wrist 1970s    Broke right wrist      PAST SURGICAL HISTORY:   has a past surgical history that includes Implant pacemaker; colonoscopy; colonoscopy (12/10/2012); Laser KTP transurethral resection (TUR) prostate (N/A, 11/10/2021); and Cystoscopy, litholapaxy, combined (N/A,  11/10/2021).  FAMILY HISTORY: family history is not on file.  SOCIAL HISTORY:   reports that he quit smoking about 11 years ago. His smoking use included cigarettes. He started smoking about 61 years ago. He has never used smokeless tobacco. He reports that he does not drink alcohol and does not use drugs.  Patient's living condition: lives with spouse    Post Discharge Medication Reconciliation Status:   MED REC REQUIRED  Post Medication Reconciliation Status:  Discharge medications reconciled and changed, see notes/orders       Current Outpatient Medications   Medication Sig Dispense Refill    acetaminophen (TYLENOL) 500 MG tablet Take 1,000 mg by mouth 3 times daily      Ferrous Sulfate 324 (65 Fe) MG TBEC Take 1 tablet by mouth daily      HYDROmorphone (DILAUDID) 2 MG tablet Take 1 mg by mouth every 4 hours as needed for severe pain      albuterol (PROAIR HFA/PROVENTIL HFA/VENTOLIN HFA) 108 (90 Base) MCG/ACT inhaler Inhale 2 puffs into the lungs every 6 hours as needed for shortness of breath, wheezing or cough      Ascorbic Acid (VITAMIN C PO) Take 1 tablet by mouth daily (Isotonix)      aspirin (ASA) 81 MG chewable tablet Take 81 mg by mouth      B Complex Vitamins (B COMPLEX PO) Take 1 tablet by mouth daily (Isotonix)      budeson-glycopyrrol-formoterol (BREZTRI AEROSPHERE) 160-9-4.8 MCG/ACT AERO inhaler Inhale 2 puffs into the lungs 2 times daily 10.7 g 4    calcium carbonate-vitamin D (CALTRATE) 600-10 MG-MCG per tablet Take 1 tablet by mouth daily      Cholecalciferol (VITAMIN D-3 PO) Take 5,000 Units by mouth      ipratropium - albuterol 0.5 mg/2.5 mg/3 mL (DUONEB) 0.5-2.5 (3) MG/3ML neb solution USE 1 VIAL IN NEBULIZER EVERY 6 HOURS AS NEEDED FOR SHORTNESS OF BREATH, WHEEZING OR COUGH (Patient taking differently: 1 vial every 6 hours as needed for shortness of breath, wheezing or cough) 180 mL 2    order for DME Equipment being ordered: Nebulizer hose 1 Tube 11    oxyBUTYnin (DITROPAN) 5 MG tablet Take 5  mg by mouth 3 times daily as needed for bladder spasms      pantoprazole (PROTONIX) 40 MG EC tablet Take 1 tablet by mouth daily before breakfast      rosuvastatin (CRESTOR) 5 MG tablet Take 5 mg by mouth every morning       No current facility-administered medications for this visit.       ROS:  10 point ROS of systems including Constitutional, Eyes, Respiratory, Cardiovascular, Gastroenterology, Genitourinary, Integumentary, Musculoskeletal, Psychiatric were all negative except for pertinent positives noted in my HPI.    Vitals:  /78   Pulse 80   Temp 98  F (36.7  C)   Resp 18   SpO2 96%   Exam:  GENERAL APPEARANCE:  Alert, in no distress, thin, cooperative, denies pain  ENT:  Mouth and posterior oropharynx normal, moist mucous membranes, normal hearing acuity  NECK:  No adenopathy,masses or thyromegaly  RESP:  no respiratory distress, diminished breath sounds bilaterally with exp whz, 2L NC  CV:  regular rate and rhythm, no murmur, rub, or gallop, no edema, PPM  ABDOMEN:  normal bowel sounds, soft, nontender, no hepatosplenomegaly or other masses, has constipation  :    murrell patent, draining aravind urine  M/S:   Able to move all extremities  SKIN:  R hip drsg intact  NEURO:   No focal deficits  PSYCH:  oriented X 3    Lab/Diagnostic data as of 4/14:  Na 141  K 3.7  Cr 0.72  GFR >60  WBC 13.2  Hgb 8.0    ASSESSMENT/PLAN:    (S72.001D) Closed fracture of right hip with routine healing   Right-sided rib fractures (5 and 7)  Underwent surgical pair, given weightbearing as tolerated, continue vitamin C/D supplementation, follow-up with Ortho (TBD)    DVT prophylaxis  History of  CVA  CAD  Continue low-dose aspirin, Plavix and statin    (R52) Pain  Continue Tylenol 1000 mg 3 times daily, Dilaudid 1 mg every 4 hours as needed, encourage icing    (J44.9) Moderate COPD (chronic obstructive pulmonary disease) (H)    (R91.1) Pulmonary nodule  Treated for pneumonia, CTA negative, currently 2 L nasal cannula,  continue to wean.  Continue Breztri Aerosphere 160/9/4.8 mcg 2 puffs twice daily.  Today start DuoNebs 3 times daily x 3 days    (I10) Hypertension goal BP (blood pressure) < 140/90  (I50.20) Systolic congestive heart failure, unspecified HF chronicity (H)  (I71.43) Infrarenal abdominal aortic aneurysm (AAA) without rupture  CAD with recent JESSICA (11/2023)   EF 40-45%, not on diuretic, monitor blood pressure twice daily.  PTA carvedilol and losartan discontinued    (N32.89) Mass of urinary bladder  Hernandez in place, follow-up appointment with Dr. Manriquez of urology to discuss BCG therapy for urothelial carcinoma     Renal function  Last CR 0.95 with GFR >60, recheck BMP on 4/17    ABLA  Last Hgb 8.0, recheck CBC on 4/17.  Continue ferrous sulfate 324 mg daily with vitamin C    Leukocytosis  Last WBC 12.5, recheck CBC on 4/17.  UC negative    (K59.03) Drug-induced constipation  Change MiraLAX to daily and start senna S 1 tab twice daily    Overactive bladder  Continue oxybutynin 5 mg 3 times daily as needed    GERD  Continue pantoprazole 40 mg daily    Orders:  Monitor blood pressure, DuoNebs, wean oxygen, MiraLAX/senna S, BMP/CBC    Electronically signed by:  Guanaco Gil NP

## 2024-04-16 NOTE — LETTER
4/16/2024        RE: Khang Bailon  [address]  [place] [address]        [facility] GERIATRICS    PRIMARY CARE PROVIDER AND CLINIC:  Neville Degroot MD, [address] [facility] / [place] [address]  Chief Complaint   Patient presents with     Hospital F/U      [place] Medical Record Number:  [id number]  Place of Service where encounter took place:  [facility] [[id number]]    Khang Bailon  is a 79 year old  (1945), admitted to the above facility from  [facility] in [facility]. Hospital stay 4/3/24 through 4/15/24..     HPI:    This is a 80 yo CAD, COPD, hypertension, infrarenal AAA, MI, pulmonary nodule presents to an outside facility for evaluation of traumatic injury sustained during a fall.  Found to have nondisplaced right-sided rib fractures and right femoral neck fracture.  Transferred to [facility] for operative management.  Underwent right hip hemiarthroplasty, weightbearing as tolerated and Tylenol and Dilaudid for pain control.  SOB requiring oxygen, CTA negative for PE, given course of antibiotics for possible pneumonia.  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  Discharged to [facility] for rehab.    CODE STATUS/ADVANCE DIRECTIVES DISCUSSION:  Prior  CPR/Full code   ALLERGIES:   Allergies   Allergen Reactions     Flagyl [Metronidazole] GI Disturbance      PAST MEDICAL HISTORY:   Past Medical History:   Diagnosis Date     Alpha thalassemia (H24)      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    pacemaker at [facility]     Diverticulitis      Injury, other and unspecified, elbow, forearm, and wrist 1970s    Broke right wrist      PAST SURGICAL HISTORY:   has a past surgical history that includes Implant pacemaker; colonoscopy; colonoscopy (12/10/2012); Laser KTP transurethral  resection (TUR) prostate (N/A, 11/10/2021); and Cystoscopy, litholapaxy, combined (N/A, 11/10/2021).  FAMILY HISTORY: family history is not on file.  SOCIAL HISTORY:   reports that he quit smoking about 11 years ago. His smoking use included cigarettes. He started smoking about 61 years ago. He has never used smokeless tobacco. He reports that he does not drink alcohol and does not use drugs.  Patient's living condition: lives with spouse    Post Discharge Medication Reconciliation Status:   MED REC REQUIRED  Post Medication Reconciliation Status:  Discharge medications reconciled and changed, see notes/orders       Current Outpatient Medications   Medication Sig Dispense Refill     acetaminophen (TYLENOL) 500 MG tablet Take 1,000 mg by mouth 3 times daily       Ferrous Sulfate 324 (65 Fe) MG TBEC Take 1 tablet by mouth daily       HYDROmorphone (DILAUDID) 2 MG tablet Take 1 mg by mouth every 4 hours as needed for severe pain       albuterol (PROAIR HFA/PROVENTIL HFA/VENTOLIN HFA) 108 (90 Base) MCG/ACT inhaler Inhale 2 puffs into the lungs every 6 hours as needed for shortness of breath, wheezing or cough       Ascorbic Acid (VITAMIN C PO) Take 1 tablet by mouth daily (Isotonix)       aspirin (ASA) 81 MG chewable tablet Take 81 mg by mouth       B Complex Vitamins (B COMPLEX PO) Take 1 tablet by mouth daily (Isotonix)       budeson-glycopyrrol-formoterol (BREZTRI AEROSPHERE) 160-9-4.8 MCG/ACT AERO inhaler Inhale 2 puffs into the lungs 2 times daily 10.7 g 4     calcium carbonate-vitamin D (CALTRATE) 600-10 MG-MCG per tablet Take 1 tablet by mouth daily       Cholecalciferol (VITAMIN D-3 PO) Take 5,000 Units by mouth       ipratropium - albuterol 0.5 mg/2.5 mg/3 mL (DUONEB) 0.5-2.5 (3) MG/3ML neb solution USE 1 VIAL IN NEBULIZER EVERY 6 HOURS AS NEEDED FOR SHORTNESS OF BREATH, WHEEZING OR COUGH (Patient taking differently: 1 vial every 6 hours as needed for shortness of breath, wheezing or cough) 180 mL 2     order  for DME Equipment being ordered: Nebulizer hose 1 Tube 11     oxyBUTYnin (DITROPAN) 5 MG tablet Take 5 mg by mouth 3 times daily as needed for bladder spasms       pantoprazole (PROTONIX) 40 MG EC tablet Take 1 tablet by mouth daily before breakfast       rosuvastatin (CRESTOR) 5 MG tablet Take 5 mg by mouth every morning       No current facility-administered medications for this visit.       ROS:  10 point ROS of systems including Constitutional, Eyes, Respiratory, Cardiovascular, Gastroenterology, Genitourinary, Integumentary, Musculoskeletal, Psychiatric were all negative except for pertinent positives noted in my HPI.    Vitals:  /78   Pulse 80   Temp 98  F (36.7  C)   Resp 18   SpO2 96%   Exam:  GENERAL APPEARANCE:  Alert, in no distress, thin, cooperative, denies pain  ENT:  Mouth and posterior oropharynx normal, moist mucous membranes, normal hearing acuity  NECK:  No adenopathy,masses or thyromegaly  RESP:  no respiratory distress, diminished breath sounds bilaterally with exp whz, 2L NC  CV:  regular rate and rhythm, no murmur, rub, or gallop, no edema, PPM  ABDOMEN:  normal bowel sounds, soft, nontender, no hepatosplenomegaly or other masses, has constipation  :    murrell patent, draining aravind urine  M/S:   Able to move all extremities  SKIN:  R hip drsg intact  NEURO:   No focal deficits  PSYCH:  oriented X 3    Lab/Diagnostic data as of 4/14:  Na 141  K 3.7  Cr 0.72  GFR >60  WBC 13.2  Hgb 8.0    ASSESSMENT/PLAN:    (S72.001D) Closed fracture of right hip with routine healing   Right-sided rib fractures (5 and 7)  Underwent surgical pair, given weightbearing as tolerated, continue vitamin C/D supplementation, follow-up with Ortho (TBD)    DVT prophylaxis  History of  CVA  CAD  Continue low-dose aspirin, Plavix and statin    (R52) Pain  Continue Tylenol 1000 mg 3 times daily, Dilaudid 1 mg every 4 hours as needed, encourage icing    (J44.9) Moderate COPD (chronic obstructive pulmonary  disease) (H)    (R91.1) Pulmonary nodule  Treated for pneumonia, CTA negative, currently 2 L nasal cannula, continue to wean.  Continue Breztri Aerosphere 160/9/4.8 mcg 2 puffs twice daily.  Today start DuoNebs 3 times daily x 3 days    (I10) Hypertension goal BP (blood pressure) < 140/90  (I50.20) Systolic congestive heart failure, unspecified HF chronicity (H)  (I71.43) Infrarenal abdominal aortic aneurysm (AAA) without rupture  CAD with recent JESSICA (11/2023)   EF 40-45%, not on diuretic, monitor blood pressure twice daily.  PTA carvedilol and losartan discontinued    (N32.89) Mass of urinary bladder  Hernandez in place, follow-up appointment with Dr. Manriquez of urology to discuss BCG therapy for urothelial carcinoma     Renal function  Last CR 0.95 with GFR >60, recheck BMP on 4/17    ABLA  Last Hgb 8.0, recheck CBC on 4/17.  Continue ferrous sulfate 324 mg daily with vitamin C    Leukocytosis  Last WBC 12.5, recheck CBC on 4/17.  UC negative    (K59.03) Drug-induced constipation  Change MiraLAX to daily and start senna S 1 tab twice daily    Overactive bladder  Continue oxybutynin 5 mg 3 times daily as needed    GERD  Continue pantoprazole 40 mg daily    Orders:  Monitor blood pressure, DuoNebs, wean oxygen, MiraLAX/senna S, BMP/CBC    Electronically signed by:  Guanaco Gil NP                   Sincerely,        Guanaco Gil NP

## 2024-04-17 LAB
ACANTHOCYTES BLD QL SMEAR: ABNORMAL
ANION GAP SERPL CALCULATED.3IONS-SCNC: 10 MMOL/L (ref 7–15)
AUER BODIES BLD QL SMEAR: ABNORMAL
BASO STIPL BLD QL SMEAR: ABNORMAL
BITE CELLS BLD QL SMEAR: ABNORMAL
BLISTER CELLS BLD QL SMEAR: ABNORMAL
BUN SERPL-MCNC: 19.2 MG/DL (ref 8–23)
BURR CELLS BLD QL SMEAR: ABNORMAL
CALCIUM SERPL-MCNC: 8.9 MG/DL (ref 8.8–10.2)
CHLORIDE SERPL-SCNC: 108 MMOL/L (ref 98–107)
CREAT SERPL-MCNC: 0.8 MG/DL (ref 0.67–1.17)
DACRYOCYTES BLD QL SMEAR: ABNORMAL
DEPRECATED HCO3 PLAS-SCNC: 25 MMOL/L (ref 22–29)
EGFRCR SERPLBLD CKD-EPI 2021: 90 ML/MIN/1.73M2
ELLIPTOCYTES BLD QL SMEAR: SLIGHT
ERYTHROCYTE [DISTWIDTH] IN BLOOD BY AUTOMATED COUNT: 20.9 % (ref 10–15)
FRAGMENTS BLD QL SMEAR: ABNORMAL
GIANT PLATELETS BLD QL SMEAR: ABNORMAL
GLUCOSE SERPL-MCNC: 102 MG/DL (ref 70–99)
HCT VFR BLD AUTO: 26.6 % (ref 40–53)
HGB BLD-MCNC: 8.1 G/DL (ref 13.3–17.7)
HGB C CRYSTALS: ABNORMAL
HOWELL-JOLLY BOD BLD QL SMEAR: ABNORMAL
MCH RBC QN AUTO: 19.2 PG (ref 26.5–33)
MCHC RBC AUTO-ENTMCNC: 30.5 G/DL (ref 31.5–36.5)
MCV RBC AUTO: 63 FL (ref 78–100)
NEUTS HYPERSEG BLD QL SMEAR: ABNORMAL
PATH REV: ABNORMAL
PLAT MORPH BLD: ABNORMAL
PLATELET # BLD AUTO: 335 10E3/UL (ref 150–450)
POLYCHROMASIA BLD QL SMEAR: ABNORMAL
POTASSIUM SERPL-SCNC: 4.1 MMOL/L (ref 3.4–5.3)
RBC # BLD AUTO: 4.22 10E6/UL (ref 4.4–5.9)
RBC AGGLUT BLD QL: ABNORMAL
RBC MORPH BLD: ABNORMAL
ROULEAUX BLD QL SMEAR: ABNORMAL
SICKLE CELLS BLD QL SMEAR: ABNORMAL
SMUDGE CELLS BLD QL SMEAR: ABNORMAL
SODIUM SERPL-SCNC: 143 MMOL/L (ref 135–145)
SPHEROCYTES BLD QL SMEAR: ABNORMAL
STOMATOCYTES BLD QL SMEAR: ABNORMAL
TARGETS BLD QL SMEAR: SLIGHT
TOXIC GRANULES BLD QL SMEAR: ABNORMAL
VARIANT LYMPHS BLD QL SMEAR: ABNORMAL
WBC # BLD AUTO: 20.9 10E3/UL (ref 4–11)

## 2024-04-17 PROCEDURE — 86481 TB AG RESPONSE T-CELL SUSP: CPT | Performed by: FAMILY MEDICINE

## 2024-04-17 PROCEDURE — 85048 AUTOMATED LEUKOCYTE COUNT: CPT | Performed by: FAMILY MEDICINE

## 2024-04-17 PROCEDURE — 80048 BASIC METABOLIC PNL TOTAL CA: CPT | Performed by: FAMILY MEDICINE

## 2024-04-17 PROCEDURE — 36415 COLL VENOUS BLD VENIPUNCTURE: CPT | Performed by: FAMILY MEDICINE

## 2024-04-17 PROCEDURE — P9604 ONE-WAY ALLOW PRORATED TRIP: HCPCS | Performed by: FAMILY MEDICINE

## 2024-04-19 LAB
GAMMA INTERFERON BACKGROUND BLD IA-ACNC: 0.07 IU/ML
M TB IFN-G BLD-IMP: NEGATIVE
M TB IFN-G CD4+ BCKGRND COR BLD-ACNC: 1.47 IU/ML
MITOGEN IGNF BCKGRD COR BLD-ACNC: -0.01 IU/ML
MITOGEN IGNF BCKGRD COR BLD-ACNC: 0 IU/ML
QUANTIFERON MITOGEN: 1.54 IU/ML
QUANTIFERON NIL TUBE: 0.07 IU/ML
QUANTIFERON TB1 TUBE: 0.07 IU/ML
QUANTIFERON TB2 TUBE: 0.06

## 2024-04-22 VITALS
BODY MASS INDEX: 22.74 KG/M2 | HEART RATE: 80 BPM | WEIGHT: 154 LBS | RESPIRATION RATE: 22 BRPM | TEMPERATURE: 98.4 F | SYSTOLIC BLOOD PRESSURE: 113 MMHG | OXYGEN SATURATION: 90 % | DIASTOLIC BLOOD PRESSURE: 78 MMHG

## 2024-04-23 ENCOUNTER — TRANSITIONAL CARE UNIT VISIT (OUTPATIENT)
Dept: GERIATRICS | Facility: CLINIC | Age: 79
End: 2024-04-23
Payer: COMMERCIAL

## 2024-04-23 ENCOUNTER — LAB REQUISITION (OUTPATIENT)
Dept: LAB | Facility: CLINIC | Age: 79
End: 2024-04-23

## 2024-04-23 ENCOUNTER — PATIENT OUTREACH (OUTPATIENT)
Dept: CARE COORDINATION | Facility: CLINIC | Age: 79
End: 2024-04-23

## 2024-04-23 DIAGNOSIS — R52 PAIN: Primary | ICD-10-CM

## 2024-04-23 DIAGNOSIS — J44.9 MODERATE COPD (CHRONIC OBSTRUCTIVE PULMONARY DISEASE) (H): ICD-10-CM

## 2024-04-23 DIAGNOSIS — D72.829 ELEVATED WHITE BLOOD CELL COUNT, UNSPECIFIED: ICD-10-CM

## 2024-04-23 DIAGNOSIS — K59.03 DRUG-INDUCED CONSTIPATION: ICD-10-CM

## 2024-04-23 DIAGNOSIS — I50.20 SYSTOLIC CONGESTIVE HEART FAILURE, UNSPECIFIED HF CHRONICITY (H): ICD-10-CM

## 2024-04-23 DIAGNOSIS — N32.89 MASS OF URINARY BLADDER: ICD-10-CM

## 2024-04-23 DIAGNOSIS — I10 HYPERTENSION GOAL BP (BLOOD PRESSURE) < 140/90: ICD-10-CM

## 2024-04-23 DIAGNOSIS — S72.001D CLOSED FRACTURE OF RIGHT HIP WITH ROUTINE HEALING: ICD-10-CM

## 2024-04-23 PROCEDURE — 99315 NF DSCHRG MGMT 30 MIN/LESS: CPT | Performed by: NURSE PRACTITIONER

## 2024-04-23 RX ORDER — METHOCARBAMOL 500 MG/1
500 TABLET, FILM COATED ORAL 3 TIMES DAILY
COMMUNITY
End: 2024-08-10

## 2024-04-23 RX ORDER — ACETAMINOPHEN 325 MG/1
650 TABLET ORAL 4 TIMES DAILY
Status: ON HOLD | COMMUNITY
End: 2024-05-02

## 2024-04-23 RX ORDER — LIDOCAINE 4 G/G
1 PATCH TOPICAL EVERY 24 HOURS
Status: ON HOLD | COMMUNITY
End: 2024-05-01

## 2024-04-23 NOTE — PROGRESS NOTES
Clinic Care Coordination Contact    Chart entered to obtain information for spouse's phone call.  Spouse is planning to have pt return home and chart entered to review PCP and if Consent to communicate and location/situation of pt.     May Damico, [facility] - Care Coordination  [facility]   [phone]

## 2024-04-23 NOTE — PROGRESS NOTES
[facility] GERIATRICS    Chief Complaint   Patient presents with    RECHECK     HPI:  Khang Bailon is a 79 year old  (1945), who is being seen today for an episodic care visit at: [facility] AND REHAB [facility] ([facility]) [[id number]].     This is a 78 yo CAD, COPD, hypertension, infrarenal AAA, MI, pulmonary nodule presents to an outside facility for evaluation of traumatic injury sustained during a fall.  Found to have nondisplaced right-sided rib fractures and right femoral neck fracture.  Transferred to [facility] for operative management.  Underwent right hip hemiarthroplasty, weightbearing as tolerated and Tylenol and Dilaudid for pain control.  SOB requiring oxygen, CTA negative for PE, given course of antibiotics for possible pneumonia.  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  Discharged to [facility] for rehab.     Today's concern is:   Follow-up with ortho/urology, leukocytosis, pain, discharge    Allergies, and PMH/PSH reviewed in [facility] today.  REVIEW OF SYSTEMS:  4 point ROS including Respiratory, CV, GI and , other than that noted in the HPI,  is negative    Objective:   /78   Pulse 80   Temp 98.4  F (36.9  C)   Resp 22   Wt 69.9 kg (154 lb)   SpO2 90%   BMI 22.74 kg/m    GENERAL APPEARANCE:  Alert, in no distress, thin, cooperative, R hip pain  RESP:  no respiratory distress, diminished breath sounds bilaterally with exp whz, RA  CV:  regular rate and rhythm, no murmur, rub, or gallop, no edema, PPM  PSYCH:  oriented X 3    Most Recent 3 CBC's:  Recent Labs   Lab Test 04/24/24  0713 04/17/24  0738 04/03/24  1201   WBC 13.2* 20.9* 12.5*   HGB 9.2* 8.1* 9.9*   MCV 62* 63* 62*    335 301     Most Recent 3 BMP's:  Recent Labs   Lab Test 04/17/24  0738 04/03/24  1203  03/26/24  1129 02/27/24  1210    134*  --  140   POTASSIUM 4.1 3.7 3.9 4.2   CHLORIDE 108* 99  --  102   CO2 25 25  --  31*   BUN 19.2 12.6  --  20.1   CR 0.80 0.95  --  0.91   ANIONGAP 10 10  --  7   RENU 8.9 8.9  --  9.2   * 124*  --  96       Assessment/Plan:    (S72.001D) Closed fracture of right hip with routine healing   Right-sided rib fractures (5 and 7)  Underwent surgical pair, given weightbearing as tolerated, continue vitamin C/D supplementation, follow-up with Ortho as directed on 5/20     DVT prophylaxis  History of  CVA  CAD  Continue low-dose aspirin, Plavix and statin     (R52) Pain  Continue change tylenol to 650mg QID, Dilaudid 1 mg every 4 hours as needed, encourage icing with methocarbamol 500mg TID. Today add lidocaine patch as well     (J44.9) Moderate COPD (chronic obstructive pulmonary disease) (H)    (R91.1) Pulmonary nodule  Treated for pneumonia, CTA negative. Continue Breztri Aerosphere 160/9/4.8 mcg 2 puffs twice daily.  Today re-start DuoNebs 3 times daily x 5 days per exp whz. Pending evaluation with pulmonology per possible lung cancer/positive PET scan.     (I10) Hypertension goal BP (blood pressure) < 140/90  (I50.20) Systolic congestive heart failure, unspecified HF chronicity (H)  (I71.43) Infrarenal abdominal aortic aneurysm (AAA) without rupture  CAD with recent JESSICA (11/2023)   EF 40-45%, not on diuretic, 110-140s, HR <70s.  PTA carvedilol and losartan discontinued     (N32.89) Mass of urinary bladder  Follows with Dr. Manriquez of urology, to start BCG therapy for invasive urothelial carcinoma.  Hernandez removed     Renal function  Last CR 0.80 with GFR >60 on 4/17     ABLA  Last Hgb 9.2 on 4/24.  Continue ferrous sulfate 324 mg daily with vitamin C     Leukocytosis  Last WBC 13.2 on 4/24     (K59.03) Drug-induced constipation  Change MiraLAX to daily and senna S 1 tab twice daily     Overactive bladder  Continue oxybutynin 5 mg 3 times daily as needed      GERD  Continue pantoprazole 40 mg daily    Orders:  UA/UC, CBC, duonebs, tylenol, lidocaine patch    Documentation of Face to Face and Certification for Home Health Services     I certify that patient: Khang Lozano is under my care and that I, or a nurse practitioner or physician's assistant working with me, had a face-to-face encounter that meets the physician face-to-face encounter requirements with this patient on: 4/23/2024.     This encounter with the patient was in whole, or in part, for the following medical condition, which is the primary reason for home health care: medication management, therapy and SW support.     I certify that, based on my findings, the following services are medically necessary home health services: Nursing, Occupational Therapy, Physical Therapy, and Home Health Aide.     My clinical findings support the need for the above services because: Nurse is needed: To provide assessment and oversight required in the home to assure adherence to the medical plan due to: medication management.., Occupational Therapy Services are needed to assess and treat cognitive ability and address ADL safety due to impairment in cognition., Physical Therapy Services are needed to assess and treat the following functional impairments: falls risk     Further, I certify that my clinical findings support that this patient is homebound (i.e. absences from home require considerable and taxing effort and are for medical reasons or Gnostic[facility] services or infrequently or of short duration when for other reasons) because: Requires assistance of another person or specialized equipment to access medical services because patient:  needs assistance with medication management and therapy.     Based on the above findings. I certify that this patient is confined to the home and needs intermittent skilled nursing care, physical therapy and/or speech therapy.  The patient is under my care, and I have initiated the  establishment of the plan of care.  This patient will be followed by a physician who will periodically review the plan of care.  Physician/Provider to provide follow up care: Neville Degroot     Attending hospital physician (the Medicare certified PECOS provider): Guanaco Gil NP  Physician Signature: See electronic signature associated with these discharge orders.  Date: 4/23/2024    Electronically signed by: Guanaco Gil NP       [facility] GERIATRICS  Face to Face and Medical Necessity Statement for DME Provider visit    Patient: Khang Bailon  Gender: male  YOB: 1945  [employer] Medical Record Number: [id number]  Demographics:  [address] [facility] [address]  [phone] (home)   Social Security Number:   Primary Care Provider: Neville Degroot  Insurance: Payor: [facility] / Plan: [employer] MEDICARE / Product Type: HMO /     HPI: Khang Bailon is a 79 year old (1945), who is being seen today [facility] ([facility] [[id number]]. Medical necessity statement for DME included.     This patient requires the following: DME Ordered and Medical Necessity Statement   Hospital bed: fully electronic with 2 side rails    The patient does  require positioning of the body in ways not feasible with an ordinary bed due to a medical condition that is expected to last at least 1 month due to (S72.001D) Closed fracture of right hip with routine healing   Right-sided rib fractures (5 and 7).   Malignant neoplasm of bladder   The patient does  require, for the alleviation of pain, positioning of the body in ways not feasible with an ordinary bed.   The patient does  require the head of bed elevated more than 30* most of the time due to chronic pulmonary disease.  The patient does not require traction that can only be attached to a hospital bed.  The patient does  require a bed height different than a fixed height hospital bed to  permit transfers to wheelchair or standing position to aid in transfers.  The patient does  require frequent or immediate changes in body position due to pain and shortness of breath.    Wheelchair Documentation  Size: standard 16 x 16  Corresponding cushion: Yes  Standard foot rests: Yes  Elevating leg rests: No  Arm rests: Yes  Lap tray: No  The patient does use oxygen. The patient is able to propel wheelchair. The patient has mobility limitations that impairs their ability to participate in one or more mobility related activities: Toileting, Feeding, Grooming, and Bathing. The wheelchair is suitable and necessary for use in the patient's home. The patient's mobility limitations cannot be safely resolved by using a cane/walker. Reason why a cane or walker will not meet the patient's needs. (ie: balance, tolerance, level of assistance) see above. The patients home has adequate access to use a manual wheelchair. The use of a manual wheelchair on a regular basis will improve the patients ability to participate in mobility related ADL's at home. The patient is willing to use a manual wheelchair at home.  The patient has adequate upper body strength and the mental capability to safely use a manual wheelchair and/or has a caregiver that is able to assist. The patient have a lower extremity injury.  Reason for Type of Wheelchair  Patient weight: 0 lbs 0 oz  Light Weight Wheelchair: Patient is unable to self-propel a standard wheelchair in the home but can self propel a light weight wheelchair.    Pt needing above DME with expected length of need of 99 months due to medical necessity associated with following diagnosis:     Pain  Moderate COPD (chronic obstructive pulmonary disease) (H)  Drug-induced constipation  Hypertension goal BP (blood pressure) < 140/90  Systolic congestive heart failure, unspecified HF chronicity (H)  Closed fracture of right hip with routine healing  Mass of urinary bladder    Malignant neoplasm  of bladder    Past Medical History:   has a past medical history of Alpha thalassemia (H24), AV block (7/2012), Diverticulitis, and Injury, other and unspecified, elbow, forearm, and wrist (1970s).    He has no past medical history of Complication of anesthesia, Motion sickness, or PONV (postoperative nausea and vomiting).    Review of Systems:  See above    Exam:  Vitals: /78   Pulse 80   Temp 98.4  F (36.9  C)   Resp 22   Wt 69.9 kg (154 lb)   SpO2 90%   BMI 22.74 kg/m    BMI: Body mass index is 22.74 kg/m .  See above    Assessment/Plan:  1. Pain    2. Moderate COPD (chronic obstructive pulmonary disease) (H)    3. Drug-induced constipation    4. Hypertension goal BP (blood pressure) < 140/90    5. Systolic congestive heart failure, unspecified HF chronicity (H)    6. Closed fracture of right hip with routine healing    7. Mass of urinary bladder        Orders:  1. Facility staff/TC to contact DME company to get their order form for provider to fill out    ELECTRONICALLY SIGNED BY SERENITY CERTIFIED PROVIDER: Guanaco Gil NP   NPI: [id number]  [facility] GERIATRICS  [address] [place] [address]

## 2024-04-23 NOTE — LETTER
4/23/2024        RE: Khang Bailon  [address]  [facility] [address]        [facility] GERIATRICS    Chief Complaint   Patient presents with     RECHECK     HPI:  Khang Bailon is a 79 year old  (1945), who is being seen today for an episodic care visit at: [facility] AND REHAB [facility] ([facility]) [[id number]].     This is a 78 yo CAD, COPD, hypertension, infrarenal AAA, MI, pulmonary nodule presents to an outside facility for evaluation of traumatic injury sustained during a fall.  Found to have nondisplaced right-sided rib fractures and right femoral neck fracture.  Transferred to [facility] for operative management.  Underwent right hip hemiarthroplasty, weightbearing as tolerated and Tylenol and Dilaudid for pain control.  SOB requiring oxygen, CTA negative for PE, given course of antibiotics for possible pneumonia.  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  Discharged to [facility] for rehab.     Today's concern is:   Follow-up with ortho/urology, leukocytosis, pain    Allergies, and PMH/PSH reviewed in [facility] today.  REVIEW OF SYSTEMS:  4 point ROS including Respiratory, CV, GI and , other than that noted in the HPI,  is negative    Objective:   /78   Pulse 80   Temp 98.4  F (36.9  C)   Resp 22   Wt 69.9 kg (154 lb)   SpO2 90%   BMI 22.74 kg/m    GENERAL APPEARANCE:  Alert, in no distress, thin, cooperative, R hip pain  RESP:  no respiratory distress, diminished breath sounds bilaterally with exp whz, RA  CV:  regular rate and rhythm, no murmur, rub, or gallop, no edema, PPM  PSYCH:  oriented X 3    Most Recent 3 CBC's:  Recent Labs   Lab Test 04/17/24  0738 04/03/24  1201 03/26/24  1129 02/27/24  1210   WBC 20.9* 12.5*  --  15.5*   HGB 8.1* 9.9* 12.1* 10.6*   MCV 63* 62*   --  63*    301  --  263     Most Recent 3 BMP's:  Recent Labs   Lab Test 04/17/24  0738 04/03/24  1203 03/26/24  1129 02/27/24  1210    134*  --  140   POTASSIUM 4.1 3.7 3.9 4.2   CHLORIDE 108* 99  --  102   CO2 25 25  --  31*   BUN 19.2 12.6  --  20.1   CR 0.80 0.95  --  0.91   ANIONGAP 10 10  --  7   RENU 8.9 8.9  --  9.2   * 124*  --  96       Assessment/Plan:    (S72.001D) Closed fracture of right hip with routine healing   Right-sided rib fractures (5 and 7)  Underwent surgical pair, given weightbearing as tolerated, continue vitamin C/D supplementation, follow-up with Ortho as directed on 5/20     DVT prophylaxis  History of  CVA  CAD  Continue low-dose aspirin, Plavix and statin     (R52) Pain  Continue change tylenol to 650mg QID, Dilaudid 1 mg every 4 hours as needed, encourage icing with methocarbamol 500mg TID. Today add lidocaine patch as well     (J44.9) Moderate COPD (chronic obstructive pulmonary disease) (H)    (R91.1) Pulmonary nodule  Treated for pneumonia, CTA negative. Continue Breztri Aerosphere 160/9/4.8 mcg 2 puffs twice daily.  Today re-start DuoNebs 3 times daily x 5 days per exp whz. Pending evaluation with pulmonology per possible lung cancer/positive PET scan.     (I10) Hypertension goal BP (blood pressure) < 140/90  (I50.20) Systolic congestive heart failure, unspecified HF chronicity (H)  (I71.43) Infrarenal abdominal aortic aneurysm (AAA) without rupture  CAD with recent JESSICA (11/2023)   EF 40-45%, not on diuretic, 110-140s, HR <70s.  PTA carvedilol and losartan discontinued     (N32.89) Mass of urinary bladder  Follows with Dr. Manriquez of urology, to start BCG therapy for invasive urothelial carcinoma.  Hernandez removed     Renal function  Last CR 0.80 with GFR >60 on 4/17     ABLA  Last Hgb 8.1 on 4/17, recheck CBC on 4/24.  Continue ferrous sulfate 324 mg daily with vitamin C     Leukocytosis  Last WBC 20.9 on 4/17 (increased), recheck for UTI. Recheck CBC on 4/24      (K59.03) Drug-induced constipation  Change MiraLAX to daily and senna S 1 tab twice daily, adequate     Overactive bladder  Continue oxybutynin 5 mg 3 times daily as needed     GERD  Continue pantoprazole 40 mg daily    Orders:  UA/UC, CBC, duonebs, tylenol, lidocaine patch    Electronically signed by: Guanaco Gil NP         Sincerely,        Guanaco Gil NP

## 2024-04-24 LAB
ACANTHOCYTES BLD QL SMEAR: NORMAL
AUER BODIES BLD QL SMEAR: NORMAL
BASO STIPL BLD QL SMEAR: NORMAL
BITE CELLS BLD QL SMEAR: NORMAL
BLISTER CELLS BLD QL SMEAR: NORMAL
BURR CELLS BLD QL SMEAR: NORMAL
DACRYOCYTES BLD QL SMEAR: NORMAL
ELLIPTOCYTES BLD QL SMEAR: NORMAL
ERYTHROCYTE [DISTWIDTH] IN BLOOD BY AUTOMATED COUNT: 21.6 % (ref 10–15)
FRAGMENTS BLD QL SMEAR: NORMAL
GIANT PLATELETS BLD QL SMEAR: NORMAL
HCT VFR BLD AUTO: 30.1 % (ref 40–53)
HGB BLD-MCNC: 9.2 G/DL (ref 13.3–17.7)
HGB C CRYSTALS: NORMAL
HOWELL-JOLLY BOD BLD QL SMEAR: NORMAL
MCH RBC QN AUTO: 19 PG (ref 26.5–33)
MCHC RBC AUTO-ENTMCNC: 30.6 G/DL (ref 31.5–36.5)
MCV RBC AUTO: 62 FL (ref 78–100)
NEUTS HYPERSEG BLD QL SMEAR: NORMAL
PATH REV: NORMAL
PLAT MORPH BLD: NORMAL
PLATELET # BLD AUTO: 348 10E3/UL (ref 150–450)
POLYCHROMASIA BLD QL SMEAR: NORMAL
RBC # BLD AUTO: 4.83 10E6/UL (ref 4.4–5.9)
RBC AGGLUT BLD QL: NORMAL
RBC MORPH BLD: NORMAL
ROULEAUX BLD QL SMEAR: NORMAL
SICKLE CELLS BLD QL SMEAR: NORMAL
SMUDGE CELLS BLD QL SMEAR: NORMAL
SPHEROCYTES BLD QL SMEAR: NORMAL
STOMATOCYTES BLD QL SMEAR: NORMAL
TARGETS BLD QL SMEAR: NORMAL
TOXIC GRANULES BLD QL SMEAR: NORMAL
VARIANT LYMPHS BLD QL SMEAR: NORMAL
WBC # BLD AUTO: 13.2 10E3/UL (ref 4–11)

## 2024-04-24 PROCEDURE — 36415 COLL VENOUS BLD VENIPUNCTURE: CPT | Performed by: NURSE PRACTITIONER

## 2024-04-24 PROCEDURE — P9604 ONE-WAY ALLOW PRORATED TRIP: HCPCS | Performed by: NURSE PRACTITIONER

## 2024-04-24 PROCEDURE — 85014 HEMATOCRIT: CPT | Performed by: NURSE PRACTITIONER

## 2024-04-26 ENCOUNTER — APPOINTMENT (OUTPATIENT)
Dept: CT IMAGING | Facility: CLINIC | Age: 79
DRG: 871 | End: 2024-04-26
Attending: STUDENT IN AN ORGANIZED HEALTH CARE EDUCATION/TRAINING PROGRAM
Payer: COMMERCIAL

## 2024-04-26 ENCOUNTER — HOSPITAL ENCOUNTER (INPATIENT)
Facility: CLINIC | Age: 79
LOS: 6 days | Discharge: HOME HEALTH | DRG: 871 | End: 2024-05-02
Attending: STUDENT IN AN ORGANIZED HEALTH CARE EDUCATION/TRAINING PROGRAM | Admitting: INTERNAL MEDICINE
Payer: COMMERCIAL

## 2024-04-26 ENCOUNTER — APPOINTMENT (OUTPATIENT)
Dept: GENERAL RADIOLOGY | Facility: CLINIC | Age: 79
DRG: 871 | End: 2024-04-26
Attending: STUDENT IN AN ORGANIZED HEALTH CARE EDUCATION/TRAINING PROGRAM
Payer: COMMERCIAL

## 2024-04-26 DIAGNOSIS — J96.01 ACUTE RESPIRATORY FAILURE WITH HYPOXIA (H): ICD-10-CM

## 2024-04-26 DIAGNOSIS — K92.1 GASTROINTESTINAL HEMORRHAGE WITH MELENA: ICD-10-CM

## 2024-04-26 DIAGNOSIS — E43 SEVERE MALNUTRITION (H): ICD-10-CM

## 2024-04-26 DIAGNOSIS — D64.9 ANEMIA, UNSPECIFIED TYPE: Primary | ICD-10-CM

## 2024-04-26 DIAGNOSIS — A41.9 SEPSIS WITH ACUTE HYPOXIC RESPIRATORY FAILURE WITHOUT SEPTIC SHOCK, DUE TO UNSPECIFIED ORGANISM (H): ICD-10-CM

## 2024-04-26 DIAGNOSIS — I44.2 THIRD DEGREE HEART BLOCK (H): ICD-10-CM

## 2024-04-26 DIAGNOSIS — R65.20 SEPSIS WITH ACUTE HYPOXIC RESPIRATORY FAILURE WITHOUT SEPTIC SHOCK, DUE TO UNSPECIFIED ORGANISM (H): ICD-10-CM

## 2024-04-26 DIAGNOSIS — L89.159 PRESSURE INJURY OF SKIN OF SACRAL REGION, UNSPECIFIED INJURY STAGE: ICD-10-CM

## 2024-04-26 DIAGNOSIS — S50.312D ABRASION OF LEFT ELBOW, SUBSEQUENT ENCOUNTER: ICD-10-CM

## 2024-04-26 DIAGNOSIS — R65.21 SEPTIC SHOCK (H): ICD-10-CM

## 2024-04-26 DIAGNOSIS — C67.9 UROTHELIAL CARCINOMA OF BLADDER (H): Chronic | ICD-10-CM

## 2024-04-26 DIAGNOSIS — R07.89 OTHER CHEST PAIN: ICD-10-CM

## 2024-04-26 DIAGNOSIS — D62 ACUTE BLOOD LOSS ANEMIA: ICD-10-CM

## 2024-04-26 DIAGNOSIS — A41.9 SEPTIC SHOCK (H): ICD-10-CM

## 2024-04-26 DIAGNOSIS — J96.01 SEPSIS WITH ACUTE HYPOXIC RESPIRATORY FAILURE WITHOUT SEPTIC SHOCK, DUE TO UNSPECIFIED ORGANISM (H): ICD-10-CM

## 2024-04-26 DIAGNOSIS — Z86.73 HISTORY OF CVA (CEREBROVASCULAR ACCIDENT): ICD-10-CM

## 2024-04-26 DIAGNOSIS — J18.9 PNEUMONIA OF RIGHT LOWER LOBE DUE TO INFECTIOUS ORGANISM: ICD-10-CM

## 2024-04-26 DIAGNOSIS — R07.1 PAINFUL RESPIRATION: ICD-10-CM

## 2024-04-26 DIAGNOSIS — Z96.641 STATUS POST TOTAL REPLACEMENT OF RIGHT HIP: ICD-10-CM

## 2024-04-26 DIAGNOSIS — I71.43 INFRARENAL ABDOMINAL AORTIC ANEURYSM (AAA) WITHOUT RUPTURE (H): ICD-10-CM

## 2024-04-26 DIAGNOSIS — S72.001D CLOSED FRACTURE OF RIGHT HIP WITH ROUTINE HEALING: ICD-10-CM

## 2024-04-26 DIAGNOSIS — J44.9 CHRONIC OBSTRUCTIVE PULMONARY DISEASE, UNSPECIFIED COPD TYPE (H): ICD-10-CM

## 2024-04-26 LAB
ACANTHOCYTES BLD QL SMEAR: NORMAL
ALBUMIN SERPL BCG-MCNC: 3.4 G/DL (ref 3.5–5.2)
ALP SERPL-CCNC: 102 U/L (ref 40–150)
ALT SERPL W P-5'-P-CCNC: 16 U/L (ref 0–70)
ANION GAP SERPL CALCULATED.3IONS-SCNC: 18 MMOL/L (ref 7–15)
AST SERPL W P-5'-P-CCNC: 26 U/L (ref 0–45)
AUER BODIES BLD QL SMEAR: NORMAL
BASO STIPL BLD QL SMEAR: NORMAL
BASOPHILS # BLD AUTO: 0.2 10E3/UL (ref 0–0.2)
BASOPHILS NFR BLD AUTO: 1 %
BILIRUB SERPL-MCNC: 1 MG/DL
BITE CELLS BLD QL SMEAR: NORMAL
BLISTER CELLS BLD QL SMEAR: NORMAL
BUN SERPL-MCNC: 36.8 MG/DL (ref 8–23)
BURR CELLS BLD QL SMEAR: NORMAL
CALCIUM SERPL-MCNC: 9.3 MG/DL (ref 8.8–10.2)
CHLORIDE SERPL-SCNC: 104 MMOL/L (ref 98–107)
CREAT SERPL-MCNC: 1.04 MG/DL (ref 0.67–1.17)
D DIMER PPP FEU-MCNC: 3.49 UG/ML FEU (ref 0–0.5)
DACRYOCYTES BLD QL SMEAR: NORMAL
DEPRECATED HCO3 PLAS-SCNC: 21 MMOL/L (ref 22–29)
EGFRCR SERPLBLD CKD-EPI 2021: 73 ML/MIN/1.73M2
ELLIPTOCYTES BLD QL SMEAR: NORMAL
EOSINOPHIL # BLD AUTO: 0.1 10E3/UL (ref 0–0.7)
EOSINOPHIL NFR BLD AUTO: 0 %
ERYTHROCYTE [DISTWIDTH] IN BLOOD BY AUTOMATED COUNT: 22.6 % (ref 10–15)
FLUAV RNA SPEC QL NAA+PROBE: NEGATIVE
FLUBV RNA RESP QL NAA+PROBE: NEGATIVE
FRAGMENTS BLD QL SMEAR: NORMAL
GIANT PLATELETS BLD QL SMEAR: NORMAL
GLUCOSE BLDC GLUCOMTR-MCNC: 131 MG/DL (ref 70–99)
GLUCOSE SERPL-MCNC: 195 MG/DL (ref 70–99)
HCT VFR BLD AUTO: 31.5 % (ref 40–53)
HGB BLD-MCNC: 9.3 G/DL (ref 13.3–17.7)
HGB C CRYSTALS: NORMAL
HOWELL-JOLLY BOD BLD QL SMEAR: NORMAL
IMM GRANULOCYTES # BLD: 0.3 10E3/UL
IMM GRANULOCYTES NFR BLD: 1 %
LACTATE SERPL-SCNC: 2.1 MMOL/L (ref 0.7–2)
LACTATE SERPL-SCNC: 6.8 MMOL/L (ref 0.7–2)
LYMPHOCYTES # BLD AUTO: 3.5 10E3/UL (ref 0.8–5.3)
LYMPHOCYTES NFR BLD AUTO: 15 %
MAGNESIUM SERPL-MCNC: 2.1 MG/DL (ref 1.7–2.3)
MCH RBC QN AUTO: 19.1 PG (ref 26.5–33)
MCHC RBC AUTO-ENTMCNC: 29.5 G/DL (ref 31.5–36.5)
MCV RBC AUTO: 65 FL (ref 78–100)
MONOCYTES # BLD AUTO: 1.4 10E3/UL (ref 0–1.3)
MONOCYTES NFR BLD AUTO: 6 %
NEUTROPHILS # BLD AUTO: 18.6 10E3/UL (ref 1.6–8.3)
NEUTROPHILS NFR BLD AUTO: 77 %
NEUTS HYPERSEG BLD QL SMEAR: NORMAL
NRBC # BLD AUTO: 0.1 10E3/UL
NRBC BLD AUTO-RTO: 1 /100
NT-PROBNP SERPL-MCNC: 2481 PG/ML (ref 0–1800)
PATH REV: NORMAL
PLAT MORPH BLD: NORMAL
PLATELET # BLD AUTO: 511 10E3/UL (ref 150–450)
POLYCHROMASIA BLD QL SMEAR: NORMAL
POTASSIUM SERPL-SCNC: 5.3 MMOL/L (ref 3.4–5.3)
PROCALCITONIN SERPL IA-MCNC: 0.23 NG/ML
PROT SERPL-MCNC: 6.8 G/DL (ref 6.4–8.3)
RBC # BLD AUTO: 4.88 10E6/UL (ref 4.4–5.9)
RBC AGGLUT BLD QL: NORMAL
RBC MORPH BLD: NORMAL
ROULEAUX BLD QL SMEAR: NORMAL
RSV RNA SPEC NAA+PROBE: NEGATIVE
SARS-COV-2 RNA RESP QL NAA+PROBE: NEGATIVE
SICKLE CELLS BLD QL SMEAR: NORMAL
SMUDGE CELLS BLD QL SMEAR: NORMAL
SODIUM SERPL-SCNC: 143 MMOL/L (ref 135–145)
SPHEROCYTES BLD QL SMEAR: NORMAL
STOMATOCYTES BLD QL SMEAR: NORMAL
TARGETS BLD QL SMEAR: NORMAL
TOXIC GRANULES BLD QL SMEAR: NORMAL
TROPONIN T SERPL HS-MCNC: 63 NG/L
TROPONIN T SERPL HS-MCNC: 91 NG/L
VARIANT LYMPHS BLD QL SMEAR: NORMAL
WBC # BLD AUTO: 24.1 10E3/UL (ref 4–11)

## 2024-04-26 PROCEDURE — 85025 COMPLETE CBC W/AUTO DIFF WBC: CPT | Performed by: STUDENT IN AN ORGANIZED HEALTH CARE EDUCATION/TRAINING PROGRAM

## 2024-04-26 PROCEDURE — 87040 BLOOD CULTURE FOR BACTERIA: CPT | Performed by: STUDENT IN AN ORGANIZED HEALTH CARE EDUCATION/TRAINING PROGRAM

## 2024-04-26 PROCEDURE — 71045 X-RAY EXAM CHEST 1 VIEW: CPT

## 2024-04-26 PROCEDURE — 74177 CT ABD & PELVIS W/CONTRAST: CPT

## 2024-04-26 PROCEDURE — 94640 AIRWAY INHALATION TREATMENT: CPT

## 2024-04-26 PROCEDURE — 250N000009 HC RX 250

## 2024-04-26 PROCEDURE — 84145 PROCALCITONIN (PCT): CPT | Performed by: STUDENT IN AN ORGANIZED HEALTH CARE EDUCATION/TRAINING PROGRAM

## 2024-04-26 PROCEDURE — 85379 FIBRIN DEGRADATION QUANT: CPT | Performed by: STUDENT IN AN ORGANIZED HEALTH CARE EDUCATION/TRAINING PROGRAM

## 2024-04-26 PROCEDURE — 99291 CRITICAL CARE FIRST HOUR: CPT | Mod: 25 | Performed by: STUDENT IN AN ORGANIZED HEALTH CARE EDUCATION/TRAINING PROGRAM

## 2024-04-26 PROCEDURE — 84484 ASSAY OF TROPONIN QUANT: CPT | Performed by: STUDENT IN AN ORGANIZED HEALTH CARE EDUCATION/TRAINING PROGRAM

## 2024-04-26 PROCEDURE — 99292 CRITICAL CARE ADDL 30 MIN: CPT | Performed by: STUDENT IN AN ORGANIZED HEALTH CARE EDUCATION/TRAINING PROGRAM

## 2024-04-26 PROCEDURE — 86923 COMPATIBILITY TEST ELECTRIC: CPT | Performed by: INTERNAL MEDICINE

## 2024-04-26 PROCEDURE — 94660 CPAP INITIATION&MGMT: CPT

## 2024-04-26 PROCEDURE — 96365 THER/PROPH/DIAG IV INF INIT: CPT | Mod: 59 | Performed by: STUDENT IN AN ORGANIZED HEALTH CARE EDUCATION/TRAINING PROGRAM

## 2024-04-26 PROCEDURE — 83605 ASSAY OF LACTIC ACID: CPT | Performed by: STUDENT IN AN ORGANIZED HEALTH CARE EDUCATION/TRAINING PROGRAM

## 2024-04-26 PROCEDURE — 87641 MR-STAPH DNA AMP PROBE: CPT | Performed by: INTERNAL MEDICINE

## 2024-04-26 PROCEDURE — 80053 COMPREHEN METABOLIC PANEL: CPT | Performed by: STUDENT IN AN ORGANIZED HEALTH CARE EDUCATION/TRAINING PROGRAM

## 2024-04-26 PROCEDURE — 258N000003 HC RX IP 258 OP 636: Performed by: INTERNAL MEDICINE

## 2024-04-26 PROCEDURE — 83880 ASSAY OF NATRIURETIC PEPTIDE: CPT | Performed by: STUDENT IN AN ORGANIZED HEALTH CARE EDUCATION/TRAINING PROGRAM

## 2024-04-26 PROCEDURE — 258N000003 HC RX IP 258 OP 636: Performed by: STUDENT IN AN ORGANIZED HEALTH CARE EDUCATION/TRAINING PROGRAM

## 2024-04-26 PROCEDURE — 250N000009 HC RX 250: Performed by: STUDENT IN AN ORGANIZED HEALTH CARE EDUCATION/TRAINING PROGRAM

## 2024-04-26 PROCEDURE — 87637 SARSCOV2&INF A&B&RSV AMP PRB: CPT | Performed by: STUDENT IN AN ORGANIZED HEALTH CARE EDUCATION/TRAINING PROGRAM

## 2024-04-26 PROCEDURE — 200N000001 HC R&B ICU

## 2024-04-26 PROCEDURE — 250N000011 HC RX IP 250 OP 636: Performed by: INTERNAL MEDICINE

## 2024-04-26 PROCEDURE — 83735 ASSAY OF MAGNESIUM: CPT | Performed by: STUDENT IN AN ORGANIZED HEALTH CARE EDUCATION/TRAINING PROGRAM

## 2024-04-26 PROCEDURE — 99291 CRITICAL CARE FIRST HOUR: CPT | Performed by: STUDENT IN AN ORGANIZED HEALTH CARE EDUCATION/TRAINING PROGRAM

## 2024-04-26 PROCEDURE — 93005 ELECTROCARDIOGRAM TRACING: CPT | Performed by: STUDENT IN AN ORGANIZED HEALTH CARE EDUCATION/TRAINING PROGRAM

## 2024-04-26 PROCEDURE — 250N000011 HC RX IP 250 OP 636: Performed by: STUDENT IN AN ORGANIZED HEALTH CARE EDUCATION/TRAINING PROGRAM

## 2024-04-26 PROCEDURE — 5A09357 ASSISTANCE WITH RESPIRATORY VENTILATION, LESS THAN 24 CONSECUTIVE HOURS, CONTINUOUS POSITIVE AIRWAY PRESSURE: ICD-10-PCS | Performed by: STUDENT IN AN ORGANIZED HEALTH CARE EDUCATION/TRAINING PROGRAM

## 2024-04-26 PROCEDURE — 3E033XZ INTRODUCTION OF VASOPRESSOR INTO PERIPHERAL VEIN, PERCUTANEOUS APPROACH: ICD-10-PCS | Performed by: STUDENT IN AN ORGANIZED HEALTH CARE EDUCATION/TRAINING PROGRAM

## 2024-04-26 PROCEDURE — 36415 COLL VENOUS BLD VENIPUNCTURE: CPT | Performed by: STUDENT IN AN ORGANIZED HEALTH CARE EDUCATION/TRAINING PROGRAM

## 2024-04-26 PROCEDURE — 86900 BLOOD TYPING SEROLOGIC ABO: CPT | Performed by: INTERNAL MEDICINE

## 2024-04-26 PROCEDURE — 96366 THER/PROPH/DIAG IV INF ADDON: CPT | Performed by: STUDENT IN AN ORGANIZED HEALTH CARE EDUCATION/TRAINING PROGRAM

## 2024-04-26 PROCEDURE — 999N000157 HC STATISTIC RCP TIME EA 10 MIN

## 2024-04-26 RX ORDER — ONDANSETRON 2 MG/ML
4 INJECTION INTRAMUSCULAR; INTRAVENOUS EVERY 6 HOURS PRN
Status: DISCONTINUED | OUTPATIENT
Start: 2024-04-26 | End: 2024-05-02 | Stop reason: HOSPADM

## 2024-04-26 RX ORDER — IPRATROPIUM BROMIDE AND ALBUTEROL SULFATE 2.5; .5 MG/3ML; MG/3ML
SOLUTION RESPIRATORY (INHALATION)
Status: COMPLETED
Start: 2024-04-26 | End: 2024-04-26

## 2024-04-26 RX ORDER — NICOTINE POLACRILEX 4 MG
15-30 LOZENGE BUCCAL
Status: DISCONTINUED | OUTPATIENT
Start: 2024-04-26 | End: 2024-05-02 | Stop reason: HOSPADM

## 2024-04-26 RX ORDER — SODIUM CHLORIDE, SODIUM LACTATE, POTASSIUM CHLORIDE, CALCIUM CHLORIDE 600; 310; 30; 20 MG/100ML; MG/100ML; MG/100ML; MG/100ML
INJECTION, SOLUTION INTRAVENOUS CONTINUOUS
Status: DISCONTINUED | OUTPATIENT
Start: 2024-04-26 | End: 2024-04-30

## 2024-04-26 RX ORDER — ASCORBIC ACID 500 MG
500 TABLET ORAL DAILY
COMMUNITY

## 2024-04-26 RX ORDER — ROPIVACAINE IN 0.9% SOD CHL/PF 0.1 %
.03-.125 PLASTIC BAG, INJECTION (ML) EPIDURAL CONTINUOUS
Status: DISCONTINUED | OUTPATIENT
Start: 2024-04-26 | End: 2024-04-27

## 2024-04-26 RX ORDER — SODIUM CHLORIDE 9 MG/ML
INJECTION, SOLUTION INTRAVENOUS CONTINUOUS
Status: DISCONTINUED | OUTPATIENT
Start: 2024-04-26 | End: 2024-04-27

## 2024-04-26 RX ORDER — AMOXICILLIN 250 MG
1 CAPSULE ORAL 2 TIMES DAILY PRN
Status: DISCONTINUED | OUTPATIENT
Start: 2024-04-26 | End: 2024-05-02 | Stop reason: HOSPADM

## 2024-04-26 RX ORDER — SODIUM CHLORIDE 9 MG/ML
INJECTION, SOLUTION INTRAVENOUS CONTINUOUS
Status: DISCONTINUED | OUTPATIENT
Start: 2024-04-26 | End: 2024-04-30

## 2024-04-26 RX ORDER — POLYETHYLENE GLYCOL 3350 17 G/17G
17 POWDER, FOR SOLUTION ORAL DAILY
COMMUNITY
Start: 2024-04-15 | End: 2024-05-22

## 2024-04-26 RX ORDER — CHLORAL HYDRATE 500 MG
1 CAPSULE ORAL 2 TIMES DAILY
COMMUNITY
End: 2024-05-22

## 2024-04-26 RX ORDER — ONDANSETRON 4 MG/1
4 TABLET, ORALLY DISINTEGRATING ORAL EVERY 6 HOURS PRN
Status: DISCONTINUED | OUTPATIENT
Start: 2024-04-26 | End: 2024-05-02 | Stop reason: HOSPADM

## 2024-04-26 RX ORDER — IPRATROPIUM BROMIDE AND ALBUTEROL SULFATE 2.5; .5 MG/3ML; MG/3ML
3 SOLUTION RESPIRATORY (INHALATION) ONCE
Status: DISCONTINUED | OUTPATIENT
Start: 2024-04-26 | End: 2024-04-27

## 2024-04-26 RX ORDER — SODIUM CHLORIDE 9 MG/ML
INJECTION, SOLUTION INTRAVENOUS CONTINUOUS
Status: DISCONTINUED | OUTPATIENT
Start: 2024-04-26 | End: 2024-04-26

## 2024-04-26 RX ORDER — AMOXICILLIN 250 MG
1 CAPSULE ORAL 2 TIMES DAILY
COMMUNITY
End: 2024-05-22

## 2024-04-26 RX ORDER — DEXTROSE MONOHYDRATE 25 G/50ML
25-50 INJECTION, SOLUTION INTRAVENOUS
Status: DISCONTINUED | OUTPATIENT
Start: 2024-04-26 | End: 2024-05-02 | Stop reason: HOSPADM

## 2024-04-26 RX ORDER — AMOXICILLIN 250 MG
2 CAPSULE ORAL 2 TIMES DAILY PRN
Status: DISCONTINUED | OUTPATIENT
Start: 2024-04-26 | End: 2024-05-02 | Stop reason: HOSPADM

## 2024-04-26 RX ORDER — ALBUTEROL SULFATE 0.83 MG/ML
SOLUTION RESPIRATORY (INHALATION)
Status: COMPLETED
Start: 2024-04-26 | End: 2024-04-26

## 2024-04-26 RX ORDER — BISACODYL 10 MG
10 SUPPOSITORY, RECTAL RECTAL DAILY PRN
Status: DISCONTINUED | OUTPATIENT
Start: 2024-04-26 | End: 2024-05-02 | Stop reason: HOSPADM

## 2024-04-26 RX ORDER — PIPERACILLIN SODIUM, TAZOBACTAM SODIUM 4; .5 G/20ML; G/20ML
4.5 INJECTION, POWDER, LYOPHILIZED, FOR SOLUTION INTRAVENOUS EVERY 6 HOURS
Status: DISCONTINUED | OUTPATIENT
Start: 2024-04-26 | End: 2024-04-26

## 2024-04-26 RX ORDER — IOPAMIDOL 755 MG/ML
500 INJECTION, SOLUTION INTRAVASCULAR ONCE
Status: COMPLETED | OUTPATIENT
Start: 2024-04-26 | End: 2024-04-26

## 2024-04-26 RX ORDER — PIPERACILLIN SODIUM, TAZOBACTAM SODIUM 4; .5 G/20ML; G/20ML
4.5 INJECTION, POWDER, LYOPHILIZED, FOR SOLUTION INTRAVENOUS EVERY 6 HOURS
Status: DISCONTINUED | OUTPATIENT
Start: 2024-04-26 | End: 2024-04-27

## 2024-04-26 RX ADMIN — NOREPINEPHRINE BITARTRATE 0.03 MCG/KG/MIN: 0.02 INJECTION, SOLUTION INTRAVENOUS at 16:39

## 2024-04-26 RX ADMIN — SODIUM CHLORIDE 70 ML: 9 INJECTION, SOLUTION INTRAVENOUS at 18:00

## 2024-04-26 RX ADMIN — SODIUM CHLORIDE: 9 INJECTION, SOLUTION INTRAVENOUS at 17:49

## 2024-04-26 RX ADMIN — SODIUM CHLORIDE: 9 INJECTION, SOLUTION INTRAVENOUS at 16:31

## 2024-04-26 RX ADMIN — SODIUM CHLORIDE, POTASSIUM CHLORIDE, SODIUM LACTATE AND CALCIUM CHLORIDE: 600; 310; 30; 20 INJECTION, SOLUTION INTRAVENOUS at 22:13

## 2024-04-26 RX ADMIN — SODIUM CHLORIDE 1000 ML: 9 INJECTION, SOLUTION INTRAVENOUS at 16:50

## 2024-04-26 RX ADMIN — SODIUM CHLORIDE: 9 INJECTION, SOLUTION INTRAVENOUS at 17:11

## 2024-04-26 RX ADMIN — SODIUM CHLORIDE: 9 INJECTION, SOLUTION INTRAVENOUS at 21:01

## 2024-04-26 RX ADMIN — PIPERACILLIN AND TAZOBACTAM 4.5 G: 4; .5 INJECTION, POWDER, FOR SOLUTION INTRAVENOUS at 17:47

## 2024-04-26 RX ADMIN — IPRATROPIUM BROMIDE AND ALBUTEROL SULFATE 3 ML: .5; 3 SOLUTION RESPIRATORY (INHALATION) at 16:49

## 2024-04-26 RX ADMIN — IOPAMIDOL 90 ML: 755 INJECTION, SOLUTION INTRAVENOUS at 18:01

## 2024-04-26 RX ADMIN — ALBUTEROL SULFATE 7.5 MG: 2.5 SOLUTION RESPIRATORY (INHALATION) at 16:48

## 2024-04-26 RX ADMIN — VANCOMYCIN HYDROCHLORIDE 1500 MG: 1 INJECTION, POWDER, LYOPHILIZED, FOR SOLUTION INTRAVENOUS at 22:10

## 2024-04-26 ASSESSMENT — ACTIVITIES OF DAILY LIVING (ADL)
ADLS_ACUITY_SCORE: 38
ADLS_ACUITY_SCORE: 23
ADLS_ACUITY_SCORE: 38
ADLS_ACUITY_SCORE: 38
ADLS_ACUITY_SCORE: 23

## 2024-04-26 ASSESSMENT — COLUMBIA-SUICIDE SEVERITY RATING SCALE - C-SSRS
6. HAVE YOU EVER DONE ANYTHING, STARTED TO DO ANYTHING, OR PREPARED TO DO ANYTHING TO END YOUR LIFE?: NO
1. IN THE PAST MONTH, HAVE YOU WISHED YOU WERE DEAD OR WISHED YOU COULD GO TO SLEEP AND NOT WAKE UP?: NO
2. HAVE YOU ACTUALLY HAD ANY THOUGHTS OF KILLING YOURSELF IN THE PAST MONTH?: NO

## 2024-04-26 ASSESSMENT — ENCOUNTER SYMPTOMS: SHORTNESS OF BREATH: 1

## 2024-04-26 NOTE — ED PROVIDER NOTES
History     Chief Complaint   Patient presents with    Shortness of Breath     HPI  Khang Bailon is a 79 year old male who presenting with EMS from skilled nursing facility secondary to shortness of breath.  Patient had a closed fracture of his hip approximately to 10 days ago and had a is repaired at [facility].  Over the past few days he has not been eating or drinking as much as he normally has per his wife and notes that this morning he was seen for significantly tired and short of breath.  On arrival patient has nasal cannula in place maintaining oxygen saturation above 94 however is in moderate to severe respiratory distress with intermittent hypotensive events with as low as 80/40.  His heart rates at approximately 120 and consistent with a wide-complex.  He is maintaining at this point.  Otherwise also has a history of recent CABG, infrarenal aortic aneurysm history, systolic heart failure and history of complete heart block with hypertension, hyperlipidemia and numerous possible malignancies was intended to be on hospice care starting Friday.  At this time we will maintain full CODE STATUS.    Allergies:  Allergies   Allergen Reactions    Flagyl [Metronidazole] GI Disturbance       Problem List:    Patient Active Problem List    Diagnosis Date Noted    Sepsis due to pneumonia (H) 04/26/2024     Priority: Medium    Septic shock (H) 04/26/2024     Priority: Medium    Acute respiratory failure with hypoxia (H) 04/26/2024     Priority: Medium    Sepsis with acute hypoxic respiratory failure without septic shock, due to unspecified organism (H) 04/26/2024     Priority: Medium    Closed fracture of right hip with routine healing 04/16/2024     Priority: Medium    Pain 04/16/2024     Priority: Medium    Drug-induced constipation 04/16/2024     Priority: Medium    ABLA (acute blood loss anemia) 04/16/2024     Priority: Medium    Postprocedural stricture of anterior urethra 03/21/2024     Priority: Medium     Acute bilateral back pain, unspecified back location 03/19/2024     Priority: Medium    SIRS (systemic inflammatory response syndrome) (H) 02/01/2024     Priority: Medium    Calculus of gallbladder without cholecystitis without obstruction 02/01/2024     Priority: Medium    Infrarenal abdominal aortic aneurysm (AAA) without rupture (H24) 02/01/2024     Priority: Medium    Fistula of sigmoid colon 02/01/2024     Priority: Medium    Aspiration into airway, initial encounter 02/01/2024     Priority: Medium    Gross hematuria 02/01/2024     Priority: Medium    Aspirin-like platelet function defect (H) 02/01/2024     Priority: Medium    Diverticulitis 01/31/2024     Priority: Medium    Pulmonary nodule 01/31/2024     Priority: Medium    Mass of urinary bladder 01/31/2024     Priority: Medium    Abnormal urinalysis 01/31/2024     Priority: Medium    Other chest pain 01/31/2024     Priority: Medium    COPD exacerbation (H) 01/31/2024     Priority: Medium    Systolic congestive heart failure, unspecified HF chronicity (H) 01/09/2024     Priority: Medium    Systolic congestive heart failure (H) 01/09/2024     Priority: Medium    Coronary artery disease involving native coronary artery of native heart without angina pectoris 12/05/2023     Priority: Medium    NSTEMI (non-ST elevated myocardial infarction) (H) 11/27/2023     Priority: Medium    COVID-19 08/13/2023     Priority: Medium    Benign prostatic hyperplasia, unspecified whether lower urinary tract symptoms present 11/01/2021     Priority: Medium    Benign prostatic hyperplasia 11/01/2021     Priority: Medium    Fatigue, unspecified type 11/29/2019     Priority: Medium    Memory loss 11/29/2019     Priority: Medium    Moderate COPD (chronic obstructive pulmonary disease) (H) 11/16/2017     Priority: Medium    Benign neoplasm of colon, unspecified part of colon 07/26/2016     Priority: Medium    Sigmoid diverticulitis 07/12/2016     Priority: Medium    History of CVA  (cerebrovascular accident) - old left internal capsule lacunar infarct on CT 7/2016 07/12/2016     Priority: Medium    Multilevel degenerative disc disease 09/21/2015     Priority: Medium    Elevated prostate specific antigen (PSA) 09/11/2015     Priority: Medium    Health Care Home 01/24/2013     Priority: Medium     Racquel Ortiz RN-PHN  [facility]   [phone]  DX V65.8 REPLACED WITH [id number] (04/08/2013)      Elevated PSA 08/27/2012     Priority: Medium    Loss of weight 08/27/2012     Priority: Medium    Third degree heart block (H) - s/p pacemaker 08/10/2012     Priority: Medium    Hypertension goal BP (blood pressure) < 140/90 08/10/2012     Priority: Medium    Advanced directives, counseling/discussion 07/27/2012     Priority: Medium     Discussed advance care planning with patient; however, patient declined at this time. 7/27/2012         Other thalassemia (H24) 07/27/2012     Priority: Medium     Diagnosis updated by automated process. Provider to review and confirm.      Hyperlipidemia LDL goal <130 07/27/2012     Priority: Medium    Heart block 07/27/2012     Priority: Medium    Cardiac abnormality 07/27/2012     Priority: Medium    Complete heart block (H) 07/27/2012     Priority: Medium    Thalassemia 07/27/2012     Priority: Medium     Formatting of this note might be different from the original.   Formatting of this note might be different from the original.   Diagnosis updated by automated process. Provider to review and confirm.      Painful respiration 03/31/2004     Priority: Medium    Personal history of tobacco use, presenting hazards to health 03/31/2004     Priority: Medium        Past Medical History:    Past Medical History:   Diagnosis Date    Alpha thalassemia (H24)     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    Diverticulitis     Injury, other and unspecified, elbow, forearm, and wrist 1970s       Past Surgical History:    Past Surgical History:   Procedure  "Laterality Date    COLONOSCOPY      COLONOSCOPY  12/10/2012    CYSTOSCOPY, LITHOLAPAXY, COMBINED N/A 11/10/2021    Procedure: Cystoscopy, Bladder Stone Removal;  Surgeon: Guanaco Stewart MD;  Location: [facility] OR    IMPLANT PACEMAKER      LASER KTP TRANSURETHRAL RESECTION (TUR) PROSTATE N/A 11/10/2021    Procedure: TRANSURETHRAL RESECTION (TUR) PROSTATE, USING KTP LASER;  Surgeon: Guanaco Stewart MD;  Location: [facility] OR       Family History:    No family history on file.    Social History:  Marital Status:   [2]  Social History     Tobacco Use    Smoking status: Former     Current packs/day: 0.00     Types: Cigarettes     Start date: 1962     Quit date: 2012     Years since quittin.7    Smokeless tobacco: Never    Tobacco comments:     Quit in July   Vaping Use    Vaping status: Never Used   Substance Use Topics    Alcohol use: No     Alcohol/week: 0.0 standard drinks of alcohol    Drug use: No        Medications:    acetaminophen (TYLENOL) 325 MG tablet  aspirin (ASA) 81 MG chewable tablet  budeson-glycopyrrol-formoterol (BREZTRI AEROSPHERE) 160-9-4.8 MCG/ACT AERO inhaler  calcium carbonate-vitamin D (CALTRATE) 600-10 MG-MCG per tablet  Cholecalciferol (VITAMIN D-3) 25 MCG (1000 UT) CAPS  Ferrous Sulfate 324 (65 Fe) MG TBEC  HYDROmorphone (DILAUDID) 2 MG tablet  ipratropium - albuterol 0.5 mg/2.5 mg/3 mL (DUONEB) 0.5-2.5 (3) MG/3ML neb solution  Lidocaine (LIDOCARE) 4 % Patch  polyethylene glycol (MIRALAX) 17 g packet  vitamin C (ASCORBIC ACID) 500 MG tablet  methocarbamol (ROBAXIN) 500 MG tablet  order for DME  oxyBUTYnin (DITROPAN) 5 MG tablet  pantoprazole (PROTONIX) 40 MG EC tablet  rosuvastatin (CRESTOR) 5 MG tablet          Review of Systems   Respiratory:  Positive for shortness of breath.    All other systems reviewed and are negative.      Physical Exam   BP: (!) 118/106  Pulse: (!) 128  Resp: (!) 53  Height: 175.3 cm (5' 9\")  Weight: 69.9 kg (154 lb)  SpO2: 90 %      Physical " Exam  Vitals and nursing note reviewed.   Constitutional:       General: He is in acute distress.      Appearance: He is ill-appearing and diaphoretic.      Comments: Patient looks moderately unwell on arrival with nasal cannula in place.  No signs of IV access.  Audible wheezing Rales present.  Patient also pale in appearance   HENT:      Head: Normocephalic and atraumatic.      Mouth/Throat:      Mouth: Mucous membranes are moist.   Eyes:      Extraocular Movements: Extraocular movements intact.      Pupils: Pupils are equal, round, and reactive to light.   Cardiovascular:      Rate and Rhythm: Regular rhythm. Tachycardia present.      Comments: Has intermittent PVCs  Pulmonary:      Effort: Tachypnea, accessory muscle usage and respiratory distress present.      Breath sounds: Examination of the right-upper field reveals decreased breath sounds. Examination of the left-upper field reveals decreased breath sounds. Examination of the right-middle field reveals decreased breath sounds. Examination of the left-middle field reveals decreased breath sounds. Examination of the right-lower field reveals decreased breath sounds, wheezing and rales. Examination of the left-lower field reveals decreased breath sounds and rales. Decreased breath sounds, wheezing and rales present.   Chest:      Chest wall: No mass or tenderness.   Abdominal:      Palpations: Abdomen is soft. There is no mass.      Tenderness: There is no guarding or rebound.      Comments: Mild tenderness to the left lower quadrant.   Musculoskeletal:         General: Normal range of motion.      Cervical back: Normal range of motion.      Right lower leg: No edema.      Left lower leg: No edema.   Skin:     General: Skin is warm.      Capillary Refill: Capillary refill takes less than 2 seconds.   Neurological:      General: No focal deficit present.      Mental Status: He is alert.      Comments: Arrival minimally able to respond with answers         ED  Course        Procedures             Critical Care time:  was 110 minutes for this patient excluding procedures.      Results for orders placed or performed during the hospital encounter of 04/26/24 (from the past 24 hour(s))   CBC with platelets differential    Narrative    The following orders were created for panel order CBC with platelets differential.  Procedure                               Abnormality         Status                     ---------                               -----------         ------                     CBC with platelets and d...[[id number]]  Abnormal            Final result               RBC and Platelet Morphology[[id number]]                      Final result                 Please view results for these tests on the individual orders.   Comprehensive metabolic panel   Result Value Ref Range    Sodium 143 135 - 145 mmol/L    Potassium 5.3 3.4 - 5.3 mmol/L    Carbon Dioxide (CO2) 21 (L) 22 - 29 mmol/L    Anion Gap 18 (H) 7 - 15 mmol/L    Urea Nitrogen 36.8 (H) 8.0 - 23.0 mg/dL    Creatinine 1.04 0.67 - 1.17 mg/dL    GFR Estimate 73 >60 mL/min/1.73m2    Calcium 9.3 8.8 - 10.2 mg/dL    Chloride 104 98 - 107 mmol/L    Glucose 195 (H) 70 - 99 mg/dL    Alkaline Phosphatase 102 40 - 150 U/L    AST 26 0 - 45 U/L    ALT 16 0 - 70 U/L    Protein Total 6.8 6.4 - 8.3 g/dL    Albumin 3.4 (L) 3.5 - 5.2 g/dL    Bilirubin Total 1.0 <=1.2 mg/dL   Lactic acid whole blood w/ reflex x1 in 3 Hr when >2   Result Value Ref Range    Lactic Acid, Initial 6.8 (HH) 0.7 - 2.0 mmol/L   Troponin T, High Sensitivity   Result Value Ref Range    Troponin T, High Sensitivity 91 (H) <=22 ng/L   Procalcitonin   Result Value Ref Range    Procalcitonin 0.23 <0.50 ng/mL   Nt probnp inpatient (BNP)   Result Value Ref Range    N terminal Pro BNP Inpatient 2,481 (H) 0 - 1,800 pg/mL   Magnesium   Result Value Ref Range    Magnesium 2.1 1.7 - 2.3 mg/dL   D dimer quantitative   Result Value Ref Range    D-Dimer Quantitative 3.49 (H)  0.00 - 0.50 ug/mL FEU    Narrative    This D-dimer assay is intended for use in conjunction with a clinical pretest probability assessment model to exclude pulmonary embolism (PE) and deep venous thrombosis (DVT) in outpatients suspected of PE or DVT. The cut-off value is 0.50 ug/mL FEU.    For patients 50 years of age or older, the application of age-adjusted cut-off values for D-Dimer may increase the specificity without significant effect on sensitivity. The literature suggested calculation age adjusted cut-off in ug/L = age in years x 10 ug/L. The results in this laboratory are reported as ug/mL rather than ug/L. The calculation for age adjusted cut off in ug/mL= age in years x 0.01 ug/mL. For example, the cut off for a 76 year old male is 76 x 0.01 ug/mL = 0.76 ug/mL (760 ug/L).    M Yenni et al. Age adjusted D-dimer cut-off levels to rule out pulmonary embolism: The ADJUST-PE Study. CHRIS 2014;311:3074-7668.; HJ Zakiya et al. Diagnostic accuracy of conventional or age adjusted D-dimer cutoff values in older patients with suspected venous thromboembolism. Systemic review and meta-analysis. BMJ 2013:346:f2492.   CBC with platelets and differential   Result Value Ref Range    WBC Count 24.1 (H) 4.0 - 11.0 10e3/uL    RBC Count 4.88 4.40 - 5.90 10e6/uL    Hemoglobin 9.3 (L) 13.3 - 17.7 g/dL    Hematocrit 31.5 (L) 40.0 - 53.0 %    MCV 65 (L) 78 - 100 fL    MCH 19.1 (L) 26.5 - 33.0 pg    MCHC 29.5 (L) 31.5 - 36.5 g/dL    RDW 22.6 (H) 10.0 - 15.0 %    Platelet Count 511 (H) 150 - 450 10e3/uL    % Neutrophils 77 %    % Lymphocytes 15 %    % Monocytes 6 %    % Eosinophils 0 %    % Basophils 1 %    % Immature Granulocytes 1 %    NRBCs per 100 WBC 1 (H) <1 /100    Absolute Neutrophils 18.6 (H) 1.6 - 8.3 10e3/uL    Absolute Lymphocytes 3.5 0.8 - 5.3 10e3/uL    Absolute Monocytes 1.4 (H) 0.0 - 1.3 10e3/uL    Absolute Eosinophils 0.1 0.0 - 0.7 10e3/uL    Absolute Basophils 0.2 0.0 - 0.2 10e3/uL    Absolute Immature  Granulocytes 0.3 <=0.4 10e3/uL    Absolute NRBCs 0.1 10e3/uL   RBC and Platelet Morphology   Result Value Ref Range    RBC Morphology Confirmed RBC Indices     Platelet Assessment  Automated Count Confirmed. Platelet morphology is normal.     Automated Count Confirmed. Platelet morphology is normal.    Giant Platelets      Acanthocytes      Shawna Rods      Basophilic Stippling      Bite Cells      Blister Cells      Wade Cells      Elliptocytes      Hgb C Crystals      Desai-Jolly Bodies      Hypersegmented Neutrophils      Polychromasia      RBC agglutination      RBC Fragments      Reactive Lymphocytes      Rouleaux      Sickle Cells      Smudge Cells      Spherocytes      Stomatocytes      Target Cells      Teardrop Cells      Toxic Neutrophils      Pathologist Review Comments (Blood)     Symptomatic Influenza A/B, RSV, & SARS-CoV2 PCR (COVID-19) Nose    Specimen: Nose; Swab   Result Value Ref Range    Influenza A PCR Negative Negative    Influenza B PCR Negative Negative    RSV PCR Negative Negative    SARS CoV2 PCR Negative Negative    Narrative    Testing was performed using the Xpert Xpress CoV2/Flu/RSV Assay on the [employer] GeneXpert Instrument. This test should be ordered for the detection of SARS-CoV-2, influenza, and RSV viruses in individuals who meet clinical and/or epidemiological criteria. Test performance is unknown in asymptomatic patients. This test is for in vitro diagnostic use under the FDA EUA for laboratories certified under CLIA to perform high or moderate complexity testing. This test has not been FDA cleared or approved. A negative result does not rule out the presence of PCR inhibitors in the specimen or target RNA in concentration below the limit of detection for the assay. If only one viral target is positive but coinfection with multiple targets is suspected, the sample should be re-tested with another FDA cleared, approved, or authorized test, if coinfection would change clinical  management. This test was validated by the [facility]. These laboratories are certified under the Clinical Laboratory Improvement Amendments of 1988 (CLIA-88) as qualified to perform high complexity laboratory testing.   XR Chest Port 1 View    Narrative    EXAM: XR CHEST PORT 1 VIEW  LOCATION: [facility]  DATE: 4/26/2024    INDICATION: Severe distress, history of CHF, COPD and recent CABG.  COMPARISON: 11/27/2023      Impression    IMPRESSION: Pacemaker leads unchanged. Some slight fibrosis in the lung bases. No acute new findings.   CT Chest (PE) Abdomen Pelvis w Contrast    Narrative    EXAM: CT CHEST PE ABDOMEN PELVIS W CONTRAST  LOCATION: [facility]  DATE: 4/26/2024    INDICATION: abdomen pain   hypotensive and tachycardic   hx of lung cancer and recent right hip surgery  COMPARISON: 04/03/2024  TECHNIQUE: CT chest pulmonary angiogram and routine CT abdomen pelvis with IV contrast. Arterial phase through the chest and venous phase through the abdomen and pelvis. Multiplanar reformats and MIP reconstructions were performed. Dose reduction   techniques were used.   CONTRAST: 90mLs Isovue 370    FINDINGS:  ANGIOGRAM CHEST: Pulmonary arteries are normal caliber and negative for pulmonary emboli. Assessment of the aorta is limited by poor contrast bolus. No definite dissection. No CT evidence of right heart strain.     LUNGS AND PLEURA: Spiculated superior segment right lower lobe pulmonary nodule is unchanged measuring 1.5 cm. Nearby partially calcified nodule is also stable, 1.3 cm. Diffuse centrilobular emphysema, most advanced in the upper lobes. There is new right   lower lobe dependent consolidation, with fluid-filled right lower lobe bronchi. Mild left basilar atelectasis.    MEDIASTINUM/AXILLAE: No significant mediastinal or hilar adenopathy. Small pericardial effusion. Cardiac pacemaker.    CORONARY ARTERY CALCIFICATION:  Previous intervention (stents or CABG).    HEPATOBILIARY: Cholelithiasis.    PANCREAS: No ductal dilatation.    SPLEEN: Tiny splenic cyst.    ADRENAL GLANDS: Unremarkable    KIDNEYS/BLADDER: Bilateral renal cysts for which no additional follow-up imaging is recommended. No hydronephrosis. Bladder is thick-walled but decompressed.    BOWEL: Colonic diverticulosis. Chronic sigmoid wall thickening compatible with diverticular disease. Chronic pericolonic stranding, without new fluid collections. Colocolic fistula redemonstrated. No small bowel obstruction.    LYMPH NODES: No significant retroperitoneal adenopathy.    VASCULATURE: 3.9 cm saccular abdominal aortic aneurysm is unchanged. Moderately extensive multifocal atherosclerotic disease. Approximately 50% narrowing of the SMA proximally.    PELVIC ORGANS: Moderate prostatic hypertrophy. Further assessment limited by streak artifact.    MUSCULOSKELETAL: Interval right hip arthroplasty. Chronic greater than 50% T6 compression fracture. T7 50% compression fracture has slightly increased in interval. Multilevel spondylosis. No suspicious lytic or blastic lesions. Thoracal lumbar scoliosis.   Multiple healing right rib fractures including fourth through eighth anterolateral ribs. Old right 11th posterior rib fracture.      Impression    IMPRESSION:  1.  New right lower lobe consolidation with associated retained secretions compatible with pneumonia or aspiration.  2.  No evidence pulmonary embolism.  3.  Sigmoid diverticular disease, with pericolonic inflammatory changes and colocolic fistula.  4.  Increasing height loss at T7.  5.  Stable spiculated right lung nodule.  6.  Multiple additional findings as above.   Lactic acid whole blood   Result Value Ref Range    Lactic Acid 2.1 (H) 0.7 - 2.0 mmol/L   Troponin T, High Sensitivity   Result Value Ref Range    Troponin T, High Sensitivity 63 (H) <=22 ng/L       Medications   norepinephrine (LEVOPHED) 4 mg in  mL  PERIPHERAL infusion (0.09 mcg/kg/min × 69.9 kg Intravenous Rate/Dose Change 4/26/24 1810)   ipratropium - albuterol 0.5 mg/2.5 mg/3 mL (DUONEB) neb solution 3 mL (0 mLs Nebulization Hold 4/26/24 1741)   piperacillin-tazobactam (ZOSYN) 4.5 g vial to attach to  mL bag (0 g Intravenous Stopped 4/26/24 1841)   sodium chloride 0.9 % infusion ( Intravenous $New Bag 4/26/24 1749)   sodium chloride 0.9 % infusion (has no administration in time range)   albuterol (PROVENTIL) (2.5 MG/3ML) 0.083% neb solution (7.5 mg  $Given 4/26/24 1648)   ipratropium - albuterol 0.5 mg/2.5 mg/3 mL (DUONEB) 0.5-2.5 (3) MG/3ML neb solution (3 mLs  $Given 4/26/24 1649)   sodium chloride 0.9% BOLUS 1,000 mL (0 mLs Intravenous Stopped 4/26/24 1707)   iopamidol (ISOVUE-370) solution 500 mL (90 mLs Intravenous $Given 4/26/24 1801)   Sodium Chloride 0.9 % bag 100mL for CT scan (70 mLs Intravenous $Given 4/26/24 1800)       Assessments & Plan (with Medical Decision Making)     I have reviewed the nursing notes.    I have reviewed the findings, diagnosis, plan and need for follow up with the patient.      Medical Decision Making  79-year-old male with extensive past medical history of CAD, CABG, COPD, hypertension, infrarenal AAA's, possible malignancy with a recent right hip replacement presenting with acute hypoxic respiratory failure and hypotension.  EMS providing 2 L nasal cannula.  Blood pressure on arrival 70/40 repeat 85/50.  With history of CHF CAD and recent surgery concern for possible vessel occlusion resulting in ACS versus dehydration versus pneumonia versus pneumothorax versus intrathoracic bleed secondary to possible malignancy versus sepsis secondary pneumonia from recent procedure versus PE from recent procedure and immobility and possible malignancy versus acute on chronic congestive heart failure likely secondary and/or contributing to hypoxic respiratory failure.  Patient placed on BiPAP and provided with 1 L of normal  saline fluids and initiated on Levophed due to patient's history of congestive heart failure hypotension and numerous risk to do poorly with extensive fluid administration.  Provided with Zosyn for concern of possible pneumonia and sepsis.  Lab work pending and imaging chest x-ray ordered followed by CT PE chest abdomen pelvis.  Wife at bedside noting full code at this time.    On reevaluation patient has improvement in his mental status and no longer pale.  Perfusion improved.  Mental status improved.  Patient remains mildly tachycardic at 110 mildly down from 120s.  Blood pressure remaining stable at 110/80.    Lab work showing a lactic acid of 6.8, white cell count of 24.1 and hemoglobin of 9.3 and a platelet count of 511 with an absolute neutrophil count of 18.6 concerning for possible pneumonia.  Patient also had elevated D-dimer 91 with a BNP of 2481 concerning for likely associated demand ischemia with associated CHF exacerbation.  He is D-dimer is elevated 3.49 however patient did recently have hip surgery few weeks ago however due to the elevated level as well as elevated BNP with associated hypotension and tachycardia concerning for PE.  CT imaging of chest ordered and pending.  Plain film imaging without any acute signs of pneumothorax or acute pneumonia evident.  Will provide Zosyn antibiotics for acute pneumonia due to Rales and rhonchi bilateral lower lungs and patient's history of immobility.  Patient is also anemic at 9.3 which is within normal patient's baseline at 9.1-9.4.  His lactic acid is being repeated 3 hours from 433.  He also has a mildly decreased bicarb of 21 and anion gap of 18 with a glucose of 195 concern for possible metabolic and respiratory acidosis.  Procalcitonin leveled at 0.23 therefore more unlikely to be associated with pneumonia at this time and/or sepsis but will continue to treat per sepsis this time due to patient's presentation of tachycardia hypotension and shortness of  breath.      After discussion with patient he has full capacity and noted that he does not want to be intubated and or have chest compressions completed.  Family is adamant that they want these provided however at this point due to patient's ability to provide full capacity and decision-making capabilities we will maintain him is DNR/DNI at this time.  Order placed.  Patient is mentating appropriately with BiPAP.  He is remaining mildly tachycardic and then for a second bolus of 500 mL of fluids IV provided.  At this time believe patient requires ICU admission due to BiPAP as well as Levophed and IV antibiotics due to likely sepsis secondary to pneumonia.  Discussed case with Dr. Villanueva and will obtain telemetry ICU consult if necessary.  Admission orders placed.    New Prescriptions    No medications on file       Final diagnoses:   Sepsis with acute hypoxic respiratory failure without septic shock, due to unspecified organism (H)   Acute respiratory failure with hypoxia (H)   Third degree heart block (H) - s/p pacemaker   History of CVA (cerebrovascular accident) - old left internal capsule lacunar infarct on CT 7/2016   Infrarenal abdominal aortic aneurysm (AAA) without rupture (H24)       4/26/2024   [facility] EMERGENCY DEPT       Brian Garcia MD  04/26/24 2012

## 2024-04-26 NOTE — PROGRESS NOTES
Pt arrived via EMS in respiratory distress.   Pt had decreased air movement noted throughout the lung fields. Placed on BiPAP with multiple nebs given via aerogen with positive response.   Pt remains on BiPAP 10/5 RR 12 FIO2 30% at this time.   Pt was brought to CT Scan, slight drop in BP was noted and corrected by RN.   No respiratory distress was noted.     RT will continue to follow.

## 2024-04-26 NOTE — ED TRIAGE NOTES
Shortness of breath from [place] home. COPD and lung ca hx. Restless, tachypneic, gray skin, hypotensive in the 70s, 90s upon arrival. A-fib RVR 120s for rate. Full code with talk of hospice in the works.     Triage Assessment (Adult)       Row Name 04/26/24 9830          Triage Assessment    Airway WDL WDL        Respiratory WDL    Respiratory WDL X  Rales, respiratory distress, lung ca hx        Skin Circulation/Temperature WDL    Skin Circulation/Temperature WDL X;all  Gray skin tone, diaphoresis        Cardiac WDL    Cardiac WDL X  Hypotensive en route 70s, 90s upon arrival, poor perfusion        Cognitive/Neuro/Behavioral WDL    Cognitive/Neuro/Behavioral WDL X

## 2024-04-27 PROBLEM — C67.9 UROTHELIAL CARCINOMA OF BLADDER (H): Chronic | Status: ACTIVE | Noted: 2024-04-27

## 2024-04-27 PROBLEM — I25.10 CORONARY ARTERY DISEASE INVOLVING NATIVE CORONARY ARTERY OF NATIVE HEART WITHOUT ANGINA PECTORIS: Chronic | Status: ACTIVE | Noted: 2023-12-05

## 2024-04-27 PROBLEM — I50.22 CHRONIC SYSTOLIC CONGESTIVE HEART FAILURE (H): Chronic | Status: ACTIVE | Noted: 2024-04-27

## 2024-04-27 PROBLEM — J44.9 MODERATE COPD (CHRONIC OBSTRUCTIVE PULMONARY DISEASE) (H): Chronic | Status: ACTIVE | Noted: 2017-11-16

## 2024-04-27 LAB
ABO/RH(D): NORMAL
ANION GAP SERPL CALCULATED.3IONS-SCNC: 11 MMOL/L (ref 7–15)
ANTIBODY SCREEN: NEGATIVE
BLD PROD TYP BPU: NORMAL
BLOOD COMPONENT TYPE: NORMAL
BUN SERPL-MCNC: 31.4 MG/DL (ref 8–23)
CALCIUM SERPL-MCNC: 8.4 MG/DL (ref 8.8–10.2)
CHLORIDE SERPL-SCNC: 108 MMOL/L (ref 98–107)
CODING SYSTEM: NORMAL
CREAT SERPL-MCNC: 0.72 MG/DL (ref 0.67–1.17)
CROSSMATCH: NORMAL
DEPRECATED HCO3 PLAS-SCNC: 22 MMOL/L (ref 22–29)
EGFRCR SERPLBLD CKD-EPI 2021: >90 ML/MIN/1.73M2
ERYTHROCYTE [DISTWIDTH] IN BLOOD BY AUTOMATED COUNT: 21.6 % (ref 10–15)
GLUCOSE BLDC GLUCOMTR-MCNC: 104 MG/DL (ref 70–99)
GLUCOSE BLDC GLUCOMTR-MCNC: 115 MG/DL (ref 70–99)
GLUCOSE BLDC GLUCOMTR-MCNC: 121 MG/DL (ref 70–99)
GLUCOSE BLDC GLUCOMTR-MCNC: 128 MG/DL (ref 70–99)
GLUCOSE SERPL-MCNC: 117 MG/DL (ref 70–99)
GLUCOSE SERPL-MCNC: 122 MG/DL (ref 70–99)
HCT VFR BLD AUTO: 22.8 % (ref 40–53)
HGB BLD-MCNC: 6.8 G/DL (ref 13.3–17.7)
HGB BLD-MCNC: 6.9 G/DL (ref 13.3–17.7)
HGB BLD-MCNC: 7.2 G/DL (ref 13.3–17.7)
HOLD SPECIMEN: NORMAL
INR PPP: 1.68 (ref 0.85–1.15)
ISSUE DATE AND TIME: NORMAL
MCH RBC QN AUTO: 19.1 PG (ref 26.5–33)
MCHC RBC AUTO-ENTMCNC: 30.3 G/DL (ref 31.5–36.5)
MCV RBC AUTO: 63 FL (ref 78–100)
MRSA DNA SPEC QL NAA+PROBE: NEGATIVE
PLATELET # BLD AUTO: 254 10E3/UL (ref 150–450)
POTASSIUM SERPL-SCNC: 4 MMOL/L (ref 3.4–5.3)
RBC # BLD AUTO: 3.62 10E6/UL (ref 4.4–5.9)
SA TARGET DNA: NEGATIVE
SODIUM SERPL-SCNC: 141 MMOL/L (ref 135–145)
SPECIMEN EXPIRATION DATE: NORMAL
UNIT ABO/RH: NORMAL
UNIT NUMBER: NORMAL
UNIT STATUS: NORMAL
UNIT TYPE ISBT: 600
WBC # BLD AUTO: 17.8 10E3/UL (ref 4–11)

## 2024-04-27 PROCEDURE — 99207 PR NO BILLABLE SERVICE THIS VISIT: CPT | Performed by: INTERNAL MEDICINE

## 2024-04-27 PROCEDURE — C9113 INJ PANTOPRAZOLE SODIUM, VIA: HCPCS | Performed by: INTERNAL MEDICINE

## 2024-04-27 PROCEDURE — 85014 HEMATOCRIT: CPT | Performed by: INTERNAL MEDICINE

## 2024-04-27 PROCEDURE — 250N000011 HC RX IP 250 OP 636: Performed by: INTERNAL MEDICINE

## 2024-04-27 PROCEDURE — 85027 COMPLETE CBC AUTOMATED: CPT | Performed by: INTERNAL MEDICINE

## 2024-04-27 PROCEDURE — 200N000001 HC R&B ICU

## 2024-04-27 PROCEDURE — 258N000003 HC RX IP 258 OP 636: Performed by: INTERNAL MEDICINE

## 2024-04-27 PROCEDURE — 36415 COLL VENOUS BLD VENIPUNCTURE: CPT | Performed by: INTERNAL MEDICINE

## 2024-04-27 PROCEDURE — 80048 BASIC METABOLIC PNL TOTAL CA: CPT | Performed by: INTERNAL MEDICINE

## 2024-04-27 PROCEDURE — 85018 HEMOGLOBIN: CPT | Performed by: INTERNAL MEDICINE

## 2024-04-27 PROCEDURE — 85610 PROTHROMBIN TIME: CPT | Performed by: INTERNAL MEDICINE

## 2024-04-27 PROCEDURE — 82310 ASSAY OF CALCIUM: CPT | Performed by: INTERNAL MEDICINE

## 2024-04-27 PROCEDURE — 999N000147 HC STATISTIC PT IP EVAL DEFER: Performed by: PHYSICAL THERAPIST

## 2024-04-27 PROCEDURE — 250N000013 HC RX MED GY IP 250 OP 250 PS 637: Performed by: INTERNAL MEDICINE

## 2024-04-27 PROCEDURE — 250N000009 HC RX 250: Performed by: INTERNAL MEDICINE

## 2024-04-27 PROCEDURE — 99223 1ST HOSP IP/OBS HIGH 75: CPT | Mod: 95 | Performed by: INTERNAL MEDICINE

## 2024-04-27 PROCEDURE — 94640 AIRWAY INHALATION TREATMENT: CPT | Mod: 76

## 2024-04-27 PROCEDURE — P9016 RBC LEUKOCYTES REDUCED: HCPCS | Performed by: INTERNAL MEDICINE

## 2024-04-27 PROCEDURE — 82947 ASSAY GLUCOSE BLOOD QUANT: CPT | Performed by: INTERNAL MEDICINE

## 2024-04-27 RX ORDER — OXYBUTYNIN CHLORIDE 5 MG/1
5 TABLET ORAL 3 TIMES DAILY PRN
Status: DISCONTINUED | OUTPATIENT
Start: 2024-04-27 | End: 2024-05-02 | Stop reason: HOSPADM

## 2024-04-27 RX ORDER — METHOCARBAMOL 500 MG/1
500 TABLET, FILM COATED ORAL 3 TIMES DAILY
Status: DISCONTINUED | OUTPATIENT
Start: 2024-04-27 | End: 2024-05-02 | Stop reason: HOSPADM

## 2024-04-27 RX ORDER — NALOXONE HYDROCHLORIDE 0.4 MG/ML
0.2 INJECTION, SOLUTION INTRAMUSCULAR; INTRAVENOUS; SUBCUTANEOUS
Status: DISCONTINUED | OUTPATIENT
Start: 2024-04-27 | End: 2024-05-02 | Stop reason: HOSPADM

## 2024-04-27 RX ORDER — IPRATROPIUM BROMIDE AND ALBUTEROL SULFATE 2.5; .5 MG/3ML; MG/3ML
1 SOLUTION RESPIRATORY (INHALATION) 3 TIMES DAILY
Status: DISCONTINUED | OUTPATIENT
Start: 2024-04-27 | End: 2024-05-01

## 2024-04-27 RX ORDER — LIDOCAINE 4 G/G
1 PATCH TOPICAL EVERY 24 HOURS
Status: DISCONTINUED | OUTPATIENT
Start: 2024-04-27 | End: 2024-05-02

## 2024-04-27 RX ORDER — NALOXONE HYDROCHLORIDE 0.4 MG/ML
0.4 INJECTION, SOLUTION INTRAMUSCULAR; INTRAVENOUS; SUBCUTANEOUS
Status: DISCONTINUED | OUTPATIENT
Start: 2024-04-27 | End: 2024-05-02 | Stop reason: HOSPADM

## 2024-04-27 RX ORDER — FLUTICASONE FUROATE AND VILANTEROL 200; 25 UG/1; UG/1
1 POWDER RESPIRATORY (INHALATION) DAILY
Status: DISCONTINUED | OUTPATIENT
Start: 2024-04-27 | End: 2024-05-02 | Stop reason: HOSPADM

## 2024-04-27 RX ORDER — POLYETHYLENE GLYCOL 3350 17 G/17G
17 POWDER, FOR SOLUTION ORAL DAILY
Status: DISCONTINUED | OUTPATIENT
Start: 2024-04-27 | End: 2024-05-02 | Stop reason: HOSPADM

## 2024-04-27 RX ORDER — ROSUVASTATIN CALCIUM 5 MG/1
5 TABLET, COATED ORAL EVERY MORNING
Status: DISCONTINUED | OUTPATIENT
Start: 2024-04-27 | End: 2024-05-02 | Stop reason: HOSPADM

## 2024-04-27 RX ORDER — PIPERACILLIN SODIUM, TAZOBACTAM SODIUM 3; .375 G/15ML; G/15ML
3.38 INJECTION, POWDER, LYOPHILIZED, FOR SOLUTION INTRAVENOUS EVERY 6 HOURS
Status: DISCONTINUED | OUTPATIENT
Start: 2024-04-27 | End: 2024-05-02

## 2024-04-27 RX ADMIN — METHOCARBAMOL 500 MG: 500 TABLET ORAL at 20:38

## 2024-04-27 RX ADMIN — PIPERACILLIN AND TAZOBACTAM 3.38 G: 3; .375 INJECTION, POWDER, FOR SOLUTION INTRAVENOUS at 18:01

## 2024-04-27 RX ADMIN — METHOCARBAMOL 500 MG: 500 TABLET ORAL at 09:53

## 2024-04-27 RX ADMIN — IPRATROPIUM BROMIDE AND ALBUTEROL SULFATE 3 ML: .5; 3 SOLUTION RESPIRATORY (INHALATION) at 08:43

## 2024-04-27 RX ADMIN — FLUTICASONE FUROATE AND VILANTEROL TRIFENATATE 1 PUFF: 200; 25 POWDER RESPIRATORY (INHALATION) at 09:54

## 2024-04-27 RX ADMIN — IPRATROPIUM BROMIDE AND ALBUTEROL SULFATE 3 ML: .5; 3 SOLUTION RESPIRATORY (INHALATION) at 20:45

## 2024-04-27 RX ADMIN — METHOCARBAMOL 500 MG: 500 TABLET ORAL at 14:17

## 2024-04-27 RX ADMIN — SODIUM CHLORIDE, POTASSIUM CHLORIDE, SODIUM LACTATE AND CALCIUM CHLORIDE: 600; 310; 30; 20 INJECTION, SOLUTION INTRAVENOUS at 07:47

## 2024-04-27 RX ADMIN — IPRATROPIUM BROMIDE AND ALBUTEROL SULFATE 3 ML: .5; 3 SOLUTION RESPIRATORY (INHALATION) at 13:35

## 2024-04-27 RX ADMIN — PIPERACILLIN AND TAZOBACTAM 4.5 G: 4; .5 INJECTION, POWDER, FOR SOLUTION INTRAVENOUS at 12:12

## 2024-04-27 RX ADMIN — HYDROMORPHONE HYDROCHLORIDE 1 MG: 2 TABLET ORAL at 03:17

## 2024-04-27 RX ADMIN — PANTOPRAZOLE SODIUM 40 MG: 40 INJECTION, POWDER, FOR SOLUTION INTRAVENOUS at 14:18

## 2024-04-27 RX ADMIN — PANTOPRAZOLE SODIUM 40 MG: 40 INJECTION, POWDER, FOR SOLUTION INTRAVENOUS at 03:17

## 2024-04-27 RX ADMIN — PIPERACILLIN AND TAZOBACTAM 4.5 G: 4; .5 INJECTION, POWDER, FOR SOLUTION INTRAVENOUS at 05:45

## 2024-04-27 RX ADMIN — LIDOCAINE 1 PATCH: 4 PATCH TOPICAL at 06:16

## 2024-04-27 RX ADMIN — UMECLIDINIUM 1 PUFF: 62.5 AEROSOL, POWDER ORAL at 09:54

## 2024-04-27 RX ADMIN — SODIUM CHLORIDE, POTASSIUM CHLORIDE, SODIUM LACTATE AND CALCIUM CHLORIDE: 600; 310; 30; 20 INJECTION, SOLUTION INTRAVENOUS at 17:20

## 2024-04-27 RX ADMIN — ROSUVASTATIN CALCIUM 5 MG: 5 TABLET, FILM COATED ORAL at 09:53

## 2024-04-27 RX ADMIN — PIPERACILLIN AND TAZOBACTAM 4.5 G: 4; .5 INJECTION, POWDER, FOR SOLUTION INTRAVENOUS at 01:15

## 2024-04-27 ASSESSMENT — ACTIVITIES OF DAILY LIVING (ADL)
ADLS_ACUITY_SCORE: 26
ADLS_ACUITY_SCORE: 26
ADLS_ACUITY_SCORE: 24
ADLS_ACUITY_SCORE: 26
ADLS_ACUITY_SCORE: 26
ADLS_ACUITY_SCORE: 24
ADLS_ACUITY_SCORE: 26
ADLS_ACUITY_SCORE: 24
ADLS_ACUITY_SCORE: 24
DEPENDENT_IADLS:: CLEANING;LAUNDRY
ADLS_ACUITY_SCORE: 24
ADLS_ACUITY_SCORE: 26
ADLS_ACUITY_SCORE: 24
ADLS_ACUITY_SCORE: 26
ADLS_ACUITY_SCORE: 26
ADLS_ACUITY_SCORE: 24
ADLS_ACUITY_SCORE: 26
ADLS_ACUITY_SCORE: 24
ADLS_ACUITY_SCORE: 24

## 2024-04-27 NOTE — MEDICATION SCRIBE - ADMISSION MEDICATION HISTORY
Medication Scribe Admission Medication History    Admission medication history is complete. The information provided in this note is only as accurate as the sources available at the time of the update.    Information Source(s): Facility ([facility] medication list/MAR via N/A    Pertinent Information:     Changes made to PTA medication list:  Added: Omega 3   Multivitamin  Miralax   Senna-S   Added per facility MAR   Deleted: Albuterol inhaler   B Complex   Pantoprazole  Deleted per facility MAR    Changed: Vitamin C PO to 500 mg tablet  Caltrate from daily to BID   Vitamin D3 changed to 25 mcg daily   Duoneb changed to TID   Changes per MAR     Allergies reviewed with patient and updates made in EHR: unable to assess    Medication History Completed By: POLLO VALERO 4/26/2024 8:15 PM    PTA Med List   Medication Sig Note Last Dose    acetaminophen (TYLENOL) 325 MG tablet Take 650 mg by mouth 4 times daily  4/26/2024 at pm    aspirin (ASA) 81 MG chewable tablet Take 81 mg by mouth  4/26/2024 at am    budeson-glycopyrrol-formoterol (BREZTRI AEROSPHERE) 160-9-4.8 MCG/ACT AERO inhaler Inhale 2 puffs into the lungs 2 times daily  4/26/2024 at am    calcium carbonate-vitamin D (CALTRATE) 600-10 MG-MCG per tablet Take 1 tablet by mouth 2 times daily  4/26/2024 at am    Cholecalciferol (VITAMIN D-3) 25 MCG (1000 UT) CAPS Take 25 mcg by mouth daily  4/26/2024 at am    Ferrous Sulfate 324 (65 Fe) MG TBEC Take 1 tablet by mouth daily  4/26/2024 at am    fish oil-omega-3 fatty acids 1000 MG capsule Take 1 g by mouth 2 times daily  4/26/2024 at am    HYDROmorphone (DILAUDID) 2 MG tablet Take 1 mg by mouth every 4 hours as needed for severe pain  4/26/2024 at pm    ipratropium - albuterol 0.5 mg/2.5 mg/3 mL (DUONEB) 0.5-2.5 (3) MG/3ML neb solution USE 1 VIAL IN NEBULIZER EVERY 6 HOURS AS NEEDED FOR SHORTNESS OF BREATH, WHEEZING OR COUGH (Patient taking differently: Take 1 vial by nebulization 3 times daily)  4/26/2024 at 1300     Lidocaine (LIDOCARE) 4 % Patch Place 1 patch onto the skin every 24 hours To prevent lidocaine toxicity, patient should be patch free for 12 hrs daily. Apply to  hip 4/26/2024: Facility not able to obtain for administer since order obtained  not started at NA    methocarbamol (ROBAXIN) 500 MG tablet Take 500 mg by mouth 3 times daily  4/26/2024 at pm    Multiple Vitamin (MULTIVITAMIN ADULT PO) Take 1 tablet by mouth daily  4/26/2024 at am    order for DME Equipment being ordered: Nebulizer hose  4/26/2024 at pm    oxyBUTYnin (DITROPAN) 5 MG tablet Take 5 mg by mouth 3 times daily as needed for bladder spasms  Unknown at 0    polyethylene glycol (MIRALAX) 17 g packet Take 17 g by mouth daily  4/26/2024 at am    rosuvastatin (CRESTOR) 5 MG tablet Take 5 mg by mouth every morning  4/26/2024 at am    senna-docusate (SENOKOT-S/PERICOLACE) 8.6-50 MG tablet Take 1 tablet by mouth 2 times daily  4/26/2024 at am    vitamin C (ASCORBIC ACID) 500 MG tablet Take 500 mg by mouth daily  4/26/2024 at am

## 2024-04-27 NOTE — PHARMACY-VANCOMYCIN DOSING SERVICE
"Pharmacy Vancomycin Initial Note  Date of Service 2024  Patient's  1945  79 year old, male    Indication: Sepsis    Current estimated CrCl = Estimated Creatinine Clearance: 56.9 mL/min (based on SCr of 1.04 mg/dL).    Creatinine for last 3 days  2024:  4:33 PM Creatinine 1.04 mg/dL    Recent Vancomycin Level(s) for last 3 days  No results found for requested labs within last 3 days.      Vancomycin IV Administrations (past 72 hours)        No vancomycin orders with administrations in past 72 hours.                    Nephrotoxins and other renal medications (From now, onward)      Start     Dose/Rate Route Frequency Ordered Stop    24 2330  piperacillin-tazobactam (ZOSYN) 4.5 g vial to attach to  mL bag        Note to Pharmacy: For [facility], [facility] and [facility]: For Zosyn-naive patients, use the \"Zosyn initial dose + extended infusion\" order panel.    4.5 g  over 30 Minutes Intravenous EVERY 6 HOURS 24 2200  vancomycin (VANCOCIN) 1,500 mg in sodium chloride 0.9 % 250 mL intermittent infusion         1,500 mg  over 90 Minutes Intravenous EVERY 24 HOURS 24 1640  norepinephrine (LEVOPHED) 4 mg in  mL PERIPHERAL infusion         0.03-0.125 mcg/kg/min × 69.9 kg  7.9-32.8 mL/hr  Intravenous CONTINUOUS 24 1637              Contrast Orders - past 72 hours (72h ago, onward)      Start     Dose/Rate Route Frequency Stop    24 1750  iopamidol (ISOVUE-370) solution 500 mL         500 mL Intravenous ONCE 24 1801            [employer] Prediction of Planned Initial Vancomycin Regimen  Loading dose: N/A  Regimen: 1500 mg IV every 24 hours.  Start time: 21:43 on 2024  Exposure target: AUC24 (range)400-600 mg/L.hr   AUC24,ss: 549 mg/L.hr  Probability of AUC24 > 400: 84 %  Ctrough,ss: 16 mg/L  Probability of Ctrough,ss > 20: 27 %  Probability of nephrotoxicity (Lodise LORENZO ): 11 %          Plan:  Start vancomycin 1500 mg IV q24h. "   Vancomycin monitoring method: AUC  Vancomycin therapeutic monitoring goal: 400-600 mg*h/L  Pharmacy will check vancomycin levels as appropriate in 1-3 Days.    Serum creatinine levels will be ordered daily for the first week of therapy and at least twice weekly for subsequent weeks.      Summer Thompson RP

## 2024-04-27 NOTE — PROGRESS NOTES
"[facility]    Medicine Progress Note - Hospitalist Service    Date of Admission:  4/26/2024    Assessment & Plan   Khang Bailon is a 79 year old male admitted on 4/26/2024. He presented with shortness of breath, fevers and hypotension and has evidence of pneumonia on imaging.     Principal Problem:    Sepsis due to pneumonia (H)    Septic shock (H)    Acute respiratory failure with hypoxia (H)    Assessment: He presented with septic shock and hypotension and was on norepinephrine drip for several hours initially, along with IV fluids. He was successfully weaned off norepinephrine last evening. Stable BP since. On vancomycin and piperacillin-tazobactam. CTA negative for PE. MRSA swab is now negative.    Plan: 1. Stop vancomycin.   2. Continue piperacillin-tazobactam. Reduce the dose from 4.5 g to 3.375 g IV q6h.   3. Discontinue norepinephrine.    Active Problems:    Acute blood loss anemia    Tubulovillous adenoma of colon    Assessment: As per prior admission notes:   \"biopsies show tubulovillous adenoma with high grade dysplasia. Discussed with Dr. Benton. He can remove with endoscopic mucosal resection this but will need a two day golytely prep, clearance from PCP and hold Plavix and ASA for 7 days. This can be done as an outpatient after he recovers in 6-8 weeks. Outpatient referral to GI has been placed. GI will contact the patient.\"   Episodes of melena since admission. Hgb is dropping. Despite explaining the need for blood transfusion due to CAD and recent stent, he was reluctant until late afternoon, after another conversation with him, his wife and daughter. He now agrees to receive blood transfusion. He states he has thalassemia.    Plan: 1. Transfuse 1 unit of PRBC.   2. Repeat Hgb in am.   3. Hold aspirin for now. Consider resuming if Hgb improving and patient stable.   4. Continue pantoprazole.      Third degree heart block (H) - s/p pacemaker    Assessment: S/P " pacemaker.    Plan: Cardiac monitor.      Hypertension goal BP (blood pressure) < 140/90    Assessment: Controlled BP. Not on any antihypertensive therapy now.    Plan: Monitor.      Moderate COPD (chronic obstructive pulmonary disease) (H)    Assessment: Stable.    Plan: Continue Breo Ellipta plus umeclidinium and Duo Nebs.      Coronary artery disease involving native coronary artery of native heart without angina pectoris    Assessment: S/P PCI 11/2023. On statin and aspirin but not clopidogrel any more.    Plan: 1. Hold aspirin for now. Consider resuming if Hgb improving and patient stable.   2. Continue statin.      Chronic systolic congestive heart failure (H)    Assessment: Compensated. Not on any medications currently.    Plan: Monitor.      History of CVA (cerebrovascular accident) - old left internal capsule lacunar infarct on CT 7/2016    Assessment: On aspirin and statin.    Plan: 1. Hold aspirin for now. Consider resuming if Hgb improving and patient stable.   2. Continue statin.      Urothelial carcinoma of bladder (H)    Assessment: He had recent surgical resection for this and will need outpatient follow up.    Plan: Outpatient follow up with urology.          Diet: Clear Liquid Diet    DVT Prophylaxis: Pneumatic Compression Devices  Hernandez Catheter: Not present  Lines: None     Cardiac Monitoring: ACTIVE order. Indication: ICU  Code Status: Full Code      Clinically Significant Risk Factors Present on Admission          # Hypocalcemia: Lowest Ca = 8.4 mg/dL in last 2 days, will monitor and replace as appropriate     # Hypoalbuminemia: Lowest albumin = 3.4 g/dL at 4/26/2024  4:33 PM, will monitor as appropriate  # Coagulation Defect: INR = 1.68 (Ref range: 0.85 - 1.15) and/or PTT = N/A, will monitor for bleeding  # Drug Induced Platelet Defect: home medication list includes an antiplatelet medication   # Hypertension: Noted on problem list   # Circulatory Shock: required vasopressors within past 24  hours    # Acute Respiratory Failure: Documented O2 saturation < 91%.  Continue supplemental oxygen as needed         # Financial/Environmental Concerns:           Disposition Plan     Medically Ready for Discharge: Anticipated in 2-4 Days             Adam Lorenzo MD  Hospitalist Service  [facility]  Securely message with [employer] (more info)  Text page via [facility] Paging/Directory   ______________________________________________________________________    Interval History   Tired. Denies acute shortness f breath or chest pain or abdominal pain. No N/V. Episodes of melena today.     Physical Exam   Vital Signs: Temp: 98.5  F (36.9  C) Temp src: Oral BP: 127/82 Pulse: 97   Resp: 22 SpO2: 93 % O2 Device: None (Room air) Oxygen Delivery: 1 LPM  Weight: 153 lbs 1.6 oz    Constitutional: awake, alert, cooperative, no apparent distress, and appears stated age  Eyes: Lids and lashes normal, pupils equal, round and reactive to light, extra ocular muscles intact, sclera clear, conjunctiva normal  ENT: normocephalic, without obvious abnormality, atraumatic  Respiratory: moderately increased work of breathing, fairly good air exchange, scattered bilateral rales, no crackles or wheezing  Cardiovascular: Normal apical impulse, regular rate and rhythm, normal S1 and S2, no S3 or S4, and no murmur noted, pacemaker in right upper chest  GI: normal bowel sounds, soft, non-distended, and non-tender  Skin: normal skin color, texture, turgor and no rashes  Musculoskeletal: no lower extremity pitting edema present  there is no redness, warmth, or swelling of the joints  tone is normal  Neurologic: Awake, alert, oriented to name, place and time.  Cranial nerves II-XII are grossly intact.  Motor is 5 out of 5 bilaterally.  Sensory is intact.    Medical Decision Making       55 MINUTES SPENT BY ME on the date of service doing chart review, history, exam, documentation & further activities per the  note.  MANAGEMENT DISCUSSED with the following over the past 24 hours: the patient, family, nurses, care coordination team   NOTE(S)/MEDICAL RECORDS REVIEWED over the past 24 hours: H&P, ER notes, nursing and respiratory therapy notes       Data   Imaging results reviewed over the past 24 hrs:   No results found for this or any previous visit (from the past 24 hour(s)).  Most Recent 3 CBC's:  Recent Labs   Lab Test 04/27/24  1343 04/27/24  0814 04/27/24  0513 04/26/24  1633 04/24/24  0713   WBC  --  17.8*  --  24.1* 13.2*   HGB 6.8* 6.9* 7.2* 9.3* 9.2*   MCV  --  63*  --  65* 62*   PLT  --  254  --  511* 348     Most Recent 3 BMP's:  Recent Labs   Lab Test 04/27/24  1639 04/27/24  1211 04/27/24  0814 04/26/24  2301 04/26/24  1633 04/17/24  0738   NA  --   --  141  --  143 143   POTASSIUM  --   --  4.0  --  5.3 4.1   CHLORIDE  --   --  108*  --  104 108*   CO2  --   --  22  --  21* 25   BUN  --   --  31.4*  --  36.8* 19.2   CR  --   --  0.72  --  1.04 0.80   ANIONGAP  --   --  11  --  18* 10   RENU  --   --  8.4*  --  9.3 8.9   * 104* 117*   < > 195* 102*    < > = values in this interval not displayed.     Most Recent 2 LFT's:  Recent Labs   Lab Test 04/26/24  1633 04/03/24  1203   AST 26 26   ALT 16 13   ALKPHOS 102 73   BILITOTAL 1.0 0.6     Most Recent 3 INR's:  Recent Labs   Lab Test 04/27/24  0513 08/14/23  0635 07/12/16  1500   INR 1.68* 1.13 0.98     Most Recent D-dimer:  Recent Labs   Lab Test 04/26/24  1633   DD 3.49*

## 2024-04-27 NOTE — PROGRESS NOTES
"S-(situation): Patient arrives to room  via cart from ED approx. 2140.    B-(background): Sepsis    A-(assessment): Pt is A&Ox4. Arrived on Bipap 30% Fio2; 10/5. LS diminished. AV paced.Trace BLE edema.     /73   Pulse 96   Temp 97.5  F (36.4  C) (Oral)   Resp 27   Ht 1.753 m (5' 9\")   Wt 69.9 kg (154 lb)   SpO2 99%   BMI 22.74 kg/m      R-(recommendations): Orders reviewed with patient an Pt's spouse. Will monitor patient per MD orders.     Inpatient nursing criteria listed below were met:    Health care directives status obtained and documented: Yes  VTE ordered/documented: Yes- SCDs  Skin issues/needs documented:Yes- see LDA  Isolation addressed and Signage used: NA  Fall Prevention: Care plan updated Yes Education given and documented Yes  Care Plan initiated and Co-Morbidities added: Yes  Education Assessment documented:Yes  Admission Education Documented: Yes  If present CAUTI/CLABI Education done: NA  New medication patient education completed and documented (Possible Side Effects of Common Medications handout): Yes  Allergies Reviewed: Yes  Admission Medication Reconciliation completed: Yes  Home medications if not able to send immediately home with family stored here: NA  Reminder note placed in discharge instructions regarding home meds: NA  Individualized care needs/preferences addressed and charted: Yes  Provider Notified that patient has arrived to the unit: Yes       "

## 2024-04-27 NOTE — PLAN OF CARE
Goal Outcome Evaluation:      Plan of Care Reviewed With: patient, family    Overall Patient Progress: improvingOverall Patient Progress: improving     Patient weaned off of oxygen this morning. Remains on room air most of the day. Patient does get SOB with turning or activity. Lungs are diminished. No coughing noted. Hemoglobin trending down 6.9 and 6.8. Plan to transfuse 1 unit of blood this evening. X1 tarry/black large BM. Provider aware. Scrotum and perineum is red/excoriated. Condom catheter in place. Patient on clear liquid diet. Refused dinner tray but tolerated lunch tray. Scheduled Robaxin given for neck and shoulder aching. Patient is Alert but confused to time and situation. Tele remains AV Paced with BBB. LR running at 100ml/hr and TKO to keep other IV open. IV Zosyn given.

## 2024-04-27 NOTE — H&P
"[facility]    History and Physical - Hospitalist Service       Date of Admission:  4/26/2024    Assessment & Plan      Khang Bailon is a 79 year old male admitted on 4/26/2024. He presented with shortness of breath, fevers and hypotension and has evidence of pneumonia on imaging.     Septic Shock  Health Care Associated Pneumonia  Acute respiratory failurue with hypoxia  He presented with SOB and was found to be in septic shock. Imagine showed a new RLL pneumonia, probably caused by his recent rib fractures and subsequent shallow inspiration. He recently was treated for pneumonia (Admission from 4/3/2024 to 4/15/2024), so this new pneumonia would qualify as HCAP. CTA was negative for a PE.   - admit to hospitalist service  - c/w vancomycin and piperacillin/tazobactem  - c/w norepinephrine drip  - c/w IVF  - trend lactic acid  - follow up blood cultures and sputum cultures  - intermittent BIPAP as needed    Melena  He had an episode of melena after I saw the patient. His last hemoglobin was stable.   - trend H/H q8h  - type and screen  - start pantoprazole 40mg IV BID  - NPO  - hold apsirin and clopidogrel    CAD s/p PCI (11/2023)  - hold aspirin and clopidogrel  - c/w rosuvastatin    HFrEF  His last echocardiogram showed a normal EF and undetermined RV function.   - monitor IVF intake for now  - consider restarting carvedilol and losartan once current shock is resolve. These were held on his last admission.     History of CVA  - c/w aspirin and clopidogrel  - c/w rosuvastatin    COPD  - c/w inhaler regimen from home  - duonebs as needed    Urothelial Carcinoma  He had recent surgical resection for this and will need outpatient follow up.    Rectal Tubulovillous Adenoma  As per prior admission notes:   \"biopsies show tubulovillous adenoma with high grade dysplasia. Discussed with Dr. Benton. He can remove with endoscopic mucosal resection this but will need a two day golytely " "prep, clearance from PCP and hold Plavix and ASA for 7 days. This can be done as an outpatient after he recovers in 6-8 weeks. Outpatient referral to GI has been placed. GI will contact the patient.\"    Recent right hip fracture  Recent right 5th and 7th rib fractures  Recent T6 and T7 compression fractures  - pain control       Diet: Combination Diet  DVT Prophylaxis: SCDs  Hernandez Catheter: Not present  Lines: None     Code Status:   The patient stated that he would not want to be intubated but he would like us to try and save his life with chest compressions or defibrillation as needed.     Clinically Significant Risk Factors Present on Admission              # Hypoalbuminemia: Lowest albumin = 3.4 g/dL at 4/26/2024  4:33 PM, will monitor as appropriate   # Drug Induced Platelet Defect: home medication list includes an antiplatelet medication   # Hypertension: Noted on problem list   # Circulatory Shock: required vasopressors within past 24 hours      # Acute Respiratory Failure: Documented O2 saturation < 91%.  Continue supplemental oxygen as needed           # Financial/Environmental Concerns:           Disposition Plan      Expected Discharge Date: 04/30/2024                The patient's care was discussed with the Bedside Nurse and Patient.        Emile Villanueva MD  [facility]  Securely message with the Vocera Web Console (learn more here)  Text page via [facility] Paging/Directory      Visit/Communication Style   Virtual (Video) communication was used to evaluate Khang.  Khang consented to the use of video communication: yes  Video START time: 0045, 4/26/2024  Video STOP time: 0010, 4/27/2024   Patient's location: [facility]   Provider's location during the visit: [facility] Tele-medicine site        ______________________________________________________________________    Chief Complaint   Shortness of breath    History is obtained from the " "patient    History of Present Illness   Khang Bailon is a 79 year old male who presented to the ED with shortness of breath. He is not a great historian, stating that his \"wife made me come in\" and that he did not have any symptoms of SOB, cough, fevers, chills, N/V/D/C, abdominal pain, dysuria, rhinorrhea, sore throat. He still has some right sided chest pain after his fall. He did not really want to answer questions and eventually told me that he was done answering me.     Review of Systems    General: negative for fever, chills, sweats, weakness  Eyes: negative for blurred vision, loss of vision  Ear Nose and Throat: negative for pharyngitis, speech or swallowing difficulties  Respiratory:  negative for sputum production, wheezing, MARVIN, pleuritic pain, sob or cough  Cardiology:  negative for chest pain, palpitations, orthopnea, PND, edema, syncope   Gastrointestinal: negative for abdominal pain, nausea, vomiting, diarrhea, constipation, hematemesis, melena or hematochezia  Genitourinary: negative for frequency, urgency, dysuria, hematuria   Neurological: negative for focal weakness, paresthesia    Past Medical History    I have reviewed this patient's medical history and updated it with pertinent information if needed.   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  Past Surgical History:   Procedure Laterality Date    COLONOSCOPY      COLONOSCOPY  12/10/2012    CYSTOSCOPY, LITHOLAPAXY, COMBINED N/A 11/10/2021    " Procedure: Cystoscopy, Bladder Stone Removal;  Surgeon: Guanaco Stewart MD;  Location: [facility] OR    IMPLANT PACEMAKER      LASER KTP TRANSURETHRAL RESECTION (TUR) PROSTATE N/A 11/10/2021    Procedure: TRANSURETHRAL RESECTION (TUR) PROSTATE, USING KTP LASER;  Surgeon: Guanaco Stewart MD;  Location: [facility] OR       Social History   I have reviewed this patient's social history and updated it with pertinent information if needed.  Social History     Tobacco Use    Smoking status: Former     Current packs/day: 0.00     Types: Cigarettes     Start date: 1962     Quit date: 2012     Years since quittin.7    Smokeless tobacco: Never    Tobacco comments:     Quit in July   Vaping Use    Vaping status: Never Used   Substance Use Topics    Alcohol use: No     Alcohol/week: 0.0 standard drinks of alcohol    Drug use: No       Family History     Unable to obtain due to: patient stopped answering questions    Prior to Admission Medications   Prior to Admission Medications   Prescriptions Last Dose Informant Patient Reported? Taking?   Cholecalciferol (VITAMIN D-3) 25 MCG (1000 UT) CAPS 2024 at am Nursing Home Yes Yes   Sig: Take 25 mcg by mouth daily   Ferrous Sulfate 324 (65 Fe) MG TBEC 2024 at am Nursing Home Yes Yes   Sig: Take 1 tablet by mouth daily   HYDROmorphone (DILAUDID) 2 MG tablet 2024 at pm Nursing Home Yes Yes   Sig: Take 1 mg by mouth every 4 hours as needed for severe pain   Lidocaine (LIDOCARE) 4 % Patch not started at  Nursing Home Yes Yes   Sig: Place 1 patch onto the skin every 24 hours To prevent lidocaine toxicity, patient should be patch free for 12 hrs daily. Apply to R hip   Multiple Vitamin (MULTIVITAMIN ADULT PO) 2024 at am Nursing Home Yes Yes   Sig: Take 1 tablet by mouth daily   acetaminophen (TYLENOL) 325 MG tablet 2024 at pm Nursing Home Yes Yes   Sig: Take 650 mg by mouth 4 times daily   aspirin (ASA) 81 MG chewable tablet 2024 at am Nursing Home Yes  Yes   Sig: Take 81 mg by mouth   budeson-glycopyrrol-formoterol (BREZTRI AEROSPHERE) 160-9-4.8 MCG/ACT AERO inhaler 4/26/2024 at am Nursing Home No Yes   Sig: Inhale 2 puffs into the lungs 2 times daily   calcium carbonate-vitamin D (CALTRATE) 600-10 MG-MCG per tablet 4/26/2024 at am Nursing Home Yes Yes   Sig: Take 1 tablet by mouth 2 times daily   fish oil-omega-3 fatty acids 1000 MG capsule 4/26/2024 at am Nursing Home Yes Yes   Sig: Take 1 g by mouth 2 times daily   ipratropium - albuterol 0.5 mg/2.5 mg/3 mL (DUONEB) 0.5-2.5 (3) MG/3ML neb solution 4/26/2024 at 1300 Nursing Home No Yes   Sig: USE 1 VIAL IN NEBULIZER EVERY 6 HOURS AS NEEDED FOR SHORTNESS OF BREATH, WHEEZING OR COUGH   Patient taking differently: Take 1 vial by nebulization 3 times daily   methocarbamol (ROBAXIN) 500 MG tablet 4/26/2024 at pm Nursing Home Yes Yes   Sig: Take 500 mg by mouth 3 times daily   order for DME 4/26/2024 at pm Nursing Home No Yes   Sig: Equipment being ordered: Nebulizer hose   oxyBUTYnin (DITROPAN) 5 MG tablet Unknown at 0 Nursing Home Yes Yes   Sig: Take 5 mg by mouth 3 times daily as needed for bladder spasms   polyethylene glycol (MIRALAX) 17 g packet 4/26/2024 at am Nursing Home Yes Yes   Sig: Take 17 g by mouth daily   rosuvastatin (CRESTOR) 5 MG tablet 4/26/2024 at am Nursing Home Yes Yes   Sig: Take 5 mg by mouth every morning   senna-docusate (SENOKOT-S/PERICOLACE) 8.6-50 MG tablet 4/26/2024 at am Nursing Home Yes Yes   Sig: Take 1 tablet by mouth 2 times daily   vitamin C (ASCORBIC ACID) 500 MG tablet 4/26/2024 at am Nursing Home Yes Yes   Sig: Take 500 mg by mouth daily      Facility-Administered Medications: None     Allergies   Allergies   Allergen Reactions    Flagyl [Metronidazole] GI Disturbance       Physical Exam   Vital Signs: Temp: 97.5  F (36.4  C) Temp src: Oral BP: 115/73 Pulse: 96   Resp: 27 SpO2: 99 % O2 Device: Nasal cannula Oxygen Delivery: 3 LPM  Weight: 154 lbs 0 oz    Gen:  Well-developed,  well-nourished, in no acute distress, lying semi-supine in hospital stretcher  HEENT:  Anicteric sclera, PER, hearing intact to voice  Resp:  No accessory muscle use, Poor effort, some basal wheezing heard, diminished breath sounds  Card:  No murmur, normal S1, S2   Abd:  Soft per RN exam, no TTP, non-distended, normoactive bowel sounds are present  Musc:  Normal strength and movement of the major muscle groups without obvious deformity  Psych:  Good insight, oriented to person, place and time, not anxious, not agitated    Data     Recent Labs   Lab 04/26/24  2301 04/26/24  1633 04/24/24  0713   WBC  --  24.1* 13.2*   HGB  --  9.3* 9.2*   MCV  --  65* 62*   PLT  --  511* 348   NA  --  143  --    POTASSIUM  --  5.3  --    CHLORIDE  --  104  --    CO2  --  21*  --    BUN  --  36.8*  --    CR  --  1.04  --    ANIONGAP  --  18*  --    RENU  --  9.3  --    * 195*  --    ALBUMIN  --  3.4*  --    PROTTOTAL  --  6.8  --    BILITOTAL  --  1.0  --    ALKPHOS  --  102  --    ALT  --  16  --    AST  --  26  --          Recent Results (from the past 24 hour(s))   XR Chest Port 1 View    Narrative    EXAM: XR CHEST PORT 1 VIEW  LOCATION: [facility]  DATE: 4/26/2024    INDICATION: Severe distress, history of CHF, COPD and recent CABG.  COMPARISON: 11/27/2023      Impression    IMPRESSION: Pacemaker leads unchanged. Some slight fibrosis in the lung bases. No acute new findings.   CT Chest (PE) Abdomen Pelvis w Contrast    Narrative    EXAM: CT CHEST PE ABDOMEN PELVIS W CONTRAST  LOCATION: [facility]  DATE: 4/26/2024    INDICATION: abdomen pain   hypotensive and tachycardic   hx of lung cancer and recent right hip surgery  COMPARISON: 04/03/2024  TECHNIQUE: CT chest pulmonary angiogram and routine CT abdomen pelvis with IV contrast. Arterial phase through the chest and venous phase through the abdomen and pelvis. Multiplanar reformats and MIP reconstructions were  performed. Dose reduction   techniques were used.   CONTRAST: 90mLs Isovue 370    FINDINGS:  ANGIOGRAM CHEST: Pulmonary arteries are normal caliber and negative for pulmonary emboli. Assessment of the aorta is limited by poor contrast bolus. No definite dissection. No CT evidence of right heart strain.     LUNGS AND PLEURA: Spiculated superior segment right lower lobe pulmonary nodule is unchanged measuring 1.5 cm. Nearby partially calcified nodule is also stable, 1.3 cm. Diffuse centrilobular emphysema, most advanced in the upper lobes. There is new right   lower lobe dependent consolidation, with fluid-filled right lower lobe bronchi. Mild left basilar atelectasis.    MEDIASTINUM/AXILLAE: No significant mediastinal or hilar adenopathy. Small pericardial effusion. Cardiac pacemaker.    CORONARY ARTERY CALCIFICATION: Previous intervention (stents or CABG).    HEPATOBILIARY: Cholelithiasis.    PANCREAS: No ductal dilatation.    SPLEEN: Tiny splenic cyst.    ADRENAL GLANDS: Unremarkable    KIDNEYS/BLADDER: Bilateral renal cysts for which no additional follow-up imaging is recommended. No hydronephrosis. Bladder is thick-walled but decompressed.    BOWEL: Colonic diverticulosis. Chronic sigmoid wall thickening compatible with diverticular disease. Chronic pericolonic stranding, without new fluid collections. Colocolic fistula redemonstrated. No small bowel obstruction.    LYMPH NODES: No significant retroperitoneal adenopathy.    VASCULATURE: 3.9 cm saccular abdominal aortic aneurysm is unchanged. Moderately extensive multifocal atherosclerotic disease. Approximately 50% narrowing of the SMA proximally.    PELVIC ORGANS: Moderate prostatic hypertrophy. Further assessment limited by streak artifact.    MUSCULOSKELETAL: Interval right hip arthroplasty. Chronic greater than 50% T6 compression fracture. T7 50% compression fracture has slightly increased in interval. Multilevel spondylosis. No suspicious lytic or blastic  lesions. Thoracal lumbar scoliosis.   Multiple healing right rib fractures including fourth through eighth anterolateral ribs. Old right 11th posterior rib fracture.      Impression    IMPRESSION:  1.  New right lower lobe consolidation with associated retained secretions compatible with pneumonia or aspiration.  2.  No evidence pulmonary embolism.  3.  Sigmoid diverticular disease, with pericolonic inflammatory changes and colocolic fistula.  4.  Increasing height loss at T7.  5.  Stable spiculated right lung nodule.  6.  Multiple additional findings as above.

## 2024-04-27 NOTE — PLAN OF CARE
S-(situation): PT/OT orders received. Patient does not report to be ready for evaluations d/t medical status, per AM team rounding     B-(background): Patient presents with SOB, fevers and hypotension; and has been admitted for septic shock, pneumonia, hypoxia with acute respiratory failure, melena, CAD s/p PCI, COPD, hx of CVA, Recent R hip fracture, rib fractures and spinal compression fractures T6/7. He also has urothelial carcinoma and rectal tubulovillous adenoma.     A-(assessment): Will assess Monday for appropriateness.     R-(recommendations): See above.   Kristina Bliss................... PT, DPT, CLT   4/27/2024, 8:47 AM  [phone]

## 2024-04-27 NOTE — PROGRESS NOTES
Patient transported from ED to ICU on BiPAP 10/5  30% FiO2.  Shortly after arriving in ICU patient wanted to no longer wear BiPAP and was placed on 3 LPM NC.  Telemedicine ordered to use BiPAP only intermittently.  Patient appears to be tolerating 3 LPM NC well with HR 93 RR 25 SpO2 98% without dyspnea or respiratory distress.  RT to follow.

## 2024-04-27 NOTE — PLAN OF CARE
Goal Outcome Evaluation:      Plan of Care Reviewed With: patient    Overall Patient Progress: no change    Outcome Evaluation:     Patient refused BiPAP after 2130, nasal cannula in use and providing 3L O2. Patient has been maintaining SpO2 in mid 90s. Short of breath with minimal activity, such as turning in bed and becomes tachypneic as well. No cough. Lung sounds are diminished at bases but clear throughout. Afebrile. Trace edema. [facility] notes state that he was treated for bilateral pneumonia during is post op recovery there, resolved at time of discharge.    Condom in cath in place with urine output of 425ml/10 hours. Provider notified, no new orders. Bladder scanned obtained for volume of 264ml. Noted that patient had a Hernandez placed at [facility] for urinary retention while recovering from hip surgery. Discharged with Hernandez and this was removed 4/22. Sediment assessed in urine output here.     Right hip surgical incision is CDI, steri strips place, mild bruising. No erythema or swelling noted. Patient had staples removed 4/22. He has apparently been refusing therapies at [facility] and was noted to require 'maxium assistnace' for standing and pivoting at his post-op appointment.     Patient had large tarry black stool overnight. MD notified and orders for Hemoglobin and INR check this morning. Hemoglobin 7.2, INR 1.68. Provider notified, defer to day team. Patient typically takes Plavix for hx of CVA per chart review, this is not on his med rec, this was held following his hip surgery as he had hematuria, maybe has not yet been resumed??    New bladder cancer diagnosis with long nodule suspicious for malignancy on PET scan. Sounds like patient was offered BCG treatment but may have declined due to transportation.    Pain reported neck, shoulders, right ribs and right hip. Dilaudid 1mg x1, Lidoderm patch applied.     Patient oriented to self and time, disoriented to place and situation. Patient irritable and  withdrawn at times.     Code status concerns - since arriving through ED has been full code, DNR/DNI and now special code - DNI, yes to chest compressions.

## 2024-04-27 NOTE — ED NOTES
Writer documenting information for Rapid Response on 04/26/24 at 1629.    Rapid response attended by Keila Smith RN, Geovanna TIJERINA RN, Yolanda VIEYRA RN, Yoly FLORES, pharmacy, and Dr Garcia.      1629: 18g  IV placed R AC by LESLIE  1630: BP 84/61   1633: EKG completed  1635: BP 99/75 , Pt alert speaking with staff.      See EPIC charting for additional information and cares.

## 2024-04-28 LAB
BLD PROD TYP BPU: NORMAL
BLOOD COMPONENT TYPE: NORMAL
CODING SYSTEM: NORMAL
CROSSMATCH: NORMAL
ERYTHROCYTE [DISTWIDTH] IN BLOOD BY AUTOMATED COUNT: 25.9 % (ref 10–15)
GLUCOSE BLDC GLUCOMTR-MCNC: 101 MG/DL (ref 70–99)
GLUCOSE BLDC GLUCOMTR-MCNC: 118 MG/DL (ref 70–99)
GLUCOSE BLDC GLUCOMTR-MCNC: 122 MG/DL (ref 70–99)
GLUCOSE BLDC GLUCOMTR-MCNC: 97 MG/DL (ref 70–99)
HCT VFR BLD AUTO: 23.2 % (ref 40–53)
HGB BLD-MCNC: 7.2 G/DL (ref 13.3–17.7)
HGB BLD-MCNC: 9 G/DL (ref 13.3–17.7)
HOLD SPECIMEN: NORMAL
HOLD SPECIMEN: NORMAL
ISSUE DATE AND TIME: NORMAL
MCH RBC QN AUTO: 20.9 PG (ref 26.5–33)
MCHC RBC AUTO-ENTMCNC: 31 G/DL (ref 31.5–36.5)
MCV RBC AUTO: 67 FL (ref 78–100)
PLATELET # BLD AUTO: 191 10E3/UL (ref 150–450)
RBC # BLD AUTO: 3.45 10E6/UL (ref 4.4–5.9)
UNIT ABO/RH: NORMAL
UNIT NUMBER: NORMAL
UNIT STATUS: NORMAL
UNIT TYPE ISBT: 600
WBC # BLD AUTO: 12.8 10E3/UL (ref 4–11)

## 2024-04-28 PROCEDURE — C9113 INJ PANTOPRAZOLE SODIUM, VIA: HCPCS | Performed by: INTERNAL MEDICINE

## 2024-04-28 PROCEDURE — P9016 RBC LEUKOCYTES REDUCED: HCPCS | Performed by: INTERNAL MEDICINE

## 2024-04-28 PROCEDURE — 250N000013 HC RX MED GY IP 250 OP 250 PS 637: Performed by: INTERNAL MEDICINE

## 2024-04-28 PROCEDURE — 36415 COLL VENOUS BLD VENIPUNCTURE: CPT | Performed by: INTERNAL MEDICINE

## 2024-04-28 PROCEDURE — 120N000001 HC R&B MED SURG/OB

## 2024-04-28 PROCEDURE — 94640 AIRWAY INHALATION TREATMENT: CPT | Mod: 76

## 2024-04-28 PROCEDURE — 94640 AIRWAY INHALATION TREATMENT: CPT

## 2024-04-28 PROCEDURE — 999N000287 HC ICU ADULT ROUNDING, EACH 10 MINS

## 2024-04-28 PROCEDURE — 250N000009 HC RX 250: Performed by: INTERNAL MEDICINE

## 2024-04-28 PROCEDURE — 250N000011 HC RX IP 250 OP 636: Performed by: INTERNAL MEDICINE

## 2024-04-28 PROCEDURE — 99233 SBSQ HOSP IP/OBS HIGH 50: CPT | Performed by: INTERNAL MEDICINE

## 2024-04-28 PROCEDURE — 85027 COMPLETE CBC AUTOMATED: CPT | Performed by: INTERNAL MEDICINE

## 2024-04-28 PROCEDURE — 258N000003 HC RX IP 258 OP 636: Performed by: INTERNAL MEDICINE

## 2024-04-28 PROCEDURE — 85018 HEMOGLOBIN: CPT | Performed by: INTERNAL MEDICINE

## 2024-04-28 RX ORDER — PANTOPRAZOLE SODIUM 40 MG/1
40 TABLET, DELAYED RELEASE ORAL
Status: DISCONTINUED | OUTPATIENT
Start: 2024-04-28 | End: 2024-05-02 | Stop reason: HOSPADM

## 2024-04-28 RX ADMIN — FLUTICASONE FUROATE AND VILANTEROL TRIFENATATE 1 PUFF: 200; 25 POWDER RESPIRATORY (INHALATION) at 09:22

## 2024-04-28 RX ADMIN — PANTOPRAZOLE SODIUM 40 MG: 40 TABLET, DELAYED RELEASE ORAL at 16:20

## 2024-04-28 RX ADMIN — HYDROMORPHONE HYDROCHLORIDE 1 MG: 2 TABLET ORAL at 19:59

## 2024-04-28 RX ADMIN — IPRATROPIUM BROMIDE AND ALBUTEROL SULFATE 3 ML: .5; 3 SOLUTION RESPIRATORY (INHALATION) at 20:13

## 2024-04-28 RX ADMIN — IPRATROPIUM BROMIDE AND ALBUTEROL SULFATE 3 ML: .5; 3 SOLUTION RESPIRATORY (INHALATION) at 09:29

## 2024-04-28 RX ADMIN — ROSUVASTATIN CALCIUM 5 MG: 5 TABLET, FILM COATED ORAL at 09:21

## 2024-04-28 RX ADMIN — METHOCARBAMOL 500 MG: 500 TABLET ORAL at 16:20

## 2024-04-28 RX ADMIN — SODIUM CHLORIDE, POTASSIUM CHLORIDE, SODIUM LACTATE AND CALCIUM CHLORIDE: 600; 310; 30; 20 INJECTION, SOLUTION INTRAVENOUS at 09:22

## 2024-04-28 RX ADMIN — UMECLIDINIUM 1 PUFF: 62.5 AEROSOL, POWDER ORAL at 09:23

## 2024-04-28 RX ADMIN — PANTOPRAZOLE SODIUM 40 MG: 40 INJECTION, POWDER, FOR SOLUTION INTRAVENOUS at 02:29

## 2024-04-28 RX ADMIN — PIPERACILLIN AND TAZOBACTAM 3.38 G: 3; .375 INJECTION, POWDER, FOR SOLUTION INTRAVENOUS at 17:27

## 2024-04-28 RX ADMIN — PIPERACILLIN AND TAZOBACTAM 3.38 G: 3; .375 INJECTION, POWDER, FOR SOLUTION INTRAVENOUS at 12:14

## 2024-04-28 RX ADMIN — METHOCARBAMOL 500 MG: 500 TABLET ORAL at 09:21

## 2024-04-28 RX ADMIN — METHOCARBAMOL 500 MG: 500 TABLET ORAL at 21:05

## 2024-04-28 RX ADMIN — PIPERACILLIN AND TAZOBACTAM 3.38 G: 3; .375 INJECTION, POWDER, FOR SOLUTION INTRAVENOUS at 07:06

## 2024-04-28 RX ADMIN — IPRATROPIUM BROMIDE AND ALBUTEROL SULFATE 3 ML: .5; 3 SOLUTION RESPIRATORY (INHALATION) at 14:09

## 2024-04-28 RX ADMIN — PIPERACILLIN AND TAZOBACTAM 3.38 G: 3; .375 INJECTION, POWDER, FOR SOLUTION INTRAVENOUS at 00:37

## 2024-04-28 ASSESSMENT — ACTIVITIES OF DAILY LIVING (ADL)
ADLS_ACUITY_SCORE: 30
ADLS_ACUITY_SCORE: 26
ADLS_ACUITY_SCORE: 29
ADLS_ACUITY_SCORE: 26
ADLS_ACUITY_SCORE: 29
ADLS_ACUITY_SCORE: 26
ADLS_ACUITY_SCORE: 26
ADLS_ACUITY_SCORE: 30
ADLS_ACUITY_SCORE: 29
ADLS_ACUITY_SCORE: 26
ADLS_ACUITY_SCORE: 30
ADLS_ACUITY_SCORE: 30
ADLS_ACUITY_SCORE: 29
ADLS_ACUITY_SCORE: 26
ADLS_ACUITY_SCORE: 29
ADLS_ACUITY_SCORE: 30
ADLS_ACUITY_SCORE: 29
ADLS_ACUITY_SCORE: 26
ADLS_ACUITY_SCORE: 26
ADLS_ACUITY_SCORE: 30
ADLS_ACUITY_SCORE: 30
ADLS_ACUITY_SCORE: 26
ADLS_ACUITY_SCORE: 26

## 2024-04-28 NOTE — PLAN OF CARE
"Goal Outcome Evaluation:      Plan of Care Reviewed With: patient    Overall Patient Progress: no change    Outcome Evaluation:     Mood/Affect: Patient irritable and uncooperative with cares. Refusing skin checks, SCDs and repositioning. With every request patient replies \"why can't you just leave me alone\", \"how do I get out of here\". Also stating, \"this is a hole, not a hospital.\"    Tolerated one unit PRBCs.     Tmax 99.3. No cough. Lung sounds are clear, diminished at bases. No supplemental oxygen needs.     Urine output marginal, 30ml/hr on average. Condom cath in use. Refused bladder scan.     Reported moderate pain in shoulders at 2000, relieved with scheduled Robaxan, denied pain the remained of the shift.                 "

## 2024-04-28 NOTE — PROGRESS NOTES
"S- Transfer to med/surg 272 from .    B- Sepsis due to pneumonia    A- Brief systems assessment: /79   Pulse 93   Temp 98.6  F (37  C) (Oral)   Resp 24   Ht 1.753 m (5' 9\")   Wt 69.4 kg (153 lb 1.6 oz)   SpO2 96%   BMI 22.61 kg/m  See end of shift note from today at 1930.      R- Transfer to med/surg per physician orders. Continue to monitor pt and update physician as needed.      Code status: Full Code  Skin: Scattered scabs and bruising. Excoriated scrotum and perineum. Wound to coccyx and elbow with mepilex in place. Right hip incision with steri strips.   Fall Risk: Yes- Department fall risk interventions implemented.  Isolation and Signage: None  Medication drips upon transfer: x2 PIV. One is SL. 1 unit of blood transfusing in the other,   Blue Bin checked and medications transfer out with patientYes    "

## 2024-04-28 NOTE — PLAN OF CARE
"Goal Outcome Evaluation:      Plan of Care Reviewed With: patient, spouse    Overall Patient Progress: no changeOverall Patient Progress: no change    Patient is A&Ox4, but forgetful. Scheduled Robaxin given for pain to shoulders and neck. Vitally stable all day and remains on room air. LR was infusing this moring, however, arounf noon patient started to sound wet, infreqwnt congested cough began and lungs had increased crackles. Per provider LR stopped. Hemoglobin this morning was 7.2. Another unit of blood given this evening. Patient cooperative most of the shift but frustrated as he \"just wants to go home.\" Patient unwilling to get out of bed, external catheter in place with very low output. Provider aware. Bladder scanned this evening for 292 and asymptomatic. Tolerating clear liquid diet with fair intake. Sleeping most of the day.               "

## 2024-04-29 ENCOUNTER — APPOINTMENT (OUTPATIENT)
Dept: PHYSICAL THERAPY | Facility: CLINIC | Age: 79
DRG: 871 | End: 2024-04-29
Attending: INTERNAL MEDICINE
Payer: COMMERCIAL

## 2024-04-29 LAB
BASOPHILS # BLD AUTO: 0 10E3/UL (ref 0–0.2)
BASOPHILS NFR BLD AUTO: 0 %
EOSINOPHIL # BLD AUTO: 0.3 10E3/UL (ref 0–0.7)
EOSINOPHIL NFR BLD AUTO: 3 %
ERYTHROCYTE [DISTWIDTH] IN BLOOD BY AUTOMATED COUNT: 26.1 % (ref 10–15)
HCT VFR BLD AUTO: 28.5 % (ref 40–53)
HGB BLD-MCNC: 8.9 G/DL (ref 13.3–17.7)
HOLD SPECIMEN: NORMAL
IMM GRANULOCYTES # BLD: 0.1 10E3/UL
IMM GRANULOCYTES NFR BLD: 1 %
LYMPHOCYTES # BLD AUTO: 1.3 10E3/UL (ref 0.8–5.3)
LYMPHOCYTES NFR BLD AUTO: 13 %
MCH RBC QN AUTO: 21.8 PG (ref 26.5–33)
MCHC RBC AUTO-ENTMCNC: 31.2 G/DL (ref 31.5–36.5)
MCV RBC AUTO: 70 FL (ref 78–100)
MONOCYTES # BLD AUTO: 0.9 10E3/UL (ref 0–1.3)
MONOCYTES NFR BLD AUTO: 9 %
NEUTROPHILS # BLD AUTO: 7.4 10E3/UL (ref 1.6–8.3)
NEUTROPHILS NFR BLD AUTO: 74 %
NRBC # BLD AUTO: 0.1 10E3/UL
NRBC BLD AUTO-RTO: 1 /100
PLATELET # BLD AUTO: 176 10E3/UL (ref 150–450)
RBC # BLD AUTO: 4.09 10E6/UL (ref 4.4–5.9)
WBC # BLD AUTO: 10 10E3/UL (ref 4–11)

## 2024-04-29 PROCEDURE — 99233 SBSQ HOSP IP/OBS HIGH 50: CPT | Performed by: INTERNAL MEDICINE

## 2024-04-29 PROCEDURE — 97161 PT EVAL LOW COMPLEX 20 MIN: CPT | Mod: GP | Performed by: PHYSICAL THERAPIST

## 2024-04-29 PROCEDURE — 250N000009 HC RX 250: Performed by: INTERNAL MEDICINE

## 2024-04-29 PROCEDURE — 94640 AIRWAY INHALATION TREATMENT: CPT

## 2024-04-29 PROCEDURE — 82565 ASSAY OF CREATININE: CPT | Performed by: INTERNAL MEDICINE

## 2024-04-29 PROCEDURE — 250N000013 HC RX MED GY IP 250 OP 250 PS 637: Performed by: INTERNAL MEDICINE

## 2024-04-29 PROCEDURE — 85025 COMPLETE CBC W/AUTO DIFF WBC: CPT | Performed by: INTERNAL MEDICINE

## 2024-04-29 PROCEDURE — 250N000011 HC RX IP 250 OP 636: Performed by: INTERNAL MEDICINE

## 2024-04-29 PROCEDURE — 36415 COLL VENOUS BLD VENIPUNCTURE: CPT | Performed by: INTERNAL MEDICINE

## 2024-04-29 PROCEDURE — 94640 AIRWAY INHALATION TREATMENT: CPT | Mod: 76

## 2024-04-29 PROCEDURE — 120N000001 HC R&B MED SURG/OB

## 2024-04-29 PROCEDURE — 999N000157 HC STATISTIC RCP TIME EA 10 MIN

## 2024-04-29 RX ORDER — MULTIPLE VITAMINS W/ MINERALS TAB 9MG-400MCG
1 TAB ORAL DAILY
Status: DISCONTINUED | OUTPATIENT
Start: 2024-04-29 | End: 2024-05-02 | Stop reason: HOSPADM

## 2024-04-29 RX ADMIN — PIPERACILLIN AND TAZOBACTAM 3.38 G: 3; .375 INJECTION, POWDER, FOR SOLUTION INTRAVENOUS at 00:34

## 2024-04-29 RX ADMIN — IPRATROPIUM BROMIDE AND ALBUTEROL SULFATE 3 ML: .5; 3 SOLUTION RESPIRATORY (INHALATION) at 13:26

## 2024-04-29 RX ADMIN — HYDROMORPHONE HYDROCHLORIDE 1 MG: 2 TABLET ORAL at 14:32

## 2024-04-29 RX ADMIN — IPRATROPIUM BROMIDE AND ALBUTEROL SULFATE 3 ML: .5; 3 SOLUTION RESPIRATORY (INHALATION) at 09:05

## 2024-04-29 RX ADMIN — METHOCARBAMOL 500 MG: 500 TABLET ORAL at 21:24

## 2024-04-29 RX ADMIN — PIPERACILLIN AND TAZOBACTAM 3.38 G: 3; .375 INJECTION, POWDER, FOR SOLUTION INTRAVENOUS at 23:44

## 2024-04-29 RX ADMIN — PIPERACILLIN AND TAZOBACTAM 3.38 G: 3; .375 INJECTION, POWDER, FOR SOLUTION INTRAVENOUS at 18:42

## 2024-04-29 RX ADMIN — IPRATROPIUM BROMIDE AND ALBUTEROL SULFATE 3 ML: .5; 3 SOLUTION RESPIRATORY (INHALATION) at 21:22

## 2024-04-29 RX ADMIN — PANTOPRAZOLE SODIUM 40 MG: 40 TABLET, DELAYED RELEASE ORAL at 08:46

## 2024-04-29 RX ADMIN — PANTOPRAZOLE SODIUM 40 MG: 40 TABLET, DELAYED RELEASE ORAL at 17:11

## 2024-04-29 RX ADMIN — MULTIPLE VITAMINS W/ MINERALS TAB 1 TABLET: TAB at 14:32

## 2024-04-29 RX ADMIN — METHOCARBAMOL 500 MG: 500 TABLET ORAL at 14:32

## 2024-04-29 RX ADMIN — METHOCARBAMOL 500 MG: 500 TABLET ORAL at 08:46

## 2024-04-29 RX ADMIN — PIPERACILLIN AND TAZOBACTAM 3.38 G: 3; .375 INJECTION, POWDER, FOR SOLUTION INTRAVENOUS at 05:49

## 2024-04-29 RX ADMIN — ROSUVASTATIN CALCIUM 5 MG: 5 TABLET, FILM COATED ORAL at 08:46

## 2024-04-29 RX ADMIN — PIPERACILLIN AND TAZOBACTAM 3.38 G: 3; .375 INJECTION, POWDER, FOR SOLUTION INTRAVENOUS at 14:34

## 2024-04-29 ASSESSMENT — ACTIVITIES OF DAILY LIVING (ADL)
ADLS_ACUITY_SCORE: 29
ADLS_ACUITY_SCORE: 28
ADLS_ACUITY_SCORE: 28
ADLS_ACUITY_SCORE: 29
ADLS_ACUITY_SCORE: 29
ADLS_ACUITY_SCORE: 28
ADLS_ACUITY_SCORE: 29
ADLS_ACUITY_SCORE: 27
ADLS_ACUITY_SCORE: 29
ADLS_ACUITY_SCORE: 29
ADLS_ACUITY_SCORE: 27
ADLS_ACUITY_SCORE: 29
ADLS_ACUITY_SCORE: 27
ADLS_ACUITY_SCORE: 29
ADLS_ACUITY_SCORE: 29
ADLS_ACUITY_SCORE: 28
ADLS_ACUITY_SCORE: 29

## 2024-04-29 NOTE — PLAN OF CARE
"Goal Outcome Evaluation:      Plan of Care Reviewed With: patient    Overall Patient Progress: no changeOverall Patient Progress: no change    Outcome Evaluation: A&Ox4. VSS on RA. Crackles heard in LS. Pt c/o pain on R side of body, unable to really describe other than achy. PO dilaudid 1 mg x1 and scheduled robaxin. Pt states it the pain meds had little effect. Patient slept between cares.  mL in external cath for shift + some in brief. Unable to measure brief as patient refused to let writer change it. Bladder scanned for 364, but urinated little shortly after. IV zosyn continued as ordered. Mepilex in place on L elbow for abrasion. R hip incision VANESSA. Blood transfused 1 unit, hemoglobin recheck at 2200 was 9.0 and 8.9 this AM. PIV SL, no IVF per provider on prior shift.     Pt refused repositioning and brief change. Writer educated patient on risk of moisture damage and pressure injuries. Patient continued to refuse.     /72 (BP Location: Left arm)   Pulse 82   Temp 98.3  F (36.8  C) (Oral)   Resp 24   Ht 1.753 m (5' 9\")   Wt 76.2 kg (167 lb 15.9 oz)   SpO2 94%   BMI 24.81 kg/m       "

## 2024-04-29 NOTE — CONSULTS
Care Management Initial Consult    General Information  Assessment completed with: Patient,    Type of CM/SW Visit: Offer D/C Planning    Primary Care Provider verified and updated as needed:     Readmission within the last 30 days: no previous admission in last 30 days      Reason for Consult: elevated risk for hospital readmission  Advance Care Planning: Advance Care Planning Reviewed: no concerns identified          Communication Assessment  Patient's communication style: spoken language (English or Bilingual)    Hearing Difficulty or Deaf: no   Wear Glasses or Blind: yes    Cognitive  Cognitive/Neuro/Behavioral: WDL  Level of Consciousness: alert  Arousal Level: opens eyes spontaneously  Orientation: oriented x 4  Mood/Behavior: cooperative  Best Language: 0 - No aphasia  Speech: clear    Living Environment:   People in home: spouse     Current living Arrangements: house      Able to return to prior arrangements: yes       Family/Social Support:  Care provided by: self  Provides care for: no one                Description of Support System:       Wife and extended family-voicing their ability to assist with cares to the patient in the home     Current Resources:   Patient receiving home care services: No     Community Resources: None  Equipment currently used at home: walker, rolling  Supplies currently used at home: None    Employment/Financial:  Employment Status: retired        Financial Concerns: none      Does the patient's insurance plan have a 3 day qualifying hospital stay waiver?  Yes     Which insurance plan 3 day waiver is available? Alternative insurance waiver    Will the waiver be used for post-acute placement? No    Lifestyle & Psychosocial Needs:  Social Determinants of Health     Food Insecurity: No Food Insecurity (4/4/2024)    Received from [facility]    Hunger Vital Sign     Worried About Running Out of Food in the Last Year: Never true     Ran Out of Food in the Last  Year: Never true   Depression: Not at risk (1/9/2024)    PHQ-2     PHQ-2 Score: 0   Housing Stability: Low Risk  (4/4/2024)    Received from [facility]    Housing Stability     In the last 12 months, was there a time when you were not able to pay the mortgage or rent on time?: No     Number of Places Lived in the Last Year: Not on file     Number of Places Lived in the Last Year (Outpatient): Not on file     In the last 12 months, how many places have you lived?: 0 to 2     In the last 12 months, was there a time when you did not have a steady place to sleep or slept in a shelter (including now)?: No   Tobacco Use: Medium Risk (4/22/2024)    Received from [facility]    Patient History     Smoking Tobacco Use: Former     Smokeless Tobacco Use: Never     Passive Exposure: Not on file   Financial Resource Strain: Unknown (3/26/2024)    Financial Resource Strain     Within the past 12 months, have you or your family members you live with been unable to get utilities (heat, electricity) when it was really needed?: Patient declined   Alcohol Use: Not on file   Transportation Needs: No Transportation Needs (4/4/2024)    Received from [facility]    Transportation Needs     In the past 12 months, has lack of transportation kept you from medical appointments, meetings, work, or from getting medicines or things needed for daily living?: No   Physical Activity: Not on file   Interpersonal Safety: Not At Risk (4/3/2024)    Received from [facility]    Intimate Partner Violence     Are you in a relationship where you are physically hurt, threatened and/or made to feel afraid?: No   Stress: Not on file   Social Connections: Not on file   Health Literacy: Not on file       Functional Status:  Prior to admission patient needed assistance:   Dependent ADLs:: Ambulation-walker, Wheelchair-with assist, Positioning, Transfers, Grooming, Dressing,  "Toileting  Dependent IADLs:: Cleaning, Laundry       Mental Health Status:      No current concerns    Chemical Dependency Status:      No current concerns          Values/Beliefs:  Spiritual, Cultural Beliefs, Jain Practices, Values that affect care: no               Additional Information:  Referral received to assist with discharge planning and services.     CM notified by outside services providers:     Pt had been at [facility]. Was not successful in the rehab unit--was not participating and showing improvement. Once patient was admitted back to the hospital -Family chose to hold the bed for a couple days--but today decided to release the bed hold.     Pt and wife also elected NOT to enroll in [facility]-Wife cancelled services prior to their enrollment date 04/28/24. Pt requested to be admitted into the hospital and voiced desire for continued treatment.     CM spoke with the Pt and \"ex-son-in-law Jake\"  Several messages left for Spouse Brittany - no return call.     Patient stated he was not interested in participating in therapy stating \"what is the point?\" And expressed that when he does do therapy is often put him in more pain.     Pt expressed his strong desire to return back home with his family.    Family confirmed they have pulled several members together to provide increased help at home. They also purchased the needed DME: hospital bed, commode etc.       PLAN: CM will continue to make attempts to reach Spouse and Daughter May to coordinate a safe discharge back to home.     Appear family has all needed DME at home--need to confirm    Spouse -cancelled bed hold at [facility]  Spouse -cancelled referral to [facility]    Preliminary referral sent to [facility] for potential need of home care on discharge         ROCIO Fam  [facility] [phone]   [facility]  [phone]     "

## 2024-04-29 NOTE — PROGRESS NOTES
Palliative Care:    Family meeting planned 4/30 at noon.  Full consultation at that time.    VALENTINE Srivastava

## 2024-04-29 NOTE — PROGRESS NOTES
04/29/24 1230   Appointment Info   Signing Clinician's Name / Credentials (PT) Daysi Bhatt PT, DPT, ATC, LAT   Rehab Comments (PT) PT eval and treat ICU Activity orders: up with assist       Present no   Living Environment   People in Home spouse   Current Living Arrangements house   Home Accessibility stairs to enter home;stairs within home   Number of Stairs, Main Entrance 8   Stair Railings, Main Entrance railings on both sides of stairs   Number of Stairs, Within Home, Primary greater than 10 stairs  (to basement)   Transportation Anticipated agency   Living Environment Comments most recently from TCU following hip fracture. at baseline has living quarters in the shop with a bathroom, living quarters with kitchen and bedroom. per patient family has acquired a bed, wheelchair, walker and all DME needed to safely manage patient at home.   Self-Care   Usual Activity Tolerance moderate   Current Activity Tolerance poor   Regular Exercise No   Equipment Currently Used at Home walker, rolling   Fall history within last six months yes   Number of times patient has fallen within last six months 1   Activity/Exercise/Self-Care Comment pre-fall no DME   General Information   Onset of Illness/Injury or Date of Surgery 04/26/24   Referring Physician Stefan Osorio MD   Patient/Family Therapy Goals Statement (PT) home   Pertinent History of Current Problem (include personal factors and/or comorbidities that impact the POC) Patient is a 79 year old male, admitted due to SOB, fever, hypotension found to have sepsis due to pneumonia, septic shock, ARF with hypoxia, anemia, colon cancer. Patient with recently at TCU following a fall suffering rib fracture and lift hip fracture with repair. Patient with a previous medical history of pacemaker insitu, HTN, COPD, CAD, CHF, bladder cancer, CVA.   Existing Precautions/Restrictions fall   Weight-Bearing Status - LUE full weight-bearing   Weight-Bearing  Status - RUE full weight-bearing   Weight-Bearing Status - LLE weight-bearing as tolerated   Weight-Bearing Status - RLE full weight-bearing   Cognition   Affect/Mental Status (Cognition) flat/blunted affect   Orientation Status (Cognition) oriented to;person;place   Follows Commands (Cognition) WFL   Pain Assessment   Patient Currently in Pain No   Posture    Posture Forward head position;Protracted shoulders   Range of Motion (ROM)   Range of Motion ROM deficits secondary to weakness   ROM Comment RLE movement limited by pain in post op setting   Strength (Manual Muscle Testing)   Strength (Manual Muscle Testing) Able to perform L SLR   Strength Comments RLE movement limited by weakness in post op setting   Bed Mobility   Bed Mobility supine-sit   Supine-Sit Mounds (Bed Mobility) moderate assist (50% patient effort);1 person assist   Bed Mobility Limitations cognitive deficits;decreased ability to use legs for bridging/pushing   Impairments Contributing to Impaired Bed Mobility decreased sensation;pain   Assistive Device (Bed Mobility) bed rails  (HOB elevated)   Transfers   Transfers sit-stand transfer;bed-chair transfer   Bed-Chair Transfer   Assistive Device (Bed-Chair Transfers) walker, front-wheeled   Bed-Chair Mounds (Transfers) contact guard   Sit-Stand Transfer   Sit-Stand Mounds (Transfers) minimum assist (75% patient effort);1 person assist   Assistive Device (Sit-Stand Transfers) walker, front-wheeled  (perched surface)   Gait/Stairs (Locomotion)   Comment, (Gait/Stairs) unable to complete   Balance   Balance Comments Short sitting balance with UE support. standing balance requires walker and supervision for safety with stepping   Sensory Examination   Sensory Perception patient reports no sensory changes   Clinical Impression   Criteria for Skilled Therapeutic Intervention Yes, treatment indicated   PT Diagnosis (PT) impaired mobility, generalized weakness   Influenced by the  following impairments history of fall with left hip repair, acute medical status   Functional limitations due to impairments transfers only, assistance for transfers and bed mobility and cares   Clinical Presentation (PT Evaluation Complexity) stable   Clinical Presentation Rationale medical status, clinical judgement, participation   Clinical Decision Making (Complexity) low complexity   Planned Therapy Interventions (PT) balance training;bed mobility training;gait training;home exercise program;patient/family education;ROM (range of motion);stair training;strengthening;transfer training;progressive activity/exercise;home program guidelines   Risk & Benefits of therapy have been explained evaluation/treatment results reviewed;participants voiced agreement with care plan;participants included;patient   Clinical Impression Comments Patient demonstrates functional mobility below his baseline prior to his hip fracture. Patient with poor insight to his need to mobilize, participate with rehab and disposition needs. Patient would benefit from goals of care discussions as he declined TCU placement and continued rehab at this time.   PT Total Evaluation Time   PT Andrew Low Complexity Minutes (04039) 20   Physical Therapy Goals   PT Frequency 3x/week   PT Predicted Duration/Target Date for Goal Attainment 05/04/24   PT Goals Bed Mobility;Transfers;Gait;Wheelchair Mobility   PT: Bed Mobility Modified independent;Supine to/from sit   PT: Transfers Modified independent;Sit to/from stand;Within precautions   PT: Gait Supervision/stand-by assist;Rolling walker;10 feet   PT: Wheelchair Mobility 150 feet;Caregiver SBA;manual wheelchair   PT Discharge Planning   PT Plan 1/3 Mon. transfers, exercise, wheelchair mobility   PT Discharge Recommendation (DC Rec) Transitional Care Facility;home with assist;home with home care physical therapy   PT Rationale for DC Rec Patient most recently from TCU following a fall with hip fracture.  Patient demonstrates functional mobility below his baseline prior to his hip fracture. Patient with poor insight to his need to mobilize, participate with rehab and disposition needs. Patient would benefit from goals of care discussions as he declined TCU placement and continued rehab at this time. Given mobility patient would benefit from TCU or home with 24/7 assistance with home safety assessment and HHPT and OT to address functional deficits to improve safety and mobility in the home.   PT Brief overview of current status MIN assist supine to sitting EOB with HOB elevated. MIN assist sit to stand with perched surface and walker. CGA bed to chair transfer   Total Session Time   Total Session Time (sum of timed and untimed services) 20     Thank you for your referral.  Daysi Bhatt, PT, DPT, ATC, LAT    [facility]  O: [phone]  E: [email or web address]

## 2024-04-29 NOTE — PROGRESS NOTES
Patient refusing up in chair for breakfast, refuses inhalers and miralax for morning medications.  Only took 180 ml clear liquids for breakfast, no IV is infusing. States his whole right side is painful, did get him to take his morning pills which included a Robaxin to help with his discomfort level. States he is frustrated and wants to go home, but unsure if he can go home as this nurse has been unable to assess his mobility. Currently has a male external catheter in place. Receives IV zosyn antibiotic as scheduled.    May need cognitive evaluation for decision making capacity.

## 2024-04-29 NOTE — PROGRESS NOTES
CLINICAL NUTRITION SERVICES - ASSESSMENT NOTE     Nutrition Prescription    RECOMMENDATIONS FOR MDs/PROVIDERS TO ORDER:  Please advance diet as able    Malnutrition Status:    Severe malnutrition in the context of acute on chronic illness    *pt met criteria for moderate protein-calorie malnutrition per recent RD assessment 4/4/24    Recommendations already ordered by Registered Dietitian (RD):  Will offer trial of Ensure clear (any flavor) BID with lunch and dinner to support PO intake - plan to adjust based on pt preference/tolerance and pending diet advancement     Multivitamin with minerals - ordered for pt due to clear liquid status and poor PO    Future/Additional Recommendations:  Will follow to monitor diet advancement, oral intake, weight changes, GI symptoms/BMs, and nutritional supplement tolerance     REASON FOR ASSESSMENT  Khang Bailon is a/an 79 year old male assessed by the dietitian for: identified at risk via screening criteria    MST score of 3: unsure of recent weight loss, poor appetite noted    NUTRITION HISTORY  Pt admitted via EMS from [place] home due to SOB. Had a closed fracture of hip approx. 10 days PTA here, was repaired through [facility]. Over the past few days, pt has not been eating or drinking as much as usual per wife. Morning of arrival, was significantly tired and SOB. Was in moderate - severe respiratory distress with intermittent hypotensive events as low as 80/40 in ED. Heart rates were about 120. Intended to be on hospice care starting soon.     Found to have septic shock, healthcare-associated pneumonia, acute respiratory failure with hypoxia, melena    Dxhx of third degree heart block s/p pacemaker, HTN, hx of CVA, tubulovillous adenoma of colon, moderate COPD, CAD, infrarenal abdominal aortic aneurysm, CHF, urothelial carcinoma of bladder, hx of tobacco use, other thalassemia, hyperlipidemia, loss of weight, elevated prostate specific antigen, multilevel degenerative disc  "disease, sigmoid diverticulitis, memory loss, BPH, COVID 19, NSTEMI, pulmonary nodule, mass of urinary bladder, SIRS, calculus of gallbladder, fistula of sigmoid colon, aspiration into airway, postprocedural stricture of anterior urethra, closed fracture of right hip, drug-induced constipation    Per chart review of recent RD note 4/11/24, pt did not drink Ensure plus HP supplements ordered. Noted trial of Ensure clear, unsure if tolerated/accepted.     CURRENT NUTRITION ORDERS  Diet: Orders Placed This Encounter      Clear Liquid Diet    Intake/Tolerance: 25% of clear liquid trays so far during admission. Ordered Ensure clear for lunch today 4/29 but not sure if pt consumed supp or not. GI is abnormal - bowel sounds are hypoactive in all quadrants. Last BM was 4/27 - loose, black.     LABS  Labs reviewed - elevated chloride, elevated urea nitrogen, low calcium, elevated BG, elevated lactic acid (was critically elevated earlier in stay), elevated BNP, elevated troponin, low hemoglobin (was critically low earlier in stay) and hematocrit    MEDICATIONS  Medications reviewed - inhalers, neb, lidocaine patch, methocarbamol, protonix, IV zosyn, miralax, crestor, sodium chloride IVF at 10 mL/hr = 240 mL per day, PRN dilaudid last given yesterday 4/28    ANTHROPOMETRICS  Height: 175.3 cm (5' 9\")  Most Recent Weight: 76.2 kg (167 lb 15.9 oz) via method unknown, was 165 lbs via bed scale 4/28, 153 lbs via bed scale 4.27 (?)  IBW: 160 lbs  BMI: Normal BMI  Weight History:   Wt Readings from Last 25 Encounters:   04/29/24 76.2 kg (167 lb 15.9 oz)   04/22/24 69.9 kg (154 lb)   04/03/24 74.8 kg (164 lb 12.8 oz)   03/19/24 76.2 kg (168 lb)   02/27/24 81.6 kg (180 lb)   02/20/24 79.8 kg (176 lb)   02/05/24 78 kg (172 lb)   02/02/24 78.2 kg (172 lb 4.8 oz)   01/23/24 81.2 kg (179 lb)   01/09/24 84.2 kg (185 lb 11.2 oz)   12/15/23 83 kg (183 lb)   12/05/23 83.2 kg (183 lb 6.4 oz)   11/27/23 83 kg (183 lb)   11/27/23 83 kg (183 lb) "   08/13/23 82 kg (180 lb 11.2 oz)   04/24/23 83.5 kg (184 lb 1.6 oz)   08/02/22 83.6 kg (184 lb 4.8 oz)   12/07/21 79.4 kg (175 lb)   11/01/21 80.2 kg (176 lb 12.8 oz)   10/27/21 80.2 kg (176 lb 11.2 oz)   10/06/21 79.4 kg (175 lb)   10/01/21 76.2 kg (167 lb 14.4 oz)   09/30/21 77.6 kg (171 lb)   07/12/21 80.7 kg (178 lb)   06/27/21 81.6 kg (180 lb)   Per chart review:   156 lbs on 4/22/24    15% weight loss in less than 3 months (meets criteria for severe malnutrition)    Weight data fluctuates quite a bit, no edema noted on OP basis (?)    Current bed scale weight went up - was 153 lbs on 4/27. Will use this weight as likely most accurate.     Dosing Weight: 76.2 kg using actual BW (likely a bit higher than actual since bed scale)    ASSESSED NUTRITION NEEDS  Estimated Energy Needs: 2,286-2,667 kcals/day (30 - 35 kcals/kg )  Justification: Increased needs, Repletion, and Wound healing  Estimated Protein Needs: + grams protein/day (1.2 - 1.5+ grams of pro/kg)  Justification: Hypercatabolism with acute illness, Increased needs, Repletion, and Wound healing  Estimated Fluid Needs: 2,667 mL/day (30 mL/kg)   Justification: Maintenance    PHYSICAL FINDINGS  See malnutrition section below.  Per chart review: surgical incision to right hip, pressure injury to sacrum, left elbow abrasion    MALNUTRITION  % Intake: </= 50% for >/= 5 days (severe) < 75% for >/= 1 month (severe) *estimated per chart review  % Weight Loss: > 7.5% in less than 3 months (severe)  Subcutaneous Fat Loss: Unable to assess  Muscle Loss: Unable to assess  Fluid Accumulation/Edema: Does not meet criteria - 1+ trace to BL ankles   Malnutrition Diagnosis: Severe malnutrition in the context of acute on chronic illness  *will meet with pt as able for NFPE    NUTRITION DIAGNOSIS  Malnutrition related to poor appetite, acute on chronic illness as evidenced by current clear liquid status day 4, intake of </= 50% for >/= 5 days (severe), weight loss of  > 7.5% in less than 3 months (severe), and increased needs above baseline    INTERVENTIONS  Implementation  Nutrition Education: Will be provided if education needs arise   *would benefit from high calorie, high protein diet ed during stay, added handouts to discharge instructions and will review with pt as able    Collaboration with other providers  Medical food supplement therapy - trial of Ensure clear BID with lunch and dinner  Multivitamin/mineral supplement therapy - ordered due to poor PO intake    Goals  Diet adv v nutrition support within 2-3 days  Pt weight will remain stable during admission  Pt will tolerate diet advancement  Pt will tolerate nutritional supplement      Monitoring/Evaluation  Progress toward goals will be monitored and evaluated per protocol.  Deepthi Gillespie RD, LD  Clinical Dietitian  Office: [phone]  Weekend pager: [phone]

## 2024-04-29 NOTE — PROGRESS NOTES
[facility], located within [facility]    Medicine Progress Note - Hospitalist Service    Date of Admission:  4/26/2024    Assessment & Plan   Khang Bailon is a 79 year old male admitted on 4/26/2024. He presented with shortness of breath, fevers and hypotension and has evidence of pneumonia on imaging.         4/28 :       Off oxygen, no fevers  Continue iv antibiotics  Hb low in 7's and will transfuse another unit of PRBC  Black stools in last few days, will monitor hb  Holding aspirin and plavis for cardiac stents as hb is low and concern for bleeding    Patient had seen a urologist recently for concerns of urothelial cancer   Also has a lung nodule and had a PET scan : 1. An irregular FDG avid 1.3 x 1.5 cm pulmonary nodule in the right lower lobe is suspicious for malignancy, likely lung cancer. If desired, this is amenable to percutaneous CT-guided biopsy. No evidence of indira or distant metastasis.  2. Marked prostate gland enlargement. Ovoid focus of FDG activity in the left lateral mid prostate gland is indeterminate, could be inflammatory or represent underlying prostate neoplasm. Please correlate clinically. If desired, this could be further   evaluated with prostate MRI.  3. Improving acute diverticulitis mid sigmoid colon.    Will discuss with his urologist in am about future plans  Not sure if patient has decision making  capacity at this time  Family wants to continue with full cares for now      Not medically ready for discharge at this time.      Principal Problem:    Sepsis due to pneumonia (H)    Septic shock (H)    Acute respiratory failure with hypoxia (H)    Assessment: He presented with septic shock and hypotension and was on norepinephrine drip for several hours initially, along with IV fluids. He was successfully weaned off norepinephrine last evening. Stable BP since. On vancomycin and piperacillin-tazobactam. CTA negative for PE. MRSA swab is now negative.    Plan: 1. Stop  "vancomycin.   2. Continue piperacillin-tazobactam. Reduce the dose from 4.5 g to 3.375 g IV q6h.   3. Discontinue norepinephrine.    Active Problems:    Acute blood loss anemia    Tubulovillous adenoma of colon    Assessment: As per prior admission notes:   \"biopsies show tubulovillous adenoma with high grade dysplasia. Discussed with Dr. Benton. He can remove with endoscopic mucosal resection this but will need a two day golytely prep, clearance from PCP and hold Plavix and ASA for 7 days. This can be done as an outpatient after he recovers in 6-8 weeks. Outpatient referral to GI has been placed. GI will contact the patient.\"   Episodes of melena since admission. Hgb is dropping. Despite explaining the need for blood transfusion due to CAD and recent stent, he was reluctant until late afternoon, after another conversation with him, his wife and daughter. He now agrees to receive blood transfusion. He states he has thalassemia.    Plan: 1. Transfuse 1 unit of PRBC.   2. Repeat Hgb in am.   3. Hold aspirin for now. Consider resuming if Hgb improving and patient stable.   4. Continue pantoprazole.      Third degree heart block (H) - s/p pacemaker    Assessment: S/P pacemaker.    Plan: Cardiac monitor.      Hypertension goal BP (blood pressure) < 140/90    Assessment: Controlled BP. Not on any antihypertensive therapy now.    Plan: Monitor.      Moderate COPD (chronic obstructive pulmonary disease) (H)    Assessment: Stable.    Plan: Continue Breo Ellipta plus umeclidinium and Duo Nebs.      Coronary artery disease involving native coronary artery of native heart without angina pectoris    Assessment: S/P PCI 11/2023. On statin and aspirin but not clopidogrel any more.    Plan: 1. Hold aspirin for now. Consider resuming if Hgb improving and patient stable.   2. Continue statin.      Chronic systolic congestive heart failure (H)    Assessment: Compensated. Not on any medications currently.    Plan: Monitor.      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    Plan: 1. Hold aspirin for now. Consider resuming if Hgb improving and patient stable.   2. Continue statin.      Urothelial carcinoma of bladder (H)    Assessment: He had recent surgical resection for this and will need outpatient follow up.    Plan: Outpatient follow up with urology.          Diet: Clear Liquid Diet    DVT Prophylaxis: Pneumatic Compression Devices  Hernandez Catheter: Not present  Lines: None     Cardiac Monitoring: ACTIVE order. Indication: ICU  Code Status: Full Code      Clinically Significant Risk Factors              # Hypoalbuminemia: Lowest albumin = 3.4 g/dL at 4/26/2024  4:33 PM, will monitor as appropriate    # Coagulation Defect: INR = 1.68 (Ref range: 0.85 - 1.15) and/or PTT = N/A, will monitor for bleeding    # Hypertension: Noted on problem list            # Financial/Environmental Concerns: none         Disposition Plan     Medically Ready for Discharge: Anticipated in 2-4 Days             Stefan Osorio MD  Hospitalist Service  [facility]  Securely message with [employer] (more info)  Text page via [facility] Paging/Directory   ______________________________________________________________________      Physical Exam   Vital Signs: Temp: 98.3  F (36.8  C) Temp src: Oral BP: 121/69 Pulse: 86   Resp: 22 SpO2: 96 % O2 Device: None (Room air)    Weight: 165 lbs 9.05 oz    Constitutional: awake, alert, cooperative, no apparent distress, and appears stated age  Eyes: Lids and lashes normal, pupils equal, round and reactive to light, extra ocular muscles intact, sclera clear, conjunctiva normal  ENT: normocephalic, without obvious abnormality, atraumatic  Respiratory: moderately increased work of breathing, fairly good air exchange, scattered bilateral rales, no crackles or wheezing  Cardiovascular: Normal apical impulse, regular  rate and rhythm, normal S1 and S2, no S3 or S4, and no murmur noted, pacemaker in right upper chest  GI: normal bowel sounds, soft, non-distended, and non-tender  Skin: normal skin color, texture, turgor and no rashes  Musculoskeletal: no lower extremity pitting edema present  there is no redness, warmth, or swelling of the joints  tone is normal  Neurologic: Awake, alert, oriented to name, place and time.  Cranial nerves II-XII are grossly intact.  Motor is 5 out of 5 bilaterally.  Sensory is intact.    Medical Decision Making       55 MINUTES SPENT BY ME on the date of service doing chart review, history, exam, documentation & further activities per the note.  MANAGEMENT DISCUSSED with the following over the past 24 hours: the patient, family, nurses, care coordination team   NOTE(S)/MEDICAL RECORDS REVIEWED over the past 24 hours: H&P, ER notes, nursing and respiratory therapy notes       Data   Imaging results reviewed over the past 24 hrs:   No results found for this or any previous visit (from the past 24 hour(s)).  Most Recent 3 CBC's:  Recent Labs   Lab Test 04/28/24  0508 04/27/24  1343 04/27/24  0814 04/27/24  0513 04/26/24  1633   WBC 12.8*  --  17.8*  --  24.1*   HGB 7.2* 6.8* 6.9*   < > 9.3*   MCV 67*  --  63*  --  65*     --  254  --  511*    < > = values in this interval not displayed.     Most Recent 3 BMP's:  Recent Labs   Lab Test 04/28/24  1625 04/28/24  1213 04/28/24  0913 04/27/24  1211 04/27/24  0814 04/26/24  2301 04/26/24  1633 04/17/24  0738   NA  --   --   --   --  141  --  143 143   POTASSIUM  --   --   --   --  4.0  --  5.3 4.1   CHLORIDE  --   --   --   --  108*  --  104 108*   CO2  --   --   --   --  22  --  21* 25   BUN  --   --   --   --  31.4*  --  36.8* 19.2   CR  --   --   --   --  0.72  --  1.04 0.80   ANIONGAP  --   --   --   --  11  --  18* 10   RENU  --   --   --   --  8.4*  --  9.3 8.9   * 122* 101*   < > 117*   < > 195* 102*    < > = values in this interval not  displayed.     Most Recent 2 LFT's:  Recent Labs   Lab Test 04/26/24  1633 04/03/24  1203   AST 26 26   ALT 16 13   ALKPHOS 102 73   BILITOTAL 1.0 0.6     Most Recent 3 INR's:  Recent Labs   Lab Test 04/27/24  0513 08/14/23  0635 07/12/16  1500   INR 1.68* 1.13 0.98     Most Recent D-dimer:  Recent Labs   Lab Test 04/26/24  1633   DD 3.49*

## 2024-04-29 NOTE — PROGRESS NOTES
04/29/24 1326   Oxygen Therapy   SpO2 94 %   O2 Device None (Room air)   Assessment   Respiratory WDL X   Rhythm/Pattern, Respiratory dyspnea upon exertion   Breath Sounds   Breath Sounds All Fields   All Lung Fields Breath Sounds diminished   LLL Breath Sounds diminished   RLL Breath Sounds diminished   RUL Breath Sounds diminished   Nebulizer Assessment & Treatment   $RT Use ONLY Delivery Method Nebulizer - Additional   Nebulizer Device Mouthpiece   Pretreatment Heart Rate (beats/min) 92   Pretreatment Resp Rate (breaths/min) 18   Pretreat Breath Sounds - Bilat - All Lobes diminished   Pretreat Breath Sounds - LIBRA diminished   Pretreat Breath Sounds - LLL diminished   Pretreat Breath Sounds - RUL diminished   Pretreat Breath Sounds - RML diminished   Pretreat Breath Sounds - RLL diminished   Patient Position sitting in chair   Respiratory Treatment Status (SVN) given   Breath Sounds Post-Respiratory Treatment   Posttreatment Heart Rate (beats/min) 94   Posttreatment Resp Rate (breaths/min) 18   Posttreatment Assessment (SVN) breath sounds unchanged   Signs of Intolerance (SVN) none   Breath Sounds Posttreatment All Fields no change   Breath Sounds Posttreatment LIBRA diminished   Breath Sounds Posttreatment LLL diminished   Breath Sounds Posttreatment RUL diminished   Breath Sounds Posttreatment RML diminished   Breath Sounds Posttreatment RLL diminished     Room air 94% patient states no improvement post treatment  Breath sounds less crackles and wheezing in afternoon

## 2024-04-29 NOTE — PLAN OF CARE
Goal Outcome Evaluation:       Patient refused some medications and interventions this morning around breakfast time, did agree to get up in a chair after lunch. Found his phone in his bed. This afternoon is stating he has pain on the right side, did medicate with scheduled robaxin and prn oral dilaudid.  IV antibiotic infusing currently.             Problem: Adult Inpatient Plan of Care  Goal: Optimal Comfort and Wellbeing  Intervention: Provide Person-Centered Care  Recent Flowsheet Documentation  Taken 4/29/2024 0850 by Ness Hernandez RN  Trust Relationship/Rapport:   care explained   choices provided     Problem: Sepsis/Septic Shock  Goal: Absence of Infection Signs and Symptoms  Intervention: Initiate Sepsis Management  Recent Flowsheet Documentation  Taken 4/29/2024 0850 by Ness Hernandez RN  Infection Prevention: rest/sleep promoted     Problem: Pain Acute  Goal: Optimal Pain Control and Function  Intervention: Prevent or Manage Pain  Recent Flowsheet Documentation  Taken 4/29/2024 0850 by Ness Hernandez, RN  Medication Review/Management: medications reviewed

## 2024-04-30 PROBLEM — D68.9 COAGULOPATHY (H): Status: ACTIVE | Noted: 2024-04-30

## 2024-04-30 PROBLEM — N40.0 ENLARGED PROSTATE: Status: ACTIVE | Noted: 2024-04-30

## 2024-04-30 PROBLEM — S50.312A ABRASION OF LEFT ELBOW: Status: ACTIVE | Noted: 2024-04-30

## 2024-04-30 PROBLEM — E88.09 HYPOALBUMINEMIA: Status: ACTIVE | Noted: 2024-04-30

## 2024-04-30 PROBLEM — L89.159 SACRAL PRESSURE SORE: Status: ACTIVE | Noted: 2024-04-30

## 2024-04-30 PROBLEM — E43 SEVERE MALNUTRITION (H): Status: ACTIVE | Noted: 2024-04-30

## 2024-04-30 PROBLEM — K92.1 GASTROINTESTINAL HEMORRHAGE WITH MELENA: Status: ACTIVE | Noted: 2024-04-30

## 2024-04-30 PROBLEM — E87.20 LACTIC ACIDOSIS: Status: ACTIVE | Noted: 2024-04-30

## 2024-04-30 LAB
CREAT SERPL-MCNC: 0.7 MG/DL (ref 0.67–1.17)
EGFRCR SERPLBLD CKD-EPI 2021: >90 ML/MIN/1.73M2
HGB BLD-MCNC: 9.6 G/DL (ref 13.3–17.7)

## 2024-04-30 PROCEDURE — 94640 AIRWAY INHALATION TREATMENT: CPT | Mod: 76

## 2024-04-30 PROCEDURE — 99233 SBSQ HOSP IP/OBS HIGH 50: CPT | Performed by: PEDIATRICS

## 2024-04-30 PROCEDURE — 250N000011 HC RX IP 250 OP 636: Performed by: INTERNAL MEDICINE

## 2024-04-30 PROCEDURE — 94640 AIRWAY INHALATION TREATMENT: CPT

## 2024-04-30 PROCEDURE — 36415 COLL VENOUS BLD VENIPUNCTURE: CPT | Performed by: PEDIATRICS

## 2024-04-30 PROCEDURE — 99418 PROLNG IP/OBS E/M EA 15 MIN: CPT | Performed by: PEDIATRICS

## 2024-04-30 PROCEDURE — 250N000009 HC RX 250: Performed by: INTERNAL MEDICINE

## 2024-04-30 PROCEDURE — 85018 HEMOGLOBIN: CPT | Performed by: PEDIATRICS

## 2024-04-30 PROCEDURE — 99222 1ST HOSP IP/OBS MODERATE 55: CPT | Performed by: NURSE PRACTITIONER

## 2024-04-30 PROCEDURE — 250N000013 HC RX MED GY IP 250 OP 250 PS 637: Performed by: INTERNAL MEDICINE

## 2024-04-30 PROCEDURE — 120N000001 HC R&B MED SURG/OB

## 2024-04-30 RX ADMIN — IPRATROPIUM BROMIDE AND ALBUTEROL SULFATE 3 ML: .5; 3 SOLUTION RESPIRATORY (INHALATION) at 08:52

## 2024-04-30 RX ADMIN — PIPERACILLIN AND TAZOBACTAM 3.38 G: 3; .375 INJECTION, POWDER, FOR SOLUTION INTRAVENOUS at 23:41

## 2024-04-30 RX ADMIN — IPRATROPIUM BROMIDE AND ALBUTEROL SULFATE 3 ML: .5; 3 SOLUTION RESPIRATORY (INHALATION) at 14:04

## 2024-04-30 RX ADMIN — MULTIPLE VITAMINS W/ MINERALS TAB 1 TABLET: TAB at 08:15

## 2024-04-30 RX ADMIN — PANTOPRAZOLE SODIUM 40 MG: 40 TABLET, DELAYED RELEASE ORAL at 15:33

## 2024-04-30 RX ADMIN — PIPERACILLIN AND TAZOBACTAM 3.38 G: 3; .375 INJECTION, POWDER, FOR SOLUTION INTRAVENOUS at 06:14

## 2024-04-30 RX ADMIN — ROSUVASTATIN CALCIUM 5 MG: 5 TABLET, FILM COATED ORAL at 08:15

## 2024-04-30 RX ADMIN — PANTOPRAZOLE SODIUM 40 MG: 40 TABLET, DELAYED RELEASE ORAL at 06:15

## 2024-04-30 RX ADMIN — PIPERACILLIN AND TAZOBACTAM 3.38 G: 3; .375 INJECTION, POWDER, FOR SOLUTION INTRAVENOUS at 17:25

## 2024-04-30 RX ADMIN — METHOCARBAMOL 500 MG: 500 TABLET ORAL at 15:33

## 2024-04-30 RX ADMIN — PIPERACILLIN AND TAZOBACTAM 3.38 G: 3; .375 INJECTION, POWDER, FOR SOLUTION INTRAVENOUS at 11:55

## 2024-04-30 RX ADMIN — FLUTICASONE FUROATE AND VILANTEROL TRIFENATATE 1 PUFF: 200; 25 POWDER RESPIRATORY (INHALATION) at 08:19

## 2024-04-30 RX ADMIN — METHOCARBAMOL 500 MG: 500 TABLET ORAL at 08:15

## 2024-04-30 RX ADMIN — UMECLIDINIUM 1 PUFF: 62.5 AEROSOL, POWDER ORAL at 08:14

## 2024-04-30 RX ADMIN — METHOCARBAMOL 500 MG: 500 TABLET ORAL at 20:39

## 2024-04-30 ASSESSMENT — ACTIVITIES OF DAILY LIVING (ADL)
ADLS_ACUITY_SCORE: 27
ADLS_ACUITY_SCORE: 28
ADLS_ACUITY_SCORE: 27
ADLS_ACUITY_SCORE: 28
ADLS_ACUITY_SCORE: 27
ADLS_ACUITY_SCORE: 27

## 2024-04-30 NOTE — CONSULTS
Palliative Care Consultation      Patient ID/Chief Complaint: Khang Hernandezly 79 year old male being seen for palliative care consultation at the request of consultation secondary to goals of care.   The patient's primary care provider is:  Neville Degroot       Impression & Recommendations:  79-year-old male admitted with early sepsis related to pneumonia requiring short course of Levophed.  Respiratory status has improved.  He has at times refused suggested medical interventions including blood transfusions, movement to reduce increasing weakness, and TCU.  There has been concerned about his capacity to make medical decisions.    Past medical history also includes recent right hip fracture with rib fractures related to fall, acute blood loss anemia secondary to tubulovillous adenoma, recent diagnosis of bladder cancer status post resection and BCG, lung nodule that is likely cancerous, enlarged prostate with elevated PSA, diverticulitis, third-degree heart block status post pacemaker, HTN, moderate COPD, CAD, CHF not on any medications, and left internal capsule lacunar infarct in 2016.    Symptoms/recommendations/discussion:  Khang is able to state year, location, and hip fracture side correctly.  He is under the impression that he does not have a history of bladder cancer.  He states that he will follow-up with urology.  He is agreeable to home health care with PT/OT.  Multiple members present confirm that they can take care of him at home.  He expressed understanding importance of participating with PT/OT.  He states he is agreeable to recommended outpatient follow-up appointments.  He confirms he wants to come to the hospital again if needed.  He confirms desire for full code.  Family stated that they had no questions or concerns about current plan of care.  He states he would want his daughter May to help him with medical decisions.  Recommend providing him with [employer] [place] form to  "name healthcare agent.        Thank you for the opportunity to participate in the care of this patient and family. Our team: will continue to follow.   VALENTINE Srivastava CNP  Securely message with [employer] (more info)  Text page via [facility] Paging/Directory     History:  History gathered today from: patient, family/loved ones, medical chart, outside records including Care Everywhere, health care directive/s    ROS:  10 point ROS is negative unless exceptions noted below    PE: /80 (BP Location: Left arm)   Pulse 85   Temp 98.9  F (37.2  C) (Oral)   Resp 17   Ht 1.753 m (5' 9\")   Wt 74.2 kg (163 lb 8 oz)   SpO2 90%   BMI 24.14 kg/m     Wt Readings from Last 3 Encounters:   04/30/24 74.2 kg (163 lb 8 oz)   04/22/24 69.9 kg (154 lb)   04/03/24 74.8 kg (164 lb 12.8 oz)     Gen alert, irritable  Head NCAT.  Eyes anicteric without injection  Face symmetric, eyes conjugate  Lungs unlabored, no cough, speaking full sentences  Skin no rashes or lesions evident on face/neck  Neuro Face symmetric, eyes conjugate; speech fluent.  Neuropsych alert and oriented.  Easily irritated        Data reviewed:  I reviewed electrolytes, BUN/creatinine, liver profile, hemoglobin and hematocrit, platelet count, and most recent imaging  Outpatient urologist note        60 minutes spent on the date of the encounter doing chart review, history and exam, patient education & counseling, documentation and other activities as noted above.        Thank you for involving us in the patient's care.   VALENTINE Srivastava, [facility] Palliative Care Service     "

## 2024-04-30 NOTE — PLAN OF CARE
Goal Outcome Evaluation:      Plan of Care Reviewed With: patient    Overall Patient Progress: no changeOverall Patient Progress: no change    Outcome Evaluation: Palliative consult done. Patient was not that cooperative in the morning. Did not have breakfast and does not want to move to chair. Mobilized to chair assist of 1 with walker after few hours, With visitors, patient became more cooperative. No complaint of pain. VSS and afebrile. On room air.    Poor oral intake and UO-250 ml only. Informed Dr. Gillis and replied he will evaluate on rounds.

## 2024-04-30 NOTE — PROGRESS NOTES
"Care Management Follow Up    Length of Stay (days): 4    Expected Discharge Date:       Concerns to be Addressed: discharge planning       Patient plan of care discussed at interdisciplinary rounds: Yes    Anticipated Discharge Disposition: Home with family, Home Care       (Released bed at TCU. Cancelled Hospice)    Anticipated Discharge Services: Home Care    Anticipated Discharge DME: None : Family ordered DME for home    Patient/family educated on Medicare website which has current facility and service quality ratings: Yes  Education Provided on the Discharge Plan: Yes  Patient/Family in Agreement with the Plan: yes    Referrals Placed by CM/SW: Internal Clinic Care Coordination  Private pay costs discussed: Not applicable    Additional Information:  Update received during IDT rounds. MD and Palliative Care to have further conversation with family regarding goals of care.       CM met with the Patient and Wife today.   Wife reported they are planning discharge to home.     \"We have all the equipment we need.\" When inquired which pieces were in the home, wife indicated there was a commode, hospital bed, wheelchair.     Daughter-in-law and Pt's brother were also present. Confirmed that Family is prepared to to care for the patient at home on discharge.     Discussed home care services. Wife expressed desired for home care. Requested to have CM place a referral to the [facility].     Spouse stated she has family ready to assist with transportation.     CM provided the Wife with a copy of a Health Care Directive.   Pt indicated he wanted to have his Daughter May as primary Health Care Agent    May # [phone]      Pt and wife expressed no further needs on discharge.       PLAN: Return HOME- with on-going family support and care.     Referral to [facility]--Accepted by [facility] (Phone: [phone])   RN, PT/OT.   Wife provided with a home care brochure.     Transport--Family to arrange. " Refilling levothyroxine         ROCIO Fam  [facility] [phone]   [facility]  [phone]

## 2024-04-30 NOTE — PROGRESS NOTES
formerly [facility]    Medicine Progress Note - Hospitalist Service    Date of Admission:  4/26/2024    Assessment & Plan   Khang Bailon is a 79 year old male with complex health history notable for moderate COPD, CAD, chronic systolic heart failure, infrarenal AAA, diverticulitis with chronic colocolonic fistula, thalassemia with chronic anemia, hypertension, hyperlipidemia, recent diagnosis of pulmonary nodule, recent diagnosis of urothelial carcinoma of bladder, enlarged prostate, recent diagnosis of tubulovillous rectal polyp with high-grade dysplasia, history of lacunar stroke, and recent hospitalization April 3 to April 15 for multiple medical problems including right-sided rib and hip fractures who presented with increasing shortness of breath, fever and hypotension and was admitted to ICU due to concerns for life-threatening pneumonia with respiratory failure and septic shock.     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    Moderate COPD (chronic obstructive pulmonary disease) (H)    Coronary artery disease involving native coronary artery of native heart without angina pectoris    Pulmonary nodule    Aspirin-like platelet function defect (H)    Acute blood loss anemia    Chronic systolic congestive heart failure (H)    Urothelial carcinoma of bladder (H)    Severe malnutrition (H24)    Hypoalbuminemia    Coagulopathy (H24)    Sacral pressure sore    Hyperlipidemia LDL goal <130    Third degree heart block (H) - s/p pacemaker    Hypertension goal BP (blood pressure) < 140/90    History of CVA (cerebrovascular accident) - old left internal capsule lacunar infarct on CT 7/2016    Diverticulitis    Infrarenal abdominal aortic aneurysm (AAA) without rupture (H24)     Fistula of sigmoid colon    Thalassemia    Enlarged prostate    Right lower lobe pneumonia  Septic shock due to pneumonia  Acute respiratory failure with hypoxia due to pneumonia  Clinical presentation suspicious for pneumonia and noted to have right lower lobe infiltrate radiographically.  Also presented with severe lactic acidosis and hypotension concerning for septic shock requiring vasopressor therapy along with IV fluids.  Vasopressors were stopped in evening 4/26.  Negative blood cultures, urine culture, testing for MRSA, and testing for COVID-19, influenza, and RSV.  Improving with empiric IV Zosyn.  Also presented with severe hypoxia concerning for acute respiratory failure that required intermittent BiPAP initially but is now weaning off of oxygen supplementation.  -Continue piperacillin-tazobactam, today is day 5  -Monitor oxygenation and use oxygen supplementation as needed to maintain saturations 90% and higher    Acute blood loss anemia  Thalassemia with chronic anemia  Suspected upper GI bleeding with melena  Aspirin induced platelet function defect  Coagulopathy  Tubulovillous adenoma of rectum  Diverticulitis with chronic colocolonic fistula  Developed gross episodes of melena since admission with worsening anemia concerning for acute blood loss anemia from upper GI bleeding superimposed upon chronic anemia from thalassemia.  Recent baseline hemoglobin 8-9 with acute decrease in hemoglobin to 6-7 during this hospital stay.  He has required transfusion of a total of 2 units PRBCs so far during this hospitalization most recently evening of 4/28 since which time hemoglobin has stabilized at 8-9.  Melena is suspicious for upper GI bleeding source, and he is at risk for upper GI bleeding and ulcer disease due to previous dual antiplatelet therapy since November 2023 and recent stress including hospitalization elsewhere April 3 to April 15.  He also had coagulopathy during this hospitalization with  prolonged INR that increases his risk for bleeding.  So far he has not had diagnostic upper GI endoscopy either during this or other recent hospitalization.  He has known tubulovillous adenoma of rectum with high-grade dysplasia found at colonoscopy on 4/9 during prior hospitalization for which outpatient follow-up was advised, but this is not likely to be the cause for GI bleeding because he has not had hematochezia.  Similarly, he has known chronic colocolonic fistula and diverticulitis but has not had hematochezia, so doubt this is the source for GI bleeding.  Previous clopidogrel antiplatelet therapy was stopped during previous recent hospitalization and aspirin has been held during this hospitalization.  It is possible that acute GI bleeding contributed to his hemodynamic instability and lactic acidosis at admission.  -Ordered recheck hemoglobin every 12 hours  -Continue to hold aspirin    -Continue pantoprazole which was newly added during this hospitalization  -If melena persists, anemia worsens, and he continues to require blood transfusion, recommend diagnostic and potentially therapeutic EGD and will consult surgeon for this purpose if indicated  -Continue restricted clear liquid diet today but anticipate advancing diet tomorrow if hemoglobin remains stable and he does not have recurrent melena  -Regarding recently diagnosed tubulovillous adenoma with high grade dysplasia, recommend outpatient follow-up with GI after he recovers from this acute illness in 6-8 weeks.  It is reported that outpatient referral to GI was placed and that GI will contact the patient    Severe malnutrition  Hypoalbuminemia  Combination of poor oral intake and recent weight loss is concerning for severe malnutrition.  Hypoalbuminemia was present at admission also likely due to poor nutritional intake.  He has been evaluated by registered dietitian during hospitalization.  Oral intake remains limited with patient currently  indicating that he does not like hospital food and that he will eat much better once he returns home.  -Encourage adequate nutritional intake  -Monitor albumin as indicated    Moderate COPD (chronic obstructive pulmonary disease)  Chronic moderate COPD has been stable.  -Continue chronic inhaled triple therapy (inhaled steroid, LAMA, and LABA)  -Continue DuoNeb 3 times daily    Demand ischemia  Coronary artery disease involving native coronary artery of native heart without angina pectoris  S/P PCI 11/2023 with 2 drug-eluting stents  Hyperlipidemia  Known CAD and underwent angiography November 2023 at which time drug-eluting stents were placed in the circumflex and ramus.  He subsequently was treated with dual antiplatelet therapy until he developed bleeding during recent previous hospitalization at which time Plavix was stopped but he had continued aspirin up until this hospital admission.  He also continues to take statin therapy.  Troponin on admission was elevated but trended downward quickly.  He did not have angina and admission EKG was not interpretable for acute ischemia because of left bundle branch block.  Demand ischemia due to septic shock and hypoxic respiratory failure is suspected as cause for elevated troponin.  -Continuing to hold aspirin because of risk of recurrent upper GI bleeding appears to outweigh potential benefit   -Continue statin    Sacral pressure ulcer  Left elbow abrasion  Noted to have sacral pressure ulcer and left elbow abrasion on admission.  By nursing description, sacral pressure ulcer is probably stage I with erythema but no skin breakdown.  Left elbow abrasion was caused by previous fall about the time of his right rib and right hip fractures in early April.  -Continue current wound care    Chronic systolic congestive heart failure  Previously diagnosed with chronic systolic CHF with EF 45 to 50% on echocardiogram November 2023 at the time of his coronary angiogram.  CHF has  been compensated during this hospital stay.  He had previously been prescribed beta-blocker and ARB which were discontinued during recent previous hospitalization.   -Continue clinical monitoring  -Minimize use of IV fluids as able  -If he does require blood transfusion, recommend administering a dose of IV Lasix with PRBCs    s/p pacemaker for third degree heart block  Has chronically indwelling pacemaker previously placed due to third-degree heart block.  Underwent pacemaker interrogation during recent previous hospitalization.  No concerns for new arrhythmia or pacemaker malfunction during this hospitalization.  -May discontinue cardiac monitoring    Urothelial bladder carcinoma  Underwent diagnostic cystoscopy March 29 demonstrating bladder tumors that were resected with pathology confirming high-grade papillary urothelial carcinoma that invaded into the lamina propria.  Urology has advised starting adjunctive therapy of this cancer with BCG bladder infusions, but these have not yet been started.  -Anticipate outpatient urology follow-up (previous Urology care has been through [facility])    Right lower lobe pulmonary nodule  Recently diagnosed with spiculated 1.2 cm pulmonary nodule in superior segment of right lower lobe during previous hospitalization that was FDG avid on PET scan raising suspicion for possible malignancy.  So far, he has not had further follow-up for this problem.  CT scan performed at this admission confirmed spiculated right lower lobe pulmonary nodule that measured 1.5 cm.  -Anticipate outpatient follow-up for this suspicious pulmonary nodule    Enlarged prostate with FDG avid lesion  Elevated PSA  Radiographic findings again demonstrated enlarged prostate.  Recent PET scan had demonstrated focus of FDG avidity in the left prostate lobe.  He has known history of elevated PSA.  He is not known to have been diagnosed with prostate cancer previously.  -Anticipate outpatient urology  follow-up    Hypertension goal BP (blood pressure) < 140/90  Carries previous diagnosis of hypertension and had previously been treated with antihypertensive medications although not on any antihypertensive therapy at the time of this hospital admission.  Previously prescribed antihypertensive medications appear to have been discontinued during recent previous hospital stay.  After recovery from septic shock and improvement in GI bleeding, blood pressure has been normotensive.  -Continue clinical monitoring    History of CVA (cerebrovascular accident) - old left internal capsule lacunar infarct on CT 7/2016  Previous history of lacunar stroke in July 2016.  Chronically treated with aspirin and statin for secondary prevention.  Stable neurologic status during this hospital stay.  -Continuing to hold aspirin   -Continue chronic statin    Care goals  Patient has met with palliative care during this hospital stay and his care goals have been clarified.  He was considered to retain decision-making capacity at the time of consultation with palliative care team during this hospital stay.  He now confirms preferences for full code resuscitation status, ongoing aggressive medical care, and desire to return home with family with home care services of PT and OT.  -Adjusting care plan to reflect his current wishes        Diet: Clear Liquid Diet  Snacks/Supplements Adult: Ensure Clear; With Meals    DVT Prophylaxis: Pneumatic Compression Devices  Hernandez Catheter: Not present  Lines: None     Cardiac Monitoring: ACTIVE order. Indication: ICU  Code Status: Full Code      Clinically Significant Risk Factors              # Hypoalbuminemia: Lowest albumin = 3.4 g/dL at 4/26/2024  4:33 PM, will monitor as appropriate     # Hypertension: Noted on problem list         # Severe Malnutrition: based on nutrition assessment, PRESENT ON ADMISSION   # Financial/Environmental Concerns: none         Disposition Plan     Medically Ready for  Discharge: Anticipated in 2-4 Days           Khang Gillis MD  Hospitalist Service  [facility]  Securely message with [employer] (more info)  Text page via [facility] Paging/Directory   ______________________________________________________________________    Interval History   He says he is feeling better overall.  He wonders when he can go home.  He says he feels like eating but is not eating much in the hospital because he simply does not like hospital food.  He says he will eat much better once he goes home.  He has been afebrile and hemodynamically stable.  Oxygenation has been stable.  He transiently used low-flow oxygen supplementation this morning for several hours but has again weaned off of oxygen supplementation this afternoon.  He is voiding spontaneously and denies difficulty with voiding.  He has not had a bowel movement including melena since the afternoon of 4/27.  Nursing reports that the patient had a red skin lesion on his sacrum at admission as well as an abrasion on his left elbow.  Patient says that he sustained the abrasion on his left elbow when he fell prior to his right rib and hip fractures in early April.    Physical Exam   Vital Signs: Temp: 98.4  F (36.9  C) Temp src: Oral BP: 117/73 Pulse: 67   Resp: 20 SpO2: 93 % O2 Device: None (Room air)   Patient Vitals for the past 24 hrs:   BP Temp Temp src Pulse Resp SpO2 Weight   04/30/24 1601 117/73 98.4  F (36.9  C) Oral 67 20 -- --   04/30/24 1313 -- -- -- -- -- 93 % --   04/30/24 1116 -- -- -- -- -- -- 74.2 kg (163 lb 8 oz)   04/30/24 0734 137/80 98.9  F (37.2  C) Oral 85 17 90 % --   04/29/24 2346 118/69 98.6  F (37  C) Oral -- 18 92 % --     Weight: 163 lbs 8 oz  Vitals:    04/26/24 1619 04/27/24 0131 04/28/24 1900 04/29/24 0619   Weight: 69.9 kg (154 lb) 69.4 kg (153 lb 1.6 oz) 75.1 kg (165 lb 9.1 oz) 76.2 kg (167 lb 15.9 oz)    04/30/24 1116   Weight: 74.2 kg (163 lb 8 oz)     Wt Readings from Last 4  Encounters:   04/30/24 74.2 kg (163 lb 8 oz)   04/22/24 69.9 kg (154 lb)   04/03/24 74.8 kg (164 lb 12.8 oz)   03/19/24 76.2 kg (168 lb)       Intake/Output Summary (Last 24 hours) at 4/30/2024 1708  Last data filed at 4/30/2024 1300  Gross per 24 hour   Intake 230 ml   Output 700 ml   Net -470 ml       General Appearance: Pale elderly man without signs of acute distress sitting up in bed  Respiratory: Moderately diminished breath sounds bilaterally, few inspiratory crackles on the right, no wheezing  Cardiovascular: Regular rate and rhythm, good radial pulse, normal capillary refill, no peripheral edema in the lower legs  GI: Nondistended abdomen, soft, nontender, normal bowel sounds  Skin: Pale color, dry dressing overlying left elbow abrasion  Neuro: Alert and maintains wakefulness and attention    Medical Decision Making       131 MINUTES SPENT BY ME on the date of service doing chart review, history, exam, documentation & further activities per the note.  Reviewed additional medical records today including report of coronary angiogram November 2023, operative report of repair of right hip fracture with arthroplasty on April 4, report of cystoscopy performed March 29, report of discharge summary from April 3 to April 15 at [facility] in [place], and report of limited colonoscopy performed April 9 during that hospitalization       Data     I have personally reviewed the following data over the past 24 hrs:    N/A  \   N/A   / N/A     N/A N/A N/A /  N/A   N/A N/A N/A \       Blood cultures negative  Urine culture was negative  Nasal MRSA testing was negative  Rapid testing for COVID, influenza, and RSV was negative    Cardiac monitoring results reviewed over the past 24 hrs: Ventricular pacing, no arrhythmias    Recent Labs   Lab 04/29/24  0553 04/28/24  2158 04/28/24  1625 04/28/24  1213 04/28/24  0913 04/28/24  0508 04/27/24  1211 04/27/24  0814 04/27/24  0751 04/27/24  0513 04/26/24  2301  04/26/24  1633   WBC 10.0  --   --   --   --  12.8*  --  17.8*  --   --   --  24.1*   HGB 8.9* 9.0*  --   --   --  7.2*   < > 6.9*  --  7.2*  --  9.3*   MCV 70*  --   --   --   --  67*  --  63*  --   --   --  65*     --   --   --   --  191  --  254  --   --   --  511*   INR  --   --   --   --   --   --   --   --   --  1.68*  --   --    NA  --   --   --   --   --   --   --  141  --   --   --  143   POTASSIUM  --   --   --   --   --   --   --  4.0  --   --   --  5.3   CHLORIDE  --   --   --   --   --   --   --  108*  --   --   --  104   CO2  --   --   --   --   --   --   --  22  --   --   --  21*   BUN  --   --   --   --   --   --   --  31.4*  --   --   --  36.8*   CR 0.70  --   --   --   --   --   --  0.72  --   --   --  1.04   ANIONGAP  --   --   --   --   --   --   --  11  --   --   --  18*   RENU  --   --   --   --   --   --   --  8.4*  --   --   --  9.3   GLC  --   --  118* 122* 101*  --    < > 117*   < > 122*   < > 195*   ALBUMIN  --   --   --   --   --   --   --   --   --   --   --  3.4*   PROTTOTAL  --   --   --   --   --   --   --   --   --   --   --  6.8   BILITOTAL  --   --   --   --   --   --   --   --   --   --   --  1.0   ALKPHOS  --   --   --   --   --   --   --   --   --   --   --  102   ALT  --   --   --   --   --   --   --   --   --   --   --  16   AST  --   --   --   --   --   --   --   --   --   --   --  26    < > = values in this interval not displayed.

## 2024-04-30 NOTE — PLAN OF CARE
Goal Outcome Evaluation:      Plan of Care Reviewed With: patient    Overall Patient Progress: no changeOverall Patient Progress: no change    Outcome Evaluation: Patient is A&O and VSS on RA. Pt has denied pain most of this shift but reports some muscle tightness on right side and took scheduled robaxin. Pt reported that was helpful and that he was comfortable. Pt has refused to get up out of bed and has refused significant position changes. Pt has shifted in bed. Pt's appetite is decreased eating 25% of dinner. Plan is for patient to have [facility]. Pt's wife (Brittany [phone]) would like to be here but has an appointment at 1300 tomorrow. Message left for social service to see if 1200 appointment can be ajusted so wife can be present. Pt states that he is hopeful he will be able to discharge tomorrow.

## 2024-04-30 NOTE — PROGRESS NOTES
[facility]    Medicine Progress Note - Hospitalist Service    Date of Admission:  4/26/2024    Assessment & Plan   Khang Bailon is a 79 year old male admitted on 4/26/2024. He presented with shortness of breath, fevers and hypotension and has evidence of pneumonia on imaging.         4/28 :       Off oxygen, no fevers  Continue iv antibiotics  Hb low in 7's and will transfuse another unit of PRBC  Black stools in last few days, will monitor hb  Holding aspirin and plavis for cardiac stents as hb is low and concern for bleeding    Patient had seen a urologist recently for concerns of urothelial cancer   Also has a lung nodule and had a PET scan : 1. An irregular FDG avid 1.3 x 1.5 cm pulmonary nodule in the right lower lobe is suspicious for malignancy, likely lung cancer. If desired, this is amenable to percutaneous CT-guided biopsy. No evidence of indira or distant metastasis.  2. Marked prostate gland enlargement. Ovoid focus of FDG activity in the left lateral mid prostate gland is indeterminate, could be inflammatory or represent underlying prostate neoplasm. Please correlate clinically. If desired, this could be further   evaluated with prostate MRI.  3. Improving acute diverticulitis mid sigmoid colon.    Will discuss with his urologist in am about future plans  Not sure if patient has decision making  capacity at this time  Family wants to continue with full cares for now      Not medically ready for discharge at this time.        4/29 :       Off oxygen, no fevers  Continue iv antibiotics  Hb stable at 8.9  Black stools in last few days, will monitor hb, on ppi, outpatient GI follow up  Holding aspirin and plavis for cardiac stents as hb is low and concern for bleeding    Patient had seen a urologist recently for concerns of urothelial cancer   Also has a lung nodule and had a PET scan : 1. An irregular FDG avid 1.3 x 1.5 cm pulmonary nodule in the right lower lobe is  "suspicious for malignancy, likely lung cancer. If desired, this is amenable to percutaneous CT-guided biopsy. No evidence of indira or distant metastasis.  2. Marked prostate gland enlargement. Ovoid focus of FDG activity in the left lateral mid prostate gland is indeterminate, could be inflammatory or represent underlying prostate neoplasm. Please correlate clinically. If desired, this could be further   evaluated with prostate MRI.  3. Improving acute diverticulitis mid sigmoid colon.    Will discuss with his urologist in am about future plans  Not sure if patient has decision making  capacity at this time  Family wants to continue with full cares for now      Not medically ready for discharge at this time.        Principal Problem:    Sepsis due to pneumonia (H)    Septic shock (H)    Acute respiratory failure with hypoxia (H)    Assessment: He presented with septic shock and hypotension and was on norepinephrine drip for several hours initially, along with IV fluids. He was successfully weaned off norepinephrine last evening. Stable BP since. On vancomycin and piperacillin-tazobactam. CTA negative for PE. MRSA swab is now negative.    Plan: 1. Stop vancomycin.   2. Continue piperacillin-tazobactam. Reduce the dose from 4.5 g to 3.375 g IV q6h.   3. Discontinue norepinephrine.    Active Problems:    Acute blood loss anemia    Tubulovillous adenoma of colon    Assessment: As per prior admission notes:   \"biopsies show tubulovillous adenoma with high grade dysplasia. Discussed with Dr. Benton. He can remove with endoscopic mucosal resection this but will need a two day golytely prep, clearance from PCP and hold Plavix and ASA for 7 days. This can be done as an outpatient after he recovers in 6-8 weeks. Outpatient referral to GI has been placed. GI will contact the patient.\"   Episodes of melena since admission. Hgb is dropping. Despite explaining the need for blood transfusion due to CAD and recent stent, he was " reluctant until late afternoon, after another conversation with him, his wife and daughter. He now agrees to receive blood transfusion. He states he has thalassemia.    Plan: 1. Transfuse 1 unit of PRBC.   2. Repeat Hgb in am.   3. Hold aspirin for now. Consider resuming if Hgb improving and patient stable.   4. Continue pantoprazole.      Third degree heart block (H) - s/p pacemaker    Assessment: S/P pacemaker.    Plan: Cardiac monitor.      Hypertension goal BP (blood pressure) < 140/90    Assessment: Controlled BP. Not on any antihypertensive therapy now.    Plan: Monitor.      Moderate COPD (chronic obstructive pulmonary disease) (H)    Assessment: Stable.    Plan: Continue Breo Ellipta plus umeclidinium and Duo Nebs.      Coronary artery disease involving native coronary artery of native heart without angina pectoris    Assessment: S/P PCI 11/2023. On statin and aspirin but not clopidogrel any more.    Plan: 1. Hold aspirin for now. Consider resuming if Hgb improving and patient stable.   2. Continue statin.      Chronic systolic congestive heart failure (H)    Assessment: Compensated. Not on any medications currently.    Plan: Monitor.      History of CVA (cerebrovascular accident) - old left internal capsule lacunar infarct on CT 7/2016    Assessment: On aspirin and statin.    Plan: 1. Hold aspirin for now. Consider resuming if Hgb improving and patient stable.   2. Continue statin.      Urothelial carcinoma of bladder (H)    Assessment: He had recent surgical resection for this and will need outpatient follow up.    Plan: Outpatient follow up with urology.          Diet: Clear Liquid Diet  Snacks/Supplements Adult: Ensure Clear; With Meals    DVT Prophylaxis: Pneumatic Compression Devices  Hernandez Catheter: Not present  Lines: None     Cardiac Monitoring: ACTIVE order. Indication: ICU  Code Status: Full Code      Clinically Significant Risk Factors              # Hypoalbuminemia: Lowest albumin = 3.4 g/dL at  4/26/2024  4:33 PM, will monitor as appropriate    # Coagulation Defect: INR = 1.68 (Ref range: 0.85 - 1.15) and/or PTT = N/A, will monitor for bleeding    # Hypertension: Noted on problem list         # Severe Malnutrition: based on nutrition assessment, PRESENT ON ADMISSION   # Financial/Environmental Concerns: none         Disposition Plan     Medically Ready for Discharge: Anticipated in 2-4 Days             Stefan Osorio MD  Hospitalist Service  [facility]  Securely message with [employer] (more info)  Text page via [employer] Paging/Directory   ______________________________________________________________________      Physical Exam   Vital Signs: Temp: 98.3  F (36.8  C) Temp src: Oral BP: 119/74 Pulse: 90   Resp: 20 SpO2: 94 % O2 Device: None (Room air)    Weight: 167 lbs 15.85 oz    Constitutional: awake, alert, cooperative, no apparent distress, and appears stated age  Eyes: Lids and lashes normal, pupils equal, round and reactive to light, extra ocular muscles intact, sclera clear, conjunctiva normal  ENT: normocephalic, without obvious abnormality, atraumatic  Respiratory: moderately increased work of breathing, fairly good air exchange, scattered bilateral rales, no crackles or wheezing  Cardiovascular: Normal apical impulse, regular rate and rhythm, normal S1 and S2, no S3 or S4, and no murmur noted, pacemaker in right upper chest  GI: normal bowel sounds, soft, non-distended, and non-tender  Skin: normal skin color, texture, turgor and no rashes  Musculoskeletal: no lower extremity pitting edema present  there is no redness, warmth, or swelling of the joints  tone is normal  Neurologic: Awake, alert, oriented to name, place and time.  Cranial nerves II-XII are grossly intact.  Motor is 5 out of 5 bilaterally.  Sensory is intact.    Medical Decision Making       55 MINUTES SPENT BY ME on the date of service doing chart review, history, exam, documentation & further activities per  the note.  MANAGEMENT DISCUSSED with the following over the past 24 hours: the patient, family, nurses, care coordination team   NOTE(S)/MEDICAL RECORDS REVIEWED over the past 24 hours: H&P, ER notes, nursing and respiratory therapy notes       Data   Imaging results reviewed over the past 24 hrs:   No results found for this or any previous visit (from the past 24 hour(s)).  Most Recent 3 CBC's:  Recent Labs   Lab Test 04/29/24  0553 04/28/24  2158 04/28/24  0508 04/27/24  1343 04/27/24  0814   WBC 10.0  --  12.8*  --  17.8*   HGB 8.9* 9.0* 7.2*   < > 6.9*   MCV 70*  --  67*  --  63*     --  191  --  254    < > = values in this interval not displayed.     Most Recent 3 BMP's:  Recent Labs   Lab Test 04/28/24  1625 04/28/24  1213 04/28/24  0913 04/27/24  1211 04/27/24  0814 04/26/24  2301 04/26/24  1633 04/17/24  0738   NA  --   --   --   --  141  --  143 143   POTASSIUM  --   --   --   --  4.0  --  5.3 4.1   CHLORIDE  --   --   --   --  108*  --  104 108*   CO2  --   --   --   --  22  --  21* 25   BUN  --   --   --   --  31.4*  --  36.8* 19.2   CR  --   --   --   --  0.72  --  1.04 0.80   ANIONGAP  --   --   --   --  11  --  18* 10   RENU  --   --   --   --  8.4*  --  9.3 8.9   * 122* 101*   < > 117*   < > 195* 102*    < > = values in this interval not displayed.     Most Recent 2 LFT's:  Recent Labs   Lab Test 04/26/24  1633 04/03/24  1203   AST 26 26   ALT 16 13   ALKPHOS 102 73   BILITOTAL 1.0 0.6     Most Recent 3 INR's:  Recent Labs   Lab Test 04/27/24  0513 08/14/23  0635 07/12/16  1500   INR 1.68* 1.13 0.98     Most Recent D-dimer:  Recent Labs   Lab Test 04/26/24  1633   DD 3.49*

## 2024-04-30 NOTE — PLAN OF CARE
Goal Outcome Evaluation:      Plan of Care Reviewed With: patient    Overall Patient Progress: no changeOverall Patient Progress: no change    Outcome Evaluation: A&O. VSS on rm air. Denies pain overnight. Pt refusing repositioning and assistance out of bed. Can shift weight slightly on own. Paced on tele. External cath in place with 400ml output overnight.

## 2024-05-01 LAB
ANION GAP SERPL CALCULATED.3IONS-SCNC: 8 MMOL/L (ref 7–15)
BACTERIA BLD CULT: NO GROWTH
BACTERIA BLD CULT: NO GROWTH
BUN SERPL-MCNC: 9.2 MG/DL (ref 8–23)
CALCIUM SERPL-MCNC: 8.4 MG/DL (ref 8.8–10.2)
CHLORIDE SERPL-SCNC: 105 MMOL/L (ref 98–107)
CREAT SERPL-MCNC: 0.65 MG/DL (ref 0.67–1.17)
DEPRECATED HCO3 PLAS-SCNC: 23 MMOL/L (ref 22–29)
EGFRCR SERPLBLD CKD-EPI 2021: >90 ML/MIN/1.73M2
GLUCOSE SERPL-MCNC: 96 MG/DL (ref 70–99)
HGB BLD-MCNC: 9.3 G/DL (ref 13.3–17.7)
INR PPP: 1.43 (ref 0.85–1.15)
MAGNESIUM SERPL-MCNC: 1.9 MG/DL (ref 1.7–2.3)
MAGNESIUM SERPL-MCNC: 1.9 MG/DL (ref 1.7–2.3)
POTASSIUM SERPL-SCNC: 3 MMOL/L (ref 3.4–5.3)
POTASSIUM SERPL-SCNC: 3.7 MMOL/L (ref 3.4–5.3)
SODIUM SERPL-SCNC: 136 MMOL/L (ref 135–145)

## 2024-05-01 PROCEDURE — 83735 ASSAY OF MAGNESIUM: CPT | Performed by: PEDIATRICS

## 2024-05-01 PROCEDURE — 250N000013 HC RX MED GY IP 250 OP 250 PS 637: Performed by: NURSE PRACTITIONER

## 2024-05-01 PROCEDURE — 250N000009 HC RX 250: Performed by: INTERNAL MEDICINE

## 2024-05-01 PROCEDURE — 36415 COLL VENOUS BLD VENIPUNCTURE: CPT | Performed by: PEDIATRICS

## 2024-05-01 PROCEDURE — 999N000111 HC STATISTIC OT IP EVAL DEFER: Performed by: SPECIALIST/TECHNOLOGIST

## 2024-05-01 PROCEDURE — 85610 PROTHROMBIN TIME: CPT | Performed by: PEDIATRICS

## 2024-05-01 PROCEDURE — 250N000011 HC RX IP 250 OP 636: Performed by: INTERNAL MEDICINE

## 2024-05-01 PROCEDURE — 94640 AIRWAY INHALATION TREATMENT: CPT | Mod: 76

## 2024-05-01 PROCEDURE — 84132 ASSAY OF SERUM POTASSIUM: CPT | Performed by: PEDIATRICS

## 2024-05-01 PROCEDURE — 250N000013 HC RX MED GY IP 250 OP 250 PS 637: Performed by: INTERNAL MEDICINE

## 2024-05-01 PROCEDURE — 94640 AIRWAY INHALATION TREATMENT: CPT

## 2024-05-01 PROCEDURE — 99232 SBSQ HOSP IP/OBS MODERATE 35: CPT | Performed by: PEDIATRICS

## 2024-05-01 PROCEDURE — 80048 BASIC METABOLIC PNL TOTAL CA: CPT | Performed by: PEDIATRICS

## 2024-05-01 PROCEDURE — 999N000157 HC STATISTIC RCP TIME EA 10 MIN

## 2024-05-01 PROCEDURE — 120N000001 HC R&B MED SURG/OB

## 2024-05-01 PROCEDURE — 250N000009 HC RX 250: Performed by: PEDIATRICS

## 2024-05-01 PROCEDURE — 85018 HEMOGLOBIN: CPT | Performed by: PEDIATRICS

## 2024-05-01 RX ORDER — IPRATROPIUM BROMIDE AND ALBUTEROL SULFATE 2.5; .5 MG/3ML; MG/3ML
1 SOLUTION RESPIRATORY (INHALATION) 3 TIMES DAILY
Status: DISCONTINUED | OUTPATIENT
Start: 2024-05-01 | End: 2024-05-02 | Stop reason: HOSPADM

## 2024-05-01 RX ORDER — PANTOPRAZOLE SODIUM 40 MG/1
40 TABLET, DELAYED RELEASE ORAL
Qty: 60 TABLET | Refills: 1 | Status: SHIPPED | OUTPATIENT
Start: 2024-05-01 | End: 2024-08-10

## 2024-05-01 RX ORDER — IPRATROPIUM BROMIDE AND ALBUTEROL SULFATE 2.5; .5 MG/3ML; MG/3ML
1 SOLUTION RESPIRATORY (INHALATION) 3 TIMES DAILY
COMMUNITY
Start: 2024-05-01 | End: 2024-06-04

## 2024-05-01 RX ORDER — POTASSIUM CHLORIDE 1500 MG/1
20 TABLET, EXTENDED RELEASE ORAL ONCE
Status: COMPLETED | OUTPATIENT
Start: 2024-05-01 | End: 2024-05-01

## 2024-05-01 RX ORDER — POTASSIUM CHLORIDE 1500 MG/1
40 TABLET, EXTENDED RELEASE ORAL ONCE
Status: COMPLETED | OUTPATIENT
Start: 2024-05-01 | End: 2024-05-01

## 2024-05-01 RX ORDER — LIDOCAINE 4 G/G
1 PATCH TOPICAL EVERY 24 HOURS
Qty: 15 PATCH | Refills: 0 | Status: SHIPPED | OUTPATIENT
Start: 2024-05-01 | End: 2024-05-02

## 2024-05-01 RX ADMIN — PANTOPRAZOLE SODIUM 40 MG: 40 TABLET, DELAYED RELEASE ORAL at 06:11

## 2024-05-01 RX ADMIN — FLUTICASONE FUROATE AND VILANTEROL TRIFENATATE 1 PUFF: 200; 25 POWDER RESPIRATORY (INHALATION) at 09:00

## 2024-05-01 RX ADMIN — METHOCARBAMOL 500 MG: 500 TABLET ORAL at 13:55

## 2024-05-01 RX ADMIN — PANTOPRAZOLE SODIUM 40 MG: 40 TABLET, DELAYED RELEASE ORAL at 15:48

## 2024-05-01 RX ADMIN — MULTIPLE VITAMINS W/ MINERALS TAB 1 TABLET: TAB at 08:59

## 2024-05-01 RX ADMIN — IPRATROPIUM BROMIDE AND ALBUTEROL SULFATE 3 ML: .5; 3 SOLUTION RESPIRATORY (INHALATION) at 08:05

## 2024-05-01 RX ADMIN — POTASSIUM CHLORIDE 40 MEQ: 1500 TABLET, EXTENDED RELEASE ORAL at 09:27

## 2024-05-01 RX ADMIN — IPRATROPIUM BROMIDE AND ALBUTEROL SULFATE 3 ML: .5; 3 SOLUTION RESPIRATORY (INHALATION) at 19:37

## 2024-05-01 RX ADMIN — ROSUVASTATIN CALCIUM 5 MG: 5 TABLET, FILM COATED ORAL at 08:59

## 2024-05-01 RX ADMIN — METHOCARBAMOL 500 MG: 500 TABLET ORAL at 08:59

## 2024-05-01 RX ADMIN — PIPERACILLIN AND TAZOBACTAM 3.38 G: 3; .375 INJECTION, POWDER, FOR SOLUTION INTRAVENOUS at 12:26

## 2024-05-01 RX ADMIN — PIPERACILLIN AND TAZOBACTAM 3.38 G: 3; .375 INJECTION, POWDER, FOR SOLUTION INTRAVENOUS at 06:11

## 2024-05-01 RX ADMIN — IPRATROPIUM BROMIDE AND ALBUTEROL SULFATE 3 ML: .5; 3 SOLUTION RESPIRATORY (INHALATION) at 14:19

## 2024-05-01 RX ADMIN — UMECLIDINIUM 1 PUFF: 62.5 AEROSOL, POWDER ORAL at 08:58

## 2024-05-01 RX ADMIN — METHOCARBAMOL 500 MG: 500 TABLET ORAL at 20:55

## 2024-05-01 RX ADMIN — POTASSIUM CHLORIDE 20 MEQ: 1500 TABLET, EXTENDED RELEASE ORAL at 12:25

## 2024-05-01 RX ADMIN — PIPERACILLIN AND TAZOBACTAM 3.38 G: 3; .375 INJECTION, POWDER, FOR SOLUTION INTRAVENOUS at 23:50

## 2024-05-01 RX ADMIN — PIPERACILLIN AND TAZOBACTAM 3.38 G: 3; .375 INJECTION, POWDER, FOR SOLUTION INTRAVENOUS at 17:41

## 2024-05-01 ASSESSMENT — ACTIVITIES OF DAILY LIVING (ADL)
ADLS_ACUITY_SCORE: 28
ADLS_ACUITY_SCORE: 27
ADLS_ACUITY_SCORE: 28
ADLS_ACUITY_SCORE: 27
ADLS_ACUITY_SCORE: 28

## 2024-05-01 NOTE — PROGRESS NOTES
Former[facility]    Medicine Progress Note - Hospitalist Service    Date of Admission:  4/26/2024    Assessment & Plan   Khang Bailon is a 79 year old male with complex health history notable for moderate COPD, CAD, chronic systolic heart failure, infrarenal AAA, diverticulitis with chronic colocolonic fistula, thalassemia with chronic anemia, hypertension, hyperlipidemia, recent diagnosis of pulmonary nodule, recent diagnosis of urothelial carcinoma of bladder, enlarged prostate, recent diagnosis of tubulovillous rectal polyp with high-grade dysplasia, history of lacunar stroke, and recent hospitalization April 3 to April 15 for multiple medical problems including right-sided rib and hip fractures who presented with increasing shortness of breath, fever and hypotension and was admitted to ICU due to concerns for life-threatening pneumonia with respiratory failure and septic shock.     Right lower lobe pneumonia  Septic shock due to pneumonia  Acute respiratory failure with hypoxia due to pneumonia  Clinical presentation suspicious for pneumonia and noted to have right lower lobe infiltrate radiographically.  Also presented with severe lactic acidosis and hypotension concerning for septic shock requiring vasopressor therapy along with IV fluids.  Vasopressors were stopped in evening 4/26.  Negative blood cultures, urine culture, testing for MRSA, and testing for COVID-19, influenza, and RSV.  Improving with empiric IV Zosyn.  Also presented with severe hypoxia concerning for acute respiratory failure that required intermittent BiPAP initially but is now weaning off of oxygen supplementation.  -Continue piperacillin-tazobactam, today is day 6, anticipate transition to Augmentin at discharge to complete 7-10 day treatment course  -Monitor oxygenation and use oxygen supplementation as needed to maintain saturations 90% and higher    Acute blood loss anemia  Thalassemia with chronic  anemia  Suspected upper GI bleeding with melena  Aspirin induced platelet function defect  Coagulopathy  Tubulovillous adenoma of rectum  Diverticulitis with chronic colocolonic fistula  Developed gross episodes of melena since admission with worsening anemia concerning for acute blood loss anemia from upper GI bleeding superimposed upon chronic anemia from thalassemia.  Recent baseline hemoglobin 8-9 with acute decrease in hemoglobin to 6-7 during this hospital stay.  He has required transfusion of a total of 2 units PRBCs so far during this hospitalization most recently evening of 4/28 since which time hemoglobin has stabilized at 8-10 now for about 60 hours.  Melena is suspicious for upper GI bleeding source, and he is at risk for upper GI bleeding and peptic ulcer disease due to previous dual antiplatelet therapy since November 2023 and recent stress including hospitalization elsewhere April 3 to April 15.  He also had coagulopathy during this hospitalization with prolonged INR that increases his risk for bleeding.  Coagulopathy is now improving although persists probably due at least partly to malnutrition.  So far he has not had diagnostic upper GI endoscopy either during this or other recent hospitalization.  He has known tubulovillous adenoma of rectum with high-grade dysplasia found at colonoscopy on 4/9 during prior hospitalization for which outpatient follow-up was advised, but this is not likely to be the cause for GI bleeding because he has not had hematochezia.  Similarly, he has known chronic colocolonic fistula and diverticulitis but has not had hematochezia, so doubt this is the source for GI bleeding.  Previous clopidogrel antiplatelet therapy was stopped during previous recent hospitalization and aspirin has been held during this hospitalization.  It is possible that acute GI bleeding contributed to his hemodynamic instability and lactic acidosis at admission.  -Ordered recheck hemoglobin in  AM  -Continue to hold aspirin    -Continue pantoprazole which was newly added during this hospitalization  -If melena persists, anemia worsens, and he continues to require blood transfusion, recommend diagnostic and potentially therapeutic EGD and will consult surgeon for this purpose if indicated  -advancing diet as tolerated today  -Regarding recently diagnosed tubulovillous adenoma with high grade dysplasia, recommend outpatient follow-up with GI after he recovers from this acute illness in 6-8 weeks.  It is reported that outpatient referral to GI was placed and that GI will contact the patient    Severe malnutrition  Hypoalbuminemia  Combination of poor oral intake and recent weight loss is concerning for severe malnutrition.  Hypoalbuminemia was present at admission also likely due to poor nutritional intake.  He has been evaluated by registered dietitian during hospitalization.  Oral intake remains limited with patient currently indicating that he does not like hospital food and that he will eat much better once he returns home.  -Encourage adequate nutritional intake  -Monitor albumin as indicated    Hypokalemia  New moderate hypokalemia on 5/1, asymptomatic.  -Continue potassium supplementation per protocol and monitor potassium as indicated  -Ordered magnesium  -Ordered magnesium supplementation per protocol if indicated    Moderate COPD (chronic obstructive pulmonary disease)  Chronic moderate COPD has been stable.  -Continue chronic inhaled triple therapy (inhaled steroid, LAMA, and LABA)  -Continue DuoNeb 3 times daily    Demand ischemia  Coronary artery disease involving native coronary artery of native heart without angina pectoris  S/P PCI 11/2023 with 2 drug-eluting stents  Hyperlipidemia  Known CAD and underwent angiography November 2023 at which time drug-eluting stents were placed in the circumflex and ramus.  He subsequently was treated with dual antiplatelet therapy until he developed bleeding  during recent previous hospitalization at which time Plavix was stopped but he had continued aspirin up until this hospital admission.  He also continues to take statin therapy.  Troponin on admission was elevated but trended downward quickly.  He did not have angina and admission EKG was not interpretable for acute ischemia because of left bundle branch block.  Demand ischemia due to septic shock and hypoxic respiratory failure is suspected as cause for elevated troponin.  -Continuing to hold aspirin because of risk of recurrent upper GI bleeding appears to outweigh potential benefit   -Continue statin    Sacral pressure ulcer  Left elbow abrasion  Noted to have sacral pressure ulcer and left elbow abrasion on admission.  By nursing description, sacral pressure ulcer is probably stage I with erythema but no skin breakdown.  Left elbow abrasion was caused by previous fall about the time of his right rib and right hip fractures in early April.  -Continue current wound care    Chronic systolic congestive heart failure  Previously diagnosed with chronic systolic CHF with EF 45 to 50% on echocardiogram November 2023 at the time of his coronary angiogram.  CHF has been compensated during this hospital stay.  He had previously been prescribed beta-blocker and ARB which were discontinued during recent previous hospitalization.   -Continue clinical monitoring  -Minimize use of IV fluids as able  -If he does require blood transfusion, recommend administering a dose of IV Lasix with PRBCs    s/p pacemaker for third degree heart block  Has chronically indwelling pacemaker previously placed due to third-degree heart block.  Underwent pacemaker interrogation during recent previous hospitalization.  No concerns for new arrhythmia or pacemaker malfunction during this hospitalization.  -May discontinue cardiac monitoring    Urothelial bladder carcinoma  Underwent diagnostic cystoscopy March 29 demonstrating bladder tumors that were  resected with pathology confirming high-grade papillary urothelial carcinoma that invaded into the lamina propria.  Urology has advised starting adjunctive therapy of this cancer with BCG bladder infusions, but these have not yet been started.  -Anticipate outpatient urology follow-up (previous Urology care has been through [facility])    Right lower lobe pulmonary nodule  Recently diagnosed with spiculated 1.2 cm pulmonary nodule in superior segment of right lower lobe during previous hospitalization that was FDG avid on PET scan raising suspicion for possible malignancy.  So far, he has not had further follow-up for this problem.  CT scan performed at this admission confirmed spiculated right lower lobe pulmonary nodule that measured 1.5 cm.  -Anticipate outpatient follow-up for this suspicious pulmonary nodule    Enlarged prostate with FDG avid lesion  Elevated PSA  Radiographic findings again demonstrated enlarged prostate.  Recent PET scan had demonstrated focus of FDG avidity in the left prostate lobe.  He has known history of elevated PSA.  He is not known to have been diagnosed with prostate cancer previously.  -Anticipate outpatient urology follow-up    Hypertension goal BP (blood pressure) < 140/90  Carries previous diagnosis of hypertension and had previously been treated with antihypertensive medications although not on any antihypertensive therapy at the time of this hospital admission.  Previously prescribed antihypertensive medications appear to have been discontinued during recent previous hospital stay.  After recovery from septic shock and improvement in GI bleeding, blood pressure has been normotensive.  -Continue clinical monitoring    History of CVA (cerebrovascular accident) - old left internal capsule lacunar infarct on CT 7/2016  Previous history of lacunar stroke in July 2016.  Chronically treated with aspirin and statin for secondary prevention.  Stable neurologic status during  this hospital stay.  -Continuing to hold aspirin   -Continue chronic statin    Care goals  Patient has met with palliative care during this hospital stay and his care goals have been clarified.  He was considered to retain decision-making capacity at the time of consultation with palliative care team during this hospital stay.  He now confirms preferences for full code resuscitation status, ongoing aggressive medical care, and desire to return home with family with home care services of PT and OT.  -Adjusting care plan to reflect his current wishes          Diet: Snacks/Supplements Adult: Ensure Clear; With Meals  Advance Diet as Tolerated: Regular Diet Adult; Regular Diet Adult    DVT Prophylaxis: Pneumatic Compression Devices  Hernandez Catheter: Not present  Lines: None     Cardiac Monitoring: None  Code Status: Full Code      Clinically Significant Risk Factors        # Hypokalemia: Lowest K = 3 mmol/L in last 2 days, will replace as needed       # Hypoalbuminemia: Lowest albumin = 3.4 g/dL at 4/26/2024  4:33 PM, will monitor as appropriate  # Coagulation Defect: INR = 1.43 (Ref range: 0.85 - 1.15) and/or PTT = N/A, will monitor for bleeding    # Hypertension: Noted on problem list         # Severe Malnutrition: based on nutrition assessment    # Financial/Environmental Concerns: none         Disposition Plan     Medically Ready for Discharge: Anticipated Tomorrow             Khang Gillis MD  Hospitalist Service  [facility]  Securely message with [place] (more info)  Text page via [facility] Paging/Directory   ______________________________________________________________________    Interval History   There were no significant overnight events.  He says he feels better today and offers no complaints.  Cough is improving and he denies shortness of breath.  He remains afebrile.  He has been hemodynamically stable.  He denies any abdominal pain.  He still does not eat much but is not  nauseated.  He is voiding spontaneously with adequate urine output.  He has not had a bowel movement or melena since the afternoon of April 27.    Physical Exam   Vital Signs: Temp: 98.3  F (36.8  C) Temp src: Oral BP: (!) 143/82 Pulse: 77   Resp: 20 SpO2: 93 % O2 Device: None (Room air)    Patient Vitals for the past 24 hrs:   BP Temp Temp src Pulse Resp SpO2 Weight   05/01/24 0805 -- -- -- -- -- 93 % --   05/01/24 0730 (!) 143/82 98.3  F (36.8  C) Oral 77 20 94 % --   05/01/24 0300 -- -- -- -- -- -- 74.4 kg (164 lb)   05/01/24 0022 133/71 98.6  F (37  C) Oral -- 18 94 % --   04/30/24 2300 -- -- -- -- -- 94 % --   04/30/24 2200 -- -- -- -- -- 94 % --   04/30/24 2100 -- -- -- -- -- 94 % --   04/30/24 2030 -- -- -- -- -- 93 % --   04/30/24 2000 -- -- -- -- -- 92 % --   04/30/24 1931 119/68 98.1  F (36.7  C) Oral 83 18 93 % --   04/30/24 1645 -- -- -- -- -- 91 % --   04/30/24 1601 117/73 98.4  F (36.9  C) Oral 67 20 -- --   04/30/24 1313 -- -- -- -- -- 93 % --     Weight: 164 lbs 0 oz  Vitals:    04/27/24 0131 04/28/24 1900 04/29/24 0619 04/30/24 1116   Weight: 69.4 kg (153 lb 1.6 oz) 75.1 kg (165 lb 9.1 oz) 76.2 kg (167 lb 15.9 oz) 74.2 kg (163 lb 8 oz)    05/01/24 0300   Weight: 74.4 kg (164 lb)       Intake/Output Summary (Last 24 hours) at 5/1/2024 1201  Last data filed at 5/1/2024 0912  Gross per 24 hour   Intake 867 ml   Output 900 ml   Net -33 ml       General Appearance: Chronically ill-appearing elderly man without signs of acute distress lying in bed  Respiratory: Normal respiratory effort, diminished breath sounds throughout, clear lung fields  Cardiovascular: Regular rate and rhythm, good radial pulse, normal capillary refill  GI: Normal bowel sounds, nondistended abdomen, soft, nontender     Medical Decision Making             Data     I have personally reviewed the following data over the past 24 hrs:    N/A  \   9.3 (L)   / N/A     136 105 9.2 /  96   3.0 (L) 23 0.65 (L) \     INR:  1.43 (H) PTT:  N/A    D-dimer:  N/A Fibrinogen:  N/A       Blood cultures negative to date    Recent Labs   Lab 05/01/24  0537 05/01/24  0536 04/30/24  1842 04/29/24  0553 04/28/24  2158 04/28/24  1625 04/28/24  1213 04/28/24  0913 04/28/24  0508 04/27/24  1211 04/27/24  0814 04/27/24  0751 04/27/24  0513 04/26/24  2301 04/26/24  1633   WBC  --   --   --  10.0  --   --   --   --  12.8*  --  17.8*  --   --   --  24.1*   HGB 9.3*  --  9.6* 8.9*   < >  --   --   --  7.2*   < > 6.9*  --  7.2*  --  9.3*   MCV  --   --   --  70*  --   --   --   --  67*  --  63*  --   --   --  65*   PLT  --   --   --  176  --   --   --   --  191  --  254  --   --   --  511*   INR  --  1.43*  --   --   --   --   --   --   --   --   --   --  1.68*  --   --    NA  --  136  --   --   --   --   --   --   --   --  141  --   --   --  143   POTASSIUM  --  3.0*  --   --   --   --   --   --   --   --  4.0  --   --   --  5.3   CHLORIDE  --  105  --   --   --   --   --   --   --   --  108*  --   --   --  104   CO2  --  23  --   --   --   --   --   --   --   --  22  --   --   --  21*   BUN  --  9.2  --   --   --   --   --   --   --   --  31.4*  --   --   --  36.8*   CR  --  0.65*  --  0.70  --   --   --   --   --   --  0.72  --   --   --  1.04   ANIONGAP  --  8  --   --   --   --   --   --   --   --  11  --   --   --  18*   RENU  --  8.4*  --   --   --   --   --   --   --   --  8.4*  --   --   --  9.3   GLC  --  96  --   --   --  118* 122*   < >  --    < > 117*   < > 122*   < > 195*   ALBUMIN  --   --   --   --   --   --   --   --   --   --   --   --   --   --  3.4*   PROTTOTAL  --   --   --   --   --   --   --   --   --   --   --   --   --   --  6.8   BILITOTAL  --   --   --   --   --   --   --   --   --   --   --   --   --   --  1.0   ALKPHOS  --   --   --   --   --   --   --   --   --   --   --   --   --   --  102   ALT  --   --   --   --   --   --   --   --   --   --   --   --   --   --  16   AST  --   --   --   --   --   --   --   --   --   --   --   --   --   --  26     < > = values in this interval not displayed.

## 2024-05-01 NOTE — PLAN OF CARE
Goal Outcome Evaluation:      Plan of Care Reviewed With: patient    Overall Patient Progress: no changeOverall Patient Progress: no change    Outcome Evaluation: Pt is A&O.  VSS. Afebrile.  Denies pain when asked.  Refused to get up for supper - states just wants to rest.  On R/A.

## 2024-05-01 NOTE — PROGRESS NOTES
05/01/24 1419   Vital Signs   Oximeter Heart Rate 80 bpm   Oxygen Therapy   SpO2 94 %   O2 Device None (Room air)   Nebulizer Assessment & Treatment   $RT Use ONLY Delivery Method Nebulizer - Additional   Nebulizer Device Mouthpiece   Pretreatment Heart Rate (beats/min) 77   Pretreatment Resp Rate (breaths/min) 16   Pretreat Breath Sounds - Bilat - All Lobes clear   Pretreat Breath Sounds - LIBRA clear   Pretreat Breath Sounds - LLL clear;diminished   Pretreat Breath Sounds - RUL clear   Pretreat Breath Sounds - RML clear   Pretreat Breath Sounds - RLL clear;diminished   Patient Position Yusuf's   Respiratory Treatment Status (SVN) given   Breath Sounds Post-Respiratory Treatment   Posttreatment Heart Rate (beats/min) 81   Posttreatment Resp Rate (breaths/min) 16   Posttreatment Assessment (SVN) breath sounds unchanged;patient reports breathing improved   Signs of Intolerance (SVN) none   Breath Sounds Posttreatment All Fields no change;clear   Breath Sounds Posttreatment LIBRA clear   Breath Sounds Posttreatment LLL clear;diminished   Breath Sounds Posttreatment RUL clear   Breath Sounds Posttreatment RML clear   Breath Sounds Posttreatment RLL clear;diminished     Nebulizer treatments given as ordered

## 2024-05-01 NOTE — PLAN OF CARE
Occupational Therapy: Orders received. Chart reviewed and discussed with care team.? Occupational Therapy not indicated due to: per communication with care management , patient is anticipated to discharge to home with 24/7 assist this date. He is here from TCU and is not receptive to returning - has released his TCU bed and cancelled previous plans for hospice, but per palliative care note he is open to [facility] OT/PT. Per chart review, patient has been minimally participatory with nursing cares, mobility this admission as well. IP OT involvement is not anticipated to contribute to or alter discharge disposition at this time? Defer discharge recommendations to PT and care team.? Will complete orders.      Thank you for your referral.  Gabriella Potter OTR/KAROLINE     [facility]  O: [phone]  E: [email or web address]

## 2024-05-01 NOTE — PLAN OF CARE
Goal Outcome Evaluation:      Plan of Care Reviewed With: patient    Overall Patient Progress: no changeOverall Patient Progress: no change    Outcome Evaluation: Patient refused to be moved. Refused to go to chair. Refused for skin check. Patient educated for the importance of turning and shifting weight. Patient allowed the writer to turn him once at 1400H.  Did not have enough oral intake. Pt wanted and eager to go home. VSS and afebrile. On room air.

## 2024-05-01 NOTE — PLAN OF CARE
Goal Outcome Evaluation:      Plan of Care Reviewed With: patient    Overall Patient Progress: no changeOverall Patient Progress: no change    Outcome Evaluation: A&O. VSS on rm air. Refuses ambulation/repositioning. Denies pain overnight. IV saline locked. External catheter in place.

## 2024-05-02 VITALS
DIASTOLIC BLOOD PRESSURE: 76 MMHG | WEIGHT: 167.11 LBS | SYSTOLIC BLOOD PRESSURE: 135 MMHG | RESPIRATION RATE: 17 BRPM | OXYGEN SATURATION: 92 % | HEIGHT: 69 IN | BODY MASS INDEX: 24.75 KG/M2 | TEMPERATURE: 98 F | HEART RATE: 74 BPM

## 2024-05-02 LAB
ANION GAP SERPL CALCULATED.3IONS-SCNC: 8 MMOL/L (ref 7–15)
CHLORIDE SERPL-SCNC: 105 MMOL/L (ref 98–107)
DEPRECATED HCO3 PLAS-SCNC: 24 MMOL/L (ref 22–29)
HGB BLD-MCNC: 9.5 G/DL (ref 13.3–17.7)
MAGNESIUM SERPL-MCNC: 1.9 MG/DL (ref 1.7–2.3)
POTASSIUM SERPL-SCNC: 3.8 MMOL/L (ref 3.4–5.3)
SODIUM SERPL-SCNC: 137 MMOL/L (ref 135–145)

## 2024-05-02 PROCEDURE — 250N000013 HC RX MED GY IP 250 OP 250 PS 637: Performed by: INTERNAL MEDICINE

## 2024-05-02 PROCEDURE — 250N000011 HC RX IP 250 OP 636: Performed by: INTERNAL MEDICINE

## 2024-05-02 PROCEDURE — 36415 COLL VENOUS BLD VENIPUNCTURE: CPT | Performed by: PEDIATRICS

## 2024-05-02 PROCEDURE — 83735 ASSAY OF MAGNESIUM: CPT | Performed by: PEDIATRICS

## 2024-05-02 PROCEDURE — 80051 ELECTROLYTE PANEL: CPT | Performed by: PEDIATRICS

## 2024-05-02 PROCEDURE — 99239 HOSP IP/OBS DSCHRG MGMT >30: CPT | Performed by: PEDIATRICS

## 2024-05-02 PROCEDURE — 250N000013 HC RX MED GY IP 250 OP 250 PS 637: Performed by: PEDIATRICS

## 2024-05-02 PROCEDURE — 85018 HEMOGLOBIN: CPT | Performed by: PEDIATRICS

## 2024-05-02 RX ORDER — HYDROMORPHONE HYDROCHLORIDE 2 MG/1
1 TABLET ORAL EVERY 6 HOURS PRN
Qty: 3 TABLET | Refills: 0 | Status: SHIPPED | OUTPATIENT
Start: 2024-05-02 | End: 2024-05-15

## 2024-05-02 RX ORDER — POTASSIUM CHLORIDE 1500 MG/1
20 TABLET, EXTENDED RELEASE ORAL ONCE
Status: COMPLETED | OUTPATIENT
Start: 2024-05-02 | End: 2024-05-02

## 2024-05-02 RX ORDER — ACETAMINOPHEN 325 MG/1
650 TABLET ORAL EVERY 4 HOURS PRN
COMMUNITY
Start: 2024-05-02 | End: 2024-05-22

## 2024-05-02 RX ADMIN — METHOCARBAMOL 500 MG: 500 TABLET ORAL at 08:22

## 2024-05-02 RX ADMIN — UMECLIDINIUM 1 PUFF: 62.5 AEROSOL, POWDER ORAL at 08:21

## 2024-05-02 RX ADMIN — PIPERACILLIN AND TAZOBACTAM 3.38 G: 3; .375 INJECTION, POWDER, FOR SOLUTION INTRAVENOUS at 05:57

## 2024-05-02 RX ADMIN — FLUTICASONE FUROATE AND VILANTEROL TRIFENATATE 1 PUFF: 200; 25 POWDER RESPIRATORY (INHALATION) at 08:23

## 2024-05-02 RX ADMIN — PANTOPRAZOLE SODIUM 40 MG: 40 TABLET, DELAYED RELEASE ORAL at 06:32

## 2024-05-02 RX ADMIN — ROSUVASTATIN CALCIUM 5 MG: 5 TABLET, FILM COATED ORAL at 08:21

## 2024-05-02 RX ADMIN — MULTIPLE VITAMINS W/ MINERALS TAB 1 TABLET: TAB at 08:21

## 2024-05-02 RX ADMIN — POTASSIUM CHLORIDE 20 MEQ: 1500 TABLET, EXTENDED RELEASE ORAL at 08:21

## 2024-05-02 ASSESSMENT — ACTIVITIES OF DAILY LIVING (ADL)
ADLS_ACUITY_SCORE: 28
ADLS_ACUITY_SCORE: 27

## 2024-05-02 NOTE — PLAN OF CARE
Goal Outcome Evaluation:      Plan of Care Reviewed With: patient    Overall Patient Progress: improvingOverall Patient Progress: improving    Outcome Evaluation: Pt A/Ox4. VSS on RA. Denies pain, declined Lidocaine patch. Scheduled Zosyn given x2. External cath placed.

## 2024-05-02 NOTE — DISCHARGE SUMMARY
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    Moderate COPD (chronic obstructive pulmonary disease) (H)    Coronary artery disease involving native coronary artery of native heart without angina pectoris    Pulmonary nodule    Aspirin-like platelet function defect (H)    Acute blood loss anemia    Chronic systolic congestive heart failure (H)    Urothelial carcinoma of bladder (H)    Severe malnutrition (H24)    Hypoalbuminemia    Coagulopathy (H24)    Sacral pressure sore    Hyperlipidemia LDL goal <130    Third degree heart block (H) - s/p pacemaker    Hypertension goal BP (blood pressure) < 140/90    History of CVA (cerebrovascular accident) - old left internal capsule lacunar infarct on CT 7/2016    Diverticulitis    Infrarenal abdominal aortic aneurysm (AAA) without rupture (H24)    Fistula of sigmoid colon    Thalassemia    Enlarged prostate    Abrasion of left elbow      Clinically Significant Risk Factors     # Severe Malnutrition: based on nutrition assessment      Follow-ups Needed After Discharge   Follow-up Appointments     Follow-up and recommended labs and tests       Follow up with primary care provider, Neville Degroot, within 7 days   to evaluate medication change, for hospital follow- up, and regarding new   diagnosis.  The following labs/tests are recommended: potassium,   hemoglobin. Patient currently declined referral for biopsy of lung mass.    If patient wishes, could consider GI referral for diagnostic EGD due to   suspected  peptic ulcer disease with bleeding.  If desired, follow up with your Urologist at [facility] to discuss and/or   arrange BCG bladder infusion treatments for bladder cancer.            Discharge Disposition   Admited to home care:   Agency:  [facility]  Discharged to home  Condition at discharge: Stable    Hospital Course   Khang Bailon is a 79 year old male with complex health history notable for moderate COPD, CAD, chronic systolic heart failure, infrarenal AAA, diverticulitis with chronic colocolonic fistula, thalassemia with chronic anemia, hypertension, hyperlipidemia, recent diagnosis of pulmonary nodule, recent diagnosis of urothelial carcinoma of bladder, enlarged prostate, recent diagnosis of tubulovillous rectal polyp with high-grade dysplasia, history of lacunar stroke, and recent hospitalization April 3 to April 15 for multiple medical problems including right-sided rib and hip fractures who presented with increasing shortness of breath, fever and hypotension and was admitted to ICU due to concerns for life-threatening pneumonia with respiratory failure and septic shock.     Right lower lobe pneumonia  Septic shock due to pneumonia  Acute respiratory failure with hypoxia due to pneumonia  Clinical presentation suspicious for pneumonia and noted to have right lower lobe infiltrate radiographically.  Also presented with severe lactic acidosis and hypotension concerning for septic shock requiring vasopressor therapy along with IV fluids.  Negative blood cultures, urine culture, testing for MRSA, and testing for COVID-19, influenza, and RSV.  Also presented with severe hypoxia concerning for acute respiratory failure that required intermittent BiPAP initially.  He was treated with antibiotics, vasopressors, BiPAP, and oxygen supplementation and improved.  Signs of sepsis resolved and he weaned off of oxygen supplementation.  He received 7 days IV Zosyn during hospitalization and was discharged with an  additional 3 days of Augmentin treatment.    Acute blood loss anemia  Thalassemia with chronic anemia  Suspected upper GI bleeding with melena  Aspirin induced platelet function defect  Coagulopathy  Tubulovillous adenoma of rectum  Diverticulitis with chronic colocolonic fistula  Developed gross episodes of melena with worsening anemia concerning for acute blood loss anemia from upper GI bleeding superimposed upon chronic anemia from thalassemia.  Recent baseline hemoglobin 8-9 acutely decreased to 6-7 during this hospital stay.  He required PRBC transfusion of a total of 2 units PRBCs during this hospitalization most recently evening of 4/28.  Melena was suspicious for upper GI bleeding source, and he was at risk for upper GI bleeding and peptic ulcer disease due to previous dual antiplatelet therapy since November 2023 and recent stress including hospitalization elsewhere April 3 to April 15.  He also had coagulopathy during this hospitalization with prolonged INR that increased his risk for bleeding.  Coagulopathy improved as other acute medical problems improved and was probably due to sepsis and malnutrition.  He did not have diagnostic upper GI endoscopy during this or other recent hospitalization, and he declined referral for it after this hospital discharge.  He had known tubulovillous adenoma of rectum with high-grade dysplasia found at colonoscopy on 4/9/24 during prior hospitalization for which outpatient follow-up was advised, but colon polyp was not likely to be the cause for GI bleeding because he did not have hematochezia.  Similarly, he had known chronic colocolonic fistula and diverticulitis but did not have hematochezia, so doubt his colon fistula was the source for GI bleeding.  Previous clopidogrel antiplatelet therapy had been stopped during previous hospitalization and aspirin was held during this hospitalization.  It was possible that acute GI bleeding contributed to his hemodynamic  instability and lactic acidosis at admission.  He was treated with proton pump inhibitor and initial dietary restriction and signs of melena and GI bleeding resolved.  Hemoglobin stabilized at 9-10 for 3 to 4 days prior to discharge.  At discharge she was advised to avoid restarting aspirin till after recheck with his PCP at outpatient follow-up and continue Protonix to complete at least 2-month treatment course of Protonix.    Severe malnutrition  Hypoalbuminemia  Combination of poor oral intake and recent weight loss was concerning for severe malnutrition.  Hypoalbuminemia was present at admission also likely due to poor nutritional intake.  He was evaluated by registered dietitian during hospitalization.  Oral intake began to improve as his other acute medical problems stabilized and once diet was advanced.    Hypokalemia  He developed moderate hypokalemia during hospitalization although was asymptomatic.  Hypokalemia resolved with potassium supplementation.  Hyperkalemia was probably due to inadequate nutritional intake.    Moderate COPD (chronic obstructive pulmonary disease)  Chronic moderate COPD has been stable while continuing chronic inhaled triple therapy (inhaled steroid, LAMA, and LABA) and chronic DuoNeb 3 times daily.    Demand ischemia  Coronary artery disease involving native coronary artery of native heart without angina pectoris  S/P PCI 11/2023 with 2 drug-eluting stents  Hyperlipidemia  Known CAD and underwent angiography November 2023 at which time drug-eluting stents were placed in the circumflex and ramus.  He subsequently was treated with dual antiplatelet therapy until he developed bleeding during recent previous hospitalization at which time Plavix was stopped but he had continued aspirin up until this hospital admission.  He also continued to take statin therapy.  Troponin on admission was elevated but trended downward quickly.  He did not have angina and admission EKG was not  interpretable for acute ischemia because of left bundle branch block.  Demand ischemia due to septic shock and and hypoxic respiratory failure was suspected as cause for elevated troponin.  Aspirin was held due to GI bleeding and statin was continued.  He had not previously been prescribed beta-blocker therapy and it was not added during this hospitalization.    Sacral pressure ulcer  Left elbow abrasion  Noted to have sacral pressure ulcer and left elbow abrasion on admission.  By nursing description, sacral pressure ulcer was probably stage I with erythema but no skin breakdown.  Left elbow abrasion was caused by previous fall about the time of his right rib and right hip fractures in early April.  Those skin lesions appeared to improve with local wound care.    Recent right hip fracture, status post right total hip arthroplasty  Recent right rib fractures  When hospitalized in early April 2024, he was diagnosed with right hip fracture that was repaired operatively and with right rib fractures that were managed with symptomatic treatment.  During this hospitalization, surgical wound from right hip fracture repair site appeared to be healing well.  Lidoderm patch was applied to his right lateral thorax during this hospitalization to treat pain from his rib fractures.  However, he reported that he was not having ongoing right-sided chest pain by the time of hospital discharge and specifically declined prescription for Lidoderm patch at discharge for that reason.  He did request prescription for oral analgesic to use as needed if his chest pain from previous rib fractures worsened and was therefore given a prescription for a few tablets of oral Dilaudid to use for that purpose.  He was advised to avoid NSAIDs because of suspected peptic ulcer disease with GI bleeding.  He was advised to use Tylenol as needed for pain as well.    Chronic systolic congestive heart failure  Previously diagnosed with chronic systolic CHF  with EF 45 to 50% on echocardiogram November 2023 at the time of his coronary angiogram.  CHF was compensated during this hospital stay.  He had previously been prescribed beta-blocker and ARB which were discontinued during recent previous hospitalization and not restarted during this hospital stay.     s/p pacemaker for third degree heart block  Has chronically indwelling pacemaker previously placed due to third-degree heart block.  Underwent pacemaker interrogation during recent previous hospitalization.  No concerns for new arrhythmia or pacemaker malfunction during this hospitalization.    Urothelial bladder carcinoma  Underwent diagnostic cystoscopy March 29 demonstrating bladder tumors that were partially resected with pathology confirming high-grade papillary urothelial carcinoma that invaded into the lamina propria.  Urology advised starting adjunctive therapy of this cancer with BCG bladder infusions, but patient had not yet started those treatments and during this hospitalization expressed preference to avoid any further treatment of bladder cancer.  Outpatient follow-up with urology was advised depending on his overall care preferences.    Right lower lobe pulmonary nodule  Recently diagnosed with spiculated 1.2 cm pulmonary nodule in superior segment of right lower lobe during previous hospitalization that was FDG avid on PET scan raising suspicion for possible malignancy.  CT scan performed at this admission confirmed spiculated right lower lobe pulmonary nodule that measured 1.5 cm.  This was discussed during his hospitalization and referral for consideration of biopsy of that lesion was discussed, but the patient expressed preference to avoid any further investigation of that suspicious pulmonary nodule.    Enlarged prostate with FDG avid lesion  Elevated PSA  Radiographic findings during this hospitalization again demonstrated enlarged prostate.  Recent PET scan had demonstrated focus of FDG avidity  in the left prostate lobe.  He had known history of elevated PSA.  He was not known to have been diagnosed with prostate cancer previously.  Depending on his preferences, he could follow-up with urology as outpatient for these problems if he preferred.  He was able to void spontaneously during this hospitalization without difficulty.    Hypertension goal BP (blood pressure) < 140/90  Carries previous diagnosis of hypertension and had previously been treated with antihypertensive medications although not on any antihypertensive therapy at the time of this hospital admission.  Previously prescribed antihypertensive medications were discontinued during recent previous hospital stay.  After recovery from septic shock and improvement in GI bleeding, blood pressure was normotensive.    History of CVA (cerebrovascular accident) - old left internal capsule lacunar infarct on CT 7/2016  Previous history of lacunar stroke in July 2016.  Chronically treated with aspirin and statin for secondary prevention.  Stable neurologic status during this hospital stay.  Aspirin was held because of GI bleeding but statin was continued.    Care goals  Patient met with palliative care during this hospital stay and his care goals were clarified.  He was considered to retain decision-making capacity at the time of consultation with palliative care team during this hospital stay.  He confirmed preferences for full code resuscitation status, ongoing aggressive medical care during hospitalization, and desire to return home with family with home care services including PT and OT at the time of hospital discharge as he continues to recover after hip fracture and operative repair thereof in early April.    Consultations This Hospital Stay   PHYSICAL THERAPY ADULT IP CONSULT  OCCUPATIONAL THERAPY ADULT IP CONSULT  PHARMACY TO DOSE VANCO  CARE MANAGEMENT / SOCIAL WORK IP CONSULT  PALLIATIVE CARE ADULT IP CONSULT    Code Status   Full Code    Time  Spent on this Encounter   I, Khang Gillis MD, personally saw the patient today and spent greater than 30 minutes discharging this patient.       Khang Gillis MD  [address] SURGICAL  [address] [place] [address]  Phone: [phone]  ______________________________________________________________________    Physical Exam   Vital Signs: Temp: 98  F (36.7  C) Temp src: Axillary BP: 135/76 Pulse: 74   Resp: 17 SpO2: 92 % O2 Device: None (Room air)    Weight: 167 lbs 1.74 oz  General Appearance: Elderly man, appears comfortable resting in bed  Respiratory: Normal respiratory effort  Neuro: Alert and maintains wakefulness and attention       Primary Care Physician   Neville Degroot    Discharge Orders      Home Care Referral      Reason for your hospital stay    Hospitalized due to pneumonia and suspected gastrointestinal bleeding with shock and improved     Activity    Your activity upon discharge: activity as tolerated     Monitor and record    weight every day     Wound care and dressings    Instructions to care for your left elbow and sacral wounds at home: as directed.     Follow-up and recommended labs and tests     Follow up with primary care provider, Neville Degroot, within 7 days to evaluate medication change, for hospital follow- up, and regarding new diagnosis.  The following labs/tests are recommended: potassium, hemoglobin. Patient currently declined referral for biopsy of lung mass.  If patient wishes, could consider GI referral for diagnostic EGD due to suspected peptic ulcer disease with bleeding.  If desired, follow up with your Urologist at [facility] to discuss and/or arrange BCG bladder infusion treatments for bladder cancer.     Diet    Follow this diet upon discharge: Orders Placed This Encounter      Snacks/Supplements Adult: Ensure Clear; With Meals      Advance Diet as Tolerated: Regular Diet Adult; Regular Diet Adult       Significant  Results and Procedures   Most Recent 3 CBC's:  Recent Labs   Lab Test 05/02/24  0542 05/01/24  0537 04/30/24  1842 04/29/24  0553 04/28/24  2158 04/28/24  0508 04/27/24  1343 04/27/24  0814   WBC  --   --   --  10.0  --  12.8*  --  17.8*   HGB 9.5* 9.3* 9.6* 8.9*   < > 7.2*   < > 6.9*   MCV  --   --   --  70*  --  67*  --  63*   PLT  --   --   --  176  --  191  --  254    < > = values in this interval not displayed.     Most Recent 3 BMP's:  Recent Labs   Lab Test 05/02/24  0542 05/01/24  1701 05/01/24  0536 04/29/24  0553 04/28/24  1625 04/28/24  1213 04/27/24  1211 04/27/24  0814 04/26/24  2301 04/26/24  1633     --  136  --   --   --   --  141  --  143   POTASSIUM 3.8 3.7 3.0*  --   --   --   --  4.0  --  5.3   CHLORIDE 105  --  105  --   --   --   --  108*  --  104   CO2 24  --  23  --   --   --   --  22  --  21*   BUN  --   --  9.2  --   --   --   --  31.4*  --  36.8*   CR  --   --  0.65* 0.70  --   --   --  0.72  --  1.04   ANIONGAP 8  --  8  --   --   --   --  11  --  18*   RENU  --   --  8.4*  --   --   --   --  8.4*  --  9.3   GLC  --   --  96  --  118* 122*   < > 117*   < > 195*    < > = values in this interval not displayed.     Most Recent 2 LFT's:  Recent Labs   Lab Test 04/26/24  1633 04/03/24  1203   AST 26 26   ALT 16 13   ALKPHOS 102 73   BILITOTAL 1.0 0.6     Most Recent 3 INR's:  Recent Labs   Lab Test 05/01/24  0536 04/27/24  0513 08/14/23  0635   INR 1.43* 1.68* 1.13     7-Day Micro Results       Collected Updated Procedure Result Status      04/26/2024 2232 04/27/2024 0628 MRSA MSSA PCR, Nasal Swab [[id number]]    Swab from Nare, Right    Final result Component Value   MRSA Target DNA Negative   SA Target DNA Negative            04/26/2024 1717 05/01/2024 2246 Blood Culture Hand, Right [[id number]]   Blood from Hand, Right    Final result Component Value   Culture No Growth               04/26/2024 1653 04/26/2024 1743 Symptomatic Influenza A/B, RSV, & SARS-CoV2 PCR (COVID-19) Nose  [[id number]]    Swab from Nose    Final result Component Value   Influenza A PCR Negative   Influenza B PCR Negative   RSV PCR Negative   SARS CoV2 PCR Negative   NEGATIVE: SARS-CoV-2 (COVID-19) RNA not detected, presumed negative.            04/26/2024 1633 05/01/2024 2246 Blood Culture Peripheral Blood [[id number]]   Peripheral Blood    Final result Component Value   Culture No Growth                   ,   Results for orders placed or performed during the hospital encounter of 04/26/24   XR Chest Port 1 View    Narrative    EXAM: XR CHEST PORT 1 VIEW  LOCATION: [facility]  DATE: 4/26/2024    INDICATION: Severe distress, history of CHF, COPD and recent CABG.  COMPARISON: 11/27/2023      Impression    IMPRESSION: Pacemaker leads unchanged. Some slight fibrosis in the lung bases. No acute new findings.   CT Chest (PE) Abdomen Pelvis w Contrast    Narrative    EXAM: CT CHEST PE ABDOMEN PELVIS W CONTRAST  LOCATION: [facility]  DATE: 4/26/2024    INDICATION: abdomen pain   hypotensive and tachycardic   hx of lung cancer and recent right hip surgery  COMPARISON: 04/03/2024  TECHNIQUE: CT chest pulmonary angiogram and routine CT abdomen pelvis with IV contrast. Arterial phase through the chest and venous phase through the abdomen and pelvis. Multiplanar reformats and MIP reconstructions were performed. Dose reduction   techniques were used.   CONTRAST: 90mLs Isovue 370    FINDINGS:  ANGIOGRAM CHEST: Pulmonary arteries are normal caliber and negative for pulmonary emboli. Assessment of the aorta is limited by poor contrast bolus. No definite dissection. No CT evidence of right heart strain.     LUNGS AND PLEURA: Spiculated superior segment right lower lobe pulmonary nodule is unchanged measuring 1.5 cm. Nearby partially calcified nodule is also stable, 1.3 cm. Diffuse centrilobular emphysema, most advanced in the upper lobes. There is new right   lower  lobe dependent consolidation, with fluid-filled right lower lobe bronchi. Mild left basilar atelectasis.    MEDIASTINUM/AXILLAE: No significant mediastinal or hilar adenopathy. Small pericardial effusion. Cardiac pacemaker.    CORONARY ARTERY CALCIFICATION: Previous intervention (stents or CABG).    HEPATOBILIARY: Cholelithiasis.    PANCREAS: No ductal dilatation.    SPLEEN: Tiny splenic cyst.    ADRENAL GLANDS: Unremarkable    KIDNEYS/BLADDER: Bilateral renal cysts for which no additional follow-up imaging is recommended. No hydronephrosis. Bladder is thick-walled but decompressed.    BOWEL: Colonic diverticulosis. Chronic sigmoid wall thickening compatible with diverticular disease. Chronic pericolonic stranding, without new fluid collections. Colocolic fistula redemonstrated. No small bowel obstruction.    LYMPH NODES: No significant retroperitoneal adenopathy.    VASCULATURE: 3.9 cm saccular abdominal aortic aneurysm is unchanged. Moderately extensive multifocal atherosclerotic disease. Approximately 50% narrowing of the SMA proximally.    PELVIC ORGANS: Moderate prostatic hypertrophy. Further assessment limited by streak artifact.    MUSCULOSKELETAL: Interval right hip arthroplasty. Chronic greater than 50% T6 compression fracture. T7 50% compression fracture has slightly increased in interval. Multilevel spondylosis. No suspicious lytic or blastic lesions. Thoracal lumbar scoliosis.   Multiple healing right rib fractures including fourth through eighth anterolateral ribs. Old right 11th posterior rib fracture.      Impression    IMPRESSION:  1.  New right lower lobe consolidation with associated retained secretions compatible with pneumonia or aspiration.  2.  No evidence pulmonary embolism.  3.  Sigmoid diverticular disease, with pericolonic inflammatory changes and colocolic fistula.  4.  Increasing height loss at T7.  5.  Stable spiculated right lung nodule.  6.  Multiple additional findings as above.        Discharge Medications   Current Discharge Medication List        START taking these medications    Details   amoxicillin-clavulanate (AUGMENTIN) 875-125 MG tablet Take 1 tablet by mouth 2 times daily for 3 days  Qty: 6 tablet, Refills: 0    Associated Diagnoses: Pneumonia of right lower lobe due to infectious organism; Septic shock (H)      pantoprazole (PROTONIX) 40 MG EC tablet Take 1 tablet (40 mg) by mouth 2 times daily (before meals)  Qty: 60 tablet, Refills: 1    Associated Diagnoses: Gastrointestinal hemorrhage with melena           CONTINUE these medications which have CHANGED    Details   acetaminophen (TYLENOL) 325 MG tablet Take 2 tablets (650 mg) by mouth every 4 hours as needed for pain    Associated Diagnoses: Other chest pain      HYDROmorphone (DILAUDID) 2 MG tablet Take 0.5 tablets (1 mg) by mouth every 6 hours as needed for severe pain  Qty: 3 tablet, Refills: 0    Associated Diagnoses: Painful respiration      ipratropium - albuterol 0.5 mg/2.5 mg/3 mL (DUONEB) 0.5-2.5 (3) MG/3ML neb solution Take 1 vial (3 mLs) by nebulization 3 times daily    Associated Diagnoses: Chronic obstructive pulmonary disease, unspecified COPD type (H)           CONTINUE these medications which have NOT CHANGED    Details   budeson-glycopyrrol-formoterol (BREZTRI AEROSPHERE) 160-9-4.8 MCG/ACT AERO inhaler Inhale 2 puffs into the lungs 2 times daily  Qty: 10.7 g, Refills: 4    Associated Diagnoses: Moderate COPD (chronic obstructive pulmonary disease) (H)      calcium carbonate-vitamin D (CALTRATE) 600-10 MG-MCG per tablet Take 1 tablet by mouth 2 times daily      Cholecalciferol (VITAMIN D-3) 25 MCG (1000 UT) CAPS Take 25 mcg by mouth daily      Ferrous Sulfate 324 (65 Fe) MG TBEC Take 1 tablet by mouth daily      fish oil-omega-3 fatty acids 1000 MG capsule Take 1 g by mouth 2 times daily      methocarbamol (ROBAXIN) 500 MG tablet Take 500 mg by mouth 3 times daily      Multiple Vitamin (MULTIVITAMIN ADULT PO)  Take 1 tablet by mouth daily      order for DME Equipment being ordered: Nebulizer hose  Qty: 1 Tube, Refills: 11    Associated Diagnoses: Chronic obstructive pulmonary disease, unspecified COPD type (H)      oxyBUTYnin (DITROPAN) 5 MG tablet Take 5 mg by mouth 3 times daily as needed for bladder spasms      polyethylene glycol (MIRALAX) 17 g packet Take 17 g by mouth daily      rosuvastatin (CRESTOR) 5 MG tablet Take 5 mg by mouth every morning      senna-docusate (SENOKOT-S/PERICOLACE) 8.6-50 MG tablet Take 1 tablet by mouth 2 times daily      vitamin C (ASCORBIC ACID) 500 MG tablet Take 500 mg by mouth daily           STOP taking these medications       aspirin (ASA) 81 MG chewable tablet Comments:   Reason for Stopping:         Lidocaine (LIDOCARE) 4 % Patch Comments:   Reason for Stopping:             Allergies   Allergies   Allergen Reactions    Flagyl [Metronidazole] GI Disturbance

## 2024-05-02 NOTE — PLAN OF CARE
Goal Outcome Evaluation:       Patient discharged via family home with [facility] (Phone: [phone]) .  Janel Jeff RN   Inpatient Care Coordinator  [facility] [phone]  [facility] [phone]

## 2024-05-02 NOTE — PROGRESS NOTES
Care Management Discharge Note    Discharge Date: 05/02/2024       Discharge Disposition: Home, Home Care    Discharge Services: None    Discharge DME: Oxygen, Wheelchair, Commode    Discharge Transportation: agency    Private pay costs discussed: Not applicable    Does the patient's insurance plan have a 3 day qualifying hospital stay waiver?  No    PAS Confirmation Code:    Patient/family educated on Medicare website which has current facility and service quality ratings: no    Education Provided on the Discharge Plan: Yes  Persons Notified of Discharge Plans: Brittany wife  Patient/Family in Agreement with the Plan: yes    Handoff Referral Completed: Yes    Additional Information:  Patient will discharge home with family and [facility] (Phone: [phone]) RN PT OT HHA.    Janel Jeff RN   Inpatient Care Coordinator  [facility] [phone]  [facility] [phone]     Janel Jeff RN

## 2024-05-02 NOTE — PROGRESS NOTES
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    Moderate COPD (chronic obstructive pulmonary disease) (H)    Coronary artery disease involving native coronary artery of native heart without angina pectoris    Pulmonary nodule    Aspirin-like platelet function defect (H)    Acute blood loss anemia    Chronic systolic congestive heart failure (H)    Urothelial carcinoma of bladder (H)    Severe malnutrition (H24)    Hypoalbuminemia    Coagulopathy (H24)    Sacral pressure sore    Hyperlipidemia LDL goal <130    Third degree heart block (H) - s/p pacemaker    Hypertension goal BP (blood pressure) < 140/90    History of CVA (cerebrovascular accident) - old left internal capsule lacunar infarct on CT 7/2016    Diverticulitis    Infrarenal abdominal aortic aneurysm (AAA) without rupture (H24)    Fistula of sigmoid colon    Thalassemia    Enlarged prostate    Abrasion of left elbow    A-(assessment): A and Ox4. Patient refused for skin check on the back and buttocks. Left abrasion on elbow scabbed and VANESSA. Lung sound diminished. Assist of 2. Refused to get up on chair and did not have food.    R-(recommendations): Discharge instructions reviewed with wife. Listed belongings gathered and returned to patient.          Discharge Nursing Criteria:   Patient Education given and documented: (STROKE, CHF, Diabetes):  { NA    Care Plan and Patient education resolved: Yes    New Medications- pt has been educated about purpose and side effects: Yes    Vaccines  Influenza status verified at discharge:  No    Intentionally Retained Items No    MISC  Prescriptions if  needed, hard copies sent with patient  Yes  Home medications returned to patient: NA  Medication Bin checked and emptied on discharge Yes  Patient reports post-discharge pain management plan is effective: Yes

## 2024-05-02 NOTE — PLAN OF CARE
Physical Therapy Discharge Summary    Reason for therapy discharge:    Patient refusing to participate with skilled rehab services in the inpatient setting . Patient agreeable to home care PT services and will discharge home with this and family assistance.    Progress towards therapy goal(s). See goals on Care Plan in Epic electronic health record for goal details.  Goals not met.  Barriers to achieving goals:   declines to participate.    Therapy recommendation(s):    Continued therapy is recommended.  Rationale/Recommendations:  to address mobility, strength and safety to decrease care giver burden and improve quality of life.      Thank you for your referral.  Daysi Bhatt, PT, DPT, ATC, LAT    [facility]  O: [phone]  E: [email or web address]

## 2024-05-02 NOTE — PLAN OF CARE
Goal Outcome Evaluation:      Plan of Care Reviewed With: patient    Overall Patient Progress: improvingOverall Patient Progress: improving     Vitals stable. No C/O pain. External cath in place, no redness or breakdown. Dressings remain CDI. Lungs sounds diminished. Tolerating clear liquid diet. Pt refuses some skin assessments due to wanting to sleep.

## 2024-05-03 ENCOUNTER — TELEPHONE (OUTPATIENT)
Dept: FAMILY MEDICINE | Facility: CLINIC | Age: 79
End: 2024-05-03
Payer: COMMERCIAL

## 2024-05-03 ENCOUNTER — PATIENT OUTREACH (OUTPATIENT)
Dept: CARE COORDINATION | Facility: CLINIC | Age: 79
End: 2024-05-03
Payer: COMMERCIAL

## 2024-05-03 ASSESSMENT — ACTIVITIES OF DAILY LIVING (ADL): DEPENDENT_IADLS:: CLEANING;LAUNDRY

## 2024-05-03 NOTE — TELEPHONE ENCOUNTER
[facility]   [phone]      Home Care is calling regarding an established patient with [facility].       Requesting orders from: Neville Degroot  Provider is following patient: No       Orders Requested    Skilled Nursing  Request for delay in care   Delay start to date: 5/6/24 due to patient requesting a late home care admission for Monday instead of today.     Information was gathered and will be sent to provider for review.  RN will contact Home Care with information after provider review.  Information was gathered and will be sent to provider to confirm provider will be following patient.  RN will contact Home Care with information after provider review.  Confirmed ok to leave a detailed message with call back.  Contact information confirmed and updated as needed.    CHICHO He, RN   [facility] ~ Registered Nurse  Clinic Triage  May 3, 2024

## 2024-05-03 NOTE — PROGRESS NOTES
Clinic Care Coordination Contact  [facility]: Post-Discharge Note  SITUATION                                                      Admission:    Admission Date: 04/26/24   Reason for Admission: Pneumonia of right lower lobe due to infectious organism  Discharge:   Discharge Date: 05/02/24  Discharge Diagnosis: Pneumonia of right lower lobe due to infectious organism    BACKGROUND                                                      Per hospital discharge summary and inpatient provider notes:    Hospital Course  Khang Bailon is a 79 year old male with complex health history notable for moderate COPD, CAD, chronic systolic heart failure, infrarenal AAA, diverticulitis with chronic colocolonic fistula, thalassemia with chronic anemia, hypertension, hyperlipidemia, recent diagnosis of pulmonary nodule, recent diagnosis of urothelial carcinoma of bladder, enlarged prostate, recent diagnosis of tubulovillous rectal polyp with high-grade dysplasia, history of lacunar stroke, and recent hospitalization April 3 to April 15 for multiple medical problems including right-sided rib and hip fractures who presented with increasing shortness of breath, fever and hypotension and was admitted to ICU due to concerns for life-threatening pneumonia with respiratory failure and septic shock.      Right lower lobe pneumonia  Septic shock due to pneumonia  Acute respiratory failure with hypoxia due to pneumonia  Clinical presentation suspicious for pneumonia and noted to have right lower lobe infiltrate radiographically.  Also presented with severe lactic acidosis and hypotension concerning for septic shock requiring vasopressor therapy along with IV fluids.  Negative blood cultures, urine culture, testing for MRSA, and testing for COVID-19, influenza, and RSV.  Also presented with severe hypoxia concerning for acute respiratory failure that required intermittent BiPAP initially.  He was treated with antibiotics, vasopressors,  BiPAP, and oxygen supplementation and improved.  Signs of sepsis resolved and he weaned off of oxygen supplementation.  He received 7 days IV Zosyn during hospitalization and was discharged with an additional 3 days of Augmentin treatment.     Acute blood loss anemia  Thalassemia with chronic anemia  Suspected upper GI bleeding with melena  Aspirin induced platelet function defect  Coagulopathy  Tubulovillous adenoma of rectum  Diverticulitis with chronic colocolonic fistula  Developed gross episodes of melena with worsening anemia concerning for acute blood loss anemia from upper GI bleeding superimposed upon chronic anemia from thalassemia.  Recent baseline hemoglobin 8-9 acutely decreased to 6-7 during this hospital stay.  He required PRBC transfusion of a total of 2 units PRBCs during this hospitalization most recently evening of 4/28.  Melena was suspicious for upper GI bleeding source, and he was at risk for upper GI bleeding and peptic ulcer disease due to previous dual antiplatelet therapy since November 2023 and recent stress including hospitalization elsewhere April 3 to April 15.  He also had coagulopathy during this hospitalization with prolonged INR that increased his risk for bleeding.  Coagulopathy improved as other acute medical problems improved and was probably due to sepsis and malnutrition.  He did not have diagnostic upper GI endoscopy during this or other recent hospitalization, and he declined referral for it after this hospital discharge.  He had known tubulovillous adenoma of rectum with high-grade dysplasia found at colonoscopy on 4/9/24 during prior hospitalization for which outpatient follow-up was advised, but colon polyp was not likely to be the cause for GI bleeding because he did not have hematochezia.  Similarly, he had known chronic colocolonic fistula and diverticulitis but did not have hematochezia, so doubt his colon fistula was the source for GI bleeding.  Previous clopidogrel  antiplatelet therapy had been stopped during previous hospitalization and aspirin was held during this hospitalization.  It was possible that acute GI bleeding contributed to his hemodynamic instability and lactic acidosis at admission.  He was treated with proton pump inhibitor and initial dietary restriction and signs of melena and GI bleeding resolved.  Hemoglobin stabilized at 9-10 for 3 to 4 days prior to discharge.  At discharge she was advised to avoid restarting aspirin till after recheck with his PCP at outpatient follow-up and continue Protonix to complete at least 2-month treatment course of Protonix.     Severe malnutrition  Hypoalbuminemia  Combination of poor oral intake and recent weight loss was concerning for severe malnutrition.  Hypoalbuminemia was present at admission also likely due to poor nutritional intake.  He was evaluated by registered dietitian during hospitalization.  Oral intake began to improve as his other acute medical problems stabilized and once diet was advanced.     Hypokalemia  He developed moderate hypokalemia during hospitalization although was asymptomatic.  Hypokalemia resolved with potassium supplementation.  Hyperkalemia was probably due to inadequate nutritional intake.     Moderate COPD (chronic obstructive pulmonary disease)  Chronic moderate COPD has been stable while continuing chronic inhaled triple therapy (inhaled steroid, LAMA, and LABA) and chronic DuoNeb 3 times daily.     Demand ischemia  Coronary artery disease involving native coronary artery of native heart without angina pectoris  S/P PCI 11/2023 with 2 drug-eluting stents  Hyperlipidemia  Known CAD and underwent angiography November 2023 at which time drug-eluting stents were placed in the circumflex and ramus.  He subsequently was treated with dual antiplatelet therapy until he developed bleeding during recent previous hospitalization at which time Plavix was stopped but he had continued aspirin up until  this hospital admission.  He also continued to take statin therapy.  Troponin on admission was elevated but trended downward quickly.  He did not have angina and admission EKG was not interpretable for acute ischemia because of left bundle branch block.  Demand ischemia due to septic shock and and hypoxic respiratory failure was suspected as cause for elevated troponin.  Aspirin was held due to GI bleeding and statin was continued.  He had not previously been prescribed beta-blocker therapy and it was not added during this hospitalization.     Sacral pressure ulcer  Left elbow abrasion  Noted to have sacral pressure ulcer and left elbow abrasion on admission.  By nursing description, sacral pressure ulcer was probably stage I with erythema but no skin breakdown.  Left elbow abrasion was caused by previous fall about the time of his right rib and right hip fractures in early April.  Those skin lesions appeared to improve with local wound care.     Recent right hip fracture, status post right total hip arthroplasty  Recent right rib fractures  When hospitalized in early April 2024, he was diagnosed with right hip fracture that was repaired operatively and with right rib fractures that were managed with symptomatic treatment.  During this hospitalization, surgical wound from right hip fracture repair site appeared to be healing well.  Lidoderm patch was applied to his right lateral thorax during this hospitalization to treat pain from his rib fractures.  However, he reported that he was not having ongoing right-sided chest pain by the time of hospital discharge and specifically declined prescription for Lidoderm patch at discharge for that reason.  He did request prescription for oral analgesic to use as needed if his chest pain from previous rib fractures worsened and was therefore given a prescription for a few tablets of oral Dilaudid to use for that purpose.  He was advised to avoid NSAIDs because of suspected  peptic ulcer disease with GI bleeding.  He was advised to use Tylenol as needed for pain as well.     Chronic systolic congestive heart failure  Previously diagnosed with chronic systolic CHF with EF 45 to 50% on echocardiogram November 2023 at the time of his coronary angiogram.  CHF was compensated during this hospital stay.  He had previously been prescribed beta-blocker and ARB which were discontinued during recent previous hospitalization and not restarted during this hospital stay.      s/p pacemaker for third degree heart block  Has chronically indwelling pacemaker previously placed due to third-degree heart block.  Underwent pacemaker interrogation during recent previous hospitalization.  No concerns for new arrhythmia or pacemaker malfunction during this hospitalization.     Urothelial bladder carcinoma  Underwent diagnostic cystoscopy March 29 demonstrating bladder tumors that were partially resected with pathology confirming high-grade papillary urothelial carcinoma that invaded into the lamina propria.  Urology advised starting adjunctive therapy of this cancer with BCG bladder infusions, but patient had not yet started those treatments and during this hospitalization expressed preference to avoid any further treatment of bladder cancer.  Outpatient follow-up with urology was advised depending on his overall care preferences.     Right lower lobe pulmonary nodule  Recently diagnosed with spiculated 1.2 cm pulmonary nodule in superior segment of right lower lobe during previous hospitalization that was FDG avid on PET scan raising suspicion for possible malignancy.  CT scan performed at this admission confirmed spiculated right lower lobe pulmonary nodule that measured 1.5 cm.  This was discussed during his hospitalization and referral for consideration of biopsy of that lesion was discussed, but the patient expressed preference to avoid any further investigation of that suspicious pulmonary nodule.      Enlarged prostate with FDG avid lesion  Elevated PSA  Radiographic findings during this hospitalization again demonstrated enlarged prostate.  Recent PET scan had demonstrated focus of FDG avidity in the left prostate lobe.  He had known history of elevated PSA.  He was not known to have been diagnosed with prostate cancer previously.  Depending on his preferences, he could follow-up with urology as outpatient for these problems if he preferred.  He was able to void spontaneously during this hospitalization without difficulty.     Hypertension goal BP (blood pressure) < 140/90  Carries previous diagnosis of hypertension and had previously been treated with antihypertensive medications although not on any antihypertensive therapy at the time of this hospital admission.  Previously prescribed antihypertensive medications were discontinued during recent previous hospital stay.  After recovery from septic shock and improvement in GI bleeding, blood pressure was normotensive.     History of CVA (cerebrovascular accident) - old left internal capsule lacunar infarct on CT 7/2016  Previous history of lacunar stroke in July 2016.  Chronically treated with aspirin and statin for secondary prevention.  Stable neurologic status during this hospital stay.  Aspirin was held because of GI bleeding but statin was continued.     Care goals  Patient met with palliative care during this hospital stay and his care goals were clarified.  He was considered to retain decision-making capacity at the time of consultation with palliative care team during this hospital stay.  He confirmed preferences for full code resuscitation status, ongoing aggressive medical care during hospitalization, and desire to return home with family with home care services including PT and OT at the time of hospital discharge as he continues to recover after hip fracture and operative repair thereof in early April.    ASSESSMENT           Discharge  Assessment  How are you doing now that you are home?: per patient's wife, things are going fine at home. she has not heard from home care. she will call if they do not reach out. patient has follow up scheduled 5/7/2024  How are your symptoms? (Red Flag symptoms escalate to triage hotline per guidelines): Improved  Do you feel your condition is stable enough to be safe at home until your provider visit?: Yes  Does the patient have their discharge instructions? : Yes  Does the patient have questions regarding their discharge instructions? : No  Were you started on any new medications or were there changes to any of your previous medications? : Yes  Does the patient have all of their medications?: Yes  Do you have questions regarding any of your medications? : No  Do you have all of your needed medical supplies or equipment (DME)?  (i.e. oxygen tank, CPAP, cane, etc.): Yes  Discharge follow-up appointment scheduled within 14 calendar days? : Yes  Discharge Follow Up Appointment Date: 05/07/24  Discharge Follow Up Appointment Scheduled with?: Primary Care Provider         Post-op (Clinicians Only)  Fever: No  Chills: No      PLAN                                                      Outpatient Plan:      Discharge Orders          Home Care Referral       Reason for your hospital stay     Hospitalized due to pneumonia and suspected gastrointestinal bleeding with shock and improved          Activity     Your activity upon discharge: activity as tolerated          Monitor and record     weight every day          Wound care and dressings     Instructions to care for your left elbow and sacral wounds at home: as directed.          Follow-up and recommended labs and tests      Follow up with primary care provider, Neville Degroot, within 7 days to evaluate medication change, for hospital follow- up, and regarding new diagnosis.  The following labs/tests are recommended: potassium, hemoglobin. Patient currently declined  referral for biopsy of lung mass.  If patient wishes, could consider GI referral for diagnostic EGD due to suspected peptic ulcer disease with bleeding.  If desired, follow up with your Urologist at [facility] to discuss and/or arrange BCG bladder infusion treatments for bladder cancer.          Diet     Follow this diet upon discharge: Orders Placed This Encounter      Snacks/Supplements Adult: Ensure Clear; With Meals      Advance Diet as Tolerated: Regular Diet Adult; Regular Diet Adult       Future Appointments   Date Time Provider Department Center   5/7/2024  9:00 AM Marcy Rodriguez PA-C [facility]   5/8/2024  1:00 PM Gigi Avina MD [facility]         For any urgent concerns, please contact our 24 hour nurse triage line: [phone] ([phone])       Patient's wife declines care coordination at this time.    Pallavi Gilman RN

## 2024-05-06 ENCOUNTER — TELEPHONE (OUTPATIENT)
Dept: FAMILY MEDICINE | Facility: CLINIC | Age: 79
End: 2024-05-06
Payer: COMMERCIAL

## 2024-05-06 NOTE — TELEPHONE ENCOUNTER
Home Care RN did a home visit with patient today and states the patient has significant pain, specifically penile pain but he refuses to go to the emergency department. He was sent home with a few tabs of Dilaudid. He will not take Tylenol due to his wife's belief that it damages the liver. He was encouraged to be seen in ED several time by home care RN> Patient does have a follow up with Marcy Rodriguez tomorrow. Do you feel he needs to see you due to the complexity? There were a lot of discrepancies with his meds as well.    He is not taking fish oil  Not taking the correct dose of Calcium or Vitamin D  He is also out of Methocarbamol.     Please review.    Andrea Reaves,BERNIEN, RN

## 2024-05-06 NOTE — TELEPHONE ENCOUNTER
Home Care is calling regarding an established patient with [facility].    Requesting orders from: Neville Degroot  Provider is following patient: Yes  Is this a 60-day recertification request?  No    Orders Requested    Skilled Nursing  Request for initial certification (first set of orders)   Frequency:  2x/wk for 1 wks  then 1x/wk for 3 wks  Once every 2 weeks for 4 weeks    Occupational Therapy  Request for initial evaluation and treatment (one time)       Verbal orders given for OT eval.  Information was gathered for Skilled nursing.  Provider review needed.  RN will contact Home Care with information after provider review.  Confirmed ok to leave a detailed message with call back.  Contact information confirmed and updated as needed.        Andrea Reaves RN

## 2024-05-07 ENCOUNTER — TELEPHONE (OUTPATIENT)
Dept: FAMILY MEDICINE | Facility: CLINIC | Age: 79
End: 2024-05-07

## 2024-05-07 ENCOUNTER — PATIENT OUTREACH (OUTPATIENT)
Dept: ONCOLOGY | Facility: CLINIC | Age: 79
End: 2024-05-07

## 2024-05-07 ENCOUNTER — HOSPITAL ENCOUNTER (EMERGENCY)
Facility: CLINIC | Age: 79
Discharge: HOME | End: 2024-05-07
Attending: EMERGENCY MEDICINE | Admitting: EMERGENCY MEDICINE
Payer: COMMERCIAL

## 2024-05-07 ENCOUNTER — OFFICE VISIT (OUTPATIENT)
Dept: FAMILY MEDICINE | Facility: CLINIC | Age: 79
End: 2024-05-07
Payer: COMMERCIAL

## 2024-05-07 ENCOUNTER — APPOINTMENT (OUTPATIENT)
Dept: GENERAL RADIOLOGY | Facility: CLINIC | Age: 79
End: 2024-05-07
Attending: EMERGENCY MEDICINE
Payer: COMMERCIAL

## 2024-05-07 VITALS
HEART RATE: 110 BPM | SYSTOLIC BLOOD PRESSURE: 130 MMHG | DIASTOLIC BLOOD PRESSURE: 88 MMHG | RESPIRATION RATE: 24 BRPM | TEMPERATURE: 97.4 F | OXYGEN SATURATION: 91 %

## 2024-05-07 VITALS
DIASTOLIC BLOOD PRESSURE: 92 MMHG | SYSTOLIC BLOOD PRESSURE: 139 MMHG | BODY MASS INDEX: 24.66 KG/M2 | WEIGHT: 167 LBS | OXYGEN SATURATION: 93 % | RESPIRATION RATE: 24 BRPM | HEART RATE: 93 BPM | TEMPERATURE: 97.6 F

## 2024-05-07 DIAGNOSIS — I12.9 HYPERTENSIVE CHRONIC KIDNEY DISEASE WITH STAGE 1 THROUGH STAGE 4 CHRONIC KIDNEY DISEASE, OR UNSPECIFIED CHRONIC KIDNEY DISEASE: ICD-10-CM

## 2024-05-07 DIAGNOSIS — R06.02 SHORTNESS OF BREATH: ICD-10-CM

## 2024-05-07 DIAGNOSIS — E43 SEVERE MALNUTRITION (H): ICD-10-CM

## 2024-05-07 DIAGNOSIS — S72.001D CLOSED FRACTURE OF RIGHT HIP WITH ROUTINE HEALING: ICD-10-CM

## 2024-05-07 DIAGNOSIS — Z74.09 REDUCED MOBILITY: ICD-10-CM

## 2024-05-07 DIAGNOSIS — J18.9 PNEUMONIA OF RIGHT LOWER LOBE DUE TO INFECTIOUS ORGANISM: ICD-10-CM

## 2024-05-07 DIAGNOSIS — I50.20 SYSTOLIC CONGESTIVE HEART FAILURE, UNSPECIFIED HF CHRONICITY (H): ICD-10-CM

## 2024-05-07 DIAGNOSIS — J44.9 MODERATE COPD (CHRONIC OBSTRUCTIVE PULMONARY DISEASE) (H): Chronic | ICD-10-CM

## 2024-05-07 DIAGNOSIS — R65.10 SIRS (SYSTEMIC INFLAMMATORY RESPONSE SYNDROME) (H): ICD-10-CM

## 2024-05-07 DIAGNOSIS — C67.9 UROTHELIAL CARCINOMA OF BLADDER (H): Primary | Chronic | ICD-10-CM

## 2024-05-07 DIAGNOSIS — E88.09 HYPOALBUMINEMIA: ICD-10-CM

## 2024-05-07 DIAGNOSIS — Z76.89 HEALTH CARE HOME: ICD-10-CM

## 2024-05-07 DIAGNOSIS — R63.4 LOSS OF WEIGHT: ICD-10-CM

## 2024-05-07 LAB
ALBUMIN SERPL BCG-MCNC: 3 G/DL (ref 3.5–5.2)
ALP SERPL-CCNC: 96 U/L (ref 40–150)
ALT SERPL W P-5'-P-CCNC: 11 U/L (ref 0–70)
ANION GAP SERPL CALCULATED.3IONS-SCNC: 14 MMOL/L (ref 7–15)
AST SERPL W P-5'-P-CCNC: 19 U/L (ref 0–45)
BASOPHILS # BLD AUTO: 0.1 10E3/UL (ref 0–0.2)
BASOPHILS NFR BLD AUTO: 0 %
BILIRUB SERPL-MCNC: 0.8 MG/DL
BUN SERPL-MCNC: 12.3 MG/DL (ref 8–23)
CALCIUM SERPL-MCNC: 9.3 MG/DL (ref 8.8–10.2)
CHLORIDE SERPL-SCNC: 104 MMOL/L (ref 98–107)
CREAT SERPL-MCNC: 0.74 MG/DL (ref 0.67–1.17)
DEPRECATED HCO3 PLAS-SCNC: 22 MMOL/L (ref 22–29)
EGFRCR SERPLBLD CKD-EPI 2021: >90 ML/MIN/1.73M2
EOSINOPHIL # BLD AUTO: 0.1 10E3/UL (ref 0–0.7)
EOSINOPHIL NFR BLD AUTO: 1 %
ERYTHROCYTE [DISTWIDTH] IN BLOOD BY AUTOMATED COUNT: 25.2 % (ref 10–15)
GLUCOSE SERPL-MCNC: 127 MG/DL (ref 70–99)
HCT VFR BLD AUTO: 38.9 % (ref 40–53)
HGB BLD-MCNC: 12.2 G/DL (ref 13.3–17.7)
IMM GRANULOCYTES # BLD: 0.1 10E3/UL
IMM GRANULOCYTES NFR BLD: 1 %
LYMPHOCYTES # BLD AUTO: 1.3 10E3/UL (ref 0.8–5.3)
LYMPHOCYTES NFR BLD AUTO: 8 %
MCH RBC QN AUTO: 21.4 PG (ref 26.5–33)
MCHC RBC AUTO-ENTMCNC: 31.4 G/DL (ref 31.5–36.5)
MCV RBC AUTO: 68 FL (ref 78–100)
MONOCYTES # BLD AUTO: 1 10E3/UL (ref 0–1.3)
MONOCYTES NFR BLD AUTO: 6 %
NEUTROPHILS # BLD AUTO: 13.5 10E3/UL (ref 1.6–8.3)
NEUTROPHILS NFR BLD AUTO: 84 %
NRBC # BLD AUTO: 0 10E3/UL
NRBC BLD AUTO-RTO: 0 /100
PLATELET # BLD AUTO: 302 10E3/UL (ref 150–450)
POTASSIUM SERPL-SCNC: 4.1 MMOL/L (ref 3.4–5.3)
PROT SERPL-MCNC: 6.2 G/DL (ref 6.4–8.3)
RBC # BLD AUTO: 5.69 10E6/UL (ref 4.4–5.9)
SODIUM SERPL-SCNC: 140 MMOL/L (ref 135–145)
WBC # BLD AUTO: 16.1 10E3/UL (ref 4–11)

## 2024-05-07 PROCEDURE — 99496 TRANSJ CARE MGMT HIGH F2F 7D: CPT

## 2024-05-07 PROCEDURE — 99284 EMERGENCY DEPT VISIT MOD MDM: CPT | Mod: 25 | Performed by: EMERGENCY MEDICINE

## 2024-05-07 PROCEDURE — 96374 THER/PROPH/DIAG INJ IV PUSH: CPT | Performed by: EMERGENCY MEDICINE

## 2024-05-07 PROCEDURE — 258N000003 HC RX IP 258 OP 636: Performed by: EMERGENCY MEDICINE

## 2024-05-07 PROCEDURE — 36415 COLL VENOUS BLD VENIPUNCTURE: CPT | Performed by: EMERGENCY MEDICINE

## 2024-05-07 PROCEDURE — 71046 X-RAY EXAM CHEST 2 VIEWS: CPT

## 2024-05-07 PROCEDURE — 85048 AUTOMATED LEUKOCYTE COUNT: CPT | Performed by: EMERGENCY MEDICINE

## 2024-05-07 PROCEDURE — 250N000011 HC RX IP 250 OP 636: Performed by: EMERGENCY MEDICINE

## 2024-05-07 PROCEDURE — 99284 EMERGENCY DEPT VISIT MOD MDM: CPT | Performed by: EMERGENCY MEDICINE

## 2024-05-07 PROCEDURE — 80053 COMPREHEN METABOLIC PANEL: CPT | Performed by: EMERGENCY MEDICINE

## 2024-05-07 PROCEDURE — 96361 HYDRATE IV INFUSION ADD-ON: CPT | Performed by: EMERGENCY MEDICINE

## 2024-05-07 RX ORDER — AZITHROMYCIN 250 MG/1
TABLET, FILM COATED ORAL
Qty: 6 TABLET | Refills: 0 | Status: SHIPPED | OUTPATIENT
Start: 2024-05-07 | End: 2024-05-12

## 2024-05-07 RX ORDER — CEFDINIR 300 MG/1
300 CAPSULE ORAL 2 TIMES DAILY
Qty: 20 CAPSULE | Refills: 0 | Status: SHIPPED | OUTPATIENT
Start: 2024-05-07 | End: 2024-05-17

## 2024-05-07 RX ORDER — OXYCODONE HYDROCHLORIDE 5 MG/1
5 TABLET ORAL EVERY 6 HOURS PRN
Qty: 12 TABLET | Refills: 0 | Status: SHIPPED | OUTPATIENT
Start: 2024-05-07 | End: 2024-05-10

## 2024-05-07 RX ORDER — HYDROMORPHONE HCL IN WATER/PF 6 MG/30 ML
0.2 PATIENT CONTROLLED ANALGESIA SYRINGE INTRAVENOUS EVERY 30 MIN PRN
Status: DISCONTINUED | OUTPATIENT
Start: 2024-05-07 | End: 2024-05-07 | Stop reason: HOSPADM

## 2024-05-07 RX ADMIN — SODIUM CHLORIDE 500 ML: 9 INJECTION, SOLUTION INTRAVENOUS at 10:54

## 2024-05-07 RX ADMIN — HYDROMORPHONE HYDROCHLORIDE 0.2 MG: 0.2 INJECTION, SOLUTION INTRAMUSCULAR; INTRAVENOUS; SUBCUTANEOUS at 10:55

## 2024-05-07 ASSESSMENT — ACTIVITIES OF DAILY LIVING (ADL)
ADLS_ACUITY_SCORE: 40
ADLS_ACUITY_SCORE: 36

## 2024-05-07 ASSESSMENT — PAIN SCALES - GENERAL: PAINLEVEL: WORST PAIN (10)

## 2024-05-07 NOTE — TELEPHONE ENCOUNTER
----- Message from Marcy Rodriguez PA-C sent at 5/7/2024  9:06 AM CDT -----  Is anyone able to work this patient in? I am seeing him now for hospital follow up and I believe it would be more appropriate to follow up with his PCP or another primary care provider due to medical complexity and severity of symptoms.     MRN:[id number]

## 2024-05-07 NOTE — TELEPHONE ENCOUNTER
----- Message from Yolanda Albrecht RN sent at 5/7/2024  9:48 AM CDT -----  Patient was escorted to ED by Marcy. Please help schedule follow up with PCP.  ----- Message -----  From: Marcy Rodriguez PA-C  Sent: 5/7/2024   9:07 AM CDT  To: [facility] Pool; #    Is anyone able to work this patient in? I am seeing him now for hospital follow up and I believe it would be more appropriate to follow up with his PCP or another primary care provider due to medical complexity and severity of symptoms.     MRN:[id number]

## 2024-05-07 NOTE — DISCHARGE INSTRUCTIONS
You had a new area of pneumonia in the right lower lung.  I prescribed antibiotics for that.  I prescribed a few pain pills for you as well.  I hope you get better quickly.  Return to the ER if worsening symptoms.

## 2024-05-07 NOTE — ED PROVIDER NOTES
History     Chief Complaint   Patient presents with    Generalized Body Aches    Generalized Weakness     HPI  Khang Bailon is a 79 year old male with a history of COPD, coronary artery disease, systolic heart failure, infrarenal AAA, diverticulitis, chronic colocolonic fistula, thalassemia, anemia, hypertension, hyperlipidemia, recent diagnosis of urothelial carcinoma of the bladder, enlarged prostate, lacunar stroke, recent admission in April for right-sided rib and hip fractures who presents to the emergency department secondary to body aches and generalized weakness since recent admission with rehabilitation.  He had chronic body aches and pains at home prior to admission.  He was in a lot of pain from his fairly recent hip fracture and rib fracture.  Now he states he has pain everywhere.  After his hip fracture he was sent to [facility] for rehabilitation.  His significant other in the room states that being in the home without being active was the reason that he got so critically ill.  Now he is in their family home with his significant other and has a son that helps out but he is having great difficulty caring for himself.  No fever or vomiting.  No new abdominal pain but he did have suprapubic pain from UTI previously.  He is urinating and the urine is aravind-colored without any blood.  He has been having ongoing dysuria.  He did complete a course of antibiotics including Zosyn followed by Augmentin.  He is currently not on any antibiotics.    Patient was admitted from 4/26/2024 until May 2, 2024 for pneumonia of the right lower lobe.He was admitted for right lower lobe pneumonia, septic shock and acute respiratory failure.  He was admitted to the ICU due to concerns for life-threatening pneumonia with respiratory failure and septic shock.  He required vasopressor therapy and IV fluids.  Blood cultures were negative urine cultures were negative.  Initially he was treated with BiPAP he received 7 days of  Zosyn and discharged with Augmentin.    Allergies:  Allergies   Allergen Reactions    Flagyl [Metronidazole] GI Disturbance       Problem List:    Patient Active Problem List    Diagnosis Date Noted    Severe malnutrition (H24) 04/30/2024     Priority: Medium    Gastrointestinal hemorrhage with melena 04/30/2024     Priority: Medium    Hypoalbuminemia 04/30/2024     Priority: Medium    Lactic acidosis 04/30/2024     Priority: Medium    Enlarged prostate 04/30/2024     Priority: Medium    Coagulopathy (H24) 04/30/2024     Priority: Medium    Sacral pressure sore 04/30/2024     Priority: Medium    Abrasion of left elbow 04/30/2024     Priority: Medium    Chronic systolic congestive heart failure (H) 04/27/2024     Priority: Medium    Urothelial carcinoma of bladder (H) 04/27/2024     Priority: Medium    Pneumonia of right lower lobe due to infectious organism 04/26/2024     Priority: Medium    Septic shock (H) 04/26/2024     Priority: Medium    Acute respiratory failure with hypoxia (H) 04/26/2024     Priority: Medium    Closed fracture of right hip with routine healing 04/16/2024     Priority: Medium    Pain 04/16/2024     Priority: Medium    Drug-induced constipation 04/16/2024     Priority: Medium    Acute blood loss anemia 04/16/2024     Priority: Medium    Postprocedural stricture of anterior urethra 03/21/2024     Priority: Medium    Acute bilateral back pain, unspecified back location 03/19/2024     Priority: Medium    SIRS (systemic inflammatory response syndrome) (H) 02/01/2024     Priority: Medium    Calculus of gallbladder without cholecystitis without obstruction 02/01/2024     Priority: Medium    Infrarenal abdominal aortic aneurysm (AAA) without rupture (H24) 02/01/2024     Priority: Medium    Fistula of sigmoid colon 02/01/2024     Priority: Medium    Aspiration into airway, initial encounter 02/01/2024     Priority: Medium    Gross hematuria 02/01/2024     Priority: Medium    Aspirin-like platelet  function defect (H) 02/01/2024     Priority: Medium    Diverticulitis 01/31/2024     Priority: Medium    Pulmonary nodule 01/31/2024     Priority: Medium    Mass of urinary bladder 01/31/2024     Priority: Medium    Abnormal urinalysis 01/31/2024     Priority: Medium    Other chest pain 01/31/2024     Priority: Medium    COPD exacerbation (H) 01/31/2024     Priority: Medium    Systolic congestive heart failure, unspecified HF chronicity (H) 01/09/2024     Priority: Medium    Systolic congestive heart failure (H) 01/09/2024     Priority: Medium    Coronary artery disease involving native coronary artery of native heart without angina pectoris 12/05/2023     Priority: Medium    NSTEMI (non-ST elevated myocardial infarction) (H) 11/27/2023     Priority: Medium    COVID-19 08/13/2023     Priority: Medium    Benign prostatic hyperplasia, unspecified whether lower urinary tract symptoms present 11/01/2021     Priority: Medium    Benign prostatic hyperplasia 11/01/2021     Priority: Medium    Fatigue, unspecified type 11/29/2019     Priority: Medium    Memory loss 11/29/2019     Priority: Medium    Moderate COPD (chronic obstructive pulmonary disease) (H) 11/16/2017     Priority: Medium    Tubulovillous adenoma of rectum 4/9/24 07/26/2016     Priority: Medium    Sigmoid diverticulitis 07/12/2016     Priority: Medium    History of CVA (cerebrovascular accident) - old left internal capsule lacunar infarct on CT 7/2016 07/12/2016     Priority: Medium    Multilevel degenerative disc disease 09/21/2015     Priority: Medium    Elevated prostate specific antigen (PSA) 09/11/2015     Priority: Medium    Health Care Home 01/24/2013     Priority: Medium     Racquel Ortiz RN-PHN  FPA / [facility]   [phone]  DX V65.8 REPLACED WITH [id number] (04/08/2013)      Elevated PSA 08/27/2012     Priority: Medium    Loss of weight 08/27/2012     Priority: Medium    Third degree heart block (H) - s/p  pacemaker 08/10/2012     Priority: Medium    Hypertension goal BP (blood pressure) < 140/90 08/10/2012     Priority: Medium    Advanced directives, counseling/discussion 07/27/2012     Priority: Medium     Discussed advance care planning with patient; however, patient declined at this time. 7/27/2012         Other thalassemia (H24) 07/27/2012     Priority: Medium     Diagnosis updated by automated process. Provider to review and confirm.      Hyperlipidemia LDL goal <130 07/27/2012     Priority: Medium    Heart block 07/27/2012     Priority: Medium    Cardiac abnormality 07/27/2012     Priority: Medium    Complete heart block (H) 07/27/2012     Priority: Medium    Thalassemia 07/27/2012     Priority: Medium     Formatting of this note might be different from the original.   Formatting of this note might be different from the original.   Diagnosis updated by automated process. Provider to review and confirm.      Painful respiration 03/31/2004     Priority: Medium    Personal history of tobacco use, presenting hazards to health 03/31/2004     Priority: Medium        Past Medical History:    Past Medical History:   Diagnosis Date    Alpha thalassemia (H24)     AV block 07/2012    CAD (coronary artery disease)     Chronic systolic heart failure (H)     COPD (chronic obstructive pulmonary disease) (H)     Diverticulitis     History of CVA (cerebrovascular accident)     Injury, other and unspecified, elbow, forearm, and wrist 1970s    Urothelial carcinoma (H)        Past Surgical History:    Past Surgical History:   Procedure Laterality Date    COLONOSCOPY      COLONOSCOPY  12/10/2012    CYSTOSCOPY, LITHOLAPAXY, COMBINED N/A 11/10/2021    Procedure: Cystoscopy, Bladder Stone Removal;  Surgeon: Guanaco Stewart MD;  Location: [facility] OR    IMPLANT PACEMAKER      LASER KTP TRANSURETHRAL RESECTION (TUR) PROSTATE N/A 11/10/2021    Procedure: TRANSURETHRAL RESECTION (TUR) PROSTATE, USING KTP LASER;  Surgeon: Guanaco Stewart,  MD;  Location:  OR       Family History:    No family history on file.    Social History:  Marital Status:   [2]  Social History     Tobacco Use    Smoking status: Former     Current packs/day: 0.00     Types: Cigarettes     Start date: 1962     Quit date: 2012     Years since quittin.7    Smokeless tobacco: Never    Tobacco comments:     Quit in July   Vaping Use    Vaping status: Never Used   Substance Use Topics    Alcohol use: No     Alcohol/week: 0.0 standard drinks of alcohol    Drug use: No        Medications:    azithromycin (ZITHROMAX Z-MARIANN) 250 MG tablet  cefdinir (OMNICEF) 300 MG capsule  oxyCODONE (ROXICODONE) 5 MG tablet  acetaminophen (TYLENOL) 325 MG tablet  budeson-glycopyrrol-formoterol (BREZTRI AEROSPHERE) 160-9-4.8 MCG/ACT AERO inhaler  calcium carbonate-vitamin D (CALTRATE) 600-10 MG-MCG per tablet  Cholecalciferol (VITAMIN D-3) 25 MCG (1000 UT) CAPS  Ferrous Sulfate 324 (65 Fe) MG TBEC  fish oil-omega-3 fatty acids 1000 MG capsule  HYDROmorphone (DILAUDID) 2 MG tablet  ipratropium - albuterol 0.5 mg/2.5 mg/3 mL (DUONEB) 0.5-2.5 (3) MG/3ML neb solution  methocarbamol (ROBAXIN) 500 MG tablet  Multiple Vitamin (MULTIVITAMIN ADULT PO)  order for DME  oxyBUTYnin (DITROPAN) 5 MG tablet  pantoprazole (PROTONIX) 40 MG EC tablet  polyethylene glycol (MIRALAX) 17 g packet  rosuvastatin (CRESTOR) 5 MG tablet  senna-docusate (SENOKOT-S/PERICOLACE) 8.6-50 MG tablet  vitamin C (ASCORBIC ACID) 500 MG tablet          Review of Systems   All other systems reviewed and are negative.      Physical Exam   BP: (!) 128/95  Pulse: 109  Temp: 97.6  F (36.4  C)  Resp: 24  Weight: 75.8 kg (167 lb)  SpO2: 92 %      Physical Exam  Vitals and nursing note reviewed.   Constitutional:       General: He is not in acute distress.     Appearance: He is well-developed. He is not diaphoretic.   HENT:      Head: Normocephalic and atraumatic.      Nose: Nose normal. No congestion.      Mouth/Throat:       Mouth: Mucous membranes are dry.      Pharynx: Oropharynx is clear.   Eyes:      General: No scleral icterus.     Extraocular Movements: Extraocular movements intact.      Conjunctiva/sclera: Conjunctivae normal.      Pupils: Pupils are equal, round, and reactive to light.   Cardiovascular:      Rate and Rhythm: Normal rate and regular rhythm.   Pulmonary:      Effort: Pulmonary effort is normal.      Breath sounds: Normal breath sounds.   Abdominal:      General: Abdomen is flat.      Tenderness: There is abdominal tenderness.      Comments: Suprapubic tenderness on exam   Musculoskeletal:         General: No tenderness or deformity. Normal range of motion.      Cervical back: Normal range of motion and neck supple.   Lymphadenopathy:      Cervical: No cervical adenopathy.   Skin:     General: Skin is warm and dry.      Capillary Refill: Capillary refill takes less than 2 seconds.      Findings: No rash.   Neurological:      General: No focal deficit present.      Mental Status: He is alert and oriented to person, place, and time.      Cranial Nerves: No cranial nerve deficit.      Sensory: No sensory deficit.      Coordination: Coordination normal.         ED Course        Procedures           Results for orders placed or performed during the hospital encounter of 05/07/24 (from the past 24 hour(s))   CBC with platelets differential    Narrative    The following orders were created for panel order CBC with platelets differential.  Procedure                               Abnormality         Status                     ---------                               -----------         ------                     CBC with platelets and d...[[id number]]  Abnormal            Final result                 Please view results for these tests on the individual orders.   Comprehensive metabolic panel   Result Value Ref Range    Sodium 140 135 - 145 mmol/L    Potassium 4.1 3.4 - 5.3 mmol/L    Carbon Dioxide (CO2) 22 22 - 29 mmol/L     Anion Gap 14 7 - 15 mmol/L    Urea Nitrogen 12.3 8.0 - 23.0 mg/dL    Creatinine 0.74 0.67 - 1.17 mg/dL    GFR Estimate >90 >60 mL/min/1.73m2    Calcium 9.3 8.8 - 10.2 mg/dL    Chloride 104 98 - 107 mmol/L    Glucose 127 (H) 70 - 99 mg/dL    Alkaline Phosphatase 96 40 - 150 U/L    AST 19 0 - 45 U/L    ALT 11 0 - 70 U/L    Protein Total 6.2 (L) 6.4 - 8.3 g/dL    Albumin 3.0 (L) 3.5 - 5.2 g/dL    Bilirubin Total 0.8 <=1.2 mg/dL   CBC with platelets and differential   Result Value Ref Range    WBC Count 16.1 (H) 4.0 - 11.0 10e3/uL    RBC Count 5.69 4.40 - 5.90 10e6/uL    Hemoglobin 12.2 (L) 13.3 - 17.7 g/dL    Hematocrit 38.9 (L) 40.0 - 53.0 %    MCV 68 (L) 78 - 100 fL    MCH 21.4 (L) 26.5 - 33.0 pg    MCHC 31.4 (L) 31.5 - 36.5 g/dL    RDW 25.2 (H) 10.0 - 15.0 %    Platelet Count 302 150 - 450 10e3/uL    % Neutrophils 84 %    % Lymphocytes 8 %    % Monocytes 6 %    % Eosinophils 1 %    % Basophils 0 %    % Immature Granulocytes 1 %    NRBCs per 100 WBC 0 <1 /100    Absolute Neutrophils 13.5 (H) 1.6 - 8.3 10e3/uL    Absolute Lymphocytes 1.3 0.8 - 5.3 10e3/uL    Absolute Monocytes 1.0 0.0 - 1.3 10e3/uL    Absolute Eosinophils 0.1 0.0 - 0.7 10e3/uL    Absolute Basophils 0.1 0.0 - 0.2 10e3/uL    Absolute Immature Granulocytes 0.1 <=0.4 10e3/uL    Absolute NRBCs 0.0 10e3/uL   XR Chest 2 Views    Narrative    CHEST TWO VIEWS 5/7/2024 12:20 PM     HISTORY: recent pneumonia cough    COMPARISON: 4/26/2024       Impression    IMPRESSION: Left chest wall pacemaker device appears unchanged. Focal  airspace opacity is seen within the right lower lung which was not  present previously and suspicious for pneumonia. Subsegmental  atelectasis is seen within the left lower lung. Known pulmonary  nodules are better seen on the prior CT chest exam from 4/26/2024.  Emphysematous changes are seen within the upper lungs. Normal  cardiomediastinal silhouette. Degenerative changes are seen along the  shoulders and spine. Compression deformities  are noted along the mid  thoracic spine which are better noted on the prior CT exam. Multiple  remote right rib fractures are seen.    GREGG MANLEY MD         SYSTEM ID:  [id number]       Medications   sodium chloride 0.9% BOLUS 500 mL (0 mLs Intravenous Stopped 5/7/24 [id number])       Assessments & Plan (with Medical Decision Making)  79-year-old with generalized weakness, generalized body pains after recent hospitalization.  Last month he also had rib and hip fractures.  He was recovering at [facility] home prior to developing pneumonia and requiring hospitalization.  Mucous membranes appear quite dry.  Although the patient has a history of congestive heart failure he appears dehydrated today.  He was given a 500 mill bolus of normal saline.  Patient was also given 0.2 mg of Dilaudid x 3 for the pain.  Patient had some improvement of his pain.  Laboratory findings show that he now has a new leukocytosis.  He had leukocytosis during admission but then it resolved and now its back again.  Chest x-ray performed which shows a right lower lobe pneumonia.  This is new from the last chest x-ray.  Oxygen saturations are 93%.  I wanted to get a urinalysis but the patient wanted to go home.  He does not want to be here any longer.  After discussion with him we decided to treat him with cefdinir and Zithromax and a few pain pills.  The cefdinir should cover him if he has a UTI.  He understands that we will know for sure.  Return to ER precautions and follow-up precautions discussed.  All questions answered prior to discharge.     I have reviewed the nursing notes.    I have reviewed the findings, diagnosis, plan and need for follow up with the patient.          Discharge Medication List as of 5/7/2024  1:25 PM        START taking these medications    Details   azithromycin (ZITHROMAX Z-MARIANN) 250 MG tablet Two tablets on the first day, then one tablet daily for the next 4 days, Disp-6 tablet, R-0, E-Prescribe      cefdinir (OMNICEF) 300  MG capsule Take 1 capsule (300 mg) by mouth 2 times daily for 10 days, Disp-20 capsule, R-0, E-Prescribe      oxyCODONE (ROXICODONE) 5 MG tablet Take 1 tablet (5 mg) by mouth every 6 hours as needed for pain, Disp-12 tablet, R-0, E-Prescribe             Final diagnoses:   Pneumonia of right lower lobe due to infectious organism       5/7/2024   [facility] EMERGENCY DEPT       Ralph, Cooper Landeros MD  05/07/24 1953

## 2024-05-07 NOTE — PROGRESS NOTES
Assessment & Plan     Urothelial carcinoma of bladder (H)  - Nutrition Referral; Future    Shortness of breath    Pneumonia of right lower lobe due to infectious organism  - Potassium; Future  - Hemoglobin; Future  - Adult GI  Referral - Consult Only; Future  - Adult Pulmonary Medicine  Referral; Future  - Adult Urology  Referral; Future  - Nutrition Referral; Future    Reduced mobility  - Physical Therapy  Referral; Future  - Occupational Therapy  Referral; Future  - Nutrition Referral; Future    Loss of weight  - Nutrition Referral; Future    Health Care Home  - Nutrition Referral; Future    Moderate COPD (chronic obstructive pulmonary disease) (H)  - Nutrition Referral; Future    Systolic congestive heart failure, unspecified HF chronicity (H)  - Nutrition Referral; Future    SIRS (systemic inflammatory response syndrome) (H)  - Nutrition Referral; Future    Closed fracture of right hip with routine healing  - Nutrition Referral; Future    Hypoalbuminemia  - Nutrition Referral; Future    Severe malnutrition (H24)  - Nutrition Referral; Future    Hypertensive chronic kidney disease with stage 1 through stage 4 chronic kidney disease, or unspecified chronic kidney disease  - Nutrition Referral; Future    Ordering of each unique test  I spent a total of 42 minutes on the day of the visit.   Time spent by me doing chart review, history and exam, documentation and further activities per the note    MED REC REQUIRED  Post Medication Reconciliation Status: discharge medications reconciled and changed, per note/orders    See Patient Instructions  Patient Instructions   Hospital follow-up completed today    GO DIRECTLY TO THE ER.  I personally walked the patient down to the ER and he was transported via wheelchair.    I discussed at length that I am very concerned about his symptoms and worried about his medical complexity.  I discussed that he would be able to achieve pain  management faster in the ER setting in comparison to outpatient pain management.  I also discussed that his medical history is complex and I do recommend further workup in the ER due to severity symptoms and 10 out of 10 and widespread pain.  He is also not sleeping or eating which is also concerning.  His wife pulled me aside and asked for referral to physical therapy and Occupational Therapy to help with getting him up and moving.  I also discussed that I do not believe it would be safe to send him home with pain medication without any further workup in the ER.  He will see Dr. Manriquez in urology on June 14.  He has not had a bowel movement in 1 week due to decreased oral intake.  Reports he cannot lift his leg.  Patient appears extremely uncomfortable and to be in pain.  He also had shortness of breath while at rest.    Physical therapy and Occupational Therapy referrals were placed    Follow-up with nutrition, referral placed.  Encouraged to increase fluid intake and drink Ensure/boost shakes to increase caloric intake    Follow-up with your primary care provider or next available provider soon as possible    Continue to follow-up closely with the medical team    Lab work today to check hemoglobin and potassium    Follow-up with urology, pulmonology, and GI.  Referrals were placed.  Call them to schedule.    Appointment scheduled with Dr. Manriquez in [facility] for urology    Continue taking medications as directed    Discharge Orders          Home Care Referral       Reason for your hospital stay     Hospitalized due to pneumonia and suspected gastrointestinal bleeding with shock and improved          Activity     Your activity upon discharge: activity as tolerated          Monitor and record     weight every day          Wound care and dressings     Instructions to care for your left elbow and sacral wounds at home: as directed.          Follow-up and recommended labs and tests      Follow up with primary care  provider, Neville Degroot, within 7 days to evaluate medication change, for hospital follow- up, and regarding new diagnosis.  The following labs/tests are recommended: potassium, hemoglobin. Patient currently declined referral for biopsy of lung mass.  If patient wishes, could consider GI referral for diagnostic EGD due to suspected peptic ulcer disease with bleeding.  If desired, follow up with your Urologist at [facility] to discuss and/or arrange BCG bladder infusion treatments for bladder cancer.          Diet     Follow this diet upon discharge: Orders Placed This Encounter      Snacks/Supplements Adult: Ensure Clear; With Meals      Advance Diet as Tolerated: Regular Diet Adult; Regular Diet Adult      Current Discharge Medication List               START taking these medications     Details   amoxicillin-clavulanate (AUGMENTIN) 875-125 MG tablet Take 1 tablet by mouth 2 times daily for 3 days  Qty: 6 tablet, Refills: 0     Associated Diagnoses: Pneumonia of right lower lobe due to infectious organism; Septic shock (H)       pantoprazole (PROTONIX) 40 MG EC tablet Take 1 tablet (40 mg) by mouth 2 times daily (before meals)  Qty: 60 tablet, Refills: 1     Associated Diagnoses: Gastrointestinal hemorrhage with melena                   CONTINUE these medications which have CHANGED     Details   acetaminophen (TYLENOL) 325 MG tablet Take 2 tablets (650 mg) by mouth every 4 hours as needed for pain     Associated Diagnoses: Other chest pain       HYDROmorphone (DILAUDID) 2 MG tablet Take 0.5 tablets (1 mg) by mouth every 6 hours as needed for severe pain  Qty: 3 tablet, Refills: 0     Associated Diagnoses: Painful respiration       ipratropium - albuterol 0.5 mg/2.5 mg/3 mL (DUONEB) 0.5-2.5 (3) MG/3ML neb solution Take 1 vial (3 mLs) by nebulization 3 times daily     Associated Diagnoses: Chronic obstructive pulmonary disease, unspecified COPD type (H)                   CONTINUE these medications which have  NOT CHANGED     Details   budeson-glycopyrrol-formoterol (BREZTRI AEROSPHERE) 160-9-4.8 MCG/ACT AERO inhaler Inhale 2 puffs into the lungs 2 times daily  Qty: 10.7 g, Refills: 4     Associated Diagnoses: Moderate COPD (chronic obstructive pulmonary disease) (H)       calcium carbonate-vitamin D (CALTRATE) 600-10 MG-MCG per tablet Take 1 tablet by mouth 2 times daily       Cholecalciferol (VITAMIN D-3) 25 MCG (1000 UT) CAPS Take 25 mcg by mouth daily       Ferrous Sulfate 324 (65 Fe) MG TBEC Take 1 tablet by mouth daily       fish oil-omega-3 fatty acids 1000 MG capsule Take 1 g by mouth 2 times daily       methocarbamol (ROBAXIN) 500 MG tablet Take 500 mg by mouth 3 times daily       Multiple Vitamin (MULTIVITAMIN ADULT PO) Take 1 tablet by mouth daily       order for DME Equipment being ordered: Nebulizer hose  Qty: 1 Tube, Refills: 11     Associated Diagnoses: Chronic obstructive pulmonary disease, unspecified COPD type (H)       oxyBUTYnin (DITROPAN) 5 MG tablet Take 5 mg by mouth 3 times daily as needed for bladder spasms       polyethylene glycol (MIRALAX) 17 g packet Take 17 g by mouth daily       rosuvastatin (CRESTOR) 5 MG tablet Take 5 mg by mouth every morning       senna-docusate (SENOKOT-S/PERICOLACE) 8.6-50 MG tablet Take 1 tablet by mouth 2 times daily       vitamin C (ASCORBIC ACID) 500 MG tablet Take 500 mg by mouth daily                   STOP taking these medications         aspirin (ASA) 81 MG chewable tablet Comments:   Reason for Stopping:            Lidocaine (LIDOCARE) 4 % Patch Comments:   Reason for Stopping:                  Patient understood and verbally consented to the treatment plan. Discussed symptoms that would warrant an urgent or emergent visit. All of the patients' questions were answered. Patient was instructed to contact the clinic if questions or concerns arise. Recommend follow up appointments if symptoms worsen or fail to improve. Recommend follow up as needed. Recommend ER  in the case of an emergency.    Marcy Rodriguez PA-C    Please note: Voice recognition software may have been used in preparing this note, unintended word substitutions may be present.      Mikaela Quiñonez is a 79 year old, presenting for the following health issues:  Hospital F/U        5/7/2024     8:58 AM   Additional Questions   Roomed by Lyla SAM   Accompanied by Wife- Adriel Soto     [facility]         5/3/2024    10:37 AM   Post Discharge Outreach   Admission Date 4/26/2024   Reason for Admission Pneumonia of right lower lobe due to infectious organism   Discharge Date 5/2/2024   Discharge Diagnosis Pneumonia of right lower lobe due to infectious organism   How are you doing now that you are home? per patient's wife, things are going fine at home. she has not heard from home care. she will call if they do not reach out. patient has follow up scheduled 5/7/2024   How are your symptoms? (Red Flag symptoms escalate to triage hotline per guidelines) Improved   Do you feel your condition is stable enough to be safe at home until your provider visit? Yes   Does the patient have their discharge instructions?  Yes   Does the patient have questions regarding their discharge instructions?  No   Were you started on any new medications or were there changes to any of your previous medications?  Yes   Does the patient have all of their medications? Yes   Do you have questions regarding any of your medications?  No   Do you have all of your needed medical supplies or equipment (DME)?  (i.e. oxygen tank, CPAP, cane, etc.) Yes   Discharge follow-up appointment scheduled within 14 calendar days?  Yes   Discharge Follow Up Appointment Date 5/7/2024   Discharge Follow Up Appointment Scheduled with? Primary Care Provider     Hospital Follow-up Visit:    Hospital/Nursing Home/ Rehab Facility: [facility]  Date of Admission: 4/26/2024  Date of Discharge: 5/2/2024  Reason(s) for Admission: Sepsis due to  pneumonia  Was the patient in the ICU or did the patient experience delirium during hospitalization?  No  Do you have any other stressors you would like to discuss with your provider? No    Problems taking medications regularly:  None  Medication changes since discharge:        START taking these medications     Details   amoxicillin-clavulanate (AUGMENTIN) 875-125 MG tablet Take 1 tablet by mouth 2 times daily for 3 days  Qty: 6 tablet, Refills: 0     Associated Diagnoses: Pneumonia of right lower lobe due to infectious organism; Septic shock (H)       pantoprazole (PROTONIX) 40 MG EC tablet Take 1 tablet (40 mg) by mouth 2 times daily (before meals)  Qty: 60 tablet, Refills: 1     Associated Diagnoses: Gastrointestinal hemorrhage with melena                   CONTINUE these medications which have CHANGED     Details   acetaminophen (TYLENOL) 325 MG tablet Take 2 tablets (650 mg) by mouth every 4 hours as needed for pain     Associated Diagnoses: Other chest pain       HYDROmorphone (DILAUDID) 2 MG tablet Take 0.5 tablets (1 mg) by mouth every 6 hours as needed for severe pain  Qty: 3 tablet, Refills: 0     Associated Diagnoses: Painful respiration       ipratropium - albuterol 0.5 mg/2.5 mg/3 mL (DUONEB) 0.5-2.5 (3) MG/3ML neb solution Take 1 vial (3 mLs) by nebulization 3 times daily     Associated Diagnoses: Chronic obstructive pulmonary disease, unspecified COPD type (H)                  STOP taking these medications         aspirin (ASA) 81 MG chewable tablet Comments:   Reason for Stopping:            Lidocaine (LIDOCARE) 4 % Patch Comments:   Reason for Stopping:                Problems adhering to non-medication therapy:  None    Summary of hospitalization:  [facility] discharge summary reviewed  Diagnostic Tests/Treatments reviewed.  Follow up needed: CBC, potassium  Other Healthcare Providers Involved in Patient s Care:          Home care, urology, (requesting referrals to occupational  therapy and physical therapy due to reduced mobility).  Update since discharge: worsened.  Patient reports 10 out of 10 pain he is out of his Dilaudid.          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    Moderate COPD (chronic obstructive pulmonary disease) (H)    Coronary artery disease involving native coronary artery of native heart without angina pectoris    Pulmonary nodule    Aspirin-like platelet function defect (H)    Acute blood loss anemia    Chronic systolic congestive heart failure (H)    Urothelial carcinoma of bladder (H)    Severe malnutrition (H24)    Hypoalbuminemia    Coagulopathy (H24)    Sacral pressure sore    Hyperlipidemia LDL goal <130    Third degree heart block (H) - s/p pacemaker    Hypertension goal BP (blood pressure) < 140/90    History of CVA (cerebrovascular accident) - old left internal capsule lacunar infarct on CT 7/2016    Diverticulitis    Infrarenal abdominal aortic aneurysm (AAA) without rupture (H24)    Fistula of sigmoid colon    Thalassemia    Enlarged prostate    Abrasion of left elbow              Clinically Significant Risk Factors     # Severe Malnutrition: based on nutrition assessment            Follow-ups Needed After Discharge  Follow-up Appointments     Follow-up and recommended labs and tests       Follow up with primary care provider, Neville Degroot, within 7 days   to evaluate medication change, for hospital follow- up, and  regarding new   diagnosis.  The following labs/tests are recommended: potassium,   hemoglobin. Patient currently declined referral for biopsy of lung mass.    If patient wishes, could consider GI referral for diagnostic EGD due to   suspected peptic ulcer disease with bleeding.  If desired, follow up with your Urologist at [facility] to discuss and/or   arrange BCG bladder infusion treatments for bladder cancer.                   Discharge Disposition  Admited to home care:   Agency:  [facility]  Discharged to home  Condition at discharge: Stable           Hospital Course  Khang Bailon is a 79 year old male with complex health history notable for moderate COPD, CAD, chronic systolic heart failure, infrarenal AAA, diverticulitis with chronic colocolonic fistula, thalassemia with chronic anemia, hypertension, hyperlipidemia, recent diagnosis of pulmonary nodule, recent diagnosis of urothelial carcinoma of bladder, enlarged prostate, recent diagnosis of tubulovillous rectal polyp with high-grade dysplasia, history of lacunar stroke, and recent hospitalization April 3 to April 15 for multiple medical problems including right-sided rib and hip fractures who presented with increasing shortness of breath, fever and hypotension and was admitted to ICU due to concerns for life-threatening pneumonia with respiratory failure and septic shock.      Right lower lobe pneumonia  Septic shock due to pneumonia  Acute respiratory failure with hypoxia due to pneumonia  Clinical presentation suspicious for pneumonia and noted to have right lower lobe infiltrate radiographically.  Also presented with severe lactic acidosis and hypotension concerning for septic shock requiring vasopressor therapy along with IV fluids.  Negative blood cultures, urine culture, testing for MRSA, and testing for COVID-19, influenza, and RSV.  Also presented with severe hypoxia concerning for acute respiratory failure that required intermittent BiPAP  initially.  He was treated with antibiotics, vasopressors, BiPAP, and oxygen supplementation and improved.  Signs of sepsis resolved and he weaned off of oxygen supplementation.  He received 7 days IV Zosyn during hospitalization and was discharged with an additional 3 days of Augmentin treatment.     Acute blood loss anemia  Thalassemia with chronic anemia  Suspected upper GI bleeding with melena  Aspirin induced platelet function defect  Coagulopathy  Tubulovillous adenoma of rectum  Diverticulitis with chronic colocolonic fistula  Developed gross episodes of melena with worsening anemia concerning for acute blood loss anemia from upper GI bleeding superimposed upon chronic anemia from thalassemia.  Recent baseline hemoglobin 8-9 acutely decreased to 6-7 during this hospital stay.  He required PRBC transfusion of a total of 2 units PRBCs during this hospitalization most recently evening of 4/28.  Melena was suspicious for upper GI bleeding source, and he was at risk for upper GI bleeding and peptic ulcer disease due to previous dual antiplatelet therapy since November 2023 and recent stress including hospitalization elsewhere April 3 to April 15.  He also had coagulopathy during this hospitalization with prolonged INR that increased his risk for bleeding.  Coagulopathy improved as other acute medical problems improved and was probably due to sepsis and malnutrition.  He did not have diagnostic upper GI endoscopy during this or other recent hospitalization, and he declined referral for it after this hospital discharge.  He had known tubulovillous adenoma of rectum with high-grade dysplasia found at colonoscopy on 4/9/24 during prior hospitalization for which outpatient follow-up was advised, but colon polyp was not likely to be the cause for GI bleeding because he did not have hematochezia.  Similarly, he had known chronic colocolonic fistula and diverticulitis but did not have hematochezia, so doubt his colon  fistula was the source for GI bleeding.  Previous clopidogrel antiplatelet therapy had been stopped during previous hospitalization and aspirin was held during this hospitalization.  It was possible that acute GI bleeding contributed to his hemodynamic instability and lactic acidosis at admission.  He was treated with proton pump inhibitor and initial dietary restriction and signs of melena and GI bleeding resolved.  Hemoglobin stabilized at 9-10 for 3 to 4 days prior to discharge.  At discharge she was advised to avoid restarting aspirin till after recheck with his PCP at outpatient follow-up and continue Protonix to complete at least 2-month treatment course of Protonix.     Severe malnutrition  Hypoalbuminemia  Combination of poor oral intake and recent weight loss was concerning for severe malnutrition.  Hypoalbuminemia was present at admission also likely due to poor nutritional intake.  He was evaluated by registered dietitian during hospitalization.  Oral intake began to improve as his other acute medical problems stabilized and once diet was advanced.     Hypokalemia  He developed moderate hypokalemia during hospitalization although was asymptomatic.  Hypokalemia resolved with potassium supplementation.  Hyperkalemia was probably due to inadequate nutritional intake.     Moderate COPD (chronic obstructive pulmonary disease)  Chronic moderate COPD has been stable while continuing chronic inhaled triple therapy (inhaled steroid, LAMA, and LABA) and chronic DuoNeb 3 times daily.     Demand ischemia  Coronary artery disease involving native coronary artery of native heart without angina pectoris  S/P PCI 11/2023 with 2 drug-eluting stents  Hyperlipidemia  Known CAD and underwent angiography November 2023 at which time drug-eluting stents were placed in the circumflex and ramus.  He subsequently was treated with dual antiplatelet therapy until he developed bleeding during recent previous hospitalization at which  time Plavix was stopped but he had continued aspirin up until this hospital admission.  He also continued to take statin therapy.  Troponin on admission was elevated but trended downward quickly.  He did not have angina and admission EKG was not interpretable for acute ischemia because of left bundle branch block.  Demand ischemia due to septic shock and and hypoxic respiratory failure was suspected as cause for elevated troponin.  Aspirin was held due to GI bleeding and statin was continued.  He had not previously been prescribed beta-blocker therapy and it was not added during this hospitalization.     Sacral pressure ulcer  Left elbow abrasion  Noted to have sacral pressure ulcer and left elbow abrasion on admission.  By nursing description, sacral pressure ulcer was probably stage I with erythema but no skin breakdown.  Left elbow abrasion was caused by previous fall about the time of his right rib and right hip fractures in early April.  Those skin lesions appeared to improve with local wound care.     Recent right hip fracture, status post right total hip arthroplasty  Recent right rib fractures  When hospitalized in early April 2024, he was diagnosed with right hip fracture that was repaired operatively and with right rib fractures that were managed with symptomatic treatment.  During this hospitalization, surgical wound from right hip fracture repair site appeared to be healing well.  Lidoderm patch was applied to his right lateral thorax during this hospitalization to treat pain from his rib fractures.  However, he reported that he was not having ongoing right-sided chest pain by the time of hospital discharge and specifically declined prescription for Lidoderm patch at discharge for that reason.  He did request prescription for oral analgesic to use as needed if his chest pain from previous rib fractures worsened and was therefore given a prescription for a few tablets of oral Dilaudid to use for that  purpose.  He was advised to avoid NSAIDs because of suspected peptic ulcer disease with GI bleeding.  He was advised to use Tylenol as needed for pain as well.     Chronic systolic congestive heart failure  Previously diagnosed with chronic systolic CHF with EF 45 to 50% on echocardiogram November 2023 at the time of his coronary angiogram.  CHF was compensated during this hospital stay.  He had previously been prescribed beta-blocker and ARB which were discontinued during recent previous hospitalization and not restarted during this hospital stay.      s/p pacemaker for third degree heart block  Has chronically indwelling pacemaker previously placed due to third-degree heart block.  Underwent pacemaker interrogation during recent previous hospitalization.  No concerns for new arrhythmia or pacemaker malfunction during this hospitalization.     Urothelial bladder carcinoma  Underwent diagnostic cystoscopy March 29 demonstrating bladder tumors that were partially resected with pathology confirming high-grade papillary urothelial carcinoma that invaded into the lamina propria.  Urology advised starting adjunctive therapy of this cancer with BCG bladder infusions, but patient had not yet started those treatments and during this hospitalization expressed preference to avoid any further treatment of bladder cancer.  Outpatient follow-up with urology was advised depending on his overall care preferences.     Right lower lobe pulmonary nodule  Recently diagnosed with spiculated 1.2 cm pulmonary nodule in superior segment of right lower lobe during previous hospitalization that was FDG avid on PET scan raising suspicion for possible malignancy.  CT scan performed at this admission confirmed spiculated right lower lobe pulmonary nodule that measured 1.5 cm.  This was discussed during his hospitalization and referral for consideration of biopsy of that lesion was discussed, but the patient expressed preference to avoid any  further investigation of that suspicious pulmonary nodule.     Enlarged prostate with FDG avid lesion  Elevated PSA  Radiographic findings during this hospitalization again demonstrated enlarged prostate.  Recent PET scan had demonstrated focus of FDG avidity in the left prostate lobe.  He had known history of elevated PSA.  He was not known to have been diagnosed with prostate cancer previously.  Depending on his preferences, he could follow-up with urology as outpatient for these problems if he preferred.  He was able to void spontaneously during this hospitalization without difficulty.     Hypertension goal BP (blood pressure) < 140/90  Carries previous diagnosis of hypertension and had previously been treated with antihypertensive medications although not on any antihypertensive therapy at the time of this hospital admission.  Previously prescribed antihypertensive medications were discontinued during recent previous hospital stay.  After recovery from septic shock and improvement in GI bleeding, blood pressure was normotensive.     History of CVA (cerebrovascular accident) - old left internal capsule lacunar infarct on CT 7/2016  Previous history of lacunar stroke in July 2016.  Chronically treated with aspirin and statin for secondary prevention.  Stable neurologic status during this hospital stay.  Aspirin was held because of GI bleeding but statin was continued.     Care goals  Patient met with palliative care during this hospital stay and his care goals were clarified.  He was considered to retain decision-making capacity at the time of consultation with palliative care team during this hospital stay.  He confirmed preferences for full code resuscitation status, ongoing aggressive medical care during hospitalization, and desire to return home with family with home care services including PT and OT at the time of hospital discharge as he continues to recover after hip fracture and operative repair thereof in  early April.           Consultations This Hospital Stay  PHYSICAL THERAPY ADULT IP CONSULT  OCCUPATIONAL THERAPY ADULT IP CONSULT  PHARMACY TO DOSE VANCO  CARE MANAGEMENT / SOCIAL WORK IP CONSULT  PALLIATIVE CARE ADULT IP CONSULT           Code Status  Full Code           Time Spent on this Encounter  I, Khang Gillis MD, personally saw the patient today and spent greater than 30 minutes discharging this patient.     Khang Gillis MD  [facility] 2A MEDICAL SURGICAL  [address] [place] [address]  Phone: [phone]  ______________________________________________________________________           Physical Exam  Vital Signs: Temp: 98  F (36.7  C) Temp src: Axillary BP: 135/76 Pulse: 74   Resp: 17 SpO2: 92 % O2 Device: None (Room air)    Weight: 167 lbs 1.74 oz  General Appearance:  Elderly man, appears comfortable resting in bed  Respiratory: Normal respiratory effort  Neuro: Alert and maintains wakefulness and attention             Primary Care Physician  Neivlle Degroot           Discharge Orders          Home Care Referral       Reason for your hospital stay     Hospitalized due to pneumonia and suspected gastrointestinal bleeding with shock and improved          Activity     Your activity upon discharge: activity as tolerated          Monitor and record     weight every day          Wound care and dressings     Instructions to care for your left elbow and sacral wounds at home: as directed.          Follow-up and recommended labs and tests      Follow up with primary care provider, Neville Degroot, within 7 days to evaluate medication change, for hospital follow- up, and regarding new diagnosis.  The following labs/tests are recommended: potassium, hemoglobin. Patient currently declined referral for biopsy of lung mass.  If patient wishes, could consider GI referral for diagnostic EGD due to suspected peptic ulcer disease with bleeding.  If desired, follow up with your  Urologist at [facility] to discuss and/or arrange BCG bladder infusion treatments for bladder cancer.          Diet     Follow this diet upon discharge: Orders Placed This Encounter      Snacks/Supplements Adult: Ensure Clear; With Meals      Advance Diet as Tolerated: Regular Diet Adult; Regular Diet Adult               Significant Results and Procedures  Most Recent 3 CBC's:             Recent Labs   Lab Test 05/02/24  0542 05/01/24  0537 04/30/24  1842 04/29/24  0553 04/28/24  2158 04/28/24  0508 04/27/24  1343 04/27/24  0814   WBC  --   --   --  10.0  --  12.8*  --  17.8*   HGB 9.5* 9.3* 9.6* 8.9*   < > 7.2*   < > 6.9*   MCV  --   --   --  70*  --  67*  --  63*   PLT  --   --   --  176  --  191  --  254    < > = values in this interval not displayed.      Most Recent 3 BMP's:               Recent Labs   Lab Test 05/02/24  0542 05/01/24  1701 05/01/24  0536 04/29/24  0553 04/28/24  1625 04/28/24  1213 04/27/24  1211 04/27/24  0814 04/26/24  2301 04/26/24  1633     --  136  --   --   --   --  141  --  143   POTASSIUM 3.8 3.7 3.0*  --   --   --   --  4.0  --  5.3   CHLORIDE 105  --  105  --   --   --   --  108*  --  104   CO2 24  --  23  --   --   --   --  22  --  21*   BUN  --   --  9.2  --   --   --   --  31.4*  --  36.8*   CR  --   --  0.65* 0.70  --   --   --  0.72  --  1.04   ANIONGAP 8  --  8  --   --   --   --  11  --  18*   RENU  --   --  8.4*  --   --   --   --  8.4*  --  9.3   GLC  --   --  96  --  118* 122*   < > 117*   < > 195*    < > = values in this interval not displayed.      Most Recent 2 LFT's:       Recent Labs   Lab Test 04/26/24  1633 04/03/24  1203   AST 26 26   ALT 16 13   ALKPHOS 102 73   BILITOTAL 1.0 0.6      Most Recent 3 INR's:        Recent Labs   Lab Test 05/01/24  0536 04/27/24  0513 08/14/23  0635   INR 1.43* 1.68* 1.13      7-Day Micro Results         Collected Updated Procedure Result Status       04/26/2024 2232 04/27/2024 0628 MRSA MSSA PCR, Nasal Swab [[id number]]    Swab  from Nare, Right    Final result Component Value   MRSA Target DNA Negative   SA Target DNA Negative                04/26/2024 1717 05/01/2024 2246 Blood Culture Hand, Right [[id number]]   Blood from Hand, Right    Final result Component Value   Culture No Growth                   04/26/2024 1653 04/26/2024 1743 Symptomatic Influenza A/B, RSV, & SARS-CoV2 PCR (COVID-19) Nose [[id number]]    Swab from Nose    Final result Component Value   Influenza A PCR Negative   Influenza B PCR Negative   RSV PCR Negative   SARS CoV2 PCR Negative   NEGATIVE: SARS-CoV-2 (COVID-19) RNA not detected, presumed negative.                04/26/2024 1633 05/01/2024 2246 Blood Culture Peripheral Blood [[id number]]   Peripheral Blood    Final result Component Value   Culture No Growth                        ,       Results for orders placed or performed during the hospital encounter of 04/26/24   XR Chest Port 1 View     Narrative     EXAM: XR CHEST PORT 1 VIEW  LOCATION: [facility]  DATE: 4/26/2024     INDICATION: Severe distress, history of CHF, COPD and recent CABG.  COMPARISON: 11/27/2023        Impression     IMPRESSION: Pacemaker leads unchanged. Some slight fibrosis in the lung bases. No acute new findings.   CT Chest (PE) Abdomen Pelvis w Contrast     Narrative     EXAM: CT CHEST PE ABDOMEN PELVIS W CONTRAST  LOCATION: [facility]  DATE: 4/26/2024     INDICATION: abdomen pain   hypotensive and tachycardic   hx of lung cancer and recent right hip surgery  COMPARISON: 04/03/2024  TECHNIQUE: CT chest pulmonary angiogram and routine CT abdomen pelvis with IV contrast. Arterial phase through the chest and venous phase through the abdomen and pelvis. Multiplanar reformats and MIP reconstructions were performed. Dose reduction   techniques were used.   CONTRAST: 90mLs Isovue 370     FINDINGS:  ANGIOGRAM CHEST: Pulmonary arteries are normal caliber and negative for  pulmonary emboli. Assessment of the aorta is limited by poor contrast bolus. No definite dissection. No CT evidence of right heart strain.      LUNGS AND PLEURA: Spiculated superior segment right lower lobe pulmonary nodule is unchanged measuring 1.5 cm. Nearby partially calcified nodule is also stable, 1.3 cm. Diffuse centrilobular emphysema, most advanced in the upper lobes. There is new right   lower lobe dependent consolidation, with fluid-filled right lower lobe bronchi. Mild left basilar atelectasis.     MEDIASTINUM/AXILLAE: No significant mediastinal or hilar adenopathy. Small pericardial effusion. Cardiac pacemaker.     CORONARY ARTERY CALCIFICATION: Previous intervention (stents or CABG).     HEPATOBILIARY: Cholelithiasis.     PANCREAS: No ductal dilatation.     SPLEEN: Tiny splenic cyst.     ADRENAL GLANDS: Unremarkable     KIDNEYS/BLADDER: Bilateral renal cysts for which no additional follow-up imaging is recommended. No hydronephrosis. Bladder is thick-walled but decompressed.     BOWEL: Colonic diverticulosis. Chronic sigmoid wall thickening compatible with diverticular disease. Chronic pericolonic stranding, without new fluid collections. Colocolic fistula redemonstrated. No small bowel obstruction.     LYMPH NODES: No significant retroperitoneal adenopathy.     VASCULATURE: 3.9 cm saccular abdominal aortic aneurysm is unchanged. Moderately extensive multifocal atherosclerotic disease. Approximately 50% narrowing of the SMA proximally.     PELVIC ORGANS: Moderate prostatic hypertrophy. Further assessment limited by streak artifact.     MUSCULOSKELETAL: Interval right hip arthroplasty. Chronic greater than 50% T6 compression fracture. T7 50% compression fracture has slightly increased in interval. Multilevel spondylosis. No suspicious lytic or blastic lesions. Thoracal lumbar scoliosis.   Multiple healing right rib fractures including fourth through eighth anterolateral ribs. Old right 11th posterior  rib fracture.        Impression     IMPRESSION:  1.  New right lower lobe consolidation with associated retained secretions compatible with pneumonia or aspiration.  2.  No evidence pulmonary embolism.  3.  Sigmoid diverticular disease, with pericolonic inflammatory changes and colocolic fistula.  4.  Increasing height loss at T7.  5.  Stable spiculated right lung nodule.  6.  Multiple additional findings as above.               Discharge Medications  Current Discharge Medication List               START taking these medications     Details   amoxicillin-clavulanate (AUGMENTIN) 875-125 MG tablet Take 1 tablet by mouth 2 times daily for 3 days  Qty: 6 tablet, Refills: 0     Associated Diagnoses: Pneumonia of right lower lobe due to infectious organism; Septic shock (H)       pantoprazole (PROTONIX) 40 MG EC tablet Take 1 tablet (40 mg) by mouth 2 times daily (before meals)  Qty: 60 tablet, Refills: 1     Associated Diagnoses: Gastrointestinal hemorrhage with melena                   CONTINUE these medications which have CHANGED     Details   acetaminophen (TYLENOL) 325 MG tablet Take 2 tablets (650 mg) by mouth every 4 hours as needed for pain     Associated Diagnoses: Other chest pain       HYDROmorphone (DILAUDID) 2 MG tablet Take 0.5 tablets (1 mg) by mouth every 6 hours as needed for severe pain  Qty: 3 tablet, Refills: 0     Associated Diagnoses: Painful respiration       ipratropium - albuterol 0.5 mg/2.5 mg/3 mL (DUONEB) 0.5-2.5 (3) MG/3ML neb solution Take 1 vial (3 mLs) by nebulization 3 times daily     Associated Diagnoses: Chronic obstructive pulmonary disease, unspecified COPD type (H)                   CONTINUE these medications which have NOT CHANGED     Details   budeson-glycopyrrol-formoterol (BREZTRI AEROSPHERE) 160-9-4.8 MCG/ACT AERO inhaler Inhale 2 puffs into the lungs 2 times daily  Qty: 10.7 g, Refills: 4     Associated Diagnoses: Moderate COPD (chronic obstructive pulmonary disease) (H)      "  calcium carbonate-vitamin D (CALTRATE) 600-10 MG-MCG per tablet Take 1 tablet by mouth 2 times daily       Cholecalciferol (VITAMIN D-3) 25 MCG (1000 UT) CAPS Take 25 mcg by mouth daily       Ferrous Sulfate 324 (65 Fe) MG TBEC Take 1 tablet by mouth daily       fish oil-omega-3 fatty acids 1000 MG capsule Take 1 g by mouth 2 times daily       methocarbamol (ROBAXIN) 500 MG tablet Take 500 mg by mouth 3 times daily       Multiple Vitamin (MULTIVITAMIN ADULT PO) Take 1 tablet by mouth daily       order for DME Equipment being ordered: Nebulizer hose  Qty: 1 Tube, Refills: 11     Associated Diagnoses: Chronic obstructive pulmonary disease, unspecified COPD type (H)       oxyBUTYnin (DITROPAN) 5 MG tablet Take 5 mg by mouth 3 times daily as needed for bladder spasms       polyethylene glycol (MIRALAX) 17 g packet Take 17 g by mouth daily       rosuvastatin (CRESTOR) 5 MG tablet Take 5 mg by mouth every morning       senna-docusate (SENOKOT-S/PERICOLACE) 8.6-50 MG tablet Take 1 tablet by mouth 2 times daily       vitamin C (ASCORBIC ACID) 500 MG tablet Take 500 mg by mouth daily                   STOP taking these medications         aspirin (ASA) 81 MG chewable tablet Comments:   Reason for Stopping:            Lidocaine (LIDOCARE) 4 % Patch Comments:   Reason for Stopping:                    Patient presents for hospital follow-up 5/7/2024:  Patient reports excruciating 10 out of 10 pain from head to toe.  Reports he is out of his Dilaudid.  He is requesting pain medications. He reports that he is not able to walk due to his hip fracture.  He states that he needs to be transferred from wheelchair to bed.  He states his pain is so severe he is not able to eat or sleep.  He was in the [facility] home and they \"did not make him do anything to get up and walking after the hip fracture\".  He is unsure if the Dilaudid helped and reports confusion about his medications and has not been taking Tylenol.  He reports dysuria and " "has new diagnosed bladder cancer.  He does have an upcoming appointment with Dr. Manriquez at [facility] for urology.  His wife encouraged him to eat at home and made him some eggs and bread, after eating he had cramping around his bladder and has been avoiding drinking due to the pain with urination.  He notes a burning sensation with urination.  Patient states he does not want to go to the ER and does not want to go to the ER.  Patient stated \"why cannot you just send me home with pain medication so I can sleep?  Fine, I will just go home and die\".  I discussed at length that I am very concerned about his symptoms and worried about his medical complexity.  I discussed that he would be able to achieve pain management faster in the ER setting in comparison to outpatient pain management.  I also discussed that his medical history is complex and I do recommend further workup in the ER due to severity symptoms and 10 out of 10 and widespread pain.  He is also not sleeping or eating which is also concerning.  His wife pulled me aside and asked for referral to physical therapy and Occupational Therapy to help with getting him up and moving.  I also discussed that I do not believe it would be safe to send him home with pain medication without any further workup in the ER.  He will see Dr. Manriquez in urology on June 14.  He has not had a bowel movement in 1 week due to decreased oral intake.  Reports he cannot lift his leg.  Patient appears extremely uncomfortable and to be in pain.  He also had shortness of breath while at rest.                Review of Systems  Constitutional, HEENT, cardiovascular, pulmonary, GI, , musculoskeletal, neuro, skin, endocrine and psych systems are negative, except as otherwise noted.      Objective    /88   Pulse 110   Temp 97.4  F (36.3  C) (Temporal)   Resp 24   SpO2 91%   There is no height or weight on file to calculate BMI.  Physical Exam   GENERAL: In acute distress, in " significant pain, sitting in a wheelchair, shortness of breath noted, thin appearing  EYES: Eyes grossly normal to inspection, PERRL and conjunctivae and sclerae normal  RESP: Patient is short of breath at rest without talking.  Upper lung fields are clear to auscultation.  I was not able to listen to the lower lung fields due to patient not being able to move or lean forward from the wheelchair.  CV: Regular, S1, S2, no murmurs noted   MS: no gross musculoskeletal defects noted  PSYCH: Appears to be in pain, irritable, demanding pain medications and to be sent home    Admission on 04/26/2024, Discharged on 05/02/2024   Component Date Value Ref Range Status    Sodium 04/26/2024 143  135 - 145 mmol/L Final    Reference intervals for this test were updated on 09/26/2023 to more accurately reflect our healthy population. There may be differences in the flagging of prior results with similar values performed with this method. Interpretation of those prior results can be made in the context of the updated reference intervals.     Potassium 04/26/2024 5.3  3.4 - 5.3 mmol/L Final    Carbon Dioxide (CO2) 04/26/2024 21 (L)  22 - 29 mmol/L Final    Anion Gap 04/26/2024 18 (H)  7 - 15 mmol/L Final    Urea Nitrogen 04/26/2024 36.8 (H)  8.0 - 23.0 mg/dL Final    Creatinine 04/26/2024 1.04  0.67 - 1.17 mg/dL Final    GFR Estimate 04/26/2024 73  >60 mL/min/1.73m2 Final    Calcium 04/26/2024 9.3  8.8 - 10.2 mg/dL Final    Chloride 04/26/2024 104  98 - 107 mmol/L Final    Glucose 04/26/2024 195 (H)  70 - 99 mg/dL Final    Alkaline Phosphatase 04/26/2024 102  40 - 150 U/L Final    Reference intervals for this test were updated on 11/14/2023 to more accurately reflect our healthy population. There may be differences in the flagging of prior results with similar values performed with this method. Interpretation of those prior results can be made in the context of the updated reference intervals.    AST 04/26/2024 26  0 - 45 U/L Final     Reference intervals for this test were updated on 6/12/2023 to more accurately reflect our healthy population. There may be differences in the flagging of prior results with similar values performed with this method. Interpretation of those prior results can be made in the context of the updated reference intervals.    ALT 04/26/2024 16  0 - 70 U/L Final    Reference intervals for this test were updated on 6/12/2023 to more accurately reflect our healthy population. There may be differences in the flagging of prior results with similar values performed with this method. Interpretation of those prior results can be made in the context of the updated reference intervals.      Protein Total 04/26/2024 6.8  6.4 - 8.3 g/dL Final    Albumin 04/26/2024 3.4 (L)  3.5 - 5.2 g/dL Final    Bilirubin Total 04/26/2024 1.0  <=1.2 mg/dL Final    Lactic Acid, Initial 04/26/2024 6.8 (HH)  0.7 - 2.0 mmol/L Final    Troponin T, High Sensitivity 04/26/2024 91 (H)  <=22 ng/L Final    Either a High Sensitivity Troponin T baseline (0 hours) value = 100 ng/L, or an increase in High Sensitivity Troponin T = 7 ng/L at 2 hours compared to 0 hours (2-0 hours), suggests myocardial injury, and urgent clinical attention is required.    If the 2-0 hours increase is <7 ng/L, a High Sensitivity Troponin T result above gender-specific reference ranges warrants further evaluation.   Recommendations for further evaluation include correlation with clinical decision-making tool (e.g., HEART), a 3rd High Sensitivity Troponin T test 2 hours after the 2nd (a 20% change from baseline would represent concern), admission for observation, close PCC/cardiology follow-up, or urgent outpatient provocative testing.    Procalcitonin 04/26/2024 0.23  <0.50 ng/mL Final    Comment: Interpretation and Recommendations     <0.5 ng/mL:   Systemic bacterial infection unlikely. Local bacterial infection is possible.     0.5-1.99 ng/mL:   Systemic bacterial infection  possible, but various other conditions are known to induce PCT as well.     >=2.00 ng/mL:   Systemic bacterial infection likely, unless other causes are known.     Decision to start antibiotics should not be based on procalcitonin level alone. See Procalcitonin Guidance document for more details. [email or web address]-[email or web address]-guidance-[email or web address]-[email or web address]    Factors that may affect PCT levels (not all-inclusive):     - Increased PCT level           Severe trauma/burns           Invasive surgery           Cooling therapy after cardiac arrest/surgery           Treatment with agents which stimulate cytokines           Acute kidney injury           Chronic kidney disease and end stage renal disease           Acute graft vs host disease           Non-specific shock                            causing decreased organ perfusion and/or infarction       - Normal or unchanged PCT level           Early in infections (if low and infection is suspected, repeating in 6-12 hours is recommended)           Chronic infections (endocarditis, osteomyelitis, prosthetic device/graft infections)           Localized infections (cellulitis, wound infections, intra-abdominal abscess)     Note: PCT has not been extensively studied in pregnancy/breastfeeding, pediatrics, severe immunosuppression, and cystic fibrosis.    N terminal Pro BNP Inpatient 04/26/2024 2,481 (H)  0 - 1,800 pg/mL Final    Reference range shown and results flagged as abnormal are suggested inpatient cut points for confirming diagnosis if CHF in an acute setting. Establishing a baseline value for each individual patient is useful for follow-up. An inpatient or emergency department NT-proPBNP <300 pg/mL effectively rules out acute CHF, with 99% negative predictive value.    The outpatient non-acute reference range for ruling out CHF is:  0-125 pg/mL (age 18 to less than 75)  0-450 pg/mL (age 75 yrs and older)     Influenza A PCR  04/26/2024 Negative  Negative Final    Influenza B PCR 04/26/2024 Negative  Negative Final    RSV PCR 04/26/2024 Negative  Negative Final    SARS CoV2 PCR 04/26/2024 Negative  Negative Final    NEGATIVE: SARS-CoV-2 (COVID-19) RNA not detected, presumed negative.    Magnesium 04/26/2024 2.1  1.7 - 2.3 mg/dL Final    Culture 04/26/2024 No Growth   Final    Culture 04/26/2024 No Growth   Final    D-Dimer Quantitative 04/26/2024 3.49 (H)  0.00 - 0.50 ug/mL FEU Final    WBC Count 04/26/2024 24.1 (H)  4.0 - 11.0 10e3/uL Final    RBC Count 04/26/2024 4.88  4.40 - 5.90 10e6/uL Final    Hemoglobin 04/26/2024 9.3 (L)  13.3 - 17.7 g/dL Final    Hematocrit 04/26/2024 31.5 (L)  40.0 - 53.0 % Final    MCV 04/26/2024 65 (L)  78 - 100 fL Final    MCH 04/26/2024 19.1 (L)  26.5 - 33.0 pg Final    MCHC 04/26/2024 29.5 (L)  31.5 - 36.5 g/dL Final    RDW 04/26/2024 22.6 (H)  10.0 - 15.0 % Final    Platelet Count 04/26/2024 511 (H)  150 - 450 10e3/uL Final    % Neutrophils 04/26/2024 77  % Final    % Lymphocytes 04/26/2024 15  % Final    % Monocytes 04/26/2024 6  % Final    % Eosinophils 04/26/2024 0  % Final    % Basophils 04/26/2024 1  % Final    % Immature Granulocytes 04/26/2024 1  % Final    NRBCs per 100 WBC 04/26/2024 1 (H)  <1 /100 Final    Absolute Neutrophils 04/26/2024 18.6 (H)  1.6 - 8.3 10e3/uL Final    Absolute Lymphocytes 04/26/2024 3.5  0.8 - 5.3 10e3/uL Final    Absolute Monocytes 04/26/2024 1.4 (H)  0.0 - 1.3 10e3/uL Final    Absolute Eosinophils 04/26/2024 0.1  0.0 - 0.7 10e3/uL Final    Absolute Basophils 04/26/2024 0.2  0.0 - 0.2 10e3/uL Final    Absolute Immature Granulocytes 04/26/2024 0.3  <=0.4 10e3/uL Final    Absolute NRBCs 04/26/2024 0.1  10e3/uL Final    RBC Morphology 04/26/2024 Confirmed RBC Indices   Final    Platelet Assessment 04/26/2024 Automated Count Confirmed. Platelet morphology is normal.  Automated Count Confirmed. Platelet morphology is normal. Final    Lactic Acid 04/26/2024 2.1 (H)  0.7 - 2.0  mmol/L Final    Troponin T, High Sensitivity 04/26/2024 63 (H)  <=22 ng/L Final    Either a High Sensitivity Troponin T baseline (0 hours) value = 100 ng/L, or an increase in High Sensitivity Troponin T = 7 ng/L at 2 hours compared to 0 hours (2-0 hours), suggests myocardial injury, and urgent clinical attention is required.    If the 2-0 hours increase is <7 ng/L, a High Sensitivity Troponin T result above gender-specific reference ranges warrants further evaluation.   Recommendations for further evaluation include correlation with clinical decision-making tool (e.g., HEART), a 3rd High Sensitivity Troponin T test 2 hours after the 2nd (a 20% change from baseline would represent concern), admission for observation, close PCC/cardiology follow-up, or urgent outpatient provocative testing.    MRSA Target DNA 04/26/2024 Negative  Negative Final    SA Target DNA 04/26/2024 Negative   Final    GLUCOSE BY METER POCT 04/26/2024 131 (H)  70 - 99 mg/dL Final    Hemoglobin 04/27/2024 7.2 (L)  13.3 - 17.7 g/dL Final    INR 04/27/2024 1.68 (H)  0.85 - 1.15 Final    ABO/RH(D) 04/26/2024 A NEG   Final    Antibody Screen 04/26/2024 Negative  Negative Final    SPECIMEN EXPIRATION DATE 04/26/2024 [id number]   Final    Glucose 04/27/2024 122 (H)  70 - 99 mg/dL Final    WBC Count 04/27/2024 17.8 (H)  4.0 - 11.0 10e3/uL Final    RBC Count 04/27/2024 3.62 (L)  4.40 - 5.90 10e6/uL Final    Hemoglobin 04/27/2024 6.9 (LL)  13.3 - 17.7 g/dL Final    Hematocrit 04/27/2024 22.8 (L)  40.0 - 53.0 % Final    MCV 04/27/2024 63 (L)  78 - 100 fL Final    MCH 04/27/2024 19.1 (L)  26.5 - 33.0 pg Final    MCHC 04/27/2024 30.3 (L)  31.5 - 36.5 g/dL Final    RDW 04/27/2024 21.6 (H)  10.0 - 15.0 % Final    Platelet Count 04/27/2024 254  150 - 450 10e3/uL Final    Sodium 04/27/2024 141  135 - 145 mmol/L Final    Reference intervals for this test were updated on 09/26/2023 to more accurately reflect our healthy population. There may be differences in  the flagging of prior results with similar values performed with this method. Interpretation of those prior results can be made in the context of the updated reference intervals.     Potassium 04/27/2024 4.0  3.4 - 5.3 mmol/L Final    Chloride 04/27/2024 108 (H)  98 - 107 mmol/L Final    Carbon Dioxide (CO2) 04/27/2024 22  22 - 29 mmol/L Final    Anion Gap 04/27/2024 11  7 - 15 mmol/L Final    Urea Nitrogen 04/27/2024 31.4 (H)  8.0 - 23.0 mg/dL Final    Creatinine 04/27/2024 0.72  0.67 - 1.17 mg/dL Final    GFR Estimate 04/27/2024 >90  >60 mL/min/1.73m2 Final    Calcium 04/27/2024 8.4 (L)  8.8 - 10.2 mg/dL Final    Glucose 04/27/2024 117 (H)  70 - 99 mg/dL Final    GLUCOSE BY METER POCT 04/27/2024 121 (H)  70 - 99 mg/dL Final    Hemoglobin 04/27/2024 6.8 (LL)  13.3 - 17.7 g/dL Final    GLUCOSE BY METER POCT 04/27/2024 104 (H)  70 - 99 mg/dL Final    GLUCOSE BY METER POCT 04/27/2024 128 (H)  70 - 99 mg/dL Final    Blood Component Type 04/27/2024 Red Blood Cells   Final    Product Code 04/27/2024 [id number]   Final    Unit Status 04/27/2024 Transfused   Final    Unit Number 04/27/2024 [id number]   Final    CROSSMATCH 04/27/2024 Compatible   Final    CODING SYSTEM 04/27/2024 [id number]   Final    ISSUE DATE AND TIME 04/27/2024 [id number]   Final    UNIT ABO/RH 04/27/2024 A-   Final    UNIT TYPE ISBT 04/27/2024 0600   Final    GLUCOSE BY METER POCT 04/27/2024 115 (H)  70 - 99 mg/dL Final    WBC Count 04/28/2024 12.8 (H)  4.0 - 11.0 10e3/uL Final    RBC Count 04/28/2024 3.45 (L)  4.40 - 5.90 10e6/uL Final    Hemoglobin 04/28/2024 7.2 (L)  13.3 - 17.7 g/dL Final    Hematocrit 04/28/2024 23.2 (L)  40.0 - 53.0 % Final    MCV 04/28/2024 67 (L)  78 - 100 fL Final    MCH 04/28/2024 20.9 (L)  26.5 - 33.0 pg Final    MCHC 04/28/2024 31.0 (L)  31.5 - 36.5 g/dL Final    RDW 04/28/2024 25.9 (H)  10.0 - 15.0 % Final    Platelet Count 04/28/2024 191  150 - 450 10e3/uL Final    GLUCOSE BY METER POCT 04/28/2024 97  70 - 99 mg/dL  Final    GLUCOSE BY METER POCT 04/28/2024 101 (H)  70 - 99 mg/dL Final    GLUCOSE BY METER POCT 04/28/2024 122 (H)  70 - 99 mg/dL Final    Blood Component Type 04/28/2024 Red Blood Cells   Final    Product Code 04/28/2024 [id number]   Final    Unit Status 04/28/2024 Transfused   Final    Unit Number 04/28/2024 [id number]   Final    CROSSMATCH 04/28/2024 Compatible   Final    CODING SYSTEM 04/28/2024 [id number]   Final    ISSUE DATE AND TIME 04/28/2024 [id number]   Final    UNIT ABO/RH 04/28/2024 A-   Final    UNIT TYPE ISBT 04/28/2024 0600   Final    GLUCOSE BY METER POCT 04/28/2024 118 (H)  70 - 99 mg/dL Final    Hemoglobin 04/28/2024 9.0 (L)  13.3 - 17.7 g/dL Final    WBC Count 04/29/2024 10.0  4.0 - 11.0 10e3/uL Final    RBC Count 04/29/2024 4.09 (L)  4.40 - 5.90 10e6/uL Final    Hemoglobin 04/29/2024 8.9 (L)  13.3 - 17.7 g/dL Final    Hematocrit 04/29/2024 28.5 (L)  40.0 - 53.0 % Final    MCV 04/29/2024 70 (L)  78 - 100 fL Final    MCH 04/29/2024 21.8 (L)  26.5 - 33.0 pg Final    MCHC 04/29/2024 31.2 (L)  31.5 - 36.5 g/dL Final    RDW 04/29/2024 26.1 (H)  10.0 - 15.0 % Final    Platelet Count 04/29/2024 176  150 - 450 10e3/uL Final    % Neutrophils 04/29/2024 74  % Final    % Lymphocytes 04/29/2024 13  % Final    % Monocytes 04/29/2024 9  % Final    % Eosinophils 04/29/2024 3  % Final    % Basophils 04/29/2024 0  % Final    % Immature Granulocytes 04/29/2024 1  % Final    NRBCs per 100 WBC 04/29/2024 1 (H)  <1 /100 Final    Absolute Neutrophils 04/29/2024 7.4  1.6 - 8.3 10e3/uL Final    Absolute Lymphocytes 04/29/2024 1.3  0.8 - 5.3 10e3/uL Final    Absolute Monocytes 04/29/2024 0.9  0.0 - 1.3 10e3/uL Final    Absolute Eosinophils 04/29/2024 0.3  0.0 - 0.7 10e3/uL Final    Absolute Basophils 04/29/2024 0.0  0.0 - 0.2 10e3/uL Final    Absolute Immature Granulocytes 04/29/2024 0.1  <=0.4 10e3/uL Final    Absolute NRBCs 04/29/2024 0.1  10e3/uL Final    Hold Specimen 04/29/2024 JIC   Final    Creatinine 04/29/2024  0.70  0.67 - 1.17 mg/dL Final    GFR Estimate 04/29/2024 >90  >60 mL/min/1.73m2 Final    Hemoglobin 04/30/2024 9.6 (L)  13.3 - 17.7 g/dL Final    Hemoglobin 05/01/2024 9.3 (L)  13.3 - 17.7 g/dL Final    Sodium 05/01/2024 136  135 - 145 mmol/L Final    Reference intervals for this test were updated on 09/26/2023 to more accurately reflect our healthy population. There may be differences in the flagging of prior results with similar values performed with this method. Interpretation of those prior results can be made in the context of the updated reference intervals.     Potassium 05/01/2024 3.0 (L)  3.4 - 5.3 mmol/L Final    Chloride 05/01/2024 105  98 - 107 mmol/L Final    Carbon Dioxide (CO2) 05/01/2024 23  22 - 29 mmol/L Final    Anion Gap 05/01/2024 8  7 - 15 mmol/L Final    Urea Nitrogen 05/01/2024 9.2  8.0 - 23.0 mg/dL Final    Creatinine 05/01/2024 0.65 (L)  0.67 - 1.17 mg/dL Final    GFR Estimate 05/01/2024 >90  >60 mL/min/1.73m2 Final    Calcium 05/01/2024 8.4 (L)  8.8 - 10.2 mg/dL Final    Glucose 05/01/2024 96  70 - 99 mg/dL Final    INR 05/01/2024 1.43 (H)  0.85 - 1.15 Final    Magnesium 05/01/2024 1.9  1.7 - 2.3 mg/dL Final    Magnesium 05/01/2024 1.9  1.7 - 2.3 mg/dL Final    Potassium 05/01/2024 3.7  3.4 - 5.3 mmol/L Final    Hemoglobin 05/02/2024 9.5 (L)  13.3 - 17.7 g/dL Final    Sodium 05/02/2024 137  135 - 145 mmol/L Final    Reference intervals for this test were updated on 09/26/2023 to more accurately reflect our healthy population. There may be differences in the flagging of prior results with similar values performed with this method. Interpretation of those prior results can be made in the context of the updated reference intervals.     Potassium 05/02/2024 3.8  3.4 - 5.3 mmol/L Final    Chloride 05/02/2024 105  98 - 107 mmol/L Final    Carbon Dioxide (CO2) 05/02/2024 24  22 - 29 mmol/L Final    Anion Gap 05/02/2024 8  7 - 15 mmol/L Final    Magnesium 05/02/2024 1.9  1.7 - 2.3 mg/dL Final      No results found for any visits on 05/07/24.  No results found.        Signed Electronically by: Marcy Rodriguez PA-C

## 2024-05-07 NOTE — PROGRESS NOTES
New Patient Oncology Nurse Navigator Note     Referring provider: Marcy Rodriguez PA-C    Referring Clinic/Organization: [facility]  Referred to: Interventional Pulmonology  Requested provider (if applicable): First available - did not specify   Referral Received: 05/07/24       Evaluation for :   Diagnosis   J18.9 (ICD-10-CM) - Pneumonia of right lower lobe due to infectious organism     Reason for Referral: Lung Nodule/Interventional Pulmonary      Clinical History (per Nurse review of records provided):      03/16/2024 PET (bookmarked) showed:   IMPRESSION:  1. An irregular FDG avid 1.3 x 1.5 cm pulmonary nodule in the right lower lobe is suspicious for malignancy, likely lung cancer. If desired, this is amenable to percutaneous CT-guided biopsy. No evidence of indira or distant metastasis.  2. Marked prostate gland enlargement. Ovoid focus of FDG activity in the left lateral mid prostate gland is indeterminate, could be inflammatory or represent underlying prostate neoplasm. Please correlate clinically. If desired, this could be further   evaluated with prostate MRI.  3. Improving acute diverticulitis mid sigmoid colon.    04/26/2024 CT Chest PE Abdomen Pelvis w/ contrast (bookmarked) showed:   IMPRESSION:  1.  New right lower lobe consolidation with associated retained secretions compatible with pneumonia or aspiration.  2.  No evidence pulmonary embolism.  3.  Sigmoid diverticular disease, with pericolonic inflammatory changes and colocolic fistula.  4.  Increasing height loss at T7.  5.  Stable spiculated right lung nodule.  6.  Multiple additional findings as above.    05/07/2024 Per referring provider OV note, pt declined referral for biopsy of lung mass.   Clinical Assessment / Barriers to Care (Per Nurse):  Tobacco History    Smoking Status  Former Smoking Start Date  7/27/1962 Quit Date  7/27/2012 Smoking Tobacco Type  Cigarettes from 7/27/1962 to 7/27/2012   History of bladder cancer,  followed by Urology (Dr. Manriquez) at [facility]. Last seen 04/2024 where BCG treatment was discussed.   Records Location: Epic   Records Needed: Outside CT, XR images from [facility] done in April 2024  Additional testing needed prior to consult: TBD  Referral updates and Plan:     5/7: Per chart review, pt had declined to schedule for consult in the past when asked if he wants a referral for biopsy of the lung nodule. Writer called [facility] to discuss the referral and see if he is interested in meeting with Interventional pulmonology or general pulmonology to follow up on his lung nodule or pneumonia. [facility] reported he is not interested and hung up. Will update referring provider.     BERNIE ChambersN, RN, OCN  [facility] Oncology Nurse Navigator  [phone] / [phone]

## 2024-05-07 NOTE — PATIENT INSTRUCTIONS
Hospital follow-up completed today    GO DIRECTLY TO THE ER.  I personally walked the patient down to the ER and he was transported via wheelchair.    I discussed at length that I am very concerned about his symptoms and worried about his medical complexity.  I discussed that he would be able to achieve pain management faster in the ER setting in comparison to outpatient pain management.  I also discussed that his medical history is complex and I do recommend further workup in the ER due to severity symptoms and 10 out of 10 and widespread pain.  He is also not sleeping or eating which is also concerning.  His wife pulled me aside and asked for referral to physical therapy and Occupational Therapy to help with getting him up and moving.  I also discussed that I do not believe it would be safe to send him home with pain medication without any further workup in the ER.  He will see Dr. Manriquez in urology on June 14.  He has not had a bowel movement in 1 week due to decreased oral intake.  Reports he cannot lift his leg.  Patient appears extremely uncomfortable and to be in pain.  He also had shortness of breath while at rest.    Physical therapy and Occupational Therapy referrals were placed    Follow-up with nutrition, referral placed.  Encouraged to increase fluid intake and drink Ensure/boost shakes to increase caloric intake    Follow-up with your primary care provider or next available provider soon as possible    Continue to follow-up closely with the medical team    Lab work today to check hemoglobin and potassium    Follow-up with urology, pulmonology, and GI.  Referrals were placed.  Call them to schedule.    Appointment scheduled with Dr. Manriquez in [facility] for urology    Continue taking medications as directed    Discharge Orders          Home Care Referral       Reason for your hospital stay     Hospitalized due to pneumonia and suspected gastrointestinal bleeding with shock and improved           Activity     Your activity upon discharge: activity as tolerated          Monitor and record     weight every day          Wound care and dressings     Instructions to care for your left elbow and sacral wounds at home: as directed.          Follow-up and recommended labs and tests      Follow up with primary care provider, Neville Degroot, within 7 days to evaluate medication change, for hospital follow- up, and regarding new diagnosis.  The following labs/tests are recommended: potassium, hemoglobin. Patient currently declined referral for biopsy of lung mass.  If patient wishes, could consider GI referral for diagnostic EGD due to suspected peptic ulcer disease with bleeding.  If desired, follow up with your Urologist at [facility] to discuss and/or arrange BCG bladder infusion treatments for bladder cancer.          Diet     Follow this diet upon discharge: Orders Placed This Encounter      Snacks/Supplements Adult: Ensure Clear; With Meals      Advance Diet as Tolerated: Regular Diet Adult; Regular Diet Adult      Current Discharge Medication List               START taking these medications     Details   amoxicillin-clavulanate (AUGMENTIN) 875-125 MG tablet Take 1 tablet by mouth 2 times daily for 3 days  Qty: 6 tablet, Refills: 0     Associated Diagnoses: Pneumonia of right lower lobe due to infectious organism; Septic shock (H)       pantoprazole (PROTONIX) 40 MG EC tablet Take 1 tablet (40 mg) by mouth 2 times daily (before meals)  Qty: 60 tablet, Refills: 1     Associated Diagnoses: Gastrointestinal hemorrhage with melena                   CONTINUE these medications which have CHANGED     Details   acetaminophen (TYLENOL) 325 MG tablet Take 2 tablets (650 mg) by mouth every 4 hours as needed for pain     Associated Diagnoses: Other chest pain       HYDROmorphone (DILAUDID) 2 MG tablet Take 0.5 tablets (1 mg) by mouth every 6 hours as needed for severe pain  Qty: 3 tablet, Refills: 0     Associated  Diagnoses: Painful respiration       ipratropium - albuterol 0.5 mg/2.5 mg/3 mL (DUONEB) 0.5-2.5 (3) MG/3ML neb solution Take 1 vial (3 mLs) by nebulization 3 times daily     Associated Diagnoses: Chronic obstructive pulmonary disease, unspecified COPD type (H)                   CONTINUE these medications which have NOT CHANGED     Details   budeson-glycopyrrol-formoterol (BREZTRI AEROSPHERE) 160-9-4.8 MCG/ACT AERO inhaler Inhale 2 puffs into the lungs 2 times daily  Qty: 10.7 g, Refills: 4     Associated Diagnoses: Moderate COPD (chronic obstructive pulmonary disease) (H)       calcium carbonate-vitamin D (CALTRATE) 600-10 MG-MCG per tablet Take 1 tablet by mouth 2 times daily       Cholecalciferol (VITAMIN D-3) 25 MCG (1000 UT) CAPS Take 25 mcg by mouth daily       Ferrous Sulfate 324 (65 Fe) MG TBEC Take 1 tablet by mouth daily       fish oil-omega-3 fatty acids 1000 MG capsule Take 1 g by mouth 2 times daily       methocarbamol (ROBAXIN) 500 MG tablet Take 500 mg by mouth 3 times daily       Multiple Vitamin (MULTIVITAMIN ADULT PO) Take 1 tablet by mouth daily       order for DME Equipment being ordered: Nebulizer hose  Qty: 1 Tube, Refills: 11     Associated Diagnoses: Chronic obstructive pulmonary disease, unspecified COPD type (H)       oxyBUTYnin (DITROPAN) 5 MG tablet Take 5 mg by mouth 3 times daily as needed for bladder spasms       polyethylene glycol (MIRALAX) 17 g packet Take 17 g by mouth daily       rosuvastatin (CRESTOR) 5 MG tablet Take 5 mg by mouth every morning       senna-docusate (SENOKOT-S/PERICOLACE) 8.6-50 MG tablet Take 1 tablet by mouth 2 times daily       vitamin C (ASCORBIC ACID) 500 MG tablet Take 500 mg by mouth daily                   STOP taking these medications         aspirin (ASA) 81 MG chewable tablet Comments:   Reason for Stopping:            Lidocaine (LIDOCARE) 4 % Patch Comments:   Reason for Stopping:                  Patient understood and verbally consented to the  treatment plan. Discussed symptoms that would warrant an urgent or emergent visit. All of the patients' questions were answered. Patient was instructed to contact the clinic if questions or concerns arise. Recommend follow up appointments if symptoms worsen or fail to improve. Recommend follow up as needed. Recommend ER in the case of an emergency.    Marcy Rodriguez PA-C    Please note: Voice recognition software may have been used in preparing this note, unintended word substitutions may be present.

## 2024-05-08 NOTE — TELEPHONE ENCOUNTER
Phoned Zuleyma and informed her of documentation per Dr. Zane MD as stated below.  Zuleyma stated understanding.    Jessica Barlow RN

## 2024-05-08 NOTE — TELEPHONE ENCOUNTER
Phoned Lyla and informed her of documentation approval per Dr. Zane MD as stated below.  Lyla stated understanding.    Jessica Barlow RN

## 2024-05-08 NOTE — TELEPHONE ENCOUNTER
Homecare is calling back.  Still waiting for order approval so that they can go see patient.    Please review and sign orders.

## 2024-05-09 ENCOUNTER — DOCUMENTATION ONLY (OUTPATIENT)
Dept: OTHER | Facility: CLINIC | Age: 79
End: 2024-05-09
Payer: COMMERCIAL

## 2024-05-15 ENCOUNTER — TELEPHONE (OUTPATIENT)
Dept: FAMILY MEDICINE | Facility: CLINIC | Age: 79
End: 2024-05-15

## 2024-05-15 ENCOUNTER — OFFICE VISIT (OUTPATIENT)
Dept: FAMILY MEDICINE | Facility: CLINIC | Age: 79
End: 2024-05-15
Payer: COMMERCIAL

## 2024-05-15 VITALS
DIASTOLIC BLOOD PRESSURE: 68 MMHG | OXYGEN SATURATION: 91 % | SYSTOLIC BLOOD PRESSURE: 100 MMHG | RESPIRATION RATE: 22 BRPM | HEART RATE: 112 BPM | TEMPERATURE: 97.4 F

## 2024-05-15 DIAGNOSIS — J44.9 MODERATE COPD (CHRONIC OBSTRUCTIVE PULMONARY DISEASE) (H): Chronic | ICD-10-CM

## 2024-05-15 DIAGNOSIS — K92.1 GASTROINTESTINAL HEMORRHAGE WITH MELENA: ICD-10-CM

## 2024-05-15 DIAGNOSIS — D12.8 TUBULOVILLOUS ADENOMA OF RECTUM: ICD-10-CM

## 2024-05-15 DIAGNOSIS — R07.1 PAINFUL RESPIRATION: ICD-10-CM

## 2024-05-15 DIAGNOSIS — Z09 HOSPITAL DISCHARGE FOLLOW-UP: Primary | ICD-10-CM

## 2024-05-15 DIAGNOSIS — E43 SEVERE MALNUTRITION (H): ICD-10-CM

## 2024-05-15 DIAGNOSIS — Z86.73 HISTORY OF CVA (CEREBROVASCULAR ACCIDENT): ICD-10-CM

## 2024-05-15 DIAGNOSIS — M54.9 ACUTE BILATERAL BACK PAIN, UNSPECIFIED BACK LOCATION: ICD-10-CM

## 2024-05-15 DIAGNOSIS — R97.20 ELEVATED PSA: ICD-10-CM

## 2024-05-15 DIAGNOSIS — Z53.9 DIAGNOSIS NOT YET DEFINED: Primary | ICD-10-CM

## 2024-05-15 DIAGNOSIS — N32.89 BLADDER SPASM: ICD-10-CM

## 2024-05-15 DIAGNOSIS — J18.9 PNEUMONIA OF RIGHT LOWER LOBE DUE TO INFECTIOUS ORGANISM: ICD-10-CM

## 2024-05-15 DIAGNOSIS — R91.8 LUNG MASS: ICD-10-CM

## 2024-05-15 DIAGNOSIS — K92.1 MELENA: ICD-10-CM

## 2024-05-15 DIAGNOSIS — I25.10 CORONARY ARTERY DISEASE INVOLVING NATIVE CORONARY ARTERY OF NATIVE HEART WITHOUT ANGINA PECTORIS: Chronic | ICD-10-CM

## 2024-05-15 DIAGNOSIS — C67.9 UROTHELIAL CARCINOMA OF BLADDER (H): Chronic | ICD-10-CM

## 2024-05-15 DIAGNOSIS — K59.03 DRUG-INDUCED CONSTIPATION: ICD-10-CM

## 2024-05-15 DIAGNOSIS — D62 ACUTE BLOOD LOSS ANEMIA: ICD-10-CM

## 2024-05-15 PROCEDURE — 99214 OFFICE O/P EST MOD 30 MIN: CPT | Performed by: STUDENT IN AN ORGANIZED HEALTH CARE EDUCATION/TRAINING PROGRAM

## 2024-05-15 PROCEDURE — G0180 MD CERTIFICATION HHA PATIENT: HCPCS | Performed by: STUDENT IN AN ORGANIZED HEALTH CARE EDUCATION/TRAINING PROGRAM

## 2024-05-15 RX ORDER — RESPIRATORY SYNCYTIAL VIRUS VACCINE 120MCG/0.5
0.5 KIT INTRAMUSCULAR ONCE
Qty: 1 EACH | Refills: 0 | Status: CANCELLED | OUTPATIENT
Start: 2024-05-15 | End: 2024-05-15

## 2024-05-15 RX ORDER — OXYBUTYNIN CHLORIDE 5 MG/1
5 TABLET ORAL 3 TIMES DAILY PRN
Qty: 90 TABLET | Refills: 1 | Status: SHIPPED | OUTPATIENT
Start: 2024-05-15 | End: 2024-07-25

## 2024-05-15 RX ORDER — HYDROMORPHONE HYDROCHLORIDE 2 MG/1
1 TABLET ORAL EVERY 6 HOURS PRN
Qty: 60 TABLET | Refills: 0 | Status: SHIPPED | OUTPATIENT
Start: 2024-05-15 | End: 2024-05-22

## 2024-05-15 ASSESSMENT — PAIN SCALES - GENERAL: PAINLEVEL: WORST PAIN (10)

## 2024-05-15 NOTE — PROGRESS NOTES
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    Drug-induced constipation    Severe malnutrition (H24)    Relevant Medications    HYDROmorphone (DILAUDID) 2 MG tablet    Gastrointestinal hemorrhage with melena       Circulatory    Coronary artery disease involving native coronary artery of native heart without angina pectoris (Chronic)       Urinary    Urothelial carcinoma of bladder (H) (Chronic)    Relevant Orders    Adult Palliative Care  Referral       Hematologic    Acute blood loss anemia       Other    Elevated PSA    History of CVA (cerebrovascular accident) - old left internal capsule lacunar infarct on CT 7/2016     Other Visit Diagnoses       Hospital discharge follow-up    -  Primary    Melena        Relevant Orders    Comprehensive metabolic panel (BMP + Alb, Alk Phos, ALT, AST, Total. Bili, TP)    CBC with platelets and differential    Lung mass        Bladder spasm        Relevant Medications    oxyBUTYnin (DITROPAN) 5 MG tablet           Lengthy discussion today with wife and grandson regarding his current condition and recent diagnosis as well as significant symptoms.  Most concerning recently being melena with concern for upper GI source.  Did discuss need for EGD for further evaluation as well as holding antiplatelet medications and repeating hemoglobin levels today although they were stable in ER upon follow-up since discharge.  He is not wanting anything done currently.  Breathing does seem stable now but continues to be a concern at  91% oxygen today given his underlying lung disease as well as presumed lung cancer diagnosis.Concern for prostate cancer along with recent bladder cancer diagnosis. He does have follow up with urology if they chose to go. I am very concerned about his weight and oral intake with decreased appetite. Continued significant body pain diffusely reported with pain into his lower abdomen.  He is hoping for refill of hydromorphone today which was the most helpful for him with recent hospitalization but he has been out of medication and struggling. Patient competent and oriented and able to make own decisions regarding his care. We did discuss his code status as well as hospice and palliative care options today. Khang not wanting to make changes now and does not want to think of the cancer or talk about it further today. Hospice explained today and they do sound willing to see palliative care to discuss further and order placed today. Instructed them to let me know if they change there mind regarding treatment option for his suspected GI bleed, Lung nodule, Bladder cancer, abnormal polyp and potential prostate cancer going forward. Has had recurrent pneumonia and completed antibiotics. Would require further imaging moving forward to ensure resolution. Risk of recurrence with his lung mass and aspiration discussed. Encouraged them to stop and complete labs today but patient not wanting to do so. He is wanting to leave and not complete exam today. He wants to go home. Should consider dietitian evaluation. Encouraged them to discuss things with family and reach out to me with further questions. I will try and call them again after several days to see if I can answer questions further. Wife and grandson and patient appear to be understanding today of risks if complications if no further workup pursued.        MED REC REQUIRED  Post Medication Reconciliation Status:  Discharge medications reconciled and changed, see  notes/orders        Mikaela Quiñonez is a 79 year old, presenting for the following health issues:  Hospital F/U        5/15/2024     2:39 PM   Additional Questions   Roomed by Richard [facility]       Hospital Follow-up Visit: and ED follow up     Hospital/Nursing Home/IP Rehab Facility: [facility]  Date of Admission: 4/26/24 / 5/7/24  Date of Discharge: 5/2/24  Reason(s) for Admission: Pneumonia of right lower lobe due to infectious organism   Was the patient in the ICU or did the patient experience delirium during hospitalization?  Yes no delirum      Do you have any other stressors you would like to discuss with your provider? No    Problems taking medications regularly:  None  Medication changes since discharge: No more pain meds and stopped oxybutinin   Problems adhering to non-medication therapy:  None    Summary of hospitalization:  [facility] discharge summary reviewed  Diagnostic Tests/Treatments reviewed.  Follow up needed: Urology, Pulmonology, palliative care   Other Healthcare Providers Involved in Patient s Care:           Update since discharge: stable.     Patient here with wife and grandson today for hospital follow-up with discharge for pneumonia.  Patient was seen in ER on 5-7-24 for recurrent pneumonia and restarted on antibiotics which she has now completed.  Continued pain and weakness then given small amount of Dilaudid for pain.  I did think this worked better than oxycodone he received from discharge.  Notes full body pain into his legs back and abdomen.  No bright red blood in the stool but still dark and trouble controlling it now.  Notes breathing to be stable since discharge and not worsening.  Melena during hospitalization thought upper GI related with no signs of decompensation then and patient not wanting further EGD evaluation.  Previous tubulovillous adenoma with high-grade dysplasia found in April not thought to be related to source  given no hematochezia.  Diagnosed with bladder cancer and did see urology with option of therapy discussed with patient and family previously.  They do have follow-up with them scheduled.  PET scan of lung mass concerning for malignancy.  Patient was not wanting further biopsy and workup regarding this still not wanting today.  Also had increased uptake in his prostate with no known prostate cancer but elevated PSA.  Was using oxybutynin for bladder spasms but out now.  Currently out of pain control now which is his biggest concern today.  Decreased appetite not able to take much at this time.  Is on Protonix given concern for upper GI bleed.  Aspirin held at discharge and concern for bleeding.  Question regarding goals of care during hospitalization but they remain full code and did not want palliative care evaluation then.  Home care notes pressure ulcer to be resolved and he is not wanting me to look at this now.  Patient is just wanting to go home and refusing further evaluation at this time.  Notes pain to be better when he is lying down.    Plan of care communicated with patient and family                 Review of Systems  Constitutional, HEENT, cardiovascular, pulmonary, GI, , musculoskeletal, neuro, skin, endocrine and psych systems are negative, except as otherwise noted.      Objective    /68 (BP Location: Left arm, Patient Position: Chair, Cuff Size: Adult Regular)   Pulse 112   Temp 97.4  F (36.3  C) (Temporal)   Resp 22   SpO2 91%   There is no height or weight on file to calculate BMI.  Physical Exam   GENERAL: alert and in pain  EYES: Eyes grossly normal to inspection, PERRL and conjunctivae and sclerae normal  HENT: nose and mouth dry  NECK: no adenopathy, no asymmetry, masses, or scars  RESP: poor lung movement with no wheezing noted  CV: regular rate and rhythm,  no peripheral edema  ABDOMEN: soft, tender in lower quadrants, negative alejandra, no hepatosplenomegaly, no masses and bowel  sounds normal  SKIN: no suspicious lesions or rashes  NEURO:  mentation intact and speech normal  PSYCH: mentation appears normal            Signed Electronically by: Neville Degroot MD

## 2024-05-15 NOTE — TELEPHONE ENCOUNTER
Reason for Call:  Form, our goal is to have forms completed with 72 hours, however, some forms may require a visit or additional information.    Type of letter, form or note:  Home Health Certification    Who is the form from?: Home care    Where did the form come from: form was faxed in    What clinic location was the form placed at?: [facility]    Where the form was placed: Given to physician    What number is listed as a contact on the form?: [phone]       Additional comments: [facility]    Call taken on 5/15/2024 at 6:55 AM by Lisbeth Arredondo

## 2024-05-16 ENCOUNTER — TELEPHONE (OUTPATIENT)
Dept: GASTROENTEROLOGY | Facility: CLINIC | Age: 79
End: 2024-05-16
Payer: COMMERCIAL

## 2024-05-16 RX ORDER — CEFDINIR 300 MG/1
300 CAPSULE ORAL 2 TIMES DAILY
Qty: 20 CAPSULE | Refills: 0 | Status: CANCELLED | OUTPATIENT
Start: 2024-05-16

## 2024-05-16 NOTE — TELEPHONE ENCOUNTER
Routing to provider to advise if refill appropriate, appears pt had appointment yesterday with primary care provider.

## 2024-05-16 NOTE — TELEPHONE ENCOUNTER
Pt wife called and asked for a refill on Cefdinir 300mg capsules that they originally got prescribed from ER.  Please send RX if appropriate or reach out to Pt.      Thank you,  Mayi Anderson, Pharmacy [facility] [place]

## 2024-05-16 NOTE — TELEPHONE ENCOUNTER
[facility] Call Center    Phone Message    May a detailed message be left on voicemail: Yes    Reason for Call: Other: Patient is currently scheduled on 07/05/2024, as visit type New GI Urgent. This is outside the expected timeline for this referral. Patient has been added to the waitlist.     Action Taken: Message routed to:  Other: GI REFERRAL TRIAGE POOL     Travel Screening: Not Applicable

## 2024-05-16 NOTE — LETTER
June 5, 2024      Khang Bailon  [address]  [facility] [address]        Dear Khang,     I have a message from Dr Degroot   Patient left abruptly the other day and had completed antibiotic from ER. I think we need further conversation regarding this and work up as discussed. I can call and discuss things further moving forward as I do have worries for Khang and his upper GI bleed as well as prognosis going forward.   Please call us when you get the letter. [phone]    Sincerely,        Care Team/  Neville Degroot MD

## 2024-05-17 ENCOUNTER — TELEPHONE (OUTPATIENT)
Dept: FAMILY MEDICINE | Facility: CLINIC | Age: 79
End: 2024-05-17
Payer: COMMERCIAL

## 2024-05-17 NOTE — TELEPHONE ENCOUNTER
Patient left abruptly the other day and had completed antibiotic from ER. I think we need further conversation regarding this and work up as discussed. I can call and discuss things further moving forward as I do have worries for Khang and his upper GI bleed as well as prognosis going forward.

## 2024-05-17 NOTE — TELEPHONE ENCOUNTER
Home Care is calling regarding an established patient with [facility].    Requesting orders from: Neville Degroot  Provider is following patient: Yes  Is this a 60-day recertification request?  No    Orders Requested    Occupational Therapy  Request for continuation of care with no increase or decrease in frequency  Frequency:  1x/wk for 5 wks          Verbal orders given.  Home Care will send orders for provider to sign.  Confirmed ok to leave a detailed message with call back.  Contact information confirmed and updated as needed.    Maria Del Carmen Randall RN on 5/17/2024 at 4:39 PM

## 2024-05-22 ENCOUNTER — APPOINTMENT (OUTPATIENT)
Dept: GENERAL RADIOLOGY | Facility: CLINIC | Age: 79
End: 2024-05-22
Attending: PHYSICIAN ASSISTANT
Payer: COMMERCIAL

## 2024-05-22 ENCOUNTER — TELEPHONE (OUTPATIENT)
Dept: FAMILY MEDICINE | Facility: CLINIC | Age: 79
End: 2024-05-22
Payer: COMMERCIAL

## 2024-05-22 ENCOUNTER — HOSPITAL ENCOUNTER (EMERGENCY)
Facility: CLINIC | Age: 79
Discharge: HOME | End: 2024-05-22
Attending: PHYSICIAN ASSISTANT | Admitting: PHYSICIAN ASSISTANT
Payer: COMMERCIAL

## 2024-05-22 VITALS
RESPIRATION RATE: 22 BRPM | TEMPERATURE: 98 F | HEART RATE: 118 BPM | OXYGEN SATURATION: 92 % | SYSTOLIC BLOOD PRESSURE: 134 MMHG | DIASTOLIC BLOOD PRESSURE: 91 MMHG

## 2024-05-22 DIAGNOSIS — J18.9 PNEUMONIA OF RIGHT LOWER LOBE DUE TO INFECTIOUS ORGANISM: ICD-10-CM

## 2024-05-22 DIAGNOSIS — J96.11 CHRONIC RESPIRATORY FAILURE WITH HYPOXIA (H): ICD-10-CM

## 2024-05-22 PROCEDURE — 71045 X-RAY EXAM CHEST 1 VIEW: CPT

## 2024-05-22 PROCEDURE — 999N000157 HC STATISTIC RCP TIME EA 10 MIN

## 2024-05-22 PROCEDURE — 99283 EMERGENCY DEPT VISIT LOW MDM: CPT | Mod: 25 | Performed by: PHYSICIAN ASSISTANT

## 2024-05-22 PROCEDURE — 99284 EMERGENCY DEPT VISIT MOD MDM: CPT | Performed by: PHYSICIAN ASSISTANT

## 2024-05-22 RX ORDER — CEFDINIR 300 MG/1
300 CAPSULE ORAL 2 TIMES DAILY
Qty: 14 CAPSULE | Refills: 0 | Status: SHIPPED | OUTPATIENT
Start: 2024-05-22 | End: 2024-05-29

## 2024-05-22 RX ORDER — HYDROMORPHONE HYDROCHLORIDE 1 MG/ML
1 SOLUTION ORAL EVERY 6 HOURS PRN
COMMUNITY
Start: 2024-05-15 | End: 2024-08-10

## 2024-05-22 ASSESSMENT — ACTIVITIES OF DAILY LIVING (ADL)
ADLS_ACUITY_SCORE: 38
ADLS_ACUITY_SCORE: 38

## 2024-05-22 NOTE — ED PROVIDER NOTES
History     Chief Complaint   Patient presents with    Shortness of Breath       HPI  Khang Bailon is a 79 year old male with a history of COPD, CHF, coronary artery disease, upper GI bleed, lung cancer, prostate and bladder cancer, who presents to the emergency department with his wife and grandson and friend for shortness of breath.  Wife provides much of history.  She reports that he recently finished antibiotics for pneumonia.  Overall he has been at his baseline in regard to his work of breathing, will occasionally get short of breath but not consistently.  He has not had increased cough or fever.  His home nurse came in today and checked his oxygen levels and they were in the 80s so they were advised to come here.  Patient denies increased pain anywhere.  He is scheduled tomorrow for treatment in [place] for his bladder cancer.  Family notes that they were in discussion with his primary care provider about hospice care but wife states that she is going to be taking care of him.  She and patient both state that he does better on oxygen but no one will give it to him to use at home.  He does have nebulizer treatments that they have been using as needed for his breathing.        Allergies:  Allergies   Allergen Reactions    Flagyl [Metronidazole] GI Disturbance       Problem List:    Patient Active Problem List    Diagnosis Date Noted    Severe malnutrition (H24) 04/30/2024     Priority: Medium    Gastrointestinal hemorrhage with melena 04/30/2024     Priority: Medium    Hypoalbuminemia 04/30/2024     Priority: Medium    Lactic acidosis 04/30/2024     Priority: Medium    Enlarged prostate 04/30/2024     Priority: Medium    Coagulopathy (H24) 04/30/2024     Priority: Medium    Sacral pressure sore 04/30/2024     Priority: Medium    Abrasion of left elbow 04/30/2024     Priority: Medium    Chronic systolic congestive heart failure (H) 04/27/2024     Priority: Medium    Urothelial carcinoma of bladder (H)  04/27/2024     Priority: Medium    Pneumonia of right lower lobe due to infectious organism 04/26/2024     Priority: Medium    Septic shock (H) 04/26/2024     Priority: Medium    Acute respiratory failure with hypoxia (H) 04/26/2024     Priority: Medium    Closed fracture of right hip with routine healing 04/16/2024     Priority: Medium    Pain 04/16/2024     Priority: Medium    Drug-induced constipation 04/16/2024     Priority: Medium    Acute blood loss anemia 04/16/2024     Priority: Medium    Postprocedural stricture of anterior urethra 03/21/2024     Priority: Medium    Acute bilateral back pain, unspecified back location 03/19/2024     Priority: Medium    SIRS (systemic inflammatory response syndrome) (H) 02/01/2024     Priority: Medium    Calculus of gallbladder without cholecystitis without obstruction 02/01/2024     Priority: Medium    Infrarenal abdominal aortic aneurysm (AAA) without rupture (H24) 02/01/2024     Priority: Medium    Fistula of sigmoid colon 02/01/2024     Priority: Medium    Aspiration into airway, initial encounter 02/01/2024     Priority: Medium    Gross hematuria 02/01/2024     Priority: Medium    Aspirin-like platelet function defect (H) 02/01/2024     Priority: Medium    Diverticulitis 01/31/2024     Priority: Medium    Pulmonary nodule 01/31/2024     Priority: Medium    Mass of urinary bladder 01/31/2024     Priority: Medium    Abnormal urinalysis 01/31/2024     Priority: Medium    Other chest pain 01/31/2024     Priority: Medium    COPD exacerbation (H) 01/31/2024     Priority: Medium    Systolic congestive heart failure, unspecified HF chronicity (H) 01/09/2024     Priority: Medium    Systolic congestive heart failure (H) 01/09/2024     Priority: Medium    Coronary artery disease involving native coronary artery of native heart without angina pectoris 12/05/2023     Priority: Medium    NSTEMI (non-ST elevated myocardial infarction) (H) 11/27/2023     Priority: Medium    COVID-19  08/13/2023     Priority: Medium    Benign prostatic hyperplasia, unspecified whether lower urinary tract symptoms present 11/01/2021     Priority: Medium    Benign prostatic hyperplasia 11/01/2021     Priority: Medium    Fatigue, unspecified type 11/29/2019     Priority: Medium    Memory loss 11/29/2019     Priority: Medium    Moderate COPD (chronic obstructive pulmonary disease) (H) 11/16/2017     Priority: Medium    Tubulovillous adenoma of rectum 4/9/24 07/26/2016     Priority: Medium    Sigmoid diverticulitis 07/12/2016     Priority: Medium    History of CVA (cerebrovascular accident) - old left internal capsule lacunar infarct on CT 7/2016 07/12/2016     Priority: Medium    Multilevel degenerative disc disease 09/21/2015     Priority: Medium    Elevated prostate specific antigen (PSA) 09/11/2015     Priority: Medium    Health Care Home 01/24/2013     Priority: Medium     Racquel Ortiz RN-PHN  FPA / [facility]   [phone]  DX V65.8 REPLACED WITH [id number] (04/08/2013)      Elevated PSA 08/27/2012     Priority: Medium    Loss of weight 08/27/2012     Priority: Medium    Third degree heart block (H) - s/p pacemaker 08/10/2012     Priority: Medium    Hypertension goal BP (blood pressure) < 140/90 08/10/2012     Priority: Medium    Other thalassemia (H24) 07/27/2012     Priority: Medium     Diagnosis updated by automated process. Provider to review and confirm.      Hyperlipidemia LDL goal <130 07/27/2012     Priority: Medium    Heart block 07/27/2012     Priority: Medium    Cardiac abnormality 07/27/2012     Priority: Medium    Complete heart block (H) 07/27/2012     Priority: Medium    Thalassemia 07/27/2012     Priority: Medium     Formatting of this note might be different from the original.   Formatting of this note might be different from the original.   Diagnosis updated by automated process. Provider to review and confirm.      Painful respiration 03/31/2004      Priority: Medium    Personal history of tobacco use, presenting hazards to health 2004     Priority: Medium        Past Medical History:    Past Medical History:   Diagnosis Date    Alpha thalassemia (H24)     AV block 2012    CAD (coronary artery disease)     Chronic systolic heart failure (H)     COPD (chronic obstructive pulmonary disease) (H)     Diverticulitis     History of CVA (cerebrovascular accident)     Injury, other and unspecified, elbow, forearm, and wrist 1970s    Urothelial carcinoma (H)        Past Surgical History:    Past Surgical History:   Procedure Laterality Date    COLONOSCOPY      COLONOSCOPY  12/10/2012    CYSTOSCOPY, LITHOLAPAXY, COMBINED N/A 11/10/2021    Procedure: Cystoscopy, Bladder Stone Removal;  Surgeon: Guanaco Stewart MD;  Location: [facility] OR    IMPLANT PACEMAKER      LASER KTP TRANSURETHRAL RESECTION (TUR) PROSTATE N/A 11/10/2021    Procedure: TRANSURETHRAL RESECTION (TUR) PROSTATE, USING KTP LASER;  Surgeon: Guanaco Stewart MD;  Location: [facility] OR       Family History:    No family history on file.    Social History:  Marital Status:   [2]  Social History     Tobacco Use    Smoking status: Former     Current packs/day: 0.00     Types: Cigarettes     Start date: 1962     Quit date: 2012     Years since quittin.8    Smokeless tobacco: Never    Tobacco comments:     Quit in July   Vaping Use    Vaping status: Never Used   Substance Use Topics    Alcohol use: No     Alcohol/week: 0.0 standard drinks of alcohol    Drug use: No        Medications:    cefdinir (OMNICEF) 300 MG capsule  HYDROmorphone, STANDARD CONC, (DILAUDID) 1 MG/ML oral solution  ACE/ARB/ARNI NOT PRESCRIBED (INTENTIONAL)  acetaminophen (TYLENOL) 325 MG tablet  BETA BLOCKER NOT PRESCRIBED (INTENTIONAL)  budeson-glycopyrrol-formoterol (BREZTRI AEROSPHERE) 160-9-4.8 MCG/ACT AERO inhaler  calcium carbonate-vitamin D (CALTRATE) 600-10 MG-MCG per tablet  Cholecalciferol (VITAMIN D-3) 25 MCG  (1000 UT) CAPS  Ferrous Sulfate 324 (65 Fe) MG TBEC  fish oil-omega-3 fatty acids 1000 MG capsule  ipratropium - albuterol 0.5 mg/2.5 mg/3 mL (DUONEB) 0.5-2.5 (3) MG/3ML neb solution  methocarbamol (ROBAXIN) 500 MG tablet  Multiple Vitamin (MULTIVITAMIN ADULT PO)  order for DME  oxyBUTYnin (DITROPAN) 5 MG tablet  pantoprazole (PROTONIX) 40 MG EC tablet  polyethylene glycol (MIRALAX) 17 g packet  rosuvastatin (CRESTOR) 5 MG tablet  senna-docusate (SENOKOT-S/PERICOLACE) 8.6-50 MG tablet  vitamin C (ASCORBIC ACID) 500 MG tablet          Review of Systems   All other systems reviewed and are negative.          Physical Exam   BP: (!) 132/97  Pulse: (!) 121  Temp: 98  F (36.7  C)  Resp: 24  SpO2: (!) 88 %      Physical Exam  Vitals and nursing note reviewed.   Constitutional:       Comments: Cachectic elderly male in no apparent distress.  Does not appear acutely ill.   HENT:      Head: Normocephalic.      Nose: Nose normal.      Mouth/Throat:      Pharynx: Oropharynx is clear.   Eyes:      Conjunctiva/sclera: Conjunctivae normal.      Pupils: Pupils are equal, round, and reactive to light.   Cardiovascular:      Rate and Rhythm: Regular rhythm.      Heart sounds: Normal heart sounds.   Pulmonary:      Effort: Pulmonary effort is normal. No respiratory distress.      Comments: Scattered occasional wheeze heard but overall good air movement.  Abdominal:      General: Abdomen is flat. There is no distension.      Tenderness: There is no abdominal tenderness.   Musculoskeletal:      Cervical back: Neck supple.      Right lower leg: No edema.      Left lower leg: No edema.   Skin:     General: Skin is warm and dry.   Neurological:      Mental Status: Mental status is at baseline.   Psychiatric:         Mood and Affect: Mood normal.             ED Course        Procedures      Results for orders placed or performed during the hospital encounter of 05/22/24 (from the past 24 hour(s))   XR Chest Port 1 View    Narrative    XR  CHEST PORT 1 VIEW 5/22/2024 2:48 PM    HISTORY: dyspnea    COMPARISON: 5/7/2024      Impression    IMPRESSION: Linear opacities in the lung bases, right greater than  left, either due to pneumonia, pulmonary edema or mild pulmonary  fibrosis. Trace right pleural effusion. Emphysema. No pneumothorax.  Normal heart size. Pacemaker.    VIVIANA ARTHUR MD         SYSTEM ID:  [id number]       Medications - No data to display      Assessments & Plan (with Medical Decision Making)  Khang Bailon is a 79 year old male with a complex medical history who presented to the ED for low oxygen readings at home with his home health team.  On arrival the patient denied feeling short of breath but does note intermittent shortness of breath at times.  He was recently hospitalized for pneumonia and finished a course of cefdinir 5 days ago.  Denies any worsening cough, fevers, or worsening shortness of breath from his baseline.  On arrival he was tachycardic in the 120s and oxygenation 88% on room air.  This dropped as low as 84% while here.  He was placed on O2 2 L via nasal cannula with improvement of oxygenation to the low 90s.  Pulmonary exam demonstrated some mild scattered wheezes but overall good air movement.  He did not appear in acute respiratory distress.  I had a long discussion with the patient and his family regarding goals of visit and care today.  He was adamant he did not want a large workup done and just wanted to go home.  Family wanted imaging and labs done.  Patient was willing to get a chest x-ray but refused blood work.  Chest x-ray showed some linear opacities in the lung bases right greater than left of unclear significance, could be pneumonia versus pulmonary edema or pulmonary fibrosis.  He had no signs of fluid overload on exam to suggest this was pulmonary edema.  I did go over results of x-ray with the patient and his family.  He was recently treated for pneumonia and though he has no worsening symptoms,  without further workup cannot definitively rule out infection.  Patient and his wife are both agreeable to extending cefdinir for another week to cover for any potential infectious process.  I do think his hypoxia today is likely related to his known disease processes progressing further.  His clinician recommended hospice and they are working on this for the patient.  I think it would be reasonable to send the patient home on oxygen since it does help him feel better.  He was advised to follow-up with his clinic provider in a week  verbalized for reevaluation.  Can return to ED for any worsening symptoms.  All questions answered and patient discharged home in suitable condition.       Oxygen Documentation  I certify that this patient, Khang Bailon has been under my care (or a nurse practitioner or physican's assistant working with me). This is the face-to-face encounter for oxygen medical necessity.      At the time of this encounter, I have reviewed the qualifying testing and have determined that supplemental oxygen is reasonable and necessary and is expected to improve the patient's condition in a home setting.         Patient has continued oxygen desaturation due to Chronic Heart Failure I50  Chronic Respiratory Failure with Hypoxia J96.11  COPD J44.9.    If portability is ordered, is the patient mobile within the home? yes    Was this visit performed as a telehealth visit: No      I have reviewed the nursing notes.    I have reviewed the findings, diagnosis, plan and need for follow up with the patient.      New Prescriptions    CEFDINIR (OMNICEF) 300 MG CAPSULE    Take 1 capsule (300 mg) by mouth 2 times daily for 7 days       Final diagnoses:   Chronic respiratory failure with hypoxia (H)   Pneumonia of right lower lobe due to infectious organism     Note: Chart documentation done in part with Dragon Voice Recognition software. Although reviewed after completion, some word and grammatical errors may  remain.     5/22/2024   [facility] EMERGENCY DEPT       Chrissie La PA-C  05/22/24 1522       Chrissie La PA-C  05/22/24 1527

## 2024-05-22 NOTE — DISCHARGE INSTRUCTIONS
Please finish the antibiotics as prescribed.  Wear the oxygen at all times to alleviate your work of breathing.  Continue with home nebulizer treatments as needed.  Please follow-up with your clinic provider next week for reevaluation.  Return to the emergency department for any worsening symptoms.    Thank you for choosing [facility]'s Emergency Department. It was a pleasure taking care of you today. If you have any questions, please call [phone].    Chrissie La PA-C

## 2024-05-22 NOTE — TELEPHONE ENCOUNTER
DEWEY  Home LPN calling as she is at the patients [place] and  his   Respirations are 23 and oxygen sats 87  /72. She called EMS because she could not get his oxygen up to 92 which is his parameter.  Lung sounds clear    EMS put patient on 4L and his sats are at 92. They recommend the patient go to ED.     Patient refusing to go to ED. POA daughter was called and told him  to go in. Wife and grandson also encouraging him to go in but patient continues to refuse. LPN calling to notify PCP'''Yen Gaston RN on 5/22/2024 at 1:28 PM

## 2024-05-22 NOTE — MEDICATION SCRIBE - ADMISSION MEDICATION HISTORY
Medication Scribe Admission Medication History    Admission medication history is complete. The information provided in this note is only as accurate as the sources available at the time of the update.    Information Source(s): Family member and CareEverywhere/SureScripts via in-person    Pertinent Information: spouse Brittany and grandson present    Changes made to PTA medication list:  Added: None  Deleted: tylenol, D3, iron, fish oil, miralax, senna+  Changed: hydromorphone to liquid    Allergies reviewed with patient and updates made in EHR: yes    Medication History Completed By: BRITTANY ANGELA 5/22/2024 3:25 PM    PTA Med List   Medication Sig Note Last Dose    ACE/ARB/ARNI NOT PRESCRIBED (INTENTIONAL) Please choose reason not prescribed from choices below.      BETA BLOCKER NOT PRESCRIBED (INTENTIONAL) Please choose reason not prescribed from choices below.      budeson-glycopyrrol-formoterol (BREZTRI AEROSPHERE) 160-9-4.8 MCG/ACT AERO inhaler Inhale 2 puffs into the lungs 2 times daily      calcium carbonate-vitamin D (CALTRATE) 600-10 MG-MCG per tablet Take 1 tablet by mouth 2 times daily      cefdinir (OMNICEF) 300 MG capsule Take 1 capsule (300 mg) by mouth 2 times daily for 7 days      HYDROmorphone, STANDARD CONC, (DILAUDID) 1 MG/ML oral solution Take 1 mg by mouth every 6 hours as needed for severe pain 5/22/2024: From [facility] Dispense Report  Last Dispensed: 05/15/24  Quantity/Days supply: 120ml/30 ds  Ordering Provider: MD Zane  Pharmacy: [facility]   5/21/2024 at     ipratropium - albuterol 0.5 mg/2.5 mg/3 mL (DUONEB) 0.5-2.5 (3) MG/3ML neb solution Take 1 vial (3 mLs) by nebulization 3 times daily      methocarbamol (ROBAXIN) 500 MG tablet Take 500 mg by mouth 3 times daily      Multiple Vitamin (MULTIVITAMIN ADULT PO) Take 1 tablet by mouth daily      order for DME Equipment being ordered: Nebulizer hose      oxyBUTYnin (DITROPAN) 5 MG tablet Take 1 tablet (5 mg) by mouth 3  times daily as needed for bladder spasms      pantoprazole (PROTONIX) 40 MG EC tablet Take 1 tablet (40 mg) by mouth 2 times daily (before meals)      rosuvastatin (CRESTOR) 5 MG tablet Take 5 mg by mouth every morning      vitamin C (ASCORBIC ACID) 500 MG tablet Take 500 mg by mouth daily

## 2024-05-22 NOTE — ED TRIAGE NOTES
Pt presents with wife and grandson for concern of low oxygen at home per home health nurse. RT called from triage to assess the patient.

## 2024-05-23 ENCOUNTER — VIRTUAL VISIT (OUTPATIENT)
Dept: PALLIATIVE MEDICINE | Facility: CLINIC | Age: 79
End: 2024-05-23
Attending: STUDENT IN AN ORGANIZED HEALTH CARE EDUCATION/TRAINING PROGRAM
Payer: COMMERCIAL

## 2024-05-23 DIAGNOSIS — C67.9 UROTHELIAL CARCINOMA OF BLADDER (H): Chronic | ICD-10-CM

## 2024-05-23 PROCEDURE — 99207 PR NO CHARGE LOS: CPT | Performed by: NURSE PRACTITIONER

## 2024-05-23 NOTE — TELEPHONE ENCOUNTER
Action 5/23/2024 8:25 AM - Yaima PEREZ.    Action Taken Writer sent Pt a Wandrian message.

## 2024-05-23 NOTE — PROGRESS NOTES
Palliative Care:    Patient was not able to complete video visit today. Reschedule request sent.     VALENTINE Srivastava

## 2024-05-25 ENCOUNTER — HEALTH MAINTENANCE LETTER (OUTPATIENT)
Age: 79
End: 2024-05-25

## 2024-05-29 ENCOUNTER — TELEPHONE (OUTPATIENT)
Dept: FAMILY MEDICINE | Facility: CLINIC | Age: 79
End: 2024-05-29
Payer: COMMERCIAL

## 2024-05-29 NOTE — TELEPHONE ENCOUNTER
Home Care is calling regarding an established patient with [facility].       Requesting orders from: Neville Degroot  Provider is following patient: Yes  Is this a 60-day recertification request?  No    Orders Requested    Skilled Nursing  Request for resumption in care.     Skilled nursing 2 times a week for 5 weeks    Occupational Therapy  Request for initial evaluation and treatment (one time)     HHA (Home Health Aide)  Request for resumption in care.     1 time a week for 4 weeks    Medication discrepancies:    Discharge instructions states for Dilaudid 2 mg tablet, 1/2 tablet.  He has the Dilaudid 1 mg /ml every 6 hours and they would like to stay with that.    Rosuvastatin, pantoprazole, and Symbicort were not on the discharge med list- is he to continue these?    He has oxygen in the home- it says 2 liters but does not say continuous or intermittent and he is using it  intermittent.    They would like parameters for reporting O2 sats-he was at 88% and was able to get it up after deep breaths and got it up to 92%    RN- would like orders to irrigate 100 ml prn if his catheter is sluggish or poor output.    He has a suspected deep tissue injury to his left heel:   2 cm x 1.5 cm, closed and black- would like to cover with foam dressing to protect.To change once weekly.    Confirmed ok to leave a detailed message with call back.  Contact information confirmed and updated as needed.    Mayi Lowery RN

## 2024-05-29 NOTE — TELEPHONE ENCOUNTER
Writer called Pt and talked with Pt's spouse Brittany. Writer offered Pt a sooner appointment with Dr. Guanaco Lott on 6/3/2024 at 1:40 PM, however, the Pt has another doctor's appointment on 6/3/2024 and was not able to accept the appt. Writer informed Pt and Brittany that Writer will reach out to the Pt again when there is another sooner appointment. Pt and Brittany expressed understanding of the plan.

## 2024-05-29 NOTE — TELEPHONE ENCOUNTER
Ok to continue medication. Ok for verbal orders. He should make sure he follows up with palliative or set up phone visit with me to discuss further.

## 2024-05-31 ENCOUNTER — TELEPHONE (OUTPATIENT)
Dept: FAMILY MEDICINE | Facility: CLINIC | Age: 79
End: 2024-05-31
Payer: COMMERCIAL

## 2024-05-31 NOTE — TELEPHONE ENCOUNTER
Home Care is calling regarding an established patient with [facility].    Requesting orders from: Neville Degroot  Provider is following patient: Yes  Is this a 60-day recertification request?  No    Orders Requested  Wound Care   Patient has a severe case of diaper rash. Patient is incontinent of stool and unable to get out of bed.   Home Care is looking for orders for wound care.     Wound Care Supplies   Wound care cleaning, barrier cream, Triad wound care dressing.     Information was gathered and will be sent to provider for review.  RN will contact Home Care with information after provider review.  Confirmed ok to leave a detailed message with call back.  Contact information confirmed and updated as needed.    Maria Del Carmen Randall RN on 5/31/2024 at 4:55 PM

## 2024-06-03 DIAGNOSIS — J44.9 CHRONIC OBSTRUCTIVE PULMONARY DISEASE, UNSPECIFIED COPD TYPE (H): ICD-10-CM

## 2024-06-04 ENCOUNTER — TELEPHONE (OUTPATIENT)
Dept: FAMILY MEDICINE | Facility: CLINIC | Age: 79
End: 2024-06-04
Payer: COMMERCIAL

## 2024-06-04 RX ORDER — IPRATROPIUM BROMIDE AND ALBUTEROL SULFATE 2.5; .5 MG/3ML; MG/3ML
SOLUTION RESPIRATORY (INHALATION)
Qty: 180 ML | Refills: 0 | Status: SHIPPED | OUTPATIENT
Start: 2024-06-04 | End: 2024-07-26

## 2024-06-04 NOTE — TELEPHONE ENCOUNTER
Home Care is calling regarding an established patient with [facility].    Requesting orders from: Neville Degroot  Provider is following patient: Yes  Is this a 60-day recertification request?  No    Orders Requested    Social Work  Request for initial evaluation and treatment (one time)       Verbal orders given.  Home Care will send orders for provider to sign.  Confirmed ok to leave a detailed message with call back.  Contact information confirmed and updated as needed.    Yolanda Albrecht RN

## 2024-06-05 ENCOUNTER — TELEPHONE (OUTPATIENT)
Dept: FAMILY MEDICINE | Facility: CLINIC | Age: 79
End: 2024-06-05
Payer: COMMERCIAL

## 2024-06-05 NOTE — TELEPHONE ENCOUNTER
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  Scheduled for removal at that 06/14 appt    [facility] is doing the procedure and saw that patient was just put on Eliquis for bilateral PE.  They also saw that a palliative care consult was scheduled.  GI is wondering if patient is going to go through with the procedure.  If he does go through the procedure he will need to stop his Eliquis at least 3 days prior to the procedure and will need to be bridged with Lovenox.  The lovenox will need to be stopped 24 hours before the procedure.    Wife contacted and she states that he IS planning to have the procedure done.     Message forwarded to  for blood thinner bridging orders

## 2024-06-07 ENCOUNTER — VIRTUAL VISIT (OUTPATIENT)
Dept: PALLIATIVE MEDICINE | Facility: CLINIC | Age: 79
End: 2024-06-07
Attending: NURSE PRACTITIONER
Payer: COMMERCIAL

## 2024-06-07 ENCOUNTER — TELEPHONE (OUTPATIENT)
Dept: FAMILY MEDICINE | Facility: CLINIC | Age: 79
End: 2024-06-07

## 2024-06-07 DIAGNOSIS — C67.9 UROTHELIAL CARCINOMA OF BLADDER (H): Primary | ICD-10-CM

## 2024-06-07 PROCEDURE — 99215 OFFICE O/P EST HI 40 MIN: CPT | Mod: 95 | Performed by: NURSE PRACTITIONER

## 2024-06-07 RX ORDER — TAMSULOSIN HYDROCHLORIDE 0.4 MG/1
1 CAPSULE ORAL AT BEDTIME
COMMUNITY
Start: 2024-05-24 | End: 2024-08-20

## 2024-06-07 RX ORDER — GABAPENTIN 300 MG/1
300 CAPSULE ORAL 3 TIMES DAILY
COMMUNITY
Start: 2024-05-28 | End: 2024-06-24

## 2024-06-07 NOTE — NURSING NOTE
Is the patient currently in the state of [place]? YES    Visit mode:VIDEO    If the visit is dropped, the patient can be reconnected by: VIDEO VISIT: Text to cell phone:   Telephone Information:   Mobile [phone]   Mobile [phone]       Will anyone else be joining the visit? NO  (If patient encounters technical issues they should call [phone]    How would you like to obtain your AVS? MyChart    Are changes needed to the allergy or medication list? No    Are refills needed on medications prescribed by this physician? NO     Reason for visit: RECHECK    Bartolome OLIVER

## 2024-06-07 NOTE — TELEPHONE ENCOUNTER
Home Care is calling regarding an established patient with [facility].    Requesting orders from: Neville Degroot  Provider is following patient: Yes  Is this a 60-day recertification request?  No    Orders Requested    Occupational Therapy  Request for delay in care, service is not able to be provided within same scheduled day.   Delay in OT eval until the week of 06/10/2024    Information was gathered and will be sent to provider for review.  RN will contact Home Care with information after provider review.  Confirmed ok to leave a detailed message with call back.  Contact information confirmed and updated as needed.    Maria Del Carmen Randall RN on 6/7/2024 at 10:46 AM

## 2024-06-07 NOTE — TELEPHONE ENCOUNTER
Called and spoke with patient's wife.  Reviewed recent notes and is due for discharge follow-up from the hospital.  Please help him schedule this with me.  Recent blood clot and now on anticoagulation.  Procedure will have to be held until follow-up visit.  Please let [facility] know this will need to be put off for the time being.

## 2024-06-07 NOTE — PATIENT INSTRUCTIONS
Thank you for meeting with us in the [facility].    How to get a hold of us:  For non-urgent matters, MyChart works best.    For more urgent matters, or if you prefer not to use MyChart, call our clinic nurse coordinator Sonia Estrada RN at [phone] or [phone]    We have an on-call number for evenings and weekends. Please call this only if you are having uncontrolled symptoms or serious side effects from your medicines: [phone].     For refills, please give us a week (5 working days) notice. We don't always have providers available everyday to do refills. If you call the day you run out of your medicine, we may not be able to refill it in time, so call 5 days in advance!

## 2024-06-07 NOTE — PROGRESS NOTES
Problem: Sleep Disturbance  Goal: Adequate Sleep/Rest  Outcome: Progressing   Goal Outcome Evaluation:  Patient was awake intermittently tonight. Was observed to have slept for 6 hours. Pain adequately managed with scheduled Oxycodone and prn Tylenol x1 tonight.  NPO after midnight in anticipation for ECT today at 1220 PM. Safety checks in place.                               SUBJECTIVE:   Khang Bailon is a 72 year old male who presents to clinic today for the following health issues:    Wife states that patient has gotten worse since his last office visit. Patient is huffing and wheezing more often. This is very noticeable when carrying in groceries and grocery shopping. Patient does not use his inhaler as he feels it doesn't do any good. Patient states that he experiences SOB with activity.      COPD Follow-Up    Symptoms are currently: much worse    Current fatigue or dyspnea with ambulation: worsened from baseline    Shortness of breath: much worse    Increased or change in Cough/Sputum: Yes-  More phlem    Fever(s): No    Baseline ambulation without stopping to rest:  Huffs when grocery shopping. Able to walk up 13 stairs without stopping to rest.    Any ER/UC or hospital admissions since your last visit? No     History   Smoking Status     Former Smoker     Years: 50.00     Types: Cigarettes     Quit date: 7/27/2012   Smokeless Tobacco     Never Used     Comment: Quit in July     No results found for: FEV1, MPD8IEW    Amount of exercise or physical activity: None    Problems taking medications regularly: NA    Medication side effects: not applicable    Diet: regular (no restrictions)            Problem list and histories reviewed & adjusted, as indicated.  Additional history: as documented        Reviewed and updated as needed this visit by clinical staff       Reviewed and updated as needed this visit by Provider        SUBJECTIVE:  Khang  is a 72 year old male who presents for: Follow-up of his COPD.  He does not feel his albuterol inhaler is helping much.  Has poor exercise tolerance.  Wife states that he had some heavy wheezing and some chest tightness when stepping out of a [employer] activity last week.  He denies any distinct chest pain.  No diaphoresis.  He has been diagnosed with COPD through a CT scan and physical findings.  Never been really treated except for with a  rescue inhaler.  He is rather stubborn and does not want to do a lot about anything.    Past Medical History:   Diagnosis Date     Alpha thalassemia (H)      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    pacemaker at [facility]     Diverticulitis      Injury, other and unspecified, elbow, forearm, and wrist 1970s    Broke right wrist     Past Surgical History:   Procedure Laterality Date     COLONOSCOPY       COLONOSCOPY  12/10/2012     IMPLANT PACEMAKER       Social History   Substance Use Topics     Smoking status: Former Smoker     Years: 50.00     Types: Cigarettes     Quit date: 7/27/2012     Smokeless tobacco: Never Used      Comment: Quit in July     Alcohol use No     Current Outpatient Prescriptions   Medication Sig Dispense Refill     UNABLE TO FIND MEDICATION NAME: Isotonix       UNABLE TO FIND MEDICATION NAME: OPC-3       Ascorbic Acid (VITAMIN C PO) Take by mouth daily       DIGESTIVE ENZYMES PO Take by mouth daily       B Complex Vitamins (B COMPLEX PO) Take by mouth daily       Misc Natural Products (CALCIUM PLUS ADVANCED PO) Take by mouth daily       Multiple Vitamins-Minerals (MULTIVITAMIN ADULTS PO) Take by mouth daily       Specialty Vitamins Products (ONE-A-DAY PROSTATE PO) Take by mouth daily       tiotropium (SPIRIVA HANDIHALER) 18 MCG capsule Inhale contents of one capsule daily. 30 capsule 1     Multiple Vitamin (MULTI VITAMIN  MENS) TABS Take 1 tablet by mouth daily.       amoxicillin (AMOXIL) 500 MG capsule Take 1 capsule (500 mg) by mouth 3 times daily (Patient not taking: Reported on 2/21/2018) 30 capsule 0     albuterol (PROAIR HFA/PROVENTIL HFA/VENTOLIN HFA) 108 (90 BASE) MCG/ACT Inhaler Inhale 2 puffs into the lungs every 6 hours as needed for shortness of breath / dyspnea or wheezing (Patient not taking: Reported on 2/21/2018) 1 Inhaler 3     ibuprofen (ADVIL,MOTRIN) 600 MG tablet Take 1 tablet (600 mg) by mouth every 6 hours as needed for moderate pain (Patient not taking: Reported on 2/21/2018) 30 tablet 0  "      REVIEW OF SYSTEMS:   5 point ROS negative except as noted above in HPI, including Gen., Resp, CV, GI &  system review.     OBJECTIVE:  Vitals: /90 (BP Location: Right arm, Patient Position: Chair, Cuff Size: Adult Large)  Pulse 85  Temp 97  F (36.1  C) (Temporal)  Ht 5' 7\" (1.702 m)  Wt 192 lb 12.8 oz (87.5 kg)  SpO2 96%  BMI 30.2 kg/m2  BMI= Body mass index is 30.2 kg/(m^2).  He is alert oriented.  Appears in no distress.  Throat is clear.  Neck supple no JVD.  Lungs with distant breath sounds and some expiratory wheezing.  Heart with a regular rhythm.  Has a pacemaker.  Abdomen soft.  Skin clear.  No dependent edema.    ASSESSMENT:  #1 COPD #2 third-degree heart block with pacemaker    PLAN:  Encouraged him to see pulmonology.  This took a lot of effort.  We will start him on Spiriva for now.  He will continue on his albuterol rescue inhaler as needed.  He does not want to do a stress cardiac test.  We will have to go on small steps as he is very reluctant about doing anything.        Cecilio Trejo MD  [facility]              "

## 2024-06-07 NOTE — TELEPHONE ENCOUNTER
Detailed approval left on confidential home care voicemail  Mayi Lowery RN on 6/7/2024 at 2:59 PM

## 2024-06-13 ENCOUNTER — TELEPHONE (OUTPATIENT)
Dept: PALLIATIVE MEDICINE | Facility: CLINIC | Age: 79
End: 2024-06-13
Payer: COMMERCIAL

## 2024-06-13 NOTE — TELEPHONE ENCOUNTER
Called daughter May and scheduled patient for follow up appt on 07/12.     Daughter is still waiting for home care/PT/OT. Its been one month now and she hasn't heard from anyone/anything about this.   Pt is getting weaker and needs it asap.     Please call May.

## 2024-06-13 NOTE — TELEPHONE ENCOUNTER
Reviewed chart. A home care referral was placed on 5/7 through pt's PCP office.     Called May and reviewed my findings. I asked her to call the PCPs office to inquire about the status of the home care referral. After further discussion she mentioned there is a nurse visiting once/week to check his blood pressure and said someone came yesterday to help him bathe, so pt is indeed receiving home care services.     She proceeded to get angry about the lack of involvement of home care and asking why PT isn't involved since pt is getting weaker. I directed her to the home care nurse to share her concerns.    She asked why they aren't receiving any in home visits from palliative care. I explained to her that we do not provide in home visits or services. She asked for an explanation of our involvement, which I provided.    She requested I send a copy of the services that were requested as part of the home care referral. Message sent via [employer].    CHICHO Murray, RN  Palliative Care Nurse Clinician    [phone] (Direct)  [phone] (Main)  [phone] (Appointment Scheduling)

## 2024-06-14 ENCOUNTER — TELEPHONE (OUTPATIENT)
Dept: FAMILY MEDICINE | Facility: CLINIC | Age: 79
End: 2024-06-14
Payer: COMMERCIAL

## 2024-06-14 NOTE — TELEPHONE ENCOUNTER
Home Care is calling regarding an established patient with [facility].       Requesting orders from: Neville Degroot  Provider is following patient: Yes  Is this a 60-day recertification request?  No    Orders Requested    Occupational Therapy  Request for initial certification (first set of orders)   Frequency:  1x/wk for 5 wks for safety with ADLs and transfers      Confirmed ok to leave a detailed message with call back.  Contact information confirmed and updated as needed.    Jessica Goodwin RN

## 2024-06-24 ENCOUNTER — TELEPHONE (OUTPATIENT)
Dept: FAMILY MEDICINE | Facility: CLINIC | Age: 79
End: 2024-06-24
Payer: COMMERCIAL

## 2024-06-24 DIAGNOSIS — C67.9 UROTHELIAL CARCINOMA OF BLADDER (H): ICD-10-CM

## 2024-06-24 DIAGNOSIS — I26.99 OTHER ACUTE PULMONARY EMBOLISM, UNSPECIFIED WHETHER ACUTE COR PULMONALE PRESENT (H): Primary | ICD-10-CM

## 2024-06-24 NOTE — TELEPHONE ENCOUNTER
Home Care is calling regarding an established patient with [facility].       Requesting orders from: Neville Degroot  Provider is following patient: Yes  Is this a 60-day recertification request?  No    Orders Requested    Skilled Nursing  Request for continuation of care with decrease in frequency   Goals have been met/progressing.  Frequency:  1x/wk for 2 wks        Confirmed ok to leave a detailed message with call back.  Contact information confirmed and updated as needed.    Mayi Lowery RN

## 2024-06-24 NOTE — TELEPHONE ENCOUNTER
DONNA Amor with homecare, calling and stating patient was admitted to the ED at the end of May and started on Gabapentin and Eliquis. Zuleyma is unsure of the indication for the Gabapentin but patient denies nerve pain. Please advise if refills are needed. If appt is needed first, please have your team help patient schedule a hospital follow up.     Please call DONNA Amor back with provider recommendations. Okay to leave detailed message on her confidential number.

## 2024-06-25 RX ORDER — GABAPENTIN 300 MG/1
300 CAPSULE ORAL 3 TIMES DAILY
Qty: 90 CAPSULE | Refills: 0 | Status: SHIPPED | OUTPATIENT
Start: 2024-06-25 | End: 2024-07-25

## 2024-07-02 ENCOUNTER — LAB REQUISITION (OUTPATIENT)
Dept: LAB | Facility: CLINIC | Age: 79
End: 2024-07-02
Payer: COMMERCIAL

## 2024-07-02 ENCOUNTER — TELEPHONE (OUTPATIENT)
Dept: FAMILY MEDICINE | Facility: CLINIC | Age: 79
End: 2024-07-02
Payer: COMMERCIAL

## 2024-07-02 DIAGNOSIS — N39.0 URINARY TRACT INFECTION, SITE NOT SPECIFIED: ICD-10-CM

## 2024-07-02 LAB
ALBUMIN UR-MCNC: 30 MG/DL
APPEARANCE UR: ABNORMAL
BILIRUB UR QL STRIP: NEGATIVE
COLOR UR AUTO: YELLOW
GLUCOSE UR STRIP-MCNC: NEGATIVE MG/DL
HGB UR QL STRIP: ABNORMAL
KETONES UR STRIP-MCNC: NEGATIVE MG/DL
LEUKOCYTE ESTERASE UR QL STRIP: ABNORMAL
NITRATE UR QL: POSITIVE
PH UR STRIP: 7 [PH] (ref 5–7)
RBC URINE: <1 /HPF
SP GR UR STRIP: 1.01 (ref 1–1.03)
UROBILINOGEN UR STRIP-MCNC: NORMAL MG/DL
WBC URINE: <1 /HPF

## 2024-07-02 PROCEDURE — 87086 URINE CULTURE/COLONY COUNT: CPT | Mod: ORL | Performed by: UROLOGY

## 2024-07-02 PROCEDURE — 81001 URINALYSIS AUTO W/SCOPE: CPT | Mod: ORL | Performed by: UROLOGY

## 2024-07-02 NOTE — TELEPHONE ENCOUNTER
Home care states patient's urine is cloudy.  Patient had a temp of 99.8  He was feeling weak yesterday and today.    He has an indwelling catheter.      They are wondering if they can get a urine order?    Message handled by Nurse Triage with [employer] - provider name: Miguel    Patient should be seen.    Home care notified.    Mayi Lowery RN on 7/2/2024 at 2:14 PM

## 2024-07-03 ENCOUNTER — TELEPHONE (OUTPATIENT)
Dept: FAMILY MEDICINE | Facility: CLINIC | Age: 79
End: 2024-07-03
Payer: COMMERCIAL

## 2024-07-03 NOTE — TELEPHONE ENCOUNTER
Home Care plan reviewed and approve as recommended.Please notify.   Electronically signed by Greg Schoen, MD

## 2024-07-03 NOTE — TELEPHONE ENCOUNTER
Home Care is calling regarding an established patient with [facility].       Requesting orders from: Neville Degroot  Provider is following patient: Yes  Is this a 60-day recertification request?  Yes    Orders Requested    Skilled Nursing  Request for recertification   Frequency:  1x/wk x4 wks, then 1x every 2wks x4 wks  For murrell cath, education/management    Physical Therapy  Request for recertification   Frequency:  evaluation    HHA  1x/wk x3wks    Confirmed ok to leave a detailed message with call back.  Contact information confirmed and updated as needed.    Lisbeth Hebert RN

## 2024-07-04 LAB — BACTERIA UR CULT: ABNORMAL

## 2024-07-11 ENCOUNTER — HOSPITAL ENCOUNTER (EMERGENCY)
Facility: CLINIC | Age: 79
Discharge: HOME | End: 2024-07-11
Attending: STUDENT IN AN ORGANIZED HEALTH CARE EDUCATION/TRAINING PROGRAM | Admitting: STUDENT IN AN ORGANIZED HEALTH CARE EDUCATION/TRAINING PROGRAM
Payer: COMMERCIAL

## 2024-07-11 ENCOUNTER — APPOINTMENT (OUTPATIENT)
Dept: CT IMAGING | Facility: CLINIC | Age: 79
End: 2024-07-11
Attending: STUDENT IN AN ORGANIZED HEALTH CARE EDUCATION/TRAINING PROGRAM
Payer: COMMERCIAL

## 2024-07-11 ENCOUNTER — NURSE TRIAGE (OUTPATIENT)
Dept: FAMILY MEDICINE | Facility: CLINIC | Age: 79
End: 2024-07-11
Payer: COMMERCIAL

## 2024-07-11 VITALS
HEART RATE: 75 BPM | BODY MASS INDEX: 21.19 KG/M2 | SYSTOLIC BLOOD PRESSURE: 120 MMHG | DIASTOLIC BLOOD PRESSURE: 94 MMHG | RESPIRATION RATE: 18 BRPM | WEIGHT: 148 LBS | HEIGHT: 70 IN | TEMPERATURE: 98.5 F | OXYGEN SATURATION: 94 %

## 2024-07-11 DIAGNOSIS — N39.0 COMPLICATED UTI (URINARY TRACT INFECTION): ICD-10-CM

## 2024-07-11 DIAGNOSIS — I31.39 PERICARDIAL EFFUSION: ICD-10-CM

## 2024-07-11 DIAGNOSIS — K56.41 FECAL IMPACTION (H): ICD-10-CM

## 2024-07-11 DIAGNOSIS — C67.9 MALIGNANT NEOPLASM OF URINARY BLADDER, UNSPECIFIED SITE (H): ICD-10-CM

## 2024-07-11 LAB
ALBUMIN UR-MCNC: 100 MG/DL
ANION GAP SERPL CALCULATED.3IONS-SCNC: 10 MMOL/L (ref 7–15)
APPEARANCE UR: ABNORMAL
BASOPHILS # BLD AUTO: 0.1 10E3/UL (ref 0–0.2)
BASOPHILS NFR BLD AUTO: 1 %
BILIRUB UR QL STRIP: NEGATIVE
BUN SERPL-MCNC: 17.1 MG/DL (ref 8–23)
CALCIUM SERPL-MCNC: 9.7 MG/DL (ref 8.8–10.2)
CHLORIDE SERPL-SCNC: 100 MMOL/L (ref 98–107)
COLOR UR AUTO: YELLOW
CREAT SERPL-MCNC: 0.87 MG/DL (ref 0.67–1.17)
DEPRECATED HCO3 PLAS-SCNC: 27 MMOL/L (ref 22–29)
EGFRCR SERPLBLD CKD-EPI 2021: 88 ML/MIN/1.73M2
EOSINOPHIL # BLD AUTO: 0.1 10E3/UL (ref 0–0.7)
EOSINOPHIL NFR BLD AUTO: 0 %
ERYTHROCYTE [DISTWIDTH] IN BLOOD BY AUTOMATED COUNT: 19.5 % (ref 10–15)
GLUCOSE SERPL-MCNC: 125 MG/DL (ref 70–99)
GLUCOSE UR STRIP-MCNC: NEGATIVE MG/DL
HCT VFR BLD AUTO: 32.2 % (ref 40–53)
HGB BLD-MCNC: 9.9 G/DL (ref 13.3–17.7)
HGB UR QL STRIP: ABNORMAL
IMM GRANULOCYTES # BLD: 0.1 10E3/UL
IMM GRANULOCYTES NFR BLD: 1 %
KETONES UR STRIP-MCNC: NEGATIVE MG/DL
LACTATE SERPL-SCNC: 1.3 MMOL/L (ref 0.7–2)
LEUKOCYTE ESTERASE UR QL STRIP: ABNORMAL
LYMPHOCYTES # BLD AUTO: 1.5 10E3/UL (ref 0.8–5.3)
LYMPHOCYTES NFR BLD AUTO: 8 %
MCH RBC QN AUTO: 19.1 PG (ref 26.5–33)
MCHC RBC AUTO-ENTMCNC: 30.7 G/DL (ref 31.5–36.5)
MCV RBC AUTO: 62 FL (ref 78–100)
MONOCYTES # BLD AUTO: 1.3 10E3/UL (ref 0–1.3)
MONOCYTES NFR BLD AUTO: 7 %
MUCOUS THREADS #/AREA URNS LPF: PRESENT /LPF
NEUTROPHILS # BLD AUTO: 14.8 10E3/UL (ref 1.6–8.3)
NEUTROPHILS NFR BLD AUTO: 83 %
NITRATE UR QL: NEGATIVE
NRBC # BLD AUTO: 0 10E3/UL
NRBC BLD AUTO-RTO: 0 /100
PH UR STRIP: 5 [PH] (ref 5–7)
PLAT MORPH BLD: NORMAL
PLATELET # BLD AUTO: 340 10E3/UL (ref 150–450)
POTASSIUM SERPL-SCNC: 4.7 MMOL/L (ref 3.4–5.3)
PROCALCITONIN SERPL IA-MCNC: 0.12 NG/ML
RBC # BLD AUTO: 5.19 10E6/UL (ref 4.4–5.9)
RBC MORPH BLD: NORMAL
RBC URINE: >182 /HPF
SODIUM SERPL-SCNC: 137 MMOL/L (ref 135–145)
SP GR UR STRIP: 1.02 (ref 1–1.03)
SQUAMOUS EPITHELIAL: 1 /HPF
UROBILINOGEN UR STRIP-MCNC: NORMAL MG/DL
WBC # BLD AUTO: 17.9 10E3/UL (ref 4–11)
WBC CLUMPS #/AREA URNS HPF: PRESENT /HPF
WBC URINE: >182 /HPF

## 2024-07-11 PROCEDURE — 96365 THER/PROPH/DIAG IV INF INIT: CPT | Mod: 59

## 2024-07-11 PROCEDURE — 87040 BLOOD CULTURE FOR BACTERIA: CPT | Performed by: STUDENT IN AN ORGANIZED HEALTH CARE EDUCATION/TRAINING PROGRAM

## 2024-07-11 PROCEDURE — 258N000003 HC RX IP 258 OP 636: Performed by: STUDENT IN AN ORGANIZED HEALTH CARE EDUCATION/TRAINING PROGRAM

## 2024-07-11 PROCEDURE — 87086 URINE CULTURE/COLONY COUNT: CPT | Performed by: STUDENT IN AN ORGANIZED HEALTH CARE EDUCATION/TRAINING PROGRAM

## 2024-07-11 PROCEDURE — 96366 THER/PROPH/DIAG IV INF ADDON: CPT

## 2024-07-11 PROCEDURE — 250N000011 HC RX IP 250 OP 636: Performed by: STUDENT IN AN ORGANIZED HEALTH CARE EDUCATION/TRAINING PROGRAM

## 2024-07-11 PROCEDURE — 250N000009 HC RX 250: Performed by: STUDENT IN AN ORGANIZED HEALTH CARE EDUCATION/TRAINING PROGRAM

## 2024-07-11 PROCEDURE — 36415 COLL VENOUS BLD VENIPUNCTURE: CPT | Performed by: STUDENT IN AN ORGANIZED HEALTH CARE EDUCATION/TRAINING PROGRAM

## 2024-07-11 PROCEDURE — 99284 EMERGENCY DEPT VISIT MOD MDM: CPT | Performed by: STUDENT IN AN ORGANIZED HEALTH CARE EDUCATION/TRAINING PROGRAM

## 2024-07-11 PROCEDURE — 74177 CT ABD & PELVIS W/CONTRAST: CPT

## 2024-07-11 PROCEDURE — 84145 PROCALCITONIN (PCT): CPT | Performed by: STUDENT IN AN ORGANIZED HEALTH CARE EDUCATION/TRAINING PROGRAM

## 2024-07-11 PROCEDURE — 99285 EMERGENCY DEPT VISIT HI MDM: CPT | Mod: 25

## 2024-07-11 PROCEDURE — 96361 HYDRATE IV INFUSION ADD-ON: CPT

## 2024-07-11 PROCEDURE — 83605 ASSAY OF LACTIC ACID: CPT | Performed by: STUDENT IN AN ORGANIZED HEALTH CARE EDUCATION/TRAINING PROGRAM

## 2024-07-11 PROCEDURE — 82374 ASSAY BLOOD CARBON DIOXIDE: CPT | Performed by: STUDENT IN AN ORGANIZED HEALTH CARE EDUCATION/TRAINING PROGRAM

## 2024-07-11 PROCEDURE — 96367 TX/PROPH/DG ADDL SEQ IV INF: CPT

## 2024-07-11 PROCEDURE — 85025 COMPLETE CBC W/AUTO DIFF WBC: CPT | Performed by: STUDENT IN AN ORGANIZED HEALTH CARE EDUCATION/TRAINING PROGRAM

## 2024-07-11 PROCEDURE — 81001 URINALYSIS AUTO W/SCOPE: CPT | Performed by: STUDENT IN AN ORGANIZED HEALTH CARE EDUCATION/TRAINING PROGRAM

## 2024-07-11 RX ORDER — LIDOCAINE HYDROCHLORIDE 20 MG/ML
JELLY TOPICAL ONCE
Status: COMPLETED | OUTPATIENT
Start: 2024-07-11 | End: 2024-07-11

## 2024-07-11 RX ORDER — CEFTRIAXONE 2 G/1
2 INJECTION, POWDER, FOR SOLUTION INTRAMUSCULAR; INTRAVENOUS ONCE
Status: COMPLETED | OUTPATIENT
Start: 2024-07-11 | End: 2024-07-11

## 2024-07-11 RX ORDER — IOPAMIDOL 755 MG/ML
500 INJECTION, SOLUTION INTRAVASCULAR ONCE
Status: COMPLETED | OUTPATIENT
Start: 2024-07-11 | End: 2024-07-11

## 2024-07-11 RX ORDER — NITROFURANTOIN 25; 75 MG/1; MG/1
100 CAPSULE ORAL 2 TIMES DAILY
Qty: 28 CAPSULE | Refills: 0 | Status: SHIPPED | OUTPATIENT
Start: 2024-07-11 | End: 2024-07-25

## 2024-07-11 RX ORDER — NITROFURANTOIN 25; 75 MG/1; MG/1
100 CAPSULE ORAL ONCE
Status: DISCONTINUED | OUTPATIENT
Start: 2024-07-11 | End: 2024-07-12 | Stop reason: HOSPADM

## 2024-07-11 RX ADMIN — SODIUM CHLORIDE 60 ML: 9 INJECTION, SOLUTION INTRAVENOUS at 19:11

## 2024-07-11 RX ADMIN — SODIUM CHLORIDE 1000 ML: 9 INJECTION, SOLUTION INTRAVENOUS at 17:55

## 2024-07-11 RX ADMIN — VANCOMYCIN HYDROCHLORIDE 1500 MG: 1 INJECTION, POWDER, LYOPHILIZED, FOR SOLUTION INTRAVENOUS at 19:28

## 2024-07-11 RX ADMIN — IOPAMIDOL 82 ML: 755 INJECTION, SOLUTION INTRAVENOUS at 19:12

## 2024-07-11 RX ADMIN — CEFTRIAXONE SODIUM 2 G: 2 INJECTION, POWDER, FOR SOLUTION INTRAMUSCULAR; INTRAVENOUS at 18:29

## 2024-07-11 RX ADMIN — LIDOCAINE HYDROCHLORIDE: 20 JELLY TOPICAL at 17:32

## 2024-07-11 ASSESSMENT — ENCOUNTER SYMPTOMS
VOMITING: 0
FEVER: 0
HEADACHES: 0
DIZZINESS: 0
DIFFICULTY URINATING: 1
SHORTNESS OF BREATH: 0
DIARRHEA: 0
DYSURIA: 1
SORE THROAT: 0
ACTIVITY CHANGE: 0
RHINORRHEA: 0
COUGH: 0
ABDOMINAL PAIN: 0
HEMATURIA: 0
APPETITE CHANGE: 0
NECK STIFFNESS: 0
MYALGIAS: 0
CHILLS: 0
ARTHRALGIAS: 0
EYE REDNESS: 0
NAUSEA: 0
FATIGUE: 0

## 2024-07-11 ASSESSMENT — ACTIVITIES OF DAILY LIVING (ADL)
ADLS_ACUITY_SCORE: 38
ADLS_ACUITY_SCORE: 38
ADLS_ACUITY_SCORE: 36
ADLS_ACUITY_SCORE: 38

## 2024-07-11 NOTE — ED PROVIDER NOTES
History     Chief Complaint   Patient presents with    Abdominal Pain     HPI  Khang Bailon is a 79 year old male presenting with wife and son-in-law secondary to urinary cath complications.  He has an extensive past medical history is currently on hospice secondary to various malignancies.  He denies any new symptoms aside from the obstructing and abnormal discharge in the urinary catheter.  He has not had any fevers shortness of breath chest complaints or difficulties eating or drinking/stooling.    Allergies:  Allergies   Allergen Reactions    Flagyl [Metronidazole] GI Disturbance       Problem List:    Patient Active Problem List    Diagnosis Date Noted    Severe malnutrition (H24) 04/30/2024     Priority: Medium    Gastrointestinal hemorrhage with melena 04/30/2024     Priority: Medium    Hypoalbuminemia 04/30/2024     Priority: Medium    Lactic acidosis 04/30/2024     Priority: Medium    Enlarged prostate 04/30/2024     Priority: Medium    Coagulopathy (H24) 04/30/2024     Priority: Medium    Sacral pressure sore 04/30/2024     Priority: Medium    Abrasion of left elbow 04/30/2024     Priority: Medium    Chronic systolic congestive heart failure (H) 04/27/2024     Priority: Medium    Urothelial carcinoma of bladder (H) 04/27/2024     Priority: Medium    Pneumonia of right lower lobe due to infectious organism 04/26/2024     Priority: Medium    Septic shock (H) 04/26/2024     Priority: Medium    Acute respiratory failure with hypoxia (H) 04/26/2024     Priority: Medium    Closed fracture of right hip with routine healing 04/16/2024     Priority: Medium    Pain 04/16/2024     Priority: Medium    Drug-induced constipation 04/16/2024     Priority: Medium    Acute blood loss anemia 04/16/2024     Priority: Medium    Postprocedural stricture of anterior urethra 03/21/2024     Priority: Medium    Acute bilateral back pain, unspecified back location 03/19/2024     Priority: Medium    SIRS (systemic inflammatory  response syndrome) (H) 02/01/2024     Priority: Medium    Calculus of gallbladder without cholecystitis without obstruction 02/01/2024     Priority: Medium    Infrarenal abdominal aortic aneurysm (AAA) without rupture (H24) 02/01/2024     Priority: Medium    Fistula of sigmoid colon 02/01/2024     Priority: Medium    Aspiration into airway, initial encounter 02/01/2024     Priority: Medium    Gross hematuria 02/01/2024     Priority: Medium    Aspirin-like platelet function defect (H) 02/01/2024     Priority: Medium    Diverticulitis 01/31/2024     Priority: Medium    Pulmonary nodule 01/31/2024     Priority: Medium    Mass of urinary bladder 01/31/2024     Priority: Medium    Abnormal urinalysis 01/31/2024     Priority: Medium    Other chest pain 01/31/2024     Priority: Medium    COPD exacerbation (H) 01/31/2024     Priority: Medium    Systolic congestive heart failure, unspecified HF chronicity (H) 01/09/2024     Priority: Medium    Systolic congestive heart failure (H) 01/09/2024     Priority: Medium    Coronary artery disease involving native coronary artery of native heart without angina pectoris 12/05/2023     Priority: Medium    NSTEMI (non-ST elevated myocardial infarction) (H) 11/27/2023     Priority: Medium    COVID-19 08/13/2023     Priority: Medium    Benign prostatic hyperplasia, unspecified whether lower urinary tract symptoms present 11/01/2021     Priority: Medium    Benign prostatic hyperplasia 11/01/2021     Priority: Medium    Fatigue, unspecified type 11/29/2019     Priority: Medium    Memory loss 11/29/2019     Priority: Medium    Moderate COPD (chronic obstructive pulmonary disease) (H) 11/16/2017     Priority: Medium    Tubulovillous adenoma of rectum 4/9/24 07/26/2016     Priority: Medium    Sigmoid diverticulitis 07/12/2016     Priority: Medium    History of CVA (cerebrovascular accident) - old left internal capsule lacunar infarct on CT 7/2016 07/12/2016     Priority: Medium    Multilevel  degenerative disc disease 09/21/2015     Priority: Medium    Elevated prostate specific antigen (PSA) 09/11/2015     Priority: Medium    Elevated PSA 08/27/2012     Priority: Medium    Loss of weight 08/27/2012     Priority: Medium    Third degree heart block (H) - s/p pacemaker 08/10/2012     Priority: Medium    Hypertension goal BP (blood pressure) < 140/90 08/10/2012     Priority: Medium    Other thalassemia (H24) 07/27/2012     Priority: Medium     Diagnosis updated by automated process. Provider to review and confirm.      Hyperlipidemia LDL goal <130 07/27/2012     Priority: Medium    Heart block 07/27/2012     Priority: Medium    Cardiac abnormality 07/27/2012     Priority: Medium    Complete heart block (H) 07/27/2012     Priority: Medium    Thalassemia 07/27/2012     Priority: Medium     Formatting of this note might be different from the original.   Formatting of this note might be different from the original.   Diagnosis updated by automated process. Provider to review and confirm.      Painful respiration 03/31/2004     Priority: Medium    Personal history of tobacco use, presenting hazards to health 03/31/2004     Priority: Medium        Past Medical History:    Past Medical History:   Diagnosis Date    Alpha thalassemia (H24)     AV block 07/2012    CAD (coronary artery disease)     Chronic systolic heart failure (H)     COPD (chronic obstructive pulmonary disease) (H)     Diverticulitis     History of CVA (cerebrovascular accident)     Injury, other and unspecified, elbow, forearm, and wrist 1970s    Urothelial carcinoma (H)        Past Surgical History:    Past Surgical History:   Procedure Laterality Date    COLONOSCOPY      COLONOSCOPY  12/10/2012    CYSTOSCOPY, LITHOLAPAXY, COMBINED N/A 11/10/2021    Procedure: Cystoscopy, Bladder Stone Removal;  Surgeon: Guanaco Stewart MD;  Location: [facility]    IMPLANT PACEMAKER      LASER KTP TRANSURETHRAL RESECTION (TUR) PROSTATE N/A 11/10/2021    Procedure:  TRANSURETHRAL RESECTION (TUR) PROSTATE, USING KTP LASER;  Surgeon: Guanaco Stewart MD;  Location: [facility] OR       Family History:    History reviewed. No pertinent family history.    Social History:  Marital Status:   [2]  Social History     Tobacco Use    Smoking status: Former     Current packs/day: 0.00     Types: Cigarettes     Start date: 1962     Quit date: 2012     Years since quittin.9    Smokeless tobacco: Never    Tobacco comments:     Quit in July   Vaping Use    Vaping status: Never Used   Substance Use Topics    Alcohol use: No     Alcohol/week: 0.0 standard drinks of alcohol    Drug use: No        Medications:    ACE/ARB/ARNI NOT PRESCRIBED (INTENTIONAL)  apixaban ANTICOAGULANT (ELIQUIS) 5 MG tablet  BETA BLOCKER NOT PRESCRIBED (INTENTIONAL)  budeson-glycopyrrol-formoterol (BREZTRI AEROSPHERE) 160-9-4.8 MCG/ACT AERO inhaler  calcium carbonate-vitamin D (CALTRATE) 600-10 MG-MCG per tablet  gabapentin (NEURONTIN) 300 MG capsule  HYDROmorphone, STANDARD CONC, (DILAUDID) 1 MG/ML oral solution  ipratropium - albuterol 0.5 mg/2.5 mg/3 mL (DUONEB) 0.5-2.5 (3) MG/3ML neb solution  methocarbamol (ROBAXIN) 500 MG tablet  Multiple Vitamin (MULTIVITAMIN ADULT PO)  order for DME  oxyBUTYnin (DITROPAN) 5 MG tablet  pantoprazole (PROTONIX) 40 MG EC tablet  rosuvastatin (CRESTOR) 5 MG tablet  tamsulosin (FLOMAX) 0.4 MG capsule  vitamin C (ASCORBIC ACID) 500 MG tablet          Review of Systems   Constitutional:  Negative for activity change, appetite change, chills, fatigue and fever.   HENT:  Negative for congestion, rhinorrhea and sore throat.    Eyes:  Negative for redness.   Respiratory:  Negative for cough and shortness of breath.    Cardiovascular:  Negative for chest pain.   Gastrointestinal:  Negative for abdominal pain, diarrhea, nausea and vomiting.   Genitourinary:  Positive for difficulty urinating, dysuria and penile discharge. Negative for hematuria.   Musculoskeletal:  Negative for  "arthralgias, myalgias and neck stiffness.   Skin:  Negative for rash.   Neurological:  Negative for dizziness and headaches.   All other systems reviewed and are negative.      Physical Exam   BP: 95/62  Pulse: 104  Temp: 99  F (37.2  C)  Resp: 20  Height: 177.8 cm (5' 10\")  Weight: 67.1 kg (148 lb)  SpO2: 93 %      Physical Exam  Vitals and nursing note reviewed.   Constitutional:       General: He is not in acute distress.     Appearance: Normal appearance. He is not toxic-appearing.   HENT:      Head: Atraumatic.   Eyes:      General: No scleral icterus.     Conjunctiva/sclera: Conjunctivae normal.   Cardiovascular:      Rate and Rhythm: Normal rate.      Heart sounds: Normal heart sounds.   Pulmonary:      Effort: Pulmonary effort is normal. No respiratory distress.      Breath sounds: Normal breath sounds.   Abdominal:      Palpations: Abdomen is soft.      Tenderness: There is abdominal tenderness in the left lower quadrant.   Genitourinary:     Comments: Hypospadias with urinary catheter in place discharging a coffee colored urine.  Musculoskeletal:         General: No deformity.      Cervical back: Neck supple.   Skin:     General: Skin is warm.      Capillary Refill: Capillary refill takes less than 2 seconds.   Neurological:      General: No focal deficit present.      Mental Status: He is alert and oriented to person, place, and time.         ED Course        Procedures           Results for orders placed or performed during the hospital encounter of 07/11/24 (from the past 24 hour(s))   CBC with platelets differential    Narrative    The following orders were created for panel order CBC with platelets differential.  Procedure                               Abnormality         Status                     ---------                               -----------         ------                     CBC with platelets and d...[[id number]]  Abnormal            Final result               RBC and Platelet " Morphology[[id number]]                      Final result                 Please view results for these tests on the individual orders.   Basic metabolic panel   Result Value Ref Range    Sodium 137 135 - 145 mmol/L    Potassium 4.7 3.4 - 5.3 mmol/L    Chloride 100 98 - 107 mmol/L    Carbon Dioxide (CO2) 27 22 - 29 mmol/L    Anion Gap 10 7 - 15 mmol/L    Urea Nitrogen 17.1 8.0 - 23.0 mg/dL    Creatinine 0.87 0.67 - 1.17 mg/dL    GFR Estimate 88 >60 mL/min/1.73m2    Calcium 9.7 8.8 - 10.2 mg/dL    Glucose 125 (H) 70 - 99 mg/dL   Lactic acid whole blood with 1x repeat in 2 hr when >2   Result Value Ref Range    Lactic Acid, Initial 1.3 0.7 - 2.0 mmol/L   Procalcitonin   Result Value Ref Range    Procalcitonin 0.12 <0.50 ng/mL   CBC with platelets and differential   Result Value Ref Range    WBC Count 17.9 (H) 4.0 - 11.0 10e3/uL    RBC Count 5.19 4.40 - 5.90 10e6/uL    Hemoglobin 9.9 (L) 13.3 - 17.7 g/dL    Hematocrit 32.2 (L) 40.0 - 53.0 %    MCV 62 (L) 78 - 100 fL    MCH 19.1 (L) 26.5 - 33.0 pg    MCHC 30.7 (L) 31.5 - 36.5 g/dL    RDW 19.5 (H) 10.0 - 15.0 %    Platelet Count 340 150 - 450 10e3/uL    % Neutrophils 83 %    % Lymphocytes 8 %    % Monocytes 7 %    % Eosinophils 0 %    % Basophils 1 %    % Immature Granulocytes 1 %    NRBCs per 100 WBC 0 <1 /100    Absolute Neutrophils 14.8 (H) 1.6 - 8.3 10e3/uL    Absolute Lymphocytes 1.5 0.8 - 5.3 10e3/uL    Absolute Monocytes 1.3 0.0 - 1.3 10e3/uL    Absolute Eosinophils 0.1 0.0 - 0.7 10e3/uL    Absolute Basophils 0.1 0.0 - 0.2 10e3/uL    Absolute Immature Granulocytes 0.1 <=0.4 10e3/uL    Absolute NRBCs 0.0 10e3/uL   RBC and Platelet Morphology   Result Value Ref Range    RBC Morphology Confirmed RBC Indices     Platelet Assessment  Automated Count Confirmed. Platelet morphology is normal.     Automated Count Confirmed. Platelet morphology is normal.   Extra Tube    Narrative    The following orders were created for panel order Extra Tube.  Procedure                                Abnormality         Status                     ---------                               -----------         ------                     Extra Blue Top Tube[[id number]]                                                           Please view results for these tests on the individual orders.   UA with Microscopic reflex to Culture    Specimen: Urine, Hernandez Catheter   Result Value Ref Range    Color Urine Yellow Colorless, Straw, Light Yellow, Yellow    Appearance Urine Cloudy (A) Clear    Glucose Urine Negative Negative mg/dL    Bilirubin Urine Negative Negative    Ketones Urine Negative Negative mg/dL    Specific Gravity Urine 1.021 1.003 - 1.035    Blood Urine Moderate (A) Negative    pH Urine 5.0 5.0 - 7.0    Protein Albumin Urine 100 (A) Negative mg/dL    Urobilinogen Urine Normal Normal, 2.0 mg/dL    Nitrite Urine Negative Negative    Leukocyte Esterase Urine Moderate (A) Negative    WBC Clumps Urine Present (A) None Seen /HPF    Mucus Urine Present (A) None Seen /LPF    RBC Urine >182 (H) <=2 /HPF    WBC Urine >182 (H) <=5 /HPF    Squamous Epithelials Urine 1 <=1 /HPF    Narrative    Urine Culture ordered based on laboratory criteria       Medications   nitroFURantoin macrocrystal-monohydrate (MACROBID) capsule 100 mg (0 mg Oral Hold 7/11/24 1844)   vancomycin (VANCOCIN) 1,500 mg in sodium chloride 0.9 % 250 mL intermittent infusion (has no administration in time range)   sodium chloride 0.9% BOLUS 1,000 mL (0 mLs Intravenous Stopped 7/11/24 1914)   lidocaine (XYLOCAINE) 2 % external gel ( Topical $Given 7/11/24 1732)   cefTRIAXone (ROCEPHIN) 2 g vial to attach to  ml bag for ADULTS or NS 50 ml bag for PEDS (0 g Intravenous Stopped 7/11/24 1914)   iopamidol (ISOVUE-370) solution 500 mL (82 mLs Intravenous $Given 7/11/24 1912)   sodium chloride 0.9 % bag 100mL for CT scan flush use (60 mLs Intravenous $Given 7/11/24 1911)       Assessments & Plan (with Medical Decision Making)     I have reviewed the  nursing notes.    I have reviewed the findings, diagnosis, plan and need for follow up with the patient.    Medical Decision Making  29-year-old male presenting with urinary complaint or concern.  He has coffee colored urine discharge.  He also has left lower quadrant pain on palpation.  He has no noted fevers at home however he has a temperature of 99 Fahrenheit blood pressure of 95/62 and a pulse of 104 concerning for possible sepsis secondary to urinary retention from UTI from chronic indwelling catheter.  He gets this change on the second of every month.  He also be suffering from diverticulitis as patient has left lower quadrant pain but it is noted that patient has bladder cancer on the left bladder wall.  Also concern for the coloration of possible enterocolic fistula.    His blood pressure improved significantly with some IV fluids up to 110/68 with a pulse of 86.  Stable at 99.  Patient has significant UTI with positive leukocyte esterase's white blood cells and red blood cells.  His BMP is reassuring his CBC with a white cell count of 17.9 hemoglobin 19.9 consistent with significant UTI.  His lactic acid however is stable at 1.3 and procalcitonin stable at 0.12 however do not believe patient is acutely septic.  However due to his milky urine discharge and history of bladder infection with associated diverticulitis and pain in the left lower quadrant still pending a CT image to evaluate for this if any surgical management is required.  However at this time due to patient's vitals and appropriate mental status feel that patient has capacity to make his own decisions as well as no acute signs of sepsis however patient is a complex medical patient and probably will do unwell on an outpatient setting with his Staph epidermidis urine cultures with significant resistance patterns requiring IV antibiotics.  Therefore at this time to provide with IV vancomycin in the meantime and discussed extensively with patient  and family that patient may benefit more from an observation admission for IV antibiotics then to go home with oral antibiotics due to his current abilities as he may do unwell.  Patient notes that he still wants to go home and is adamant on this.  He has a capacity to make this decision and with his vital signs lab work to feel comfortable with the patient going home at this time with the appropriate antibiotic treatment regimen in place.  Family would like him to stay.  At this time I believe the CT imaging would be the final deciding factor in regards to this and discussed case with oncoming physician Dr. Quigley who will follow-up on imaging as well as the final discussion with family in regards to disposition.      New Prescriptions    No medications on file       Final diagnoses:   Complicated UTI (urinary tract infection)       7/11/2024   [facility] EMERGENCY DEPT       Brian Garcia MD  07/11/24 1927

## 2024-07-11 NOTE — TELEPHONE ENCOUNTER
"Nurse Triage SBAR    Is this a 2nd Level Triage? NO    Situation: Patient and wife report catheter is leaking and he is having abdominal pain with \"chocolate milk looking output in his catheter bag.\" Wife reports he was having very minimal output prior to this recent concerning output. No fever that he is aware of but recently had a UTI and is now done with antibx.    Background: Urinary catheter. It was recently changed by homecare.    Assessment: Patient needs to go to ED due to having low output all day and a lot of abdominal pain accompanied by dark, chocolate milk looking, thick and bloody output in bag. Has a hx of UTI's.     Protocol Recommended Disposition:   Go to ED Now    Recommendation: Patient will go to ED due to pain and concerning output.         Does the patient meet one of the following criteria for ADS visit consideration?    BERNIE KruseN, RN        Reason for Disposition   SEVERE abdominal pain    Additional Information   Negative: Shock suspected (e.g., cold/pale/clammy skin, too weak to stand, low BP, rapid pulse)   Negative: Sounds like a life-threatening emergency to the triager    Protocols used: Urinary Catheter (e.g., Hernandez) Symptoms and Ueigznyos-[id number]-SD    "

## 2024-07-11 NOTE — ED TRIAGE NOTES
"Family reports this morning pts murrell wasn't draining any urine. Physical therapy came out to the house and stood him up and the urine started to flow into the bag and it was like \"chocolate milk\" so the therapist told them to come to the ED.      Triage Assessment (Adult)       Row Name 07/11/24 1729          Triage Assessment    Airway WDL WDL        Respiratory WDL    Respiratory WDL WDL        Skin Circulation/Temperature WDL    Skin Circulation/Temperature WDL X                     "

## 2024-07-11 NOTE — PHARMACY-VANCOMYCIN DOSING SERVICE
Pharmacy Vancomycin Initial Note  Date of Service 2024  Patient's  1945  79 year old, male    Indication: Urinary Tract Infection    Current estimated CrCl = Estimated Creatinine Clearance: 73.8 mL/min (based on SCr of 0.87 mg/dL).    Creatinine for last 3 days  2024:  5:29 PM Creatinine 0.87 mg/dL    Recent Vancomycin Level(s) for last 3 days  No results found for requested labs within last 3 days.      Vancomycin IV Administrations (past 72 hours)        No vancomycin orders with administrations in past 72 hours.                    Nephrotoxins and other renal medications (From now, onward)      Start     Dose/Rate Route Frequency Ordered Stop    24 1845  vancomycin (VANCOCIN) 1,500 mg in sodium chloride 0.9 % 250 mL intermittent infusion         1,500 mg  over 90 Minutes Intravenous EVERY 24 HOURS 24 184              Contrast Orders - past 72 hours (72h ago, onward)      Start     Dose/Rate Route Frequency Stop    24 1835  iopamidol (ISOVUE-370) solution 500 mL         500 mL Intravenous ONCE              [employer] Prediction of Planned Initial Vancomycin Regimen  Loading dose: N/A  Regimen: 1500 mg IV every 24 hours.  Start time: 18:40 on 2024  Exposure target: AUC24 (range)400-600 mg/L.hr   AUC24,ss: 515 mg/L.hr  Probability of AUC24 > 400: 78 %  Ctrough,ss: 14.3 mg/L  Probability of Ctrough,ss > 20: 20 %  Probability of nephrotoxicity (Lodise LORENZO ): 9 %          Plan:  Start vancomycin  1500 mg IV q24h.   Vancomycin monitoring method: AUC  Vancomycin therapeutic monitoring goal: 400-600 mg*h/L  Pharmacy will check vancomycin levels as appropriate in 1-3 Days.    Serum creatinine levels will be ordered daily for the first week of therapy and at least twice weekly for subsequent weeks.      Summer Thompson, [facility]

## 2024-07-12 ENCOUNTER — TELEPHONE (OUTPATIENT)
Dept: FAMILY MEDICINE | Facility: CLINIC | Age: 79
End: 2024-07-12

## 2024-07-12 ENCOUNTER — PATIENT OUTREACH (OUTPATIENT)
Dept: CARE COORDINATION | Facility: CLINIC | Age: 79
End: 2024-07-12

## 2024-07-12 ENCOUNTER — VIRTUAL VISIT (OUTPATIENT)
Dept: PALLIATIVE MEDICINE | Facility: CLINIC | Age: 79
End: 2024-07-12
Payer: COMMERCIAL

## 2024-07-12 DIAGNOSIS — Z53.9 DIAGNOSIS NOT YET DEFINED: Primary | ICD-10-CM

## 2024-07-12 DIAGNOSIS — C67.9 UROTHELIAL CARCINOMA OF BLADDER (H): ICD-10-CM

## 2024-07-12 PROCEDURE — G0179 MD RECERTIFICATION HHA PT: HCPCS | Performed by: STUDENT IN AN ORGANIZED HEALTH CARE EDUCATION/TRAINING PROGRAM

## 2024-07-12 PROCEDURE — 99215 OFFICE O/P EST HI 40 MIN: CPT | Mod: 95 | Performed by: NURSE PRACTITIONER

## 2024-07-12 NOTE — PROGRESS NOTES
[facility]  Community Health Worker Initial Outreach    CHW spoke with patient's spouse, Brittany. Consent to communicate on file.     CHW Initial Information Gathering:  Referral Source: ED Follow-Up  CHW Additional Questions  If ED/Hospital discharge, follow-up appointment scheduled as recommended?: No  Patient agreeable to assistance with scheduling?: Yes  CHW assisted patient to schedule (specify): CHW scheduled ED follow-up with PCP on 07/31/2024  MyChart active?: Yes    Patient accepts CC: No, patient's spouse declined care coordination at this time, reports that patient's daughter helps coordinate appointments. Patient will be sent Care Coordination introduction letter for future reference.       Meliza Moore  Community Health Worker  [facility] Care Resource [facility]    *Connected Care Resource Team does NOT follow patient ongoing. Referrals are identified based on internal discharge reports and the outreach is to ensure patient has an understanding of their discharge instructions.

## 2024-07-12 NOTE — PROGRESS NOTES
Virtual Visit Details    Type of service:  Video Visit   Originating Location (pt. Location): Home    Distant Location (provider location):  On-site  Platform used for Video Visit: [facility]      Palliative Care Outpatient Clinic      Patient ID/Chief Complaint: Khang Bailon 79 year old male who is presenting to the palliative medicine clinic today for a palliative care follow-up after hospitalization secondary to care planning/symptom management.   The patient's primary care provider is:  Neville Degroot       Impression:   79-year-old male with recurrent hospitalizations due to multiple medical problems and debility.  He is nonambulatory.  He often refuses recommended plan of care (TCU placement, wound care) and unfortunately continues to decline.  Recently his appetite has picked up and patient and family are hopeful to work with home health care and therapy to hopefully improve strength.     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    I initially met Khang and his family when he was hospitalized at [facility] in April 2024.  I was consulted due to reports of poorly controlled pain, but primarily because he consistently refused recommended plan of care.     Symptoms/recommendations/discussion:  4/30/24: Khang is able to state year, location, and hip fracture side correctly.  He is under the impression that he does not have a history of bladder cancer.  He states that he will follow-up with urology.  He is agreeable to home health care with PT/OT.  Multiple members  present confirm that they can take care of him at home.  He expressed understanding importance of participating with PT/OT.  He states he is agreeable to recommended outpatient follow-up appointments.  He confirms he wants to come to the hospital again if needed.  He confirms desire for full code.  Family stated that they had no questions or concerns about current plan of care.  Healthcare directive completed during April hospitalization.  His daughter May is his agent.  He is able to participate in decision-making.  6/7/2024: Virtual follow-up completed today with patient, grandson, daughter May (present during latter half of visit), and patient's wife.  All feel that he is eating better and hope for improvement in strength from working with home health care and physical therapy.  He assures me he is going to follow-up with urology for bladder cancer.  We discussed strong recommendation that he allow home health care providers to assess him and participate in recommended plan of care.  I tried to emphasize that he will only continue to become more and more debilitated if he does not try to follow recommended restorative measures.  He complains of pain in his feet, likely related to pressure ulcer on his heel that he has not allowed home health care nurse to examine.  He states he will allow for examination of wound.  Continue gabapentin 300 mg 3 times daily, dose could be increased in the future as complaints are primarily neuropathic in nature.  Palliative care follow-up in 4 to 6 weeks.  7/12/24: I called his daughter/healthcare agent May this morning to ask to complete visit at 11:45 AM instead of 11 AM. She expressed agreement but then became persistently argumentative about her feeling that he is receiving inadequate services from healthcare system.  She repeatedly tells me that no one is taking over his care and providing them an adequate level of service.  She repeatedly tells me that  "palliative care service should do more and asks repeatedly what does palliative care do.  Despite multiple attempts to provide information, she persistently argues with me about services that are handled by multiple other specialists or home health care.  I asked her multiple times to please stop being so argumentative but eventually had to end the conversation.  Palliative care RN had similar conversation with daughter on June 13, 2024, please see documentation in epic.  Virtual meeting completed with patient and grandson at 11:45 AM, daughter was not present.  Patient continues to express desire to eat better, work more with physical therapy, improve strength, and seek hospitalization if needed.  We have reviewed multiple times his poor overall health condition and expectation for ongoing infections and other medical conditions that would likely cause persistent decline and possibly impaired quality of life.  He has not been able to complete BCG therapy with urology, per urology note \"patient last seen by Dr. Manriquez for bladder cancer. Unable to move forward with BCG treatments due to UTI's, urinary retention, weakness, hygiene, and other medical issues outside of urology.\"  Given his multiple medical problems and poor state of health, daughter was informed that he would likely be hospice eligible and could be evaluated by hospice for enrollment, but he has consistently expressed desire for plan of care that is not in line with hospice philosophy.  Palliative care follow-up planned in 6 months.        How to get a hold of us:  For non-urgent matters, MyChart works best.    For more urgent matters, or if you prefer not to use MyChart, call our clinic nurse coordinator Sonia Estrada RN at [phone] or [phone]    We have an on-call number for evenings and weekends. Please call this only if you are having uncontrolled symptoms or serious side effects from your medicines: [phone].     For refills, please " give us a week (5 working days) notice. We don't always have providers available everyday to do refills. If you call the day you run out of your medicine, we may not be able to refill it in time, so call 5 days in advance        History:  History gathered today from: patient, family/loved ones, medical chart, outside records including Care Everywhere, health care directive/s      PE: There were no vitals taken for this visit.   Wt Readings from Last 3 Encounters:   07/11/24 67.1 kg (148 lb)   05/07/24 75.8 kg (167 lb)   05/02/24 75.8 kg (167 lb 1.7 oz)       Gen alert, chronically ill-appearing  Head NCAT.  Eyes anicteric without injection  Face symmetric, eyes conjugate  Lungs unlabored, no cough, speaking full sentences  Skin no rashes or lesions evident on face/neck  Neuro Face symmetric, eyes conjugate; speech fluent.  Neuropsych exam normal including affect, sensorium, gross memory, thought processes, and fund of knowledge.         Data reviewed:  I reviewed electrolytes, BUN/creatinine, liver profile, hemoglobin and hematocrit, platelet count, and most recent imaging  Discharge summary from recent hospitalization in [facility]     database reviewed: 7/12        40 minutes spent on the date of the encounter doing chart review, history and exam, patient education & counseling, documentation and other activities as noted above.        Thank you for involving us in the patient's care.   VALENTINE Srivastava, [facility] Palliative Care Service

## 2024-07-12 NOTE — TELEPHONE ENCOUNTER
Reason for Call:  Appointment Request    Patient requesting this type of appt:  Hospital/ED Follow-Up     Requested provider: Neville Degroot    Reason patient unable to be scheduled: Not within requested timeframe    When does patient want to be seen/preferred time: 1-2 days    Comments: Patient needs to be seen for a Hospital follow up and needs orders for an ECHO and urine analysis with his [facility] homecare.  Patient's daughter May is deeply upset that he can't be scheduled and is requesting Dr. Degroot find time to see him ASAP and to send in the orders.    Could we send this information to you in [employer] or would you prefer to receive a phone call?:   Patient would prefer a phone call   Okay to leave a detailed message?: Yes at Other phone number:  Daughter May's mobile at [phone]    Call taken on 7/12/2024 at 2:20 PM by Luba Hathaway

## 2024-07-12 NOTE — NURSING NOTE
Current patient location: [address] [place] [id number]    Is the patient currently in the state of [place]? YES    Visit mode:VIDEO    If the visit is dropped, the patient can be reconnected by: VIDEO VISIT: Send to e-mail at: [email or web address]    Will anyone else be joining the visit? NO  (If patient encounters technical issues they should call [phone]    How would you like to obtain your AVS? MyChart    Are changes needed to the allergy or medication list?  Per pt's grandson no changes to pt's allergies or medications since last reviewed yesterday on July 11th 2024.     Are refills needed on medications prescribed by this physician? NO    Reason for visit: RECHECK    Bartolome OLIVER

## 2024-07-12 NOTE — PATIENT INSTRUCTIONS
Thank you for meeting with us in the [facility] Palliative Care Clinic.    How to get a hold of us:  For non-urgent matters, MyChart works best.    For more urgent matters, or if you prefer not to use MyChart, call our clinic nurse coordinator Sonia Estrada RN at [phone] or [phone]    We have an on-call number for evenings and weekends. Please call this only if you are having uncontrolled symptoms or serious side effects from your medicines: [phone].     For refills, please give us a week (5 working days) notice. We don't always have providers available everyday to do refills. If you call the day you run out of your medicine, we may not be able to refill it in time, so call 5 days in advance!

## 2024-07-12 NOTE — LETTER
Khang Bailon  [address] [place] [id number]    Dear Khang Bailon,      I am a team member within the [facility] with [facility]. I recently contacted you to ensure you are doing well following a visit within our health system. I also wanted to take this chance to introduce Clinic Care Coordination should you have any interest in this program in the future.    Below is a description of Clinic Care Coordination and how this team can further assist you:       The Clinic Care Coordination team is made up of a Registered Nurse, , Financial Resource Worker, and a Community Health Worker who understand and can help navigate the health care system. The goal of clinic care coordination is to help you manage your health, improve access to care, and achieve optimal health outcomes. They work alongside your provider to assist you in determining your health and social needs, obtain health care and community resources, and provide you with necessary information and education. Clinic Care Coordination can work with you through any barriers and develop a care plan that helps coordinate and strengthen the relationship between you and your care team.    If you wish to connect with the Clinic Care Coordination Team, please let your [facility] Primary Care Provider or Clinic Care Team know and they can place a referral. The Clinic Care Coordination team will then reach out by phone to further support you.    We are focused on providing you with the highest-quality healthcare experience possible.    Sincerely,   Your care team with [facility]

## 2024-07-12 NOTE — TELEPHONE ENCOUNTER
Reason for Call:  Form, our goal is to have forms completed with 72 hours, however, some forms may require a visit or additional information.    Type of letter, form or note:  Home Health Certification    Who is the form from?: Home care    Where did the form come from: form was faxed in    What clinic location was the form placed at?: [facility]    Where the form was placed: Given to physician    What number is listed as a contact on the form?: [phone]       Additional comments: [facility]    Call taken on 7/12/2024 at 2:49 PM by Lisbeth Arredondo

## 2024-07-12 NOTE — ED PROVIDER NOTES
Assumed care at shift change from Dr. Garcia.  See his note for patient presenting details and initial workup.  Briefly, this is a 79-year-old male presenting for concern of dark urine in his catheter.  Workup revealed bacteria and blood in the urine.  Unfortunately, he is not sensitive to much, is sensitive to Macrobid and vancomycin.  He is receiving a dose of IV vancomycin here.  Asked to follow-up on CT abdomen pelvis that is pending to rule out acute intra-abdominal process such as a fistula that would be causing these acute symptoms.  Patient desires discharge if possible.  Family would like him to stay.  Will follow-up on CT results and response to vancomycin, may be able to discharge if he still wants to leave with no acute surgical intervention noted on CT.    Results for orders placed or performed during the hospital encounter of 07/11/24   CT ABDOMEN PELVIS W CONTRAST     Status: None    Narrative    EXAM: CT ABDOMEN PELVIS W CONTRAST  LOCATION: [facility]  DATE: 7/11/2024    INDICATION: Left lower quadrant pain; bladder cancer with indwelling catheter.  COMPARISON: 4/26/2024.  TECHNIQUE: CT scan of the abdomen and pelvis was performed following injection of IV contrast. Multiplanar reformats were obtained. Dose reduction techniques were used.  CONTRAST: 82 mL Isovue-370.    FINDINGS:    LOWER CHEST: Mild/moderate pulmonary emphysema. Mild to moderate dependent and compressive atelectasis. Additional mucous plugging within peripheral branches of both lower lobe bronchi. Mild peripheral pulmonary fibrosis. Small bilateral pleural   effusions.     Small to moderate-sized pericardial effusion, increased in size from recent prior. Dense calcifications of the mitral annulus. Additional calcifications of the aortic valve and coronary arteries. Cardiac pacer leads are partially imaged. Atherosclerosis   of the visualized thoracic aorta.    HEPATOBILIARY: Liver enhances  normally and is normal in size.    Cholelithiasis.    No intrahepatic or intrahepatic biliary ductal dilatation.    PANCREAS: Enhances normally. No peripancreatic inflammatory fat stranding.    SPLEEN: Enhances normally. Normal size. Subcentimeter low-attenuation lesion of the anterior spleen, similar to prior and likely benign cyst or hemangioma/hamartomas; no further follow-up recommended.    ADRENAL GLANDS: Normal.    KIDNEYS: Both kidneys enhance symmetrically, without hydronephrosis. Benign appearing renal cyst(s), in addition to other low attenuation renal lesion(s), which are too small to characterize, though statistically likely to represent simple cysts; no   further follow up recommended.    No nephroureterolithiasis.    Urinary bladder is diffusely thick-walled and decompressed by the balloon tip of a Hernandez catheter. There is diffuse mucosal hyperenhancement, suggesting a superimposed acute cystitis.    PELVIC ORGANS: Moderately enlarged prostate gland.    BOWEL: No evidence of acute gastrointestinal inflammation or obstruction. Mild-to-moderate colonic stool burden, greatest within cecum and rectum. 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 Colonic diverticulosis, without evidence of acute diverticulitis.    No intraperitoneal free fluid or free air.    LYMPH NODES: No suspicious abdominal or pelvic lymphadenopathy.    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 Unchanged small saccular aneurysm occurring at the left common iliac artery origin, measuring up to 1.7 cm at its base. Severe atheromatous disease of the abdominal aorta   and branch vessels.    MUSCULOSKELETAL: Multiple old bilateral rib fractures. Right hip arthroplasty.    OTHER: No additionally significant abnormalities.      Impression    IMPRESSION:   1.  Changes of early fecal impaction and constipation.  2.  Acute  cystitis.  3.  Small bilateral pleural effusions, with associated compressive/dependent atelectasis in both lower lobes.  4.  Additional areas of mucous plugging within the peripheral bronchial branches of both lower lobes.  5.  Small to moderate-sized pericardial effusion, increased in size from the previous examination.  6.  Cholelithiasis.  7.  Colonic diverticulosis.  8.  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       Basic metabolic panel     Status: Abnormal   Result Value Ref Range    Sodium 137 135 - 145 mmol/L    Potassium 4.7 3.4 - 5.3 mmol/L    Chloride 100 98 - 107 mmol/L    Carbon Dioxide (CO2) 27 22 - 29 mmol/L    Anion Gap 10 7 - 15 mmol/L    Urea Nitrogen 17.1 8.0 - 23.0 mg/dL    Creatinine 0.87 0.67 - 1.17 mg/dL    GFR Estimate 88 >60 mL/min/1.73m2    Calcium 9.7 8.8 - 10.2 mg/dL    Glucose 125 (H) 70 - 99 mg/dL   UA with Microscopic reflex to Culture     Status: Abnormal    Specimen: Urine, Hernandez Catheter   Result Value Ref Range    Color Urine Yellow Colorless, Straw, Light Yellow, Yellow    Appearance Urine Cloudy (A) Clear    Glucose Urine Negative Negative mg/dL    Bilirubin Urine Negative Negative    Ketones Urine Negative Negative mg/dL    Specific Gravity Urine 1.021 1.003 - 1.035    Blood Urine Moderate (A) Negative    pH Urine 5.0 5.0 - 7.0    Protein Albumin Urine 100 (A) Negative mg/dL    Urobilinogen Urine Normal Normal, 2.0 mg/dL    Nitrite Urine Negative Negative    Leukocyte Esterase Urine Moderate (A) Negative    WBC Clumps Urine Present (A) None Seen /HPF    Mucus Urine Present (A) None Seen /LPF    RBC Urine >182 (H) <=2 /HPF    WBC Urine >182 (H) <=5 /HPF    Squamous Epithelials Urine 1 <=1 /HPF    Narrative    Urine Culture ordered based on laboratory criteria   Lactic acid whole blood with 1x repeat in 2 hr when >2     Status: Normal   Result Value Ref Range    Lactic Acid, Initial 1.3 0.7 - 2.0 mmol/L   Procalcitonin     Status: Normal   Result  Value Ref Range    Procalcitonin 0.12 <0.50 ng/mL   CBC with platelets and differential     Status: Abnormal   Result Value Ref Range    WBC Count 17.9 (H) 4.0 - 11.0 10e3/uL    RBC Count 5.19 4.40 - 5.90 10e6/uL    Hemoglobin 9.9 (L) 13.3 - 17.7 g/dL    Hematocrit 32.2 (L) 40.0 - 53.0 %    MCV 62 (L) 78 - 100 fL    MCH 19.1 (L) 26.5 - 33.0 pg    MCHC 30.7 (L) 31.5 - 36.5 g/dL    RDW 19.5 (H) 10.0 - 15.0 %    Platelet Count 340 150 - 450 10e3/uL    % Neutrophils 83 %    % Lymphocytes 8 %    % Monocytes 7 %    % Eosinophils 0 %    % Basophils 1 %    % Immature Granulocytes 1 %    NRBCs per 100 WBC 0 <1 /100    Absolute Neutrophils 14.8 (H) 1.6 - 8.3 10e3/uL    Absolute Lymphocytes 1.5 0.8 - 5.3 10e3/uL    Absolute Monocytes 1.3 0.0 - 1.3 10e3/uL    Absolute Eosinophils 0.1 0.0 - 0.7 10e3/uL    Absolute Basophils 0.1 0.0 - 0.2 10e3/uL    Absolute Immature Granulocytes 0.1 <=0.4 10e3/uL    Absolute NRBCs 0.0 10e3/uL   Extra Tube     Status: None ()    Narrative    The following orders were created for panel order Extra Tube.  Procedure                               Abnormality         Status                     ---------                               -----------         ------                     Extra Blue Top Tube[[id number]]                                                           Please view results for these tests on the individual orders.   RBC and Platelet Morphology     Status: None   Result Value Ref Range    RBC Morphology Confirmed RBC Indices     Platelet Assessment  Automated Count Confirmed. Platelet morphology is normal.     Automated Count Confirmed. Platelet morphology is normal.   CBC with platelets differential     Status: Abnormal    Narrative    The following orders were created for panel order CBC with platelets differential.  Procedure                               Abnormality         Status                     ---------                               -----------         ------                      CBC with platelets and d...[[id number]]  Abnormal            Final result               RBC and Platelet Morphology[[id number]]                      Final result                 Please view results for these tests on the individual orders.     Labs and imaging results as above.  CT shows findings that are consistent with an acute urinary tract infection.  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  Do not see any evidence of colovesicular fistula.  However, it was noted on the images captured of the bottom of the chest that there is evidence of mucous plugging and air trapping, atelectasis, and a slightly larger pericardial effusion.  Veasley a trace pericardial effusion noted on CT imaging from hospitalization at the end of May of this year.    After discussing the findings with patient and his family, patient said that he wanted to go home.  His wife wanted him to be admitted, as did his grandson.  Called and spoke with May, patient's daughter, who says that she is medical power of .  Explained that I had offered admission, given that the patient has a UTI that is complex, and did have the weakness associated today.  Would be concerned with letting him go home.  Also, with the pericardial effusion noted to be larger, even though he is asymptomatic, could make sure this is stable and does not need further workup while hospitalized.  May agrees and says that she will talk with her dad that she thinks that he needs to be hospitalized.    Mom time for family members to converse on the phone, allowing Khang to speak with his daughter May on speaker phone.  Ultimately, family still said they wanted Khang to be admitted and Khang says that he wants to go home.  Since we do not have any available beds at this facility, patient would need to be transferred.  He request to go to [facility] if possible.    [facility] did not have an available bed for this patient.   Informed his family, and Khang says that he just wants to go home.  Said that we would need to make sure that he could at least ambulate with a walker, which is his home baseline, and may present a challenge for discharge if he is unable to safely move around    RN states the patient was able to ambulate utilizing walker.  Discussed again with the family members of the findings and the lack of beds available at [facility].  Can try another facility, but Khang is very adamant that he does not want to be hospitalized.  Also gave family the option to discharge with oral Macrobid and close follow-up with his primary provider.  Ultimately I think it would be a better choice for the patient to be hospitalized, but he is insistent that he does not want to be.  Since pericardial effusion is not noted to be symptomatic, and was present previously, he should follow-up with echo and formal evaluation in outpatient setting.  Gave strict ED return precautions, and I have a low threshold for the patient to return to the ED should symptoms worsen in any way.  Family had discussed amongst themselves and feels that they can take Khang home if this is truly what he wants tonight and there are no other available beds.  Discharged in stable condition     Samantha Quigley,   07/12/24 0041

## 2024-07-13 LAB — BACTERIA UR CULT: NORMAL

## 2024-07-16 ENCOUNTER — TELEPHONE (OUTPATIENT)
Dept: FAMILY MEDICINE | Facility: CLINIC | Age: 79
End: 2024-07-16
Payer: COMMERCIAL

## 2024-07-16 LAB
BACTERIA BLD CULT: NO GROWTH
BACTERIA BLD CULT: NO GROWTH

## 2024-07-16 NOTE — TELEPHONE ENCOUNTER
Home Care is calling regarding an established patient with [facility].       Requesting orders from: Neville Degroot  Provider is following patient: Yes  Is this a 60-day recertification request?  No    Orders Requested    Physical Therapy  Request for initial evaluation and treatment (one time)     FYI patient had a fall on Saturday, patient self ambulated tripped over seat and fell onto his L side, Denies hitting head, no Loss of consciousness, Grandson assisted him off of floor. Complaining of pain on L hip and R hip, which is normal for patient since his hip surgery. Refused to go see ER after fall. Patient has an appointment with Dr. Degroot tomorrow. Pt has bruises on L hand and L wrist (resolved). Pt is taking Xarelto. Please Address at visit.    Confirmed ok to leave a detailed message with call back.  Contact information confirmed and updated as needed.    Mayi Lowery RN

## 2024-07-17 ENCOUNTER — TELEPHONE (OUTPATIENT)
Dept: CARDIOLOGY | Facility: CLINIC | Age: 79
End: 2024-07-17

## 2024-07-17 ENCOUNTER — HOSPITAL ENCOUNTER (OUTPATIENT)
Dept: GENERAL RADIOLOGY | Facility: CLINIC | Age: 79
Discharge: HOME | End: 2024-07-17
Attending: STUDENT IN AN ORGANIZED HEALTH CARE EDUCATION/TRAINING PROGRAM
Payer: COMMERCIAL

## 2024-07-17 ENCOUNTER — OFFICE VISIT (OUTPATIENT)
Dept: FAMILY MEDICINE | Facility: CLINIC | Age: 79
End: 2024-07-17
Payer: COMMERCIAL

## 2024-07-17 VITALS
HEART RATE: 88 BPM | RESPIRATION RATE: 20 BRPM | DIASTOLIC BLOOD PRESSURE: 78 MMHG | OXYGEN SATURATION: 90 % | SYSTOLIC BLOOD PRESSURE: 114 MMHG | TEMPERATURE: 98.4 F

## 2024-07-17 DIAGNOSIS — E43 SEVERE MALNUTRITION (H): ICD-10-CM

## 2024-07-17 DIAGNOSIS — K59.03 DRUG-INDUCED CONSTIPATION: ICD-10-CM

## 2024-07-17 DIAGNOSIS — K63.2 FISTULA OF SIGMOID COLON: ICD-10-CM

## 2024-07-17 DIAGNOSIS — N39.0 URINARY TRACT INFECTION ASSOCIATED WITH INDWELLING URETHRAL CATHETER, SUBSEQUENT ENCOUNTER: Primary | ICD-10-CM

## 2024-07-17 DIAGNOSIS — I10 HYPERTENSION GOAL BP (BLOOD PRESSURE) < 140/90: Chronic | ICD-10-CM

## 2024-07-17 DIAGNOSIS — R63.4 LOSS OF WEIGHT: ICD-10-CM

## 2024-07-17 DIAGNOSIS — D12.8 TUBULOVILLOUS ADENOMA OF RECTUM: ICD-10-CM

## 2024-07-17 DIAGNOSIS — C67.9 UROTHELIAL CARCINOMA OF BLADDER (H): Chronic | ICD-10-CM

## 2024-07-17 DIAGNOSIS — Z86.73 HISTORY OF CVA (CEREBROVASCULAR ACCIDENT): ICD-10-CM
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
## 2024-07-17 DIAGNOSIS — J90 PLEURAL EFFUSION: ICD-10-CM

## 2024-07-17 DIAGNOSIS — T83.511D URINARY TRACT INFECTION ASSOCIATED WITH INDWELLING URETHRAL CATHETER, SUBSEQUENT ENCOUNTER: Primary | ICD-10-CM

## 2024-07-17 DIAGNOSIS — D64.9 ANEMIA, UNSPECIFIED TYPE: ICD-10-CM

## 2024-07-17 DIAGNOSIS — M25.551 HIP PAIN, RIGHT: ICD-10-CM

## 2024-07-17 DIAGNOSIS — R52 PAIN: ICD-10-CM

## 2024-07-17 DIAGNOSIS — I44.2 THIRD DEGREE HEART BLOCK (H): ICD-10-CM

## 2024-07-17 DIAGNOSIS — E78.5 HYPERLIPIDEMIA LDL GOAL <130: ICD-10-CM

## 2024-07-17 DIAGNOSIS — D56.8 OTHER THALASSEMIA (H): ICD-10-CM

## 2024-07-17 DIAGNOSIS — R97.20 ELEVATED PSA: ICD-10-CM

## 2024-07-17 DIAGNOSIS — I50.22 CHRONIC SYSTOLIC CONGESTIVE HEART FAILURE (H): Chronic | ICD-10-CM

## 2024-07-17 DIAGNOSIS — I71.43 INFRARENAL ABDOMINAL AORTIC ANEURYSM (AAA) WITHOUT RUPTURE (H): ICD-10-CM

## 2024-07-17 DIAGNOSIS — R91.1 PULMONARY NODULE: ICD-10-CM

## 2024-07-17 DIAGNOSIS — I25.10 CORONARY ARTERY DISEASE INVOLVING NATIVE CORONARY ARTERY OF NATIVE HEART WITHOUT ANGINA PECTORIS: Chronic | ICD-10-CM

## 2024-07-17 DIAGNOSIS — I31.39 PERICARDIAL EFFUSION: ICD-10-CM

## 2024-07-17 LAB
ANION GAP SERPL CALCULATED.3IONS-SCNC: 9 MMOL/L (ref 7–15)
BUN SERPL-MCNC: 12.1 MG/DL (ref 8–23)
CALCIUM SERPL-MCNC: 9.4 MG/DL (ref 8.8–10.4)
CHLORIDE SERPL-SCNC: 101 MMOL/L (ref 98–107)
CREAT SERPL-MCNC: 0.72 MG/DL (ref 0.67–1.17)
EGFRCR SERPLBLD CKD-EPI 2021: >90 ML/MIN/1.73M2
ERYTHROCYTE [DISTWIDTH] IN BLOOD BY AUTOMATED COUNT: 19 % (ref 10–15)
FERRITIN SERPL-MCNC: 33 NG/ML (ref 31–409)
GLUCOSE SERPL-MCNC: 112 MG/DL (ref 70–99)
HCO3 SERPL-SCNC: 27 MMOL/L (ref 22–29)
HCT VFR BLD AUTO: 31.8 % (ref 40–53)
HGB BLD-MCNC: 9.4 G/DL (ref 13.3–17.7)
IRON BINDING CAPACITY (ROCHE): 231 UG/DL (ref 240–430)
IRON SATN MFR SERPL: 11 % (ref 15–46)
IRON SERPL-MCNC: 26 UG/DL (ref 61–157)
MCH RBC QN AUTO: 18.4 PG (ref 26.5–33)
MCHC RBC AUTO-ENTMCNC: 29.6 G/DL (ref 31.5–36.5)
MCV RBC AUTO: 62 FL (ref 78–100)
PLAT MORPH BLD: ABNORMAL
PLATELET # BLD AUTO: 362 10E3/UL (ref 150–450)
POTASSIUM SERPL-SCNC: 4.2 MMOL/L (ref 3.4–5.3)
RBC # BLD AUTO: 5.1 10E6/UL (ref 4.4–5.9)
RBC MORPH BLD: ABNORMAL
SODIUM SERPL-SCNC: 137 MMOL/L (ref 135–145)
TARGETS BLD QL SMEAR: SLIGHT
WBC # BLD AUTO: 14.8 10E3/UL (ref 4–11)

## 2024-07-17 PROCEDURE — 82728 ASSAY OF FERRITIN: CPT | Performed by: STUDENT IN AN ORGANIZED HEALTH CARE EDUCATION/TRAINING PROGRAM

## 2024-07-17 PROCEDURE — 85027 COMPLETE CBC AUTOMATED: CPT | Performed by: STUDENT IN AN ORGANIZED HEALTH CARE EDUCATION/TRAINING PROGRAM

## 2024-07-17 PROCEDURE — 83550 IRON BINDING TEST: CPT | Performed by: STUDENT IN AN ORGANIZED HEALTH CARE EDUCATION/TRAINING PROGRAM

## 2024-07-17 PROCEDURE — 99417 PROLNG OP E/M EACH 15 MIN: CPT | Performed by: STUDENT IN AN ORGANIZED HEALTH CARE EDUCATION/TRAINING PROGRAM

## 2024-07-17 PROCEDURE — 83540 ASSAY OF IRON: CPT | Performed by: STUDENT IN AN ORGANIZED HEALTH CARE EDUCATION/TRAINING PROGRAM

## 2024-07-17 PROCEDURE — 72100 X-RAY EXAM L-S SPINE 2/3 VWS: CPT

## 2024-07-17 PROCEDURE — G2211 COMPLEX E/M VISIT ADD ON: HCPCS | Performed by: STUDENT IN AN ORGANIZED HEALTH CARE EDUCATION/TRAINING PROGRAM

## 2024-07-17 PROCEDURE — 99215 OFFICE O/P EST HI 40 MIN: CPT | Performed by: STUDENT IN AN ORGANIZED HEALTH CARE EDUCATION/TRAINING PROGRAM

## 2024-07-17 PROCEDURE — 36415 COLL VENOUS BLD VENIPUNCTURE: CPT | Performed by: STUDENT IN AN ORGANIZED HEALTH CARE EDUCATION/TRAINING PROGRAM

## 2024-07-17 PROCEDURE — 73502 X-RAY EXAM HIP UNI 2-3 VIEWS: CPT

## 2024-07-17 PROCEDURE — 80048 BASIC METABOLIC PNL TOTAL CA: CPT | Performed by: STUDENT IN AN ORGANIZED HEALTH CARE EDUCATION/TRAINING PROGRAM

## 2024-07-17 RX ORDER — RESPIRATORY SYNCYTIAL VIRUS VACCINE 120MCG/0.5
0.5 KIT INTRAMUSCULAR ONCE
Qty: 1 EACH | Refills: 0 | Status: CANCELLED | OUTPATIENT
Start: 2024-07-17 | End: 2024-07-17

## 2024-07-17 RX ORDER — HYDROMORPHONE HYDROCHLORIDE 2 MG/1
1 TABLET ORAL EVERY 6 HOURS PRN
Qty: 30 TABLET | Refills: 0 | Status: SHIPPED | OUTPATIENT
Start: 2024-07-17 | End: 2024-08-07

## 2024-07-17 ASSESSMENT — PAIN SCALES - GENERAL: PAINLEVEL: WORST PAIN (10)

## 2024-07-17 NOTE — PROGRESS NOTES
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    Fistula of sigmoid colon    Drug-induced constipation    Severe malnutrition (H24)    Relevant Medications    HYDROmorphone (DILAUDID) 2 MG tablet       Endocrine    Hyperlipidemia LDL goal <130       Circulatory    Hypertension goal BP (blood pressure) < 140/90 (Chronic)    Coronary artery disease involving native coronary artery of native heart without angina pectoris (Chronic)    Chronic systolic congestive heart failure (H) (Chronic)    Third degree heart block (H) - s/p pacemaker    Infrarenal abdominal aortic aneurysm (AAA) without rupture (H24)       Urinary    Urothelial carcinoma of bladder (H) (Chronic)       Hematologic    Other thalassemia (H24)       Other    Elevated PSA    Loss of weight    History of CVA (cerebrovascular accident) - old left internal capsule lacunar infarct on CT 7/2016     Other Visit Diagnoses       Urinary tract infection associated with indwelling urethral catheter, subsequent encounter    -  Primary    Relevant Orders    CBC with platelets (Completed)    Basic metabolic panel  (Ca, Cl, CO2, Creat, Gluc, K, Na, BUN) (Completed)    RBC and Platelet Morphology (Completed)    Hip pain, right        Relevant Medications    HYDROmorphone (DILAUDID) 2 MG tablet    Other Relevant Orders    XR Lumbar Spine 2/3 Views (Completed)    Fall, initial encounter        Pericardial effusion        Relevant Orders    Echocardiogram Complete    Adult Cardiology Eval  Referral    Anemia, unspecified type        Relevant Orders    Iron and iron binding capacity (Completed)    Ferritin (Completed)    Pleural effusion               Breathing appears to be doing well now and tolerating anticoagulation.  Urinary symptoms improved with  clear urine in bag today.  Has follow-up with urology this week and discussion regarding further treatments regarding his bladder cancer at that time as well as recurrent infections.  Continues with catheter in place will need to be changed upon follow-up with urology.  They can direct on repeat UA.  No concern for head injury with fall x-rays negative today.  Will plan to follow-up with orthopedics if continue to have pain into his right hip.  Given his generalized pain okay to continue hydromorphone at half tablet nightly if severe but avoid is much as possible encouraged him not to use.  Increased risk of fall confusion respiratory suppression death and addiction discussed with patient and family.  Understands cannot drive or operate further with this.  Patient with continued weight loss in the setting of cancer diagnosis as well as multiple chronic medical conditions including his COPD heart failure and recent clots.  Known bladder cancer with suspected prostate  and lung cancer as well.  Dysplastic polyp removed recently and they do need follow-up with GI given fistula and recent GI bleed with polyp removal.  At risk of GI bleed given anticoagulation which is needed for his previous PE and this was discussed with family today.  Low protein and hemoglobin and we did discuss iron supplement and they will do multivitamin with iron.  Plan to follow-up with GI.  Be seen immediately with further signs of GI bleed doing well currently.  They will follow-up with cardiology and get echo done given small pericardial effusion but has been present since May on CT.  Causes of this discussed today and patient breathing has continued to improve.  Patient with low protein and hemoglobin.  No signs of tamponade or heart failure exacerbation.  We also discussed follow-up with interventional pulmonology given his lung nodule if he wishes to pursue this in the future.  Currently not wanting to pursue this or suspected prostate  cancer.  Also discussed palliative care as well as hospice options moving forward.  They are not wanting to  to pursue this further now.  Did recommend follow-up with dietitian and increase protein intake but they are declining at this time.  Luckily his appetite has gotten much better over the last several weeks and hopefully we can get his weight up.  Right now we will keep follow-up with me in 2 weeks.  Let me know sooner if new or worsening issues arise.  All of patient's and family's questions answered today but they will reach out if needed.      Greater than 60 minutes spent with patient and family in history, exam, education and answering questions.      MED REC REQUIRED  Post Medication Reconciliation Status:  Discharge medications reconciled and changed, see notes/orders        Mikaela Quiñonez is a 79 year old, presenting for the following health issues:  ER F/U        7/17/2024    10:11 AM   Additional Questions   Roomed by Samantha LEDEZMA   Accompanied by Brittany, wife         7/17/2024   Declines Weight   Did patient decline having their weight taken? Yes        HPI       ED/UC Followup:    Facility:  [facility] Emergency room  Date of visit: 7/11/2024  Reason for visit: complicated UTI  Current Status: worse    Patient here today following up in the ER with complex medical history.  Does have bladder cancer diagnosis and following with urology.  History of urinary retention and bladder spasms.  Patient on oxybutynin and Flomax with some improvement in this.  Discussing field therapy with urology but has not been done given recurrent infections.  Did have symptoms and followed up in the ER with ongoing infection.  Given his complex history they did discuss admission with IV antibiotics.  Staph epidermidis noted on culture previously but mixed kami upon recent visit.  Negative blood cultures.  Breathing has been improved and continues on anticoagulation with history of pneumonia as well as  recent pulmonary embolism.  Patient was given dose of vancomycin in the ER and discharged on Macrobid.  Urine output has been clear since then and symptoms improved.  Here with daughter and wife today.  Daughter disappointed with delay in diagnosis and his bladder infection as outside urologist ordered this through home care.  Also notes difficulty on her mom with taking care of him at home which has been more stressful.  He does have home care and physical therapy but have not done many sessions.  Has had significant weight loss over the last 6 months.  Appetite has been better recently.  Multiple hospitalizations for COPD pneumonia and PE as noted above.  Notes breathing to actually be better than has been in some time now.  Previous upper back pain mostly resolved now but right-sided low back pain as well as hip pain as he did have a fall with his walker at home and has not been seen for this yet.  Sounds like this was mechanical in nature and was able to get up with help.  Notes not hitting his head but does have pain into the right hip radiating to his low back.  He did fall on his left side and did have some bruising on his wrist but no pain now and resolved.  He does not want x-rays.  History of previous hip replacement.  Heart failure diagnosis with COPD and previous stents placed.  History of previous GI bleed with tubulovillous adenoma.  They did not keep GI appointment that was scheduled in May as patient was in the hospital.  No further GI bleeding noted.  Nodule with PET scan concerning for lung cancer and did have activity in the prostate concerning for prostate cancer.  Did speak with nodule clinic but patient did not want further evaluation with this at that time.  Previously given hydromorphone for pain control which has been very helpful and notes tolerating without significant side effects.  Doing nebulizers as needed as well as Breztri    Review of Systems  Constitutional, HEENT, cardiovascular,  pulmonary, GI, , musculoskeletal, neuro, skin, endocrine and psych systems are negative, except as otherwise noted.      Objective    /78 (BP Location: Right arm, Patient Position: Chair)   Pulse 88   Temp 98.4  F (36.9  C) (Temporal)   Resp 20   SpO2 90%   There is no height or weight on file to calculate BMI.  Physical Exam   GENERAL: alert and no distress, thin  EYES: Eyes grossly normal to inspection, PERRL and conjunctivae and sclerae normal  HENT: nose and mouth without ulcers or lesions  RESP: minimal lower right lobe crackles that do clear with deep expiration  CV: regular rate and rhythm, normal S1 S2, no peripheral edema  ABDOMEN: soft, mild Suprapubic tenderness, no hepatosplenomegaly, no masses and bowel sounds normal  MS: no gross musculoskeletal defects noted, no edema, no bruising along his hip and no leg length discrepancy, does have lateral right leg pain as well as SI joint pain but no spinal process tenderness to palpation.  Range of motion limited due to discomfort.  SKIN: no suspicious lesions or rashes  NEURO: Normal strength and tone, mentation intact and speech normal  PSYCH: mentation appears normal, affect normal/bright            Signed Electronically by: Neville Degroot MD

## 2024-07-17 NOTE — TELEPHONE ENCOUNTER
Patient needs to establish with care with cardiologist first then will place orders for device to transfer care with our device team.

## 2024-07-17 NOTE — TELEPHONE ENCOUNTER
[facility] Call Center    Phone Message    May a detailed message be left on voicemail: yes     Reason for Call: Appointment Intake    Referring Provider Name: Neville Degroot MD in  FAMILY PRACTICE     Diagnosis and/or Symptoms: Pericardial effusion     Please call pt to schedule once device orders are in the system.  Priority Referral- [place] location preferred.    Action Taken: Other: cardio    Travel Screening: Not Applicable     Date of Service:

## 2024-07-17 NOTE — TELEPHONE ENCOUNTER
Future Appointments 7/17/2024 - 1/13/2025        Date Visit Type Length Department Provider     7/31/2024  2:20 PM ED/HOSP FOLLOW UP 40 min PH FAMILY PRACTICE Neville Degroot MD    Location Instructions:     [facility] is located at [address], within [facility]. This is near the [address] exit off of [address]. Turn north off the exit, then west onto [address]. Park in the Blue Lot. The clinic and medical center share a main entrance and .               8/15/2024 11:00 AM ECHO COMPLETE 60 min PH CARDIAC SERVICES PHECHR1    Location Instructions:     From [address]: Exit at [address] on south side of [place]. Turn right on [address]. Turn left at stoplight on [address]. [facility] will be in view two blocks ahead  This appointment is in a hospital-based location.&nbsp; Before your visit, you may want to check with your insurance company for coverage and referral options, including cost differences between services provided in different clinic settings.&nbsp; For more information visit this link on the [employer] Website:&nbsp; [email or web address]              9/5/2024 11:30 AM NEW CARDIOLOGY 30 min PH CARDIOLOGY Joshua Fernandes MD    Location Instructions:     From [address]: Exit at [address] on south side [facility]. Turn right on [address]. Turn left at stoplight on [address]. [facility] will be in view two blocks ahead.  Please follow the signage to utilize the  [facility]  entrance and parking lot when entering campus.                    Scheduled appt for 9/5/24 for first available OK to use urgent slot per cardio RN.

## 2024-07-17 NOTE — TELEPHONE ENCOUNTER
Called and left detailed message on confidential voicemail of DONNA Amor with PCP response, per below note.     Sonia Terrazas BSN, RN

## 2024-07-19 DIAGNOSIS — B37.49 YEAST UTI: Primary | ICD-10-CM

## 2024-07-19 RX ORDER — FLUCONAZOLE 150 MG/1
150 TABLET ORAL
Qty: 3 TABLET | Refills: 0 | Status: SHIPPED | OUTPATIENT
Start: 2024-07-19 | End: 2024-08-10

## 2024-07-19 RX ORDER — FLUCONAZOLE 150 MG/1
150 TABLET ORAL
Qty: 3 TABLET | Refills: 0 | Status: SHIPPED | OUTPATIENT
Start: 2024-07-19 | End: 2024-07-19

## 2024-07-20 NOTE — PROGRESS NOTES
Patient currently on day 8 of UTI treatment with macrobid. Called home health out for concerns of penile pain and noted to have thick, chunky white discharge per urethre suspected to be candida UTI on top of bacterial UTI. Home health care requesting rx for antifungal for treatment.  Sending 1 rx for 3 tablets of 150 mg fluconazole. Recommend take only 1 dose and stop if symptoms resolve. However, if symptoms persist, worsen, or return, can repeat dosing every 3 days to completion of 3 tablets.     Chrissie Morin, DO

## 2024-07-25 ENCOUNTER — HOSPITAL ENCOUNTER (OUTPATIENT)
Dept: CARDIOLOGY | Facility: CLINIC | Age: 79
Discharge: HOME | End: 2024-07-25
Attending: STUDENT IN AN ORGANIZED HEALTH CARE EDUCATION/TRAINING PROGRAM | Admitting: STUDENT IN AN ORGANIZED HEALTH CARE EDUCATION/TRAINING PROGRAM
Payer: COMMERCIAL

## 2024-07-25 DIAGNOSIS — N32.89 BLADDER SPASM: ICD-10-CM

## 2024-07-25 DIAGNOSIS — C67.9 UROTHELIAL CARCINOMA OF BLADDER (H): ICD-10-CM

## 2024-07-25 DIAGNOSIS — I31.39 PERICARDIAL EFFUSION: ICD-10-CM

## 2024-07-25 DIAGNOSIS — I26.99 OTHER ACUTE PULMONARY EMBOLISM, UNSPECIFIED WHETHER ACUTE COR PULMONALE PRESENT (H): ICD-10-CM

## 2024-07-25 DIAGNOSIS — J44.9 CHRONIC OBSTRUCTIVE PULMONARY DISEASE, UNSPECIFIED COPD TYPE (H): ICD-10-CM

## 2024-07-25 LAB — LVEF ECHO: NORMAL

## 2024-07-25 PROCEDURE — 93325 DOPPLER ECHO COLOR FLOW MAPG: CPT

## 2024-07-25 PROCEDURE — 93321 DOPPLER ECHO F-UP/LMTD STD: CPT | Mod: 26 | Performed by: INTERNAL MEDICINE

## 2024-07-25 PROCEDURE — 93325 DOPPLER ECHO COLOR FLOW MAPG: CPT | Mod: 26 | Performed by: INTERNAL MEDICINE

## 2024-07-25 PROCEDURE — 93308 TTE F-UP OR LMTD: CPT | Mod: 26 | Performed by: INTERNAL MEDICINE

## 2024-07-26 ENCOUNTER — TELEPHONE (OUTPATIENT)
Dept: FAMILY MEDICINE | Facility: CLINIC | Age: 79
End: 2024-07-26
Payer: COMMERCIAL

## 2024-07-26 RX ORDER — OXYBUTYNIN CHLORIDE 5 MG/1
5 TABLET ORAL 3 TIMES DAILY PRN
Qty: 90 TABLET | Refills: 1 | Status: SHIPPED | OUTPATIENT
Start: 2024-07-26 | End: 2024-09-17

## 2024-07-26 RX ORDER — IPRATROPIUM BROMIDE AND ALBUTEROL SULFATE 2.5; .5 MG/3ML; MG/3ML
SOLUTION RESPIRATORY (INHALATION)
Qty: 360 ML | Refills: 2 | Status: SHIPPED | OUTPATIENT
Start: 2024-07-26

## 2024-07-26 RX ORDER — NITROFURANTOIN 25; 75 MG/1; MG/1
100 CAPSULE ORAL DAILY
Qty: 30 CAPSULE | Refills: 0 | Status: SHIPPED | OUTPATIENT
Start: 2024-07-26 | End: 2024-10-01

## 2024-07-26 RX ORDER — GABAPENTIN 300 MG/1
300 CAPSULE ORAL 3 TIMES DAILY
Qty: 90 CAPSULE | Refills: 0 | Status: SHIPPED | OUTPATIENT
Start: 2024-07-26 | End: 2024-08-29

## 2024-07-26 NOTE — TELEPHONE ENCOUNTER
Me   to [facility]     7/25/24 10:54 AM  Macrobid was prescribed in ED. Please contact patient to triage reason for refill request for Macrobid. Then forward encounter with other pending meds to PCP.    Sonia Terrazas, BERNIEN, RN

## 2024-07-26 NOTE — TELEPHONE ENCOUNTER
"Called patient to triage request. Spoke with patient's wife, Brittany. C2C on file.     Brittany states that Dr. Degroot has told them in the past to request any medications they are running out of, then he will decide if patient still needs them or not.    Writer notes patient was prescribed the Macrobid in the ED on 7/11/24 for \"complicated UTI\", where he also received a dose of IV vancomycin prior to discharge.     When asking Brittany if patient still has symptoms of UTI, she states \"Well he still has bladder cancer, I don't know.\" She again stated Dr. Degroot told them he will decide on any medications so she sent in the request for renewal.    Brittany states patient is now out of Macrobid as well as his Eliquis. PCP is out of office. Sending to covering provider for review of refill requests.     Sonia Terrazas, BERNIEN, RN    "

## 2024-07-26 NOTE — TELEPHONE ENCOUNTER
Refills are done.  Per the urology note from the 22nd the nitrofurantoin should change to once a day for prevention so I did prescribe it as 1 Macrobid or nitrofurantoin 100 mg daily instead of twice daily.

## 2024-07-26 NOTE — TELEPHONE ENCOUNTER
Called and notified patient's wife of refills sent and response from covering provider below. She had no further questions or concerns.     BERNIE FragosoN, RN     Speech Therapy    Patient not seen in therapy today.    Additional details:  Per review patient admit to hospice on 10/21 PO intake for pleasure when awake/alert per MD orders. SLP to sign off at this time.         OBJECTIVE                    Documented in the chart in the following areas: Assessment.      Therapy procedure time and total treatment time can be found documented on the Time Entry flowsheet

## 2024-07-30 ENCOUNTER — TELEPHONE (OUTPATIENT)
Dept: FAMILY MEDICINE | Facility: CLINIC | Age: 79
End: 2024-07-30
Payer: COMMERCIAL

## 2024-08-02 ENCOUNTER — TELEPHONE (OUTPATIENT)
Dept: FAMILY MEDICINE | Facility: CLINIC | Age: 79
End: 2024-08-02
Payer: COMMERCIAL

## 2024-08-02 NOTE — TELEPHONE ENCOUNTER
Home Care is calling regarding an established patient with [facility].       Requesting orders from: Neville Degroot  Provider is following patient: Yes  Is this a 60-day recertification request?  No    Orders Requested    Occupational Therapy  Request for delay in care, service is not able to be provided within same scheduled day.   To reassess patient next week due to scheduling conflicts.      Information was gathered and will be sent to provider for review.  RN will contact Home Care with information after provider review.  Confirmed ok to leave a detailed message with call back.  Contact information confirmed and updated as needed.    Jessica Barlow RN

## 2024-08-06 ENCOUNTER — TELEPHONE (OUTPATIENT)
Dept: FAMILY MEDICINE | Facility: CLINIC | Age: 79
End: 2024-08-06
Payer: COMMERCIAL

## 2024-08-06 DIAGNOSIS — R30.0 DYSURIA: Primary | ICD-10-CM

## 2024-08-06 DIAGNOSIS — R52 PAIN: ICD-10-CM

## 2024-08-06 NOTE — TELEPHONE ENCOUNTER
Order placed and await urine results. They should make sure urology is aware and follow up in clinic. Be seen in ER if worsening symptoms with concern for systemic infection.

## 2024-08-06 NOTE — TELEPHONE ENCOUNTER
RN called and left below message with wife, Brittany (C2C on file). Brittany states understanding. No further questions or concerns at this time.

## 2024-08-06 NOTE — TELEPHONE ENCOUNTER
"Patient's daughter May called in. No C2C on file, so writer only collected information.     May states patient has a history of ongoing, on and off, UTI's with \"really bad deadly bacteria that can kill him.\" She states he does see urology in [place] as well.     She states home health nurse and/or PT was out today and they think his urine looks infected and possibly has blood in it. She states patient has been feeling weak, complaining of feeling hot and cold, and complaining of burning. She states these are the symptoms he usually gets with his UTIs.     She is requesting an order for urinalysis. Notified May I will route the request to PCP, but that we will need to call patient back directly as we don't have consent to communicate with her on file. She states that Dr. Degroot knows she is the one who makes all of the calls and appointments for her dad.    Sonia Terrazas, BSN, RN    "

## 2024-08-07 ENCOUNTER — LAB REQUISITION (OUTPATIENT)
Dept: LAB | Facility: CLINIC | Age: 79
End: 2024-08-07
Payer: COMMERCIAL

## 2024-08-07 ENCOUNTER — TELEPHONE (OUTPATIENT)
Dept: FAMILY MEDICINE | Facility: CLINIC | Age: 79
End: 2024-08-07

## 2024-08-07 DIAGNOSIS — N40.0 BENIGN PROSTATIC HYPERPLASIA WITHOUT LOWER URINARY TRACT SYMPTOMS: ICD-10-CM

## 2024-08-07 DIAGNOSIS — R31.9 URINARY TRACT INFECTION WITH HEMATURIA, SITE UNSPECIFIED: Primary | ICD-10-CM

## 2024-08-07 DIAGNOSIS — N39.0 URINARY TRACT INFECTION WITH HEMATURIA, SITE UNSPECIFIED: Primary | ICD-10-CM

## 2024-08-07 LAB
ALBUMIN UR-MCNC: 100 MG/DL
AMORPH CRY #/AREA URNS HPF: ABNORMAL /HPF
APPEARANCE UR: CLEAR
BACTERIA #/AREA URNS HPF: ABNORMAL /HPF
BILIRUB UR QL STRIP: NEGATIVE
COLOR UR AUTO: YELLOW
GLUCOSE UR STRIP-MCNC: NEGATIVE MG/DL
HGB UR QL STRIP: ABNORMAL
KETONES UR STRIP-MCNC: NEGATIVE MG/DL
LEUKOCYTE ESTERASE UR QL STRIP: ABNORMAL
MUCOUS THREADS #/AREA URNS LPF: PRESENT /LPF
NITRATE UR QL: NEGATIVE
PH UR STRIP: 6 [PH] (ref 5–7)
RBC URINE: >182 /HPF
SP GR UR STRIP: 1.01 (ref 1–1.03)
UROBILINOGEN UR STRIP-MCNC: NORMAL MG/DL
WBC URINE: 19 /HPF

## 2024-08-07 PROCEDURE — 87086 URINE CULTURE/COLONY COUNT: CPT | Mod: ORL | Performed by: STUDENT IN AN ORGANIZED HEALTH CARE EDUCATION/TRAINING PROGRAM

## 2024-08-07 PROCEDURE — 81001 URINALYSIS AUTO W/SCOPE: CPT | Mod: ORL | Performed by: STUDENT IN AN ORGANIZED HEALTH CARE EDUCATION/TRAINING PROGRAM

## 2024-08-07 RX ORDER — HYDROMORPHONE HYDROCHLORIDE 2 MG/1
1 TABLET ORAL EVERY 6 HOURS PRN
Qty: 30 TABLET | Refills: 0 | Status: SHIPPED | OUTPATIENT
Start: 2024-08-07 | End: 2024-08-30

## 2024-08-07 RX ORDER — NITROFURANTOIN 25; 75 MG/1; MG/1
100 CAPSULE ORAL 2 TIMES DAILY
Qty: 14 CAPSULE | Refills: 0 | Status: SHIPPED | OUTPATIENT
Start: 2024-08-07 | End: 2024-08-10

## 2024-08-07 RX ORDER — CIPROFLOXACIN 250 MG/1
250 TABLET, FILM COATED ORAL 2 TIMES DAILY
Qty: 14 TABLET | Refills: 0 | Status: SHIPPED | OUTPATIENT
Start: 2024-08-07 | End: 2024-08-07

## 2024-08-07 NOTE — TELEPHONE ENCOUNTER
Lab staff approached writer and stated they have a note for PCP. She states home health nurse notified them that the patient/family were threatening to rao her, were very upset, requesting results be faxed to his urologist. Lab staff left note on PCP desk. The note is requesting urine results be faxed to:    -Dr. Escudero Fax: [phone]  -Dr. Quiles Fax: [phone]    Writer spoke with PCP and he asked me to call lab to see if they got the sample.    Called lab and they confirmed they did a UA today that was resulted, and a urine culture is now in process.     Dr. Degroot is requesting we fax the UA results that we have now from 8/7/24, and then fax the urine culture results when they are back.    Writer faxed UA results from 8/7/24 to both doctors/fax numbers as requested. Please watch for culture results and fax them when they are in.     Sonia Terrazas, BSN, RN

## 2024-08-07 NOTE — TELEPHONE ENCOUNTER
Detailed approval left on confidential home care voicemail    Mayi Lowery RN on 8/7/2024 at 9:48 AM

## 2024-08-07 NOTE — TELEPHONE ENCOUNTER
Lengthy conversation today with wife as well as daughter. I have not seen patient for some time but call regarding urinary symptoms. Urine again concerning for infection and given his symptoms as well as resistant bug I did recommend he be seen in the ER for IV antibiotics as well as cardiovascular evaluation with wife noting some increased cough and SOB yesterday.  They continue to be upset with care given at ER in [place] and I did instruct them to go to [place].  They do have follow-up with Dr. Escudero and cardiology to evaluate pericardial effusion with concern for malignancy potentially contributing.  Previously spoke with patient and daughter regarding this with echocardiogram results and instructed to be seen immediately if worsening symptoms and follow-up with cardiology.  They are going in Friday and would like to wait until then which I did not recommend. They are adamant on not going to the ER against medical advice but will go if worsening respiratory symptoms which mostly seem stable now. They would like to start antibiotic and have results sent to Dr. Manriquez with urology who they are following. I will send prescription for macrobid given previous culture results but he has been on this multiple times now and previous staph epi pretty resistant to other good oral options. She understands this medication is only active in the bladder. This is why I think IV antibiotic and in person evaluation maybe a better option which I reiterated to family. They report they will go if further systemic symptoms which were discussed today as well as medication side effects. All of there questions were answered. They will contact urology.

## 2024-08-09 ENCOUNTER — TELEPHONE (OUTPATIENT)
Dept: FAMILY MEDICINE | Facility: CLINIC | Age: 79
End: 2024-08-09
Payer: COMMERCIAL

## 2024-08-09 LAB — BACTERIA UR CULT: NORMAL

## 2024-08-10 ENCOUNTER — APPOINTMENT (OUTPATIENT)
Dept: GENERAL RADIOLOGY | Facility: CLINIC | Age: 79
End: 2024-08-10
Attending: FAMILY MEDICINE
Payer: COMMERCIAL

## 2024-08-10 ENCOUNTER — HOSPITAL ENCOUNTER (EMERGENCY)
Facility: CLINIC | Age: 79
Discharge: HOME | End: 2024-08-10
Attending: FAMILY MEDICINE | Admitting: FAMILY MEDICINE
Payer: COMMERCIAL

## 2024-08-10 VITALS
WEIGHT: 155 LBS | RESPIRATION RATE: 18 BRPM | OXYGEN SATURATION: 91 % | SYSTOLIC BLOOD PRESSURE: 119 MMHG | DIASTOLIC BLOOD PRESSURE: 64 MMHG | BODY MASS INDEX: 22.24 KG/M2 | HEART RATE: 85 BPM | TEMPERATURE: 99.1 F

## 2024-08-10 DIAGNOSIS — J93.9 PNEUMOTHORAX ON LEFT: ICD-10-CM

## 2024-08-10 DIAGNOSIS — R07.9 ACUTE CHEST PAIN: ICD-10-CM

## 2024-08-10 LAB
ALBUMIN SERPL BCG-MCNC: 2.9 G/DL (ref 3.5–5.2)
ALP SERPL-CCNC: 78 U/L (ref 40–150)
ALT SERPL W P-5'-P-CCNC: 9 U/L (ref 0–70)
ANION GAP SERPL CALCULATED.3IONS-SCNC: 10 MMOL/L (ref 7–15)
AST SERPL W P-5'-P-CCNC: 10 U/L (ref 0–45)
BASE EXCESS BLDV CALC-SCNC: 1.7 MMOL/L (ref -3–3)
BASOPHILS # BLD AUTO: 0.1 10E3/UL (ref 0–0.2)
BASOPHILS NFR BLD AUTO: 0 %
BILIRUB SERPL-MCNC: 0.4 MG/DL
BUN SERPL-MCNC: 13.6 MG/DL (ref 8–23)
CALCIUM SERPL-MCNC: 8.3 MG/DL (ref 8.8–10.4)
CHLORIDE SERPL-SCNC: 100 MMOL/L (ref 98–107)
CREAT SERPL-MCNC: 0.73 MG/DL (ref 0.67–1.17)
EGFRCR SERPLBLD CKD-EPI 2021: >90 ML/MIN/1.73M2
EOSINOPHIL # BLD AUTO: 0.2 10E3/UL (ref 0–0.7)
EOSINOPHIL NFR BLD AUTO: 1 %
ERYTHROCYTE [DISTWIDTH] IN BLOOD BY AUTOMATED COUNT: 18.7 % (ref 10–15)
GLUCOSE SERPL-MCNC: 113 MG/DL (ref 70–99)
HCO3 BLDV-SCNC: 27 MMOL/L (ref 21–28)
HCO3 SERPL-SCNC: 24 MMOL/L (ref 22–29)
HCT VFR BLD AUTO: 28.3 % (ref 40–53)
HGB BLD-MCNC: 8.3 G/DL (ref 13.3–17.7)
IMM GRANULOCYTES # BLD: 0.2 10E3/UL
IMM GRANULOCYTES NFR BLD: 1 %
INR PPP: 1.59 (ref 0.85–1.15)
LYMPHOCYTES # BLD AUTO: 1.5 10E3/UL (ref 0.8–5.3)
LYMPHOCYTES NFR BLD AUTO: 8 %
MCH RBC QN AUTO: 17.2 PG (ref 26.5–33)
MCHC RBC AUTO-ENTMCNC: 29.3 G/DL (ref 31.5–36.5)
MCV RBC AUTO: 59 FL (ref 78–100)
MONOCYTES # BLD AUTO: 1.1 10E3/UL (ref 0–1.3)
MONOCYTES NFR BLD AUTO: 6 %
NEUTROPHILS # BLD AUTO: 16.2 10E3/UL (ref 1.6–8.3)
NEUTROPHILS NFR BLD AUTO: 84 %
NRBC # BLD AUTO: 0 10E3/UL
NRBC BLD AUTO-RTO: 0 /100
NT-PROBNP SERPL-MCNC: 881 PG/ML (ref 0–1800)
O2/TOTAL GAS SETTING VFR VENT: 40 %
OXYHGB MFR BLDV: 30 % (ref 70–75)
PCO2 BLDV: 47 MM HG (ref 40–50)
PH BLDV: 7.37 [PH] (ref 7.32–7.43)
PLAT MORPH BLD: NORMAL
PLATELET # BLD AUTO: 397 10E3/UL (ref 150–450)
PO2 BLDV: 22 MM HG (ref 25–47)
POTASSIUM SERPL-SCNC: 4.2 MMOL/L (ref 3.4–5.3)
PROT SERPL-MCNC: 6.1 G/DL (ref 6.4–8.3)
RBC # BLD AUTO: 4.83 10E6/UL (ref 4.4–5.9)
RBC MORPH BLD: NORMAL
SAO2 % BLDV: 30.2 % (ref 70–75)
SODIUM SERPL-SCNC: 134 MMOL/L (ref 135–145)
TROPONIN T SERPL HS-MCNC: 37 NG/L
TROPONIN T SERPL HS-MCNC: 39 NG/L
WBC # BLD AUTO: 19.3 10E3/UL (ref 4–11)

## 2024-08-10 PROCEDURE — 99285 EMERGENCY DEPT VISIT HI MDM: CPT | Mod: 25 | Performed by: FAMILY MEDICINE

## 2024-08-10 PROCEDURE — 80053 COMPREHEN METABOLIC PANEL: CPT | Performed by: FAMILY MEDICINE

## 2024-08-10 PROCEDURE — 99284 EMERGENCY DEPT VISIT MOD MDM: CPT | Performed by: FAMILY MEDICINE

## 2024-08-10 PROCEDURE — 71045 X-RAY EXAM CHEST 1 VIEW: CPT | Mod: XU

## 2024-08-10 PROCEDURE — 250N000011 HC RX IP 250 OP 636: Performed by: FAMILY MEDICINE

## 2024-08-10 PROCEDURE — 36415 COLL VENOUS BLD VENIPUNCTURE: CPT | Performed by: FAMILY MEDICINE

## 2024-08-10 PROCEDURE — 85025 COMPLETE CBC W/AUTO DIFF WBC: CPT | Performed by: FAMILY MEDICINE

## 2024-08-10 PROCEDURE — 96374 THER/PROPH/DIAG INJ IV PUSH: CPT | Performed by: FAMILY MEDICINE

## 2024-08-10 PROCEDURE — 83880 ASSAY OF NATRIURETIC PEPTIDE: CPT | Performed by: FAMILY MEDICINE

## 2024-08-10 PROCEDURE — 93005 ELECTROCARDIOGRAM TRACING: CPT | Performed by: FAMILY MEDICINE

## 2024-08-10 PROCEDURE — 82805 BLOOD GASES W/O2 SATURATION: CPT | Performed by: FAMILY MEDICINE

## 2024-08-10 PROCEDURE — 85610 PROTHROMBIN TIME: CPT | Performed by: FAMILY MEDICINE

## 2024-08-10 PROCEDURE — 71046 X-RAY EXAM CHEST 2 VIEWS: CPT

## 2024-08-10 PROCEDURE — 84484 ASSAY OF TROPONIN QUANT: CPT | Performed by: FAMILY MEDICINE

## 2024-08-10 PROCEDURE — 93010 ELECTROCARDIOGRAM REPORT: CPT | Performed by: FAMILY MEDICINE

## 2024-08-10 RX ORDER — OXYCODONE HYDROCHLORIDE 5 MG/1
5-10 TABLET ORAL EVERY 6 HOURS PRN
Qty: 12 TABLET | Refills: 0 | Status: SHIPPED | OUTPATIENT
Start: 2024-08-10 | End: 2024-08-13

## 2024-08-10 RX ORDER — HYDROMORPHONE HYDROCHLORIDE 1 MG/ML
0.5 INJECTION, SOLUTION INTRAMUSCULAR; INTRAVENOUS; SUBCUTANEOUS ONCE
Status: COMPLETED | OUTPATIENT
Start: 2024-08-10 | End: 2024-08-10

## 2024-08-10 RX ADMIN — HYDROMORPHONE HYDROCHLORIDE 0.5 MG: 1 INJECTION, SOLUTION INTRAMUSCULAR; INTRAVENOUS; SUBCUTANEOUS at 07:43

## 2024-08-10 ASSESSMENT — ACTIVITIES OF DAILY LIVING (ADL)
ADLS_ACUITY_SCORE: 38

## 2024-08-10 ASSESSMENT — COLUMBIA-SUICIDE SEVERITY RATING SCALE - C-SSRS
2. HAVE YOU ACTUALLY HAD ANY THOUGHTS OF KILLING YOURSELF IN THE PAST MONTH?: NO
1. IN THE PAST MONTH, HAVE YOU WISHED YOU WERE DEAD OR WISHED YOU COULD GO TO SLEEP AND NOT WAKE UP?: NO
6. HAVE YOU EVER DONE ANYTHING, STARTED TO DO ANYTHING, OR PREPARED TO DO ANYTHING TO END YOUR LIFE?: NO

## 2024-08-10 NOTE — MEDICATION SCRIBE - ADMISSION MEDICATION HISTORY
Medication Scribe Admission Medication History    Admission medication history is complete. The information provided in this note is only as accurate as the sources available at the time of the update.    Information Source(s): Family member and CareEverywhere/SureScripts via in-person    Pertinent Information: Brittany (spouse) helpful    Changes made to PTA medication list:  Added: None  Deleted: Breztri inh, diflucan, hydromorphone conc, robaxin, macrobid twice daily 14 day order, protonix, crestor  Changed: dilaudid from 1mg (half-tablet) to 1mg-2mg (half to full tablet) per dose.    Allergies reviewed with patient and updates made in EHR: yes    Medication History Completed By: BRITTANY ANGELA 8/10/2024 11:41 AM    PTA Med List   Medication Sig Last Dose    ACE/ARB/ARNI NOT PRESCRIBED (INTENTIONAL) Please choose reason not prescribed from choices below.  at n/a    apixaban ANTICOAGULANT (ELIQUIS) 5 MG tablet Take 1 tablet (5 mg) by mouth 2 times daily 8/9/2024 at hs    BETA BLOCKER NOT PRESCRIBED (INTENTIONAL) Please choose reason not prescribed from choices below.  at n/a    calcium carbonate-vitamin D (CALTRATE) 600-10 MG-MCG per tablet Take 1 tablet by mouth 2 times daily 8/9/2024 at hs    gabapentin (NEURONTIN) 300 MG capsule Take 1 capsule (300 mg) by mouth 3 times daily 8/9/2024 at hs    HYDROmorphone (DILAUDID) 2 MG tablet Take 0.5 tablets (1 mg) by mouth every 6 hours as needed for severe pain (Patient taking differently: Take 1-2 mg by mouth every 6 hours as needed for severe pain) 8/9/2024 at hs    ipratropium - albuterol 0.5 mg/2.5 mg/3 mL (DUONEB) 0.5-2.5 (3) MG/3ML neb solution INHALE 1 VIAL IN NEBULIZER EVERY 6 HOURS AS NEEDED FOR SHORTNESS OF BREATH, WHEEZING OR COUGH (Patient taking differently: Take 1 vial by nebulization every 6 hours as needed for shortness of breath, wheezing or cough) 8/9/2024 at am    Multiple Vitamin (MULTIVITAMIN ADULT PO) Take 1 tablet by mouth daily 8/7/2024 at am     nitroFURantoin macrocrystal-monohydrate (MACROBID) 100 MG capsule Take 1 capsule (100 mg) by mouth daily 8/7/2024 at am    order for DME Equipment being ordered: Nebulizer hose 8/9/2024 at am    oxyBUTYnin (DITROPAN) 5 MG tablet Take 1 tablet (5 mg) by mouth 3 times daily as needed for bladder spasms 8/9/2024 at hs    tamsulosin (FLOMAX) 0.4 MG capsule Take 1 capsule by mouth at bedtime 8/9/2024 at hs    vitamin C (ASCORBIC ACID) 500 MG tablet Take 500 mg by mouth daily 8/7/2024 at am

## 2024-08-10 NOTE — ED PROVIDER NOTES
"                                                            [facility] ED Provider Note   Patient: Khang Bailon  MRN #:  [id number]  Date of Visit: August 10, 2024    CC:     Chief Complaint   Patient presents with    Chest Pain     HPI:  Khang Bailon is a 79 year old male on chronic anticoagulation for pulmonary embolism, with recent new diagnosis of pericardial effusion who presented to the emergency department by EMS with acute severe left-sided chest pain with shortness of breath that started around 4:00 this morning.  Patient was at the [facility] for an attempted pericardiocentesis that was unsuccessful.  Patient apparently developed some chest pain after the procedure which was thought to be due to muscle spasms.  Patient's wife states that the pain did go away when they raised his left arm, but he was still having some pain and soreness into the evening.  At 4:00 this morning, patient was yelling out for help as he was extremely short of breath and thought that he was having a heart attack.  Patient describes sharp stabbing chest pains.  EMS report that he had an oxygen saturation of 80% on room air, and was placed on supplemental oxygen at 10 L by mask.  He received 1 spray of nitroglycerin, 0.5 mg of Dilaudid, and 4 baby aspirin.  Patient has a pacemaker that is nearing the end of battery life.  Upon arrival, patient states that he is still having some severe chest pains and thought that he was going to die.  \"I am having a heart attack.\"  Patient's wife reports that he has a history of COPD, but not on oxygen supplementation at home.  Patient is on Eliquis.    Problem List:  Patient Active Problem List    Diagnosis Date Noted    Severe malnutrition (H24) 04/30/2024     Priority: Medium    Gastrointestinal hemorrhage with melena 04/30/2024     Priority: Medium    Hypoalbuminemia 04/30/2024     Priority: Medium    Lactic acidosis 04/30/2024     Priority: Medium    Enlarged prostate " 04/30/2024     Priority: Medium    Coagulopathy (H24) 04/30/2024     Priority: Medium    Sacral pressure sore 04/30/2024     Priority: Medium    Abrasion of left elbow 04/30/2024     Priority: Medium    Chronic systolic congestive heart failure (H) 04/27/2024     Priority: Medium    Urothelial carcinoma of bladder (H) 04/27/2024     Priority: Medium    Pneumonia of right lower lobe due to infectious organism 04/26/2024     Priority: Medium    Septic shock (H) 04/26/2024     Priority: Medium    Acute respiratory failure with hypoxia (H) 04/26/2024     Priority: Medium    Closed fracture of right hip with routine healing 04/16/2024     Priority: Medium    Pain 04/16/2024     Priority: Medium    Drug-induced constipation 04/16/2024     Priority: Medium    Acute blood loss anemia 04/16/2024     Priority: Medium    Postprocedural stricture of anterior urethra 03/21/2024     Priority: Medium    Acute bilateral back pain, unspecified back location 03/19/2024     Priority: Medium    SIRS (systemic inflammatory response syndrome) (H) 02/01/2024     Priority: Medium    Calculus of gallbladder without cholecystitis without obstruction 02/01/2024     Priority: Medium    Infrarenal abdominal aortic aneurysm (AAA) without rupture (H24) 02/01/2024     Priority: Medium    Fistula of sigmoid colon 02/01/2024     Priority: Medium    Aspiration into airway, initial encounter 02/01/2024     Priority: Medium    Gross hematuria 02/01/2024     Priority: Medium    Aspirin-like platelet function defect (H) 02/01/2024     Priority: Medium    Diverticulitis 01/31/2024     Priority: Medium    Pulmonary nodule 01/31/2024     Priority: Medium    Mass of urinary bladder 01/31/2024     Priority: Medium    Abnormal urinalysis 01/31/2024     Priority: Medium    Other chest pain 01/31/2024     Priority: Medium    COPD exacerbation (H) 01/31/2024     Priority: Medium    Systolic congestive heart failure, unspecified HF chronicity (H) 01/09/2024      Priority: Medium    Systolic congestive heart failure (H) 01/09/2024     Priority: Medium    Coronary artery disease involving native coronary artery of native heart without angina pectoris 12/05/2023     Priority: Medium    NSTEMI (non-ST elevated myocardial infarction) (H) 11/27/2023     Priority: Medium    COVID-19 08/13/2023     Priority: Medium    Benign prostatic hyperplasia, unspecified whether lower urinary tract symptoms present 11/01/2021     Priority: Medium    Benign prostatic hyperplasia 11/01/2021     Priority: Medium    Fatigue, unspecified type 11/29/2019     Priority: Medium    Memory loss 11/29/2019     Priority: Medium    Moderate COPD (chronic obstructive pulmonary disease) (H) 11/16/2017     Priority: Medium    Tubulovillous adenoma of rectum 4/9/24 07/26/2016     Priority: Medium    Sigmoid diverticulitis 07/12/2016     Priority: Medium    History of CVA (cerebrovascular accident) - old left internal capsule lacunar infarct on CT 7/2016 07/12/2016     Priority: Medium    Multilevel degenerative disc disease 09/21/2015     Priority: Medium    Elevated prostate specific antigen (PSA) 09/11/2015     Priority: Medium    Elevated PSA 08/27/2012     Priority: Medium    Loss of weight 08/27/2012     Priority: Medium    Third degree heart block (H) - s/p pacemaker 08/10/2012     Priority: Medium    Hypertension goal BP (blood pressure) < 140/90 08/10/2012     Priority: Medium    Other thalassemia (H24) 07/27/2012     Priority: Medium     Diagnosis updated by automated process. Provider to review and confirm.      Hyperlipidemia LDL goal <130 07/27/2012     Priority: Medium    Heart block 07/27/2012     Priority: Medium    Cardiac abnormality 07/27/2012     Priority: Medium    Complete heart block (H) 07/27/2012     Priority: Medium    Thalassemia 07/27/2012     Priority: Medium     Formatting of this note might be different from the original.   Formatting of this note might be different from the  original.   Diagnosis updated by automated process. Provider to review and confirm.      Painful respiration 03/31/2004     Priority: Medium    Personal history of tobacco use, presenting hazards to health 03/31/2004     Priority: Medium       Past Medical History:   Diagnosis Date    Alpha thalassemia (H24)     AV block 07/2012    CAD (coronary artery disease)     Chronic systolic heart failure (H)     COPD (chronic obstructive pulmonary disease) (H)     Diverticulitis     History of CVA (cerebrovascular accident)     Injury, other and unspecified, elbow, forearm, and wrist 1970s    Urothelial carcinoma (H)        MEDS: ACE/ARB/ARNI NOT PRESCRIBED (INTENTIONAL)  apixaban ANTICOAGULANT (ELIQUIS) 5 MG tablet  BETA BLOCKER NOT PRESCRIBED (INTENTIONAL)  calcium carbonate-vitamin D (CALTRATE) 600-10 MG-MCG per tablet  gabapentin (NEURONTIN) 300 MG capsule  HYDROmorphone (DILAUDID) 2 MG tablet  ipratropium - albuterol 0.5 mg/2.5 mg/3 mL (DUONEB) 0.5-2.5 (3) MG/3ML neb solution  Multiple Vitamin (MULTIVITAMIN ADULT PO)  nitroFURantoin macrocrystal-monohydrate (MACROBID) 100 MG capsule  order for DME  oxyBUTYnin (DITROPAN) 5 MG tablet  oxyCODONE (ROXICODONE) 5 MG tablet  tamsulosin (FLOMAX) 0.4 MG capsule  vitamin C (ASCORBIC ACID) 500 MG tablet        ALLERGIES:    Allergies   Allergen Reactions    Flagyl [Metronidazole] GI Disturbance       Past Surgical History:   Procedure Laterality Date    COLONOSCOPY      COLONOSCOPY  12/10/2012    CYSTOSCOPY, LITHOLAPAXY, COMBINED N/A 11/10/2021    Procedure: Cystoscopy, Bladder Stone Removal;  Surgeon: Guanaco Stewart MD;  Location: [facility] OR    IMPLANT PACEMAKER      LASER KTP TRANSURETHRAL RESECTION (TUR) PROSTATE N/A 11/10/2021    Procedure: TRANSURETHRAL RESECTION (TUR) PROSTATE, USING KTP LASER;  Surgeon: Guanaco Stewart MD;  Location: [facility] OR       Social History     Tobacco Use    Smoking status: Former     Current packs/day: 0.00     Types: Cigarettes     Start date:  1962     Quit date: 2012     Years since quittin.0    Smokeless tobacco: Never    Tobacco comments:     Quit in July   Vaping Use    Vaping status: Never Used   Substance Use Topics    Alcohol use: No     Alcohol/week: 0.0 standard drinks of alcohol    Drug use: No         Review of Systems   Except as noted in HPI, all other systems were reviewed and are negative    Physical Exam   Vitals were reviewed  Patient Vitals for the past 12 hrs:   BP Temp Temp src Pulse Resp SpO2 Weight   08/10/24 1315 119/64 -- -- 85 18 91 % --   08/10/24 1300 120/73 -- -- 87 23 90 % --   08/10/24 1245 111/75 -- -- 89 19 91 % --   08/10/24 1230 116/80 -- -- 89 21 91 % --   08/10/24 1200 108/65 -- -- 82 17 95 % --   08/10/24 1145 117/72 -- -- 90 23 95 % --   08/10/24 1115 -- -- -- 84 21 -- --   08/10/24 1100 99/73 -- -- 87 14 94 % --   08/10/24 1045 103/71 -- -- 86 19 91 % --   08/10/24 1030 94/58 -- -- 83 18 91 % --   08/10/24 1015 111/65 -- -- 87 17 91 % --   08/10/24 1000 105/54 -- -- 85 17 93 % --   08/10/24 0945 101/67 -- -- 85 17 93 % --   08/10/24 0930 108/65 -- -- 85 11 (!) 89 % --   08/10/24 0922 100/62 -- -- 87 18 94 % --   08/10/24 0915 -- -- -- 84 18 94 % --   08/10/24 0907 94/57 -- -- 87 18 94 % --   08/10/24 0852 -- -- -- 83 17 94 % --   08/10/24 0837 97/57 -- -- 85 11 94 % --   08/10/24 0822 96/55 -- -- 92 19 95 % --   08/10/24 0807 -- -- -- 90 16 97 % --   08/10/24 0806 90/ -- -- 91 17 97 % --   08/10/24 0752 -- -- -- 96 16 97 % --   08/10/24 0751 96/62 -- -- 95 17 97 % --   08/10/24 0750 -- -- -- 93 20 97 % --   08/10/24 0737 -- -- -- 103 16 97 % --   08/10/24 0736 -- -- -- 104 28 97 % --   08/10/24 0733 -- 99.1  F (37.3  C) Oral -- -- -- 70.3 kg (155 lb)   08/10/24 0732 105/77 -- -- 105 18 95 % --     GENERAL APPEARANCE: Alert, moderate to severe distress due to pain; patient is dyspneic.  Patient appears disheveled  FACE: normal facies  EYES: Pupils are equal  HENT: normal external exam  NECK: no  adenopathy or asymmetry  RESP: Increased respiratory effort; labored breathing; coarse crackles at the bases with diminished breath sounds  CV: Distant heart sounds, regular rate and rhythm; borderline tachycardia, no appreciable murmurs,  ABD: soft, no tenderness; no rebound or guarding; bowel sounds are normal  MS: no gross deformities noted; normal muscle tone.  EXT: No calf tenderness or pitting edema  SKIN: no worrisome rash  NEURO: no facial droop; no focal deficits, speech is labored        Available Lab/Imaging Results     Results for orders placed or performed during the hospital encounter of 08/10/24 (from the past 24 hour(s))   CBC with platelets differential    Narrative    The following orders were created for panel order CBC with platelets differential.  Procedure                               Abnormality         Status                     ---------                               -----------         ------                     CBC with platelets and d...[[id number]]  Abnormal            Final result               RBC and Platelet Morphology[[id number]]                      Final result                 Please view results for these tests on the individual orders.   Comprehensive metabolic panel   Result Value Ref Range    Sodium 134 (L) 135 - 145 mmol/L    Potassium 4.2 3.4 - 5.3 mmol/L    Carbon Dioxide (CO2) 24 22 - 29 mmol/L    Anion Gap 10 7 - 15 mmol/L    Urea Nitrogen 13.6 8.0 - 23.0 mg/dL    Creatinine 0.73 0.67 - 1.17 mg/dL    GFR Estimate >90 >60 mL/min/1.73m2    Calcium 8.3 (L) 8.8 - 10.4 mg/dL    Chloride 100 98 - 107 mmol/L    Glucose 113 (H) 70 - 99 mg/dL    Alkaline Phosphatase 78 40 - 150 U/L    AST 10 0 - 45 U/L    ALT 9 0 - 70 U/L    Protein Total 6.1 (L) 6.4 - 8.3 g/dL    Albumin 2.9 (L) 3.5 - 5.2 g/dL    Bilirubin Total 0.4 <=1.2 mg/dL   INR   Result Value Ref Range    INR 1.59 (H) 0.85 - 1.15   Troponin T, High Sensitivity   Result Value Ref Range    Troponin T, High Sensitivity 39 (H)  <=22 ng/L   Nt probnp inpatient (BNP)   Result Value Ref Range    N terminal Pro BNP Inpatient 881 0 - 1,800 pg/mL   Blood gas venous   Result Value Ref Range    pH Venous 7.37 7.32 - 7.43    pCO2 Venous 47 40 - 50 mm Hg    pO2 Venous 22 (L) 25 - 47 mm Hg    Bicarbonate Venous 27 21 - 28 mmol/L    Base Excess/Deficit Venous 1.7 -3.0 - 3.0 mmol/L    FIO2 40     Oxyhemoglobin Venous 30 (L) 70 - 75 %    O2 Sat, Venous 30.2 (L) 70.0 - 75.0 %    Narrative    In healthy individuals, oxyhemoglobin (O2Hb) and oxygen saturation (SO2) are approximately equal. In the presence of dyshemoglobins, oxyhemoglobin can be considerably lower than oxygen saturation.   CBC with platelets and differential   Result Value Ref Range    WBC Count 19.3 (H) 4.0 - 11.0 10e3/uL    RBC Count 4.83 4.40 - 5.90 10e6/uL    Hemoglobin 8.3 (L) 13.3 - 17.7 g/dL    Hematocrit 28.3 (L) 40.0 - 53.0 %    MCV 59 (L) 78 - 100 fL    MCH 17.2 (L) 26.5 - 33.0 pg    MCHC 29.3 (L) 31.5 - 36.5 g/dL    RDW 18.7 (H) 10.0 - 15.0 %    Platelet Count 397 150 - 450 10e3/uL    % Neutrophils 84 %    % Lymphocytes 8 %    % Monocytes 6 %    % Eosinophils 1 %    % Basophils 0 %    % Immature Granulocytes 1 %    NRBCs per 100 WBC 0 <1 /100    Absolute Neutrophils 16.2 (H) 1.6 - 8.3 10e3/uL    Absolute Lymphocytes 1.5 0.8 - 5.3 10e3/uL    Absolute Monocytes 1.1 0.0 - 1.3 10e3/uL    Absolute Eosinophils 0.2 0.0 - 0.7 10e3/uL    Absolute Basophils 0.1 0.0 - 0.2 10e3/uL    Absolute Immature Granulocytes 0.2 <=0.4 10e3/uL    Absolute NRBCs 0.0 10e3/uL   RBC and Platelet Morphology   Result Value Ref Range    RBC Morphology Confirmed RBC Indices     Platelet Assessment  Automated Count Confirmed. Platelet morphology is normal.     Automated Count Confirmed. Platelet morphology is normal.   XR Chest Port 1 View    Narrative    EXAM: XR CHEST PORTABLE 1 VIEW  LOCATION: [facility]  DATE: 08/10/2024    INDICATION: Chest pain, shortness of breath, recent  pericardiocentesis.  COMPARISON: 05/22/2024.      Impression    IMPRESSION: Minimal left apical pneumothorax. Scattered interstitial changes, similar to previous. A cardiac implantable electronic device is in place with lead(s) grossly intact. No abandoned leads/wires or other unexpected metallic foreign bodies   identified in the chest.     Chest XR,  PA & LAT    Narrative    EXAM: XR CHEST 2 VIEWS  LOCATION: [facility]  DATE: 8/10/2024    INDICATION: f u apical pneumothorax  COMPARISON: 8/10/2024      Impression    IMPRESSION: Small bilateral pleural effusions. 11 mm left apical pneumothorax, unchanged. Left subclavian approach pacing device. Cardiac silhouette within normal limits. Bibasilar atelectasis.   Troponin T, High Sensitivity   Result Value Ref Range    Troponin T, High Sensitivity 37 (H) <=22 ng/L       EKG reviewed by me: Paced rhythm, with rapid ventricular response.  Left bundle branch block.  No acute change compared with previous EKG from April 2024             Impression     Final diagnoses:   Acute chest pain   Apical pneumothorax on left         ED Course & Medical Decision Making   Khang Bailon is a 79 year old male who presented to the emergency department by EMS after he developed severe chest pain this morning at around 4:00 AM.  The patient's wife was distraught when she heard her  yelling for help, and she ended up calling 911.  Patient had a pericardiocentesis procedure done yesterday at [facility], but unfortunately they could not draw off any fluid.  Patient had a pericardial effusion that was discovered on an echocardiogram.  When they went in with the pericardiocentesis needle, no fluid was extracted and they thought that the fluid was coming from behind the heart.  Right after the procedure, patient had an episode of severe left-sided chest pain that did resolve with bringing the arm up overhead.  It was thought that the patient was  having a muscle spasm.  Patient was discharged home, and this morning, had an episode of severe pain that he thought was a heart attack.  Patient had some mild increased shortness of breath.  Patient is on Eliquis.    Vital signs reveal a temp of 99.1, blood pressure of 105/77, heart rate of 105, respiration 18, 95% oxygen saturation.  On exam, patient is in severe distress with left-sided chest wall tenderness as well as epigastric tenderness.  Lungs are clear bilaterally.    Patient received the following medications while in the ED:    Workup reveals a white blood count of 19.3, hemoglobin of 8.3, platelet count of 397.  Comprehensive metabolic panel is normal except for slightly low sodium of 134, glucose of 113, calcium of 8.4.  Normal kidney and liver enzymes.  INR levels 1.59, troponins 39, BNP of 881.  Venous blood gas reveals a pH of 7.37, pCO2 47, pO2 of 22.  Chest x-ray reveals minimal left apical pneumothorax.  Scattered interstitial changes, similar to previous.  A cardiac implantable electronic device is in place.    Patient received the following medications  Medications   HYDROmorphone (PF) (DILAUDID) injection 0.5 mg (0.5 mg Intravenous $Given 8/10/24 0743)     A 2-hour troponin level came back at 37.  Repeat chest x-ray performed 2 to 3 hours after the initial x-ray revealed small bilateral pleural effusions.  11 mm left apical pneumothorax unchanged.  Cardiac silhouette within normal limits.  Bibasilar atelectasis.    I had a long discussion with the patient and his wife.  I have recommended hospitalization for observation.  Patient states that his pain has completely resolved for several hours now and he wants to go home.  He understands that the pain could come back and that he may end up with a return visit to the ED.  Patient is requesting discharge at this time.    Patient will continue close monitoring of his oxygen saturations with oximeter.  He was given oxycodone tablets to take for  breakthrough pain.  His [place] prescription monitoring profile was queried and he had received Dilaudid pills several days ago.  He can still use the oxycodone for the breakthrough pain.  He was urged to return if he is unable to keep his oxygen saturations greater than 90-92% or if he has unrelenting pain that is not resolved.  He refuses to come in tomorrow for chest x-ray but is willing to come in on Monday.  I will message his primary care provider to ensure that there is some follow-up.        Written after-visit summary and instructions were given at the time of discharge.          Discharge Instructions:   We did not find any evidence for acute heart attack.  You have an apical pneumothorax on the left side, most likely from your recent pericardiocentesis procedure.  Continue to monitor for any worsening pain or shortness of breath.  Use supplemental oxygen as needed to maintain oxygen saturation of 92-96%.  Take Tylenol 1000 mg every 6 hours as needed for pain.  Reserve oxycodone for severe pain.  You could also take your hydromorphone pills.  Follow-up on Monday for repeat chest x-ray.  Follow-up with Dr. Degroot in 2 to 3 days.  Return to the emergency department at any time if your symptoms worsen.       Disclaimer: This note consists of words and symbols derived from keyboarding and dictation using voice recognition software.  As a result, there may be errors that have gone undetected.  Please consider this when interpreting information found in this note.       Marisel Tompkins MD  08/10/24 0371

## 2024-08-10 NOTE — DISCHARGE INSTRUCTIONS
We did not find any evidence for acute heart attack.  You have an apical pneumothorax on the left side, most likely from your recent pericardiocentesis procedure.  Continue to monitor for any worsening pain or shortness of breath.  Use supplemental oxygen as needed to maintain oxygen saturation of 92-96%.  Take Tylenol 1000 mg every 6 hours as needed for pain.  Reserve oxycodone for severe pain.  You could also take your hydromorphone pills.  Follow-up on Monday for repeat chest x-ray.  Follow-up with Dr. Degroot in 2 to 3 days.  Return to the emergency department at any time if your symptoms worsen.

## 2024-08-10 NOTE — ED NOTES
Patient's brief was changed prior to discharge per patient's family request. Patient was able to stand and pivot to wheelchair with 2 staff assist. Patient educated about pulse ox monitor use and O2 parameters. Patient and family verbalized understanding.

## 2024-08-10 NOTE — ED TRIAGE NOTES
PT woke up at 0400 this morning with stabbing chest pain. He has a pacemaker. When EMS arrived he was sating 80% O2 on RA. He was put on 10 L by EMS. He was given 1 spray on nitroglycerin by EMS, .5 mg of dilaudid, and 4 baby aspirin. His BP went down to 91/46

## 2024-08-12 ENCOUNTER — HOSPITAL ENCOUNTER (OUTPATIENT)
Dept: GENERAL RADIOLOGY | Facility: CLINIC | Age: 79
Discharge: HOME | End: 2024-08-12
Attending: FAMILY MEDICINE | Admitting: FAMILY MEDICINE
Payer: COMMERCIAL

## 2024-08-12 DIAGNOSIS — J93.9 PNEUMOTHORAX ON LEFT: ICD-10-CM

## 2024-08-12 DIAGNOSIS — R07.9 ACUTE CHEST PAIN: ICD-10-CM

## 2024-08-12 PROCEDURE — 71046 X-RAY EXAM CHEST 2 VIEWS: CPT

## 2024-08-13 NOTE — TELEPHONE ENCOUNTER
Left message on confidential voicemail with Dr. Degroot approval for Home Care orders as requested.    Maria Del Carmen Randall RN on 8/13/2024 at 8:55 AM

## 2024-08-15 ENCOUNTER — OFFICE VISIT (OUTPATIENT)
Dept: FAMILY MEDICINE | Facility: CLINIC | Age: 79
End: 2024-08-15
Payer: COMMERCIAL

## 2024-08-15 ENCOUNTER — HOSPITAL ENCOUNTER (OUTPATIENT)
Dept: GENERAL RADIOLOGY | Facility: CLINIC | Age: 79
Discharge: HOME | End: 2024-08-15
Attending: STUDENT IN AN ORGANIZED HEALTH CARE EDUCATION/TRAINING PROGRAM | Admitting: STUDENT IN AN ORGANIZED HEALTH CARE EDUCATION/TRAINING PROGRAM
Payer: COMMERCIAL

## 2024-08-15 ENCOUNTER — TELEPHONE (OUTPATIENT)
Dept: FAMILY MEDICINE | Facility: CLINIC | Age: 79
End: 2024-08-15

## 2024-08-15 VITALS
HEART RATE: 102 BPM | RESPIRATION RATE: 24 BRPM | HEIGHT: 70 IN | DIASTOLIC BLOOD PRESSURE: 60 MMHG | OXYGEN SATURATION: 90 % | SYSTOLIC BLOOD PRESSURE: 104 MMHG | TEMPERATURE: 98.1 F | WEIGHT: 142 LBS | BODY MASS INDEX: 20.33 KG/M2

## 2024-08-15 DIAGNOSIS — J44.9 MODERATE COPD (CHRONIC OBSTRUCTIVE PULMONARY DISEASE) (H): Chronic | ICD-10-CM

## 2024-08-15 DIAGNOSIS — R91.1 PULMONARY NODULE: ICD-10-CM

## 2024-08-15 DIAGNOSIS — R97.20 ELEVATED PSA: ICD-10-CM

## 2024-08-15 DIAGNOSIS — I31.39 PERICARDIAL EFFUSION: ICD-10-CM

## 2024-08-15 DIAGNOSIS — I44.2 THIRD DEGREE HEART BLOCK (H): ICD-10-CM

## 2024-08-15 DIAGNOSIS — I25.10 CORONARY ARTERY DISEASE INVOLVING NATIVE CORONARY ARTERY OF NATIVE HEART WITHOUT ANGINA PECTORIS: Chronic | ICD-10-CM

## 2024-08-15 DIAGNOSIS — J96.21 ACUTE ON CHRONIC RESPIRATORY FAILURE WITH HYPOXIA (H): ICD-10-CM

## 2024-08-15 DIAGNOSIS — C67.9 UROTHELIAL CARCINOMA OF BLADDER (H): Chronic | ICD-10-CM

## 2024-08-15 DIAGNOSIS — J95.811 POSTPROCEDURAL PNEUMOTHORAX: ICD-10-CM

## 2024-08-15 DIAGNOSIS — I50.22 CHRONIC SYSTOLIC CONGESTIVE HEART FAILURE (H): Chronic | ICD-10-CM

## 2024-08-15 DIAGNOSIS — D12.8 TUBULOVILLOUS ADENOMA OF RECTUM: ICD-10-CM

## 2024-08-15 DIAGNOSIS — Z86.73 HISTORY OF CVA (CEREBROVASCULAR ACCIDENT): ICD-10-CM

## 2024-08-15 DIAGNOSIS — J95.811 POSTPROCEDURAL PNEUMOTHORAX: Primary | ICD-10-CM

## 2024-08-15 DIAGNOSIS — I71.43 INFRARENAL ABDOMINAL AORTIC ANEURYSM (AAA) WITHOUT RUPTURE (H): ICD-10-CM

## 2024-08-15 DIAGNOSIS — E43 SEVERE MALNUTRITION (H): ICD-10-CM

## 2024-08-15 DIAGNOSIS — N40.0 ENLARGED PROSTATE: ICD-10-CM

## 2024-08-15 PROCEDURE — 99214 OFFICE O/P EST MOD 30 MIN: CPT | Performed by: STUDENT IN AN ORGANIZED HEALTH CARE EDUCATION/TRAINING PROGRAM

## 2024-08-15 PROCEDURE — 71046 X-RAY EXAM CHEST 2 VIEWS: CPT

## 2024-08-15 PROCEDURE — G2211 COMPLEX E/M VISIT ADD ON: HCPCS | Performed by: STUDENT IN AN ORGANIZED HEALTH CARE EDUCATION/TRAINING PROGRAM

## 2024-08-15 ASSESSMENT — PAIN SCALES - GENERAL: PAINLEVEL: SEVERE PAIN (6)

## 2024-08-15 NOTE — PROGRESS NOTES
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    Severe malnutrition (H24)       Circulatory    Coronary artery disease involving native coronary artery of native heart without angina pectoris (Chronic)    Chronic systolic congestive heart failure (H) (Chronic)    Third degree heart block (H) - s/p pacemaker    Infrarenal abdominal aortic aneurysm (AAA) without rupture (H24)       Urinary    Urothelial carcinoma of bladder (H) (Chronic)    Enlarged prostate       Other    Elevated PSA    History of CVA (cerebrovascular accident) - old left internal capsule lacunar infarct on CT 7/2016     Other Visit Diagnoses       Postprocedural pneumothorax    -  Primary    Relevant Orders    XR Chest 2 Views    Pericardial effusion        Acute on chronic respiratory failure with hypoxia (H)        Relevant Orders    OXYGEN TUBING - MASK (Completed)    Miscellaneous DME Supply Order (Use only if a more specific DME order does not already exist)           Patient generally stable from a respiratory standpoint.  Previous pneumothorax improved slightly without intervention and will repeat x-ray today.  Did discuss need for follow-up in ER if worsening or things not improve or worsening respiratory symptoms with concern for worsening effusion or pneumothorax and tamponade.  Complicated by his underlying COPD and suspected lung cancer.  Cardiology in [place] not planning for further pericardiocentesis but awaiting biopsy results and he is willing to do this now with pulmonology through [facility].  They will call to make sure this is scheduled today.  Does not seem to have acute exacerbation of his COPD but we did discuss potential trial of prednisone in the future if worsening but respiratory status multifactorial.  Does not appear to have acute  decompensation now but understands the need to be seen in the ER and likely admitted if worsening symptoms.  Plan for trial of mask for his oxygen which they think would be more helpful at night.  We did discuss his goals of care long-term and Khang fairly resistant to the idea of lung cancer and not wanting further discussion today regarding his goals or hospice but daughter seem to be more open to this now and will look into other resources for him.  Will also order for care coordination to talk regarding these resources as well as living situation as I have concern for their ability to care for him at home. Repeat xray and follow up in 1-2 weeks with me and with pulmonology. Consider hospice and palliative care follow up again but they are undecided.      MED REC REQUIRED  Post Medication Reconciliation Status:  Discharge medications reconciled and changed, see notes/orders        Subjective   Khang is a 79 year old, presenting for the following health issues:  Hospital F/U    HPI       ED/UC Followup:    Facility:  [facility]  Date of visit: 08/10/2024  Reason for visit: Chest Pain  Current Status: Improving    Patient with complex medical history with recent visit to the ER for pneumothorax after pericardiocentesis attempt without fluid drained in [facility].  Patient discharged and in ER with chest pain.  Noted to be around his pacemaker but this did improve while he was there with negative workup other than pneumothorax that was found.  Hospitalization was recommended at that time but they opted to go home instead of waiting for a bed to be available.  Have refused hospitalizations multiple times with needing to wait for beds to become available.  Patient was oxygenating well and things have actually been pretty stable since being at home with improvement in his breathing.  Still occasional deep cough without significant wheeze or increased sputum production.  Using oxygen nightly and  "at times during the day with saturations remaining above 90.  No recent steroid use for his COPD.  Previously did not want to follow-up with pulmonology for biopsy of suspected lung cancer on previous PET scan.  Also following with urology regarding bladder cancer and suspected prostate based on PET scan findings.  Has had recurrent urinary tract infections but most recent symptoms largely improved and mixed kami on culture.  Normal output now and reports to be drinking fluids but seems slightly dry.  Minimal appetite but notes trying to be increasing fluid intake.  Wife trying to push increased protein as well as daughter who lives 15 minutes away.  Wife dealing with chronic back pain issues as well making it difficult to care for Khang.  Difficulty control bowel and bladder and has catheter in place.  Previous resistant bug on urine culture that did require hospitalization and does have history of aspiration pneumonia.    Review of Systems  Constitutional, HEENT, cardiovascular, pulmonary, GI, , musculoskeletal, neuro, skin, endocrine and psych systems are negative, except as otherwise noted.      Objective    /60   Pulse 102   Temp 98.1  F (36.7  C) (Temporal)   Resp 24   Ht 1.778 m (5' 10\")   Wt 64.4 kg (142 lb)   SpO2 90%   BMI 20.37 kg/m    Body mass index is 20.37 kg/m .  Physical Exam   GENERAL: alert and no distress, skinny in wheelchair  EYES: Eyes grossly normal to inspection, PERRL and conjunctivae and sclerae normal  HENT: nose and mouth without ulcers or lesions, dry  NECK: no adenopathy, no asymmetry, masses, or scars  RESP: Mild rhonchi in lower lobes with distant lung sounds  CV: regular rate and rhythm, no peripheral edema, distant heart sounds  ABDOMEN: soft, nontender, no hepatosplenomegaly, no masses and bowel sounds normal  MS: no gross musculoskeletal defects noted, no edema, no rib tenderness to palpation currently  SKIN: no suspicious lesions or rashes  NEURO:mentation intact " and speech normal  PSYCH: mentation appears normal, affect normal/bright            Signed Electronically by: Neville Degroot MD

## 2024-08-15 NOTE — TELEPHONE ENCOUNTER
Home Care is calling regarding an established patient with  Holvi Rita.         8/15/2024     9:39 AM   Home Care Information    Name/Phone Number Alaina RAYMOND [phone]   Home Care agency [facility]         Requesting orders from: Neville Degroot  Provider is following patient: Yes  Is this a 60-day recertification request?  No    Orders Requested    HHA (Home Health Aide)  Request for initial certification (first set of orders)   BAthing  Frequency:  1x/wk for 2 wks      Information was gathered and will be sent to provider for review.  RN will contact Home Care with information after provider review.  Confirmed ok to leave a detailed message with call back.  Contact information confirmed and updated as needed.    Maritza Marsh RN

## 2024-08-16 ENCOUNTER — PATIENT OUTREACH (OUTPATIENT)
Dept: CARE COORDINATION | Facility: CLINIC | Age: 79
End: 2024-08-16
Payer: COMMERCIAL

## 2024-08-16 NOTE — PROGRESS NOTES
Clinic Care Coordination Contact  Community Health Worker Initial Outreach    CHW Initial Information Gathering:  Referral Source: PCP  Preferred Hospital: [facility], [place]  [phone]  Preferred Urgent Care: [facility] - [place], [phone]  Current living arrangement:: I live in a private home with spouse  Type of residence:: Private home - no stairs  Community Resources: None  Supplies Currently Used at Home: Chux, Incontinence Supplies, Oxygen Tubing/Supplies  Equipment Currently Used at Home: none  Informal Support system:: Family  No PCP office visit in Past Year: No  Transportation means:: Family, Regular car       Patient accepts CC: Yes. Patient scheduled for assessment with the SW on 8/19/24 at 10:00 am. Patient noted desire to discuss supports in the home. The patient's wife stated that they are having some road work completed and that they are now going to have a sidewalk put in front of her house and she will have to maintain it. The wife stated that she will not be able to maintain it in the winter.     [place] CHW  [phone]  [facility]  [facility]

## 2024-08-19 ENCOUNTER — PATIENT OUTREACH (OUTPATIENT)
Dept: FAMILY MEDICINE | Facility: CLINIC | Age: 79
End: 2024-08-19
Payer: COMMERCIAL

## 2024-08-19 NOTE — LETTER
August 19, 2024      ZAHRA CEDENO  [address]  [place] [facility] [address]      Dear Zahra:    Ronda, my name is May. You are eligible for [facility] s Case Management program through your enrollment in [employer]. We think you may benefit from this program. I am writing to invite you to be in our Case Management program.     The following are a few things the Case Management program can help you with:  Select or change your primary care doctor or primary care clinic  Find a specialist, if needed, near your home  Receive preventive care, such as flu shots  Join programs offered by [facility] that interest you, like wellness programs    As your , I will do the following to enroll you in the Case Management Program:  Schedule a telephone call with you to answer any questions you may have about case management  Conduct an assessment by phone to identify needs case management can help you with  Develop a care plan to address those needs  Help you obtain available care and resources as needed    I will call you soon to discuss your interest in this program and your health care needs.    Being in the Case Management program is voluntary and offered to you at no cost. If you accept being in the Case Management program, you can stop any time by calling me at [phone].    Sincerely,    May Damico, ROCIO            [address], [place] [address]  [phone]  fax [phone]  [facility]    [id number]                                   (01/2020)

## 2024-08-19 NOTE — PROGRESS NOTES
Clinic Care Coordination Contact  Clinic Care Coordination Contact  OUTREACH    Referral Information:  Referral Source: PCP    Primary Diagnosis: Psychosocial    Chief Complaint   Patient presents with    Clinic Care Coordination - Initial     SW CC        [place] Utilization: as noted below  Clinic Utilization  Difficulty keeping appointments:: No  Compliance Concerns: Yes  No-Show Concerns: No  No PCP office visit in Past Year: No  Utilization      No Show Count (past year)  5             ED Visits  8             Hospital Admissions  2                    Current as of: 8/19/2024 10:07 AM                Clinical Concerns:  Current Medical Concerns:  per spouse pt is getting home care to help with medical concerns/recovery.  Per chart there are recommendations for hospice yet pt and family not willing to go that route at this time.     Current Behavioral Concerns: spouse declined any concerns.     Education Provided to patient: spouse declined any questions or needs.       Health Maintenance Reviewed: Due/Overdue   Health Maintenance Due   Topic Date Due    HF ACTION PLAN  Never done    COPD ACTION PLAN  Never done    Pneumococcal Vaccine: 65+ Years (1 of 2 - PCV) Never done    DTAP/TDAP/TD IMMUNIZATION (1 - Tdap) Never done    ZOSTER IMMUNIZATION (1 of 2) Never done    RSV VACCINE (Pregnancy & 60+) (1 - 1-dose 60+ series) Never done    MEDICARE ANNUAL WELLNESS VISIT  09/16/2016    COVID-19 Vaccine (1 - 2023-24 season) Never done       Clinical Pathway: None    Medication Management:  Medication review status: Medications reviewed and no changes reported per patient.        Spouse noted no concerns.     Functional Status:  Dependent ADLs:: Ambulation-walker, Wheelchair-with assist, Positioning, Transfers, Grooming, Dressing, Toileting, Bathing, Incontinence  Dependent IADLs:: Cleaning, Laundry, Cooking, Shopping, Medication Management, Meal Preparation, Money Management, Transportation, Incontinence  Bed or  wheelchair confined:: No  Mobility Status: Dependent/Assisted by Another  Fallen 2 or more times in the past year?: (!) Yes  Any fall with injury in the past year?: Yes    Living Situation:  Current living arrangement:: I live in a private home with spouse  Type of residence:: Private home - stairs    Lifestyle & Psychosocial Needs:    Social Determinants of Health     Food Insecurity: Low Risk  (8/19/2024)    Food Insecurity     Within the past 12 months, did you worry that your food would run out before you got money to buy more?: No     Within the past 12 months, did the food you bought just not last and you didn t have money to get more?: No   Depression: Not at risk (1/9/2024)    PHQ-2     PHQ-2 Score: 0   Housing Stability: Low Risk  (8/19/2024)    Housing Stability     Do you have housing? : Yes     Are you worried about losing your housing?: No   Tobacco Use: Medium Risk (8/15/2024)    Patient History     Smoking Tobacco Use: Former     Smokeless Tobacco Use: Never     Passive Exposure: Not on file   Financial Resource Strain: Low Risk  (8/19/2024)    Financial Resource Strain     Within the past 12 months, have you or your family members you live with been unable to get utilities (heat, electricity) when it was really needed?: No   Alcohol Use: Not on file   Transportation Needs: Low Risk  (8/19/2024)    Transportation Needs     Within the past 12 months, has lack of transportation kept you from medical appointments, getting your medicines, non-medical meetings or appointments, work, or from getting things that you need?: No   Physical Activity: Not on file   Interpersonal Safety: Low Risk  (8/19/2024)    Interpersonal Safety     Do you feel physically and emotionally safe where you currently live?: Yes     Within the past 12 months, have you been hit, slapped, kicked or otherwise physically hurt by someone?: No     Within the past 12 months, have you been humiliated or emotionally abused in other ways by  your partner or ex-partner?: No   Stress: Not on file   Social Connections: Unknown (8/19/2024)    Social Connection and Isolation Panel [NHANES]     Frequency of Communication with Friends and Family: Not on file     Frequency of Social Gatherings with Friends and Family: Not on file     Attends Amish Services: Not on file     Active Member of Clubs or Organizations: Not on file     Attends Club or Organization Meetings: Not on file     Marital Status:    Health Literacy: Not on file     Diet:: Regular  Inadequate nutrition (GOAL):: No  Tube Feeding: No  Inadequate activity/exercise (GOAL):: Yes  Significant changes in sleep pattern (GOAL): No  Transportation means:: Family, Regular car     Amish or spiritual beliefs that impact treatment:: No  Mental health DX:: No  Chemical Dependency Status: No Current Concerns  Informal Support system:: Family        Spouse noted financial as a concern for incontinent products.  Unfortunately medicare does not pay for incontinent products.  She also noted some challenges with paying for ambulance trips as he had 4 and their copay is $300 each.  Discussed calling the billing number on the ambulance bill to check into payment options.      Spouse noted challenges with future shoveling of new sidewalk.  Also potential other needs.  Discussed calling the [facility] to see about supports for seniors with new sidewalk and number given ([phone]).  Also talked about Senior Linkage Line  [phone] and calling to ask about a  as they may also have additional resources for supports.      Spouse noted no other needs at this time.      Resources and Interventions:  Current Resources:   Skilled Home Care Services: Skilled Nursing, Home Health Aid, Physical Therapy  Community Resources: Home Care  Supplies Currently Used at Home: Chux, Incontinence Supplies, Oxygen Tubing/Supplies  Equipment Currently Used at Home: other (see comments),  wheelchair, manual, commode chair (hospital bed, catheter)  Employment Status: retired         Advance Care Plan/Directive  Advanced Care Plans/Directives on file:: Yes  Status of record:: On File and Validated  Type Advanced Care Plans/Directives: Advanced Directive - On File            Care Plan:  N/a    Patient/Caregiver understanding: n/a       Future Appointments                In 2 weeks Joshua Fernandes MD [facility]    In 4 months Tito Palmer APRN CNP [facility]            Plan: will send introduction letter and [employer] welcome letter.  At this time no planned outreaches as spouse was only looking for resources.     ROCIO Glass [facility] - Care Coordinator   8/19/2024   10:31 AM  [phone]

## 2024-08-19 NOTE — LETTER
[facility] CARE COORDINATION - [address] [address]  Clinic: [phone]     August 19, 2024    Khang Bailon  [address] [place] [facility] [address]      Dear Khang,    I am a clinic care coordinator who works with Neville Degroot MD with the [facility]. I wanted to thank you for spending the time to talk with me.  Below is a description of clinic care coordination and how I can further assist you.       The clinic care coordination team is made up of a registered nurse, , financial resource worker and community health worker who understand the health care system. The goal of clinic care coordination is to help you manage your health and improve access to the health care system. Our team works alongside your provider to assist you in determining your health and social needs. We can help you obtain health care and community resources, providing you with necessary information and education. We can work with you through any barriers and develop a care plan that helps coordinate and strengthen the communication between you and your care team.  Our services are voluntary and are offered without charge to you personally.    Please feel free to contact me with any questions or concerns regarding care coordination and what we can offer.      We are focused on providing you with the highest-quality healthcare experience possible.    Sincerely,     ROCIO Glass   [facility] - Care Coordination  [facility]   [phone]      Enclosed: I have enclosed a letter specific to your [facility] eligibility for Case Management services.

## 2024-08-19 NOTE — TELEPHONE ENCOUNTER
Detailed approval left on confidential home care voicemail    Roberto Maier RN on 8/19/2024 at 10:17 AM

## 2024-08-20 DIAGNOSIS — N40.0 BENIGN PROSTATIC HYPERPLASIA, UNSPECIFIED WHETHER LOWER URINARY TRACT SYMPTOMS PRESENT: Primary | ICD-10-CM

## 2024-08-20 RX ORDER — TAMSULOSIN HYDROCHLORIDE 0.4 MG/1
0.4 CAPSULE ORAL AT BEDTIME
Qty: 90 CAPSULE | Refills: 3 | Status: SHIPPED | OUTPATIENT
Start: 2024-08-20

## 2024-08-20 NOTE — TELEPHONE ENCOUNTER
Pt wife requesting fill for tamsulosin. Usually goes thru a Dr. Loja (?), urologist, in [facility]. Usually fills at [facility] in [facility], but we called 08/19, and there were no refills. Wife is requesting Dr. Degroot fill this med, and the pt only has 2 days left, so urgency is greatly appreciated.    Thank you,  Meliza Solares, [facility] Float [facility]

## 2024-08-23 ENCOUNTER — TELEPHONE (OUTPATIENT)
Dept: FAMILY MEDICINE | Facility: CLINIC | Age: 79
End: 2024-08-23
Payer: COMMERCIAL

## 2024-08-23 NOTE — TELEPHONE ENCOUNTER
Order/Referral Request    Who is requesting: DONNA Amor from [facility] (home Health)    Orders being requested: 8/23/2024    Reason service is needed/diagnosis: Looking to Cancel home health aide visit for this week, due to capacity.  Will need verbal confirmation for cancellation, this is due to staffing/capacity issues     When are orders needed by: 8/26/2024    Has this been discussed with Provider: No    Does patient have a preference on a Group/Provider/Facility? no    Does patient have an appointment scheduled?: No    Where to send orders:  Call back - [phone]    Could we send this information to you in [facility] or would you prefer to receive a phone call?:   No preference   Okay to leave a detailed message?: No at Other phone number:  all back - [phone]

## 2024-08-27 DIAGNOSIS — C67.9 UROTHELIAL CARCINOMA OF BLADDER (H): ICD-10-CM

## 2024-08-27 DIAGNOSIS — I26.99 OTHER ACUTE PULMONARY EMBOLISM, UNSPECIFIED WHETHER ACUTE COR PULMONALE PRESENT (H): ICD-10-CM

## 2024-08-27 NOTE — TELEPHONE ENCOUNTER
Spoke with DONNA Amor. Patient did not receive HHA. Patient did not want substitution in care.     Provider gave advice on 08/27, Services requesting cancellation occurred on 08/26.     No further action.     Roberto Maier RN on 8/27/2024 at 2:35 PM

## 2024-08-29 RX ORDER — APIXABAN 5 MG/1
5 TABLET, FILM COATED ORAL 2 TIMES DAILY
Qty: 60 TABLET | Refills: 0 | Status: SHIPPED | OUTPATIENT
Start: 2024-08-29 | End: 2024-09-16

## 2024-08-29 RX ORDER — GABAPENTIN 300 MG/1
300 CAPSULE ORAL 3 TIMES DAILY
Qty: 90 CAPSULE | Refills: 0 | Status: SHIPPED | OUTPATIENT
Start: 2024-08-29 | End: 2024-09-17

## 2024-08-30 ENCOUNTER — TELEPHONE (OUTPATIENT)
Dept: FAMILY MEDICINE | Facility: CLINIC | Age: 79
End: 2024-08-30
Payer: COMMERCIAL

## 2024-08-30 DIAGNOSIS — R52 PAIN: ICD-10-CM

## 2024-08-30 RX ORDER — HYDROMORPHONE HYDROCHLORIDE 2 MG/1
2 TABLET ORAL EVERY 6 HOURS PRN
Qty: 10 TABLET | Refills: 0 | Status: SHIPPED | OUTPATIENT
Start: 2024-08-30 | End: 2024-09-17

## 2024-08-30 NOTE — TELEPHONE ENCOUNTER
Home Care is calling regarding an established patient with  [facility].         8/30/2024    11:16 AM 8/15/2024     9:39 AM   Home Care Information    Name/Phone Number Luis [phone] Alaina RAYMOND [phone]   Home Care agency [facility]      Requesting orders from: Neville Degroot  Provider is following patient: Yes  Is this a 60-day recertification request?  Yes    Orders Requested    Skilled Nursing  Request for recertification   Frequency:  x1 every other week x8 weeks for catheter care management      Order to cancel OT per patient request.   Information was gathered and will be sent to provider for review.  RN will contact Home Care with information after provider review.  Confirmed ok to leave a detailed message with call back.  Contact information confirmed and updated as needed.    Maritza Marsh RN

## 2024-09-03 ENCOUNTER — TELEPHONE (OUTPATIENT)
Dept: FAMILY MEDICINE | Facility: CLINIC | Age: 79
End: 2024-09-03
Payer: COMMERCIAL

## 2024-09-03 NOTE — TELEPHONE ENCOUNTER
Home Care is calling regarding an established patient with  [facility].        8/30/2024    11:16 AM 8/15/2024     9:39 AM   Home Care Information    Name/Phone Number Luis THOMPSON [phone] Alaina RAYMOND [phone]   Home Care agency [facility]     Requesting orders from: Neville Degroot  Provider is following patient: Yes  Is this a 60-day recertification request?  Yes    Orders Requested    Physical Therapy  Request for recertification   Frequency:  1x/wk for 4 wks  then every otherx/wk for 2 wks      Information was gathered and will be sent to provider for review.  RN will contact Home Care with information after provider review.  Confirmed ok to leave a detailed message with call back.  Contact information confirmed and updated as needed.    Mayi Lowery RN

## 2024-09-03 NOTE — TELEPHONE ENCOUNTER
Detailed approval left on confidential home care voicemail    Roberto Maier RN on 9/3/2024 at 2:26 PM

## 2024-09-10 ENCOUNTER — TELEPHONE (OUTPATIENT)
Dept: FAMILY MEDICINE | Facility: CLINIC | Age: 79
End: 2024-09-10
Payer: COMMERCIAL

## 2024-09-10 DIAGNOSIS — Z53.9 DIAGNOSIS NOT YET DEFINED: Primary | ICD-10-CM

## 2024-09-10 PROCEDURE — G0179 MD RECERTIFICATION HHA PT: HCPCS | Performed by: STUDENT IN AN ORGANIZED HEALTH CARE EDUCATION/TRAINING PROGRAM

## 2024-09-10 NOTE — TELEPHONE ENCOUNTER
Reason for Call:  Form, our goal is to have forms completed with 72 hours, however, some forms may require a visit or additional information.    Type of letter, form or note:  Home Health Certification    Who is the form from?: Home care    Where did the form come from: form was faxed in    What clinic location was the form placed at?: [facility]    Where the form was placed: Given to physician    What number is listed as a contact on the form?: [phone]       Additional comments: [facility]    Call taken on 9/10/2024 at 3:19 PM by Lisbeth Arredondo

## 2024-09-11 NOTE — TELEPHONE ENCOUNTER
Left message on confidential voicemail with Dr. Degroot approval for Home Care orders as requested.    Roberto Maier RN on 9/11/2024 at 8:03 AM

## 2024-09-12 ENCOUNTER — OFFICE VISIT (OUTPATIENT)
Dept: FAMILY MEDICINE | Facility: CLINIC | Age: 79
End: 2024-09-12
Payer: COMMERCIAL

## 2024-09-12 VITALS
HEART RATE: 92 BPM | OXYGEN SATURATION: 94 % | TEMPERATURE: 99 F | RESPIRATION RATE: 15 BRPM | DIASTOLIC BLOOD PRESSURE: 50 MMHG | SYSTOLIC BLOOD PRESSURE: 96 MMHG

## 2024-09-12 DIAGNOSIS — E43 SEVERE MALNUTRITION (H): ICD-10-CM

## 2024-09-12 DIAGNOSIS — F11.20 CONTINUOUS OPIOID DEPENDENCE (H): ICD-10-CM

## 2024-09-12 DIAGNOSIS — K59.03 DRUG-INDUCED CONSTIPATION: ICD-10-CM

## 2024-09-12 DIAGNOSIS — Z23 NEED FOR SHINGLES VACCINE: ICD-10-CM

## 2024-09-12 DIAGNOSIS — C67.9 UROTHELIAL CARCINOMA OF BLADDER (H): Chronic | ICD-10-CM

## 2024-09-12 DIAGNOSIS — I26.99 ACUTE PULMONARY EMBOLISM, UNSPECIFIED PULMONARY EMBOLISM TYPE, UNSPECIFIED WHETHER ACUTE COR PULMONALE PRESENT (H): Primary | ICD-10-CM

## 2024-09-12 DIAGNOSIS — Z29.11 NEED FOR VACCINATION AGAINST RESPIRATORY SYNCYTIAL VIRUS: ICD-10-CM

## 2024-09-12 DIAGNOSIS — J44.9 MODERATE COPD (CHRONIC OBSTRUCTIVE PULMONARY DISEASE) (H): Chronic | ICD-10-CM

## 2024-09-12 DIAGNOSIS — R91.1 PULMONARY NODULE: ICD-10-CM

## 2024-09-12 DIAGNOSIS — M54.9 ACUTE BILATERAL BACK PAIN, UNSPECIFIED BACK LOCATION: ICD-10-CM

## 2024-09-12 DIAGNOSIS — Z23 NEED FOR TDAP VACCINATION: ICD-10-CM

## 2024-09-12 PROCEDURE — 99214 OFFICE O/P EST MOD 30 MIN: CPT | Performed by: STUDENT IN AN ORGANIZED HEALTH CARE EDUCATION/TRAINING PROGRAM

## 2024-09-12 PROCEDURE — G2211 COMPLEX E/M VISIT ADD ON: HCPCS | Performed by: STUDENT IN AN ORGANIZED HEALTH CARE EDUCATION/TRAINING PROGRAM

## 2024-09-12 ASSESSMENT — PAIN SCALES - GENERAL: PAINLEVEL: WORST PAIN (10)

## 2024-09-12 NOTE — PROGRESS NOTES
Assessment & Plan   Problem List Items Addressed This Visit          Nervous and Auditory    Acute bilateral back pain, unspecified back location       Respiratory    Moderate COPD (chronic obstructive pulmonary disease) (H) (Chronic)    Pulmonary nodule    Relevant Orders    Home Care Referral       Digestive    Drug-induced constipation    Severe malnutrition (H24)       Circulatory    Acute pulmonary embolism, unspecified pulmonary embolism type, unspecified whether acute cor pulmonale present (H) - Primary       Urinary    Urothelial carcinoma of bladder (H) (Chronic)    Relevant Orders    Home Care Referral       Other    Continuous opioid dependence (H)     Other Visit Diagnoses       Need for shingles vaccine        Need for Tdap vaccination        Need for vaccination against respiratory syncytial virus               No significant postvoid residual on ultrasound today and catheter appears to have clear output.  Suspect discomfort is intermittent spasms which do seem to come and go for him.  Mild improvement with oxybutynin and will plan to increase to 10 mg up to 3 times a day as needed and see if this can give him some relief.  Also discussed potential constipation playing a role in his discomfort as well as irritating bladder.  Could do a trial of suppository if not having regular bowel movement daily along with MiraLAX as he can handle.  Opiates likely not helping and his constipation.  Have been helpful for his back.  Also discussed topicals and lidocaine patches.  Recommend he follow-up with urology to discuss spasms further.  He will call and schedule this today. He will let me know sooner if new or changing symptoms or things not improving, or if recurrent urinary symptoms that would be concerning for infection.      MED REC REQUIRED  Post Medication Reconciliation Status:  Discharge medications reconciled and changed, see notes/orders      Mikaela Quiñonez is a 79 year old, presenting for the  following health issues:  Hospital F/U        9/12/2024     2:09 PM   Additional Questions   Roomed by Rani HEATON     [facility]       ED/UC Followup:    Facility:  [facility]   Date of visit: 09/02/2024  Reason for visit: Urinary cath malfunction  Current Status: Improving    Here with son-in-law and wife today.  Patient seen in the ER with bladder discomfort and found to have catheter malfunction which was replaced.  Things have been better since then but does note intermittent spasming that started yesterday.  Tolerating antibiotics without issues.  Notes breathing and edema to be well-controlled and no chest pain or palpitations.  Still having difficulty with mobility at home and caring for himself which his wife helps with mostly now.  Does have some help from daughter and other family close but still mostly on his elderly wife who is having health issues of her own.  They are looking at hospice options now but would plan to do so from home.  He has not followed up with urology and they were not planning for further treatments of his bladder cancer.  Did discuss follow-up with pulmonology for his suspected lung cancer but he is decided against this as well.  Tolerating anticoagulation without issues.  Continues to have flares of his back pain intermittently limited by his decreased mobility.  Weight decreasing but him and wife do note good appetite recently.  They are not interested in seeing dietitian.  Does continue to have stool output but feels like difficulty straining at times.    Review of Systems  Constitutional, HEENT, cardiovascular, pulmonary, GI, , musculoskeletal, neuro, skin, endocrine and psych systems are negative, except as otherwise noted.      Objective    BP 96/50   Pulse 92   Temp 99  F (37.2  C) (Temporal)   Resp 15   SpO2 94%   There is no height or weight on file to calculate BMI.  Physical Exam   GENERAL: alert and no distress  EYES: Eyes grossly  normal to inspection, PERRL and conjunctivae and sclerae normal  HENT: ear canals and TM's normal, nose and mouth without ulcers or lesions  NECK: no adenopathy, no asymmetry, masses, or scars  RESP: lungs clear to auscultation - no rales, rhonchi or wheezes  CV: regular rate and rhythm, normal S1 S2, no peripheral edema  ABDOMEN: soft, suprapubic tenderness to palpation, and bowel sounds normal  MS: no gross musculoskeletal defects noted, no edema  SKIN: no suspicious lesions or rashes  NEURO: mentation intact and speech normal  PSYCH: mentation appears normal, affect normal          Signed Electronically by: Neville Degroot MD

## 2024-09-13 ENCOUNTER — TELEPHONE (OUTPATIENT)
Dept: FAMILY MEDICINE | Facility: CLINIC | Age: 79
End: 2024-09-13
Payer: COMMERCIAL

## 2024-09-13 DIAGNOSIS — C67.9 UROTHELIAL CARCINOMA OF BLADDER (H): ICD-10-CM

## 2024-09-13 DIAGNOSIS — I26.99 OTHER ACUTE PULMONARY EMBOLISM, UNSPECIFIED WHETHER ACUTE COR PULMONALE PRESENT (H): ICD-10-CM

## 2024-09-13 DIAGNOSIS — E43 SEVERE MALNUTRITION (H): Primary | ICD-10-CM

## 2024-09-13 DIAGNOSIS — N32.89 BLADDER SPASM: ICD-10-CM

## 2024-09-13 DIAGNOSIS — R52 PAIN: ICD-10-CM

## 2024-09-13 DIAGNOSIS — C67.9 UROTHELIAL CARCINOMA OF BLADDER (H): Chronic | ICD-10-CM

## 2024-09-13 NOTE — TELEPHONE ENCOUNTER
[facility] is going to patient home TODAY to evaluate need for Hospice care    Elvia Mccormack, Hospice Liaison - phone [phone]  Fax [phone]    Referral pended

## 2024-09-16 RX ORDER — APIXABAN 5 MG/1
5 TABLET, FILM COATED ORAL 2 TIMES DAILY
Qty: 60 TABLET | Refills: 0 | Status: SHIPPED | OUTPATIENT
Start: 2024-09-16

## 2024-09-17 RX ORDER — HYDROMORPHONE HYDROCHLORIDE 2 MG/1
2 TABLET ORAL EVERY 6 HOURS PRN
Qty: 30 TABLET | Refills: 0 | Status: SHIPPED | OUTPATIENT
Start: 2024-09-17

## 2024-09-17 RX ORDER — GABAPENTIN 300 MG/1
300 CAPSULE ORAL 3 TIMES DAILY
Qty: 90 CAPSULE | Refills: 0 | Status: SHIPPED | OUTPATIENT
Start: 2024-09-17

## 2024-09-17 RX ORDER — OXYBUTYNIN CHLORIDE 5 MG/1
5 TABLET ORAL 3 TIMES DAILY PRN
Qty: 90 TABLET | Refills: 1 | Status: SHIPPED | OUTPATIENT
Start: 2024-09-17

## 2024-10-01 ENCOUNTER — APPOINTMENT (OUTPATIENT)
Dept: GENERAL RADIOLOGY | Facility: CLINIC | Age: 79
DRG: 698 | End: 2024-10-01
Attending: STUDENT IN AN ORGANIZED HEALTH CARE EDUCATION/TRAINING PROGRAM
Payer: COMMERCIAL

## 2024-10-01 ENCOUNTER — HOSPITAL ENCOUNTER (INPATIENT)
Facility: CLINIC | Age: 79
LOS: 4 days | Discharge: HOME HEALTH | End: 2024-10-05
Attending: STUDENT IN AN ORGANIZED HEALTH CARE EDUCATION/TRAINING PROGRAM | Admitting: FAMILY MEDICINE
Payer: COMMERCIAL

## 2024-10-01 ENCOUNTER — APPOINTMENT (OUTPATIENT)
Dept: CT IMAGING | Facility: CLINIC | Age: 79
DRG: 698 | End: 2024-10-01
Attending: STUDENT IN AN ORGANIZED HEALTH CARE EDUCATION/TRAINING PROGRAM
Payer: COMMERCIAL

## 2024-10-01 DIAGNOSIS — N39.0 RECURRENT UTI: ICD-10-CM

## 2024-10-01 DIAGNOSIS — R91.1 PULMONARY NODULE: ICD-10-CM

## 2024-10-01 DIAGNOSIS — I50.22 CHRONIC SYSTOLIC CONGESTIVE HEART FAILURE (H): Chronic | ICD-10-CM

## 2024-10-01 DIAGNOSIS — I25.10 CORONARY ARTERY DISEASE INVOLVING NATIVE CORONARY ARTERY OF NATIVE HEART WITHOUT ANGINA PECTORIS: Chronic | ICD-10-CM

## 2024-10-01 DIAGNOSIS — F11.20 CONTINUOUS OPIOID DEPENDENCE (H): Primary | ICD-10-CM

## 2024-10-01 DIAGNOSIS — T83.511A URINARY TRACT INFECTION ASSOCIATED WITH INDWELLING URETHRAL CATHETER, INITIAL ENCOUNTER (H): ICD-10-CM

## 2024-10-01 DIAGNOSIS — D64.9 ANEMIA, UNSPECIFIED TYPE: ICD-10-CM

## 2024-10-01 DIAGNOSIS — N39.0 URINARY TRACT INFECTION ASSOCIATED WITH INDWELLING URETHRAL CATHETER, INITIAL ENCOUNTER (H): ICD-10-CM

## 2024-10-01 DIAGNOSIS — A41.9 SEPSIS, DUE TO UNSPECIFIED ORGANISM, UNSPECIFIED WHETHER ACUTE ORGAN DYSFUNCTION PRESENT (H): ICD-10-CM

## 2024-10-01 DIAGNOSIS — R55 SYNCOPE, UNSPECIFIED SYNCOPE TYPE: ICD-10-CM

## 2024-10-01 DIAGNOSIS — C67.9 UROTHELIAL CARCINOMA OF BLADDER (H): Chronic | ICD-10-CM

## 2024-10-01 PROBLEM — D68.9 MEDICATION INDUCED COAGULOPATHY (H): Status: ACTIVE | Noted: 2024-10-01

## 2024-10-01 PROBLEM — T50.905A MEDICATION INDUCED COAGULOPATHY (H): Status: ACTIVE | Noted: 2024-10-01

## 2024-10-01 PROBLEM — D72.829 LEUKOCYTOSIS, UNSPECIFIED TYPE: Status: ACTIVE | Noted: 2024-10-01

## 2024-10-01 LAB
ABO/RH(D): NORMAL
ALBUMIN SERPL BCG-MCNC: 3 G/DL (ref 3.5–5.2)
ALBUMIN UR-MCNC: 30 MG/DL
ALP SERPL-CCNC: 71 U/L (ref 40–150)
ALT SERPL W P-5'-P-CCNC: 10 U/L (ref 0–70)
AMMONIA PLAS-SCNC: 10 UMOL/L (ref 16–60)
AMORPH CRY #/AREA URNS HPF: ABNORMAL /HPF
ANION GAP SERPL CALCULATED.3IONS-SCNC: 9 MMOL/L (ref 7–15)
ANTIBODY SCREEN: NEGATIVE
APPEARANCE UR: ABNORMAL
AST SERPL W P-5'-P-CCNC: 10 U/L (ref 0–45)
BACTERIA #/AREA URNS HPF: ABNORMAL /HPF
BASOPHILS # BLD AUTO: 0.1 10E3/UL (ref 0–0.2)
BASOPHILS NFR BLD AUTO: 1 %
BILIRUB DIRECT SERPL-MCNC: <0.2 MG/DL (ref 0–0.3)
BILIRUB SERPL-MCNC: 0.4 MG/DL
BILIRUB UR QL STRIP: NEGATIVE
BLD PROD TYP BPU: NORMAL
BLOOD COMPONENT TYPE: NORMAL
BUN SERPL-MCNC: 14.3 MG/DL (ref 8–23)
CALCIUM SERPL-MCNC: 9 MG/DL (ref 8.8–10.4)
CHLORIDE SERPL-SCNC: 100 MMOL/L (ref 98–107)
CODING SYSTEM: NORMAL
COLOR UR AUTO: YELLOW
CREAT SERPL-MCNC: 0.74 MG/DL (ref 0.67–1.17)
CROSSMATCH: NORMAL
EGFRCR SERPLBLD CKD-EPI 2021: >90 ML/MIN/1.73M2
ELLIPTOCYTES BLD QL SMEAR: SLIGHT
EOSINOPHIL # BLD AUTO: 0.5 10E3/UL (ref 0–0.7)
EOSINOPHIL NFR BLD AUTO: 4 %
ERYTHROCYTE [DISTWIDTH] IN BLOOD BY AUTOMATED COUNT: 21.4 % (ref 10–15)
GLUCOSE BLDC GLUCOMTR-MCNC: 114 MG/DL (ref 70–99)
GLUCOSE SERPL-MCNC: 119 MG/DL (ref 70–99)
GLUCOSE UR STRIP-MCNC: NEGATIVE MG/DL
HCO3 SERPL-SCNC: 27 MMOL/L (ref 22–29)
HCT VFR BLD AUTO: 24.6 % (ref 40–53)
HGB BLD-MCNC: 6.7 G/DL (ref 13.3–17.7)
HGB BLD-MCNC: 7.1 G/DL (ref 13.3–17.7)
HGB UR QL STRIP: ABNORMAL
HOLD SPECIMEN: NORMAL
IMM GRANULOCYTES # BLD: 0.1 10E3/UL
IMM GRANULOCYTES NFR BLD: 1 %
ISSUE DATE AND TIME: NORMAL
KETONES UR STRIP-MCNC: NEGATIVE MG/DL
LACTATE SERPL-SCNC: 1.4 MMOL/L (ref 0.7–2)
LACTATE SERPL-SCNC: 2.2 MMOL/L (ref 0.7–2)
LEUKOCYTE ESTERASE UR QL STRIP: ABNORMAL
LIPASE SERPL-CCNC: 21 U/L (ref 13–60)
LYMPHOCYTES # BLD AUTO: 3.7 10E3/UL (ref 0.8–5.3)
LYMPHOCYTES NFR BLD AUTO: 28 %
MAGNESIUM SERPL-MCNC: 2 MG/DL (ref 1.7–2.3)
MCH RBC QN AUTO: 14.8 PG (ref 26.5–33)
MCHC RBC AUTO-ENTMCNC: 27.2 G/DL (ref 31.5–36.5)
MCV RBC AUTO: 54 FL (ref 78–100)
MONOCYTES # BLD AUTO: 1.1 10E3/UL (ref 0–1.3)
MONOCYTES NFR BLD AUTO: 8 %
MUCOUS THREADS #/AREA URNS LPF: PRESENT /LPF
NEUTROPHILS # BLD AUTO: 7.6 10E3/UL (ref 1.6–8.3)
NEUTROPHILS NFR BLD AUTO: 58 %
NITRATE UR QL: NEGATIVE
NRBC # BLD AUTO: 0 10E3/UL
NRBC BLD AUTO-RTO: 0 /100
PH UR STRIP: 6 [PH] (ref 5–7)
PLAT MORPH BLD: ABNORMAL
PLATELET # BLD AUTO: 376 10E3/UL (ref 150–450)
POTASSIUM SERPL-SCNC: 4 MMOL/L (ref 3.4–5.3)
PROT SERPL-MCNC: 6.4 G/DL (ref 6.4–8.3)
RBC # BLD AUTO: 4.53 10E6/UL (ref 4.4–5.9)
RBC MORPH BLD: ABNORMAL
RBC URINE: 35 /HPF
SODIUM SERPL-SCNC: 136 MMOL/L (ref 135–145)
SP GR UR STRIP: 1.01 (ref 1–1.03)
SPECIMEN EXPIRATION DATE: NORMAL
TROPONIN T SERPL HS-MCNC: 43 NG/L
TROPONIN T SERPL HS-MCNC: 49 NG/L
TSH SERPL DL<=0.005 MIU/L-ACNC: 3.76 UIU/ML (ref 0.3–4.2)
UNIT ABO/RH: NORMAL
UNIT NUMBER: NORMAL
UNIT STATUS: NORMAL
UNIT TYPE ISBT: 600
UROBILINOGEN UR STRIP-MCNC: NORMAL MG/DL
WBC # BLD AUTO: 13 10E3/UL (ref 4–11)
WBC URINE: 90 /HPF

## 2024-10-01 PROCEDURE — 258N000003 HC RX IP 258 OP 636: Performed by: STUDENT IN AN ORGANIZED HEALTH CARE EDUCATION/TRAINING PROGRAM

## 2024-10-01 PROCEDURE — 120N000001 HC R&B MED SURG/OB

## 2024-10-01 PROCEDURE — 84443 ASSAY THYROID STIM HORMONE: CPT | Performed by: STUDENT IN AN ORGANIZED HEALTH CARE EDUCATION/TRAINING PROGRAM

## 2024-10-01 PROCEDURE — 87086 URINE CULTURE/COLONY COUNT: CPT | Performed by: STUDENT IN AN ORGANIZED HEALTH CARE EDUCATION/TRAINING PROGRAM

## 2024-10-01 PROCEDURE — 87040 BLOOD CULTURE FOR BACTERIA: CPT | Performed by: STUDENT IN AN ORGANIZED HEALTH CARE EDUCATION/TRAINING PROGRAM

## 2024-10-01 PROCEDURE — 83735 ASSAY OF MAGNESIUM: CPT | Performed by: STUDENT IN AN ORGANIZED HEALTH CARE EDUCATION/TRAINING PROGRAM

## 2024-10-01 PROCEDURE — 83605 ASSAY OF LACTIC ACID: CPT | Performed by: STUDENT IN AN ORGANIZED HEALTH CARE EDUCATION/TRAINING PROGRAM

## 2024-10-01 PROCEDURE — 84484 ASSAY OF TROPONIN QUANT: CPT | Performed by: STUDENT IN AN ORGANIZED HEALTH CARE EDUCATION/TRAINING PROGRAM

## 2024-10-01 PROCEDURE — 36415 COLL VENOUS BLD VENIPUNCTURE: CPT | Performed by: STUDENT IN AN ORGANIZED HEALTH CARE EDUCATION/TRAINING PROGRAM

## 2024-10-01 PROCEDURE — 80048 BASIC METABOLIC PNL TOTAL CA: CPT | Performed by: STUDENT IN AN ORGANIZED HEALTH CARE EDUCATION/TRAINING PROGRAM

## 2024-10-01 PROCEDURE — 86923 COMPATIBILITY TEST ELECTRIC: CPT | Performed by: INTERNAL MEDICINE

## 2024-10-01 PROCEDURE — 86850 RBC ANTIBODY SCREEN: CPT | Performed by: STUDENT IN AN ORGANIZED HEALTH CARE EDUCATION/TRAINING PROGRAM

## 2024-10-01 PROCEDURE — 83690 ASSAY OF LIPASE: CPT | Performed by: STUDENT IN AN ORGANIZED HEALTH CARE EDUCATION/TRAINING PROGRAM

## 2024-10-01 PROCEDURE — 250N000013 HC RX MED GY IP 250 OP 250 PS 637: Performed by: INTERNAL MEDICINE

## 2024-10-01 PROCEDURE — 36415 COLL VENOUS BLD VENIPUNCTURE: CPT | Performed by: INTERNAL MEDICINE

## 2024-10-01 PROCEDURE — 96361 HYDRATE IV INFUSION ADD-ON: CPT

## 2024-10-01 PROCEDURE — 85025 COMPLETE CBC W/AUTO DIFF WBC: CPT | Performed by: STUDENT IN AN ORGANIZED HEALTH CARE EDUCATION/TRAINING PROGRAM

## 2024-10-01 PROCEDURE — 81003 URINALYSIS AUTO W/O SCOPE: CPT | Performed by: STUDENT IN AN ORGANIZED HEALTH CARE EDUCATION/TRAINING PROGRAM

## 2024-10-01 PROCEDURE — 86923 COMPATIBILITY TEST ELECTRIC: CPT | Performed by: STUDENT IN AN ORGANIZED HEALTH CARE EDUCATION/TRAINING PROGRAM

## 2024-10-01 PROCEDURE — 250N000011 HC RX IP 250 OP 636: Performed by: INTERNAL MEDICINE

## 2024-10-01 PROCEDURE — 82248 BILIRUBIN DIRECT: CPT | Performed by: STUDENT IN AN ORGANIZED HEALTH CARE EDUCATION/TRAINING PROGRAM

## 2024-10-01 PROCEDURE — 99285 EMERGENCY DEPT VISIT HI MDM: CPT | Mod: 25 | Performed by: STUDENT IN AN ORGANIZED HEALTH CARE EDUCATION/TRAINING PROGRAM

## 2024-10-01 PROCEDURE — 71045 X-RAY EXAM CHEST 1 VIEW: CPT

## 2024-10-01 PROCEDURE — 99291 CRITICAL CARE FIRST HOUR: CPT | Mod: 25

## 2024-10-01 PROCEDURE — 85018 HEMOGLOBIN: CPT | Performed by: INTERNAL MEDICINE

## 2024-10-01 PROCEDURE — 96365 THER/PROPH/DIAG IV INF INIT: CPT

## 2024-10-01 PROCEDURE — 82962 GLUCOSE BLOOD TEST: CPT

## 2024-10-01 PROCEDURE — P9016 RBC LEUKOCYTES REDUCED: HCPCS | Performed by: STUDENT IN AN ORGANIZED HEALTH CARE EDUCATION/TRAINING PROGRAM

## 2024-10-01 PROCEDURE — 86900 BLOOD TYPING SEROLOGIC ABO: CPT | Performed by: STUDENT IN AN ORGANIZED HEALTH CARE EDUCATION/TRAINING PROGRAM

## 2024-10-01 PROCEDURE — 70450 CT HEAD/BRAIN W/O DYE: CPT

## 2024-10-01 PROCEDURE — 250N000011 HC RX IP 250 OP 636: Performed by: STUDENT IN AN ORGANIZED HEALTH CARE EDUCATION/TRAINING PROGRAM

## 2024-10-01 PROCEDURE — 999N000156 HC STATISTIC RCP CONSULT EA 30 MIN

## 2024-10-01 PROCEDURE — 82140 ASSAY OF AMMONIA: CPT | Performed by: STUDENT IN AN ORGANIZED HEALTH CARE EDUCATION/TRAINING PROGRAM

## 2024-10-01 PROCEDURE — 99223 1ST HOSP IP/OBS HIGH 75: CPT | Performed by: INTERNAL MEDICINE

## 2024-10-01 PROCEDURE — 999N000157 HC STATISTIC RCP TIME EA 10 MIN

## 2024-10-01 RX ORDER — NALOXONE HYDROCHLORIDE 0.4 MG/ML
0.2 INJECTION, SOLUTION INTRAMUSCULAR; INTRAVENOUS; SUBCUTANEOUS
Status: DISCONTINUED | OUTPATIENT
Start: 2024-10-01 | End: 2024-10-05 | Stop reason: HOSPADM

## 2024-10-01 RX ORDER — PROMETHAZINE HYDROCHLORIDE 25 MG/1
25 TABLET ORAL EVERY 6 HOURS PRN
Status: DISCONTINUED | OUTPATIENT
Start: 2024-10-01 | End: 2024-10-05 | Stop reason: HOSPADM

## 2024-10-01 RX ORDER — CEFEPIME HYDROCHLORIDE 2 G/1
2 INJECTION, POWDER, FOR SOLUTION INTRAVENOUS EVERY 8 HOURS SCHEDULED
Status: DISCONTINUED | OUTPATIENT
Start: 2024-10-01 | End: 2024-10-04

## 2024-10-01 RX ORDER — BISACODYL 5 MG
5 TABLET, DELAYED RELEASE (ENTERIC COATED) ORAL DAILY PRN
Status: DISCONTINUED | OUTPATIENT
Start: 2024-10-01 | End: 2024-10-05 | Stop reason: HOSPADM

## 2024-10-01 RX ORDER — LIDOCAINE HYDROCHLORIDE 20 MG/ML
10 JELLY TOPICAL
COMMUNITY
Start: 2024-06-26

## 2024-10-01 RX ORDER — NALOXONE HYDROCHLORIDE 0.4 MG/ML
0.4 INJECTION, SOLUTION INTRAMUSCULAR; INTRAVENOUS; SUBCUTANEOUS
Status: DISCONTINUED | OUTPATIENT
Start: 2024-10-01 | End: 2024-10-05 | Stop reason: HOSPADM

## 2024-10-01 RX ORDER — SODIUM CHLORIDE 9 MG/ML
INJECTION, SOLUTION INTRAVENOUS CONTINUOUS
Status: DISCONTINUED | OUTPATIENT
Start: 2024-10-01 | End: 2024-10-03

## 2024-10-01 RX ORDER — SODIUM PHOSPHATE,MONO-DIBASIC 19G-7G/118
1 ENEMA (ML) RECTAL DAILY PRN
Status: DISCONTINUED | OUTPATIENT
Start: 2024-10-01 | End: 2024-10-05 | Stop reason: HOSPADM

## 2024-10-01 RX ORDER — IPRATROPIUM BROMIDE AND ALBUTEROL SULFATE 2.5; .5 MG/3ML; MG/3ML
1 SOLUTION RESPIRATORY (INHALATION) EVERY 6 HOURS PRN
Status: DISCONTINUED | OUTPATIENT
Start: 2024-10-01 | End: 2024-10-05 | Stop reason: HOSPADM

## 2024-10-01 RX ORDER — HYDROMORPHONE HYDROCHLORIDE 2 MG/1
2 TABLET ORAL EVERY 6 HOURS PRN
Status: DISCONTINUED | OUTPATIENT
Start: 2024-10-01 | End: 2024-10-05 | Stop reason: HOSPADM

## 2024-10-01 RX ORDER — ACETAMINOPHEN 325 MG/1
650 TABLET ORAL EVERY 4 HOURS PRN
Status: DISCONTINUED | OUTPATIENT
Start: 2024-10-01 | End: 2024-10-01

## 2024-10-01 RX ORDER — NITROFURANTOIN MACROCRYSTALS 50 MG/1
50 CAPSULE ORAL EVERY MORNING
Status: ON HOLD | COMMUNITY
Start: 2024-07-25 | End: 2024-10-05

## 2024-10-01 RX ORDER — TAMSULOSIN HYDROCHLORIDE 0.4 MG/1
0.4 CAPSULE ORAL AT BEDTIME
Status: DISCONTINUED | OUTPATIENT
Start: 2024-10-01 | End: 2024-10-05 | Stop reason: HOSPADM

## 2024-10-01 RX ORDER — OXYBUTYNIN CHLORIDE 5 MG/1
5 TABLET ORAL 3 TIMES DAILY PRN
Status: DISCONTINUED | OUTPATIENT
Start: 2024-10-01 | End: 2024-10-05 | Stop reason: HOSPADM

## 2024-10-01 RX ORDER — GABAPENTIN 300 MG/1
300 CAPSULE ORAL 3 TIMES DAILY
Status: DISCONTINUED | OUTPATIENT
Start: 2024-10-01 | End: 2024-10-05 | Stop reason: HOSPADM

## 2024-10-01 RX ORDER — CEFEPIME HYDROCHLORIDE 1 G/1
1 INJECTION, POWDER, FOR SOLUTION INTRAMUSCULAR; INTRAVENOUS ONCE
Status: DISCONTINUED | OUTPATIENT
Start: 2024-10-01 | End: 2024-10-01

## 2024-10-01 RX ORDER — BISACODYL 10 MG
10 SUPPOSITORY, RECTAL RECTAL DAILY PRN
Status: DISCONTINUED | OUTPATIENT
Start: 2024-10-01 | End: 2024-10-05 | Stop reason: HOSPADM

## 2024-10-01 RX ORDER — POLYETHYLENE GLYCOL 3350 17 G/17G
17 POWDER, FOR SOLUTION ORAL DAILY
Status: DISCONTINUED | OUTPATIENT
Start: 2024-10-01 | End: 2024-10-05 | Stop reason: HOSPADM

## 2024-10-01 RX ORDER — LANOLIN ALCOHOL/MO/W.PET/CERES
3 CREAM (GRAM) TOPICAL
Status: DISCONTINUED | OUTPATIENT
Start: 2024-10-01 | End: 2024-10-05 | Stop reason: HOSPADM

## 2024-10-01 RX ORDER — CEFEPIME HYDROCHLORIDE 1 G/1
1 INJECTION, POWDER, FOR SOLUTION INTRAMUSCULAR; INTRAVENOUS ONCE
Status: COMPLETED | OUTPATIENT
Start: 2024-10-01 | End: 2024-10-01

## 2024-10-01 RX ORDER — ACETAMINOPHEN 325 MG/1
650 TABLET ORAL EVERY 4 HOURS PRN
Status: DISCONTINUED | OUTPATIENT
Start: 2024-10-01 | End: 2024-10-05 | Stop reason: HOSPADM

## 2024-10-01 RX ADMIN — GABAPENTIN 300 MG: 300 CAPSULE ORAL at 20:37

## 2024-10-01 RX ADMIN — HYDROMORPHONE HYDROCHLORIDE 2 MG: 2 TABLET ORAL at 23:48

## 2024-10-01 RX ADMIN — POLYETHYLENE GLYCOL 3350 17 G: 17 POWDER, FOR SOLUTION ORAL at 19:24

## 2024-10-01 RX ADMIN — CEFEPIME HYDROCHLORIDE 1 G: 1 INJECTION, POWDER, FOR SOLUTION INTRAMUSCULAR; INTRAVENOUS at 11:02

## 2024-10-01 RX ADMIN — SODIUM CHLORIDE 1000 ML: 9 INJECTION, SOLUTION INTRAVENOUS at 10:45

## 2024-10-01 RX ADMIN — APIXABAN 5 MG: 5 TABLET, FILM COATED ORAL at 20:37

## 2024-10-01 RX ADMIN — SODIUM CHLORIDE: 9 INJECTION, SOLUTION INTRAVENOUS at 18:13

## 2024-10-01 RX ADMIN — ACETAMINOPHEN 650 MG: 325 TABLET ORAL at 23:48

## 2024-10-01 RX ADMIN — CEFEPIME HYDROCHLORIDE 2 G: 2 INJECTION, POWDER, FOR SOLUTION INTRAVENOUS at 20:36

## 2024-10-01 RX ADMIN — TAMSULOSIN HYDROCHLORIDE 0.4 MG: 0.4 CAPSULE ORAL at 20:37

## 2024-10-01 ASSESSMENT — ACTIVITIES OF DAILY LIVING (ADL)
ADLS_ACUITY_SCORE: 36
ADLS_ACUITY_SCORE: 38
ADLS_ACUITY_SCORE: 36
ADLS_ACUITY_SCORE: 38
ADLS_ACUITY_SCORE: 36
ADLS_ACUITY_SCORE: 38
ADLS_ACUITY_SCORE: 36
ADLS_ACUITY_SCORE: 38
ADLS_ACUITY_SCORE: 36

## 2024-10-01 NOTE — ED NOTES
Pt alert and orienated. Color pinker. Pt has pacemaker. Pt has history of bladder cancer. Spouse states it has spread but pt does not want to know this information. Daughter and spouse have been talking about hospice care. Pt with history of COPD. Pt is on 2 liters 02 at this time. Warm blankets given. Vss stable.

## 2024-10-01 NOTE — MEDICATION SCRIBE - ADMISSION MEDICATION HISTORY
Medication Scribe Admission Medication History    Admission medication history is complete. The information provided in this note is only as accurate as the sources available at the time of the update.    Information Source(s): Family member, spouse Brittany via phone    Pertinent Information: takes all supplements/vitamins in the afternoon     Changes made to PTA medication list:  Added: Macrodantin 50 mg from reconcile list per patient's wife   Lidocaine Mucous membrane jelly - from reconcile list per patient's wife   Deleted: Macrobid 100 mg - no longer taking  Changed: None    Allergies reviewed with patient and updates made in EHR: yes with patient's wife     Medication History Completed By: POLLO VALERO 10/1/2024 5:33 PM    PTA Med List   Medication Sig Note Last Dose    ACE/ARB/ARNI NOT PRESCRIBED (INTENTIONAL) Please choose reason not prescribed from choices below.  na at na    BETA BLOCKER NOT PRESCRIBED (INTENTIONAL) Please choose reason not prescribed from choices below.  na at na    calcium carbonate-vitamin D (CALTRATE) 600-10 MG-MCG per tablet Take 1 tablet by mouth 2 times daily  9/30/2024 at 1500    ELIQUIS ANTICOAGULANT 5 MG tablet TAKE 1 TABLET (5 MG) BY MOUTH 2 TIMES DAILY  10/1/2024 at am    gabapentin (NEURONTIN) 300 MG capsule TAKE 1 CAPSULE (300 MG) BY MOUTH 3 TIMES DAILY  10/1/2024 at am    HYDROmorphone (DILAUDID) 2 MG tablet TAKE 1 TABLET (2 MG) BY MOUTH EVERY 6 HOURS AS NEEDED FOR SEVERE PAIN.  10/1/2024 at am    ipratropium - albuterol 0.5 mg/2.5 mg/3 mL (DUONEB) 0.5-2.5 (3) MG/3ML neb solution INHALE 1 VIAL IN NEBULIZER EVERY 6 HOURS AS NEEDED FOR SHORTNESS OF BREATH, WHEEZING OR COUGH (Patient taking differently: Take 1 vial by nebulization every 6 hours as needed for shortness of breath, wheezing or cough.)  10/1/2024 at 0930    Lidocaine HCl Urethral/Mucosal 2 % external gel Place 10 mLs into the urethra every 28 days. 10/1/2024: Done by home care RN  Past Week at unknown    Multiple  Vitamin (MULTIVITAMIN ADULT PO) Take 1 tablet by mouth daily  9/30/2024 at 1500    nitroFURantoin macrocrystal (MACRODANTIN) 50 MG capsule Take 50 mg by mouth every morning.  10/1/2024 at am    oxyBUTYnin (DITROPAN) 5 MG tablet TAKE 1 TABLET (5 MG) BY MOUTH 3 TIMES DAILY AS NEEDED FOR BLADDER SPASMS  10/1/2024 at am    tamsulosin (FLOMAX) 0.4 MG capsule Take 1 capsule (0.4 mg) by mouth at bedtime  9/30/2024 at hs    vitamin C (ASCORBIC ACID) 500 MG tablet Take 500 mg by mouth daily  9/30/2024 at 1500

## 2024-10-01 NOTE — ED TRIAGE NOTES
"      Patient brought to ED from home for issues with catheter. His wife reported the urine in the tubing \"looked like yoohoo\". Per spouse he was acting his \"normal\" on the way to the ED and when he arrived in the entry way he became less responsive, and RN out to lobby. Patient was slumped in chair and RRT was called. Sabine Richmond RN    "

## 2024-10-01 NOTE — H&P
Admission History and Physical   Khang Bailon, 1945, [id number]  [facility]  Urinary tract infection associated with indwelling urethral catheter, initial encounter (H)  Anemia, unspecified type  Sepsis, due to unspecified organism, unspecified whether acute organ dysfunction present (H)  Syncope, unspecified syncope type  PCP:Neville Degroot, [phone]   Admitting provider: Charlene Brown MD.    Code status:  Full Code          Extended Emergency Contact Information  Primary Emergency Contact: May Clay  Address: [address] [place] [address] [facility]  Mobile Phone: [phone]  Relation: Daughter  Secondary Emergency Contact: Brittany Bailon  Address: [address] [place] [address] [facility]  Home Phone: [phone]  Mobile Phone: [phone]  Relation: Spouse       Assessment and Plan  Principal Problem:    Sepsis, due to unspecified organism, unspecified whether acute organ dysfunction present (H)  Active Problems:    Moderate COPD (chronic obstructive pulmonary disease) (H)    Acute pulmonary embolism, unspecified pulmonary embolism type, unspecified whether acute cor pulmonale present (H)    Continuous opioid dependence (H)    Syncope, unspecified syncope type    Anemia, unspecified type    Medication induced coagulopathy (H)    Urinary tract infection associated with indwelling urethral catheter (H)    Leukocytosis, unspecified type    Khang Bailon is a 79 year old male presenting with weakness and cloudy urine in the Murrell catheter tubing and bag.    Sepsis secondary to CAUTI   -h/o transurethral resection of a bladder abnormality performed on 3/29/2024, chronic murrell  This tumor was located on the anterior bladder wall. This demonstrated a high-grade superficially invasive urothelial carcinoma   - chronic murrell in place and changed in ED, per Urology note patient declines SPC  - Ucx pending and Blood Cx  - h/o  Pseudomonas UTI in past and continue cefepime until ID and sensitivities return and possibly can de-escalate abx at that time  - holding home prophylactic Macrobid   - lactic normal   - continue flomax and oxybutynin     Syncope was hypotensive in lobby    - placed on telemetry for now  - continue IVF   -treating infection   - has PM in place interrogation PM.  - IVF  - Hgb low and getting transfusion     Anemia chronic blood loss vs malignancy  - consented and given 1 unit in ED  - rechecking Hgb now  - trend CBC  - transfuse for Hgb 7 or less    H/O PE, medication induced coagulopathy   - continue DOAC     Leukocytosis   - treating as above  - trend CBC     Chronic pain   - continue home regiment of prn dilaudid  - schedule gabapentin   - schedule miralax    COPD not in exacerbation   - continue home nebs     Weakness and recent fall  - PT/OT to assess for home safety     Clinically Significant Risk Factors Present on Admission              # Hypoalbuminemia: Lowest albumin = 3 g/dL at 10/1/2024 10:36 AM, will monitor as appropriate    # Drug Induced Coagulation Defect: home medication list includes an anticoagulant medication    # Hypertension: Noted on problem list  # Chronic heart failure with preserved ejection fraction: heart failure noted on problem list and last echo with EF >50%   # Anemia: based on hgb <11           # Financial/Environmental Concerns:           COVID-19 PCR Results          8/13/2023    14:03 4/26/2024    16:53   COVID-19 PCR Results   SARS CoV2 PCR Positive  Negative      COVID-19 Antibody Results, Testing for Immunity           No data to display              VTE prophylaxis:  DOAC  DIET: Orders Placed This Encounter      Regular Diet Adult    Drains/Lines: none  Weight bearing status: WBAt  Code Status:Full Code will need further discussion with family, per patient he would like no further resuscitation but he states his family would like him full code   Medically Ready for Discharge:  Anticipated in 2-4 Days    Barriers to discharge:cultures      Expected Discharge Date: 10/02/2024              HPI: Khang Bailon is a 79 year old male with h/o transurethral resection of a bladder abnormality performed on 3/29/2024 with chronic murrell, AC with recent PE and weakness and wife brought in with initial concerns of cloudy urine in the Murrell catheter tubing and bag. Patient notes spasms in the bladder region. While in the ED lobby he became less responsive and slumped in chair no CPR or interventions and recovered slowly.     Past Medical History:   Diagnosis Date    Alpha thalassemia (H)     AV block 2012    pacemaker at [facility]    CAD (coronary artery disease)     Chronic systolic heart failure (H)     COPD (chronic obstructive pulmonary disease) (H)     Diverticulitis     History of CVA (cerebrovascular accident)     Injury, other and unspecified, elbow, forearm, and wrist 1970s    Broke right wrist    Urothelial carcinoma (H)      Past Surgical History:   Procedure Laterality Date    COLONOSCOPY      COLONOSCOPY  12/10/2012    CYSTOSCOPY, LITHOLAPAXY, COMBINED N/A 11/10/2021    Procedure: Cystoscopy, Bladder Stone Removal;  Surgeon: Guanaco Stewart MD;  Location: [facility] OR    IMPLANT PACEMAKER      LASER KTP TRANSURETHRAL RESECTION (TUR) PROSTATE N/A 11/10/2021    Procedure: TRANSURETHRAL RESECTION (TUR) PROSTATE, USING KTP LASER;  Surgeon: Guanaco Stewart MD;  Location: [facility] OR     No family history on file.  Social History     Socioeconomic History    Marital status:      Spouse name: Nikki    Number of children: 3    Years of education: Not on file    Highest education level: Not on file   Occupational History     Employer: SELF   Tobacco Use    Smoking status: Former     Current packs/day: 0.00     Types: Cigarettes     Start date: 1962     Quit date: 2012     Years since quittin.1    Smokeless tobacco: Never    Tobacco comments:     Quit in July   Vaping Use     Vaping status: Never Used   Substance and Sexual Activity    Alcohol use: No     Alcohol/week: 0.0 standard drinks of alcohol    Drug use: No    Sexual activity: Not Currently   Other Topics Concern    Parent/sibling w/ CABG, MI or angioplasty before 65F 55M? No   Social History Narrative    Not on file     Social Determinants of Health     Financial Resource Strain: Low Risk  (10/1/2024)    Financial Resource Strain     Within the past 12 months, have you or your family members you live with been unable to get utilities (heat, electricity) when it was really needed?: No   Food Insecurity: Low Risk  (10/1/2024)    Food Insecurity     Within the past 12 months, did you worry that your food would run out before you got money to buy more?: No     Within the past 12 months, did the food you bought just not last and you didn t have money to get more?: No   Transportation Needs: Low Risk  (10/1/2024)    Transportation Needs     Within the past 12 months, has lack of transportation kept you from medical appointments, getting your medicines, non-medical meetings or appointments, work, or from getting things that you need?: No   Physical Activity: Not on file   Stress: Not on file   Social Connections: Unknown (8/19/2024)    Social Connection and Isolation Panel [NHANES]     Frequency of Communication with Friends and Family: Not on file     Frequency of Social Gatherings with Friends and Family: Not on file     Attends Caodaism Services: Not on file     Active Member of Clubs or Organizations: Not on file     Attends Club or Organization Meetings: Not on file     Marital Status:    Interpersonal Safety: Low Risk  (10/1/2024)    Interpersonal Safety     Do you feel physically and emotionally safe where you currently live?: Yes     Within the past 12 months, have you been hit, slapped, kicked or otherwise physically hurt by someone?: No     Within the past 12 months, have you been humiliated or emotionally abused in  other ways by your partner or ex-partner?: No   Housing Stability: Low Risk  (10/1/2024)    Housing Stability     Do you have housing? : Yes     Are you worried about losing your housing?: No     Allergies   Allergen Reactions    Flagyl [Metronidazole] GI Disturbance       PRIOR TO ADMISSION MEDICATIONS   Prior to Admission medications    Medication Sig Last Dose Taking? Auth Provider Long Term End Date   ACE/ARB/ARNI NOT PRESCRIBED (INTENTIONAL) Please choose reason not prescribed from choices below. na at na Yes Neville Degroot MD Yes    BETA BLOCKER NOT PRESCRIBED (INTENTIONAL) Please choose reason not prescribed from choices below. na at na Yes Neville Degroot MD Yes    calcium carbonate-vitamin D (CALTRATE) 600-10 MG-MCG per tablet Take 1 tablet by mouth 2 times daily 9/30/2024 at 1500 Yes Reported, Patient     ELIQUIS ANTICOAGULANT 5 MG tablet TAKE 1 TABLET (5 MG) BY MOUTH 2 TIMES DAILY 10/1/2024 at am Yes Neville Degroot MD     gabapentin (NEURONTIN) 300 MG capsule TAKE 1 CAPSULE (300 MG) BY MOUTH 3 TIMES DAILY 10/1/2024 at am Yes Neville Degroot MD Yes    HYDROmorphone (DILAUDID) 2 MG tablet TAKE 1 TABLET (2 MG) BY MOUTH EVERY 6 HOURS AS NEEDED FOR SEVERE PAIN. 10/1/2024 at am Yes Neville Degroot MD No    ipratropium - albuterol 0.5 mg/2.5 mg/3 mL (DUONEB) 0.5-2.5 (3) MG/3ML neb solution INHALE 1 VIAL IN NEBULIZER EVERY 6 HOURS AS NEEDED FOR SHORTNESS OF BREATH, WHEEZING OR COUGH  Patient taking differently: Take 1 vial by nebulization every 6 hours as needed for shortness of breath, wheezing or cough. 10/1/2024 at 0930 Yes Neville Degroot MD Yes    Lidocaine HCl Urethral/Mucosal 2 % external gel Place 10 mLs into the urethra every 28 days. Past Week at unknown Yes Reported, Patient     Multiple Vitamin (MULTIVITAMIN ADULT PO) Take 1 tablet by mouth daily 9/30/2024 at 1500 Yes Reported, Patient     nitroFURantoin macrocrystal (MACRODANTIN) 50 MG capsule Take 50 mg by mouth  every morning. 10/1/2024 at am Yes Reported, Patient     oxyBUTYnin (DITROPAN) 5 MG tablet TAKE 1 TABLET (5 MG) BY MOUTH 3 TIMES DAILY AS NEEDED FOR BLADDER SPASMS 10/1/2024 at am Yes Neville Degroot MD     tamsulosin (FLOMAX) 0.4 MG capsule Take 1 capsule (0.4 mg) by mouth at bedtime 9/30/2024 at hs Yes Neville Degroot MD No    vitamin C (ASCORBIC ACID) 500 MG tablet Take 500 mg by mouth daily 9/30/2024 at 1500 Yes Reported, Patient     order for DME Equipment being ordered: Nebulizer hose na at na  Cecilio Trejo MD          REVIEW OF SYSTEMS:  12 point reviewed pertinent negatives and positives in HPI all others negative     PHYSICAL EXAM  Temp:  [97.8  F (36.6  C)-98.3  F (36.8  C)] 98.3  F (36.8  C)  Pulse:  [60-83] 68  Resp:  [11-27] 18  BP: ()/(54-90) 131/66  SpO2:  [86 %-100 %] 96 %  Wt Readings from Last 1 Encounters:   10/01/24 62.3 kg (137 lb 5.6 oz)     Body mass index is 19.71 kg/m .  Physical Exam  Vitals and nursing note reviewed.   Constitutional:       General: He is not in acute distress.     Appearance: He is ill-appearing.      Comments: Elderly frail male   HENT:      Head: Normocephalic and atraumatic.      Mouth/Throat:      Mouth: Mucous membranes are dry.      Pharynx: Oropharynx is clear.   Eyes:      Extraocular Movements: Extraocular movements intact.      Conjunctiva/sclera: Conjunctivae normal.   Cardiovascular:      Rate and Rhythm: Normal rate. Rhythm irregular.      Pulses: Normal pulses.      Comments: Device left anterior chest  Pulmonary:      Effort: Pulmonary effort is normal.      Comments: Unlabored breathing, wet weak cough, anterior crackles upper airway otherwise clear at bases.   Abdominal:      General: Bowel sounds are normal. There is no distension.      Palpations: Abdomen is soft.      Tenderness: There is no abdominal tenderness. There is no guarding.   Genitourinary:     Comments: Hernandez in place  Musculoskeletal:         General: Normal range  of motion.      Right lower leg: No edema.      Left lower leg: No edema.   Skin:     General: Skin is warm and dry.      Capillary Refill: Capillary refill takes less than 2 seconds.   Neurological:      General: No focal deficit present.      Mental Status: He is alert and oriented to person, place, and time. Mental status is at baseline.   Psychiatric:      Comments: Flat affect         PERTINENT LABS and RADIOLOGY   Results for orders placed or performed during the hospital encounter of 10/01/24   XR Chest Port 1 View    Impression    IMPRESSION: Retrocardiac/left basilar opacity may reflect combination  of small pleural effusion, atelectasis and/or infection. No convincing  pneumothorax. Similar heart size and left chest wall cardiac device.  Support devices overlie the thorax.    ARTURO POLK MD         SYSTEM ID:  [id number]   CT Head w/o Contrast    Impression    IMPRESSION:     1. No evidence of acute intracranial hemorrhage, mass, or herniation.  2. Moderate diffuse parenchymal volume loss and white matter changes  likely due to chronic microvascular ischemic disease.      BAILEY COMER MD         SYSTEM ID:  [id number]       Imaging results reviewed over the past 24 hrs:   Recent Results (from the past 24 hour(s))   XR Chest Port 1 View    Narrative    CHEST ONE VIEW  10/1/2024 12:09 PM     HISTORY: AMS    COMPARISON: Chest radiograph 8/15/2024.      Impression    IMPRESSION: Retrocardiac/left basilar opacity may reflect combination  of small pleural effusion, atelectasis and/or infection. No convincing  pneumothorax. Similar heart size and left chest wall cardiac device.  Support devices overlie the thorax.    ARTURO POLK MD         SYSTEM ID:  [id number]   CT Head w/o Contrast    Narrative    CT SCAN OF THE HEAD WITHOUT CONTRAST   10/1/2024 12:43 PM     HISTORY: AMS    TECHNIQUE: Axial images of the head and coronal reformations without  IV contrast material. Radiation dose for this  scan was reduced using  automated exposure control, adjustment of the mA and/or kV according  to patient size, or iterative reconstruction technique.    COMPARISON: Head CT 7/12/2016.    FINDINGS: There is no evidence of intracranial hemorrhage, mass, acute  infarct or anomaly. The ventricles are normal in size, shape and  configuration. Moderate diffuse parenchymal volume loss. Moderate  patchy periventricular white matter hypodensities which are  nonspecific, but likely related to chronic microvascular ischemic  disease.     The visualized portions of the sinuses and mastoids appear normal. The  bony calvarium and bones of the skull base appear intact.       Impression    IMPRESSION:     1. No evidence of acute intracranial hemorrhage, mass, or herniation.  2. Moderate diffuse parenchymal volume loss and white matter changes  likely due to chronic microvascular ischemic disease.      BAILEY COMER MD         SYSTEM ID:  [id number]     Recent Labs   Lab 10/01/24  1036 10/01/24  1027   WBC 13.0*  --    HGB 6.7*  --    MCV 54*  --      --      --    POTASSIUM 4.0  --    CHLORIDE 100  --    CO2 27  --    BUN 14.3  --    CR 0.74  --    ANIONGAP 9  --    RENU 9.0  --    * 114*   ALBUMIN 3.0*  --    PROTTOTAL 6.4  --    BILITOTAL 0.4  --    ALKPHOS 71  --    ALT 10  --    AST 10  --    LIPASE 21  --      EKG: per my read appears LBBB    Charlene Brown MD  [facility]

## 2024-10-01 NOTE — ED NOTES
ED Nursing criteria listed below was addressed during verbal handoff:     Abnormal vitals: No  Abnormal results: Yes  Med Reconciliation completed: Yes  Meds given in ED: Yes  Any Overdue Meds: No  Core Measures: N/A  Isolation: No  Special needs: Yes - weak. Ax1 - has murrell catheter. Hx bladder cancer  Skin assessment: Yes, evelyn area per pt itchy and tender    Observation Patient  Education provided: N/A   [facility]  Hospitalist Progress Note  Christopher Morrow MD  10/06/2022    Assessment & Plan   Raúl Marie is a 67 year old male with history of BPH, IgA nephropathy, glaucoma who presents with light-headedness, fatigue, dyspnea and chest pain on exertion in setting of 4 years of intermittent bright red blood per rectum. Admitted on 10/5/2022.     Acute on chronic gastrointestinal bleed, suspect lower   Iron deficiency anemia, severe  Acute blood loss anemia, symptomatic  *Presents with light-headedness, fatigue, dyspnea and chest pain on exertion in setting of 4 years of intermittent bright red blood per rectum, worsening over the last 2-3 weeks. Reports hx of hemorrhoids.   *Has been managing with a homeopath with an oral tincture treatment (Evaristo)   *Hemoglobin 4.6 g/dl, ferritin 3, iron sat low, iron binding capacity high  *Denies NSAID use, denies melena  *Hemodynamically stable, severe iron deficiency suggests likely slow blood loss over rapid bleeding  - GI (Kemal) consulted with plans for EGD and colon on 10/7  - Consented for blood in ED  - Conditional transfusion for Hgb <7 g/dl, hgb 7.1  - Serial hemoglobin  - NPO at midnight   - Continue IV PPI BID     BPH  - continue tamsulosin      Dry cough  CXR clear   - Symptomatic management     Glaucoma  - Continue prior to admission eye drops     Diet:  clear liquid diet  DVT Prophylaxis: Pneumatic Compression Devices  Loredo Catheter: Not present  Code Status:   Full code     Disposition Plan  -- anticipate on 10/8      Interval History   -- chart reviewed  -- GI consult noted  -- on clears for colon prep  -- hgb corrected appropriately    -Data reviewed today: I reviewed all new labs and imaging over the last 24 hours. I personally reviewed no images or EKG's today.    Physical Exam    , Blood pressure (!) 141/71, pulse 69, temperature 98.1  F (36.7  C), temperature source Oral, resp. rate 16, SpO2 98 %.  There were no vitals  filed for this visit.  Vital Signs with Ranges  Temp:  [97.4  F (36.3  C)-98.1  F (36.7  C)] 98.1  F (36.7  C)  Pulse:  [60-85] 69  Resp:  [10-23] 16  BP: ()/(46-71) 141/71  SpO2:  [95 %-100 %] 98 %  I/O's Last 24 hours  I/O last 3 completed shifts:  In: 955   Out: -     Constitutional: Awake, alert, cooperative, no apparent distress, pallor, ambulating       Medications   All medications were reviewed.      latanoprost  1 drop Both Eyes Daily     pantoprazole  40 mg Intravenous BID     polyethylene glycol  238 g Oral Once     sodium chloride (PF)  3 mL Intracatheter Q8H     tamsulosin  0.4 mg Oral At Bedtime        Data   Recent Labs   Lab 10/06/22  0516 10/05/22  1802   WBC 6.1 5.1   HGB 7.1* 4.6*   MCV 74* 69*    358   INR  --  1.10   NA  --  138   POTASSIUM  --  3.6   CHLORIDE  --  105   CO2  --  28   BUN  --  24   CR  --  1.19   ANIONGAP  --  5   SONA  --  8.5   GLC  --  101*       Recent Results (from the past 24 hour(s))   Chest XR,  PA & LAT    Narrative    EXAM: XR CHEST 2 VIEWS  LOCATION: [facility]  DATE/TIME: 10/5/2022 7:31 PM    INDICATION: cough  COMPARISON: 1/11/2011      Impression    IMPRESSION: Heart size and pulmonary vascularity normal. The lungs are clear. Mild thoracolumbar spinal curve.       Christopher Morrow MD  Text Page  (7am to 6pm)

## 2024-10-01 NOTE — ED PROVIDER NOTES
History     Chief Complaint   Patient presents with    Altered Mental Status     HPI  Khang Bailon is a 79 year old male who presents the emergency department with wife with initial concerns of cloudy urine in the Hernandez catheter tubing and bag.  Patient notes spasms in the bladder region.    Allergies:  Allergies   Allergen Reactions    Flagyl [Metronidazole] GI Disturbance       Problem List:    Patient Active Problem List    Diagnosis Date Noted    Syncope, unspecified syncope type 10/01/2024     Priority: Medium    Anemia, unspecified type 10/01/2024     Priority: Medium    Urinary tract infection associated with indwelling urethral catheter, initial encounter (H) 10/01/2024     Priority: Medium    Sepsis, due to unspecified organism, unspecified whether acute organ dysfunction present (H) 10/01/2024     Priority: Medium    Medication induced coagulopathy (H) 10/01/2024     Priority: Medium    Urinary tract infection associated with indwelling urethral catheter (H) 10/01/2024     Priority: Medium    Leukocytosis, unspecified type 10/01/2024     Priority: Medium    Acute pulmonary embolism, unspecified pulmonary embolism type, unspecified whether acute cor pulmonale present (H) 09/12/2024     Priority: Medium    Continuous opioid dependence (H) 09/12/2024     Priority: Medium    Severe malnutrition (H) 04/30/2024     Priority: Medium    Gastrointestinal hemorrhage with melena 04/30/2024     Priority: Medium    Hypoalbuminemia 04/30/2024     Priority: Medium    Lactic acidosis 04/30/2024     Priority: Medium    Enlarged prostate 04/30/2024     Priority: Medium    Coagulopathy (H) 04/30/2024     Priority: Medium    Sacral pressure sore 04/30/2024     Priority: Medium    Abrasion of left elbow 04/30/2024     Priority: Medium    Chronic systolic congestive heart failure (H) 04/27/2024     Priority: Medium    Urothelial carcinoma of bladder (H) 04/27/2024     Priority: Medium    Pneumonia of right lower lobe due  to infectious organism 04/26/2024     Priority: Medium    Septic shock (H) 04/26/2024     Priority: Medium    Acute respiratory failure with hypoxia (H) 04/26/2024     Priority: Medium    Closed fracture of right hip with routine healing 04/16/2024     Priority: Medium    Pain 04/16/2024     Priority: Medium    Drug-induced constipation 04/16/2024     Priority: Medium    Acute blood loss anemia 04/16/2024     Priority: Medium    Postprocedural stricture of anterior urethra 03/21/2024     Priority: Medium    Acute bilateral back pain, unspecified back location 03/19/2024     Priority: Medium    SIRS (systemic inflammatory response syndrome) (H) 02/01/2024     Priority: Medium    Calculus of gallbladder without cholecystitis without obstruction 02/01/2024     Priority: Medium    Infrarenal abdominal aortic aneurysm (AAA) without rupture (H) 02/01/2024     Priority: Medium    Fistula of sigmoid colon 02/01/2024     Priority: Medium    Aspiration into airway, initial encounter 02/01/2024     Priority: Medium    Gross hematuria 02/01/2024     Priority: Medium    Aspirin-like platelet function defect (H) 02/01/2024     Priority: Medium    Diverticulitis 01/31/2024     Priority: Medium    Pulmonary nodule 01/31/2024     Priority: Medium    Mass of urinary bladder 01/31/2024     Priority: Medium    Abnormal urinalysis 01/31/2024     Priority: Medium    Other chest pain 01/31/2024     Priority: Medium    COPD exacerbation (H) 01/31/2024     Priority: Medium    Systolic congestive heart failure, unspecified HF chronicity (H) 01/09/2024     Priority: Medium    Systolic congestive heart failure (H) 01/09/2024     Priority: Medium    Coronary artery disease involving native coronary artery of native heart without angina pectoris 12/05/2023     Priority: Medium    NSTEMI (non-ST elevated myocardial infarction) (H) 11/27/2023     Priority: Medium    COVID-19 08/13/2023     Priority: Medium    Benign prostatic hyperplasia,  unspecified whether lower urinary tract symptoms present 11/01/2021     Priority: Medium    Benign prostatic hyperplasia 11/01/2021     Priority: Medium    Fatigue, unspecified type 11/29/2019     Priority: Medium    Memory loss 11/29/2019     Priority: Medium    Moderate COPD (chronic obstructive pulmonary disease) (H) 11/16/2017     Priority: Medium    Tubulovillous adenoma of rectum 4/9/24 07/26/2016     Priority: Medium    Sigmoid diverticulitis 07/12/2016     Priority: Medium    History of CVA (cerebrovascular accident) - old left internal capsule lacunar infarct on CT 7/2016 07/12/2016     Priority: Medium    Multilevel degenerative disc disease 09/21/2015     Priority: Medium    Elevated prostate specific antigen (PSA) 09/11/2015     Priority: Medium    Elevated PSA 08/27/2012     Priority: Medium    Loss of weight 08/27/2012     Priority: Medium    Third degree heart block (H) - s/p pacemaker 08/10/2012     Priority: Medium    Hypertension goal BP (blood pressure) < 140/90 08/10/2012     Priority: Medium    Other thalassemia (H) 07/27/2012     Priority: Medium     Diagnosis updated by automated process. Provider to review and confirm.      Hyperlipidemia LDL goal <130 07/27/2012     Priority: Medium    Heart block 07/27/2012     Priority: Medium    Cardiac abnormality 07/27/2012     Priority: Medium    Complete heart block (H) 07/27/2012     Priority: Medium    Thalassemia 07/27/2012     Priority: Medium     Formatting of this note might be different from the original.   Formatting of this note might be different from the original.   Diagnosis updated by automated process. Provider to review and confirm.      Painful respiration 03/31/2004     Priority: Medium    Personal history of tobacco use, presenting hazards to health 03/31/2004     Priority: Medium        Past Medical History:    Past Medical History:   Diagnosis Date    Alpha thalassemia (H)     AV block 07/2012    CAD (coronary artery disease)      "Chronic systolic heart failure (H)     COPD (chronic obstructive pulmonary disease) (H)     Diverticulitis     History of CVA (cerebrovascular accident)     Injury, other and unspecified, elbow, forearm, and wrist 1970s    Urothelial carcinoma (H)        Past Surgical History:    Past Surgical History:   Procedure Laterality Date    COLONOSCOPY      COLONOSCOPY  12/10/2012    CYSTOSCOPY, LITHOLAPAXY, COMBINED N/A 11/10/2021    Procedure: Cystoscopy, Bladder Stone Removal;  Surgeon: Guanaco Stewart MD;  Location: [facility]    IMPLANT PACEMAKER      LASER KTP TRANSURETHRAL RESECTION (TUR) PROSTATE N/A 11/10/2021    Procedure: TRANSURETHRAL RESECTION (TUR) PROSTATE, USING KTP LASER;  Surgeon: Guanaco Stewart MD;  Location: [facility]       Family History:    No family history on file.    Social History:  Marital Status:   [2]  Social History     Tobacco Use    Smoking status: Former     Current packs/day: 0.00     Types: Cigarettes     Start date: 1962     Quit date: 2012     Years since quittin.1    Smokeless tobacco: Never    Tobacco comments:     Quit in July   Vaping Use    Vaping status: Never Used   Substance Use Topics    Alcohol use: No     Alcohol/week: 0.0 standard drinks of alcohol    Drug use: No        Medications:    No current outpatient medications on file.        Review of Systems  Gen: No fevers, no unintentional weight changes  Head and neck: No headache, no neck pain  Eye: No eye pain, no vision changes  Respiratory: No shortness of breath, no cough  Cardiovascular: No chest pain, no palpitations  Abdominal: No vomiting, no constipation, no diarrhea  Urinary: No dysuria, no hematuria  Musculoskeletal: No joint redness, no trauma  Neurologic: No confusion, no weakness, no sensation changes  Psychiatric: No suicidal ideation, no homicidal ideation    Physical Exam   BP: 105/68  Pulse: 76  Temp: 97.8  F (36.6  C)  Resp: 12  Height: 175.3 cm (5' 9\")  Weight: 63.5 kg (140 lb)  SpO2: " (!) 86 %      Physical Exam general: Alert, awake, no distress, oriented x 4  Head: Normocephalic, atraumatic  Eyes: Grossly intact extraocular movements, conjunctiva clear, pupils equal   Mouth: Mucous membranes moist  Neck: Supple, grossly full range of motion, no observable masses  Respiratory: No distress, speaks in full sentences, clear to auscultation bilaterally  Cardiac: S1 and S2 cardiac sounds appreciated, normal rate, no edema  Abdominal: Soft, not distended, no tenderness appreciated  Neurologic: Normal level of consciousness, speech is clear and appropriate, moving all extremities grossly normal and symmetric, cranial nerves II through XII grossly intact  Skin: No gross rashes or lesions  Psych: Normal mood and appropriate for situation          ED Course        Procedures                  Results for orders placed or performed during the hospital encounter of 10/01/24 (from the past 24 hour(s))   Glucose by meter   Result Value Ref Range    GLUCOSE BY METER POCT 114 (H) 70 - 99 mg/dL   Extra Tube ([place] Draw)    Narrative    The following orders were created for panel order Extra Tube ([place] Draw).  Procedure                               Abnormality         Status                     ---------                               -----------         ------                     Extra Blue Top Tube[[id number]]                              Final result               Extra Red Top Tube[[id number]]                               Final result               Extra Green Top (Lithium...[[id number]]                      Final result               Extra Purple Top Tube[[id number]]                            Final result               Extra Heparinized Syringe[[id number]]                        Final result                 Please view results for these tests on the individual orders.   Extra Blue Top Tube   Result Value Ref Range    Hold Specimen JIC    Extra Red Top Tube   Result Value Ref Range    Hold Specimen JIC     Extra Green Top (Lithium Heparin) Tube   Result Value Ref Range    Hold Specimen JIC    Extra Purple Top Tube   Result Value Ref Range    Hold Specimen JIC    Extra Heparinized Syringe   Result Value Ref Range    Hold Specimen HOLD    CBC with platelets differential    Narrative    The following orders were created for panel order CBC with platelets differential.  Procedure                               Abnormality         Status                     ---------                               -----------         ------                     CBC with platelets and d...[[id number]]  Abnormal            Final result               RBC and Platelet Morphology[[id number]]  Abnormal            Final result                 Please view results for these tests on the individual orders.   Basic metabolic panel   Result Value Ref Range    Sodium 136 135 - 145 mmol/L    Potassium 4.0 3.4 - 5.3 mmol/L    Chloride 100 98 - 107 mmol/L    Carbon Dioxide (CO2) 27 22 - 29 mmol/L    Anion Gap 9 7 - 15 mmol/L    Urea Nitrogen 14.3 8.0 - 23.0 mg/dL    Creatinine 0.74 0.67 - 1.17 mg/dL    GFR Estimate >90 >60 mL/min/1.73m2    Calcium 9.0 8.8 - 10.4 mg/dL    Glucose 119 (H) 70 - 99 mg/dL   Troponin T, High Sensitivity   Result Value Ref Range    Troponin T, High Sensitivity 49 (H) <=22 ng/L   Magnesium   Result Value Ref Range    Magnesium 2.0 1.7 - 2.3 mg/dL   TSH with free T4 reflex   Result Value Ref Range    TSH 3.76 0.30 - 4.20 uIU/mL   Lipase   Result Value Ref Range    Lipase 21 13 - 60 U/L   CBC with platelets and differential   Result Value Ref Range    WBC Count 13.0 (H) 4.0 - 11.0 10e3/uL    RBC Count 4.53 4.40 - 5.90 10e6/uL    Hemoglobin 6.7 (LL) 13.3 - 17.7 g/dL    Hematocrit 24.6 (L) 40.0 - 53.0 %    MCV 54 (L) 78 - 100 fL    MCH 14.8 (L) 26.5 - 33.0 pg    MCHC 27.2 (L) 31.5 - 36.5 g/dL    RDW 21.4 (H) 10.0 - 15.0 %    Platelet Count 376 150 - 450 10e3/uL    % Neutrophils 58 %    % Lymphocytes 28 %    % Monocytes 8 %    %  Eosinophils 4 %    % Basophils 1 %    % Immature Granulocytes 1 %    NRBCs per 100 WBC 0 <1 /100    Absolute Neutrophils 7.6 1.6 - 8.3 10e3/uL    Absolute Lymphocytes 3.7 0.8 - 5.3 10e3/uL    Absolute Monocytes 1.1 0.0 - 1.3 10e3/uL    Absolute Eosinophils 0.5 0.0 - 0.7 10e3/uL    Absolute Basophils 0.1 0.0 - 0.2 10e3/uL    Absolute Immature Granulocytes 0.1 <=0.4 10e3/uL    Absolute NRBCs 0.0 10e3/uL   RBC and Platelet Morphology   Result Value Ref Range    RBC Morphology Confirmed RBC Indices     Platelet Assessment  Automated Count Confirmed. Platelet morphology is normal.     Automated Count Confirmed. Platelet morphology is normal.    Elliptocytes Slight (A) None Seen   Hepatic function panel   Result Value Ref Range    Protein Total 6.4 6.4 - 8.3 g/dL    Albumin 3.0 (L) 3.5 - 5.2 g/dL    Bilirubin Total 0.4 <=1.2 mg/dL    Alkaline Phosphatase 71 40 - 150 U/L    AST 10 0 - 45 U/L    ALT 10 0 - 70 U/L    Bilirubin Direct <0.20 0.00 - 0.30 mg/dL   Lactic acid whole blood with 1x repeat in 2 hr when >2   Result Value Ref Range    Lactic Acid, Initial 2.2 (H) 0.7 - 2.0 mmol/L   Ammonia   Result Value Ref Range    Ammonia 10 (L) 16 - 60 umol/L   UA with Microscopic reflex to Culture    Specimen: Urine, Hernandez Catheter   Result Value Ref Range    Color Urine Yellow Colorless, Straw, Light Yellow, Yellow    Appearance Urine Cloudy (A) Clear    Glucose Urine Negative Negative mg/dL    Bilirubin Urine Negative Negative    Ketones Urine Negative Negative mg/dL    Specific Gravity Urine 1.011 1.003 - 1.035    Blood Urine Moderate (A) Negative    pH Urine 6.0 5.0 - 7.0    Protein Albumin Urine 30 (A) Negative mg/dL    Urobilinogen Urine Normal Normal, 2.0 mg/dL    Nitrite Urine Negative Negative    Leukocyte Esterase Urine Large (A) Negative    Bacteria Urine Few (A) None Seen /HPF    Mucus Urine Present (A) None Seen /LPF    Amorphous Crystals Urine Few (A) None Seen /HPF    RBC Urine 35 (H) <=2 /HPF    WBC Urine 90 (H)  <=5 /HPF    Narrative    Urine Culture ordered based on laboratory criteria   Prepare red blood cells (unit)   Result Value Ref Range    Blood Component Type Red Blood Cells     Product Code [id number]     Unit Status Transfused     Unit Number [id number]     CROSSMATCH Compatible     CODING SYSTEM [id number]     ISSUE DATE AND TIME [id number]     UNIT ABO/RH A-     UNIT TYPE ISBT 0600    ABO/Rh type and screen    Narrative    The following orders were created for panel order ABO/Rh type and screen.  Procedure                               Abnormality         Status                     ---------                               -----------         ------                     Adult Type and Screen[[id number]]                            Final result                 Please view results for these tests on the individual orders.   Adult Type and Screen   Result Value Ref Range    ABO/RH(D) A NEG     Antibody Screen Negative Negative    SPECIMEN EXPIRATION DATE [id number]    XR Chest Port 1 View    Narrative    CHEST ONE VIEW  10/1/2024 12:09 PM     HISTORY: AMS    COMPARISON: Chest radiograph 8/15/2024.      Impression    IMPRESSION: Retrocardiac/left basilar opacity may reflect combination  of small pleural effusion, atelectasis and/or infection. No convincing  pneumothorax. Similar heart size and left chest wall cardiac device.  Support devices overlie the thorax.    ARTURO POLK MD         SYSTEM ID:  [id number]   Troponin T, High Sensitivity   Result Value Ref Range    Troponin T, High Sensitivity 43 (H) <=22 ng/L   Lactic acid whole blood   Result Value Ref Range    Lactic Acid 1.4 0.7 - 2.0 mmol/L   CT Head w/o Contrast    Narrative    CT SCAN OF THE HEAD WITHOUT CONTRAST   10/1/2024 12:43 PM     HISTORY: AMS    TECHNIQUE: Axial images of the head and coronal reformations without  IV contrast material. Radiation dose for this scan was reduced using  automated exposure control, adjustment of the mA and/or  kV according  to patient size, or iterative reconstruction technique.    COMPARISON: Head CT 7/12/2016.    FINDINGS: There is no evidence of intracranial hemorrhage, mass, acute  infarct or anomaly. The ventricles are normal in size, shape and  configuration. Moderate diffuse parenchymal volume loss. Moderate  patchy periventricular white matter hypodensities which are  nonspecific, but likely related to chronic microvascular ischemic  disease.     The visualized portions of the sinuses and mastoids appear normal. The  bony calvarium and bones of the skull base appear intact.       Impression    IMPRESSION:     1. No evidence of acute intracranial hemorrhage, mass, or herniation.  2. Moderate diffuse parenchymal volume loss and white matter changes  likely due to chronic microvascular ischemic disease.      BAILEY COMER MD         SYSTEM ID:  [id number]   Hemoglobin   Result Value Ref Range    Hemoglobin 7.1 (L) 13.3 - 17.7 g/dL       Medications   sodium chloride 0.9 % infusion ( Intravenous Rate/Dose Change 10/1/24 4265)   apixaban ANTICOAGULANT (ELIQUIS) tablet 5 mg (has no administration in time range)   gabapentin (NEURONTIN) capsule 300 mg (has no administration in time range)   HYDROmorphone (DILAUDID) tablet 2 mg (has no administration in time range)   ipratropium - albuterol 0.5 mg/2.5 mg/3 mL (DUONEB) neb solution 3 mL (has no administration in time range)   oxyBUTYnin (DITROPAN) tablet 5 mg (has no administration in time range)   tamsulosin (FLOMAX) capsule 0.4 mg (has no administration in time range)   ceFEPIme (MAXIPIME) 2 g vial to attach to  mL bag for ADULTS or NS 50 mL bag for PEDS (has no administration in time range)   acetaminophen (TYLENOL) tablet 650 mg (has no administration in time range)   bisacodyl (DULCOLAX) suppository 10 mg (has no administration in time range)   bisacodyl (DULCOLAX) EC tablet 5 mg (has no administration in time range)   promethazine (PHENERGAN) tablet 25 mg (has  no administration in time range)   melatonin tablet 3 mg (has no administration in time range)   naloxone (NARCAN) injection 0.2 mg (has no administration in time range)     Or   naloxone (NARCAN) injection 0.4 mg (has no administration in time range)     Or   naloxone (NARCAN) injection 0.2 mg (has no administration in time range)     Or   naloxone (NARCAN) injection 0.4 mg (has no administration in time range)   polyethylene glycol (MIRALAX) Packet 17 g (17 g Oral $Given 10/1/24 1924)   magnesium hydroxide (MILK OF MAGNESIA) suspension 30 mL (has no administration in time range)   prochlorperazine (COMPAZINE) injection 5 mg (has no administration in time range)   sodium phosphate (FLEET ENEMA) 1 enema (has no administration in time range)   sodium chloride 0.9% BOLUS 1,000 mL (0 mLs Intravenous Stopped 10/1/24 1222)   ceFEPIme (MAXIPIME) 1 g vial to attach to  mL bag for ADULTS or NS 50 mL bag for PEDS (0 g Intravenous Stopped 10/1/24 1148)       Assessments & Plan (with Medical Decision Making)     Patient initially arrives with wife with concerns of bladder spasms and cloudy urine, while in triage patient did become very weak and unconscious, possibly syncopized.  Brought immediately to the resuscitation room in which when he was laid flat he regained consciousness.  IV was immediately established, discussed CODE STATUS quickly with the wife.  He appeared to have a pulse and breathing on his own.  Blood sugar checked and noted to be reassuring.  When the patient regained consciousness he is oriented x 4 and able to tell me the events of today.  Cannot appreciate any focal neurologic weakness or concerns of acute stroke.    EKG shows a rate of 71, poor baseline, possible atrial fibrillation, right bundle branch block without any gross ischemic changes.,  Similar EKGs in the past.  We did interrogate his Medtronic device, there was no arrhythmia or other concerning features that took place today.  Metabolic  panel is reassuring along with hepatic function panel.  He does have a hemoglobin of 6.7, has a history with anemia and requires periodic transfusions, blood consent was obtained and the patient was transferred ruse to 1 unit.  No overt areas of bleeding.  Mild nonspecific leukocytosis of 13.  Initial lactic acid 2.2 which quickly cleared.  Urinalysis obtained has signs of acute infection, he was given empiric cefepime upon arrival, no evidence on previous urine cultures that this would be resistant.  Chest x-ray shows possible opacification retrocardiac however the patient does not have any particular symptoms of pneumonia.  CT head does not show any acute process.  He remains at his baseline mental status during the rest of his ER visit, his hemodynamics remain reassuring.  His syncopal event along with UTI and possible sepsis and anemia requiring transfusion, patient will be best served admitted to the hospital.  Discussed this case with the hospitalist service and agreeable with admission.  No decompensation or other acute events during his time in the ER.    I have reviewed the nursing notes.    I have reviewed the findings, diagnosis, plan and need for follow up with the patient.    Critical Care time was 35 minutes for this patient excluding procedures.        Current Discharge Medication List          Final diagnoses:   Urinary tract infection associated with indwelling urethral catheter, initial encounter (H)   Anemia, unspecified type   Sepsis, due to unspecified organism, unspecified whether acute organ dysfunction present (H)   Syncope, unspecified syncope type       10/1/2024   [facility] EMERGENCY DEPT       Cristopher Jeffers MD  10/01/24 2013

## 2024-10-01 NOTE — PROGRESS NOTES
"S-(situation): Patient arrives to room 272 via cart from ED @ 1800.    B-(background): Sepsis/UTI    A-(assessment): /66 (BP Location: Left arm)   Pulse 68   Temp 98.3  F (36.8  C) (Oral)   Resp 18   Ht 1.778 m (5' 10\")   Wt 62.3 kg (137 lb 5.6 oz)   SpO2 96%   BMI 19.71 kg/m    Patient is A&Ox4. Pain located to penile area and lower abdomen. IV NS running at 75ml/hr. Tele place, paced. Lungs are clear with diminished bases. Frequent loose congested cough. Coccyx red with mepilex in place.     R-(recommendations): Orders reviewed with patient. Will monitor patient per MD orders.     Inpatient nursing criteria listed below were met:    Health care directives status obtained and documented: Yes  VTE ordered/documented: Yes  Skin issues/needs documented:NA  Isolation addressed and Signage used: NA  Fall Prevention: Care plan updated NA Education given and documented NA  Care Plan initiated and Co-Morbidities added: Yes  Education Assessment documented:Yes  Admission Education Documented: Yes  If present CAUTI/CLABI Education done: NA  New medication patient education completed and documented (Possible Side Effects of Common Medications handout): Yes  Allergies Reviewed: Yes  Admission Medication Reconciliation completed: Yes  Home medications if not able to send immediately home with family stored here: NA  Reminder note placed in discharge instructions regarding home meds: NA  Individualized care needs/preferences addressed and charted: Yes  Provider Notified that patient has arrived to the unit: Yes       "

## 2024-10-01 NOTE — PROGRESS NOTES
[facility]  Patient is currently receiving services RN PT and OT with [facility].   Our hospice team has been out to speak with family for hospice care.  Please reach out to our [facility] team  with questions.  [facility] Liaison will continue to follow patient during stay.

## 2024-10-01 NOTE — PROGRESS NOTES
"   10/01/24 1029   RCAT Assessment   Reason for Assessment Other (see comments)  (Rapid Responce)   Pulmonary Status 2   Surgical Status 0   Respiratory Pattern 0   Mental Status 0   Breath Sounds 0   Cough Effectiveness 0   Level of Activity 0   O2 Required for SpO2>=92% 1   Acuity Level  5   Re-eval Interval Guideline Every 3 days   Re-evaluation Date 10/04/24   Adult IBW Calculation   Height 1.753 m (5' 9\")   Min VC (male) 1.07   Clinical Indications/Symptoms   Aerosol Therapy Bronchospasm   Breath Sounds   Breath Sounds All Fields   All Lung Fields Breath Sounds clear;equal bilaterally       "

## 2024-10-01 NOTE — ED NOTES
Pt was unresponsive pale and slumped over. To cart. Pt then did wake up. Pt RR 6 . Pt placed on defib pads and monitor. Pt has pacemaker.

## 2024-10-02 ENCOUNTER — APPOINTMENT (OUTPATIENT)
Dept: PHYSICAL THERAPY | Facility: CLINIC | Age: 79
DRG: 698 | End: 2024-10-02
Attending: INTERNAL MEDICINE
Payer: COMMERCIAL

## 2024-10-02 LAB
ACANTHOCYTES BLD QL SMEAR: SLIGHT
ANION GAP SERPL CALCULATED.3IONS-SCNC: 7 MMOL/L (ref 7–15)
BACTERIA UR CULT: NORMAL
BLD PROD TYP BPU: NORMAL
BLOOD COMPONENT TYPE: NORMAL
BUN SERPL-MCNC: 19.7 MG/DL (ref 8–23)
CALCIUM SERPL-MCNC: 8.2 MG/DL (ref 8.8–10.4)
CHLORIDE SERPL-SCNC: 106 MMOL/L (ref 98–107)
CODING SYSTEM: NORMAL
CREAT SERPL-MCNC: 0.64 MG/DL (ref 0.67–1.17)
CROSSMATCH: NORMAL
EGFRCR SERPLBLD CKD-EPI 2021: >90 ML/MIN/1.73M2
ELLIPTOCYTES BLD QL SMEAR: SLIGHT
ERYTHROCYTE [DISTWIDTH] IN BLOOD BY AUTOMATED COUNT: 26 % (ref 10–15)
GLUCOSE SERPL-MCNC: 103 MG/DL (ref 70–99)
HCO3 SERPL-SCNC: 24 MMOL/L (ref 22–29)
HCT VFR BLD AUTO: 22.6 % (ref 40–53)
HGB BLD-MCNC: 6.4 G/DL (ref 13.3–17.7)
HGB BLD-MCNC: 7.7 G/DL (ref 13.3–17.7)
HGB BLD-MCNC: 8.5 G/DL (ref 13.3–17.7)
ISSUE DATE AND TIME: NORMAL
MCH RBC QN AUTO: 16.2 PG (ref 26.5–33)
MCHC RBC AUTO-ENTMCNC: 28.3 G/DL (ref 31.5–36.5)
MCV RBC AUTO: 57 FL (ref 78–100)
PLAT MORPH BLD: ABNORMAL
PLATELET # BLD AUTO: 266 10E3/UL (ref 150–450)
POTASSIUM SERPL-SCNC: 4.5 MMOL/L (ref 3.4–5.3)
RBC # BLD AUTO: 3.94 10E6/UL (ref 4.4–5.9)
RBC MORPH BLD: ABNORMAL
ROULEAUX BLD QL SMEAR: PRESENT
SODIUM SERPL-SCNC: 137 MMOL/L (ref 135–145)
TARGETS BLD QL SMEAR: SLIGHT
UNIT ABO/RH: NORMAL
UNIT NUMBER: NORMAL
UNIT STATUS: NORMAL
UNIT TYPE ISBT: 600
WBC # BLD AUTO: 9.9 10E3/UL (ref 4–11)

## 2024-10-02 PROCEDURE — 258N000003 HC RX IP 258 OP 636: Performed by: INTERNAL MEDICINE

## 2024-10-02 PROCEDURE — 80048 BASIC METABOLIC PNL TOTAL CA: CPT | Performed by: INTERNAL MEDICINE

## 2024-10-02 PROCEDURE — 120N000001 HC R&B MED SURG/OB

## 2024-10-02 PROCEDURE — 82310 ASSAY OF CALCIUM: CPT | Performed by: INTERNAL MEDICINE

## 2024-10-02 PROCEDURE — 250N000009 HC RX 250: Performed by: INTERNAL MEDICINE

## 2024-10-02 PROCEDURE — 250N000013 HC RX MED GY IP 250 OP 250 PS 637: Performed by: INTERNAL MEDICINE

## 2024-10-02 PROCEDURE — 36415 COLL VENOUS BLD VENIPUNCTURE: CPT | Performed by: INTERNAL MEDICINE

## 2024-10-02 PROCEDURE — 85014 HEMATOCRIT: CPT | Performed by: INTERNAL MEDICINE

## 2024-10-02 PROCEDURE — 99232 SBSQ HOSP IP/OBS MODERATE 35: CPT | Performed by: FAMILY MEDICINE

## 2024-10-02 PROCEDURE — 94640 AIRWAY INHALATION TREATMENT: CPT

## 2024-10-02 PROCEDURE — 999N000157 HC STATISTIC RCP TIME EA 10 MIN

## 2024-10-02 PROCEDURE — 36415 COLL VENOUS BLD VENIPUNCTURE: CPT | Performed by: FAMILY MEDICINE

## 2024-10-02 PROCEDURE — 97161 PT EVAL LOW COMPLEX 20 MIN: CPT | Mod: GP | Performed by: PHYSICAL THERAPIST

## 2024-10-02 PROCEDURE — 87086 URINE CULTURE/COLONY COUNT: CPT | Performed by: FAMILY MEDICINE

## 2024-10-02 PROCEDURE — 85018 HEMOGLOBIN: CPT | Performed by: FAMILY MEDICINE

## 2024-10-02 PROCEDURE — 250N000011 HC RX IP 250 OP 636: Performed by: INTERNAL MEDICINE

## 2024-10-02 PROCEDURE — P9016 RBC LEUKOCYTES REDUCED: HCPCS | Performed by: INTERNAL MEDICINE

## 2024-10-02 RX ADMIN — HYDROMORPHONE HYDROCHLORIDE 2 MG: 2 TABLET ORAL at 19:27

## 2024-10-02 RX ADMIN — OXYBUTYNIN CHLORIDE 5 MG: 5 TABLET ORAL at 19:27

## 2024-10-02 RX ADMIN — CEFEPIME HYDROCHLORIDE 2 G: 2 INJECTION, POWDER, FOR SOLUTION INTRAVENOUS at 06:06

## 2024-10-02 RX ADMIN — IPRATROPIUM BROMIDE AND ALBUTEROL SULFATE 3 ML: .5; 3 SOLUTION RESPIRATORY (INHALATION) at 22:04

## 2024-10-02 RX ADMIN — OXYBUTYNIN CHLORIDE 5 MG: 5 TABLET ORAL at 00:16

## 2024-10-02 RX ADMIN — SODIUM CHLORIDE: 9 INJECTION, SOLUTION INTRAVENOUS at 20:17

## 2024-10-02 RX ADMIN — GABAPENTIN 300 MG: 300 CAPSULE ORAL at 20:18

## 2024-10-02 RX ADMIN — HYDROMORPHONE HYDROCHLORIDE 2 MG: 2 TABLET ORAL at 06:59

## 2024-10-02 RX ADMIN — APIXABAN 5 MG: 5 TABLET, FILM COATED ORAL at 20:18

## 2024-10-02 RX ADMIN — CEFEPIME HYDROCHLORIDE 2 G: 2 INJECTION, POWDER, FOR SOLUTION INTRAVENOUS at 21:30

## 2024-10-02 RX ADMIN — GABAPENTIN 300 MG: 300 CAPSULE ORAL at 14:08

## 2024-10-02 RX ADMIN — APIXABAN 5 MG: 5 TABLET, FILM COATED ORAL at 10:16

## 2024-10-02 RX ADMIN — TAMSULOSIN HYDROCHLORIDE 0.4 MG: 0.4 CAPSULE ORAL at 20:18

## 2024-10-02 RX ADMIN — CEFEPIME HYDROCHLORIDE 2 G: 2 INJECTION, POWDER, FOR SOLUTION INTRAVENOUS at 14:13

## 2024-10-02 RX ADMIN — GABAPENTIN 300 MG: 300 CAPSULE ORAL at 10:16

## 2024-10-02 RX ADMIN — SODIUM CHLORIDE: 9 INJECTION, SOLUTION INTRAVENOUS at 06:11

## 2024-10-02 RX ADMIN — ACETAMINOPHEN 650 MG: 325 TABLET ORAL at 06:58

## 2024-10-02 ASSESSMENT — ACTIVITIES OF DAILY LIVING (ADL)
ADLS_ACUITY_SCORE: 33
ADLS_ACUITY_SCORE: 32
ADLS_ACUITY_SCORE: 36
ADLS_ACUITY_SCORE: 33
ADLS_ACUITY_SCORE: 33
ADLS_ACUITY_SCORE: 32
ADLS_ACUITY_SCORE: 36
ADLS_ACUITY_SCORE: 32
ADLS_ACUITY_SCORE: 36
ADLS_ACUITY_SCORE: 32
ADLS_ACUITY_SCORE: 33
ADLS_ACUITY_SCORE: 36

## 2024-10-02 NOTE — PLAN OF CARE
Goal Outcome Evaluation:      Plan of Care Reviewed With: patient, spouse    Overall Patient Progress: no changeOverall Patient Progress: no change    Outcome Evaluation: Ate ice cream and mashed potatoes with gravy for supper. Wife here visiting. Urine in murrell is dark aravind/tea-colored. Denied pain. VSS Paced rhythm with BBB per telemetry. Napped this afternoon and refused to get up in chair. Receiving Maxipime. Repositioned every two hours. Bed alarm on.Hemoglobin up to 8.5.

## 2024-10-02 NOTE — PLAN OF CARE
"Goal Outcome Evaluation:      Plan of Care Reviewed With: patient    Overall Patient Progress: no changeOverall Patient Progress: no change    Outcome Evaluation: Pt with low UOP overnight 300 ml at this time.  AM HgB 6.4, 1 unit PRBC's to be given.  PO dilaudid/Tylenol/Ditropan given for Bladder pain.  IV NS at 75 ml/hr.  Tele paced with BBB.  not OOB overnight.  Harsh/Productive cough.  Good apetite.    /63 (BP Location: Left arm)   Pulse 82   Temp 98.5  F (36.9  C) (Oral)   Resp 20   Ht 1.778 m (5' 10\")   Wt 62.3 kg (137 lb 5.6 oz)   SpO2 93%   BMI 19.71 kg/m     "

## 2024-10-02 NOTE — PLAN OF CARE
Goal Outcome Evaluation:      Plan of Care Reviewed With: patient, spouse    Overall Patient Progress: improvingOverall Patient Progress: improving    Outcome Evaluation: Feeling better since murrell was replaced. Had penile pain before murrell was changed. Urine output for the shift was 625ml of aravind urine. Ate 75% of lunch. had smear of dark brown stool incontinently. IV fluids infusing. Urine specimen sent for culture. Rating pain in lower abdomen 2/10. Oriented x4. Telemetry showing paced rhythm with BBB. VSS. Hemoglobin up to 7.7 after transfusion. Bed alarm on.

## 2024-10-02 NOTE — PROGRESS NOTES
Urinary catheter plugged with rust colored sediment. Attempted to irrigate. Physician notified and new order placed to change out murrell. 16F Coude catheter placed. Anny colored urine in tubing. Will continue to monitor output.

## 2024-10-02 NOTE — PROGRESS NOTES
10/02/24 1500   Appointment Info   Signing Clinician's Name / Credentials (PT) Daysi Bhatt PT, DPT, ATC, LAT   Rehab Comments (PT) PT eval and treat strengthening, home safety, has fallen at home and uses walker, complains of significant weakness Activity order: up in chair, ambulate, up ad abundio       Present no   Living Environment   People in Home spouse   Current Living Arrangements house   Home Accessibility stairs to enter home   Number of Stairs, Main Entrance 1   Transportation Anticipated agency   Living Environment Comments recently son using wheelchair to assist patient into and out of the home. walk in shower. flat bed.   Self-Care   Usual Activity Tolerance fair   Current Activity Tolerance poor   Regular Exercise No   Equipment Currently Used at Home walker, rolling;wheelchair, manual;shower chair   Fall history within last six months yes   Number of times patient has fallen within last six months 2   Activity/Exercise/Self-Care Comment patient notes walking between rooms at home with the walker but also using the wheelchair in the home. notes his wife supports off all of his care needs   General Information   Onset of Illness/Injury or Date of Surgery 10/01/24   Referring Physician Charlene Brown MD   Patient/Family Therapy Goals Statement (PT) go home   Pertinent History of Current Problem (include personal factors and/or comorbidities that impact the POC) Patient is a 79 year old male, presented to the ED with weakness and cloudy urine with syncopal episode in the lobby admitted due to sepsis secondary to CAUTI, chronic anemia with transfusion dependent, PE, chronic pain, weakness, falls, COPD, pacemaker insitu, HTN, CAD, CHF, bladder cancer, CVA, right hip fracture with repair 4/2024 with TCU placement.   Existing Precautions/Restrictions fall   Weight-Bearing Status - LUE full weight-bearing   Weight-Bearing Status - RUE full weight-bearing   Weight-Bearing Status -  LLE full weight-bearing   Weight-Bearing Status - RLE full weight-bearing   General Observations hgb 7.7. room air. IV infusing. asleep during each attempt. notes he sleeps all day and is awake at night   Cognition   Affect/Mental Status (Cognition) flat/blunted affect   Orientation Status (Cognition) oriented to;person;place;situation   Follows Commands (Cognition) WFL   Cognitive Status Comments sleeps all day at baseline, prefers to be left alone   Integumentary/Edema   Integumentary/Edema Comments thin skin   Posture    Posture Forward head position   Range of Motion (ROM)   Range of Motion ROM is WFL;ROM deficits secondary to weakness   ROM Comment demonstrates functional UE and LE AROM in bed   Strength (Manual Muscle Testing)   Strength (Manual Muscle Testing) Able to perform R SLR;Able to perform L SLR   Strength Comments poor muscular endurance, declined break testing or further strength test   Bed Mobility   Comment, (Bed Mobility) declined to perform   Transfers   Comment, (Transfers) declined to perform   Coordination   Coordination no deficits were identified   Coordination Comments slow but intact   Muscle Tone   Muscle Tone no deficits were identified   Clinical Impression   Criteria for Skilled Therapeutic Intervention Yes, treatment indicated   PT Diagnosis (PT) deconditioned, muscle weakness, impaired mobility   Influenced by the following impairments acute on chronic medical status   Functional limitations due to impairments assistance for cares and mobility   Clinical Presentation (PT Evaluation Complexity) evolving   Clinical Presentation Rationale medical status, clinical judgement   Clinical Decision Making (Complexity) low complexity   Planned Therapy Interventions (PT) balance training;bed mobility training;gait training;home exercise program;ROM (range of motion);stair training;strengthening;transfer training;progressive activity/exercise;home program guidelines   Risk & Benefits of therapy  have been explained evaluation/treatment results reviewed;participants voiced agreement with care plan;participants included;patient   Clinical Impression Comments Currently patient is self limiting and declines mobility or strengthening with PT. He demonstrates impaired muscular conditioning and poor activity tolerance. Patient would benefit from continued mobility with nursing as well as progressive mobility with PT. Patient has been functioning at home with family support and his goal is to return, at minimum he will need to be able to transfer into and out of the wheelchair. He is agreeable to resuming HHPT services with return home with 24/7 support. IF he is unable to do this than TCU placement is advised.   PT Total Evaluation Time   PT Andrew Low Complexity Minutes (57461) 12   Physical Therapy Goals   PT Frequency 5x/week   PT Predicted Duration/Target Date for Goal Attainment 10/03/24   PT Goals Bed Mobility;Transfers;Gait   PT: Bed Mobility Minimal assist;Supine to/from sit   PT: Transfers Supervision/stand-by assist;Sit to/from stand;Assistive device;Bed to/from chair   PT: Gait Minimal assist;Rolling walker;10 feet   PT Discharge Planning   PT Plan 1/5 Wed. bed mobility, transfers, up in chair   PT Discharge Recommendation (DC Rec) home with assist;home with home care physical therapy;Transitional Care Facility   PT Rationale for DC Rec Patient from home with family support, using walker and wheelchair at baseline. Currently patient is self limiting and declines mobility or strengthening with PT. He demonstrates impaired muscular conditioning and poor activity tolerance. Patient would benefit from continued mobility with nursing as well as progressive mobility with PT. Patient has been functioning at home with family support and his goal is to return, at minimum he will need to be able to transfer into and out of the wheelchair. He is agreeable to resuming HHPT services with return home with 24/7  support. IF he is unable to do this than TCU placement is advised.   PT Brief overview of current status supine AROM demonstrated. declined all mobility   Total Session Time   Total Session Time (sum of timed and untimed services) 12     Thank you for your referral.  Daysi Bhatt, PT, DPT, ATC, LAT    [facility]  O: [phone]  E: [email or web address]

## 2024-10-02 NOTE — PROGRESS NOTES
[facility]    Medicine Progress Note - Hospitalist Service    Date of Admission:  10/1/2024    Assessment & Plan   Khang Bailon is a 79 year old male with PMHx of urothelial carcinoma of the bladder s/p resection with chronic murrell, pulmonary nodule concerning for malignancy with patient declining further evaluation or treatment, history of pulmonary embolism on Eliquis, COPD, and chronic pain who presented with weakness and cloudy urine in the Murrell catheter tubing and bag and had syncopal episode suspected secondary to hypotension in the ED waiting room and was found to have suspected urosepsis and significant anemia and has been hospitalized for medical management.  He has been started on cefepime and given 2 units of PRBC in ED and another overnight for ongoing anemia less than 7.0.  Course has been complicated by the fact that initial UC was collected on chronic murrell (apparently tubing was exchanged but not the murrell itself).  Patient had obstruction of murrell today due to sediment and pus and murrell has now been fully exchanged with urine starting to clear this afternoon and patient feeling better overall but still requiring inpatient management.  New UC has been sent, however collected almost 24 hours of antibiotic initiation, and therefore may not be accurate reflection of organisms causing this infection.  Will need ongoing evaluation and management going forward.       Sepsis secondary to CAUTI   Patient with history of transurethral resection of a bladder abnormality performed on 3/29/2024, chronic murrell in place.  Patient presented with leukocytosis, lactic acidosis, transient hypotension improved with fluids and UA concerning for UTI and urosepsis.  UC collected in ED rapidly grew out many organisms with concern for colonization and it is now known murrell was not exchanged in ED (just the tubing).  Murrell exchanged and new culture collected (but after patient has been on  antibiotics for almost 24 hours).  Blood pressures remain stable.    - monitor for results of blood cultures and new urine culture  - h/o Pseudomonas UTI in past and continue cefepime until ID and sensitivities return and possibly can de-escalate abx at that time - if no growth will need to discuss with ID given patient's high risk of complicated UTI   - holding home prophylactic Macrobid   - continue flomax and oxybutynin      Syncope  Occurring in the ED waiting room and thought secondary to hypotension.  No electrolyte abnormalities, no arrhthymias noted so far.   -continue IV fluids and treatment of infection  -continue continuous cardiac monitoring  -monitor for any recurrence     Hypotensive  Noted on ED evaluation following syncope and rapid response, improved with fluids and blood.  Thought secondary to anemia and infection.  Blood pressures continue to be in normal range since admission   - continue IVF, treating infection and further PRBC if appropriate   - continue monitoring for resolution      Anemia chronic blood loss vs malignancy  Hemoglobin was found to be 6.7 and patient was given 2 units PRBC with improvement to 7.1 however down to 6.4 following further IV fluid resuscitation.  Now s/p a third unit PRBC with hemoglobin 7.7.    - will recheck hemoglobin again at 1800 and if trending downward will perform serial hemoglobins, occult stool, further work up  - monitor for any signs of active bleeding  - patient has declined further work up or treatment of cancer in past and still does not want intervention during this stay or after discharge      Urothelial carcinoma of the bladder  This tumor was located on the anterior bladder wall. This demonstrated a high-grade superficially invasive urothelial carcinoma and is s/p resection with chronic murrell catheter now in place. Patient has declined further treatment with BCG therapy and continues to decline, even at recent urology office visit on 9/23/24.    -  continue murrell catheter   - outpatient follow up with urology following discharge     Pulmonary nodule - concerning for malignancy  Noted on imaging last spring, patient declining further work up  -no further inpatient management needed    H/O PE, medication induced coagulopathy   Previous history of PE on Eliquis   - continue DOAC      Chronic pain  Patient has back pain and pain from cancer   - continue home regiment of prn dilaudid  - schedule gabapentin   - schedule miralax     COPD without exacerbation  - continue home nebs      Weakness and recent fall  - PT/OT to assess for home safety             Diet: Regular Diet Adult  Snacks/Supplements Adult: Magic Cup; With Meals    DVT Prophylaxis: DOAC  Murrell Catheter: PRESENT, indication:    Lines: None     Cardiac Monitoring: ACTIVE order. Indication: Syncope- low cardiac risk (24 hours)  Code Status: Full Code      Clinically Significant Risk Factors              # Hypoalbuminemia: Lowest albumin = 3 g/dL at 10/1/2024 10:36 AM, will monitor as appropriate     # Hypertension: Noted on problem list  # Chronic heart failure with preserved ejection fraction: heart failure noted on problem list and last echo with EF >50%              # Financial/Environmental Concerns:           Disposition Plan     Medically Ready for Discharge: Anticipated in 2-4 Days             Elizabeth Quigley MD  Hospitalist Service  [facility]  Securely message with [employer] (more info)  Text page via [employer] Paging/Directory   ______________________________________________________________________    Interval History   Patient has been vitally stable overnight with blood pressures normal.  Patient states his abdomen pain started to improve after murrell exchange and he is starting to feel stronger but still low appetite and not back to baseline.  No additional new nursing concerns.     Physical Exam   Vital Signs: Temp: 97.7  F (36.5  C) Temp src: Oral BP:  130/80 Pulse: 64   Resp: 16 SpO2: 93 % O2 Device: None (Room air) Oxygen Delivery: 2 LPM  Weight: 137 lbs 5.55 oz    Constitutional: awake, alert, cooperative, no apparent distress, and appears mildly cachetic but stated age  Respiratory: No increased work of breathing, good air exchange, clear to auscultation bilaterally, no crackles or wheezing  Cardiovascular: RRR  GI: bowel sounds present, abdomen soft, no distention or tenderness   Musculoskeletal: no lower extremity pitting edema present  Neurologic: Awake, alert, oriented to name, place and situation     Medical Decision Making       47 MINUTES SPENT BY ME on the date of service doing chart review, history, exam, documentation & further activities per the note.      Data     I have personally reviewed the following data over the past 24 hrs:    9.9  \   8.5 (L)   / 266     137 106 19.7 /  103 (H)   4.5 24 0.64 (L) \

## 2024-10-02 NOTE — CONSULTS
"CLINICAL NUTRITION SERVICES - ASSESSMENT NOTE     Nutrition Prescription    RECOMMENDATIONS FOR MDs/PROVIDERS TO ORDER:  Please consider order for multivitamin with minerals to support micronutrient status with recent weight loss     Malnutrition Status:    Unable to determine due to lack of NFPE - will meet with pt as able    Recommendations already ordered by Registered Dietitian (RD):  Will offer trial of Magic cup (any flavor) BID with lunch and dinner to support PO intake - plan to monitor and adjust based on pt preference / tolerance    Multivitamin with minerals - recommending pending provider approval     Food preferences provided - listed as \"room service appropriate\" in [employer]     Future/Additional Recommendations:  Will follow to monitor oral intake, weight changes, GI symptoms/BMs, and nutritional supplement tolerance     REASON FOR ASSESSMENT  Khang Bailon is a/an 79 year old male assessed by the dietitian via RN consult - Lost 40+ punds in the last few months with no changes in appetite and identified at risk via screening criteria    MST score of 4: recent weight loss of 34 lbs or more, no poor appetite noted    NUTRITION HISTORY  Pt admitted with concerns of cloudy urine in Hernandez catheter tubing and bag. Pt notes spasms in bladder region.     Found to have sepsis secondary to CAUTI, syncope with hypotension, anemia, leukocytosis    Dxhx of moderate COPD, pulmonary nodule, urothelial carcinoma of bladder, pulm embolism, continuous opioid dependence, syncope, hx of tobacco use, other thalassemia, hyperlipidemia, third degree heart block s/p pacemaker, HTN, multilevel degenerative disc disease, sigmoid diverticulitis, CVA, memory loss, tubulovillous adenoma of rectum, BPH, COVID 19, CAD, HF, NSTEMI, SIRS, calculus of gallbladder, AAA, fistula of sigmoid colon, aspiration into airway, closed fracture of right hip, drug-induced constipation, pneumonia, septic shock, severe malnutrition, GI " "hemorrhage    Per chart review of previous RD notes:   - was seen at outside facility 5/24/24 - refused to meet with RD, trial ordered for Magic cup BID  - writer assessed pt 4/29/24 - was unable to conduct NFPE, trialed Ensure clear, unsure about tolerance  - hx of not drinking Ensure supps    CURRENT NUTRITION ORDERS  Diet: Orders Placed This Encounter      Regular Diet Adult    Intake/Tolerance: 100% intake so far during stay, some PO fluid intake. GI is abnormal - abdominal discomfort, gas present. Abdomen is tender to LLQ and RLQ. Pt is passing flatus. Unsure when last BM was as nothing noted per flow sheet.     LABS  Labs reviewed - low creatinine, low calcium, low ammonia, slightly elevated BGs, elevated troponin, critically low hemoglobin, low hematocrit     MEDICATIONS  Medications reviewed - eliquis, IV maxipime, gabapentin, miralax, flomax, sodium chloride IVF at 75 mL/hr = 1,800 mL per day, PRN tylenol last given today 10/2, PRN dilaudid last given today 10/2, PRN oxybutynin given today 10/2    ANTHROPOMETRICS  Height: 177.8 cm (5' 10\")  Most Recent Weight: 62.3 kg (137 lb 5.6 oz) via bed scale   IBW: 166 lbs  BMI: Normal BMI   Weight History:   Wt Readings from Last 15 Encounters:   10/01/24 62.3 kg (137 lb 5.6 oz)   08/15/24 64.4 kg (142 lb)   08/10/24 70.3 kg (155 lb)   07/11/24 67.1 kg (148 lb)   05/07/24 75.8 kg (167 lb)   05/02/24 75.8 kg (167 lb 1.7 oz)   04/22/24 69.9 kg (154 lb)   04/03/24 74.8 kg (164 lb 12.8 oz)   03/19/24 76.2 kg (168 lb)   02/27/24 81.6 kg (180 lb)   02/20/24 79.8 kg (176 lb)   02/05/24 78 kg (172 lb)   02/02/24 78.2 kg (172 lb 4.8 oz)   01/23/24 81.2 kg (179 lb)   01/09/24 84.2 kg (185 lb 11.2 oz)   18.0% weight loss in less than 6 months (significant for severe malnutrition)    Dosing Weight: 62.3 kg using actual BW    ASSESSED NUTRITION NEEDS  Estimated Energy Needs: 1,869-2,181 kcals/day (30 - 35 kcals/kg )  Justification: Increased needs and Repletion  Estimated Protein " Needs: 75-93 grams protein/day (1.2 - 1.5 grams of pro/kg)  Justification: Hypercatabolism with acute illness, Increased needs, and Repletion  Estimated Fluid Needs: 1,869 mL/day (30 mL/kg)   Justification: Maintenance    PHYSICAL FINDINGS  See malnutrition section below.  Per chart review: blanchable redness to sacrum/coccyx, perineum, buttocks/IT    MALNUTRITION  % Intake: No decreased intake noted  % Weight Loss: > 10% in less than 6 months (severe)  Subcutaneous Fat Loss: Unable to assess  Muscle Loss: Unable to assess - potential for age-related sarcopenia vs. possible malnutrition-related muscle loss  Fluid Accumulation/Edema: None noted  Malnutrition Diagnosis: Unable to determine due to lack of NFPE    NUTRITION DIAGNOSIS  Inadequate oral intake related to acute on chronic illness as evidenced by weight loss of > 10% in less than 6 months (severe) and increased needs above baseline      INTERVENTIONS  Implementation  Nutrition Education: Will be provided if education needs arise     Collaboration with other providers  Medical food supplement therapy - Magic cup BID to start, any flavor   Multivitamin/mineral supplement therapy - recommended     Goals  Patient to consume % of nutritionally adequate meal trays TID, or the equivalent with supplements/snacks  Pt weight will remain stable during admission  Pt will tolerate nutritional supplement      Monitoring/Evaluation  Progress toward goals will be monitored and evaluated per protocol.  Deepthi Gillespie RD, LD  Clinical Dietitian  Office: [phone]  [facility] pager: [phone]

## 2024-10-03 ENCOUNTER — APPOINTMENT (OUTPATIENT)
Dept: OCCUPATIONAL THERAPY | Facility: CLINIC | Age: 79
DRG: 698 | End: 2024-10-03
Attending: INTERNAL MEDICINE
Payer: COMMERCIAL

## 2024-10-03 ENCOUNTER — APPOINTMENT (OUTPATIENT)
Dept: PHYSICAL THERAPY | Facility: CLINIC | Age: 79
DRG: 698 | End: 2024-10-03
Payer: COMMERCIAL

## 2024-10-03 LAB
ANION GAP SERPL CALCULATED.3IONS-SCNC: 7 MMOL/L (ref 7–15)
BACTERIA UR CULT: NORMAL
BUN SERPL-MCNC: 14 MG/DL (ref 8–23)
CALCIUM SERPL-MCNC: 8 MG/DL (ref 8.8–10.4)
CHLORIDE SERPL-SCNC: 107 MMOL/L (ref 98–107)
CREAT SERPL-MCNC: 0.73 MG/DL (ref 0.67–1.17)
EGFRCR SERPLBLD CKD-EPI 2021: >90 ML/MIN/1.73M2
ERYTHROCYTE [DISTWIDTH] IN BLOOD BY AUTOMATED COUNT: 30.5 % (ref 10–15)
GLUCOSE SERPL-MCNC: 101 MG/DL (ref 70–99)
HCO3 SERPL-SCNC: 23 MMOL/L (ref 22–29)
HCT VFR BLD AUTO: 26.5 % (ref 40–53)
HGB BLD-MCNC: 7.6 G/DL (ref 13.3–17.7)
HGB BLD-MCNC: 7.7 G/DL (ref 13.3–17.7)
MCH RBC QN AUTO: 17.8 PG (ref 26.5–33)
MCHC RBC AUTO-ENTMCNC: 29.1 G/DL (ref 31.5–36.5)
MCV RBC AUTO: 61 FL (ref 78–100)
PLAT MORPH BLD: NORMAL
PLATELET # BLD AUTO: 207 10E3/UL (ref 150–450)
POTASSIUM SERPL-SCNC: 4.4 MMOL/L (ref 3.4–5.3)
RBC # BLD AUTO: 4.32 10E6/UL (ref 4.4–5.9)
RBC MORPH BLD: NORMAL
SODIUM SERPL-SCNC: 137 MMOL/L (ref 135–145)
WBC # BLD AUTO: 10.2 10E3/UL (ref 4–11)

## 2024-10-03 PROCEDURE — 80048 BASIC METABOLIC PNL TOTAL CA: CPT | Performed by: FAMILY MEDICINE

## 2024-10-03 PROCEDURE — 97530 THERAPEUTIC ACTIVITIES: CPT | Mod: GP | Performed by: PHYSICAL THERAPIST

## 2024-10-03 PROCEDURE — 999N000157 HC STATISTIC RCP TIME EA 10 MIN

## 2024-10-03 PROCEDURE — 250N000013 HC RX MED GY IP 250 OP 250 PS 637: Performed by: INTERNAL MEDICINE

## 2024-10-03 PROCEDURE — 82310 ASSAY OF CALCIUM: CPT | Performed by: FAMILY MEDICINE

## 2024-10-03 PROCEDURE — 97110 THERAPEUTIC EXERCISES: CPT | Mod: GP | Performed by: PHYSICAL THERAPIST

## 2024-10-03 PROCEDURE — 85014 HEMATOCRIT: CPT | Performed by: FAMILY MEDICINE

## 2024-10-03 PROCEDURE — 250N000011 HC RX IP 250 OP 636: Performed by: INTERNAL MEDICINE

## 2024-10-03 PROCEDURE — 97165 OT EVAL LOW COMPLEX 30 MIN: CPT | Mod: GO | Performed by: OCCUPATIONAL THERAPIST

## 2024-10-03 PROCEDURE — 85018 HEMOGLOBIN: CPT | Performed by: FAMILY MEDICINE

## 2024-10-03 PROCEDURE — 99232 SBSQ HOSP IP/OBS MODERATE 35: CPT | Performed by: FAMILY MEDICINE

## 2024-10-03 PROCEDURE — 272N000202 HC AEROBIKA WITH MANOMETER

## 2024-10-03 PROCEDURE — 120N000001 HC R&B MED SURG/OB

## 2024-10-03 PROCEDURE — 36415 COLL VENOUS BLD VENIPUNCTURE: CPT | Performed by: FAMILY MEDICINE

## 2024-10-03 PROCEDURE — 258N000003 HC RX IP 258 OP 636: Performed by: INTERNAL MEDICINE

## 2024-10-03 PROCEDURE — 94799 UNLISTED PULMONARY SVC/PX: CPT

## 2024-10-03 RX ADMIN — APIXABAN 5 MG: 5 TABLET, FILM COATED ORAL at 20:54

## 2024-10-03 RX ADMIN — GABAPENTIN 300 MG: 300 CAPSULE ORAL at 20:54

## 2024-10-03 RX ADMIN — GABAPENTIN 300 MG: 300 CAPSULE ORAL at 14:06

## 2024-10-03 RX ADMIN — HYDROMORPHONE HYDROCHLORIDE 2 MG: 2 TABLET ORAL at 16:08

## 2024-10-03 RX ADMIN — OXYBUTYNIN CHLORIDE 5 MG: 5 TABLET ORAL at 09:50

## 2024-10-03 RX ADMIN — CEFEPIME HYDROCHLORIDE 2 G: 2 INJECTION, POWDER, FOR SOLUTION INTRAVENOUS at 14:05

## 2024-10-03 RX ADMIN — CEFEPIME HYDROCHLORIDE 2 G: 2 INJECTION, POWDER, FOR SOLUTION INTRAVENOUS at 05:44

## 2024-10-03 RX ADMIN — SODIUM CHLORIDE: 9 INJECTION, SOLUTION INTRAVENOUS at 09:52

## 2024-10-03 RX ADMIN — CEFEPIME HYDROCHLORIDE 2 G: 2 INJECTION, POWDER, FOR SOLUTION INTRAVENOUS at 20:55

## 2024-10-03 RX ADMIN — ACETAMINOPHEN 650 MG: 325 TABLET ORAL at 02:04

## 2024-10-03 RX ADMIN — ACETAMINOPHEN 650 MG: 325 TABLET ORAL at 20:54

## 2024-10-03 RX ADMIN — APIXABAN 5 MG: 5 TABLET, FILM COATED ORAL at 08:53

## 2024-10-03 RX ADMIN — TAMSULOSIN HYDROCHLORIDE 0.4 MG: 0.4 CAPSULE ORAL at 20:54

## 2024-10-03 RX ADMIN — GABAPENTIN 300 MG: 300 CAPSULE ORAL at 08:53

## 2024-10-03 ASSESSMENT — ACTIVITIES OF DAILY LIVING (ADL)
ADLS_ACUITY_SCORE: 33
ADLS_ACUITY_SCORE: 39
ADLS_ACUITY_SCORE: 33
ADLS_ACUITY_SCORE: 33
ADLS_ACUITY_SCORE: 34
ADLS_ACUITY_SCORE: 33
ADLS_ACUITY_SCORE: 39
ADLS_ACUITY_SCORE: 39
ADLS_ACUITY_SCORE: 34
ADLS_ACUITY_SCORE: 34
ADLS_ACUITY_SCORE: 38
ADLS_ACUITY_SCORE: 39
DEPENDENT_IADLS:: INCONTINENCE;TRANSPORTATION;MONEY MANAGEMENT;MEDICATION MANAGEMENT;MEAL PREPARATION;SHOPPING;LAUNDRY;COOKING;CLEANING
ADLS_ACUITY_SCORE: 39
ADLS_ACUITY_SCORE: 33
ADLS_ACUITY_SCORE: 39
ADLS_ACUITY_SCORE: 39
ADLS_ACUITY_SCORE: 34
ADLS_ACUITY_SCORE: 39
ADLS_ACUITY_SCORE: 34
ADLS_ACUITY_SCORE: 33
ADLS_ACUITY_SCORE: 39

## 2024-10-03 NOTE — PROGRESS NOTES
[facility]    Medicine Progress Note - Hospitalist Service    Date of Admission:  10/1/2024    Assessment & Plan     Khang Baioln is a 79 year old male with PMHx of urothelial carcinoma of the bladder s/p resection with chronic murrell, pulmonary nodule concerning for malignancy with patient declining further evaluation or treatment, history of pulmonary embolism on Eliquis, COPD, and chronic pain who presented with weakness, cough, and cloudy urine in the Murrell catheter tubing and bag and had syncopal episode suspected secondary to hypotension in the ED waiting room and was found to have suspected urosepsis, possible pneumonia and significant anemia and has been hospitalized for medical management.  He has been started on cefepime and a total of 3 units PRBC to achieve hemoglobin above 7.0 and is clinically improving.  Hospital course has been complicated by the fact that initial UC was collected on chronic murrell (apparently tubing was exchanged but not the murrell itself).  Patient had obstruction of murrell on 10/2/24 due to sediment and pus and murrell has now been fully exchanged with urine starting to clear - new UC has been sent and is pending, however collected almost 24 hours of antibiotic initiation, and therefore may not be accurate reflection of organisms causing this infection.  Will need ongoing evaluation and management going forward.       Sepsis secondary to CAUTI   Patient with history of transurethral resection of a bladder abnormality performed on 3/29/2024, chronic murrell in place.  Patient presented with leukocytosis, lactic acidosis, transient hypotension improved with fluids and UA concerning for UTI and urosepsis.  UC collected in ED rapidly grew out many organisms with concern for colonization and it is now known murrell was not exchanged in ED (just the tubing).  Murrell exchanged and new culture collected (but after patient has been on antibiotics for almost 24  hours) with results pending.  Blood pressures remain stable.    - monitor for results of blood cultures and new urine culture  - h/o Pseudomonas UTI in past and continue cefepime until ID and sensitivities return and possibly can de-escalate abx at that time - if no growth will need to discuss with ID given patient's high risk of complicated UTI   - holding home prophylactic Macrobid   - continue flomax and oxybutynin   -monitor for ongoing resolution of sepsis symptoms     Syncope  Occurring in the ED waiting room and thought secondary to hypotension.  No electrolyte abnormalities, no arrhthymias noted so far.   -continue treatment of infection as above  -continue continuous cardiac monitoring  -monitor for any recurrence     Hypotensive  Noted on ED evaluation following syncope and rapid response, improved with fluids and blood.  Thought secondary to anemia and infection.  Blood pressures continue to be in normal range since admission   - Continue treating infection as above  - Saline lock IV fluids at this time and monitor closely   - monitor hemoglobin and consider further PRBC if appropriate   - continue monitoring for resolution      Possible pneumonia  Shortness of breath  Cough  Patient has increased cough in days leading up to presentation with x-ray showing possible retrocardiac/left basilar opacity in patient with right lower lobe lung nodule known but no known nodules/masses on left known.  Patient reports his breathing and cough has improved during this stay but unclear at this time if he truly has infection or is symptoms were related to anemia and sepsis above.   -continue cefepime for now.    -monitor for ongoing improvement     Anemia chronic blood loss vs malignancy  Hemoglobin was found to be 6.7 and patient was given 2 units PRBC with improvement to 7.1 however down to 6.4 following further IV fluid resuscitation.  Now s/p a third unit PRBC pm on morning of 10/2/24 with hemoglobin 7.7  - recheck  hemoglobin this afternoon to ensure ongoing stability and then perform daily while hospitalized   - monitor for any signs of active bleeding  - patient has declined further work up or treatment of cancer in past and still does not want intervention during this stay or after discharge      Urothelial carcinoma of the bladder  This tumor was located on the anterior bladder wall. This demonstrated a high-grade superficially invasive urothelial carcinoma and is s/p resection with chronic murrell catheter now in place. Patient has declined further treatment with BCG therapy and continues to decline, even at recent urology office visit on 9/23/24.    - continue murrell catheter   - outpatient follow up with urology following discharge     Pulmonary nodule - concerning for malignancy  Noted on imaging last spring, patient declining further work up  -no further inpatient management needed    H/O PE, medication induced coagulopathy   Previous history of PE on Eliquis   - continue DOAC      Chronic pain  Patient has back pain with symptoms well controlled with home regimen   - continue home regiment of prn dilaudid  - schedule gabapentin   - schedule miralax     COPD without exacerbation  - continue home nebs      Weakness and recent fall  - physical therapy working with patient to assist in safe disposition planning going forward.              Diet: Regular Diet Adult  Snacks/Supplements Adult: Magic Cup; With Meals    DVT Prophylaxis: DOAC  Murrell Catheter: PRESENT, indication: Other (Comment) (chronic murrell, hx bladder cancer)  Lines: None     Cardiac Monitoring: ACTIVE order. Indication: Syncope- low cardiac risk (24 hours)  Code Status: Full Code      Clinically Significant Risk Factors              # Hypoalbuminemia: Lowest albumin = 3 g/dL at 10/1/2024 10:36 AM, will monitor as appropriate     # Hypertension: Noted on problem list  # Chronic heart failure with preserved ejection fraction: heart failure noted on problem list  and last echo with EF >50%              # Financial/Environmental Concerns: none         Disposition Plan     Medically Ready for Discharge: Anticipated in 2-4 Days    Elizabeth Quigley MD  Hospitalist Service  [facility]  Securely message with AMEE (more info)  Text page via [employer] Touch Paging/Directory   ______________________________________________________________________    Interval History   Patient continues to improve.  Blood pressures have been within normal limits for the past 24 hours, patient needing 1-2L while sleeping last night and is weaning off this morning.  Celso reports he has oxygen he uses as needed at home.  He is eating and drinking better, very motivated to try and get stronger and get back home soon.  No new symptoms.  No new nursing concerns.     Physical Exam   Vital Signs: Temp: 98.2  F (36.8  C) Temp src: Oral BP: 138/77 Pulse: 75   Resp: 19 SpO2: 98 % O2 Device: Nasal cannula Oxygen Delivery: 2 LPM  Weight: 137 lbs 5.55 oz    Constitutional: awake, alert, cooperative, no apparent distress, and appears stated age  Respiratory: no increased work of breathing, decreased breath sounds diffusely, fine crackles noted in bilateral bases which is changed from yesterday, no wheezing  Cardiovascular: RRR  GI: bowel sounds present, abdomen soft and non-tender   Neurologic: Awake, alert, oriented to name, place and situation     Medical Decision Making       41 MINUTES SPENT BY ME on the date of service doing chart review, history, exam, documentation & further activities per the note.      Data     I have personally reviewed the following data over the past 24 hrs:    10.2  \   7.7 (L)   / 207     137 107 14.0 /  101 (H)   4.4 23 0.73 \

## 2024-10-03 NOTE — PROGRESS NOTES
"   10/03/24 1000   Appointment Info   Signing Clinician's Name / Credentials (OT) Arabella Lee MA, OTR/L   Rehab Comments (OT) One Time Evaluation   Living Environment   People in Home spouse   Current Living Arrangements house   Home Accessibility stairs within home   Number of Stairs, Main Entrance 1   Transportation Anticipated agency   Living Environment Comments recently son using wheelchair to assist patient into and out of the home. walk in shower. flat bed.   Self-Care   Usual Activity Tolerance moderate   Current Activity Tolerance poor   Regular Exercise No   Equipment Currently Used at Home walker, rolling;wheelchair, manual;shower chair   Fall history within last six months yes   Number of times patient has fallen within last six months 2   Activity/Exercise/Self-Care Comment patient notes walking between rooms at home with the walker but also using the wheelchair in the home. Patient reports he has home health nursing. Chart review indicates in home PT and OT, but patient stated \"I don't really know.\"   Instrumental Activities of Daily Living (IADL)   IADL Comments Patient reports that his wife takes care of all ADLs and IADLs within the home.   General Information   Onset of Illness/Injury or Date of Surgery 10/01/24   Referring Physician Charlene Brown MD   Patient/Family Therapy Goal Statement (OT) Return Home   Additional Occupational Profile Info/Pertinent History of Current Problem Per chart review: Patient's cancer progressing. Patient's family is aware of this, but patient may not be aware of the extent.   Existing Precautions/Restrictions oxygen therapy device and L/min;pacemaker   Left Upper Extremity (Weight-bearing Status) full weight-bearing (FWB)   Right Upper Extremity (Weight-bearing Status) full weight-bearing (FWB)   Left Lower Extremity (Weight-bearing Status) full weight-bearing (FWB)   Right Lower Extremity (Weight-bearing Status) full weight-bearing (FWB)   General " "Observations and Info Patient up in chair eating breakfast. Alert and oriented at time of evaluation. Patient fixated on the fact that whenever he eats, he feels he needs to pass a BM right away - patient encouraged to speak with his doctor about this.   Cognitive Status Examination   Orientation Status orientation to person, place and time   Behavioral Issues difficulty managing stress;verbal outbursts   Affect/Mental Status (Cognitive) emotionally labile   Follows Commands WFL   Cognitive Status Comments At this time, there are not concerns with patient's cognition. When O2 was removed for face washing, patient stated he felt dizzy and \"hazy\" - cleared when O2 was put back into place.   Posture   Posture forward head position;protracted shoulders   Range of Motion Comprehensive   General Range of Motion bilateral upper extremity ROM WFL   Coordination   Coordination Comments Patient observed to be able to self feed independently including opening milk carton, coffee creamer container, and apple sauce container.   Bed Mobility   Comment (Bed Mobility) Patient in chair at this time - bed mobility being addressed by PT   Transfers   Transfer Comments Patient declined mobility at this time due to eating breakfast, but agreed to ADLs sitting in chair.   Activities of Daily Living   BADL Assessment/Intervention   (At the time of this evaluation, patient was able to display washing his face/body, apply deodorant, and feed himself in the seated position independently. Becoming upset as he normally does not do these tasks for himself at home.)   Additional Documentation   (At baseline, patient states that his wife or the home health nurse do all of his ADLs for him. He does not dress himself, his wife cleans his dentures. His wife or nursing provides him with sponge baths within the seated position.)   Clinical Impression   Criteria for Skilled Therapeutic Interventions Met (OT) Evaluation only;Current level of function " same as previous level of function;No significant expected improvement in functional status   ADL comments/analysis Patient's ADLs are at baseline. His wife currently performs all ADL for home and IADL tasks within the home. Patient able to display independence with upper body washing with set-up in the seated position, but was upset as these are normally not tasks he performs for himself at baseline.   Clinical Decision Making Complexity (OT) problem focused assessment/low complexity   Clinical Impression Comments Patient capable of more than he is currently doing with regard to participation in his ADLs, but has no motivation to progress and/or change this aspect of his life.   OT Total Evaluation Time   OT Eval, Low Complexity Minutes (57875) 20   OT Discharge Planning   OT Plan One Time Evaluation   OT Discharge Recommendation (DC Rec) home with assist;home with home care occupational therapy;Transitional Care Facility   OT Rationale for DC Rec Patient from home with spouse. ADLs at baseline as wife completes all of them for patient - patient capable of more than he does with regard to ADLs within the home, but is not willing to change this. It is recommended that therapies continue within the home. IF wife is not able to provide care and therapies are not able to resume within the home, then TCU is recommended to support patient's PT recommendations.   OT Brief overview of current status Patient's ADLs are at baseline (wife completes all within the home). No current concerns with cognition. No further IP OT is being recommended at this time.   Total Session Time   Total Session Time (sum of timed and untimed services) 20     Thank you for your referral,  Arabella Lee MA, OTR/L    [facility]  [email or web address]

## 2024-10-03 NOTE — PLAN OF CARE
Goal Outcome Evaluation:      Plan of Care Reviewed With: patient    Overall Patient Progress: improvingOverall Patient Progress: improving    Outcome Evaluation: A&O x 4, intermittent confuse, able reorient. VSS. Frequent productive cough w/loose sputum, lungs CTA, increased respirations with activity, unlabored at rest, On 2L via NC (uses prn O2 baseline at home). Hernandez cath care completed. Urine characteristics improved to clear, no odor noticed. On tele, vent paced w/BBB. Multiple loose/formed stools, incontinent at times, able to use commode a few times. Transfers x 1-2 assist w/ GB and walker. Up to chair in the am. Coccyx mepilex replaced, heels mepilex CDI. PRN oxybutynin given x 1 for bladder spasms.

## 2024-10-03 NOTE — PLAN OF CARE
Goal Outcome Evaluation:      Plan of Care Reviewed With: patient          Outcome Evaluation: Home with homecare services

## 2024-10-03 NOTE — PROGRESS NOTES
10/03/24 0900   Oxygen Therapy   SpO2 94 %   O2 Device Nasal cannula   Oxygen Delivery 2 LPM   Positive Expiratory Pressure (PEP)   Daily Review of Necessity (PEP Therapy) completed   $PEP Complete   Type (PEP Therapy) vibratory/oscillatory   Device (PEP Therapy) flutter   Route (PEP Therapy) instruction provided, initial   Breaths per Cycle (PEP Therapy) 10   Cycles (PEP Therapy) 1   PEP Duration (min) 3   Settings (PEP Therapy) PEP 5   Patient Position sitting in chair   Posttreatment Assessment (PEP) breath sounds unchanged   Signs of Intolerance (PEP Therapy) none     Aero bika initiated for rhonchi and loose congested cough  Patient uses 2 liters oxygen via nasal cannula at home inconsistently

## 2024-10-03 NOTE — PLAN OF CARE
"Goal Outcome Evaluation:      Plan of Care Reviewed With: patient    Overall Patient Progress: no changeOverall Patient Progress: no change    Outcome Evaluation: Pt A&O with intermittent confusion.  Pain 5/10 to abdomen, but improved to 2/10. Dilaudid and oxybutinin given at beginning of shift by previous RN. Lungs diminished throughtout, with rhonci to upper left lobe. Loose congested cough, but unable to produce sputum. Had increased respirations with O2 92% and pursed lip breathing. Pt stated hed usually breaths like this and usually uses O2 at home.   1 L O2 applied.  RT called and neb given. Respirations reduced to 20 and pt more comfortable throughout night.    Hernandez with yellow cloudy output. Good output.  Cath cares completed. Tele paced with BBB.  Small BM.  Max temp 99.6. Tylenol given and 98.6 on recheck. Mepilex applied to red blanchable heels.  Repositioned throughout night.    /72 (BP Location: Right arm)   Pulse 73   Temp 98.9  F (37.2  C) (Oral)   Resp 20   Ht 1.778 m (5' 10\")   Wt 62.3 kg (137 lb 5.6 oz)   SpO2 94%   BMI 19.71 kg/m      "

## 2024-10-03 NOTE — CONSULTS
Care Management Initial Consult    General Information  Assessment completed with: Patient,    Type of CM/SW Visit: Initial Assessment    Primary Care Provider verified and updated as needed: Yes   Readmission within the last 30 days: no previous admission in last 30 days      Reason for Consult: discharge planning  Advance Care Planning: Advance Care Planning Reviewed: present on chart          Communication Assessment  Patient's communication style: spoken language (English or Bilingual)    Hearing Difficulty or Deaf: no   Wear Glasses or Blind: yes    Cognitive  Cognitive/Neuro/Behavioral: WDL  Level of Consciousness: alert  Arousal Level: opens eyes spontaneously  Orientation: oriented x 4  Mood/Behavior: cooperative, calm  Best Language: 0 - No aphasia  Speech: clear, spontaneous    Living Environment:   People in home: spouse  Wife Brittany  Current living Arrangements: house      Able to return to prior arrangements:         Family/Social Support:  Care provided by: self, spouse/significant other  Provides care for: no one  Marital Status:   Support system: Wife, Children  Brittany       Description of Support System: Supportive, Involved         Current Resources:   Patient receiving home care services: Yes  Skilled Home Care Services: Skilled Nursing, Home Health Aid, Physical Therapy, Occupational Therapy     Community Resources: Home Care  Equipment currently used at home: walker, rolling, wheelchair, manual, shower chair  Supplies currently used at home: Oxygen Tubing/Supplies, Chux, Incontinence Supplies    Employment/Financial:  Employment Status: retired        Financial Concerns: none           Does the patient's insurance plan have a 3 day qualifying hospital stay waiver?  Yes     Which insurance plan 3 day waiver is available? Alternative insurance waiver    Will the waiver be used for post-acute placement? Undetermined at this time    Lifestyle & Psychosocial Needs:  Social Determinants of Health      Food Insecurity: Low Risk  (10/1/2024)    Food Insecurity     Within the past 12 months, did you worry that your food would run out before you got money to buy more?: No     Within the past 12 months, did the food you bought just not last and you didn t have money to get more?: No   Depression: Not at risk (1/9/2024)    PHQ-2     PHQ-2 Score: 0   Housing Stability: Low Risk  (10/1/2024)    Housing Stability     Do you have housing? : Yes     Are you worried about losing your housing?: No   Tobacco Use: Medium Risk (9/23/2024)    Received from [facility] and [facility]    Patient History     Smoking Tobacco Use: Former     Smokeless Tobacco Use: Never     Passive Exposure: Past   Financial Resource Strain: Low Risk  (10/1/2024)    Financial Resource Strain     Within the past 12 months, have you or your family members you live with been unable to get utilities (heat, electricity) when it was really needed?: No   Alcohol Use: Not on file   Transportation Needs: Low Risk  (10/1/2024)    Transportation Needs     Within the past 12 months, has lack of transportation kept you from medical appointments, getting your medicines, non-medical meetings or appointments, work, or from getting things that you need?: No   Physical Activity: Not on file   Interpersonal Safety: Low Risk  (10/1/2024)    Interpersonal Safety     Do you feel physically and emotionally safe where you currently live?: Yes     Within the past 12 months, have you been hit, slapped, kicked or otherwise physically hurt by someone?: No     Within the past 12 months, have you been humiliated or emotionally abused in other ways by your partner or ex-partner?: No   Stress: Not on file   Social Connections: Unknown (8/19/2024)    Social Connection and Isolation Panel [NHANES]     Frequency of Communication with Friends and Family: Not on file     Frequency of Social Gatherings with Friends and Family: Not on file     Attends [employer] Services: Not on  file     Active Member of Clubs or Organizations: Not on file     Attends Club or Organization Meetings: Not on file     Marital Status:    Health Literacy: Not on file       Functional Status:  Prior to admission patient needed assistance:   Dependent ADLs:: Ambulation-walker, Wheelchair-with assist, Positioning, Transfers, Grooming, Dressing, Toileting, Bathing, Incontinence  Dependent IADLs:: Incontinence, Transportation, Money Management, Medication Management, Meal Preparation, Shopping, Laundry, Cooking, Cleaning     Values/Beliefs:  Spiritual, Cultural Beliefs, Shinto Practices, Values that affect care: no               Discussed  Partnership in Safe Discharge Planning  document with patient/family: No    Additional Information:  CM consulted for discharge planning, patient is also high hospital readmission risk. Patient has current homecare with [facility] (Phone: [phone])     CM met bedside with patient for initial assessment. No family present at bedside. Per Patient he lives in his garage which was made into a living space with a kitchen and a bathroom. Patients wife Brittany lives at home with patient and per patient she gets him up at 8:30am every morning to get him dressed and ready for the day and spends the whole day with him and then goes back into the house for sleeping. Per patient his wife assists with all ADLS and pts wife, son in law and grandson assist with all IADLS. Patient has a walker and wheelchair at home, pt transitions between the wheelchair and his bed. Per pt his bed is an old hospital bed.    Patients goal is to go home post hospitalization, pt expressed wanting to get strength to walk more. CM discussed pt continuing homecare for this, pt in agreement.    Per therapy recommending Home with homecare v.s TCU. Pt would like to go home. Per patient family can provide transport on discharge. Patient currently on O2 2lpm via NC, only PRN at home. May need  O2 at transport depending on O2 needs at discharge.    Next Steps:   -CM to continue to follow during hospitalization for discharge planning  -Family input would be helpful to determine if family can continue to manage patient at home    Plan: home with [facility] (Phone: [phone]) Rn,PT,OT,HHA    Transport: Family v.s agency pending pts ability on day of discharge and O2 needs    Sonia Samuel RNCM  Care Transitions Registered Nurse

## 2024-10-04 LAB
ANION GAP SERPL CALCULATED.3IONS-SCNC: 8 MMOL/L (ref 7–15)
BUN SERPL-MCNC: 16.4 MG/DL (ref 8–23)
CALCIUM SERPL-MCNC: 8 MG/DL (ref 8.8–10.4)
CHLORIDE SERPL-SCNC: 108 MMOL/L (ref 98–107)
CREAT SERPL-MCNC: 0.55 MG/DL (ref 0.67–1.17)
EGFRCR SERPLBLD CKD-EPI 2021: >90 ML/MIN/1.73M2
ERYTHROCYTE [DISTWIDTH] IN BLOOD BY AUTOMATED COUNT: 30.7 % (ref 10–15)
GLUCOSE SERPL-MCNC: 97 MG/DL (ref 70–99)
HCO3 SERPL-SCNC: 22 MMOL/L (ref 22–29)
HCT VFR BLD AUTO: 25.9 % (ref 40–53)
HGB BLD-MCNC: 7.4 G/DL (ref 13.3–17.7)
MCH RBC QN AUTO: 17.7 PG (ref 26.5–33)
MCHC RBC AUTO-ENTMCNC: 28.6 G/DL (ref 31.5–36.5)
MCV RBC AUTO: 62 FL (ref 78–100)
PLATELET # BLD AUTO: 192 10E3/UL (ref 150–450)
POTASSIUM SERPL-SCNC: 3.9 MMOL/L (ref 3.4–5.3)
RBC # BLD AUTO: 4.17 10E6/UL (ref 4.4–5.9)
SODIUM SERPL-SCNC: 138 MMOL/L (ref 135–145)
WBC # BLD AUTO: 9.1 10E3/UL (ref 4–11)

## 2024-10-04 PROCEDURE — 250N000013 HC RX MED GY IP 250 OP 250 PS 637: Performed by: INTERNAL MEDICINE

## 2024-10-04 PROCEDURE — 80048 BASIC METABOLIC PNL TOTAL CA: CPT | Performed by: FAMILY MEDICINE

## 2024-10-04 PROCEDURE — 250N000013 HC RX MED GY IP 250 OP 250 PS 637: Performed by: FAMILY MEDICINE

## 2024-10-04 PROCEDURE — 99232 SBSQ HOSP IP/OBS MODERATE 35: CPT | Performed by: FAMILY MEDICINE

## 2024-10-04 PROCEDURE — 36415 COLL VENOUS BLD VENIPUNCTURE: CPT | Performed by: FAMILY MEDICINE

## 2024-10-04 PROCEDURE — 250N000011 HC RX IP 250 OP 636: Performed by: INTERNAL MEDICINE

## 2024-10-04 PROCEDURE — 120N000001 HC R&B MED SURG/OB

## 2024-10-04 PROCEDURE — 85027 COMPLETE CBC AUTOMATED: CPT | Performed by: FAMILY MEDICINE

## 2024-10-04 RX ORDER — CIPROFLOXACIN 500 MG/1
500 TABLET, FILM COATED ORAL EVERY 12 HOURS SCHEDULED
Status: DISCONTINUED | OUTPATIENT
Start: 2024-10-04 | End: 2024-10-05 | Stop reason: HOSPADM

## 2024-10-04 RX ADMIN — GABAPENTIN 300 MG: 300 CAPSULE ORAL at 19:49

## 2024-10-04 RX ADMIN — CEFEPIME HYDROCHLORIDE 2 G: 2 INJECTION, POWDER, FOR SOLUTION INTRAVENOUS at 06:41

## 2024-10-04 RX ADMIN — APIXABAN 5 MG: 5 TABLET, FILM COATED ORAL at 09:27

## 2024-10-04 RX ADMIN — GABAPENTIN 300 MG: 300 CAPSULE ORAL at 09:27

## 2024-10-04 RX ADMIN — OXYBUTYNIN CHLORIDE 5 MG: 5 TABLET ORAL at 09:59

## 2024-10-04 RX ADMIN — TAMSULOSIN HYDROCHLORIDE 0.4 MG: 0.4 CAPSULE ORAL at 19:49

## 2024-10-04 RX ADMIN — CIPROFLOXACIN HYDROCHLORIDE 500 MG: 500 TABLET, FILM COATED ORAL at 19:48

## 2024-10-04 RX ADMIN — GABAPENTIN 300 MG: 300 CAPSULE ORAL at 14:03

## 2024-10-04 RX ADMIN — APIXABAN 5 MG: 5 TABLET, FILM COATED ORAL at 19:49

## 2024-10-04 RX ADMIN — CEFEPIME HYDROCHLORIDE 2 G: 2 INJECTION, POWDER, FOR SOLUTION INTRAVENOUS at 14:03

## 2024-10-04 ASSESSMENT — ACTIVITIES OF DAILY LIVING (ADL)
ADLS_ACUITY_SCORE: 34
ADLS_ACUITY_SCORE: 35
ADLS_ACUITY_SCORE: 34

## 2024-10-04 NOTE — PROGRESS NOTES
[facility]    Medicine Progress Note - Hospitalist Service    Date of Admission:  10/1/2024    Assessment & Plan     Khang Bailon is a 79 year old male with PMHx of urothelial carcinoma of the bladder s/p resection with chronic murrell, pulmonary nodule concerning for malignancy with patient declining further evaluation or treatment, history of pulmonary embolism on Eliquis, COPD, and chronic pain who presented with weakness, cough, and cloudy urine in the Murrell catheter tubing and bag and had syncopal episode suspected secondary to hypotension in the ED waiting room and was found to have suspected urosepsis, possible pneumonia and significant anemia and has been hospitalized for medical management.  He has been started on cefepime and a total of 3 units PRBC to achieve hemoglobin above 7.0 and is clinically improving.  Hospital course has been complicated by the fact that initial UC was collected on chronic murrell (apparently tubing was exchanged but not the murrell itself).  Patient had obstruction of murrell on 10/2/24 due to sediment and pus and murrell has now been fully exchanged with urine starting to clear - new UC has been sent and shows urogenital kami however collected almost 24 hours of antibiotic initiation, and therefore may not be accurate reflection of organisms causing this infection.  Reviewed with antibiotic stewardship  committee and given that patient had a Pseudomonas UTI last month that was not effectively treated presumption is that this likely was a Pseudomonas UTI.  They are recommending transition to oral ciprofloxacin 500 mg twice daily for antibiotic completion course of 5 to 7 days.  Will transition patient to oral ciprofloxacin this evening with anticipated discharge home tomorrow as long as medication is well-tolerated and patient continues to improve.     Sepsis secondary to CAUTI   Patient with history of transurethral resection of a bladder abnormality  performed on 3/29/2024, chronic murrell in place.  Patient presented with leukocytosis, lactic acidosis, transient hypotension improved with fluids and UA concerning for UTI and urosepsis.  UC collected in ED rapidly grew out many organisms with concern for colonization and it is now known murrell was not exchanged in ED (just the tubing).  Murrell exchanged and new culture collected (but after patient has been on antibiotics for almost 24 hours) with results pending.  Blood pressures remain stable And sepsis symptoms are now resolved.  - h/o Pseudomonas UTI in pastmost recently 1 month ago and for which patient was given Omnicef which would not effectively treat a pseudomonal infection.  -Will transition from cefepime to oral ciprofloxacin as per antibiotic stewardship recommendations.  - holding home prophylactic Macrobid for as long as patient is on ciprofloxacin but resume at time of ciprofloxacin completion.  - continue flomax and oxybutynin   -monitor for ongoing resolution of sepsis symptoms and anticipate discharge home tomorrow as long as patient continues to clinically improve.     Syncope  Occurring in the ED waiting room and thought secondary to hypotension.  No electrolyte abnormalities, no arrhthymias noted so far.   -continue treatment of infection as above  -continue continuous cardiac monitoring  -monitor for any recurrence     Hypotensive  Noted on ED evaluation following syncope and rapid response, improved with fluids and blood.  Thought secondary to anemia and infection.  Blood pressures continue to be in normal range since admission   - Continue treating infection as above  - Saline lock IV fluids at this time and monitor closely   - monitor hemoglobin and consider further PRBC if appropriate   - continue monitoring for resolution      Possible pneumonia  Shortness of breath  Cough  Patient has increased cough in days leading up to presentation with x-ray showing possible retrocardiac/left basilar  opacity in patient with right lower lobe lung nodule known but no known nodules/masses on left known.  Patient reports his breathing and cough has improved during this stay but unclear at this time if he truly has infection or is symptoms were related to anemia and sepsis above.   -transition to ciprofloxacin as above and monitor for ongoing clinical improvement    -Continue to monitor hemoglobin and transfuse if hemoglobin is less than 7.0.    Anemia chronic blood loss vs malignancy  Hemoglobin was found to be 6.7 and patient was given 2 units PRBC with improvement to 7.1 however down to 6.4 following further IV fluid resuscitation.  Now s/p a third unit PRBC pm on morning of 10/2/24 with hemoglobin  does appear to stabilize in the mid 7 range  - Daily monitoring of hemoglobin to ensure ongoing stability  - monitor for any signs of active bleeding  - patient has declined further work up or treatment of cancer in past and still does not want intervention during this stay or after discharge      Urothelial carcinoma of the bladder  This tumor was located on the anterior bladder wall. This demonstrated a high-grade superficially invasive urothelial carcinoma and is s/p resection with chronic murrell catheter now in place. Patient has declined further treatment with BCG therapy and continues to decline, even at recent urology office visit on 9/23/24.    - continue murrell catheter   - outpatient follow up with urology following discharge     Pulmonary nodule - concerning for malignancy  Noted on imaging last spring, patient declining further work up  -no further inpatient management needed    H/O PE, medication induced coagulopathy   Previous history of PE on Eliquis   - continue DOAC      Chronic pain  Patient has back pain with symptoms well controlled with home regimen   - continue home regiment of prn dilaudid  - schedule gabapentin   - schedule miralax     COPD without exacerbation  - continue home nebs      Weakness  and recent fall  - physical therapy working with patient to assist in safe disposition planning going forward.         Diet: Regular Diet Adult  Snacks/Supplements Adult: Magic Cup; With Meals    DVT Prophylaxis: Pneumatic Compression Devices  Hernandez Catheter: PRESENT, indication: Other (Comment)  Lines: None     Cardiac Monitoring: ACTIVE order. Indication: Syncope- low cardiac risk (24 hours)  Code Status: Full Code      Clinically Significant Risk Factors              # Hypoalbuminemia: Lowest albumin = 3 g/dL at 10/1/2024 10:36 AM, will monitor as appropriate     # Hypertension: Noted on problem list  # Chronic heart failure with preserved ejection fraction: heart failure noted on problem list and last echo with EF >50%              # Financial/Environmental Concerns: none         Disposition Plan     Medically Ready for Discharge: Anticipated Tomorrow             Elizabeth Quigley MD  Hospitalist Service  [facility]  Securely message with [employer] (more info)  Text page via [employer] Paging/Directory   ______________________________________________________________________    Interval History   Patient continues to improve - feels that he continues to be weaker than his baseline but is making progress is very excited to go home soon.  Vital signs have been stable.  Patient was put on oxygen while sleeping last evening and continues on it during the day even though he is satting 98 to 100% on 2 L.  In discussion with patient he does have oxygen at home but normally only uses it at night.  Nursing staff will try and wean patient off oxygen during the day.  No new nursing concerns.  Urine now appears clear and yellow.      Physical Exam   Vital Signs: Temp: 98.2  F (36.8  C) Temp src: Oral BP: 139/65 Pulse: 73   Resp: 20 SpO2: 100 % O2 Device: Nasal cannula Oxygen Delivery: 2 LPM  Weight: 137 lbs 5.55 oz    Constitutional: awake, alert, cooperative, no apparent distress, and  appears stated age  Respiratory: No increased work of breathing, good air exchange, crackles in bilateral bases, no wheezing  Cardiovascular: Regular rate and rhythm  GI: Bowel sounds present, abdomen is soft and nondistended  Neurologic: Awake, alert, oriented to name, place and  situation    Medical Decision Making       40 MINUTES SPENT BY ME on the date of service doing chart review, history, exam, documentation & further activities per the note.      Data     I have personally reviewed the following data over the past 24 hrs:    9.1  \   7.4 (L)   / 192     138 108 (H) 16.4 /  97   3.9 22 0.55 (L) \

## 2024-10-04 NOTE — CONSULTS
SPIRITUAL HEALTH SERVICES Progress Note    Medical Surgical Unit at [facility].      REFERRAL SOURCE/REASON FOR VISIT - I attempted to see patient per length of stay.      SUMMARY - I attempted to see Khang, who has been hospitalized for 3 days.  He was asleep, however.  I left my contact information on his bedside table.         Plan - I will continue to follow patient and be available for any ongoing support needs until his discharge.     Jose Lozada, Ph.D,   Spiritual Health Services  [address] [place] [address] [phone]  [email or web address]    Assessment -     Meaning, Beliefs, and Spirituality - Patient did not designate a Restoration preference at admission.

## 2024-10-04 NOTE — PLAN OF CARE
Goal Outcome Evaluation:      Plan of Care Reviewed With: patient    Overall Patient Progress: no changeOverall Patient Progress: no change    Outcome Evaluation: Pt has been alert and oriented with occasional intermittent confusion. He was up in the chair for supper. Good appetite. Pt has a very congested cough. Oxygen on at 2L. Pt states this is his baseline at home. Dilaudid given for abdominal/bladder spasms. Hernandez in place with adequate output. Tele has been paced.

## 2024-10-04 NOTE — PLAN OF CARE
Goal Outcome Evaluation:                 Outcome Evaluation: Patient alert and oriented with intermittent confusion. Remains on 2 LPM nasal cannula overnight. Turned/repositioned in bed overnight due to redness on buttocks and patient known to site in same position for long periods of time. Patient is able to help assist with turning to both sides. Hernandez wipes completed, UOP as charted. Noted patient anatomy has catheter inserted into urethral hole at base of penis. Mepilex on coccyx covering blachable redness and some peeling of skin on area. Heels boggy, floated with pillows. No PRN's overnight.

## 2024-10-04 NOTE — PLAN OF CARE
Goal Outcome Evaluation:      Plan of Care Reviewed With: patient    Overall Patient Progress: improvingOverall Patient Progress: improving     VSS on RA. Pt shifted to oral abx- cipro. C/o blader spasm, PRN ditropan given. Pt had 1 large BM this AM. Hernandez in place drained 450ml.

## 2024-10-04 NOTE — PROGRESS NOTES
Antimicrobial Stewardship Team Note    Antimicrobial Stewardship Program - A joint venture between [facility] and  Physicians to optimize antibiotic management.  NOT a formal consult - Restricted Antimicrobial Review     Patient: Khang Bailon  MRN: [id number]  Allergies: Flagyl [metronidazole]    Brief Summary: Khang Bailon is a 79 year old male with PMHx of urothelial carcinoma of the bladder s/p resection with chronicFfoley, history of pulmonary embolism on Eliquis, COPD, and chronic pain, and pulmonary nodule concerning for malignancy that patient has refused evaluation and treatment who was admitted on 10/1/24 for weakness, cough, and cloudy urine in the Hernandez catheter tubing and bag with a syncopal episode in ED.     HPI:   On 9/2/24, patient presented to [place] ED due to concerns that his Hernandez was displaced leading to leakage. The Hernandez was realigned by urology technicians with success. It was noted that the urine coming out of the realigned Hernandez was cloudy. The physician prescribed a 10 day course of cefdinir. On 10/1/24 patient presented to the [facility] ED with weakness, cough, and cloudy urine seen in the Hernandez catheter tubing and bag. In the ED, patient had a syncopal episode that did not require chest compressions. Patient was afebrile but blood pressure dropped to 91/54. Patient was stabilized on fluids. Pertninent labs were elevated WBC of 13. Chest Xray showed retrocardiac/left basilar opacity may reflect combination of small pleural effusion, atelectasis and/or infection. No convincing pneumothorax. Blood cultures NGTD x2 days. Urine culture 10/1/24 from existing Hernandez (>100,00 CFU/mL mixed urogenital kami) and urine culture 10/2/24 from replaced Hernandez (10-50,000 CFU/mL mixed urogenital kami). It was noted that antibiotics were started about 45 minutes before the first urine culture was collected. Antibiotic started was cefepime. Patient has a  history of recurrent UTIs with organisms cultured Pseudomonas aeruginosa (9/2/24), Staph epi (7/11/24), Candida albicans (5/23/24) and Klebsiella pneumoniae (1/31/24). Hernandez catheter was changed on 10/2/24 and sampled but after about 24 hours on antibiotics.        Active Anti-infective Medications   (From admission, onward)                 Start     Stop    10/01/24 2130  ceFEPIme  2 g,   Intravenous,   EVERY 8 HOURS SCHEDULED        Sepsis, Urinary Tract Infection       --                  Assessment:   Urosepsis secondary to possible catheter-associated UTI  Patient continues to be afebrile and now hemodynamically stable off of IV fluids. However, still on 2 liters nasal canula for oxygen. WBC count has down trended from to 9.1. Given sepsis picture with signs of cloudy urine from Hernandez catheter, likely urosepsis secondary to UTI. Culture results from 9/2/24 after [place] ED visit resulted in Pseudomonas (>100,000 CFU/mL) but was only treated with non-Pseudomonal covering antibiotic cefdinir. This may suggest inappropriate treatment of the offending organism leading to 10/1/24 [facility] ED presentation. The inconclusive urine culture results are difficult to assess with the use of antibiotics prior to Hernandez exchange and culture. It may be reasonable that Pseudomonas be the organism to target given the history of events. We agree that IV cefepime is beneficial for Pseudomonal coverage, however, we recommend oral step down to PO ciprofloxacin for narrower coverage to complete a total 5-7 day antibiotic course. The likely most beneficial intervention was exchanging patient's Hernandez catheter in effort to debulk colonized organism burden. It seems like patient gets his Hernandez exchanged monthly - continue to ensure patient has a good plan in place for routine catheter exchanges.      Recommendations:  1.) Oral step down to PO ciprofloxacin 500 mg twice daily for a total 5-7 day antibiotic course   2.) Continue to  ensure patient has a good plan in place for routine catheter exchanges    Discussed with ID Staff Dr. Daysi Bernard DO and Arabella Montano, PharmD, BCIDP      Ben Cortez, PharmD  PGY-1 Pharmacy Resident   Phone# [phone]    Vital Signs/Clinical Features:  Vitals         10/02 0700  10/03 0659 10/03 0700  10/04 0659 10/04 0700  10/04 1334   Most Recent      Temp ( F) 97.6 -  99.8    97.6 -  98.7      98.2     98.2 (36.8) 10/04 1107    Pulse 64 -  76    63 -  79    73 -  85     73 10/04 1107    Resp 16 -  30    16 -  20      20     20 10/04 1107    /73 -  158/82    110/61 -  150/80    129/69 -  139/65     139/65 10/04 1107    SpO2 (%) 92 -  95    93 -  99      100     100 10/04 1107            Labs  Estimated Creatinine Clearance: 96 mL/min (A) (based on SCr of 0.55 mg/dL (L)).  Recent Labs   Lab Test 07/17/24  1305 08/10/24  0749 10/01/24  1036 10/02/24  0521 10/03/24  0531 10/04/24  0526   CR 0.72 0.73 0.74 0.64* 0.73 0.55*       Recent Labs   Lab Test 08/02/17  1354 05/15/19  1833 11/26/19  1350 06/27/21  1941 09/30/21  1238 10/01/21  1139 10/20/21  1203 07/17/24  1305 08/10/24  0749 10/01/24  1036 10/01/24  1856 10/02/24  0521 10/02/24  1156 10/02/24  1800 10/03/24  0531 10/03/24  1426 10/04/24  0526   WBC 7.7 8.0  --  14.1*   < > 29.7*   < > 14.8* 19.3* 13.0*  --  9.9  --   --  10.2  --  9.1   ANEU 5.7 5.8  --  11.8*  --  24.9*  --   --   --   --   --   --   --   --   --   --   --    ALYM 1.3 1.2  --  0.9  --  2.1  --   --   --   --   --   --   --   --   --   --   --    CURT 0.6 0.7  --  1.2  --  2.7*  --   --   --   --   --   --   --   --   --   --   --    AEOS 0.1  --   --  0.1  --  0.0  --   --   --   --   --   --   --   --   --   --   --    HGB 12.7* 12.1*   < > 12.3*   < > 13.5   < > 9.4* 8.3* 6.7*   < > 6.4* 7.7* 8.5* 7.7* 7.6* 7.4*   HCT 40.1 39.6*  --  40.1   < > 43.0   < > 31.8* 28.3* 24.6*  --  22.6*  --   --  26.5*  --  25.9*   MCV 60* 62*  --  61*   < > 61*   < > 62* 59* 54*  --  57*  --    --  61*  --  62*    206  --  182   < > 285   < > 362 397 376  --  266  --   --  207  --  192    < > = values in this interval not displayed.       Recent Labs   Lab Test 02/27/24  1210 04/03/24  1203 04/26/24  1633 05/07/24  1047 08/10/24  0749 10/01/24  1036   BILITOTAL 0.6 0.6 1.0 0.8 0.4 0.4   ALKPHOS 90 73 102 96 78 71   ALBUMIN 3.8 2.9* 3.4* 3.0* 2.9* 3.0*   AST 17 26 26 19 10 10   ALT 16 13 16 11 9 10       Recent Labs   Lab Test 11/26/19  1350 06/27/21  1941 09/30/21  1330 08/13/23  1547 08/14/23  0635 01/31/24  1348 02/01/24  0712 02/02/24  0645 02/02/24  0839 02/27/24  1210 04/26/24  1633 04/26/24  1918 07/11/24  1729 10/01/24  1042 10/01/24  1227   PCAL  --   --   --   --  0.65*  --  1.09* 0.74*  --   --  0.23  --  0.12  --   --    LACT  --  2.1*   < > 0.7  --    < > 1.4  --    < > 0.8 6.8* 2.1* 1.3 2.2* 1.4   CRP  --  11.8*  --   --   --   --   --   --   --   --   --   --   --   --   --    CRPI  --   --   --  31.16* 59.57*  --   --   --   --  3.90  --   --   --   --   --    SED 7  --   --   --   --   --   --   --   --   --   --   --   --   --   --     < > = values in this interval not displayed.             Culture Results:  7-Day Micro Results       Procedure Component Value Units Date/Time    Urine Culture [[id number]] Collected: 10/02/24 1412    Order Status: Completed Lab Status: Final result Updated: 10/03/24 1304    Specimen: Urine, Hernandez Catheter      Culture 10,000-50,000 CFU/mL Mixture of Urogenital Umu    Urine Culture [[id number]] Collected: 10/01/24 1145    Order Status: Completed Lab Status: Final result Updated: 10/02/24 1140    Specimen: Urine, Hernandez Catheter      Culture >100,000 CFU/mL Mixture of Urogenital Umu    Narrative:      Multiple morphotypes present with no predominant organism.  Growth consistent with probable contamination during collection.  Suggest repeat specimen if clinically indicated.     Blood Culture Peripheral Blood [[id number]]  (Normal) Collected:  10/01/24 1042    Order Status: Completed Lab Status: Preliminary result Updated: 10/03/24 1531    Specimen: Peripheral Blood      Culture No growth after 2 days            Recent Labs   Lab Test 02/27/24  1225 07/02/24  1515 07/11/24  1752 08/07/24  1658 10/01/24  1145   URINEPH 6.0 7.0 5.0 6.0 6.0   NITRITE Negative Positive* Negative Negative Negative   LEUKEST Small* Large* Moderate* Moderate* Large*   WBCU 181* <1 >182* 19* 90*                         Imaging: CT Head w/o Contrast    Result Date: 10/1/2024  CT SCAN OF THE HEAD WITHOUT CONTRAST   10/1/2024 12:43 PM HISTORY: AMS TECHNIQUE: Axial images of the head and coronal reformations without IV contrast material. Radiation dose for this scan was reduced using automated exposure control, adjustment of the mA and/or kV according to patient size, or iterative reconstruction technique. COMPARISON: Head CT 7/12/2016. FINDINGS: There is no evidence of intracranial hemorrhage, mass, acute infarct or anomaly. The ventricles are normal in size, shape and configuration. Moderate diffuse parenchymal volume loss. Moderate patchy periventricular white matter hypodensities which are nonspecific, but likely related to chronic microvascular ischemic disease. The visualized portions of the sinuses and mastoids appear normal. The bony calvarium and bones of the skull base appear intact.     IMPRESSION:   1. No evidence of acute intracranial hemorrhage, mass, or herniation. 2. Moderate diffuse parenchymal volume loss and white matter changes likely due to chronic microvascular ischemic disease. BAILEY COMER MD   SYSTEM ID:  [id number]    XR Chest Port 1 View    Result Date: 10/1/2024  CHEST ONE VIEW  10/1/2024 12:09 PM HISTORY: AMS COMPARISON: Chest radiograph 8/15/2024.     IMPRESSION: Retrocardiac/left basilar opacity may reflect combination of small pleural effusion, atelectasis and/or infection. No convincing pneumothorax. Similar heart size and left chest wall cardiac  device. Support devices overlie the thorax. ARTURO POLK MD   SYSTEM ID:  [id number]

## 2024-10-05 ENCOUNTER — TELEPHONE (OUTPATIENT)
Dept: FAMILY MEDICINE | Facility: CLINIC | Age: 79
End: 2024-10-05
Payer: COMMERCIAL

## 2024-10-05 VITALS
BODY MASS INDEX: 19.66 KG/M2 | HEART RATE: 88 BPM | WEIGHT: 137.35 LBS | SYSTOLIC BLOOD PRESSURE: 146 MMHG | RESPIRATION RATE: 20 BRPM | DIASTOLIC BLOOD PRESSURE: 76 MMHG | OXYGEN SATURATION: 93 % | TEMPERATURE: 98.2 F | HEIGHT: 70 IN

## 2024-10-05 LAB
ANION GAP SERPL CALCULATED.3IONS-SCNC: 6 MMOL/L (ref 7–15)
BUN SERPL-MCNC: 14.5 MG/DL (ref 8–23)
CALCIUM SERPL-MCNC: 8 MG/DL (ref 8.8–10.4)
CHLORIDE SERPL-SCNC: 108 MMOL/L (ref 98–107)
CREAT SERPL-MCNC: 0.55 MG/DL (ref 0.67–1.17)
EGFRCR SERPLBLD CKD-EPI 2021: >90 ML/MIN/1.73M2
ERYTHROCYTE [DISTWIDTH] IN BLOOD BY AUTOMATED COUNT: 31 % (ref 10–15)
GLUCOSE SERPL-MCNC: 94 MG/DL (ref 70–99)
HCO3 SERPL-SCNC: 24 MMOL/L (ref 22–29)
HCT VFR BLD AUTO: 25.7 % (ref 40–53)
HGB BLD-MCNC: 7.4 G/DL (ref 13.3–17.7)
MCH RBC QN AUTO: 17.7 PG (ref 26.5–33)
MCHC RBC AUTO-ENTMCNC: 28.8 G/DL (ref 31.5–36.5)
MCV RBC AUTO: 62 FL (ref 78–100)
PLATELET # BLD AUTO: 204 10E3/UL (ref 150–450)
POTASSIUM SERPL-SCNC: 4.1 MMOL/L (ref 3.4–5.3)
RBC # BLD AUTO: 4.18 10E6/UL (ref 4.4–5.9)
SODIUM SERPL-SCNC: 138 MMOL/L (ref 135–145)
WBC # BLD AUTO: 9.2 10E3/UL (ref 4–11)

## 2024-10-05 PROCEDURE — 99239 HOSP IP/OBS DSCHRG MGMT >30: CPT | Performed by: FAMILY MEDICINE

## 2024-10-05 PROCEDURE — 80048 BASIC METABOLIC PNL TOTAL CA: CPT | Performed by: FAMILY MEDICINE

## 2024-10-05 PROCEDURE — 36415 COLL VENOUS BLD VENIPUNCTURE: CPT | Performed by: FAMILY MEDICINE

## 2024-10-05 PROCEDURE — 85027 COMPLETE CBC AUTOMATED: CPT | Performed by: FAMILY MEDICINE

## 2024-10-05 PROCEDURE — 250N000013 HC RX MED GY IP 250 OP 250 PS 637: Performed by: FAMILY MEDICINE

## 2024-10-05 PROCEDURE — 250N000013 HC RX MED GY IP 250 OP 250 PS 637: Performed by: INTERNAL MEDICINE

## 2024-10-05 RX ORDER — CIPROFLOXACIN 500 MG/1
500 TABLET, FILM COATED ORAL EVERY 12 HOURS
Qty: 6 TABLET | Refills: 0 | Status: SHIPPED | OUTPATIENT
Start: 2024-10-05 | End: 2024-10-08

## 2024-10-05 RX ORDER — NITROFURANTOIN MACROCRYSTALS 50 MG/1
CAPSULE ORAL
DISCHARGE
Start: 2024-10-05

## 2024-10-05 RX ADMIN — GABAPENTIN 300 MG: 300 CAPSULE ORAL at 08:19

## 2024-10-05 RX ADMIN — CIPROFLOXACIN HYDROCHLORIDE 500 MG: 500 TABLET, FILM COATED ORAL at 08:19

## 2024-10-05 RX ADMIN — APIXABAN 5 MG: 5 TABLET, FILM COATED ORAL at 08:19

## 2024-10-05 ASSESSMENT — ACTIVITIES OF DAILY LIVING (ADL)
ADLS_ACUITY_SCORE: 34
ADLS_ACUITY_SCORE: 35
ADLS_ACUITY_SCORE: 34

## 2024-10-05 NOTE — PLAN OF CARE
Goal Outcome Evaluation: Adequate for care transition       S-(situation): Patient discharged to Home via wheelchair with wife and son.    B-(background): Pt admitted with UTI/sepsis   A-(assessment): Pt is A&Ox4. VSS on RA. Pt is up with 1A, gb and walker. Pt denies pain this morning. Chronic murrell in place with good urine output Pt denies discomfort with catheter. Pt tolerating diet and denies nausea but reports decreased appetite. Discussed with pt and pt's family about nutritional supplements. Lung sounds are slightly coarse but cleared some with coughing. Bowel sounds are positive and pt is passing flatus. Last bowel movement was today.     R-(recommendations): Discharge instructions reviewed with Patient, pt's wife and pt's son. Listed belongings gathered and returned to patient.          Discharge Nursing Criteria:   Patient Education given and documented: (STROKE, CHF, Diabetes):  Handouts given on UTI in males and cipro antibiotic     Care Plan and Patient education resolved: Yes    New Medications- pt has been educated about purpose and side effects: Yes    Vaccines  Influenza status verified at discharge:  Yes      MISC  Prescriptions if needed, hard copies sent with patient  Prescription sent to pt's pharmacy.  Home medications returned to patient: NA  Medication Bin checked and emptied on discharge Yes  Patient reports post-discharge pain management plan is effective: Yes

## 2024-10-05 NOTE — PLAN OF CARE
Goal Outcome Evaluation:      Plan of Care Reviewed With: patient    Overall Patient Progress: no changeOverall Patient Progress: no change    Outcome Evaluation: 2L o2 NC for bedtime, patient denied pain but then stated that his bottom was sore, offered to help reposition patient refuse and said he can do it himself and shifted his weight slightly to his right side

## 2024-10-05 NOTE — PROGRESS NOTES
Care Management Discharge Note    Discharge Date: 10/05/2024       Discharge Disposition: Home Care, Home    Discharge Services: Home Care- Resume [facility] (Phone: [phone])     Discharge DME: None    Discharge Transportation: family or friend will provide    Private pay costs discussed: Not applicable    Does the patient's insurance plan have a 3 day qualifying hospital stay waiver?  Yes     Which insurance plan 3 day waiver is available? Alternative insurance waiver    Will the waiver be used for post-acute placement? No    Patient/family educated on Medicare website which has current facility and service quality ratings: no    Education Provided on the Discharge Plan: Yes    Persons Notified of Discharge Plans: Patient, family     Patient/Family in Agreement with the Plan: yes    Handoff Referral Completed: Yes, [facility] PCP: Internal handoff referral completed    Additional Information:  Patient discharged to home today with wife and son and will resume [facility] (Phone: [phone]).    Post hospital follow up appointment has been scheduled.      ROCIO Major  [facility]   [phone]

## 2024-10-05 NOTE — DISCHARGE SUMMARY
[facility]  Hospitalist Discharge Summary      Date of Admission:  10/1/2024  Date of Discharge:  10/5/2024  Discharging Provider: Elizabeth Quigley MD  Discharge Service: Hospitalist Service    Discharge Diagnoses   Principal Problem:    Sepsis, due to unspecified organism, unspecified whether acute organ dysfunction present (H)  Active Problems:    Moderate COPD (chronic obstructive pulmonary disease) (H)    Pulmonary nodule - concerning for cancer but patient declining further work up    Urothelial carcinoma of bladder (H)    Acute pulmonary embolism, unspecified pulmonary embolism type, unspecified whether acute cor pulmonale present (H)    Continuous opioid dependence (H)    Syncope, unspecified syncope type    Anemia, unspecified type    Medication induced coagulopathy (H)    Urinary tract infection associated with indwelling urethral catheter (H)    Leukocytosis, unspecified type    Clinically Significant Risk Factors          Follow-ups Needed After Discharge   Follow-up Appointments     Follow-up and recommended labs and tests       Follow up with primary care provider, Neville Degroot, within 7 days   for hospital follow- up.          Discharge Disposition   Discharged to home with [facility]   Condition at discharge: Stable    Hospital Course     Khang Bailon is a 79 year old male with PMHx of urothelial carcinoma of the bladder s/p resection with chronic murrell, pulmonary nodule concerning for malignancy with patient declining further evaluation or treatment, history of pulmonary embolism on Eliquis, COPD, and chronic pain who presented with weakness, cough, and cloudy urine in the Murrell catheter tubing and bag and had syncopal episode suspected secondary to hypotension in the ED waiting room and was found to have suspected urosepsis, possible pneumonia and significant anemia and was hospitalized for medical management.  He was  started on cefepime and given a total of 3 units PRBC to achieve hemoglobin above 7.0 and is clinically improving.  Hospital course was complicated by the fact that initial UC was collected on chronic murrell (apparently tubing was exchanged but not the murrell itself).  Patient had obstruction of murrell on 10/2/24 due to sediment and pus and murrell has now been fully exchanged with urine starting to clear - new UC has been sent and shows urogenital kami however collected almost 24 hours of antibiotic initiation, and therefore may not be accurate reflection of organisms causing this infection.  Reviewed with antibiotic stewardship  committee and given that patient had a Pseudomonas UTI last month that was not effectively treated presumption is that this likely was a Pseudomonas UTI.  They recommended transition to oral ciprofloxacin 500 mg twice daily for antibiotic completion course of 5 to 7 days and patient has tolerated PO cipro well with ongoing improvement and he will discharge home with ongoing cipro for course completion, 24/7 family support and resumption of home health care services.       Sepsis secondary to CAUTI   Patient with history of transurethral resection of a bladder abnormality performed on 3/29/2024, chronic murrell in place.  Patient presented with leukocytosis, lactic acidosis, transient hypotension improved with fluids and UA concerning for UTI and urosepsis.  UC collected in ED rapidly grew out many organisms with concern for colonization and it is now known murrell was not exchanged in ED (just the tubing).  Murrell exchanged and new culture collected (but after patient has been on antibiotics for almost 24 hours) with results pending.  Blood pressures remain stable And sepsis symptoms are now resolved.  Patient does have a history of Pseudomonas UTI in pastmost recently 1 month ago and for which patient was given Omnicef which would not effectively treat a pseudomonal infection.  - Discharge home  with ongoing PO ciprofloxacin 500 mg BID for total of 7 day course completion   - holding home prophylactic Macrobid for as long as patient is on ciprofloxacin but resume at time of ciprofloxacin completion.  - continue flomax and oxybutynin   - encouraged outpatient follow up with urology and specialty support      Syncope  Occurring in the ED waiting room and thought secondary to hypotension and sepsis.  No electrolyte abnormalities, no arrhthymias noted during this stay and patient is now moving around well without pre-syncope symptoms    -patient and family to monitor for any signs of recurrence following discharge but no recommended further work up felt needed at this time unless symptoms return in the future.     Hypotensive  Noted on ED evaluation following syncope and rapid response, improved with fluids and blood.  Thought secondary to anemia and infection.  Blood pressures continue to be in normal range since admission   - continue monitoring by home health nurse and clinic      Possible pneumonia  Shortness of breath  Cough  Patient has increased cough in days leading up to presentation with x-ray showing possible retrocardiac/left basilar opacity in patient with right lower lobe lung nodule known but no known nodules/masses on left known.  Patient reports his breathing and cough has improved during this stay but unclear at this time if he truly has infection or is symptoms were related to anemia and sepsis above.   -transition to ciprofloxacin as above and monitor for ongoing clinical improvement in home environment.   -Continue to monitor hemoglobin and transfuse if hemoglobin is less than 7.0 following discharge .    Anemia chronic blood loss vs malignancy  Hemoglobin was found to be 6.7 and patient was given 2 units PRBC with improvement to 7.1 however down to 6.4 following further IV fluid resuscitation.  Now s/p a third unit PRBC pm on morning of 10/2/24 with hemoglobin  does appear to stabilize in  the mid 7 range  - Daily monitoring of hemoglobin to ensure ongoing stability in outpatient setting   - patient has declined further work up or treatment of cancer in past and still does not want intervention during this stay or after discharge      Urothelial carcinoma of the bladder  This tumor was located on the anterior bladder wall. This demonstrated a high-grade superficially invasive urothelial carcinoma and is s/p resection with chronic murrell catheter now in place. Patient has declined further treatment with BCG therapy and continues to decline, even at recent urology office visit on 9/23/24.    - continue murrell catheter at discharge   - outpatient follow up with urology following discharge     Pulmonary nodule - concerning for malignancy  Noted on imaging last spring, patient declining further work up  -no further outpatient management given patient's goals of care     H/O PE, medication induced coagulopathy   Previous history of PE on Eliquis   - continue DOAC at discharge      Chronic pain  Patient has back pain with symptoms well controlled with home regimen   - continue home regiment of prn dilaudid  - schedule gabapentin   - schedule miralax     COPD without exacerbation  - continue home nebs      Weakness and recent fall  - home physical therapy and occupational therapy after discharge     Consultations This Hospital Stay   CARE MANAGEMENT / SOCIAL WORK IP CONSULT  PHYSICAL THERAPY ADULT IP CONSULT  OCCUPATIONAL THERAPY ADULT IP CONSULT  NUTRITION SERVICES ADULT IP CONSULT    Code Status   Full Code    Time Spent on this Encounter   I, Elizabeth Quigley MD, personally saw the patient today and spent greater than 30 minutes discharging this patient.       Elizabeth Quigley MD  [address] MEDICAL SURGICAL  [address] [place] [address]  Phone: [phone]  ______________________________________________________________________    Physical Exam   Vital  Signs: Temp: 98.2  F (36.8  C) Temp src: Oral BP: 134/78 Pulse: 69   Resp: 18 SpO2: 94 % O2 Device: None (Room air) Oxygen Delivery: 2 LPM  Weight: 137 lbs 5.55 oz  Constitutional: awake, alert, cooperative, no apparent distress, and appears stated age  Respiratory: No increased work of breathing, decreased air exchange, ongoing fine crackles in bilateral bases  Cardiovascular: RRR  GI: bowel sounds present, abdomen soft and non-tender  Neurologic: Awake, alert, oriented to name, place and situation        Primary Care Physician   Neville Degroot    Discharge Orders      Medication Therapy Management Referral      Home Care Referral      Reason for your hospital stay    Urinary tract infection improving on antibiotics given during this hospitalization.  Please continue the Cipro antibiotic for another 3 days as you go home and make sure you continue to have your murrell placed monthly by the home care team.  Enjoy going home!     Follow-up and recommended labs and tests     Follow up with primary care provider, Neville Degroot, within 7 days for hospital follow- up.     Activity    Your activity upon discharge: activity as tolerated     Diet    Follow this diet upon discharge:     Regular Diet Adult - encourage nutrition supplementation 2-3 times a day as well (Ensure, magic cup ice cream, etc)       Significant Results and Procedures   Results for orders placed or performed during the hospital encounter of 10/01/24   XR Chest Port 1 View    Narrative    CHEST ONE VIEW  10/1/2024 12:09 PM     HISTORY: AMS    COMPARISON: Chest radiograph 8/15/2024.      Impression    IMPRESSION: Retrocardiac/left basilar opacity may reflect combination  of small pleural effusion, atelectasis and/or infection. No convincing  pneumothorax. Similar heart size and left chest wall cardiac device.  Support devices overlie the thorax.    ARTURO POLK MD         SYSTEM ID:  [id number]   CT Head w/o Contrast    Narrative    CT  SCAN OF THE HEAD WITHOUT CONTRAST   10/1/2024 12:43 PM     HISTORY: AMS    TECHNIQUE: Axial images of the head and coronal reformations without  IV contrast material. Radiation dose for this scan was reduced using  automated exposure control, adjustment of the mA and/or kV according  to patient size, or iterative reconstruction technique.    COMPARISON: Head CT 7/12/2016.    FINDINGS: There is no evidence of intracranial hemorrhage, mass, acute  infarct or anomaly. The ventricles are normal in size, shape and  configuration. Moderate diffuse parenchymal volume loss. Moderate  patchy periventricular white matter hypodensities which are  nonspecific, but likely related to chronic microvascular ischemic  disease.     The visualized portions of the sinuses and mastoids appear normal. The  bony calvarium and bones of the skull base appear intact.       Impression    IMPRESSION:     1. No evidence of acute intracranial hemorrhage, mass, or herniation.  2. Moderate diffuse parenchymal volume loss and white matter changes  likely due to chronic microvascular ischemic disease.      BAILEY COMER MD         SYSTEM ID:  [id number]       Discharge Medications   Current Discharge Medication List        START taking these medications    Details   ciprofloxacin (CIPRO) 500 MG tablet Take 1 tablet (500 mg) by mouth every 12 hours for 3 days.  Qty: 6 tablet, Refills: 0    Associated Diagnoses: Urinary tract infection associated with indwelling urethral catheter, initial encounter (H); Sepsis, due to unspecified organism, unspecified whether acute organ dysfunction present (H)           CONTINUE these medications which have CHANGED    Details   nitroFURantoin macrocrystal (MACRODANTIN) 50 MG capsule HOLD while on the Ciprofloxacin antibiotic but restart the day after you have completed Cipro and restart 1 tablet daily dosing.    Associated Diagnoses: Recurrent UTI           CONTINUE these medications which have NOT CHANGED    Details    !! ACE/ARB/ARNI NOT PRESCRIBED (INTENTIONAL) Please choose reason not prescribed from choices below.      !! BETA BLOCKER NOT PRESCRIBED (INTENTIONAL) Please choose reason not prescribed from choices below.      calcium carbonate-vitamin D (CALTRATE) 600-10 MG-MCG per tablet Take 1 tablet by mouth 2 times daily      ELIQUIS ANTICOAGULANT 5 MG tablet TAKE 1 TABLET (5 MG) BY MOUTH 2 TIMES DAILY  Qty: 60 tablet, Refills: 0    Associated Diagnoses: Other acute pulmonary embolism, unspecified whether acute cor pulmonale present (H)      gabapentin (NEURONTIN) 300 MG capsule TAKE 1 CAPSULE (300 MG) BY MOUTH 3 TIMES DAILY  Qty: 90 capsule, Refills: 0    Associated Diagnoses: Urothelial carcinoma of bladder (H)      HYDROmorphone (DILAUDID) 2 MG tablet TAKE 1 TABLET (2 MG) BY MOUTH EVERY 6 HOURS AS NEEDED FOR SEVERE PAIN.  Qty: 30 tablet, Refills: 0    Associated Diagnoses: Pain      ipratropium - albuterol 0.5 mg/2.5 mg/3 mL (DUONEB) 0.5-2.5 (3) MG/3ML neb solution INHALE 1 VIAL IN NEBULIZER EVERY 6 HOURS AS NEEDED FOR SHORTNESS OF BREATH, WHEEZING OR COUGH  Qty: 360 mL, Refills: 2    Associated Diagnoses: Chronic obstructive pulmonary disease, unspecified COPD type (H)      Lidocaine HCl Urethral/Mucosal 2 % external gel Place 10 mLs into the urethra every 28 days.      Multiple Vitamin (MULTIVITAMIN ADULT PO) Take 1 tablet by mouth daily      oxyBUTYnin (DITROPAN) 5 MG tablet TAKE 1 TABLET (5 MG) BY MOUTH 3 TIMES DAILY AS NEEDED FOR BLADDER SPASMS  Qty: 90 tablet, Refills: 1    Associated Diagnoses: Bladder spasm      tamsulosin (FLOMAX) 0.4 MG capsule Take 1 capsule (0.4 mg) by mouth at bedtime  Qty: 90 capsule, Refills: 3    Associated Diagnoses: Benign prostatic hyperplasia, unspecified whether lower urinary tract symptoms present      vitamin C (ASCORBIC ACID) 500 MG tablet Take 500 mg by mouth daily      order for DME Equipment being ordered: Nebulizer hose  Qty: 1 Tube, Refills: 11    Associated Diagnoses:  Chronic obstructive pulmonary disease, unspecified COPD type (H)       !! - Potential duplicate medications found. Please discuss with provider.        Allergies   Allergies   Allergen Reactions    Flagyl [Metronidazole] GI Disturbance

## 2024-10-05 NOTE — TELEPHONE ENCOUNTER
Reason for Call:  Appointment Request    Patient requesting this type of appt:  Hospital/ED Follow-Up     Requested provider: Neville Degroot    Reason patient unable to be scheduled: Not with their preferred provider    When does patient want to be seen/preferred time: 1-2 weeks    Comments: Pt has an ED F/U on 10/10 with a provider other than their PCP, [facility] staff called to get appt r/s with PCP instead but nothing available within 2 weeks, hoping for pt to be worked in with PCP if possible.    F/U - [facility] - 10/05      Could we send this information to you in [employer] or would you prefer to receive a phone call?:   Patient would prefer a phone call   Okay to leave a detailed message?: No at Home number on file [phone] (home)    Call taken on 10/5/2024 at 10:26 AM by CAROLE Aquino

## 2024-10-05 NOTE — DISCHARGE INSTRUCTIONS
A message has been sent to your care team to schedule an after-hospital visit with your primary provider.  If you do not hear from the care team within two business days, please call appointment scheduling at [phone]

## 2024-10-05 NOTE — PLAN OF CARE
Goal Outcome Evaluation:      Plan of Care Reviewed With: patient    Overall Patient Progress: no changeOverall Patient Progress: no change    Outcome Evaluation: A&O. VSS on rm air, wears 2L o2 NC at bedtime. Ambulating A1. Chronic murrell in place with good output. Denies pain overnight. IV saline locked.

## 2024-10-06 LAB — BACTERIA BLD CULT: NO GROWTH

## 2024-10-07 ENCOUNTER — TELEPHONE (OUTPATIENT)
Dept: FAMILY MEDICINE | Facility: CLINIC | Age: 79
End: 2024-10-07
Payer: COMMERCIAL

## 2024-10-07 ENCOUNTER — PATIENT OUTREACH (OUTPATIENT)
Dept: CARE COORDINATION | Facility: CLINIC | Age: 79
End: 2024-10-07
Payer: COMMERCIAL

## 2024-10-07 NOTE — PLAN OF CARE
Physical Therapy Discharge Summary    Reason for therapy discharge:    Discharged to home with home therapy.    Progress towards therapy goal(s). See goals on Care Plan in [facility] electronic health record for goal details.  Goals partially met.  Barriers to achieving goals:   unwilling to participate with rehab.    Therapy recommendation(s):    Continued therapy is recommended.  Rationale/Recommendations:  to address functional mobility deficits and safety in the home to decrease caregiver burden.      Thank you for your referral.  Daysi Bhatt, PT, DPT, ATC, LAT    [facility]  O: [phone]  E: [email or web address]

## 2024-10-07 NOTE — TELEPHONE ENCOUNTER
MTM referral from: Transitions of Care (recent hospital discharge, TCU discharge, or ED visit)    MTM referral outreach attempt #1 on October 7, 2024 at 11:21 AM      Outcome: Spoke with patient spoke w/wife. Has appt with PCP. Decline MTM    Use [facility] part d map for the carrier/Plan on the flowsheet          HARRIET Clinton   [phone]      
Allergy;

## 2024-10-07 NOTE — LETTER
[facility] CARE COORDINATION  [id number] [place] [address]  [facility] [phone]    October 7, 2024    Khang Bailon  [address] [facility] [address]      Dear Khang,    I am a clinic care coordinator who works with Neville Degroot MD with the [facility]. I wanted to introduce myself and provide you with my contact information for you to be able to call me with any questions or concerns. I wanted to thank you for spending the time to talk with me.  Below is a description of clinic care coordination and how I can further assist you.       The clinic care coordination team is made up of a registered nurse, , financial resource worker and community health worker who understand the health care system. The goal of clinic care coordination is to help you manage your health and improve access to the health care system. Our team works alongside your provider to assist you in determining your health and social needs. We can help you obtain health care and community resources, providing you with necessary information and education. We can work with you through any barriers and develop a care plan that helps coordinate and strengthen the communication between you and your care team.  Our services are voluntary and are offered without charge to you personally.    Please feel free to contact me with any questions or concerns regarding care coordination and what we can offer.      We are focused on providing you with the highest-quality healthcare experience possible.    Sincerely,     Isrrael Jones MSN, RN, PHN, CCM / Covering for [facility] [phone]  Primary Care Clinical RN Care Coordinator  [facility]  10/7/2024   10:34 AM  [email or web address]  Office: [phone]

## 2024-10-07 NOTE — PROGRESS NOTES
"Clinic Care Coordination Contact  Transitions of Care Outreach  Chief Complaint   Patient presents with    Clinic Care Coordination - Post Hospital       Most Recent Admission Date: 10/1/2024   Most Recent Admission Diagnosis: Syncope, unspecified syncope type - R55  Anemia, unspecified type - D64.9  Urinary tract infection associated with indwelling urethral catheter, initial encounter (H) - T83.511A, N39.0  Sepsis, due to unspecified organism, unspecified whether acute organ dysfunction present (H) - A41.9     Most Recent Discharge Date: 10/5/2024   Most Recent Discharge Diagnosis: Urinary tract infection associated with indwelling urethral catheter, initial encounter (H) - T83.511A, N39.0  Anemia, unspecified type - D64.9  Sepsis, due to unspecified organism, unspecified whether acute organ dysfunction present (H) - A41.9  Syncope, unspecified syncope type - R55  Continuous opioid dependence (H) - F11.20  Recurrent UTI - N39.0  Urothelial carcinoma of bladder (H) - C67.9  Chronic systolic congestive heart failure (H) - I50.22  Pulmonary nodule - R91.1  Coronary artery disease involving native coronary artery of native heart without angina pectoris - I25.10     Transitions of Care Assessment    Discharge Assessment  How are you doing now that you are home?: Per his wife and caregiver \"He is doing fine except for the itchy feet that we took care of\".  How are your symptoms? (Red Flag symptoms escalate to triage hotline per guidelines): Improved  Do you know how to contact your clinic care team if you have future questions or changes to your health status? : Yes  Does the patient have their discharge instructions? : Yes  Does the patient have questions regarding their discharge instructions? : No  Were you started on any new medications or were there changes to any of your previous medications? : Yes  Does the patient have all of their medications?: Yes  Do you have questions regarding any of your medications? : " No  Do you have all of your needed medical supplies or equipment (DME)?  (i.e. oxygen tank, CPAP, cane, etc.): Yes    Follow up Plan     Discharge Follow-Up  Discharge follow up appointment scheduled in alignment with recommended follow up timeframe or Transitions of Risk Category? (Low = within 30 days; Moderate= within 14 days; High= within 7 days): Yes  Discharge Follow Up Appointment Date: 10/10/24  Discharge Follow Up Appointment Scheduled with?: Primary Care Provider (The patient's wife hung up at this point.)    Future Appointments   Date Time Provider Department Center   10/10/2024  1:40 PM Neville Degroot MD [facility]   1/10/2025 10:20 AM Tito Palmer APRN CNP [facility]       Outpatient Plan as outlined on AVS reviewed with patient.    The patient's wife hung up before the RN CC was finished with the questions for the post discharge questionnaire.  Primary care-care coordination was not explained to the wife of the patient, as she hung up.  There is a consent to communicate with the wife.      For any urgent concerns, please contact our 24 hour nurse triage line: [phone] ([phone])       Isrrael Jones MSN, RN, PHN, CCM/ Covering for [facility] [phone]   Primary Care Clinical RN Care Coordinator  [facility]  10/7/2024   10:54 AM  [email or web address]  Office: [phone]

## 2024-10-08 ENCOUNTER — TELEPHONE (OUTPATIENT)
Dept: FAMILY MEDICINE | Facility: CLINIC | Age: 79
End: 2024-10-08
Payer: COMMERCIAL

## 2024-10-08 NOTE — TELEPHONE ENCOUNTER
"New Clinic Note    Klaus Stevens is a 53 y.o. male     CC:   Chief Complaint   Patient presents with    Abdominal Pain     Patient complains of upper abdominal discomfort in right upper quadrant area that radiates all the way to right flank area. Stated he had fried chicken and fried corn nuggets for lunch today. Stated he still has his gallbladder.     Leg Pain     Complains of muscle cramp like pain in legs. His Vit D level is low and he is on prescription strength. His wife stated he has never had a testosterone level checked.     Anxiety     Wife stated he has these "episodes" if he is frighten/startled where he shakes hard and then he gets embarassed at work when these episodes of shaking happens. When asked he has not been taking his Lexapro as he should.     Gastroesophageal Reflux     Has history of GERD and requested refill on his Omeprazole.     Medication Refill     Request refill on all his medications.     Color Me Healthy     Htn/glucose        Subjective  Patient is here for a color me healthy for hypertension. He also complains of epigastric pain that radiates in to his right flank. He denies nausea or vomiting. Worse with greasy foods. He has been out of his omeprazole for several weeks. He complains of muscle spasms.     Presents to clinic for follow up on chronic health problems. He needs refills.     Hypertension:    Takes medications as directed: yes  Checks blood pressure outside of clinic: yes  On a statin: no  Cardiovascular exercise: no  Heart healthy diet: no       Current Outpatient Medications:     fluticasone propionate (FLONASE) 50 mcg/actuation nasal spray, 1 spray (50 mcg total) by Each Nostril route once daily., Disp: 18 mL, Rfl: 2    amLODIPine (NORVASC) 10 MG tablet, Take 1 tablet (10 mg total) by mouth 2 (two) times a day., Disp: 180 tablet, Rfl: 1    buPROPion (WELLBUTRIN XL) 150 MG TB24 tablet, Take 1 tablet (150 mg total) by mouth once daily., Disp: 30 tablet, Rfl: 11    " Home Care is calling regarding an established patient with  [facility]        8/30/2024    11:16 AM 8/15/2024     9:39 AM   Home Care Information    Name/Phone Number Luis THOMPSON [phone] Alaina RAYMOND [phone]   Home Care agency [facility]     Requesting orders from: Neville Degroot  Provider is following patient: Yes  Is this a 60-day recertification request?  No    Orders Requested    Skilled Nursing  Request for resumption in care.     1x per wk x 3 wks    Physical Therapy  Request for initial evaluation and treatment (one time)    Weight down to 137 lbs  Please provide new parameters for reporting weight (High and low) or not to report at all.     Verbal orders given for PT eval.  Information was gathered for Skilled Nursing.  Provider review needed.  RN will contact Home Care with information after provider review.  Confirmed ok to leave a detailed message with call back.  Contact information confirmed and updated as needed.    Yolanda Albrecht RN   "ergocalciferol (ERGOCALCIFEROL) 50,000 unit Cap, Take 1 capsule (50,000 Units total) by mouth every 7 days., Disp: 12 capsule, Rfl: 0    gabapentin (NEURONTIN) 300 MG capsule, Take 1 capsule (300 mg total) by mouth 3 (three) times daily., Disp: 90 capsule, Rfl: 11    hydrocortisone (ANUSOL-HC) 2.5 % rectal cream, SMARTSIG:Topical Strength: 2.5 %, Disp: 28 g, Rfl: 2    LEXAPRO 10 mg tablet, Take 1 tablet (10 mg total) by mouth once daily at 6am., Disp: 90 tablet, Rfl: 1    loratadine (CLARITIN) 10 mg tablet, Take 1 tablet (10 mg total) by mouth once daily., Disp: 90 tablet, Rfl: 1    omeprazole (PRILOSEC) 20 MG capsule, Take 1 capsule (20 mg total) by mouth once daily., Disp: 30 capsule, Rfl: 11    traZODone (DESYREL) 100 MG tablet, Take 1 tablet (100 mg total) by mouth every evening., Disp: 90 tablet, Rfl: 1  No current facility-administered medications for this visit.     Past Medical History:   Diagnosis Date    Anxiety     GERD (gastroesophageal reflux disease)     Hypertension         Family History   Problem Relation Age of Onset    Heart disease Mother     Arthritis Mother     Heart disease Father     Hypertension Father     Diabetes Father         History reviewed. No pertinent surgical history.     Review of Systems   Constitutional:  Negative for fatigue and fever.   HENT:  Negative for ear pain, postnasal drip, rhinorrhea and sinus pressure/congestion.    Respiratory:  Negative for cough and shortness of breath.    Cardiovascular:  Negative for chest pain.   Gastrointestinal:  Positive for abdominal pain. Negative for diarrhea, nausea and vomiting.   Genitourinary:  Negative for dysuria.   Neurological:  Negative for headaches.      /79 (BP Location: Left arm, Patient Position: Sitting, BP Method: Large (Automatic))   Pulse 68   Temp 97.5 °F (36.4 °C) (Oral)   Resp 18   Ht 5' 6" (1.676 m)   Wt 132.5 kg (292 lb)   SpO2 97%   BMI 47.13 kg/m²      Physical Exam  HENT:      Head: Normocephalic and " atraumatic.      Mouth/Throat:      Pharynx: Oropharynx is clear.   Cardiovascular:      Rate and Rhythm: Normal rate and regular rhythm.   Pulmonary:      Effort: Pulmonary effort is normal.      Breath sounds: Normal breath sounds.   Abdominal:      General: Bowel sounds are increased.      Palpations: Abdomen is soft.      Tenderness: There is no abdominal tenderness.   Neurological:      Mental Status: He is alert and oriented to person, place, and time.   Psychiatric:         Mood and Affect: Mood normal.         Behavior: Behavior normal.        Assessment and Plan      ICD-10-CM ICD-9-CM   1. Essential hypertension, benign  I10 401.1   2. Flank pain  R10.9 789.09   3. Pain of upper abdomen  R10.10 789.09   4. Seasonal allergic rhinitis due to other allergic trigger  J30.89 477.8   5. Gastroesophageal reflux disease without esophagitis  K21.9 530.81   6. Primary insomnia  F51.01 307.42   7. Encounter for screening for malignant neoplasm of colon  Z12.11 V76.51   8. Fatigue, unspecified type  R53.83 780.79   9. Anxiety  F41.9 300.00   10. Depression, unspecified depression type  F32.A 311   11. Muscle spasm  M62.838 728.85   12. Primary hypertension  I10 401.9        Problem List Items Addressed This Visit          GI    Gastroesophageal reflux disease without esophagitis (Chronic)    Relevant Medications    omeprazole (PRILOSEC) 20 MG capsule       Other    Primary insomnia    Relevant Medications    traZODone (DESYREL) 100 MG tablet     Other Visit Diagnoses       Essential hypertension, benign    -  Primary    Relevant Medications    amLODIPine (NORVASC) 10 MG tablet    Flank pain        Relevant Medications    ketorolac injection 60 mg (Completed)    Other Relevant Orders    POCT URINALYSIS W/O SCOPE (Completed)    X-Ray Abdomen Flat And Erect (Completed)    Pain of upper abdomen        Relevant Orders    X-Ray Abdomen Flat And Erect (Completed)    Seasonal allergic rhinitis due to other allergic trigger         Relevant Medications    loratadine (CLARITIN) 10 mg tablet    Encounter for screening for malignant neoplasm of colon        Relevant Orders    Ambulatory referral/consult to General Surgery    Fatigue, unspecified type        Anxiety        Relevant Medications    LEXAPRO 10 mg tablet    buPROPion (WELLBUTRIN XL) 150 MG TB24 tablet    Depression, unspecified depression type        Relevant Medications    LEXAPRO 10 mg tablet    buPROPion (WELLBUTRIN XL) 150 MG TB24 tablet    Muscle spasm        Relevant Orders    Comprehensive Metabolic Panel (Completed)    CBC Auto Differential (Completed)    Magnesium (Completed)    Primary hypertension               Restart omeprazole. Suspect abdominal pain is due to being without his medicines. Schedule colonoscopy.     Patient Instructions   Take Medications as directed  Monitor blood pressure outside of clinic  Eat a heart healthy diet and get plenty of cardiovascular exercise.    Patient Education       Diet and Health   The Basics   Written by the doctors and editors at [facility]   Why is it important to eat a healthy diet? -- It's important to eat a healthy diet because eating the right foods can keep you healthy now and later on in life.  Which foods are especially healthy? -- Foods that are especially healthy include:  Fruits and vegetables - Eating a diet with lots of fruits and vegetables can help prevent heart disease and strokes. It might also help prevent certain types of cancers. Try to eat fruits and vegetables at each meal and also for snacks. If you don't have fresh fruits and vegetables available, you can eat frozen or canned ones instead. Doctors recommend eating at least 2 1/2 servings of vegetables and 2 servings of fruits each day.  Foods with fiber - Eating foods with a lot of fiber can help prevent heart disease and strokes. If you have type 2 diabetes, it can also help control your blood sugar. Foods that have a lot of fiber include vegetables,  "fruits, beans, nuts, oatmeal, and whole grain breads and cereals. You can tell how much fiber is in a food by reading the nutrition label (figure 1). Doctors recommend eating 25 to 36 grams of fiber each day.  Foods with folate (also called folic acid) - Folate is a vitamin that is important for pregnant people, since it helps prevent certain birth defects. Anyone who could get pregnant should get at least 400 micrograms of folic acid daily, whether or not they are actively trying to get pregnant. Folate is found in many breakfast cereals, oranges, orange juice, and green leafy vegetables.  Foods with calcium and vitamin D - Babies, children, and adults need calcium and vitamin D to help keep their bones strong. Adults also need calcium and vitamin D to help prevent osteoporosis. Osteoporosis is a condition that causes bones to get "thin" and break more easily than usual. Different foods and drinks have calcium and vitamin D in them (figure 2). People who don't get enough calcium and vitamin D in their diet might need to take a supplement.  Foods with protein - Protein helps your muscles stay strong. Healthy foods with a lot of protein include chicken, fish, eggs, beans, nuts, and soy products. Red meat also has a lot of protein, but it also contains fats, which can be unhealthy.  Some experts recommend a "Mediterranean diet." This involves eating a lot of fruits, vegetables, nuts, whole grains, and olive oil. It also includes some fish, poultry, and dairy products, but not a lot of red meat. Eating this way can help your overall health, and might even lower your risk of having a stroke.  What foods should I avoid or limit? -- To eat a healthy diet, there are some things you should avoid or limit. They include:   Fats - There are different types of fats. Some types of fats are better for your body than others.  Trans fats are especially unhealthy. They are found in margarines, many fast foods, and some store-bought " "baked goods. Trans fats can raise your cholesterol level and your increase your chance of getting heart disease. You should avoid eating foods with these types of fats.  The type of "polyunsaturated" fats found in fish seems to be healthy and can reduce your chance of getting heart disease. Other polyunsaturated fats might also be good for your health. When you cook, it's best to use oils with healthier fats, such as olive oil and canola oil.  Sugar - To have a healthy diet, it's important to limit or avoid sugar, sweets, and refined grains. Refined grains are found in white bread, white rice, most forms of pasta, and most packaged "snack" foods. Whole grains, such as whole-wheat bread and brown rice, have more fiber and are better for your health.  Avoiding sugar-sweetened beverages, like soda and sports drinks, can also help improve your health.  Red meat - Studies have shown that eating a lot of red meat can increase your risk of certain health problems, including heart disease and cancer. You should limit the amount of red meat that you eat.  Can I drink alcohol as part of a healthy diet? -- People who drink a small amount of alcohol each day might have a lower chance of getting heart disease. But drinking alcohol can lead to problems. For example, it can raise a person's chances of getting liver disease and certain types of cancers. In women, even 1 drink a day can increase the risk of getting breast cancer.  Most doctors recommend that adult women not have more than 1 drink a day and that adult men not have more than 2 drinks a day. The limits are different because most women's bodies take longer to break down alcohol.  How many calories do I need each day? -- The number of calories you need each day depends on your weight, height, age, sex, and how active you are.  Your doctor or nurse can tell you how many calories you should eat each day. If you are trying to lose weight, you should eat fewer calories each " "day.  What if I have questions? -- If you have questions about which foods you should or should not eat, ask your doctor or nurse. The right diet for you will depend, in part, on your health and any medical conditions you have.  All topics are updated as new evidence becomes available and our peer review process is complete.  This topic retrieved from [employer] on: Sep 21, 2021.  Topic 48960 Version 20.0  Release: 29.4.2 - C29.263  © 2021 UpToDate, Inc. and/or its affiliates. All rights reserved.  figure 1: Nutrition label - fiber     This is an example of a nutrition label. To figure out how much fiber is in a food, look for the line that says "Dietary Fiber." It's also important to look at the serving size. This food has 7 grams of fiber in each serving, and each serving is 1 cup.  Graphic 72430 Version 7.0    figure 2: Foods and drinks with calcium and vitamin D     Foods rich in calcium include ice cream, soy milk, breads, kale, broccoli, milk, cheese, cottage cheese, almonds, yogurt, ready-to-eat cereals, beans, and tofu. Foods rich in vitamin D include milk, fortified plant-based "milks" (soy, almond), canned tuna fish, cod liver oil, yogurt, ready-to-eat-cereals, cooked salmon, canned sardines, mackerel, and eggs. Some of these foods are rich in both.  Graphic 00625 Version 4.0    Consumer Information Use and Disclaimer   This information is not specific medical advice and does not replace information you receive from your health care provider. This is only a brief summary of general information. It does NOT include all information about conditions, illnesses, injuries, tests, procedures, treatments, therapies, discharge instructions or life-style choices that may apply to you. You must talk with your health care provider for complete information about your health and treatment options. This information should not be used to decide whether or not to accept your health care provider's advice, instructions or " recommendations. Only your health care provider has the knowledge and training to provide advice that is right for you. The use of this information is governed by the [employer] End User License Agreement, available at https://www.[employer]/en/solutions/Cazoomi/about/mar.The use of [employer] content is governed by the [employer] Terms of Use. ©2021 UpToDate, Inc. All rights reserved.  Copyright   © 2021 UpToDate, Inc. and/or its affiliates. All rights reserved.        Follow up in 6 months (on 3/12/2023).

## 2024-10-10 ENCOUNTER — OFFICE VISIT (OUTPATIENT)
Dept: FAMILY MEDICINE | Facility: CLINIC | Age: 79
End: 2024-10-10
Payer: COMMERCIAL

## 2024-10-10 VITALS
OXYGEN SATURATION: 93 % | RESPIRATION RATE: 18 BRPM | DIASTOLIC BLOOD PRESSURE: 58 MMHG | HEART RATE: 97 BPM | SYSTOLIC BLOOD PRESSURE: 92 MMHG | TEMPERATURE: 99.7 F

## 2024-10-10 DIAGNOSIS — I25.10 CORONARY ARTERY DISEASE INVOLVING NATIVE CORONARY ARTERY OF NATIVE HEART WITHOUT ANGINA PECTORIS: Chronic | ICD-10-CM

## 2024-10-10 DIAGNOSIS — R97.20 ELEVATED PROSTATE SPECIFIC ANTIGEN (PSA): ICD-10-CM

## 2024-10-10 DIAGNOSIS — D64.9 ANEMIA, UNSPECIFIED TYPE: ICD-10-CM

## 2024-10-10 DIAGNOSIS — E78.5 HYPERLIPIDEMIA LDL GOAL <130: ICD-10-CM

## 2024-10-10 DIAGNOSIS — J44.9 MODERATE COPD (CHRONIC OBSTRUCTIVE PULMONARY DISEASE) (H): Chronic | ICD-10-CM

## 2024-10-10 DIAGNOSIS — Z87.891 PERSONAL HISTORY OF TOBACCO USE, PRESENTING HAZARDS TO HEALTH: ICD-10-CM

## 2024-10-10 DIAGNOSIS — D12.8 TUBULOVILLOUS ADENOMA OF RECTUM: ICD-10-CM

## 2024-10-10 DIAGNOSIS — N32.89 BLADDER SPASM: ICD-10-CM

## 2024-10-10 DIAGNOSIS — Z86.73 HISTORY OF CVA (CEREBROVASCULAR ACCIDENT): ICD-10-CM

## 2024-10-10 DIAGNOSIS — I44.2 THIRD DEGREE HEART BLOCK (H): ICD-10-CM

## 2024-10-10 DIAGNOSIS — E43 SEVERE MALNUTRITION (H): ICD-10-CM

## 2024-10-10 DIAGNOSIS — Z09 HOSPITAL DISCHARGE FOLLOW-UP: Primary | ICD-10-CM

## 2024-10-10 DIAGNOSIS — N39.0 URINARY TRACT INFECTION ASSOCIATED WITH INDWELLING URETHRAL CATHETER, SUBSEQUENT ENCOUNTER: ICD-10-CM

## 2024-10-10 DIAGNOSIS — C67.9 UROTHELIAL CARCINOMA OF BLADDER (H): Chronic | ICD-10-CM

## 2024-10-10 DIAGNOSIS — R91.1 PULMONARY NODULE: ICD-10-CM

## 2024-10-10 DIAGNOSIS — N39.0 RECURRENT UTI: ICD-10-CM

## 2024-10-10 DIAGNOSIS — F11.20 CONTINUOUS OPIOID DEPENDENCE (H): ICD-10-CM

## 2024-10-10 DIAGNOSIS — R52 PAIN: ICD-10-CM

## 2024-10-10 DIAGNOSIS — K59.03 DRUG-INDUCED CONSTIPATION: ICD-10-CM

## 2024-10-10 DIAGNOSIS — I26.99 OTHER ACUTE PULMONARY EMBOLISM, UNSPECIFIED WHETHER ACUTE COR PULMONALE PRESENT (H): ICD-10-CM

## 2024-10-10 DIAGNOSIS — I71.43 INFRARENAL ABDOMINAL AORTIC ANEURYSM (AAA) WITHOUT RUPTURE (H): ICD-10-CM

## 2024-10-10 DIAGNOSIS — T83.511D URINARY TRACT INFECTION ASSOCIATED WITH INDWELLING URETHRAL CATHETER, SUBSEQUENT ENCOUNTER: ICD-10-CM

## 2024-10-10 LAB
BASOPHILS # BLD AUTO: 0.1 10E3/UL (ref 0–0.2)
BASOPHILS NFR BLD AUTO: 1 %
ELLIPTOCYTES BLD QL SMEAR: SLIGHT
EOSINOPHIL # BLD AUTO: 0.8 10E3/UL (ref 0–0.7)
EOSINOPHIL NFR BLD AUTO: 6 %
ERYTHROCYTE [DISTWIDTH] IN BLOOD BY AUTOMATED COUNT: 30.7 % (ref 10–15)
FRAGMENTS BLD QL SMEAR: SLIGHT
HCT VFR BLD AUTO: 29.3 % (ref 40–53)
HGB BLD-MCNC: 8.6 G/DL (ref 13.3–17.7)
IMM GRANULOCYTES # BLD: 0.1 10E3/UL
IMM GRANULOCYTES NFR BLD: 1 %
LYMPHOCYTES # BLD AUTO: 1.7 10E3/UL (ref 0.8–5.3)
LYMPHOCYTES NFR BLD AUTO: 12 %
MCH RBC QN AUTO: 17.7 PG (ref 26.5–33)
MCHC RBC AUTO-ENTMCNC: 29.4 G/DL (ref 31.5–36.5)
MCV RBC AUTO: 60 FL (ref 78–100)
MONOCYTES # BLD AUTO: 1 10E3/UL (ref 0–1.3)
MONOCYTES NFR BLD AUTO: 7 %
NEUTROPHILS # BLD AUTO: 10.2 10E3/UL (ref 1.6–8.3)
NEUTROPHILS NFR BLD AUTO: 74 %
NRBC # BLD AUTO: 0 10E3/UL
NRBC BLD AUTO-RTO: 0 /100
PLAT MORPH BLD: ABNORMAL
PLATELET # BLD AUTO: 278 10E3/UL (ref 150–450)
RBC # BLD AUTO: 4.87 10E6/UL (ref 4.4–5.9)
RBC MORPH BLD: ABNORMAL
TARGETS BLD QL SMEAR: SLIGHT
WBC # BLD AUTO: 13.8 10E3/UL (ref 4–11)

## 2024-10-10 PROCEDURE — 36415 COLL VENOUS BLD VENIPUNCTURE: CPT | Performed by: STUDENT IN AN ORGANIZED HEALTH CARE EDUCATION/TRAINING PROGRAM

## 2024-10-10 PROCEDURE — 99495 TRANSJ CARE MGMT MOD F2F 14D: CPT | Performed by: STUDENT IN AN ORGANIZED HEALTH CARE EDUCATION/TRAINING PROGRAM

## 2024-10-10 PROCEDURE — 85025 COMPLETE CBC W/AUTO DIFF WBC: CPT | Performed by: STUDENT IN AN ORGANIZED HEALTH CARE EDUCATION/TRAINING PROGRAM

## 2024-10-10 RX ORDER — OXYBUTYNIN CHLORIDE 5 MG/1
5 TABLET ORAL 3 TIMES DAILY PRN
Qty: 90 TABLET | Refills: 1 | Status: SHIPPED | OUTPATIENT
Start: 2024-10-10

## 2024-10-10 RX ORDER — NITROFURANTOIN MACROCRYSTALS 50 MG/1
CAPSULE ORAL
Qty: 90 CAPSULE | Refills: 0 | Status: SHIPPED | OUTPATIENT
Start: 2024-10-10

## 2024-10-10 RX ORDER — HYDROMORPHONE HYDROCHLORIDE 2 MG/1
1-2 TABLET ORAL EVERY 6 HOURS PRN
Qty: 30 TABLET | Refills: 0 | Status: SHIPPED | OUTPATIENT
Start: 2024-10-10 | End: 2024-12-04

## 2024-10-10 RX ORDER — GABAPENTIN 300 MG/1
300 CAPSULE ORAL 3 TIMES DAILY
Qty: 90 CAPSULE | Refills: 0 | Status: SHIPPED | OUTPATIENT
Start: 2024-10-10 | End: 2024-11-22

## 2024-10-10 ASSESSMENT — PATIENT HEALTH QUESTIONNAIRE - PHQ9
10. IF YOU CHECKED OFF ANY PROBLEMS, HOW DIFFICULT HAVE THESE PROBLEMS MADE IT FOR YOU TO DO YOUR WORK, TAKE CARE OF THINGS AT HOME, OR GET ALONG WITH OTHER PEOPLE: NOT DIFFICULT AT ALL
SUM OF ALL RESPONSES TO PHQ QUESTIONS 1-9: 2
SUM OF ALL RESPONSES TO PHQ QUESTIONS 1-9: 2

## 2024-10-10 ASSESSMENT — PAIN SCALES - GENERAL: PAINLEVEL: MODERATE PAIN (5)

## 2024-10-10 ASSESSMENT — ANXIETY QUESTIONNAIRES
4. TROUBLE RELAXING: NOT AT ALL
1. FEELING NERVOUS, ANXIOUS, OR ON EDGE: NOT AT ALL
2. NOT BEING ABLE TO STOP OR CONTROL WORRYING: NOT AT ALL
6. BECOMING EASILY ANNOYED OR IRRITABLE: NOT AT ALL
IF YOU CHECKED OFF ANY PROBLEMS ON THIS QUESTIONNAIRE, HOW DIFFICULT HAVE THESE PROBLEMS MADE IT FOR YOU TO DO YOUR WORK, TAKE CARE OF THINGS AT HOME, OR GET ALONG WITH OTHER PEOPLE: NOT DIFFICULT AT ALL
GAD7 TOTAL SCORE: 0
7. FEELING AFRAID AS IF SOMETHING AWFUL MIGHT HAPPEN: NOT AT ALL
GAD7 TOTAL SCORE: INCOMPLETE
3. WORRYING TOO MUCH ABOUT DIFFERENT THINGS: NOT AT ALL
GAD7 TOTAL SCORE: 0
5. BEING SO RESTLESS THAT IT IS HARD TO SIT STILL: NOT AT ALL

## 2024-10-10 NOTE — PROGRESS NOTES
Assessment & Plan   Problem List Items Addressed This Visit    None  Visit Diagnoses       Need for shingles vaccine        Need for Tdap vaccination        Need for prophylactic vaccination against hepatitis A        Need for vaccination against respiratory syncytial virus                    MED REC REQUIRED{TIP  Click the link below to document or use med rec list, use list to pull in response :090230}  Post Medication Reconciliation Status:  Discharge medications reconciled and changed, see notes/orders      Mikaela Quiñonez is a 79 year old, presenting for the following health issues:  Hospital F/U      10/10/2024     2:13 PM   Additional Questions   Roomed by Vanna   Accompanied by wife katie         10/10/2024   Declines Weight   Did patient decline having their weight taken? Yes        HPI        Hospital Follow-up Visit:    Hospital/Nursing Home/IP Rehab Facility: [facility]  Date of Admission: 10/1/2024  Date of Discharge: 10/5/2024  Reason(s) for Admission: sepsis  Was the patient in the ICU or did the patient experience delirium during hospitalization?  No  Do you have any other stressors you would like to discuss with your provider? No    Problems taking medications regularly:  None  Medication changes since discharge:   Discharge Medication List               START taking these medications     Details   ciprofloxacin (CIPRO) 500 MG tablet Take 1 tablet (500 mg) by mouth every 12 hours for 3 days.  Qty: 6 tablet, Refills: 0     Associated Diagnoses: Urinary tract infection associated with indwelling urethral catheter, initial encounter (H); Sepsis, due to unspecified organism, unspecified whether acute organ dysfunction present (H)                   CONTINUE these medications which have CHANGED     Details   nitroFURantoin macrocrystal (MACRODANTIN) 50 MG capsule HOLD while on the Ciprofloxacin antibiotic but restart the day after you have completed Cipro and restart 1 tablet  daily dosing.     Associated Diagnoses: Recurrent UTI                   CONTINUE these medications which have NOT CHANGED     Details   !! ACE/ARB/ARNI NOT PRESCRIBED (INTENTIONAL) Please choose reason not prescribed from choices below.       !! BETA BLOCKER NOT PRESCRIBED (INTENTIONAL) Please choose reason not prescribed from choices below.       calcium carbonate-vitamin D (CALTRATE) 600-10 MG-MCG per tablet Take 1 tablet by mouth 2 times daily       ELIQUIS ANTICOAGULANT 5 MG tablet TAKE 1 TABLET (5 MG) BY MOUTH 2 TIMES DAILY  Qty: 60 tablet, Refills: 0     Associated Diagnoses: Other acute pulmonary embolism, unspecified whether acute cor pulmonale present (H)       gabapentin (NEURONTIN) 300 MG capsule TAKE 1 CAPSULE (300 MG) BY MOUTH 3 TIMES DAILY  Qty: 90 capsule, Refills: 0     Associated Diagnoses: Urothelial carcinoma of bladder (H)       HYDROmorphone (DILAUDID) 2 MG tablet TAKE 1 TABLET (2 MG) BY MOUTH EVERY 6 HOURS AS NEEDED FOR SEVERE PAIN.  Qty: 30 tablet, Refills: 0     Associated Diagnoses: Pain       ipratropium - albuterol 0.5 mg/2.5 mg/3 mL (DUONEB) 0.5-2.5 (3) MG/3ML neb solution INHALE 1 VIAL IN NEBULIZER EVERY 6 HOURS AS NEEDED FOR SHORTNESS OF BREATH, WHEEZING OR COUGH  Qty: 360 mL, Refills: 2     Associated Diagnoses: Chronic obstructive pulmonary disease, unspecified COPD type (H)       Lidocaine HCl Urethral/Mucosal 2 % external gel Place 10 mLs into the urethra every 28 days.       Multiple Vitamin (MULTIVITAMIN ADULT PO) Take 1 tablet by mouth daily       oxyBUTYnin (DITROPAN) 5 MG tablet TAKE 1 TABLET (5 MG) BY MOUTH 3 TIMES DAILY AS NEEDED FOR BLADDER SPASMS  Qty: 90 tablet, Refills: 1     Associated Diagnoses: Bladder spasm       tamsulosin (FLOMAX) 0.4 MG capsule Take 1 capsule (0.4 mg) by mouth at bedtime  Qty: 90 capsule, Refills: 3     Associated Diagnoses: Benign prostatic hyperplasia, unspecified whether lower urinary tract symptoms present       vitamin C (ASCORBIC ACID) 500 MG  "tablet Take 500 mg by mouth daily       order for DME Equipment being ordered: Nebulizer hose  Qty: 1 Tube, Refills: 11     Associated Diagnoses: Chronic obstructive pulmonary disease, unspecified COPD type (H)     Problems adhering to non-medication therapy:  None    Summary of hospitalization:  {:955860}  Diagnostic Tests/Treatments reviewed.  Follow up needed: {:511560:}  Other Healthcare Providers Involved in Patient s Care:         {those currently involved after discharge:897151::\"None\"}  Update since discharge: {:465133} {TIP  Include information from family/caregivers, SNF, Care Coordination :202544}        Plan of care communicated with {:353643}     {Reference  Coding guidelines- Moderate Complexity F2F/Video within 7 - 14 days of discharge [id number], High Complexity F2F/Video within 7 days [id number] or [id number] days [id number] :005419}      {additonal problems for provider to add (Optional):252793}  {additonal problems for provider to add (Optional):311349}    {ROS Picklists (Optional):105838}      Objective    BP 92/58   Pulse 97   Temp 99.7  F (37.6  C) (Temporal)   Resp 18   SpO2 93%   There is no height or weight on file to calculate BMI.  Physical Exam   {Exam List (Optional):229531}    {Diagnostic Test Results (Optional):113894}        Signed Electronically by: Neville Degroot MD  {Email feedback regarding this note to [email or web address]   :261808}  Answers submitted by the patient for this visit:  Patient Health Questionnaire (Submitted on 10/10/2024)  If you checked off any problems, how difficult have these problems made it for you to do your work, take care of things at home, or get along with other people?: Not difficult at all  PHQ9 TOTAL SCORE: 2  Patient Health Questionnaire (G7) (Submitted on 10/10/2024)  RAISA 7 TOTAL SCORE: Incomplete    " bedtime  Qty: 90 capsule, Refills: 3     Associated Diagnoses: Benign prostatic hyperplasia, unspecified whether lower urinary tract symptoms present       vitamin C (ASCORBIC ACID) 500 MG tablet Take 500 mg by mouth daily       order for DME Equipment being ordered: Nebulizer hose  Qty: 1 Tube, Refills: 11     Associated Diagnoses: Chronic obstructive pulmonary disease, unspecified COPD type (H)     Problems adhering to non-medication therapy:  None    Summary of hospitalization:  [facility] discharge summary reviewed  Diagnostic Tests/Treatments reviewed.  Follow up needed: urology  Other Healthcare Providers Involved in Patient s Care:         Homecare and Specialist appointment - urology, PT  Update since discharge: improved.         Plan of care communicated with patient and family               Review of Systems  Constitutional, HEENT, cardiovascular, pulmonary, GI, , musculoskeletal, neuro, skin, endocrine and psych systems are negative, except as otherwise noted.      Objective    BP 92/58   Pulse 97   Temp 99.7  F (37.6  C) (Temporal)   Resp 18   SpO2 93%   There is no height or weight on file to calculate BMI.  Physical Exam   GENERAL: alert, skinny   EYES: Eyes grossly normal to inspection, PERRL and conjunctivae and sclerae normal  HENT:  nose and mouth without ulcers or lesions  NECK: no adenopathy, no asymmetry, masses, or scars  RESP: lungs clear to auscultation - no rales, rhonchi or wheezes  CV: regular rate and rhythm, normal S1 S2, no peripheral edema  ABDOMEN: soft, nontender, no hepatosplenomegaly, no masses and bowel sounds normal  MS: no gross musculoskeletal defects noted, no edema  SKIN: no suspicious lesions or rashes  NEURO: mentation intact and speech normal  PSYCH: mentation appears normal, affect normal/bright          Signed Electronically by: Neville Degroot MD    Answers submitted by the patient for this visit:  Patient Health Questionnaire (Submitted on  10/10/2024)  If you checked off any problems, how difficult have these problems made it for you to do your work, take care of things at home, or get along with other people?: Not difficult at all  PHQ9 TOTAL SCORE: 2  Patient Health Questionnaire (G7) (Submitted on 10/10/2024)  RAISA 7 TOTAL SCORE: Incomplete

## 2024-10-14 ENCOUNTER — TELEPHONE (OUTPATIENT)
Dept: FAMILY MEDICINE | Facility: CLINIC | Age: 79
End: 2024-10-14
Payer: COMMERCIAL

## 2024-10-16 ENCOUNTER — TELEPHONE (OUTPATIENT)
Dept: FAMILY MEDICINE | Facility: CLINIC | Age: 79
End: 2024-10-16

## 2024-10-16 NOTE — TELEPHONE ENCOUNTER
Home Care is calling regarding an established patient with  [facility].        8/30/2024    11:16 AM 8/15/2024     9:39 AM   Home Care Information    Name/Phone Number Luis THOMPSON [phone] Alaina RAYMOND [phone]   Home Care agency [facility]     Requesting orders from: Neville Degroot  Provider is following patient: Yes  Is this a 60-day recertification request?  No    Orders Requested    Physical Therapy  Request for continuation of care with no increase or decrease in frequency  Frequency:  1x/wk for 3 wks      Then will recerting patient    Confirmed ok to leave a detailed message with call back.  Contact information confirmed and updated as needed.  Yoli, Physical Therapist with [facility].   [phone]    Julián Grimaldo RN

## 2024-10-18 NOTE — TELEPHONE ENCOUNTER
Called and gave verbal approval for requested home care orders to KIRSTIE Kent, per PCP note below.     BERNIE FragosoN, RN

## 2024-10-24 ENCOUNTER — TELEPHONE (OUTPATIENT)
Dept: FAMILY MEDICINE | Facility: CLINIC | Age: 79
End: 2024-10-24
Payer: COMMERCIAL

## 2024-10-24 NOTE — TELEPHONE ENCOUNTER
Left message on confidential voicemail with Dr. Degroot approval for Home Care orders as requested.    Roberto Maier RN on 10/24/2024 at 11:47 AM

## 2024-10-24 NOTE — TELEPHONE ENCOUNTER
DONNA Palomares, [facility] calling that patient BP is 100/58 P77. Patient denies all symptoms other than blurred vision which is from needing cataract surgery. Home care RN looking for recommendation as BP low.     Patient not taking any BP medications. HCRN says patient looks appropriate, no signs of distress/infection. His BP tends to run in the 110-120/60-80 range. Pulse trends in the 70's Below are a couple abnormal readings.     Yesterday /68  10/18: 130/79  09/08: 100/54  09/05: 96/56    Wife inquiring if Dr. Degroot would give blessing to proceed with cataract surgery.     Please return call to Marielle THOMPSON.     Roberto Maier RN on 10/24/2024 at 10:42 AM

## 2024-11-01 ENCOUNTER — TELEPHONE (OUTPATIENT)
Dept: FAMILY MEDICINE | Facility: CLINIC | Age: 79
End: 2024-11-01
Payer: COMMERCIAL

## 2024-11-01 NOTE — TELEPHONE ENCOUNTER
Home Care is calling regarding an established patient with  [facility].        8/30/2024    11:16 AM 8/15/2024     9:39 AM   Home Care Information    Name/Phone Number Luis THOMPSON [phone] Alaina RAYMOND [phone]   Home Care agency [facility]     Requesting orders from: Neville Degroot  Provider is following patient: Yes  Is this a 60-day recertification request?  Yes    Orders Requested    Physical Therapy  Request for recertification   Frequency:  1x/wk for 4 wks  2x/month for 1 months      Information was gathered and will be sent to provider for review.  RN will contact Home Care with information after provider review.  Confirmed ok to leave a detailed message with call back.  Contact information confirmed and updated as needed.    Mayi Lowery RN

## 2024-11-05 NOTE — TELEPHONE ENCOUNTER
Writer spoke with KIRSTIE Harman, November 5, 2024 at 10:30 AM gave verbal per Dr. Degroot's approval for Home Care orders as requested.    Maria Del Carmen Randall RN on 11/5/2024 at 10:31 AM

## 2024-11-07 ENCOUNTER — TELEPHONE (OUTPATIENT)
Dept: FAMILY MEDICINE | Facility: CLINIC | Age: 79
End: 2024-11-07
Payer: COMMERCIAL

## 2024-11-07 NOTE — TELEPHONE ENCOUNTER
Reason for Call:  Form, our goal is to have forms completed with 72 hours, however, some forms may require a visit or additional information.    Type of letter, form or note:  Home Health Certification    Who is the form from?: Home care    Where did the form come from: form was faxed in    What clinic location was the form placed at?: [facility]    Where the form was placed: Given to physician    What number is listed as a contact on the form?: [phone]       Additional comments: [facility]    Call taken on 11/7/2024 at 9:09 AM by Lisbeth Arredondo

## 2024-11-09 ENCOUNTER — HOSPITAL ENCOUNTER (EMERGENCY)
Facility: CLINIC | Age: 79
Discharge: HOME | End: 2024-11-09
Attending: STUDENT IN AN ORGANIZED HEALTH CARE EDUCATION/TRAINING PROGRAM | Admitting: STUDENT IN AN ORGANIZED HEALTH CARE EDUCATION/TRAINING PROGRAM
Payer: COMMERCIAL

## 2024-11-09 VITALS
RESPIRATION RATE: 14 BRPM | TEMPERATURE: 98.5 F | WEIGHT: 139 LBS | SYSTOLIC BLOOD PRESSURE: 124 MMHG | DIASTOLIC BLOOD PRESSURE: 77 MMHG | HEART RATE: 72 BPM | BODY MASS INDEX: 19.94 KG/M2 | OXYGEN SATURATION: 97 %

## 2024-11-09 DIAGNOSIS — T83.511A URINARY TRACT INFECTION ASSOCIATED WITH INDWELLING URETHRAL CATHETER, INITIAL ENCOUNTER (H): ICD-10-CM

## 2024-11-09 DIAGNOSIS — D64.9 ANEMIA, UNSPECIFIED TYPE: ICD-10-CM

## 2024-11-09 DIAGNOSIS — N39.0 URINARY TRACT INFECTION ASSOCIATED WITH INDWELLING URETHRAL CATHETER, INITIAL ENCOUNTER (H): ICD-10-CM

## 2024-11-09 LAB
ABO/RH(D): NORMAL
ALBUMIN SERPL BCG-MCNC: 3.1 G/DL (ref 3.5–5.2)
ALBUMIN UR-MCNC: NEGATIVE MG/DL
ALP SERPL-CCNC: 65 U/L (ref 40–150)
ALT SERPL W P-5'-P-CCNC: 9 U/L (ref 0–70)
ANION GAP SERPL CALCULATED.3IONS-SCNC: 6 MMOL/L (ref 7–15)
ANTIBODY SCREEN: NEGATIVE
APPEARANCE UR: ABNORMAL
AST SERPL W P-5'-P-CCNC: 10 U/L (ref 0–45)
BACTERIA #/AREA URNS HPF: ABNORMAL /HPF
BASOPHILS # BLD AUTO: 0.1 10E3/UL (ref 0–0.2)
BASOPHILS NFR BLD AUTO: 1 %
BILIRUB SERPL-MCNC: 0.3 MG/DL
BILIRUB UR QL STRIP: NEGATIVE
BLD PROD TYP BPU: NORMAL
BLOOD COMPONENT TYPE: NORMAL
BUN SERPL-MCNC: 17.1 MG/DL (ref 8–23)
CALCIUM SERPL-MCNC: 8.7 MG/DL (ref 8.8–10.4)
CHLORIDE SERPL-SCNC: 101 MMOL/L (ref 98–107)
CODING SYSTEM: NORMAL
COLOR UR AUTO: YELLOW
CREAT SERPL-MCNC: 0.67 MG/DL (ref 0.67–1.17)
CROSSMATCH: NORMAL
EGFRCR SERPLBLD CKD-EPI 2021: >90 ML/MIN/1.73M2
EOSINOPHIL # BLD AUTO: 0.2 10E3/UL (ref 0–0.7)
EOSINOPHIL NFR BLD AUTO: 2 %
ERYTHROCYTE [DISTWIDTH] IN BLOOD BY AUTOMATED COUNT: 25.3 % (ref 10–15)
GLUCOSE SERPL-MCNC: 130 MG/DL (ref 70–99)
GLUCOSE UR STRIP-MCNC: NEGATIVE MG/DL
HCO3 SERPL-SCNC: 28 MMOL/L (ref 22–29)
HCT VFR BLD AUTO: 24 % (ref 40–53)
HGB BLD-MCNC: 6.7 G/DL (ref 13.3–17.7)
HGB UR QL STRIP: ABNORMAL
HOLD SPECIMEN: NORMAL
IMM GRANULOCYTES # BLD: 0 10E3/UL
IMM GRANULOCYTES NFR BLD: 0 %
ISSUE DATE AND TIME: NORMAL
KETONES UR STRIP-MCNC: NEGATIVE MG/DL
LEUKOCYTE ESTERASE UR QL STRIP: ABNORMAL
LYMPHOCYTES # BLD AUTO: 1.3 10E3/UL (ref 0.8–5.3)
LYMPHOCYTES NFR BLD AUTO: 13 %
MCH RBC QN AUTO: 16.3 PG (ref 26.5–33)
MCHC RBC AUTO-ENTMCNC: 27.9 G/DL (ref 31.5–36.5)
MCV RBC AUTO: 58 FL (ref 78–100)
MONOCYTES # BLD AUTO: 0.7 10E3/UL (ref 0–1.3)
MONOCYTES NFR BLD AUTO: 7 %
MUCOUS THREADS #/AREA URNS LPF: PRESENT /LPF
NEUTROPHILS # BLD AUTO: 7.7 10E3/UL (ref 1.6–8.3)
NEUTROPHILS NFR BLD AUTO: 78 %
NITRATE UR QL: POSITIVE
NRBC # BLD AUTO: 0 10E3/UL
NRBC BLD AUTO-RTO: 0 /100
PH UR STRIP: 7 [PH] (ref 5–7)
PLATELET # BLD AUTO: 262 10E3/UL (ref 150–450)
POTASSIUM SERPL-SCNC: 4 MMOL/L (ref 3.4–5.3)
PROT SERPL-MCNC: 5.9 G/DL (ref 6.4–8.3)
RBC # BLD AUTO: 4.12 10E6/UL (ref 4.4–5.9)
RBC URINE: 63 /HPF
SODIUM SERPL-SCNC: 135 MMOL/L (ref 135–145)
SP GR UR STRIP: 1.01 (ref 1–1.03)
SPECIMEN EXPIRATION DATE: NORMAL
SQUAMOUS EPITHELIAL: <1 /HPF
UNIT ABO/RH: NORMAL
UNIT NUMBER: NORMAL
UNIT STATUS: NORMAL
UNIT TYPE ISBT: 600
UROBILINOGEN UR STRIP-MCNC: NORMAL MG/DL
WBC # BLD AUTO: 10 10E3/UL (ref 4–11)
WBC CLUMPS #/AREA URNS HPF: PRESENT /HPF
WBC URINE: >182 /HPF

## 2024-11-09 PROCEDURE — 86923 COMPATIBILITY TEST ELECTRIC: CPT | Performed by: STUDENT IN AN ORGANIZED HEALTH CARE EDUCATION/TRAINING PROGRAM

## 2024-11-09 PROCEDURE — 86901 BLOOD TYPING SEROLOGIC RH(D): CPT | Performed by: STUDENT IN AN ORGANIZED HEALTH CARE EDUCATION/TRAINING PROGRAM

## 2024-11-09 PROCEDURE — 96365 THER/PROPH/DIAG IV INF INIT: CPT | Performed by: STUDENT IN AN ORGANIZED HEALTH CARE EDUCATION/TRAINING PROGRAM

## 2024-11-09 PROCEDURE — 85004 AUTOMATED DIFF WBC COUNT: CPT | Performed by: STUDENT IN AN ORGANIZED HEALTH CARE EDUCATION/TRAINING PROGRAM

## 2024-11-09 PROCEDURE — 84155 ASSAY OF PROTEIN SERUM: CPT | Performed by: STUDENT IN AN ORGANIZED HEALTH CARE EDUCATION/TRAINING PROGRAM

## 2024-11-09 PROCEDURE — 36430 TRANSFUSION BLD/BLD COMPNT: CPT

## 2024-11-09 PROCEDURE — 87040 BLOOD CULTURE FOR BACTERIA: CPT | Performed by: STUDENT IN AN ORGANIZED HEALTH CARE EDUCATION/TRAINING PROGRAM

## 2024-11-09 PROCEDURE — 99284 EMERGENCY DEPT VISIT MOD MDM: CPT | Mod: 25 | Performed by: STUDENT IN AN ORGANIZED HEALTH CARE EDUCATION/TRAINING PROGRAM

## 2024-11-09 PROCEDURE — P9016 RBC LEUKOCYTES REDUCED: HCPCS | Performed by: STUDENT IN AN ORGANIZED HEALTH CARE EDUCATION/TRAINING PROGRAM

## 2024-11-09 PROCEDURE — 81003 URINALYSIS AUTO W/O SCOPE: CPT | Performed by: STUDENT IN AN ORGANIZED HEALTH CARE EDUCATION/TRAINING PROGRAM

## 2024-11-09 PROCEDURE — 87186 SC STD MICRODIL/AGAR DIL: CPT | Performed by: STUDENT IN AN ORGANIZED HEALTH CARE EDUCATION/TRAINING PROGRAM

## 2024-11-09 PROCEDURE — 36415 COLL VENOUS BLD VENIPUNCTURE: CPT | Performed by: STUDENT IN AN ORGANIZED HEALTH CARE EDUCATION/TRAINING PROGRAM

## 2024-11-09 PROCEDURE — 86900 BLOOD TYPING SEROLOGIC ABO: CPT | Performed by: STUDENT IN AN ORGANIZED HEALTH CARE EDUCATION/TRAINING PROGRAM

## 2024-11-09 PROCEDURE — 99284 EMERGENCY DEPT VISIT MOD MDM: CPT | Performed by: STUDENT IN AN ORGANIZED HEALTH CARE EDUCATION/TRAINING PROGRAM

## 2024-11-09 PROCEDURE — 250N000011 HC RX IP 250 OP 636: Performed by: STUDENT IN AN ORGANIZED HEALTH CARE EDUCATION/TRAINING PROGRAM

## 2024-11-09 RX ORDER — CIPROFLOXACIN 2 MG/ML
400 INJECTION, SOLUTION INTRAVENOUS ONCE
Status: COMPLETED | OUTPATIENT
Start: 2024-11-09 | End: 2024-11-09

## 2024-11-09 RX ORDER — CIPROFLOXACIN 500 MG/1
500 TABLET, FILM COATED ORAL 2 TIMES DAILY
Qty: 14 TABLET | Refills: 0 | Status: SHIPPED | OUTPATIENT
Start: 2024-11-09 | End: 2024-11-16

## 2024-11-09 RX ADMIN — CIPROFLOXACIN 400 MG: 400 INJECTION, SOLUTION INTRAVENOUS at 17:29

## 2024-11-09 ASSESSMENT — ACTIVITIES OF DAILY LIVING (ADL)
ADLS_ACUITY_SCORE: 0

## 2024-11-09 ASSESSMENT — COLUMBIA-SUICIDE SEVERITY RATING SCALE - C-SSRS
1. IN THE PAST MONTH, HAVE YOU WISHED YOU WERE DEAD OR WISHED YOU COULD GO TO SLEEP AND NOT WAKE UP?: NO
2. HAVE YOU ACTUALLY HAD ANY THOUGHTS OF KILLING YOURSELF IN THE PAST MONTH?: NO
6. HAVE YOU EVER DONE ANYTHING, STARTED TO DO ANYTHING, OR PREPARED TO DO ANYTHING TO END YOUR LIFE?: NO

## 2024-11-09 NOTE — ED PROVIDER NOTES
History     Chief Complaint   Patient presents with    Catheter Problem     HPI  Khang Bailon is a 79 year old male who presents the emergency department with concerns of cloudiness of the urine in the catheter bag.  It has been roughly a month since has been exchanged.  He is a long-term catheter for urinary retention.  Has spasms in the bladder region as well.  Denies any particular fevers at home.    Allergies:  Allergies   Allergen Reactions    Flagyl [Metronidazole] GI Disturbance       Problem List:    Patient Active Problem List    Diagnosis Date Noted    Syncope, unspecified syncope type 10/01/2024     Priority: Medium    Anemia, unspecified type 10/01/2024     Priority: Medium    Urinary tract infection associated with indwelling urethral catheter, initial encounter (H) 10/01/2024     Priority: Medium    Sepsis, due to unspecified organism, unspecified whether acute organ dysfunction present (H) 10/01/2024     Priority: Medium    Medication induced coagulopathy (H) 10/01/2024     Priority: Medium    Urinary tract infection associated with indwelling urethral catheter (H) 10/01/2024     Priority: Medium    Leukocytosis, unspecified type 10/01/2024     Priority: Medium    Acute pulmonary embolism, unspecified pulmonary embolism type, unspecified whether acute cor pulmonale present (H) 09/12/2024     Priority: Medium    Continuous opioid dependence (H) 09/12/2024     Priority: Medium    Severe malnutrition (H) 04/30/2024     Priority: Medium    Gastrointestinal hemorrhage with melena 04/30/2024     Priority: Medium    Hypoalbuminemia 04/30/2024     Priority: Medium    Lactic acidosis 04/30/2024     Priority: Medium    Enlarged prostate 04/30/2024     Priority: Medium    Coagulopathy (H) 04/30/2024     Priority: Medium    Sacral pressure sore 04/30/2024     Priority: Medium    Abrasion of left elbow 04/30/2024     Priority: Medium    Chronic systolic congestive heart failure (H) 04/27/2024     Priority:  Medium    Urothelial carcinoma of bladder (H) 04/27/2024     Priority: Medium    Pneumonia of right lower lobe due to infectious organism 04/26/2024     Priority: Medium    Septic shock (H) 04/26/2024     Priority: Medium    Acute respiratory failure with hypoxia (H) 04/26/2024     Priority: Medium    Closed fracture of right hip with routine healing 04/16/2024     Priority: Medium    Pain 04/16/2024     Priority: Medium    Drug-induced constipation 04/16/2024     Priority: Medium    Acute blood loss anemia 04/16/2024     Priority: Medium    Postprocedural stricture of anterior urethra 03/21/2024     Priority: Medium    Acute bilateral back pain, unspecified back location 03/19/2024     Priority: Medium    SIRS (systemic inflammatory response syndrome) (H) 02/01/2024     Priority: Medium    Calculus of gallbladder without cholecystitis without obstruction 02/01/2024     Priority: Medium    Infrarenal abdominal aortic aneurysm (AAA) without rupture (H) 02/01/2024     Priority: Medium    Fistula of sigmoid colon 02/01/2024     Priority: Medium    Aspiration into airway, initial encounter 02/01/2024     Priority: Medium    Gross hematuria 02/01/2024     Priority: Medium    Aspirin-like platelet function defect (H) 02/01/2024     Priority: Medium    Diverticulitis 01/31/2024     Priority: Medium    Pulmonary nodule - concerning for cancer but patient declining further work up 01/31/2024     Priority: Medium    Mass of urinary bladder 01/31/2024     Priority: Medium    Abnormal urinalysis 01/31/2024     Priority: Medium    Other chest pain 01/31/2024     Priority: Medium    COPD exacerbation (H) 01/31/2024     Priority: Medium    Systolic congestive heart failure, unspecified HF chronicity (H) 01/09/2024     Priority: Medium    Systolic congestive heart failure (H) 01/09/2024     Priority: Medium    Coronary artery disease involving native coronary artery of native heart without angina pectoris 12/05/2023     Priority:  Medium    NSTEMI (non-ST elevated myocardial infarction) (H) 11/27/2023     Priority: Medium    COVID-19 08/13/2023     Priority: Medium    Benign prostatic hyperplasia, unspecified whether lower urinary tract symptoms present 11/01/2021     Priority: Medium    Benign prostatic hyperplasia 11/01/2021     Priority: Medium    Fatigue, unspecified type 11/29/2019     Priority: Medium    Memory loss 11/29/2019     Priority: Medium    Moderate COPD (chronic obstructive pulmonary disease) (H) 11/16/2017     Priority: Medium    Tubulovillous adenoma of rectum 4/9/24 07/26/2016     Priority: Medium    Sigmoid diverticulitis 07/12/2016     Priority: Medium    History of CVA (cerebrovascular accident) - old left internal capsule lacunar infarct on CT 7/2016 07/12/2016     Priority: Medium    Multilevel degenerative disc disease 09/21/2015     Priority: Medium    Elevated prostate specific antigen (PSA) 09/11/2015     Priority: Medium    Elevated PSA 08/27/2012     Priority: Medium    Loss of weight 08/27/2012     Priority: Medium    Third degree heart block (H) - s/p pacemaker 08/10/2012     Priority: Medium    Hypertension goal BP (blood pressure) < 140/90 08/10/2012     Priority: Medium    Other thalassemia (H) 07/27/2012     Priority: Medium     Diagnosis updated by automated process. Provider to review and confirm.      Hyperlipidemia LDL goal <130 07/27/2012     Priority: Medium    Heart block 07/27/2012     Priority: Medium    Cardiac abnormality 07/27/2012     Priority: Medium    Complete heart block (H) 07/27/2012     Priority: Medium    Thalassemia 07/27/2012     Priority: Medium     Formatting of this note might be different from the original.   Formatting of this note might be different from the original.   Diagnosis updated by automated process. Provider to review and confirm.      Painful respiration 03/31/2004     Priority: Medium    Personal history of tobacco use, presenting hazards to health 03/31/2004      Priority: Medium        Past Medical History:    Past Medical History:   Diagnosis Date    Alpha thalassemia (H)     AV block 2012    CAD (coronary artery disease)     Chronic systolic heart failure (H)     COPD (chronic obstructive pulmonary disease) (H)     Diverticulitis     History of CVA (cerebrovascular accident)     Injury, other and unspecified, elbow, forearm, and wrist 1970s    Urothelial carcinoma (H)        Past Surgical History:    Past Surgical History:   Procedure Laterality Date    COLONOSCOPY      COLONOSCOPY  12/10/2012    CYSTOSCOPY, LITHOLAPAXY, COMBINED N/A 11/10/2021    Procedure: Cystoscopy, Bladder Stone Removal;  Surgeon: Guanaco Stewart MD;  Location: [facility] OR    IMPLANT PACEMAKER      LASER KTP TRANSURETHRAL RESECTION (TUR) PROSTATE N/A 11/10/2021    Procedure: TRANSURETHRAL RESECTION (TUR) PROSTATE, USING KTP LASER;  Surgeon: Guanaco Stewart MD;  Location: [facility] OR       Family History:    History reviewed. No pertinent family history.    Social History:  Marital Status:   [2]  Social History     Tobacco Use    Smoking status: Former     Current packs/day: 0.00     Types: Cigarettes     Start date: 1962     Quit date: 2012     Years since quittin.2    Smokeless tobacco: Never    Tobacco comments:     Quit in July   Vaping Use    Vaping status: Never Used   Substance Use Topics    Alcohol use: No     Alcohol/week: 0.0 standard drinks of alcohol    Drug use: No        Medications:    ciprofloxacin (CIPRO) 500 MG tablet  ACE/ARB/ARNI NOT PRESCRIBED (INTENTIONAL)  apixaban ANTICOAGULANT (ELIQUIS ANTICOAGULANT) 5 MG tablet  BETA BLOCKER NOT PRESCRIBED (INTENTIONAL)  calcium carbonate-vitamin D (CALTRATE) 600-10 MG-MCG per tablet  gabapentin (NEURONTIN) 300 MG capsule  HYDROmorphone (DILAUDID) 2 MG tablet  ipratropium - albuterol 0.5 mg/2.5 mg/3 mL (DUONEB) 0.5-2.5 (3) MG/3ML neb solution  Lidocaine HCl Urethral/Mucosal 2 % external gel  Multiple Vitamin (MULTIVITAMIN  ADULT PO)  nitroFURantoin macrocrystal (MACRODANTIN) 50 MG capsule  order for DME  oxyBUTYnin (DITROPAN) 5 MG tablet  tamsulosin (FLOMAX) 0.4 MG capsule  vitamin C (ASCORBIC ACID) 500 MG tablet          Review of Systems  Gen: No fevers, no unintentional weight changes  Head and neck: No headache, no neck pain  Eye: No eye pain, no vision changes  Respiratory: No shortness of breath, no cough  Cardiovascular: No chest pain, no palpitations  Abdominal: No vomiting, no constipation, no diarrhea  Musculoskeletal: No joint redness, no trauma  Neurologic: No confusion, no weakness, no sensation changes  Psychiatric: No suicidal ideation, no homicidal ideation    Physical Exam   BP: 126/80  Pulse: 99  Temp: 98.3  F (36.8  C)  Resp: 16  Weight: 63 kg (139 lb)  SpO2: 94 %      Physical Exam  General: Alert, awake, no distress  Head: Normocephalic, atraumatic  Eyes: Grossly intact extraocular movements, conjunctiva clear, pupils equal   Mouth: Mucous membranes moist  Neck: Supple, grossly full range of motion, no observable masses  Respiratory: No distress, speaks in full sentences, clear to auscultation bilaterally  Cardiac: S1 and S2 cardiac sounds appreciated, normal rate, no edema  Abdominal: Soft, not distended, no tenderness appreciated  Neurologic: Normal level of consciousness, speech is clear and appropriate, moving all extremities grossly normal and symmetric  Skin: No gross rashes or lesions  Psych: Normal mood and appropriate for situation  Genital exam: Urethral erosion, urinary catheter in place with cloudy urine        ED Course        Procedures                  No results found for this or any previous visit (from the past 24 hours).    Medications   ciprofloxacin (CIPRO) infusion 400 mg (0 mg Intravenous Stopped 11/9/24 1848)       Assessments & Plan (with Medical Decision Making)     Vital signs reassuring.  We did exchange his catheter today andUrine obtained from new catheter shows nitrite positive,  strong signs of acute infection, reviewed previous urine cultures, ciprofloxacin subsequently given.  His blood work is overall reassuring in terms of an infectious etiology workup.  Hemoglobin is noted to be 6.7, mentions that he has had transfusions in the past.  We have given him a unit of blood and offered to observe him in the hospital however he wishes to go home and follow-up as an outpatient.  We have sent him home with oral ciprofloxacin and the patient will be notified if the urine culture is resistant.  Patient and family do have questions of potentially exchanging the Hernandez catheter for a suprapubic, they are encouraged to contact the urology team about this.    Discussed todays ER visit and current agreed upon treatment plan. Education provided and all questions answered. Instructed to follow up with pcp to discuss ER visit, make sure they are doing well, and to follow up on any incidental findings. Encouraged to come back to the ER for re evaluation if worsening or any other concerns. +    I have reviewed the nursing notes.    I have reviewed the findings, diagnosis, plan and need for follow up with the patient.          Discharge Medication List as of 11/9/2024  8:52 PM        START taking these medications    Details   ciprofloxacin (CIPRO) 500 MG tablet Take 1 tablet (500 mg) by mouth 2 times daily for 7 days., Disp-14 tablet, R-0, E-Prescribe             Final diagnoses:   Urinary tract infection associated with indwelling urethral catheter, initial encounter (H)   Anemia, unspecified type       11/9/2024   [facility] EMERGENCY DEPT       Cristopher Jeffers MD  11/10/24 [id number]

## 2024-11-09 NOTE — ED NOTES
Angi care for BM incontinence, wound care for wounds per LDAs, new indwelling murrell inserted per VORB.

## 2024-11-09 NOTE — ED TRIAGE NOTES
Pt comes in with complaints of sediment in his urine since last night. Pt complains of body aches. Pt has chronic catheter use for urinary retention.     Triage Assessment (Adult)       Row Name 11/09/24 8201          Triage Assessment    Airway WDL WDL        Respiratory WDL    Respiratory WDL WDL        Skin Circulation/Temperature WDL    Skin Circulation/Temperature WDL WDL        Cardiac WDL    Cardiac WDL WDL        Peripheral/Neurovascular WDL    Peripheral Neurovascular WDL WDL        Cognitive/Neuro/Behavioral WDL    Cognitive/Neuro/Behavioral WDL WDL

## 2024-11-10 NOTE — DISCHARGE INSTRUCTIONS
Continue to work with the urology department for questions on the urinary catheter, take the antibiotics as prescribed, stop taking previous antibiotics.  Come back to the hospital if you are feeling worse.  We also did give you a unit of blood.  Call the primary doctor on Tuesday to schedule follow-up visit so they can recheck your blood levels.    Please call your primary doctor in the morning to discuss your ER visit, make sure you are doing well, and to follow up on any incidental findings. Please come back to the ER for re evaluation if you feel like things are getting worse or have other concerns.

## 2024-11-11 ENCOUNTER — PATIENT OUTREACH (OUTPATIENT)
Dept: CARE COORDINATION | Facility: CLINIC | Age: 79
End: 2024-11-11
Payer: COMMERCIAL

## 2024-11-11 NOTE — PROGRESS NOTES
Clinic Care Coordination Contact  Community Health Worker Initial Outreach    CHW Initial Information Gathering:  Referral Source: ED Follow-Up  Current living arrangement:: Not Assessed  CHW Additional Questions  If ED/Hospital discharge, follow-up appointment scheduled as recommended?: N/A  Medication changes made following ED/Hospital discharge?: No  MyChart active?: Yes  Patient sent Social Drivers of Health questionnaire?: No    Patient accepts CC: No, Patient's Spouse (Brittany) expressed no additional support is needed at this time. Patient will be sent Care Coordination introduction letter for future reference.     Scarlet Osorio  Community Health Worker    Connected Care Resources   [facility]     *Connected Care Resources Team does NOT   follow patient ongoing. Referrals are identified   based on internal discharge report and the outreach is to ensure   Patient has understanding of their discharge instructions.

## 2024-11-11 NOTE — LETTER
Khang Bailon  [address] [facility] [address]    Dear Khang Bailon,    I am a team member within the Connected Care Resource Center with [facility]. I recently contacted you to ensure you are doing well following a visit within our health system. I also wanted to take this chance to introduce Clinic Care Coordination should you have any interest in this program in the future.    Below is a description of Clinic Care Coordination and how this team can further assist you:       The Clinic Care Coordination team is made up of a Registered Nurse, , Financial Resource Worker, and a Community Health Worker who understand and can help navigate the health care system. The goal of clinic care coordination is to help you manage your health, improve access to care, and achieve optimal health outcomes. They work alongside your provider to assist you in determining your health and social needs, obtain health care and community resources, and provide you with necessary information and education. Clinic Care Coordination can work with you through any barriers and develop a care plan that helps coordinate and strengthen the relationship between you and your care team.    If you wish to connect with the Clinic Care Coordination Team, please let your [facility] Primary Care Provider or Clinic Care Team know and they can place a referral. The Clinic Care Coordination team will then reach out by phone to further support you.    We are focused on providing you with the highest-quality healthcare experience possible.    Sincerely,   Scarlet LYN  Community Health Worker    Connected Care Resources   [facility]

## 2024-11-14 ENCOUNTER — TELEPHONE (OUTPATIENT)
Dept: FAMILY MEDICINE | Facility: CLINIC | Age: 79
End: 2024-11-14
Payer: COMMERCIAL

## 2024-11-14 LAB — BACTERIA BLD CULT: NO GROWTH

## 2024-11-14 NOTE — TELEPHONE ENCOUNTER
Home Care is calling regarding an established patient with [facility].      Requesting orders from: Neville Degroot  Provider is following patient: Yes  Is this a 60-day recertification request?  No    Orders Requested    Skilled Nursing  Request for continuation of care with increase in frequency  Frequency:  2x/wk for 3 wks  then 1x/wk for 3 wks         Confirmed ok to leave a detailed message with call back.  Contact information confirmed and updated as needed.  DONNA Jordan with [facility]  [phone] secure line, ok to leave a detailed message    Julián Grimaldo RN

## 2024-11-15 LAB
BACTERIA UR CULT: ABNORMAL

## 2024-11-18 ENCOUNTER — TELEPHONE (OUTPATIENT)
Dept: NURSING | Facility: CLINIC | Age: 79
End: 2024-11-18
Payer: COMMERCIAL

## 2024-11-18 LAB
BACTERIA UR CULT: ABNORMAL

## 2024-11-18 NOTE — TELEPHONE ENCOUNTER
[facility]    Reason for call: Lab Result Notification     Lab Result (including Rx patient on, if applicable).  If culture, copy of lab report at bottom.  Lab Result: Final urine culture on 11/18/24 shows the presence of bacteria(s): >100,000 CFU/ML Pseudomonas aeruginosa, >100,000 CFU/ML Pseudomonas aeruginosa. AND >100,000 CFU/ML Pseudomonas aeruginosa   [facility] Emergency Dept discharge antibiotic: Ciprofloxacin (Cipro) 500 mg tablet, 1 tablet (500 mg) by mouth 2 times daily for 7 days.     Patient's current Symptoms:   Per his Wife Brittany, (Consent on file)  Urine is cleared   A little hazy  No other complaints  Has completed the Ciprofloxacin already.    RN Recommendations/Instructions per [facility] ED lab result protocol:   [facility] ED lab result protocol utilized: urine culture    Patient/care giver notified to contact your PCP clinic or return to the Emergency department if your:  Symptoms worsen or other concerning symptoms.            Harmeet Cadena RN

## 2024-12-02 DIAGNOSIS — R52 PAIN: ICD-10-CM

## 2024-12-02 DIAGNOSIS — N32.89 BLADDER SPASM: Primary | ICD-10-CM

## 2024-12-02 NOTE — TELEPHONE ENCOUNTER
Call from home care RN requesting refill of the Lidocaine gel used with catheter changes. Medication noted as historical.    Yolanda GIBBSN, RN

## 2024-12-04 DIAGNOSIS — I26.99 OTHER ACUTE PULMONARY EMBOLISM, UNSPECIFIED WHETHER ACUTE COR PULMONALE PRESENT (H): ICD-10-CM

## 2024-12-04 RX ORDER — HYDROMORPHONE HYDROCHLORIDE 2 MG/1
1-2 TABLET ORAL EVERY 6 HOURS PRN
Qty: 30 TABLET | Refills: 0 | Status: SHIPPED | OUTPATIENT
Start: 2024-12-04

## 2024-12-04 RX ORDER — APIXABAN 5 MG/1
5 TABLET, FILM COATED ORAL 2 TIMES DAILY
Qty: 60 TABLET | Refills: 0 | OUTPATIENT
Start: 2024-12-04

## 2024-12-04 RX ORDER — LIDOCAINE HYDROCHLORIDE 20 MG/ML
10 JELLY TOPICAL
Qty: 20 ML | Refills: 5 | Status: SHIPPED | OUTPATIENT
Start: 2024-12-04

## 2024-12-05 ENCOUNTER — HOSPITAL ENCOUNTER (EMERGENCY)
Facility: CLINIC | Age: 79
Discharge: HOME | End: 2024-12-05
Attending: PHYSICIAN ASSISTANT | Admitting: PHYSICIAN ASSISTANT
Payer: COMMERCIAL

## 2024-12-05 VITALS
BODY MASS INDEX: 19.94 KG/M2 | TEMPERATURE: 98.5 F | SYSTOLIC BLOOD PRESSURE: 137 MMHG | OXYGEN SATURATION: 97 % | DIASTOLIC BLOOD PRESSURE: 74 MMHG | HEIGHT: 70 IN | RESPIRATION RATE: 20 BRPM | HEART RATE: 81 BPM

## 2024-12-05 DIAGNOSIS — T83.9XXA DIFFICULT FOLEY CATHETER PLACEMENT (H): ICD-10-CM

## 2024-12-05 PROCEDURE — 99284 EMERGENCY DEPT VISIT MOD MDM: CPT | Mod: 25

## 2024-12-05 PROCEDURE — 51702 INSERT TEMP BLADDER CATH: CPT | Performed by: PHYSICIAN ASSISTANT

## 2024-12-05 PROCEDURE — 51703 INSERT BLADDER CATH COMPLEX: CPT

## 2024-12-05 PROCEDURE — 99284 EMERGENCY DEPT VISIT MOD MDM: CPT | Performed by: PHYSICIAN ASSISTANT

## 2024-12-05 PROCEDURE — 99283 EMERGENCY DEPT VISIT LOW MDM: CPT | Mod: 25 | Performed by: PHYSICIAN ASSISTANT

## 2024-12-05 PROCEDURE — 250N000009 HC RX 250: Performed by: PHYSICIAN ASSISTANT

## 2024-12-05 RX ORDER — LOPERAMIDE HYDROCHLORIDE 2 MG/1
2 CAPSULE ORAL 4 TIMES DAILY PRN
COMMUNITY
Start: 2024-12-02 | End: 2024-12-05

## 2024-12-05 RX ORDER — LIDOCAINE HYDROCHLORIDE 20 MG/ML
JELLY TOPICAL ONCE
Status: COMPLETED | OUTPATIENT
Start: 2024-12-05 | End: 2024-12-05

## 2024-12-05 RX ADMIN — LIDOCAINE HYDROCHLORIDE: 20 JELLY TOPICAL at 15:28

## 2024-12-05 ASSESSMENT — ACTIVITIES OF DAILY LIVING (ADL): ADLS_ACUITY_SCORE: 58

## 2024-12-05 NOTE — ED PROVIDER NOTES
"  History     Chief Complaint   Patient presents with    Catheter Problem       HPI  Khang Bailon is a 79 year old male with a history of bladder cancer and chronic indwelling catheter, COPD, coronary artery disease, third-degree heart block s/p pacemaker placement, CVA on Eliquis, who presents to the emergency department for a Hernandez catheter issue.  The patient reports that his home nurse came to replace his catheter today and was unable to put a new catheter and after pulling the old one.  Patient has had had some drainage around the penis tip with urine in his depends at times.  Wife reports that his penis has been \"ripping\" which she thinks is why it is leaking and why they are having trouble putting in a new catheter.  They do have an appointment with urology at [facility] next month to discuss a suprapubic catheter.  Patient has not had any fevers, nausea or vomiting, or abdominal pain out of the ordinary.        Allergies:  Allergies   Allergen Reactions    Flagyl [Metronidazole] GI Disturbance       Problem List:    Patient Active Problem List    Diagnosis Date Noted    Syncope, unspecified syncope type 10/01/2024     Priority: Medium    Anemia, unspecified type 10/01/2024     Priority: Medium    Urinary tract infection associated with indwelling urethral catheter, initial encounter (H) 10/01/2024     Priority: Medium    Sepsis, due to unspecified organism, unspecified whether acute organ dysfunction present (H) 10/01/2024     Priority: Medium    Medication induced coagulopathy (H) 10/01/2024     Priority: Medium    Urinary tract infection associated with indwelling urethral catheter (H) 10/01/2024     Priority: Medium    Leukocytosis, unspecified type 10/01/2024     Priority: Medium    Acute pulmonary embolism, unspecified pulmonary embolism type, unspecified whether acute cor pulmonale present (H) 09/12/2024     Priority: Medium    Continuous opioid dependence (H) 09/12/2024     Priority: Medium    " Severe malnutrition (H) 04/30/2024     Priority: Medium    Gastrointestinal hemorrhage with melena 04/30/2024     Priority: Medium    Hypoalbuminemia 04/30/2024     Priority: Medium    Lactic acidosis 04/30/2024     Priority: Medium    Enlarged prostate 04/30/2024     Priority: Medium    Coagulopathy (H) 04/30/2024     Priority: Medium    Sacral pressure sore 04/30/2024     Priority: Medium    Abrasion of left elbow 04/30/2024     Priority: Medium    Chronic systolic congestive heart failure (H) 04/27/2024     Priority: Medium    Urothelial carcinoma of bladder (H) 04/27/2024     Priority: Medium    Pneumonia of right lower lobe due to infectious organism 04/26/2024     Priority: Medium    Septic shock (H) 04/26/2024     Priority: Medium    Acute respiratory failure with hypoxia (H) 04/26/2024     Priority: Medium    Closed fracture of right hip with routine healing 04/16/2024     Priority: Medium    Pain 04/16/2024     Priority: Medium    Drug-induced constipation 04/16/2024     Priority: Medium    Acute blood loss anemia 04/16/2024     Priority: Medium    Postprocedural stricture of anterior urethra 03/21/2024     Priority: Medium    Acute bilateral back pain, unspecified back location 03/19/2024     Priority: Medium    SIRS (systemic inflammatory response syndrome) (H) 02/01/2024     Priority: Medium    Calculus of gallbladder without cholecystitis without obstruction 02/01/2024     Priority: Medium    Infrarenal abdominal aortic aneurysm (AAA) without rupture (H) 02/01/2024     Priority: Medium    Fistula of sigmoid colon 02/01/2024     Priority: Medium    Aspiration into airway, initial encounter 02/01/2024     Priority: Medium    Gross hematuria 02/01/2024     Priority: Medium    Aspirin-like platelet function defect (H) 02/01/2024     Priority: Medium    Diverticulitis 01/31/2024     Priority: Medium    Pulmonary nodule - concerning for cancer but patient declining further work up 01/31/2024     Priority:  Medium    Mass of urinary bladder 01/31/2024     Priority: Medium    Abnormal urinalysis 01/31/2024     Priority: Medium    Other chest pain 01/31/2024     Priority: Medium    COPD exacerbation (H) 01/31/2024     Priority: Medium    Systolic congestive heart failure, unspecified HF chronicity (H) 01/09/2024     Priority: Medium    Systolic congestive heart failure (H) 01/09/2024     Priority: Medium    Coronary artery disease involving native coronary artery of native heart without angina pectoris 12/05/2023     Priority: Medium    NSTEMI (non-ST elevated myocardial infarction) (H) 11/27/2023     Priority: Medium    COVID-19 08/13/2023     Priority: Medium    Benign prostatic hyperplasia, unspecified whether lower urinary tract symptoms present 11/01/2021     Priority: Medium    Benign prostatic hyperplasia 11/01/2021     Priority: Medium    Fatigue, unspecified type 11/29/2019     Priority: Medium    Memory loss 11/29/2019     Priority: Medium    Moderate COPD (chronic obstructive pulmonary disease) (H) 11/16/2017     Priority: Medium    Tubulovillous adenoma of rectum 4/9/24 07/26/2016     Priority: Medium    Sigmoid diverticulitis 07/12/2016     Priority: Medium    History of CVA (cerebrovascular accident) - old left internal capsule lacunar infarct on CT 7/2016 07/12/2016     Priority: Medium    Multilevel degenerative disc disease 09/21/2015     Priority: Medium    Elevated prostate specific antigen (PSA) 09/11/2015     Priority: Medium    Elevated PSA 08/27/2012     Priority: Medium    Loss of weight 08/27/2012     Priority: Medium    Third degree heart block (H) - s/p pacemaker 08/10/2012     Priority: Medium    Hypertension goal BP (blood pressure) < 140/90 08/10/2012     Priority: Medium    Other thalassemia (H) 07/27/2012     Priority: Medium     Diagnosis updated by automated process. Provider to review and confirm.      Hyperlipidemia LDL goal <130 07/27/2012     Priority: Medium    Heart block  2012     Priority: Medium    Cardiac abnormality 2012     Priority: Medium    Complete heart block (H) 2012     Priority: Medium    Thalassemia 2012     Priority: Medium     Formatting of this note might be different from the original.   Formatting of this note might be different from the original.   Diagnosis updated by automated process. Provider to review and confirm.      Painful respiration 2004     Priority: Medium    Personal history of tobacco use, presenting hazards to health 2004     Priority: Medium        Past Medical History:    Past Medical History:   Diagnosis Date    Alpha thalassemia (H)     AV block 2012    CAD (coronary artery disease)     Chronic systolic heart failure (H)     COPD (chronic obstructive pulmonary disease) (H)     Diverticulitis     History of CVA (cerebrovascular accident)     Injury, other and unspecified, elbow, forearm, and wrist 1970s    Urothelial carcinoma (H)        Past Surgical History:    Past Surgical History:   Procedure Laterality Date    COLONOSCOPY      COLONOSCOPY  12/10/2012    CYSTOSCOPY, LITHOLAPAXY, COMBINED N/A 11/10/2021    Procedure: Cystoscopy, Bladder Stone Removal;  Surgeon: Guanaco Stewart MD;  Location: [facility] OR    IMPLANT PACEMAKER      LASER KTP TRANSURETHRAL RESECTION (TUR) PROSTATE N/A 11/10/2021    Procedure: TRANSURETHRAL RESECTION (TUR) PROSTATE, USING KTP LASER;  Surgeon: Guanaco Stewart MD;  Location: [facility] OR       Family History:    No family history on file.    Social History:  Marital Status:   [2]  Social History     Tobacco Use    Smoking status: Former     Current packs/day: 0.00     Types: Cigarettes     Start date: 1962     Quit date: 2012     Years since quittin.3    Smokeless tobacco: Never    Tobacco comments:     Quit in July   Vaping Use    Vaping status: Never Used   Substance Use Topics    Alcohol use: No     Alcohol/week: 0.0 standard drinks of alcohol    Drug use:  "No        Medications:    ACE/ARB/ARNI NOT PRESCRIBED (INTENTIONAL)  apixaban ANTICOAGULANT (ELIQUIS ANTICOAGULANT) 5 MG tablet  BETA BLOCKER NOT PRESCRIBED (INTENTIONAL)  calcium carbonate-vitamin D (CALTRATE) 600-10 MG-MCG per tablet  gabapentin (NEURONTIN) 300 MG capsule  HYDROmorphone (DILAUDID) 2 MG tablet  ipratropium - albuterol 0.5 mg/2.5 mg/3 mL (DUONEB) 0.5-2.5 (3) MG/3ML neb solution  Lidocaine HCl Urethral/Mucosal 2 % external gel  loperamide (IMODIUM) 2 MG capsule  Multiple Vitamin (MULTIVITAMIN ADULT PO)  nitroFURantoin macrocrystal (MACRODANTIN) 50 MG capsule  order for DME  oxyBUTYnin (DITROPAN) 5 MG tablet  tamsulosin (FLOMAX) 0.4 MG capsule  vitamin C (ASCORBIC ACID) 500 MG tablet          Review of Systems   All other systems reviewed and are negative.          Physical Exam   BP: 137/74  Pulse: 81  Temp: 98.5  F (36.9  C)  Resp: 20  Height: 177.8 cm (5' 10\")  SpO2: 97 %      Physical Exam  Vitals and nursing note reviewed.   Constitutional:       General: He is not in acute distress.     Appearance: He is well-developed. He is cachectic. He is not ill-appearing, toxic-appearing or diaphoretic.   HENT:      Head: Normocephalic and atraumatic.      Nose: Nose normal.      Mouth/Throat:      Mouth: Mucous membranes are moist.   Eyes:      Conjunctiva/sclera: Conjunctivae normal.      Pupils: Pupils are equal, round, and reactive to light.   Cardiovascular:      Rate and Rhythm: Normal rate and regular rhythm.      Heart sounds: Normal heart sounds.   Pulmonary:      Effort: Pulmonary effort is normal. No respiratory distress.      Breath sounds: Normal breath sounds.   Abdominal:      General: Bowel sounds are normal. There is no distension.      Palpations: Abdomen is soft.      Tenderness: There is abdominal tenderness (mild suprapubic). There is no right CVA tenderness or left CVA tenderness.   Genitourinary:     Comments: On underside of penile shaft there is evidence of degradation of the penis " with urethral erosion noted.  Some blood on proximal base of penis suggestive of more acute injury.  See photo.  Musculoskeletal:         General: No deformity.      Cervical back: Neck supple.   Skin:     General: Skin is warm and dry.   Neurological:      General: No focal deficit present.      Mental Status: He is alert and oriented to person, place, and time. Mental status is at baseline.      Coordination: Coordination normal.   Psychiatric:         Mood and Affect: Mood normal.             ED Course        Procedures      No results found for this or any previous visit (from the past 24 hours).    Medications   lidocaine (XYLOCAINE) 2 % external gel ( Topical $Given 12/5/24 4701)         Assessments & Plan (with Medical Decision Making)  Khang Bailon is a 79 year old male who presents to the ED for Hernandez catheter placement.  His home nurse removed his Hernandez catheter per his monthly change but was unable to replace it so they came here.  No acute symptoms otherwise.  He has had issues with urethral erosion.  Photo as above demonstrates extent of erosion.  Due to atypical anatomy, urology at [facility] consulted and I spoke with Dr. Truong.  She advised placing catheter at the base of the penile shaft where erosion ended.  This was performed by nursing staff with good success.  There was some initial blood in the tubing but this quickly cleared to clear light yellow urine.  Patient tolerated procedure well.  I think he is stable for discharge.  I did advise that they follow-up with urology tomorrow with a phone call to see if they can get in sooner for further management of his catheter.  If he has any new or worsening concerns can return to ED.  All questions answered and patient discharged home in stable condition.     I have reviewed the nursing notes.    I have reviewed the findings, diagnosis, plan and need for follow up with the patient.      New Prescriptions    No medications on file       Final  diagnoses:   Difficult Hernandez catheter placement (H)     Note: Chart documentation done in part with Dragon Voice Recognition software. Although reviewed after completion, some word and grammatical errors may remain.     12/5/2024   [facility] EMERGENCY DEPT       Chrissie La PA-C  12/05/24 5689

## 2024-12-05 NOTE — ED NOTES
Writer went to place new Hernandez catheter. Patient has notable wound on the underside of penis and bleeding. Provider called to room. Advised to hold catheter at this time, urology consult started.

## 2024-12-05 NOTE — DISCHARGE INSTRUCTIONS
Please call his urology team at [facility] to see if they can get you in sooner than January for follow-up.  If he has any new or worsening symptoms please return to the emergency department.    Thank you for choosing [facility]'s Emergency Department. It was a pleasure taking care of you today. If you have any questions, please call [phone].    Apple Anand, ANDRAE

## 2024-12-05 NOTE — MEDICATION SCRIBE - ADMISSION MEDICATION HISTORY
Medication Scribe Admission Medication History    Admission medication history is complete. The information provided in this note is only as accurate as the sources available at the time of the update.    Information Source(s): Patient, Family member, and CareEverywhere/SureScripts via in-person    Pertinent Information: Brittany (spouse) present and helpful. Verified no use of pain pump, inhalers or prescription eye drops currently.    Changes made to PTA medication list:  Added: loperamide  Deleted: None  Changed: None    Allergies reviewed with patient and updates made in EHR: yes    Medication History Completed By: BRITTANY ANGELA 12/5/2024 3:55 PM    PTA Med List   Medication Sig Last Dose/Taking    ACE/ARB/ARNI NOT PRESCRIBED (INTENTIONAL) Please choose reason not prescribed from choices below. Taking    apixaban ANTICOAGULANT (ELIQUIS ANTICOAGULANT) 5 MG tablet Take 1 tablet (5 mg) by mouth 2 times daily. 12/5/2024 at  9:00 AM    BETA BLOCKER NOT PRESCRIBED (INTENTIONAL) Please choose reason not prescribed from choices below. Taking    calcium carbonate-vitamin D (CALTRATE) 600-10 MG-MCG per tablet Take 1 tablet by mouth 2 times daily 12/5/2024 at  2:00 PM    gabapentin (NEURONTIN) 300 MG capsule TAKE ONE CAPSULE BY MOUTH THREE TIMES A DAY 12/5/2024 at  1:00 PM    HYDROmorphone (DILAUDID) 2 MG tablet Take 0.5-1 tablets (1-2 mg) by mouth every 6 hours as needed for severe pain. Past Week    ipratropium - albuterol 0.5 mg/2.5 mg/3 mL (DUONEB) 0.5-2.5 (3) MG/3ML neb solution INHALE 1 VIAL IN NEBULIZER EVERY 6 HOURS AS NEEDED FOR SHORTNESS OF BREATH, WHEEZING OR COUGH (Patient taking differently: Take 1 vial by nebulization every 6 hours as needed for shortness of breath, wheezing or cough.) 12/5/2024 at  9:15 AM    Lidocaine HCl Urethral/Mucosal 2 % external gel Place 10 mLs into the urethra every 28 days. 12/5/2024 Morning    loperamide (IMODIUM) 2 MG capsule Take 2 mg by mouth 4 times daily as needed for diarrhea.  12/5/2024 at  9:00 AM    Multiple Vitamin (MULTIVITAMIN ADULT PO) Take 1 tablet by mouth daily 12/5/2024 at  2:00 PM    nitroFURantoin macrocrystal (MACRODANTIN) 50 MG capsule HOLD while on the Ciprofloxacin antibiotic but restart the day after you have completed Cipro and restart 1 tablet daily dosing. (Patient taking differently: Take 50 mg by mouth daily.) 12/5/2024 at  9:00 AM    order for DME Equipment being ordered: Nebulizer hose 12/5/2024 at  9:15 AM    oxyBUTYnin (DITROPAN) 5 MG tablet Take 1 tablet (5 mg) by mouth 3 times daily as needed for bladder spasms. 12/5/2024 at  9:00 AM    tamsulosin (FLOMAX) 0.4 MG capsule Take 1 capsule (0.4 mg) by mouth at bedtime 12/4/2024 at  9:00 PM    vitamin C (ASCORBIC ACID) 500 MG tablet Take 500 mg by mouth daily 12/5/2024 at  2:00 PM

## 2024-12-05 NOTE — ED TRIAGE NOTES
Pt reports the nurse came to the house today to change his catheter and couldn't get the new one in.      Triage Assessment (Adult)       Row Name 12/05/24 1506          Triage Assessment    Airway WDL WDL        Respiratory WDL    Respiratory WDL WDL

## 2024-12-06 ENCOUNTER — TELEPHONE (OUTPATIENT)
Dept: FAMILY MEDICINE | Facility: CLINIC | Age: 79
End: 2024-12-06
Payer: COMMERCIAL

## 2024-12-06 NOTE — TELEPHONE ENCOUNTER
Home Care is calling regarding an established patient with  [facility].        8/30/2024    11:16 AM 8/15/2024     9:39 AM   Home Care Information    Name/Phone Number Luis THOMPSON [phone] Alaina RAYMOND [phone]   Home Care agency [facility]     Requesting orders from: Neville Degroot  Provider is following patient: Yes  Is this a 60-day recertification request?  No    Orders Requested    Physical Therapy  Request to cancel PT visit this week-  due to recent ER visit      Information was gathered and will be sent to provider for review.  RN will contact Home Care with information after provider review.  Confirmed ok to leave a detailed message with call back.  Contact information confirmed and updated as needed.    Mayi Lowery RN

## 2024-12-09 ENCOUNTER — PATIENT OUTREACH (OUTPATIENT)
Dept: CARE COORDINATION | Facility: CLINIC | Age: 79
End: 2024-12-09
Payer: COMMERCIAL

## 2024-12-09 NOTE — PROGRESS NOTES
[facility] Care Resource Center Contact  [facility]     Clinical Data: Care Coordination ED-sourced Outreach-     Outreach attempted x 2.  Left message on patient's voicemail, providing [facility]'s 24/7 scheduling and nurse triage phone number [phone] ([phone]) for questions/concerns and/or to schedule an appt with an [facility] provider.      Care Coordination introduction letter with explanation of Clinic Care Coordination services sent to patient via RingMDt. Clinic Care Coordination services remain available via referral if needed.    Plan: [facility] will do no further outreaches at this time.       ELMIRA Henao  [facility] Resource Surveyor, [facility]    *Connected Care Resource Team does NOT follow patient ongoing. Referrals are identified based on internal discharge reports and the outreach is to ensure patient has an understanding of their discharge instructions.

## 2024-12-09 NOTE — LETTER
Khang Bailon  [address] [place] [id number]    Dear Khang Bailon,      I am a team member within the Connected Care Resource Center with [facility]. I recently tried to reach you to ensure you were doing well following a recent visit within our health system. I also wanted to take this chance to introduce Clinic Care Coordination.     Below is a description of Clinic Care Coordination and how this team can further assist you:       The Clinic Care Coordination team is made up of a Registered Nurse, , Financial Resource Worker, and a Community Health Worker who understand and can help navigate the health care system. The goal of clinic care coordination is to help you manage your health, improve access to care, and achieve optimal health outcomes. They work alongside your provider to assist you in determining your health and social needs, obtain health care and community resources, and provide you with necessary information and education. Clinic Care Coordination can work with you through any barriers and develop a care plan that helps coordinate and strengthen the relationship between you and your care team.    If you wish to connect with the Clinic Care Coordination Team, please let your [facility] Primary Care Provider or Clinic Care Team know and they can place a referral. The Clinic Care Coordination team will then reach out by phone to further support you.    We are focused on providing you with the highest-quality healthcare experience possible.    Sincerely,   Scarlet LYN  Community Health Worker    Connected Care Resources   [facility]'s [phone] ([phone]).

## 2024-12-11 NOTE — TELEPHONE ENCOUNTER
Left message on confidential voicemail with Dr. Degroot approval for Home Care orders as requested.    Roberto Maier RN on 12/11/2024 at 2:51 PM

## 2024-12-27 ENCOUNTER — TELEPHONE (OUTPATIENT)
Dept: FAMILY MEDICINE | Facility: CLINIC | Age: 79
End: 2024-12-27
Payer: COMMERCIAL

## 2024-12-27 NOTE — TELEPHONE ENCOUNTER
Reason for Call:  Appointment Request    Patient requesting this type of appt:  bladder spasms, home nurse requested primary care appt    Requested provider: Neville Degroot    Reason patient unable to be scheduled: Not within requested timeframe    When does patient want to be seen/preferred time: 1-2 days    Comments: in extreme pain    Could we [facility] this [facility] to [facility] or would you prefer to receive a phone call?:   Patient would prefer a phone call   Okay to leave a detailed message?: Yes at Cell number on file:    Telephone Information:   Mobile [phone]     Call taken on 12/27/2024 at 12:44 PM by Jocelyn Malik

## 2024-12-28 DIAGNOSIS — C67.9 UROTHELIAL CARCINOMA OF BLADDER (H): Chronic | ICD-10-CM

## 2024-12-30 ENCOUNTER — HOSPITAL ENCOUNTER (EMERGENCY)
Facility: CLINIC | Age: 79
Discharge: HOME | End: 2024-12-30
Attending: EMERGENCY MEDICINE
Payer: COMMERCIAL

## 2024-12-30 ENCOUNTER — TELEPHONE (OUTPATIENT)
Dept: FAMILY MEDICINE | Facility: CLINIC | Age: 79
End: 2024-12-30
Payer: COMMERCIAL

## 2024-12-30 ENCOUNTER — APPOINTMENT (OUTPATIENT)
Dept: CT IMAGING | Facility: CLINIC | Age: 79
End: 2024-12-30
Attending: EMERGENCY MEDICINE
Payer: COMMERCIAL

## 2024-12-30 VITALS
OXYGEN SATURATION: 97 % | BODY MASS INDEX: 19.8 KG/M2 | TEMPERATURE: 98 F | DIASTOLIC BLOOD PRESSURE: 95 MMHG | SYSTOLIC BLOOD PRESSURE: 164 MMHG | WEIGHT: 138 LBS | RESPIRATION RATE: 16 BRPM | HEART RATE: 76 BPM

## 2024-12-30 DIAGNOSIS — D64.9 ANEMIA, UNSPECIFIED TYPE: ICD-10-CM

## 2024-12-30 DIAGNOSIS — K59.00 CONSTIPATION, UNSPECIFIED CONSTIPATION TYPE: ICD-10-CM

## 2024-12-30 DIAGNOSIS — N39.0 RECURRENT UTI: Primary | ICD-10-CM

## 2024-12-30 LAB
ABO + RH BLD: NORMAL
ANION GAP SERPL CALCULATED.3IONS-SCNC: 10 MMOL/L (ref 7–15)
BASOPHILS # BLD AUTO: 0.1 10E3/UL (ref 0–0.2)
BASOPHILS NFR BLD AUTO: 1 %
BLD GP AB SCN SERPL QL: NEGATIVE
BLD PROD TYP BPU: NORMAL
BLOOD COMPONENT TYPE: NORMAL
BUN SERPL-MCNC: 17.2 MG/DL (ref 8–23)
CALCIUM SERPL-MCNC: 8.5 MG/DL (ref 8.8–10.4)
CHLORIDE SERPL-SCNC: 101 MMOL/L (ref 98–107)
CODING SYSTEM: NORMAL
CREAT SERPL-MCNC: 0.78 MG/DL (ref 0.67–1.17)
CROSSMATCH: NORMAL
EGFRCR SERPLBLD CKD-EPI 2021: >90 ML/MIN/1.73M2
EOSINOPHIL # BLD AUTO: 0.1 10E3/UL (ref 0–0.7)
EOSINOPHIL NFR BLD AUTO: 2 %
ERYTHROCYTE [DISTWIDTH] IN BLOOD BY AUTOMATED COUNT: 22.1 % (ref 10–15)
GLUCOSE SERPL-MCNC: 121 MG/DL (ref 70–99)
HCO3 SERPL-SCNC: 25 MMOL/L (ref 22–29)
HCT VFR BLD AUTO: 22.3 % (ref 40–53)
HGB BLD-MCNC: 6.1 G/DL (ref 13.3–17.7)
IMM GRANULOCYTES # BLD: 0.1 10E3/UL
IMM GRANULOCYTES NFR BLD: 1 %
ISSUE DATE AND TIME: NORMAL
LYMPHOCYTES # BLD AUTO: 1.4 10E3/UL (ref 0.8–5.3)
LYMPHOCYTES NFR BLD AUTO: 15 %
MCH RBC QN AUTO: 14.8 PG (ref 26.5–33)
MCHC RBC AUTO-ENTMCNC: 27.4 G/DL (ref 31.5–36.5)
MCV RBC AUTO: 54 FL (ref 78–100)
MONOCYTES # BLD AUTO: 0.7 10E3/UL (ref 0–1.3)
MONOCYTES NFR BLD AUTO: 8 %
NEUTROPHILS # BLD AUTO: 6.8 10E3/UL (ref 1.6–8.3)
NEUTROPHILS NFR BLD AUTO: 74 %
NRBC # BLD AUTO: 0 10E3/UL
NRBC BLD AUTO-RTO: 0 /100
PLATELET # BLD AUTO: 335 10E3/UL (ref 150–450)
POTASSIUM SERPL-SCNC: 4.3 MMOL/L (ref 3.4–5.3)
RBC # BLD AUTO: 4.11 10E6/UL (ref 4.4–5.9)
SODIUM SERPL-SCNC: 136 MMOL/L (ref 135–145)
SPECIMEN EXP DATE BLD: NORMAL
UNIT ABO/RH: NORMAL
UNIT NUMBER: NORMAL
UNIT STATUS: NORMAL
UNIT TYPE ISBT: 600
WBC # BLD AUTO: 9.2 10E3/UL (ref 4–11)

## 2024-12-30 PROCEDURE — 258N000003 HC RX IP 258 OP 636: Performed by: EMERGENCY MEDICINE

## 2024-12-30 PROCEDURE — 36415 COLL VENOUS BLD VENIPUNCTURE: CPT | Performed by: EMERGENCY MEDICINE

## 2024-12-30 PROCEDURE — P9016 RBC LEUKOCYTES REDUCED: HCPCS | Performed by: EMERGENCY MEDICINE

## 2024-12-30 PROCEDURE — 96361 HYDRATE IV INFUSION ADD-ON: CPT

## 2024-12-30 PROCEDURE — 86923 COMPATIBILITY TEST ELECTRIC: CPT | Performed by: EMERGENCY MEDICINE

## 2024-12-30 PROCEDURE — 80051 ELECTROLYTE PANEL: CPT | Performed by: EMERGENCY MEDICINE

## 2024-12-30 PROCEDURE — 86900 BLOOD TYPING SEROLOGIC ABO: CPT | Performed by: EMERGENCY MEDICINE

## 2024-12-30 PROCEDURE — 85025 COMPLETE CBC W/AUTO DIFF WBC: CPT | Performed by: EMERGENCY MEDICINE

## 2024-12-30 PROCEDURE — 99284 EMERGENCY DEPT VISIT MOD MDM: CPT | Performed by: EMERGENCY MEDICINE

## 2024-12-30 PROCEDURE — 99285 EMERGENCY DEPT VISIT HI MDM: CPT | Mod: 25

## 2024-12-30 PROCEDURE — 86850 RBC ANTIBODY SCREEN: CPT | Performed by: EMERGENCY MEDICINE

## 2024-12-30 PROCEDURE — 36430 TRANSFUSION BLD/BLD COMPNT: CPT

## 2024-12-30 PROCEDURE — 80048 BASIC METABOLIC PNL TOTAL CA: CPT | Performed by: EMERGENCY MEDICINE

## 2024-12-30 PROCEDURE — 96360 HYDRATION IV INFUSION INIT: CPT

## 2024-12-30 PROCEDURE — 250N000013 HC RX MED GY IP 250 OP 250 PS 637: Performed by: EMERGENCY MEDICINE

## 2024-12-30 PROCEDURE — 74176 CT ABD & PELVIS W/O CONTRAST: CPT

## 2024-12-30 RX ORDER — SODIUM CHLORIDE 9 MG/ML
INJECTION, SOLUTION INTRAVENOUS CONTINUOUS
Status: DISCONTINUED | OUTPATIENT
Start: 2024-12-30 | End: 2024-12-30 | Stop reason: HOSPADM

## 2024-12-30 RX ORDER — HYDROCODONE BITARTRATE AND ACETAMINOPHEN 5; 325 MG/1; MG/1
1 TABLET ORAL ONCE
Status: COMPLETED | OUTPATIENT
Start: 2024-12-30 | End: 2024-12-30

## 2024-12-30 RX ADMIN — HYDROCODONE BITARTRATE AND ACETAMINOPHEN 1 TABLET: 5; 325 TABLET ORAL at 16:02

## 2024-12-30 RX ADMIN — SODIUM CHLORIDE: 9 INJECTION, SOLUTION INTRAVENOUS at 17:00

## 2024-12-30 RX ADMIN — SODIUM CHLORIDE 500 ML: 9 INJECTION, SOLUTION INTRAVENOUS at 16:00

## 2024-12-30 ASSESSMENT — ACTIVITIES OF DAILY LIVING (ADL)
ADLS_ACUITY_SCORE: 58

## 2024-12-30 NOTE — TELEPHONE ENCOUNTER
Order placed.  Please notify home care for collection.  Electronically signed by Greg Schoen, MD

## 2024-12-30 NOTE — TELEPHONE ENCOUNTER
Patient scheduled for 1/3/25 at 10 am, arrival time 10:45 am. Patient informed of this and is ok with that time.    Trish Mcghee CMA

## 2024-12-30 NOTE — ED TRIAGE NOTES
Pt reports his HHN came today and told him to come in. Pt states he has had diarrhea for the past month and was running a low grade temp today. Pt also reports she wants his urine checked because the murrell tube has been cloudy.      Triage Assessment (Adult)       Row Name 12/30/24 1314          Triage Assessment    Airway WDL WDL        Respiratory WDL    Respiratory WDL X;cough     Cough Frequency infrequent

## 2024-12-30 NOTE — TELEPHONE ENCOUNTER
Call from DONNA Dominguez with [facility].     Received a call today from patients wife reporting concerns for UTI. Patient having cloudy urine and recent diarrhea.     DONNA Dominguez is requesting orders for UA and can go out to patient and collect sample today.    Okay to leave detailed message with Angelica if no answer upon call back.     Yolanda GIBBSN, RN

## 2024-12-30 NOTE — TELEPHONE ENCOUNTER
Left message on confidential voicemail with Dr. Schoen approval for Home Care orders as requested.    Patient went to ED today.     Roberto Maier RN on 12/30/2024 at 2:05 PM

## 2024-12-30 NOTE — ED PROVIDER NOTES
History     Chief Complaint   Patient presents with    Diarrhea    Fever     HPI  Khang Bailon is a 79 year old male who is sent here for some ongoing diarrhea he reports.  Apparently his home health nurse was out today and noticed a low-grade fever and has had ongoing diarrhea for a month.  He has some abdominal discomfort as well.  No history of diverticulitis, C. difficile or other significant intestinal trouble.  Chronic indwelling Hernandez catheter with some cloudiness noted.  This was changed with quite a bit of difficulty at urologist about 2 to 3 weeks ago his wife reports.  No nausea.  No vomiting.  Did eat some toast today.  Apparently home nurse had also noted a low-grade fever    Allergies:  Allergies   Allergen Reactions    Flagyl [Metronidazole] GI Disturbance       Problem List:    Patient Active Problem List    Diagnosis Date Noted    Syncope, unspecified syncope type 10/01/2024     Priority: Medium    Anemia, unspecified type 10/01/2024     Priority: Medium    Urinary tract infection associated with indwelling urethral catheter, initial encounter (H) 10/01/2024     Priority: Medium    Sepsis, due to unspecified organism, unspecified whether acute organ dysfunction present (H) 10/01/2024     Priority: Medium    Medication induced coagulopathy (H) 10/01/2024     Priority: Medium    Urinary tract infection associated with indwelling urethral catheter (H) 10/01/2024     Priority: Medium    Leukocytosis, unspecified type 10/01/2024     Priority: Medium    Acute pulmonary embolism, unspecified pulmonary embolism type, unspecified whether acute cor pulmonale present (H) 09/12/2024     Priority: Medium    Continuous opioid dependence (H) 09/12/2024     Priority: Medium    Severe malnutrition (H) 04/30/2024     Priority: Medium    Gastrointestinal hemorrhage with melena 04/30/2024     Priority: Medium    Hypoalbuminemia 04/30/2024     Priority: Medium    Lactic acidosis 04/30/2024     Priority: Medium     Enlarged prostate 04/30/2024     Priority: Medium    Coagulopathy (H) 04/30/2024     Priority: Medium    Sacral pressure sore 04/30/2024     Priority: Medium    Abrasion of left elbow 04/30/2024     Priority: Medium    Chronic systolic congestive heart failure (H) 04/27/2024     Priority: Medium    Urothelial carcinoma of bladder (H) 04/27/2024     Priority: Medium    Pneumonia of right lower lobe due to infectious organism 04/26/2024     Priority: Medium    Septic shock (H) 04/26/2024     Priority: Medium    Acute respiratory failure with hypoxia (H) 04/26/2024     Priority: Medium    Closed fracture of right hip with routine healing 04/16/2024     Priority: Medium    Pain 04/16/2024     Priority: Medium    Drug-induced constipation 04/16/2024     Priority: Medium    Acute blood loss anemia 04/16/2024     Priority: Medium    Postprocedural stricture of anterior urethra 03/21/2024     Priority: Medium    Acute bilateral back pain, unspecified back location 03/19/2024     Priority: Medium    SIRS (systemic inflammatory response syndrome) (H) 02/01/2024     Priority: Medium    Calculus of gallbladder without cholecystitis without obstruction 02/01/2024     Priority: Medium    Infrarenal abdominal aortic aneurysm (AAA) without rupture (H) 02/01/2024     Priority: Medium    Fistula of sigmoid colon 02/01/2024     Priority: Medium    Aspiration into airway, initial encounter 02/01/2024     Priority: Medium    Gross hematuria 02/01/2024     Priority: Medium    Aspirin-like platelet function defect (H) 02/01/2024     Priority: Medium    Diverticulitis 01/31/2024     Priority: Medium    Pulmonary nodule - concerning for cancer but patient declining further work up 01/31/2024     Priority: Medium    Mass of urinary bladder 01/31/2024     Priority: Medium    Abnormal urinalysis 01/31/2024     Priority: Medium    Other chest pain 01/31/2024     Priority: Medium    COPD exacerbation (H) 01/31/2024     Priority: Medium     Systolic congestive heart failure, unspecified HF chronicity (H) 01/09/2024     Priority: Medium    Systolic congestive heart failure (H) 01/09/2024     Priority: Medium    Coronary artery disease involving native coronary artery of native heart without angina pectoris 12/05/2023     Priority: Medium    NSTEMI (non-ST elevated myocardial infarction) (H) 11/27/2023     Priority: Medium    COVID-19 08/13/2023     Priority: Medium    Benign prostatic hyperplasia, unspecified whether lower urinary tract symptoms present 11/01/2021     Priority: Medium    Benign prostatic hyperplasia 11/01/2021     Priority: Medium    Fatigue, unspecified type 11/29/2019     Priority: Medium    Memory loss 11/29/2019     Priority: Medium    Moderate COPD (chronic obstructive pulmonary disease) (H) 11/16/2017     Priority: Medium    Tubulovillous adenoma of rectum 4/9/24 07/26/2016     Priority: Medium    Sigmoid diverticulitis 07/12/2016     Priority: Medium    History of CVA (cerebrovascular accident) - old left internal capsule lacunar infarct on CT 7/2016 07/12/2016     Priority: Medium    Multilevel degenerative disc disease 09/21/2015     Priority: Medium    Elevated prostate specific antigen (PSA) 09/11/2015     Priority: Medium    Elevated PSA 08/27/2012     Priority: Medium    Loss of weight 08/27/2012     Priority: Medium    Third degree heart block (H) - s/p pacemaker 08/10/2012     Priority: Medium    Hypertension goal BP (blood pressure) < 140/90 08/10/2012     Priority: Medium    Other thalassemia (H) 07/27/2012     Priority: Medium     Diagnosis updated by automated process. Provider to review and confirm.      Hyperlipidemia LDL goal <130 07/27/2012     Priority: Medium    Heart block 07/27/2012     Priority: Medium    Cardiac abnormality 07/27/2012     Priority: Medium    Complete heart block (H) 07/27/2012     Priority: Medium    Thalassemia 07/27/2012     Priority: Medium     Formatting of this note might be different  from the original.   Formatting of this note might be different from the original.   Diagnosis updated by automated process. Provider to review and confirm.      Painful respiration 2004     Priority: Medium    Personal history of tobacco use, presenting hazards to health 2004     Priority: Medium        Past Medical History:    Past Medical History:   Diagnosis Date    Alpha thalassemia (H)     AV block 2012    CAD (coronary artery disease)     Chronic systolic heart failure (H)     COPD (chronic obstructive pulmonary disease) (H)     Diverticulitis     History of CVA (cerebrovascular accident)     Injury, other and unspecified, elbow, forearm, and wrist 1970s    Urothelial carcinoma (H)        Past Surgical History:    Past Surgical History:   Procedure Laterality Date    COLONOSCOPY      COLONOSCOPY  12/10/2012    CYSTOSCOPY, LITHOLAPAXY, COMBINED N/A 11/10/2021    Procedure: Cystoscopy, Bladder Stone Removal;  Surgeon: Guanaco Stewart MD;  Location: [facility] OR    IMPLANT PACEMAKER      LASER KTP TRANSURETHRAL RESECTION (TUR) PROSTATE N/A 11/10/2021    Procedure: TRANSURETHRAL RESECTION (TUR) PROSTATE, USING KTP LASER;  Surgeon: Guanaco Stewart MD;  Location: [facility] OR       Family History:    History reviewed. No pertinent family history.    Social History:  Marital Status:   [2]  Social History     Tobacco Use    Smoking status: Former     Current packs/day: 0.00     Types: Cigarettes     Start date: 1962     Quit date: 2012     Years since quittin.4    Smokeless tobacco: Never    Tobacco comments:     Quit in July   Vaping Use    Vaping status: Never Used   Substance Use Topics    Alcohol use: No     Alcohol/week: 0.0 standard drinks of alcohol    Drug use: No        Medications:    ACE/ARB/ARNI NOT PRESCRIBED (INTENTIONAL)  apixaban ANTICOAGULANT (ELIQUIS ANTICOAGULANT) 5 MG tablet  BETA BLOCKER NOT PRESCRIBED (INTENTIONAL)  calcium carbonate-vitamin D (CALTRATE) 600-10  MG-MCG per tablet  gabapentin (NEURONTIN) 300 MG capsule  HYDROmorphone (DILAUDID) 2 MG tablet  ipratropium - albuterol 0.5 mg/2.5 mg/3 mL (DUONEB) 0.5-2.5 (3) MG/3ML neb solution  Lidocaine HCl Urethral/Mucosal 2 % external gel  Multiple Vitamin (MULTIVITAMIN ADULT PO)  nitroFURantoin macrocrystal (MACRODANTIN) 50 MG capsule  order for DME  oxyBUTYnin (DITROPAN) 5 MG tablet  tamsulosin (FLOMAX) 0.4 MG capsule  vitamin C (ASCORBIC ACID) 500 MG tablet          Review of Systems  Chronic cough, left lung cancer per wife  All other systems are reviewed and are negative    Physical Exam   BP: 115/64  Pulse: 83  Temp: 98.3  F (36.8  C)  Resp: 20  Weight: 62.6 kg (138 lb)  SpO2: 95 %      Physical Exam  Vitals and nursing note reviewed.   Constitutional:       General: He is not in acute distress.     Appearance: He is well-developed. He is not diaphoretic.   HENT:      Head: Normocephalic and atraumatic.      Nose: Nose normal.      Mouth/Throat:      Mouth: Mucous membranes are moist.      Pharynx: Oropharynx is clear.   Eyes:      General: No scleral icterus.        Right eye: No discharge.         Left eye: No discharge.      Conjunctiva/sclera: Conjunctivae normal.   Cardiovascular:      Rate and Rhythm: Normal rate and regular rhythm.      Heart sounds: Normal heart sounds. No murmur heard.  Pulmonary:      Effort: Pulmonary effort is normal. No respiratory distress.      Breath sounds: Normal breath sounds. No stridor.   Abdominal:      General: There is no distension.      Palpations: Abdomen is soft.      Tenderness: There is no abdominal tenderness. There is no guarding or rebound.   Genitourinary:     Comments: Digital rectal exam reveals multiple marble sized firm stool balls.  No large stool mass for disimpaction  Musculoskeletal:         General: Normal range of motion.      Cervical back: Normal range of motion and neck supple.   Skin:     General: Skin is warm and dry.      Coloration: Skin is not pale.       Findings: No erythema or rash.   Neurological:      General: No focal deficit present.      Mental Status: He is alert.      Cranial Nerves: No cranial nerve deficit.      Motor: No abnormal muscle tone.   Psychiatric:         Mood and Affect: Mood normal.         ED Course        Procedures                  Results for orders placed or performed during the hospital encounter of 12/30/24 (from the past 24 hours)   CBC with platelets differential    Narrative    The following orders were created for panel order CBC with platelets differential.  Procedure                               Abnormality         Status                     ---------                               -----------         ------                     CBC with platelets and d...[[id number]]  Abnormal            Final result               RBC and Platelet Morphology[[id number]]                                                   Please view results for these tests on the individual orders.   Basic metabolic panel   Result Value Ref Range    Sodium 136 135 - 145 mmol/L    Potassium 4.3 3.4 - 5.3 mmol/L    Chloride 101 98 - 107 mmol/L    Carbon Dioxide (CO2) 25 22 - 29 mmol/L    Anion Gap 10 7 - 15 mmol/L    Urea Nitrogen 17.2 8.0 - 23.0 mg/dL    Creatinine 0.78 0.67 - 1.17 mg/dL    GFR Estimate >90 >60 mL/min/1.73m2    Calcium 8.5 (L) 8.8 - 10.4 mg/dL    Glucose 121 (H) 70 - 99 mg/dL   CBC with platelets and differential   Result Value Ref Range    WBC Count 9.2 4.0 - 11.0 10e3/uL    RBC Count 4.11 (L) 4.40 - 5.90 10e6/uL    Hemoglobin 6.1 (LL) 13.3 - 17.7 g/dL    Hematocrit 22.3 (L) 40.0 - 53.0 %    MCV 54 (L) 78 - 100 fL    MCH 14.8 (L) 26.5 - 33.0 pg    MCHC 27.4 (L) 31.5 - 36.5 g/dL    RDW 22.1 (H) 10.0 - 15.0 %    Platelet Count 335 150 - 450 10e3/uL    % Neutrophils 74 %    % Lymphocytes 15 %    % Monocytes 8 %    % Eosinophils 2 %    % Basophils 1 %    % Immature Granulocytes 1 %    NRBCs per 100 WBC 0 <1 /100    Absolute Neutrophils 6.8 1.6 - 8.3  10e3/uL    Absolute Lymphocytes 1.4 0.8 - 5.3 10e3/uL    Absolute Monocytes 0.7 0.0 - 1.3 10e3/uL    Absolute Eosinophils 0.1 0.0 - 0.7 10e3/uL    Absolute Basophils 0.1 0.0 - 0.2 10e3/uL    Absolute Immature Granulocytes 0.1 <=0.4 10e3/uL    Absolute NRBCs 0.0 10e3/uL   CT Abdomen Pelvis w/o Contrast    Narrative    CT ABDOMEN PELVIS WITHOUT CONTRAST 12/30/2024 4:27 PM    CLINICAL HISTORY: Abdominal pain. Diarrhea.   TECHNIQUE: CT scan of the abdomen and pelvis was performed without IV  contrast. Multiplanar reformats were obtained. Dose reduction  techniques were used.  CONTRAST: None.    COMPARISON: July 11, 2024    FINDINGS:   LOWER CHEST: Minimal left and trace right effusions with associated  probable compressive atelectasis vs. less likely infiltrate. Areas of  emphysema evident. Bronchial wall thickening and some mucus plugging  is present. Small pericardial effusion.    HEPATOBILIARY: Normal contour with no significant mass. No bile duct  dilatation. Calcified gallstones.    PANCREAS: No significant mass, duct dilatation, or inflammatory  change.    SPLEEN: Normal size.    ADRENAL GLANDS: No significant nodules.    KIDNEYS/BLADDER: A few nonobstructing stones and/or vascular  calcifications measuring up to 3 mm on the right noted. Benign cysts  requiring no follow-up. No hydronephrosis or ureteral stone  demonstrated. Catheter decompresses the bladder.    BOWEL: Large amount of rectal stool and moderate to large amount of  overall stool, question stercoral colitis.    VASCULATURE: 4.3 cm saccular infrarenal abdominal aortic aneurysm.  Extensive calcified atherosclerosis of the aorta and its branches.    PELVIC ORGANS: No pelvic masses.    OTHER: No free air or free fluid.    MUSCULOSKELETAL: No frankly destructive bony lesions.      Impression    IMPRESSION:   1.  Large amount of rectal stool. With history of diarrhea, question  fecal impaction and stercoral colitis.  2.  Similar pleural and pericardial  effusions.  3.  4.3 cm saccular infrarenal abdominal aortic aneurysm partially  thrombosed, unchanged from previous.    BJ BALDERAS MD         SYSTEM ID:  [id number]   ABO/Rh type and screen    Narrative    The following orders were created for panel order ABO/Rh type and screen.  Procedure                               Abnormality         Status                     ---------                               -----------         ------                     Adult Type and Screen[[id number]]                            Final result                 Please view results for these tests on the individual orders.   Adult Type and Screen   Result Value Ref Range    ABO/RH(D) A NEG     Antibody Screen Negative Negative    SPECIMEN EXPIRATION DATE [id number]    ABO/Rh type and screen *Canceled*    Narrative    The following orders were created for panel order ABO/Rh type and screen.  Procedure                               Abnormality         Status                     ---------                               -----------         ------                       Please view results for these tests on the individual orders.   Prepare red blood cells (unit)   Result Value Ref Range    Blood Component Type Red Blood Cells     Product Code [id number]     Unit Status Transfused     Unit Number [id number]     CROSSMATCH Compatible     CODING SYSTEM [id number]     ISSUE DATE AND TIME [id number]     UNIT ABO/RH A-     UNIT TYPE ISBT 0600        Medications   sodium chloride 0.9 % infusion ( Intravenous $New Bag 12/30/24 1700)   sodium chloride 0.9% BOLUS 500 mL (0 mLs Intravenous Stopped 12/30/24 1637)   HYDROcodone-acetaminophen (NORCO) 5-325 MG per tablet 1 tablet (1 tablet Oral $Given 12/30/24 1602)       Assessments & Plan (with Medical Decision Making)  79-year-old male who presented with diarrhea.  Workup including CT reveals a large amount of rectal stool burden.  Suspect constipation with stool leakage.  Disimpaction attempted and  given enema with good relief.  Have recommended trying to keep stools soft for cleanout.  This does not appear to be infectious as discussed with patient and his wife.  Also with noted recurrent anemia.  Given a unit of blood for hemoglobin of 6.1.  Eating and sitting up.  Wants to return home at this point.     I have reviewed the nursing notes.    I have reviewed the findings, diagnosis, plan and need for follow up with the patient.          New Prescriptions    No medications on file       Final diagnoses:   Anemia, unspecified type   Constipation, unspecified constipation type       12/30/2024   [facility] EMERGENCY DEPT       Laureano Colorado MD  12/30/24 2045

## 2024-12-31 RX ORDER — GABAPENTIN 300 MG/1
300 CAPSULE ORAL 3 TIMES DAILY
Qty: 90 CAPSULE | Refills: 0 | Status: SHIPPED | OUTPATIENT
Start: 2024-12-31

## 2024-12-31 NOTE — ED NOTES
Administered 400 mL tap water enema over one hour. Pt unable to hold last 100 mL (approx); liquid stool and tap water immediately expelled. Pt instructed to continue trying to hold for five more minutes and then allow expulsion of stool and then call for assessment.

## 2024-12-31 NOTE — ED NOTES
Pt murrell bag changed out, new [id number] to saccrum and fungal cream applied to rash near murrell sticker.

## 2025-01-03 PROBLEM — N48.89 PENILE EROSION: Status: ACTIVE | Noted: 2025-01-03

## 2025-01-10 ENCOUNTER — VIRTUAL VISIT (OUTPATIENT)
Dept: PALLIATIVE MEDICINE | Facility: CLINIC | Age: 80
End: 2025-01-10
Attending: NURSE PRACTITIONER
Payer: COMMERCIAL

## 2025-01-10 DIAGNOSIS — C67.9 UROTHELIAL CARCINOMA OF BLADDER (H): ICD-10-CM

## 2025-01-20 ENCOUNTER — TELEPHONE (OUTPATIENT)
Dept: FAMILY MEDICINE | Facility: CLINIC | Age: 80
End: 2025-01-20
Payer: COMMERCIAL

## 2025-01-20 NOTE — TELEPHONE ENCOUNTER
Reason for Call:  Appointment Request    Patient requesting this type of appt: Pre-op    Requested provider: Neville Degroot    Reason patient unable to be scheduled: Not within requested timeframe    When does patient want to be seen/preferred time: ASAP     Comments: Pt's spouse called in and mentioned that Dr. Degroot informed them they would be able to schedule pre op- in to an urgent slot - pt's surgery scheduled for 2/7/25     Could we send this information to you in [facility] or would you prefer to receive a phone call?:   Patient would prefer a phone call   Okay to leave a detailed message?: Yes at Other phone number: Larry Soto  [phone]    Call taken on 1/20/2025 at 12:11 PM by Zenaida Wilks

## 2025-01-21 ENCOUNTER — OFFICE VISIT (OUTPATIENT)
Dept: FAMILY MEDICINE | Facility: CLINIC | Age: 80
End: 2025-01-21
Payer: COMMERCIAL

## 2025-01-21 ENCOUNTER — HOSPITAL ENCOUNTER (INPATIENT)
Facility: CLINIC | Age: 80
End: 2025-01-21
Attending: EMERGENCY MEDICINE | Admitting: EMERGENCY MEDICINE
Payer: COMMERCIAL

## 2025-01-21 ENCOUNTER — APPOINTMENT (OUTPATIENT)
Dept: GENERAL RADIOLOGY | Facility: CLINIC | Age: 80
End: 2025-01-21
Attending: EMERGENCY MEDICINE
Payer: COMMERCIAL

## 2025-01-21 VITALS — OXYGEN SATURATION: 95 % | TEMPERATURE: 100.3 F | HEART RATE: 112 BPM | RESPIRATION RATE: 20 BRPM

## 2025-01-21 DIAGNOSIS — D12.8 TUBULOVILLOUS ADENOMA OF RECTUM: ICD-10-CM

## 2025-01-21 DIAGNOSIS — J20.9 ACUTE BRONCHITIS WITH COPD (H): ICD-10-CM

## 2025-01-21 DIAGNOSIS — C67.9 UROTHELIAL CARCINOMA OF BLADDER (H): Chronic | ICD-10-CM

## 2025-01-21 DIAGNOSIS — N39.0 RECURRENT UTI: ICD-10-CM

## 2025-01-21 DIAGNOSIS — J44.0 ACUTE BRONCHITIS WITH COPD (H): ICD-10-CM

## 2025-01-21 DIAGNOSIS — T83.511A URINARY TRACT INFECTION ASSOCIATED WITH INDWELLING URETHRAL CATHETER, INITIAL ENCOUNTER: ICD-10-CM

## 2025-01-21 DIAGNOSIS — Z86.73 HISTORY OF CVA (CEREBROVASCULAR ACCIDENT): ICD-10-CM

## 2025-01-21 DIAGNOSIS — I44.2 THIRD DEGREE HEART BLOCK (H): ICD-10-CM

## 2025-01-21 DIAGNOSIS — I25.10 CORONARY ARTERY DISEASE INVOLVING NATIVE CORONARY ARTERY OF NATIVE HEART WITHOUT ANGINA PECTORIS: ICD-10-CM

## 2025-01-21 DIAGNOSIS — I95.1 ORTHOSTATIC HYPOTENSION: ICD-10-CM

## 2025-01-21 DIAGNOSIS — R91.1 PULMONARY NODULE: ICD-10-CM

## 2025-01-21 DIAGNOSIS — I10 HYPERTENSION GOAL BP (BLOOD PRESSURE) < 140/90: Chronic | ICD-10-CM

## 2025-01-21 DIAGNOSIS — N39.0 URINARY TRACT INFECTION ASSOCIATED WITH INDWELLING URETHRAL CATHETER, INITIAL ENCOUNTER: ICD-10-CM

## 2025-01-21 DIAGNOSIS — I71.43 INFRARENAL ABDOMINAL AORTIC ANEURYSM (AAA) WITHOUT RUPTURE: ICD-10-CM

## 2025-01-21 DIAGNOSIS — Z87.891 PERSONAL HISTORY OF TOBACCO USE, PRESENTING HAZARDS TO HEALTH: ICD-10-CM

## 2025-01-21 DIAGNOSIS — R42 LIGHTHEADEDNESS: ICD-10-CM

## 2025-01-21 DIAGNOSIS — K59.03 DRUG-INDUCED CONSTIPATION: ICD-10-CM

## 2025-01-21 DIAGNOSIS — R55 SYNCOPE, UNSPECIFIED SYNCOPE TYPE: Primary | ICD-10-CM

## 2025-01-21 DIAGNOSIS — E78.5 HYPERLIPIDEMIA LDL GOAL <130: ICD-10-CM

## 2025-01-21 DIAGNOSIS — Z01.818 PREOP GENERAL PHYSICAL EXAM: Primary | ICD-10-CM

## 2025-01-21 DIAGNOSIS — R55 NEAR SYNCOPE: ICD-10-CM

## 2025-01-21 DIAGNOSIS — E43 SEVERE MALNUTRITION: ICD-10-CM

## 2025-01-21 PROBLEM — J44.1 COPD EXACERBATION (H): Status: RESOLVED | Noted: 2024-01-31 | Resolved: 2025-01-21

## 2025-01-21 PROBLEM — F11.20 CONTINUOUS OPIOID DEPENDENCE (H): Status: RESOLVED | Noted: 2024-09-12 | Resolved: 2025-01-21

## 2025-01-21 PROBLEM — J96.01 ACUTE RESPIRATORY FAILURE WITH HYPOXIA (H): Status: RESOLVED | Noted: 2024-04-26 | Resolved: 2025-01-21

## 2025-01-21 PROBLEM — J44.9 MODERATE COPD (CHRONIC OBSTRUCTIVE PULMONARY DISEASE) (H): Chronic | Status: RESOLVED | Noted: 2017-11-16 | Resolved: 2025-01-21

## 2025-01-21 PROBLEM — D69.1 ASPIRIN-LIKE PLATELET FUNCTION DEFECT (H): Status: RESOLVED | Noted: 2024-02-01 | Resolved: 2025-01-21

## 2025-01-21 LAB
ABO + RH BLD: NORMAL
ALBUMIN SERPL BCG-MCNC: 3.4 G/DL (ref 3.5–5.2)
ALBUMIN UR-MCNC: 300 MG/DL
ALP SERPL-CCNC: 65 U/L (ref 40–150)
ALT SERPL W P-5'-P-CCNC: 12 U/L (ref 0–70)
ANION GAP SERPL CALCULATED.3IONS-SCNC: 8 MMOL/L (ref 7–15)
APPEARANCE UR: ABNORMAL
AST SERPL W P-5'-P-CCNC: 11 U/L (ref 0–45)
ATRIAL RATE - MUSE: 74 BPM
BACTERIA #/AREA URNS HPF: ABNORMAL /HPF
BASOPHILS # BLD AUTO: 0.1 10E3/UL (ref 0–0.2)
BASOPHILS NFR BLD AUTO: 1 %
BILIRUB SERPL-MCNC: 0.5 MG/DL
BILIRUB UR QL STRIP: NEGATIVE
BLD GP AB SCN SERPL QL: NEGATIVE
BUN SERPL-MCNC: 18.8 MG/DL (ref 8–23)
CALCIUM SERPL-MCNC: 9.1 MG/DL (ref 8.8–10.4)
CHLORIDE SERPL-SCNC: 104 MMOL/L (ref 98–107)
COLOR UR AUTO: ABNORMAL
CREAT SERPL-MCNC: 0.73 MG/DL (ref 0.67–1.17)
DIASTOLIC BLOOD PRESSURE - MUSE: NORMAL MMHG
EGFRCR SERPLBLD CKD-EPI 2021: >90 ML/MIN/1.73M2
ELLIPTOCYTES BLD QL SMEAR: SLIGHT
EOSINOPHIL # BLD AUTO: 0.2 10E3/UL (ref 0–0.7)
EOSINOPHIL NFR BLD AUTO: 1 %
ERYTHROCYTE [DISTWIDTH] IN BLOOD BY AUTOMATED COUNT: 29.6 % (ref 10–15)
FLUAV RNA SPEC QL NAA+PROBE: NEGATIVE
FLUBV RNA RESP QL NAA+PROBE: NEGATIVE
FRAGMENTS BLD QL SMEAR: SLIGHT
GLUCOSE SERPL-MCNC: 114 MG/DL (ref 70–99)
GLUCOSE UR STRIP-MCNC: NEGATIVE MG/DL
HCO3 SERPL-SCNC: 26 MMOL/L (ref 22–29)
HCT VFR BLD AUTO: 28.9 % (ref 40–53)
HGB BLD-MCNC: 8.1 G/DL (ref 13.3–17.7)
HGB UR QL STRIP: ABNORMAL
IMM GRANULOCYTES # BLD: 0.1 10E3/UL
IMM GRANULOCYTES NFR BLD: 1 %
INTERPRETATION ECG - MUSE: NORMAL
KETONES UR STRIP-MCNC: NEGATIVE MG/DL
LACTATE SERPL-SCNC: 1.4 MMOL/L (ref 0.7–2)
LEUKOCYTE ESTERASE UR QL STRIP: ABNORMAL
LYMPHOCYTES # BLD AUTO: 1.2 10E3/UL (ref 0.8–5.3)
LYMPHOCYTES NFR BLD AUTO: 7 %
MAGNESIUM SERPL-MCNC: 2 MG/DL (ref 1.7–2.3)
MCH RBC QN AUTO: 16.8 PG (ref 26.5–33)
MCHC RBC AUTO-ENTMCNC: 28 G/DL (ref 31.5–36.5)
MCV RBC AUTO: 60 FL (ref 78–100)
MONOCYTES # BLD AUTO: 1.2 10E3/UL (ref 0–1.3)
MONOCYTES NFR BLD AUTO: 7 %
MUCOUS THREADS #/AREA URNS LPF: PRESENT /LPF
NEUTROPHILS # BLD AUTO: 14.7 10E3/UL (ref 1.6–8.3)
NEUTROPHILS NFR BLD AUTO: 84 %
NITRATE UR QL: POSITIVE
NRBC # BLD AUTO: 0 10E3/UL
NRBC BLD AUTO-RTO: 0 /100
P AXIS - MUSE: 180 DEGREES
PH UR STRIP: 6 [PH] (ref 5–7)
PLAT MORPH BLD: ABNORMAL
PLATELET # BLD AUTO: 290 10E3/UL (ref 150–450)
POLYCHROMASIA BLD QL SMEAR: SLIGHT
POTASSIUM SERPL-SCNC: 4.3 MMOL/L (ref 3.4–5.3)
PR INTERVAL - MUSE: NORMAL MS
PROCALCITONIN SERPL IA-MCNC: 0.11 NG/ML
PROT SERPL-MCNC: 6.4 G/DL (ref 6.4–8.3)
QRS DURATION - MUSE: 160 MS
QT - MUSE: 428 MS
QTC - MUSE: 475 MS
R AXIS - MUSE: -79 DEGREES
RBC # BLD AUTO: 4.83 10E6/UL (ref 4.4–5.9)
RBC MORPH BLD: ABNORMAL
RBC URINE: >182 /HPF
RSV RNA SPEC NAA+PROBE: NEGATIVE
SARS-COV-2 RNA RESP QL NAA+PROBE: NEGATIVE
SODIUM SERPL-SCNC: 138 MMOL/L (ref 135–145)
SP GR UR STRIP: 1.02 (ref 1–1.03)
SPECIMEN EXP DATE BLD: NORMAL
SYSTOLIC BLOOD PRESSURE - MUSE: NORMAL MMHG
T AXIS - MUSE: 88 DEGREES
TARGETS BLD QL SMEAR: SLIGHT
TROPONIN T SERPL HS-MCNC: 8 NG/L
UROBILINOGEN UR STRIP-MCNC: NORMAL MG/DL
VENTRICULAR RATE- MUSE: 74 BPM
WBC # BLD AUTO: 17.6 10E3/UL (ref 4–11)
WBC CLUMPS #/AREA URNS HPF: PRESENT /HPF
WBC URINE: >182 /HPF

## 2025-01-21 PROCEDURE — 71045 X-RAY EXAM CHEST 1 VIEW: CPT

## 2025-01-21 PROCEDURE — 250N000009 HC RX 250: Performed by: EMERGENCY MEDICINE

## 2025-01-21 PROCEDURE — 258N000003 HC RX IP 258 OP 636: Performed by: EMERGENCY MEDICINE

## 2025-01-21 PROCEDURE — 86923 COMPATIBILITY TEST ELECTRIC: CPT | Performed by: INTERNAL MEDICINE

## 2025-01-21 PROCEDURE — 99285 EMERGENCY DEPT VISIT HI MDM: CPT | Performed by: EMERGENCY MEDICINE

## 2025-01-21 PROCEDURE — 99223 1ST HOSP IP/OBS HIGH 75: CPT | Performed by: EMERGENCY MEDICINE

## 2025-01-21 PROCEDURE — 85004 AUTOMATED DIFF WBC COUNT: CPT | Performed by: EMERGENCY MEDICINE

## 2025-01-21 PROCEDURE — 87040 BLOOD CULTURE FOR BACTERIA: CPT | Performed by: EMERGENCY MEDICINE

## 2025-01-21 PROCEDURE — 120N000001 HC R&B MED SURG/OB

## 2025-01-21 PROCEDURE — 84484 ASSAY OF TROPONIN QUANT: CPT | Performed by: EMERGENCY MEDICINE

## 2025-01-21 PROCEDURE — 250N000013 HC RX MED GY IP 250 OP 250 PS 637: Performed by: EMERGENCY MEDICINE

## 2025-01-21 PROCEDURE — 84145 PROCALCITONIN (PCT): CPT | Performed by: EMERGENCY MEDICINE

## 2025-01-21 PROCEDURE — 81001 URINALYSIS AUTO W/SCOPE: CPT | Performed by: EMERGENCY MEDICINE

## 2025-01-21 PROCEDURE — 87186 SC STD MICRODIL/AGAR DIL: CPT | Performed by: EMERGENCY MEDICINE

## 2025-01-21 PROCEDURE — 250N000011 HC RX IP 250 OP 636: Performed by: EMERGENCY MEDICINE

## 2025-01-21 PROCEDURE — 93005 ELECTROCARDIOGRAM TRACING: CPT | Performed by: EMERGENCY MEDICINE

## 2025-01-21 PROCEDURE — G0378 HOSPITAL OBSERVATION PER HR: HCPCS

## 2025-01-21 PROCEDURE — 93010 ELECTROCARDIOGRAM REPORT: CPT | Performed by: EMERGENCY MEDICINE

## 2025-01-21 PROCEDURE — 83735 ASSAY OF MAGNESIUM: CPT | Performed by: EMERGENCY MEDICINE

## 2025-01-21 PROCEDURE — 36415 COLL VENOUS BLD VENIPUNCTURE: CPT | Performed by: EMERGENCY MEDICINE

## 2025-01-21 PROCEDURE — 96360 HYDRATION IV INFUSION INIT: CPT | Performed by: EMERGENCY MEDICINE

## 2025-01-21 PROCEDURE — 94640 AIRWAY INHALATION TREATMENT: CPT

## 2025-01-21 PROCEDURE — 86900 BLOOD TYPING SEROLOGIC ABO: CPT | Performed by: EMERGENCY MEDICINE

## 2025-01-21 PROCEDURE — 87637 SARSCOV2&INF A&B&RSV AMP PRB: CPT | Performed by: EMERGENCY MEDICINE

## 2025-01-21 PROCEDURE — 83605 ASSAY OF LACTIC ACID: CPT | Performed by: EMERGENCY MEDICINE

## 2025-01-21 PROCEDURE — 99285 EMERGENCY DEPT VISIT HI MDM: CPT | Mod: 25 | Performed by: EMERGENCY MEDICINE

## 2025-01-21 PROCEDURE — 80053 COMPREHEN METABOLIC PANEL: CPT | Performed by: EMERGENCY MEDICINE

## 2025-01-21 RX ORDER — IPRATROPIUM BROMIDE AND ALBUTEROL SULFATE 2.5; .5 MG/3ML; MG/3ML
3 SOLUTION RESPIRATORY (INHALATION) EVERY 4 HOURS PRN
Status: DISCONTINUED | OUTPATIENT
Start: 2025-01-21 | End: 2025-01-28 | Stop reason: HOSPADM

## 2025-01-21 RX ORDER — LOPERAMIDE HYDROCHLORIDE 2 MG/1
2 CAPSULE ORAL PRN
COMMUNITY
End: 2025-02-05

## 2025-01-21 RX ORDER — GABAPENTIN 300 MG/1
300 CAPSULE ORAL 3 TIMES DAILY
Status: DISCONTINUED | OUTPATIENT
Start: 2025-01-21 | End: 2025-01-28 | Stop reason: HOSPADM

## 2025-01-21 RX ORDER — IPRATROPIUM BROMIDE AND ALBUTEROL SULFATE 2.5; .5 MG/3ML; MG/3ML
1 SOLUTION RESPIRATORY (INHALATION) EVERY 6 HOURS PRN
Status: DISCONTINUED | OUTPATIENT
Start: 2025-01-21 | End: 2025-01-21

## 2025-01-21 RX ORDER — ACETAMINOPHEN 650 MG/1
650 SUPPOSITORY RECTAL EVERY 4 HOURS PRN
Status: DISCONTINUED | OUTPATIENT
Start: 2025-01-21 | End: 2025-01-28 | Stop reason: HOSPADM

## 2025-01-21 RX ORDER — NALOXONE HYDROCHLORIDE 0.4 MG/ML
0.2 INJECTION, SOLUTION INTRAMUSCULAR; INTRAVENOUS; SUBCUTANEOUS
Status: DISCONTINUED | OUTPATIENT
Start: 2025-01-21 | End: 2025-01-28 | Stop reason: HOSPADM

## 2025-01-21 RX ORDER — CALCIUM CARBONATE 500 MG/1
1000 TABLET, CHEWABLE ORAL 4 TIMES DAILY PRN
Status: DISCONTINUED | OUTPATIENT
Start: 2025-01-21 | End: 2025-01-28 | Stop reason: HOSPADM

## 2025-01-21 RX ORDER — SODIUM CHLORIDE 9 MG/ML
INJECTION, SOLUTION INTRAVENOUS CONTINUOUS
Status: DISCONTINUED | OUTPATIENT
Start: 2025-01-21 | End: 2025-01-26

## 2025-01-21 RX ORDER — ACETAMINOPHEN 325 MG/1
650 TABLET ORAL EVERY 4 HOURS PRN
Status: DISCONTINUED | OUTPATIENT
Start: 2025-01-21 | End: 2025-01-28 | Stop reason: HOSPADM

## 2025-01-21 RX ORDER — ONDANSETRON 2 MG/ML
4 INJECTION INTRAMUSCULAR; INTRAVENOUS EVERY 6 HOURS PRN
Status: DISCONTINUED | OUTPATIENT
Start: 2025-01-21 | End: 2025-01-28 | Stop reason: HOSPADM

## 2025-01-21 RX ORDER — ENOXAPARIN SODIUM 100 MG/ML
40 INJECTION SUBCUTANEOUS EVERY 24 HOURS
Status: DISCONTINUED | OUTPATIENT
Start: 2025-01-21 | End: 2025-01-21

## 2025-01-21 RX ORDER — LIDOCAINE HYDROCHLORIDE 20 MG/ML
JELLY TOPICAL
Status: DISCONTINUED | OUTPATIENT
Start: 2025-01-21 | End: 2025-01-28 | Stop reason: HOSPADM

## 2025-01-21 RX ORDER — LIDOCAINE 40 MG/G
CREAM TOPICAL
Status: DISCONTINUED | OUTPATIENT
Start: 2025-01-21 | End: 2025-01-28 | Stop reason: HOSPADM

## 2025-01-21 RX ORDER — IPRATROPIUM BROMIDE AND ALBUTEROL SULFATE 2.5; .5 MG/3ML; MG/3ML
3 SOLUTION RESPIRATORY (INHALATION) ONCE
Status: COMPLETED | OUTPATIENT
Start: 2025-01-21 | End: 2025-01-21

## 2025-01-21 RX ORDER — AMOXICILLIN 250 MG
2 CAPSULE ORAL 2 TIMES DAILY PRN
Status: DISCONTINUED | OUTPATIENT
Start: 2025-01-21 | End: 2025-01-28 | Stop reason: HOSPADM

## 2025-01-21 RX ORDER — OXYCODONE HYDROCHLORIDE 5 MG/1
5 CAPSULE ORAL EVERY 4 HOURS PRN
COMMUNITY
End: 2025-02-05

## 2025-01-21 RX ORDER — CIPROFLOXACIN 500 MG/1
500 TABLET, FILM COATED ORAL ONCE
Status: COMPLETED | OUTPATIENT
Start: 2025-01-21 | End: 2025-01-21

## 2025-01-21 RX ORDER — OXYCODONE HYDROCHLORIDE 5 MG/1
5 TABLET ORAL EVERY 4 HOURS PRN
Status: DISCONTINUED | OUTPATIENT
Start: 2025-01-21 | End: 2025-01-28 | Stop reason: HOSPADM

## 2025-01-21 RX ORDER — LIDOCAINE HYDROCHLORIDE 20 MG/ML
JELLY TOPICAL ONCE
Status: COMPLETED | OUTPATIENT
Start: 2025-01-21 | End: 2025-01-21

## 2025-01-21 RX ORDER — VITAMIN B COMPLEX
25 TABLET ORAL DAILY
Status: DISCONTINUED | OUTPATIENT
Start: 2025-01-22 | End: 2025-01-28 | Stop reason: HOSPADM

## 2025-01-21 RX ORDER — OXYBUTYNIN CHLORIDE 5 MG/1
5 TABLET ORAL 3 TIMES DAILY PRN
Status: DISCONTINUED | OUTPATIENT
Start: 2025-01-21 | End: 2025-01-28 | Stop reason: HOSPADM

## 2025-01-21 RX ORDER — TAMSULOSIN HYDROCHLORIDE 0.4 MG/1
0.4 CAPSULE ORAL AT BEDTIME
Status: DISCONTINUED | OUTPATIENT
Start: 2025-01-21 | End: 2025-01-28 | Stop reason: HOSPADM

## 2025-01-21 RX ORDER — CIPROFLOXACIN 2 MG/ML
400 INJECTION, SOLUTION INTRAVENOUS EVERY 12 HOURS
Status: DISCONTINUED | OUTPATIENT
Start: 2025-01-22 | End: 2025-01-24

## 2025-01-21 RX ORDER — DOXYCYCLINE 100 MG/1
100 CAPSULE ORAL ONCE
Status: COMPLETED | OUTPATIENT
Start: 2025-01-21 | End: 2025-01-21

## 2025-01-21 RX ORDER — AMOXICILLIN 250 MG
1 CAPSULE ORAL 2 TIMES DAILY PRN
Status: DISCONTINUED | OUTPATIENT
Start: 2025-01-21 | End: 2025-01-28 | Stop reason: HOSPADM

## 2025-01-21 RX ORDER — FERROUS SULFATE 325(65) MG
325 TABLET ORAL
Status: DISCONTINUED | OUTPATIENT
Start: 2025-01-22 | End: 2025-01-28 | Stop reason: HOSPADM

## 2025-01-21 RX ORDER — NALOXONE HYDROCHLORIDE 0.4 MG/ML
0.4 INJECTION, SOLUTION INTRAMUSCULAR; INTRAVENOUS; SUBCUTANEOUS
Status: DISCONTINUED | OUTPATIENT
Start: 2025-01-21 | End: 2025-01-28 | Stop reason: HOSPADM

## 2025-01-21 RX ORDER — LOPERAMIDE HYDROCHLORIDE 2 MG/1
2 CAPSULE ORAL 4 TIMES DAILY PRN
Status: DISCONTINUED | OUTPATIENT
Start: 2025-01-21 | End: 2025-01-28 | Stop reason: HOSPADM

## 2025-01-21 RX ORDER — ONDANSETRON 4 MG/1
4 TABLET, ORALLY DISINTEGRATING ORAL EVERY 6 HOURS PRN
Status: DISCONTINUED | OUTPATIENT
Start: 2025-01-21 | End: 2025-01-28 | Stop reason: HOSPADM

## 2025-01-21 RX ADMIN — SODIUM CHLORIDE 1000 ML: 9 INJECTION, SOLUTION INTRAVENOUS at 19:29

## 2025-01-21 RX ADMIN — CIPROFLOXACIN 400 MG: 2 INJECTION, SOLUTION INTRAVENOUS at 23:19

## 2025-01-21 RX ADMIN — CIPROFLOXACIN 500 MG: 500 TABLET, FILM COATED ORAL at 14:40

## 2025-01-21 RX ADMIN — APIXABAN 5 MG: 5 TABLET, FILM COATED ORAL at 20:55

## 2025-01-21 RX ADMIN — OXYCODONE HYDROCHLORIDE 5 MG: 5 TABLET ORAL at 20:55

## 2025-01-21 RX ADMIN — SODIUM CHLORIDE 500 ML: 9 INJECTION, SOLUTION INTRAVENOUS at 13:20

## 2025-01-21 RX ADMIN — SODIUM CHLORIDE 1000 ML: 9 INJECTION, SOLUTION INTRAVENOUS at 19:33

## 2025-01-21 RX ADMIN — GABAPENTIN 300 MG: 300 CAPSULE ORAL at 20:55

## 2025-01-21 RX ADMIN — DOXYCYCLINE HYCLATE 100 MG: 100 CAPSULE ORAL at 14:40

## 2025-01-21 RX ADMIN — SODIUM CHLORIDE 1000 ML: 9 INJECTION, SOLUTION INTRAVENOUS at 20:43

## 2025-01-21 RX ADMIN — TAMSULOSIN HYDROCHLORIDE 0.4 MG: 0.4 CAPSULE ORAL at 20:55

## 2025-01-21 RX ADMIN — IPRATROPIUM BROMIDE AND ALBUTEROL SULFATE 3 ML: .5; 3 SOLUTION RESPIRATORY (INHALATION) at 13:04

## 2025-01-21 ASSESSMENT — ACTIVITIES OF DAILY LIVING (ADL)
ADLS_ACUITY_SCORE: 58
ADLS_ACUITY_SCORE: 53
ADLS_ACUITY_SCORE: 58
TOILETING_MANAGEMENT: CHRONIC FOLEY
TOILETING_ISSUES: YES
ADLS_ACUITY_SCORE: 53
ADLS_ACUITY_SCORE: 58
HEARING_DIFFICULTY_OR_DEAF: NO
ADLS_ACUITY_SCORE: 58
WALKING_OR_CLIMBING_STAIRS_DIFFICULTY: YES
ADLS_ACUITY_SCORE: 58
CONCENTRATING,_REMEMBERING_OR_MAKING_DECISIONS_DIFFICULTY: NO
DIFFICULTY_COMMUNICATING: NO
WALKING_OR_CLIMBING_STAIRS: AMBULATION DIFFICULTY, REQUIRES EQUIPMENT
TOILETING: 1-->ASSISTANCE (EQUIPMENT/PERSON) NEEDED (NOT DEVELOPMENTALLY APPROPRIATE)
DIFFICULTY_EATING/SWALLOWING: NO
DOING_ERRANDS_INDEPENDENTLY_DIFFICULTY: YES
ADLS_ACUITY_SCORE: 58
WEAR_GLASSES_OR_BLIND: YES
FALL_HISTORY_WITHIN_LAST_SIX_MONTHS: YES
CHANGE_IN_FUNCTIONAL_STATUS_SINCE_ONSET_OF_CURRENT_ILLNESS/INJURY: NO
TOILETING: 1-->ASSISTANCE (EQUIPMENT/PERSON) NEEDED
EQUIPMENT_CURRENTLY_USED_AT_HOME: WHEELCHAIR, MANUAL
DRESSING/BATHING: BATHING DIFFICULTY, ASSISTANCE 1 PERSON
DRESSING/BATHING_DIFFICULTY: YES
NUMBER_OF_TIMES_PATIENT_HAS_FALLEN_WITHIN_LAST_SIX_MONTHS: 2
ADLS_ACUITY_SCORE: 53
ADLS_ACUITY_SCORE: 53

## 2025-01-21 ASSESSMENT — PAIN SCALES - GENERAL: PAINLEVEL_OUTOF10: NO PAIN (0)

## 2025-01-21 NOTE — ED TRIAGE NOTES
He was at the clinic for a pre op appointment and had a syncopal episode so he was sent here  He has COPD and audible wheezing.     Triage Assessment (Adult)       Row Name 01/21/25 1106          Triage Assessment    Airway WDL WDL        Respiratory WDL    Respiratory WDL WDL        Cardiac WDL    Cardiac WDL WDL        Peripheral/Neurovascular WDL    Peripheral Neurovascular WDL WDL        Cognitive/Neuro/Behavioral WDL    Cognitive/Neuro/Behavioral WDL WDL

## 2025-01-21 NOTE — ED PROVIDER NOTES
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  Patient was in clinic today for a preop appointment for suprapubic catheter placement.  He was noted to have episode of near syncope and fever.  Apparently patient has been experiencing severe lightheadedness at home.  His wife notes that he has a very loose cough \"from 50 years of smoking\".  He has a urinary catheter in place that has significant sediment per his wife.  She has not noted fevers.  He uses inhalers and nebs at home.  He also uses oxygen at night.  His wife is just recovered from RSV infection.  She notes he has had a long history of diarrhea that has improved with a brat diet.  No nausea or vomiting.  He has had recent transfusions for anemia.    Allergies:  Allergies   Allergen Reactions    Flagyl [Metronidazole] GI Disturbance       Problem List:    Patient Active Problem List    Diagnosis Date Noted    Orthostatic hypotension 01/21/2025     Priority: Medium    Acute bronchitis with COPD (H) 01/21/2025     Priority: Medium    Near syncope 01/21/2025     Priority: Medium    Penile erosion 01/03/2025     Priority: Medium    Syncope, unspecified syncope type 10/01/2024     Priority: Medium    Anemia, unspecified type 10/01/2024     Priority: Medium    Urinary tract infection associated with indwelling urethral catheter, initial encounter 10/01/2024     Priority: Medium    Sepsis, due to unspecified organism, unspecified whether acute organ dysfunction present (H) 10/01/2024     Priority: Medium    Medication induced coagulopathy 10/01/2024     Priority: Medium    Urinary tract infection " associated with indwelling urethral catheter 10/01/2024     Priority: Medium    Leukocytosis, unspecified type 10/01/2024     Priority: Medium    Acute pulmonary embolism, unspecified pulmonary embolism type, unspecified whether acute cor pulmonale present (H) 09/12/2024     Priority: Medium    Severe malnutrition 04/30/2024     Priority: Medium    Gastrointestinal hemorrhage with melena 04/30/2024     Priority: Medium    Hypoalbuminemia 04/30/2024     Priority: Medium    Lactic acidosis 04/30/2024     Priority: Medium    Enlarged prostate 04/30/2024     Priority: Medium    Coagulopathy 04/30/2024     Priority: Medium    Sacral pressure sore 04/30/2024     Priority: Medium    Abrasion of left elbow 04/30/2024     Priority: Medium    Chronic systolic congestive heart failure (H) 04/27/2024     Priority: Medium    Urothelial carcinoma of bladder (H) 04/27/2024     Priority: Medium    Pneumonia of right lower lobe due to infectious organism 04/26/2024     Priority: Medium    Septic shock (H) 04/26/2024     Priority: Medium    Closed fracture of right hip with routine healing 04/16/2024     Priority: Medium    Pain 04/16/2024     Priority: Medium    Drug-induced constipation 04/16/2024     Priority: Medium    Acute blood loss anemia 04/16/2024     Priority: Medium    Postprocedural stricture of anterior urethra 03/21/2024     Priority: Medium    Acute bilateral back pain, unspecified back location 03/19/2024     Priority: Medium    SIRS (systemic inflammatory response syndrome) (H) 02/01/2024     Priority: Medium    Calculus of gallbladder without cholecystitis without obstruction 02/01/2024     Priority: Medium    Infrarenal abdominal aortic aneurysm (AAA) without rupture 02/01/2024     Priority: Medium    Fistula of sigmoid colon 02/01/2024     Priority: Medium    Aspiration into airway, initial encounter 02/01/2024     Priority: Medium    Gross hematuria 02/01/2024     Priority: Medium    Diverticulitis 01/31/2024      Priority: Medium    Pulmonary nodule - concerning for cancer but patient declining further work up 01/31/2024     Priority: Medium    Mass of urinary bladder 01/31/2024     Priority: Medium    Abnormal urinalysis 01/31/2024     Priority: Medium    Other chest pain 01/31/2024     Priority: Medium    Systolic congestive heart failure, unspecified HF chronicity (H) 01/09/2024     Priority: Medium    Systolic congestive heart failure (H) 01/09/2024     Priority: Medium    Coronary artery disease involving native coronary artery of native heart without angina pectoris 12/05/2023     Priority: Medium    NSTEMI (non-ST elevated myocardial infarction) (H) 11/27/2023     Priority: Medium    COVID-19 08/13/2023     Priority: Medium    Benign prostatic hyperplasia, unspecified whether lower urinary tract symptoms present 11/01/2021     Priority: Medium    Benign prostatic hyperplasia 11/01/2021     Priority: Medium    Fatigue, unspecified type 11/29/2019     Priority: Medium    Memory loss 11/29/2019     Priority: Medium    Tubulovillous adenoma of rectum 4/9/24 07/26/2016     Priority: Medium    Sigmoid diverticulitis 07/12/2016     Priority: Medium    History of CVA (cerebrovascular accident) - old left internal capsule lacunar infarct on CT 7/2016 07/12/2016     Priority: Medium    Multilevel degenerative disc disease 09/21/2015     Priority: Medium    Elevated prostate specific antigen (PSA) 09/11/2015     Priority: Medium    Elevated PSA 08/27/2012     Priority: Medium    Loss of weight 08/27/2012     Priority: Medium    Third degree heart block (H) - s/p pacemaker 08/10/2012     Priority: Medium    Hypertension goal BP (blood pressure) < 140/90 08/10/2012     Priority: Medium    Other thalassemia 07/27/2012     Priority: Medium     Diagnosis updated by automated process. Provider to review and confirm.      Hyperlipidemia LDL goal <130 07/27/2012     Priority: Medium    Heart block 07/27/2012     Priority: Medium     Cardiac abnormality 2012     Priority: Medium    Complete heart block (H) 2012     Priority: Medium    Thalassemia 2012     Priority: Medium     Formatting of this note might be different from the original.   Formatting of this note might be different from the original.   Diagnosis updated by automated process. Provider to review and confirm.      Painful respiration 2004     Priority: Medium    Personal history of tobacco use, presenting hazards to health 2004     Priority: Medium        Past Medical History:    Past Medical History:   Diagnosis Date    Alpha thalassemia     AV block 2012    CAD (coronary artery disease)     Chronic systolic heart failure (H)     COPD (chronic obstructive pulmonary disease) (H)     Diverticulitis     History of CVA (cerebrovascular accident)     Injury, other and unspecified, elbow, forearm, and wrist 1970s    Urothelial carcinoma (H)        Past Surgical History:    Past Surgical History:   Procedure Laterality Date    COLONOSCOPY      COLONOSCOPY  12/10/2012    CYSTOSCOPY, LITHOLAPAXY, COMBINED N/A 11/10/2021    Procedure: Cystoscopy, Bladder Stone Removal;  Surgeon: Guanaco Stewart MD;  Location: [facility]    IMPLANT PACEMAKER      LASER KTP TRANSURETHRAL RESECTION (TUR) PROSTATE N/A 11/10/2021    Procedure: TRANSURETHRAL RESECTION (TUR) PROSTATE, USING KTP LASER;  Surgeon: Guanaco Stewart MD;  Location: [facility] OR       Family History:    No family history on file.    Social History:  Marital Status:   [2]  Social History     Tobacco Use    Smoking status: Former     Current packs/day: 0.00     Types: Cigarettes     Start date: 1962     Quit date: 2012     Years since quittin.4    Smokeless tobacco: Never    Tobacco comments:     Quit in July   Vaping Use    Vaping status: Never Used   Substance Use Topics    Alcohol use: No     Alcohol/week: 0.0 standard drinks of alcohol    Drug use: No        Medications:    No current  outpatient medications on file.        Review of Systems  All other ROS reviewed and are negative or non-contributory except as stated in HPI.     Physical Exam   BP: 108/55  Pulse: 99  Temp: 98.7  F (37.1  C)  Resp: 20  Weight: 59.9 kg (132 lb)  SpO2: 93 %  Lying Orthostatic BP: 115/85  Lying Orthostatic Pulse: 82 bpm  Sitting Orthostatic BP: 94/69  Sitting Orthostatic Pulse: 95 bpm      Physical Exam  Vitals and nursing note reviewed.   Constitutional:       Appearance: He is normal weight.      Comments: Pleasant older gentleman sitting in the bed.  Slightly hard of hearing.  Intermittent strong loose cough.   HENT:      Head: Normocephalic.      Right Ear: Tympanic membrane and ear canal normal.      Left Ear: Tympanic membrane and ear canal normal.      Nose: Nose normal.      Mouth/Throat:      Mouth: Mucous membranes are dry.      Pharynx: Oropharynx is clear.   Eyes:      Extraocular Movements: Extraocular movements intact.      Comments: Mild conjunctiva pallor   Cardiovascular:      Rate and Rhythm: Normal rate and regular rhythm.      Pulses: Normal pulses.      Heart sounds: Normal heart sounds.      Comments: Pacemaker in place  Pulmonary:      Comments: Loose cough.  Prolonged expiratory phase.  Rhonchi.  Abdominal:      Palpations: Abdomen is soft.      Tenderness: There is no abdominal tenderness.   Genitourinary:     Comments: Hernandez catheter in place.  Significant amount of sediment in the catheter tubing  Musculoskeletal:         General: Normal range of motion.      Cervical back: Normal range of motion and neck supple.      Right lower leg: No edema.      Left lower leg: No edema.   Skin:     General: Skin is warm and dry.      Findings: No rash.   Neurological:      General: No focal deficit present.      Mental Status: He is alert. Mental status is at baseline.   Psychiatric:         Mood and Affect: Mood normal.         Behavior: Behavior normal.         ED Course (with Medical Decision  "Making)    Pt seen and examined by me.  RN and EPIC notes reviewed.       Patient with what sounds like lightheadedness and near syncope at clinic during a preop visit.  He has a chronic indwelling catheter and is supposed to get a suprapubic catheter.  He was there for preop visit today.  He has COPD and a loose cough.  Hernandez catheter was changed a week ago apparently, there is a lot of sediment in the bag.    I am not sure if he has an underlying infection causing his symptomatology or if he is dry.  Will check urine.  His wife recently has had RSV and there is a lot of influenza in the community so I will check for viral illness, possible pneumonia.  Check labs.  Type and screen in case he needs a transfusion.    Patient has elevated white count 17.6.  Hemoglobin is 8.1 which is near his recent baselines.  Platelets 290  Procalcitonin within normal limits  Normal magnesium  Normal troponin  CMP basically normal except for mildly elevated glucose at 114.    Patient negative for COVID, RSV, influenza.    Urinalysis grossly abnormal.  On previous urine culture, patient had 3 different Pseudomonas grow out, all sensitive to Cipro.  I did give him Cipro orally but I feel he likely needs a catheter change instead of just tubing change.  Urine sent for culture.    Portable chest x-ray shows left lower lobe atelectasis and/or infiltrate \"similar to previous\".  Last x-ray I can see in the chart was on 10/1/2024.  Reviewed by myself.  It may be that patient has a bronchitis with significant productive sputum.  With his COPD history I am going to give him doxycycline as this will not interfere with the Cipro.    When patient falls asleep, O2 sats down to 89%.  He was placed on O2.  I believe he is also on this at home.    I spoke with the patient and his wife regarding all the findings and his symptoms.  We did orthostatics and he does have slight decrease in his blood pressure and was given a small bolus of fluids.  I " would like to have him admitted observation status just to be sure he is improving, recheck labs, hemoglobin, etc.  Closely monitor.  I spoke with the hospitalist.  He asked that I add a lactic acid which was done.  It is 1.4, within normal limits.  I also did a EKG which shows pacemaker at a rate of 74.  Read by myself at 1632.    Patient stable in the ED.        Procedures    Results for orders placed or performed during the hospital encounter of 01/21/25 (from the past 24 hours)   Influenza A/B, RSV and SARS-CoV2 PCR (COVID-19) Nose    Specimen: Nose; Swab   Result Value Ref Range    Influenza A PCR Negative Negative    Influenza B PCR Negative Negative    RSV PCR Negative Negative    SARS CoV2 PCR Negative Negative    Narrative    Testing was performed using the Xpert Xpress CoV2/Flu/RSV Assay on the Cepheid GeneXpert Instrument. This test should be ordered for the detection of SARS-CoV2, influenza, and RSV viruses in individuals with signs and symptoms of respiratory tract infection. This test is for in vitro diagnostic use under the [place] FDA for laboratories certified under CLIA to perform high or moderate complexity testing. This test has been [place] FDA cleared. A negative result does not rule out the presence of PCR inhibitors in the specimen or target RNA in concentration below the limit of detection for the assay. If only one viral target is positive but coinfection with multiple targets is suspected, the sample should be re-tested with another FDA cleared, approved, or authorized test, if coninfection would change clinical management. This test was validated by the [facility]. These laboratories are certified under the Clinical Laboratory Improvement Amendments of 1988 (CLIA-88) as qualified to perfom high complexity laboratory testing.   CBC with platelets differential    Narrative    The following orders were created for panel order CBC with platelets differential.  Procedure                                Abnormality         Status                     ---------                               -----------         ------                     CBC with platelets and d...[[id number]]  Abnormal            Final result               RBC and Platelet Morphology[[id number]]  Abnormal            Final result                 Please view results for these tests on the individual orders.   ABO/Rh type and screen    Narrative    The following orders were created for panel order ABO/Rh type and screen.  Procedure                               Abnormality         Status                     ---------                               -----------         ------                     Adult Type and Screen[[id number]]                            Final result                 Please view results for these tests on the individual orders.   Comprehensive metabolic panel   Result Value Ref Range    Sodium 138 135 - 145 mmol/L    Potassium 4.3 3.4 - 5.3 mmol/L    Carbon Dioxide (CO2) 26 22 - 29 mmol/L    Anion Gap 8 7 - 15 mmol/L    Urea Nitrogen 18.8 8.0 - 23.0 mg/dL    Creatinine 0.73 0.67 - 1.17 mg/dL    GFR Estimate >90 >60 mL/min/1.73m2    Calcium 9.1 8.8 - 10.4 mg/dL    Chloride 104 98 - 107 mmol/L    Glucose 114 (H) 70 - 99 mg/dL    Alkaline Phosphatase 65 40 - 150 U/L    AST 11 0 - 45 U/L    ALT 12 0 - 70 U/L    Protein Total 6.4 6.4 - 8.3 g/dL    Albumin 3.4 (L) 3.5 - 5.2 g/dL    Bilirubin Total 0.5 <=1.2 mg/dL   Troponin T, High Sensitivity   Result Value Ref Range    Troponin T, High Sensitivity 8 <=22 ng/L   Magnesium   Result Value Ref Range    Magnesium 2.0 1.7 - 2.3 mg/dL   Procalcitonin   Result Value Ref Range    Procalcitonin 0.11 <0.50 ng/mL   CBC with platelets and differential   Result Value Ref Range    WBC Count 17.6 (H) 4.0 - 11.0 10e3/uL    RBC Count 4.83 4.40 - 5.90 10e6/uL    Hemoglobin 8.1 (L) 13.3 - 17.7 g/dL    Hematocrit 28.9 (L) 40.0 - 53.0 %    MCV 60 (L) 78 - 100 fL    MCH 16.8 (L) 26.5 - 33.0 pg    MCHC  28.0 (L) 31.5 - 36.5 g/dL    RDW 29.6 (H) 10.0 - 15.0 %    Platelet Count 290 150 - 450 10e3/uL    % Neutrophils 84 %    % Lymphocytes 7 %    % Monocytes 7 %    % Eosinophils 1 %    % Basophils 1 %    % Immature Granulocytes 1 %    NRBCs per 100 WBC 0 <1 /100    Absolute Neutrophils 14.7 (H) 1.6 - 8.3 10e3/uL    Absolute Lymphocytes 1.2 0.8 - 5.3 10e3/uL    Absolute Monocytes 1.2 0.0 - 1.3 10e3/uL    Absolute Eosinophils 0.2 0.0 - 0.7 10e3/uL    Absolute Basophils 0.1 0.0 - 0.2 10e3/uL    Absolute Immature Granulocytes 0.1 <=0.4 10e3/uL    Absolute NRBCs 0.0 10e3/uL   Adult Type and Screen   Result Value Ref Range    ABO/RH(D) A NEG     Antibody Screen Negative Negative    SPECIMEN EXPIRATION DATE [id number]    RBC and Platelet Morphology   Result Value Ref Range    RBC Morphology Confirmed RBC Indices     Platelet Assessment  Automated Count Confirmed. Platelet morphology is normal.     Automated Count Confirmed. Platelet morphology is normal.    Elliptocytes Slight (A) None Seen    Polychromasia Slight (A) None Seen    RBC Fragments Slight (A) None Seen    Target Cells Slight (A) None Seen   XR Chest Port 1 View    Narrative    EXAM: XR CHEST PORT 1 VIEW  LOCATION: [facility]  DATE: 1/21/2025    INDICATION: Cough.  COMPARISON: None.      Impression    IMPRESSION: Left lower lobe atelectasis and/or infiltrate similar to previous. Right lung grossly clear.   UA with Microscopic reflex to Culture    Specimen: Urine, Hernandez Catheter   Result Value Ref Range    Color Urine Orange (A) Colorless, Straw, Light Yellow, Yellow    Appearance Urine Cloudy (A) Clear    Glucose Urine Negative Negative mg/dL    Bilirubin Urine Negative Negative    Ketones Urine Negative Negative mg/dL    Specific Gravity Urine 1.025 1.003 - 1.035    Blood Urine Large (A) Negative    pH Urine 6.0 5.0 - 7.0    Protein Albumin Urine 300 (A) Negative mg/dL    Urobilinogen Urine Normal Normal, 2.0 mg/dL    Nitrite  Urine Positive (A) Negative    Leukocyte Esterase Urine Large (A) Negative    Bacteria Urine Many (A) None Seen /HPF    WBC Clumps Urine Present (A) None Seen /HPF    Mucus Urine Present (A) None Seen /LPF    RBC Urine >182 (H) <=2 /HPF    WBC Urine >182 (H) <=5 /HPF    Narrative    Urine Culture ordered based on laboratory criteria   EKG 12-lead, tracing only   Result Value Ref Range    Systolic Blood Pressure  mmHg    Diastolic Blood Pressure  mmHg    Ventricular Rate 74 BPM    Atrial Rate 74 BPM    VT Interval  ms    QRS Duration 160 ms     ms    QTc 475 ms    P Axis 180 degrees    R AXIS -79 degrees    T Axis 88 degrees    Interpretation ECG       Ventricular-paced rhythm  Abnormal ECG  No previous ECGs available  Confirmed by SEE ED PROVIDER NOTE FOR, ECG INTERPRETATION (4000),  Evi Chappell ([id number]) on 1/21/2025 6:52:17 PM     Lactic acid whole blood with 1x repeat in 2 hr when >2   Result Value Ref Range    Lactic Acid, Initial 1.4 0.7 - 2.0 mmol/L       Medications   lidocaine 1 % 0.1-1 mL (has no administration in time range)   lidocaine (LMX4) cream (has no administration in time range)   sodium chloride (PF) 0.9% PF flush 3 mL (3 mLs Intracatheter $Given 1/21/25 1939)   sodium chloride (PF) 0.9% PF flush 3 mL (has no administration in time range)   senna-docusate (SENOKOT-S/PERICOLACE) 8.6-50 MG per tablet 1 tablet (has no administration in time range)     Or   senna-docusate (SENOKOT-S/PERICOLACE) 8.6-50 MG per tablet 2 tablet (has no administration in time range)   calcium carbonate (TUMS) chewable tablet 1,000 mg (has no administration in time range)   ondansetron (ZOFRAN ODT) ODT tab 4 mg (has no administration in time range)     Or   ondansetron (ZOFRAN) injection 4 mg (has no administration in time range)   apixaban ANTICOAGULANT (ELIQUIS) tablet 5 mg (5 mg Oral $Given 1/21/25 2055)   calcium carbonate-vitamin D (OSCAL) 500-5 MG-MCG per tablet 2 tablet (has no administration in time  range)   Vitamin D3 (CHOLECALCIFEROL) tablet 25 mcg (has no administration in time range)   ferrous sulfate (FEROSUL) tablet 325 mg (has no administration in time range)   gabapentin (NEURONTIN) capsule 300 mg (300 mg Oral $Given 1/21/25 2055)   HYDROmorphone (DILAUDID) half-tab 1-2 mg (has no administration in time range)   lidocaine (XYLOCAINE) 2 % external gel ( Urethral Not Given 1/21/25 2042)   loperamide (IMODIUM) capsule 2 mg (has no administration in time range)   oxyBUTYnin (DITROPAN) tablet 5 mg (has no administration in time range)   oxyCODONE (ROXICODONE) tablet 5 mg (5 mg Oral $Given 1/21/25 2055)   tamsulosin (FLOMAX) capsule 0.4 mg (0.4 mg Oral $Given 1/21/25 2055)   ciprofloxacin (CIPRO) infusion 400 mg (has no administration in time range)   ipratropium - albuterol 0.5 mg/2.5 mg/3 mL (DUONEB) neb solution 3 mL (has no administration in time range)   sodium chloride 0.9 % infusion (1,000 mLs Intravenous $Started 1/21/25 1933)   acetaminophen (TYLENOL) tablet 650 mg (has no administration in time range)     Or   acetaminophen (TYLENOL) Suppository 650 mg (has no administration in time range)   naloxone (NARCAN) injection 0.2 mg (has no administration in time range)     Or   naloxone (NARCAN) injection 0.4 mg (has no administration in time range)     Or   naloxone (NARCAN) injection 0.2 mg (has no administration in time range)     Or   naloxone (NARCAN) injection 0.4 mg (has no administration in time range)   lidocaine (XYLOCAINE) 2 % external gel ( Topical Not Given 1/21/25 1320)   ipratropium - albuterol 0.5 mg/2.5 mg/3 mL (DUONEB) neb solution 3 mL (3 mLs Nebulization $Given 1/21/25 1304)   sodium chloride 0.9% BOLUS 500 mL (0 mLs Intravenous Stopped 1/21/25 1440)   ciprofloxacin (CIPRO) tablet 500 mg (500 mg Oral $Given 1/21/25 1440)   doxycycline hyclate (VIBRAMYCIN) capsule 100 mg (100 mg Oral $Given 1/21/25 1440)   sodium chloride 0.9% BOLUS 1,000 mL (1,000 mLs Intravenous $Started 1/21/25 1929)    sodium chloride 0.9% BOLUS 1,000 mL (1,000 mLs Intravenous $New Bag 1/21/25 2043)       Assessments & Plan     I have reviewed the findings, diagnosis, plan with the patient.    Current Discharge Medication List          Final diagnoses:   Near syncope   Orthostatic hypotension   Urinary tract infection associated with indwelling urethral catheter, initial encounter   Acute bronchitis with COPD (H)     Disposition: Patient admitted observation status to the hospitalist service    Note: Chart documentation done in part with Dragon Voice Recognition software. Although reviewed after completion, some word and grammatical errors may remain.   1/21/2025   [facility] EMERGENCY DEPT       Nelia Sun MD  01/21/25 3140

## 2025-01-21 NOTE — H&P
ADMISSION HISTORY & PHYSICAL      Neville Degroot, [phone]  ASSESSMENT AND PLAN:  79 year old male with a history of bladder cancer status post resection with chronic indwelling Hernandez catheter, COPD, PE, known pulmonary nodule concerning for malignancy but declining further workup, opioid dependence, third-degree heart block status post pacemaker, chart history of CHF, CVA, coronary artery disease, infrarenal AAA, presenting with:    Syncopal episode, hypotension, concern for sepsis: Blood pressures generally in the low 100s in the context of syncopal episodes, EKG shows ventricular paced rhythm.  Recent echocardiogram showing no obvious valvular abnormality.  Does have evidence of UTI, history of sepsis.  Lactate is normal.  White count elevated greater than 17.  Will aggressively give IV fluids given history of similar symptoms with prior septic episode in October 2024.  Will further workup for sepsis with blood cultures and switch antibiotic to IV.   CAUTI: History of Pseudomonas, started on ciprofloxacin and awaiting urine culture.  Workup as above UA showing large amount of WBCs.  Bladder cancer with history of resection and chronic indwelling Hernandez catheter.  Was sent from clinic today at preop exam as they are planning on a suprapubic catheter placement scheduled February 7.  History of PE: Continue anticoagulation   Chronic pain: Continue home Dilaudid, oxycodone and gabapentin.  Acute hypoxic respiratory failure: O2 sat dipped to 89% and easily corrected with 2 L of oxygen.?  Secondary to illness or COPD.  Will monitor.  Continue home COPD meds and is on antibiotics.  As needed nebs  Clinically Significant Risk Factors Present on Admission               # Hypoalbuminemia: Lowest albumin = 3.4 g/dL at 1/21/2025 11:48 AM, will monitor as appropriate  # Drug Induced Coagulation Defect: home medication list includes an anticoagulant medication    # Hypertension: Noted on problem list  # Chronic heart  failure with preserved ejection fraction: heart failure noted on problem list and last echo with EF >50%     # Anemia: based on hgb <11           # Financial/Environmental Concerns:            Barriers to discharge: UTI, syncope, concern for sepsis      CHIEF COMPLAINT:  Syncopal episode, fever    HISTORY OF PRESENTING ILLNESS:  Khang Bailon is a 79 year old male who presented to clinic today for a preop appointment for placement of suprapubic catheter.  He had been complaining of lightheadedness and was slumped over in his chair in the examining room with poor response.  Clinic vitals were significant for temperature of 100.3 with a pulse of 112.  I see no blood pressure documented but in the ED his systolic pressures are generally in the low 100s to mid 90s.  Extensive chart review performed and it appears he had a similar presentation with a hospital admission for sepsis.    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    Fatigue, unspecified type    Memory loss    Benign prostatic hyperplasia, unspecified whether lower urinary tract symptoms present    COVID-19    Coronary artery disease involving native coronary artery of native heart without angina pectoris    Systolic congestive heart failure, unspecified HF chronicity (H)    Heart block    NSTEMI (non-ST elevated myocardial infarction) (H)    Cardiac abnormality    Complete heart block (H)    Diverticulitis    Pulmonary nodule -  concerning for cancer but patient declining further work up    Mass of urinary bladder    Abnormal urinalysis    Other chest pain    SIRS (systemic inflammatory response syndrome) (H)    Calculus of gallbladder without cholecystitis without obstruction    Infrarenal abdominal aortic aneurysm (AAA) without rupture    Fistula of sigmoid colon    Aspiration into airway, initial encounter    Gross hematuria    Acute bilateral back pain, unspecified back location    Benign prostatic hyperplasia    Thalassemia    Systolic congestive heart failure (H)    Postprocedural stricture of anterior urethra    Closed fracture of right hip with routine healing    Pain    Drug-induced constipation    Acute blood loss anemia    Pneumonia of right lower lobe due to infectious organism    Septic shock (H)    Chronic systolic congestive heart failure (H)    Urothelial carcinoma of bladder (H)    Severe malnutrition    Gastrointestinal hemorrhage with melena    Hypoalbuminemia    Lactic acidosis    Enlarged prostate    Coagulopathy    Sacral pressure sore    Abrasion of left elbow    Acute pulmonary embolism, unspecified pulmonary embolism type, unspecified whether acute cor pulmonale present (H)    Syncope, unspecified syncope type    Anemia, unspecified type    Urinary tract infection associated with indwelling urethral catheter, initial encounter    Sepsis, due to unspecified organism, unspecified whether acute organ dysfunction present (H)    Medication induced coagulopathy    Urinary tract infection associated with indwelling urethral catheter    Leukocytosis, unspecified type    Penile erosion    Orthostatic hypotension    Acute bronchitis with COPD (H)    Near syncope       ALLERGIES:  Allergies   Allergen Reactions    Flagyl [Metronidazole] GI Disturbance       MEDICATIONS:  Reviewed.  No current facility-administered medications for this encounter.     Current Outpatient Medications   Medication Sig Dispense Refill     ACE/ARB/ARNI NOT PRESCRIBED (INTENTIONAL) Please choose reason not prescribed from choices below.      apixaban ANTICOAGULANT (ELIQUIS ANTICOAGULANT) 5 MG tablet Take 1 tablet (5 mg) by mouth 2 times daily. 60 tablet 0    BETA BLOCKER NOT PRESCRIBED (INTENTIONAL) Please choose reason not prescribed from choices below.      calcium carbonate-vitamin D (CALTRATE) 600-10 MG-MCG per tablet Take 2 tablets by mouth daily.      cholecalciferol (VITAMIN D3) 25 mcg (1000 units) capsule Take 1 capsule by mouth daily.      ferrous sulfate (FEROSUL) 325 (65 Fe) MG tablet Take 1 tablet (325 mg) by mouth daily (with breakfast). 90 tablet 0    gabapentin (NEURONTIN) 300 MG capsule TAKE ONE CAPSULE BY MOUTH THREE TIMES A DAY 90 capsule 0    HYDROmorphone (DILAUDID) 2 MG tablet Take 0.5-1 tablets (1-2 mg) by mouth every 6 hours as needed for severe pain. 30 tablet 0    ipratropium - albuterol 0.5 mg/2.5 mg/3 mL (DUONEB) 0.5-2.5 (3) MG/3ML neb solution INHALE 1 VIAL IN NEBULIZER EVERY 6 HOURS AS NEEDED FOR SHORTNESS OF BREATH, WHEEZING OR COUGH (Patient taking differently: Take 1 vial by nebulization every 6 hours as needed for shortness of breath or wheezing.) 360 mL 2    Lidocaine HCl Urethral/Mucosal 2 % external gel Place 10 mLs into the urethra every 28 days. (Patient taking differently: Place into the urethra every 28 days.) 20 mL 5    loperamide (IMODIUM) 2 MG capsule Take 2 mg by mouth as needed for diarrhea.      Multiple Vitamin (MULTIVITAMIN ADULT PO) Take 1 tablet by mouth daily      nitroFURantoin macrocrystal (MACRODANTIN) 50 MG capsule HOLD while on the Ciprofloxacin antibiotic but restart the day after you have completed Cipro and restart 1 tablet daily dosing. 90 capsule 0    order for DME Equipment being ordered: Nebulizer hose 1 Tube 11    oxyBUTYnin (DITROPAN) 5 MG tablet Take 1 tablet (5 mg) by mouth 3 times daily as needed for bladder spasms. 90 tablet 1    oxyCODONE (OXY-IR) 5 MG capsule Take 5 mg by mouth  every 4 hours as needed for pain.      tamsulosin (FLOMAX) 0.4 MG capsule Take 1 capsule (0.4 mg) by mouth at bedtime 90 capsule 3    vitamin C (ASCORBIC ACID) 500 MG tablet Take 500 mg by mouth daily         SOCIAL HISTORY:  Social History     Socioeconomic History    Marital status:      Spouse name: Nikki    Number of children: 3    Years of education: Not on file    Highest education level: Not on file   Occupational History     Employer: SELF   Tobacco Use    Smoking status: Former     Current packs/day: 0.00     Types: Cigarettes     Start date: 1962     Quit date: 2012     Years since quittin.4    Smokeless tobacco: Never    Tobacco comments:     Quit in July   Vaping Use    Vaping status: Never Used   Substance and Sexual Activity    Alcohol use: No     Alcohol/week: 0.0 standard drinks of alcohol    Drug use: No    Sexual activity: Not Currently   Other Topics Concern    Parent/sibling w/ CABG, MI or angioplasty before 65F 55M? No   Social History Narrative    Not on file     Social Drivers of Health     Financial Resource Strain: Low Risk  (10/1/2024)    Financial Resource Strain     Within the past 12 months, have you or your family members you live with been unable to get utilities (heat, electricity) when it was really needed?: No   Food Insecurity: Low Risk  (10/1/2024)    Food Insecurity     Within the past 12 months, did you worry that your food would run out before you got money to buy more?: No     Within the past 12 months, did the food you bought just not last and you didn t have money to get more?: No   Transportation Needs: Low Risk  (10/1/2024)    Transportation Needs     Within the past 12 months, has lack of transportation kept you from medical appointments, getting your medicines, non-medical meetings or appointments, work, or from getting things that you need?: No   Physical Activity: Not on file   Stress: Not on file   Social Connections: Unknown (2024)     Social Connection and Isolation Panel [NHANES]     Frequency of Communication with Friends and Family: Not on file     Frequency of Social Gatherings with Friends and Family: Not on file     Attends Yazdanism Services: Not on file     Active Member of Clubs or Organizations: Not on file     Attends Club or Organization Meetings: Not on file     Marital Status:    Interpersonal Safety: Not At Risk (12/2/2024)    [facility]    Intimate Partner Violence     Are you in a relationship where you are physically hurt, threatened and/or made to feel afraid?: No   Housing Stability: Low Risk  (10/1/2024)    Housing Stability     Do you have housing? : Yes     Are you worried about losing your housing?: No       FAMILY HISTORY:  No family history on file.    ROS:  12 point ROS was performed and found to be negative except for the pertinent positives mentioned in the HPI.      PHYSICAL EXAM:  /63   Pulse 72   Temp 98.7  F (37.1  C) (Oral)   Resp 20   Wt 59.9 kg (132 lb)   SpO2 97%   BMI 18.94 kg/m    No intake/output data recorded.  No intake/output data recorded.  CONSTITUTIONAL: No apparent distress, tired appearing but nontoxic appearing  HEENT:       Sclera- anicteric      Mucous membrane- dry  LUNGS: Slight expiratory wheezing, no rales  CARDIOVASCULAR: S1S2 regular. No murmurs, rubs or gallops,no pedal edema  GI: Soft. Non-tender, non-distended. No organomegaly. No guarding or rigidity. Bowel sounds- active  NEURO: Cranial nerves II-XII grossly intact. No focal neurological deficit. No involuntary movements  INTGM: No jaundice, no cyanosis or clubbing  LYMPH:   MSK: no joint swelling or tenderness  PSYCH: alert and oriented x 3, no agitation      DIAGNOSTIC DATA:  Recent Results (from the past 24 hours)   Influenza A/B, RSV and SARS-CoV2 PCR (COVID-19) Nose    Collection Time: 01/21/25 11:05 AM    Specimen: Nose; Swab   Result Value Ref Range    Influenza A PCR  Negative Negative    Influenza B PCR Negative Negative    RSV PCR Negative Negative    SARS CoV2 PCR Negative Negative   Comprehensive metabolic panel    Collection Time: 01/21/25 11:48 AM   Result Value Ref Range    Sodium 138 135 - 145 mmol/L    Potassium 4.3 3.4 - 5.3 mmol/L    Carbon Dioxide (CO2) 26 22 - 29 mmol/L    Anion Gap 8 7 - 15 mmol/L    Urea Nitrogen 18.8 8.0 - 23.0 mg/dL    Creatinine 0.73 0.67 - 1.17 mg/dL    GFR Estimate >90 >60 mL/min/1.73m2    Calcium 9.1 8.8 - 10.4 mg/dL    Chloride 104 98 - 107 mmol/L    Glucose 114 (H) 70 - 99 mg/dL    Alkaline Phosphatase 65 40 - 150 U/L    AST 11 0 - 45 U/L    ALT 12 0 - 70 U/L    Protein Total 6.4 6.4 - 8.3 g/dL    Albumin 3.4 (L) 3.5 - 5.2 g/dL    Bilirubin Total 0.5 <=1.2 mg/dL   Troponin T, High Sensitivity    Collection Time: 01/21/25 11:48 AM   Result Value Ref Range    Troponin T, High Sensitivity 8 <=22 ng/L   Magnesium    Collection Time: 01/21/25 11:48 AM   Result Value Ref Range    Magnesium 2.0 1.7 - 2.3 mg/dL   Procalcitonin    Collection Time: 01/21/25 11:48 AM   Result Value Ref Range    Procalcitonin 0.11 <0.50 ng/mL   CBC with platelets and differential    Collection Time: 01/21/25 11:48 AM   Result Value Ref Range    WBC Count 17.6 (H) 4.0 - 11.0 10e3/uL    RBC Count 4.83 4.40 - 5.90 10e6/uL    Hemoglobin 8.1 (L) 13.3 - 17.7 g/dL    Hematocrit 28.9 (L) 40.0 - 53.0 %    MCV 60 (L) 78 - 100 fL    MCH 16.8 (L) 26.5 - 33.0 pg    MCHC 28.0 (L) 31.5 - 36.5 g/dL    RDW 29.6 (H) 10.0 - 15.0 %    Platelet Count 290 150 - 450 10e3/uL    % Neutrophils 84 %    % Lymphocytes 7 %    % Monocytes 7 %    % Eosinophils 1 %    % Basophils 1 %    % Immature Granulocytes 1 %    NRBCs per 100 WBC 0 <1 /100    Absolute Neutrophils 14.7 (H) 1.6 - 8.3 10e3/uL    Absolute Lymphocytes 1.2 0.8 - 5.3 10e3/uL    Absolute Monocytes 1.2 0.0 - 1.3 10e3/uL    Absolute Eosinophils 0.2 0.0 - 0.7 10e3/uL    Absolute Basophils 0.1 0.0 - 0.2 10e3/uL    Absolute Immature  Granulocytes 0.1 <=0.4 10e3/uL    Absolute NRBCs 0.0 10e3/uL   Adult Type and Screen    Collection Time: 01/21/25 11:48 AM   Result Value Ref Range    ABO/RH(D) A NEG     Antibody Screen Negative Negative    SPECIMEN EXPIRATION DATE [id number]    RBC and Platelet Morphology    Collection Time: 01/21/25 11:48 AM   Result Value Ref Range    RBC Morphology Confirmed RBC Indices     Platelet Assessment  Automated Count Confirmed. Platelet morphology is normal.     Automated Count Confirmed. Platelet morphology is normal.    Elliptocytes Slight (A) None Seen    Polychromasia Slight (A) None Seen    RBC Fragments Slight (A) None Seen    Target Cells Slight (A) None Seen   UA with Microscopic reflex to Culture    Collection Time: 01/21/25  1:24 PM    Specimen: Urine, Hernandez Catheter   Result Value Ref Range    Color Urine Orange (A) Colorless, Straw, Light Yellow, Yellow    Appearance Urine Cloudy (A) Clear    Glucose Urine Negative Negative mg/dL    Bilirubin Urine Negative Negative    Ketones Urine Negative Negative mg/dL    Specific Gravity Urine 1.025 1.003 - 1.035    Blood Urine Large (A) Negative    pH Urine 6.0 5.0 - 7.0    Protein Albumin Urine 300 (A) Negative mg/dL    Urobilinogen Urine Normal Normal, 2.0 mg/dL    Nitrite Urine Positive (A) Negative    Leukocyte Esterase Urine Large (A) Negative    Bacteria Urine Many (A) None Seen /HPF    WBC Clumps Urine Present (A) None Seen /HPF    Mucus Urine Present (A) None Seen /LPF    RBC Urine >182 (H) <=2 /HPF    WBC Urine >182 (H) <=5 /HPF   EKG 12-lead, tracing only    Collection Time: 01/21/25  4:30 PM   Result Value Ref Range    Systolic Blood Pressure  mmHg    Diastolic Blood Pressure  mmHg    Ventricular Rate 74 BPM    Atrial Rate 74 BPM    FL Interval  ms    QRS Duration 160 ms     ms    QTc 475 ms    P Axis 180 degrees    R AXIS -79 degrees    T Axis 88 degrees    Interpretation ECG       Ventricular-paced rhythm  Abnormal ECG  No previous ECGs  available     Lactic acid whole blood with 1x repeat in 2 hr when >2    Collection Time: 01/21/25  4:35 PM   Result Value Ref Range    Lactic Acid, Initial 1.4 0.7 - 2.0 mmol/L     All lab studies reviewed personally    XR Chest Port 1 View    Result Date: 1/21/2025  EXAM: XR CHEST PORT 1 VIEW LOCATION: [facility] DATE: 1/21/2025 INDICATION: Cough. COMPARISON: None.     IMPRESSION: Left lower lobe atelectasis and/or infiltrate similar to previous. Right lung grossly clear.    CT Abdomen Pelvis w/o Contrast    Result Date: 12/30/2024  CT ABDOMEN PELVIS WITHOUT CONTRAST 12/30/2024 4:27 PM CLINICAL HISTORY: Abdominal pain. Diarrhea. TECHNIQUE: CT scan of the abdomen and pelvis was performed without IV contrast. Multiplanar reformats were obtained. Dose reduction techniques were used. CONTRAST: None. COMPARISON: July 11, 2024 FINDINGS: LOWER CHEST: Minimal left and trace right effusions with associated probable compressive atelectasis vs. less likely infiltrate. Areas of emphysema evident. Bronchial wall thickening and some mucus plugging is present. Small pericardial effusion. HEPATOBILIARY: Normal contour with no significant mass. No bile duct dilatation. Calcified gallstones. PANCREAS: No significant mass, duct dilatation, or inflammatory change. SPLEEN: Normal size. ADRENAL GLANDS: No significant nodules. KIDNEYS/BLADDER: A few nonobstructing stones and/or vascular calcifications measuring up to 3 mm on the right noted. Benign cysts requiring no follow-up. No hydronephrosis or ureteral stone demonstrated. Catheter decompresses the bladder. BOWEL: Large amount of rectal stool and moderate to large amount of overall stool, question stercoral colitis. VASCULATURE: 4.3 cm saccular infrarenal abdominal aortic aneurysm. Extensive calcified atherosclerosis of the aorta and its branches. PELVIC ORGANS: No pelvic masses. OTHER: No free air or free fluid. MUSCULOSKELETAL: No frankly destructive  bony lesions.     IMPRESSION: 1.  Large amount of rectal stool. With history of diarrhea, question fecal impaction and stercoral colitis. 2.  Similar pleural and pericardial effusions. 3.  4.3 cm saccular infrarenal abdominal aortic aneurysm partially thrombosed, unchanged from previous. BJ BALDERAS MD   SYSTEM ID:  [id number]    Radiology report reviewed.    Medically Ready for Discharge: Anticipated in 2-4 Days       Medical Decision Making       75 MINUTES SPENT BY ME on the date of service doing chart review, history, exam, documentation & further activities per the note.      Jude Le MD  [facility]

## 2025-01-21 NOTE — MEDICATION SCRIBE - ADMISSION MEDICATION HISTORY
Medication Scribe Admission Medication History    Admission medication history is complete. The information provided in this note is only as accurate as the sources available at the time of the update.    Information Source(s): Family member, Brittany via in-person    Pertinent Information:     Changes made to PTA medication list:  Added: Loperamide 2 mg  Oxycodone 5mg  Cholecalciferol 25 mcg   Deleted: Bisacodyl 10 mg suppository   Changed: None    Allergies reviewed with patient and updates made in EHR: yes    Medication History Completed By: POLLO VALERO 1/21/2025 2:58 PM    PTA Med List   Medication Sig Note Last Dose/Taking    ACE/ARB/ARNI NOT PRESCRIBED (INTENTIONAL) Please choose reason not prescribed from choices below.  Taking    apixaban ANTICOAGULANT (ELIQUIS ANTICOAGULANT) 5 MG tablet Take 1 tablet (5 mg) by mouth 2 times daily.  1/21/2025 Morning    BETA BLOCKER NOT PRESCRIBED (INTENTIONAL) Please choose reason not prescribed from choices below.  Taking    calcium carbonate-vitamin D (CALTRATE) 600-10 MG-MCG per tablet Take 2 tablets by mouth daily.  1/21/2025 Morning    cholecalciferol (VITAMIN D3) 25 mcg (1000 units) capsule Take 1 capsule by mouth daily.  1/21/2025 Morning    ferrous sulfate (FEROSUL) 325 (65 Fe) MG tablet Take 1 tablet (325 mg) by mouth daily (with breakfast).  1/21/2025 Morning    gabapentin (NEURONTIN) 300 MG capsule TAKE ONE CAPSULE BY MOUTH THREE TIMES A DAY  1/21/2025 Morning    HYDROmorphone (DILAUDID) 2 MG tablet Take 0.5-1 tablets (1-2 mg) by mouth every 6 hours as needed for severe pain.  More than a month    ipratropium - albuterol 0.5 mg/2.5 mg/3 mL (DUONEB) 0.5-2.5 (3) MG/3ML neb solution INHALE 1 VIAL IN NEBULIZER EVERY 6 HOURS AS NEEDED FOR SHORTNESS OF BREATH, WHEEZING OR COUGH (Patient taking differently: Take 1 vial by nebulization every 6 hours as needed for shortness of breath or wheezing.)  1/20/2025 Morning    Lidocaine HCl Urethral/Mucosal 2 % external gel Place  10 mLs into the urethra every 28 days. (Patient taking differently: Place into the urethra every 28 days.) 1/21/2025: Spouse reports: Home Care RN administers  Past Month    loperamide (IMODIUM) 2 MG capsule Take 2 mg by mouth as needed for diarrhea.  More than a month    Multiple Vitamin (MULTIVITAMIN ADULT PO) Take 1 tablet by mouth daily  1/21/2025 Morning    nitroFURantoin macrocrystal (MACRODANTIN) 50 MG capsule HOLD while on the Ciprofloxacin antibiotic but restart the day after you have completed Cipro and restart 1 tablet daily dosing. 1/21/2025: Out of mediation; needs refills  1/19/2025 Morning    order for DME Equipment being ordered: Nebulizer hose  1/20/2025 Morning    oxyBUTYnin (DITROPAN) 5 MG tablet Take 1 tablet (5 mg) by mouth 3 times daily as needed for bladder spasms.  1/21/2025 Morning    oxyCODONE (OXY-IR) 5 MG capsule Take 5 mg by mouth every 4 hours as needed for pain.  More than a month    tamsulosin (FLOMAX) 0.4 MG capsule Take 1 capsule (0.4 mg) by mouth at bedtime  1/20/2025 Bedtime    vitamin C (ASCORBIC ACID) 500 MG tablet Take 500 mg by mouth daily  1/21/2025 Morning

## 2025-01-21 NOTE — ED NOTES
Pt 's spouse states its time to change pt's murrell bag. Done. No new reinsertion as it is difficult

## 2025-01-21 NOTE — PROGRESS NOTES
01/21/25 1304 01/21/25 1305   Vital Signs   Pulse  --  90   Oximeter Heart Rate  --  95 bpm   BP  --  94/69   Oxygen Therapy   SpO2  --  92 %   O2 Device  --  None (Room air)   Nebulizer Assessment & Treatment   $RT Use ONLY Delivery Method Nebulizer - Initial  --    Nebulizer Device Mouthpiece  --    Pretreatment Heart Rate (beats/min) 94  --    Pretreatment Resp Rate (breaths/min) 16  --    Pretreat Breath Sounds - LIBRA diminished  --    Pretreat Breath Sounds - LLL diminished;coarse  --    Pretreat Breath Sounds - RUL diminished  --    Pretreat Breath Sounds - RML coarse  --    Pretreat Breath Sounds - RLL coarse  --    Patient Position semi-Goldberg's  --    Respiratory Treatment Status (SVN) given  --    Breath Sounds Post-Respiratory Treatment   Posttreatment Heart Rate (beats/min) 90  --    Posttreatment Resp Rate (breaths/min) 18  --    Posttreatment Assessment (SVN) increased aeration  --    Signs of Intolerance (SVN) none  --    Breath Sounds Posttreatment LIBRA diminished  --    Breath Sounds Posttreatment LLL coarse  --    Breath Sounds Posttreatment RUL diminished  --    Breath Sounds Posttreatment RML coarse  --    Breath Sounds Posttreatment RLL coarse  --

## 2025-01-21 NOTE — PROGRESS NOTES
Preoperative Evaluation  [address] [address]  Phone: [phone]  Fax: [phone]  Primary Provider: Neville Degroot MD  Pre-op Performing Provider: Neville Degroot MD  Jan 21, 2025 1/21/2025   Surgical Information   What procedure is being done? Cystoscopy, Percutaneous Suprapubic Tube Placement   Facility or Hospital where procedure/surgery will be performed: [facility]   Who is doing the procedure / surgery? Tej Manriquez   Date of surgery / procedure: 02/07/2025   Time of surgery / procedure: 7:40 AM   Where do you plan to recover after surgery? at home with family     Fax number for surgical facility: [phone]     Assessment & Plan     The proposed surgical procedure is considered LOW risk.    Problem List Items Addressed This Visit          Respiratory    Pulmonary nodule - concerning for cancer but patient declining further work up       Digestive    Tubulovillous adenoma of rectum 4/9/24    Drug-induced constipation    Severe malnutrition       Endocrine    Hyperlipidemia LDL goal <130       Circulatory    Hypertension goal BP (blood pressure) < 140/90 (Chronic)    Coronary artery disease involving native coronary artery of native heart without angina pectoris (Chronic)    Third degree heart block (H) - s/p pacemaker    Infrarenal abdominal aortic aneurysm (AAA) without rupture       Urinary    Urothelial carcinoma of bladder (H) (Chronic)       Behavioral    Personal history of tobacco use, presenting hazards to health       Other    History of CVA (cerebrovascular accident) - old left internal capsule lacunar infarct on CT 7/2016     Other Visit Diagnoses       Preop general physical exam    -  Primary    Lightheadedness                Patient experiencing severe lightheadedness which has been persisting at home but complaining of here now and visit although wife says  he was doing fine prior to me entering the room.  Patient slouching over in chair and minimally responsive at times but would answer saying he wanted to go home with repositioning. He did have fever upon initial rooming and he did not let nursing get other vitals. With concern for worsening anemia, dehydration and potential sepsis given his fever and strong risk factors we did send him over the ER for further evaluation. Wife katie in agreement. ER provider aware he is coming down.     The longitudinal plan of care for the diagnosis(es)/condition(s) as documented were addressed during this visit. Due to the added complexity in care, I will continue to support Khang in the subsequent management and with ongoing continuity of care.      Recommendation  Sent to ER for further workup and stabalization     Subjective   Khang is a 79 year old, presenting for the following:  Pre-Op Exam          1/21/2025    10:13 AM   Additional Questions   Roomed by Toni   Accompanied by Wife and friend     HPI related to upcoming procedure: catheter         1/21/2025   Pre-Op Questionnaire   Have you ever had a heart attack or stroke? No   Have you ever had surgery on your heart or blood vessels, such as a stent placement, a coronary artery bypass, or surgery on an artery in your head, neck, heart, or legs? (!) YES    Do you have chest pain with activity? No   Do you have a history of heart failure? No   Do you currently have a cold, bronchitis or symptoms of other infection? No   Do you have a cough, shortness of breath, or wheezing? No   Do you or anyone in your family have previous history of blood clots? No   Do you or does anyone in your family have a serious bleeding problem such as prolonged bleeding following surgeries or cuts? No   Have you ever had problems with anemia or been told to take iron pills? (!) YES    Have you had any abnormal blood loss such as black, tarry or bloody stools? No   Have you ever had a blood  transfusion? (!) YES   Have you ever had a transfusion reaction? No   Are you willing to have a blood transfusion if it is medically needed before, during, or after your surgery? Yes   Have you or any of your relatives ever had problems with anesthesia? No   Do you have sleep apnea, excessive snoring or daytime drowsiness? No   Do you have any artifical heart valves or other implanted medical devices like a pacemaker, defibrillator, or continuous glucose monitor? (!) YES   What type of device do you have? Pacemaker   Name of the clinic that manages your device [facility]   Do you have artificial joints? No   Are you allergic to latex? No     Health Care Directive  Patient has a Health Care Directive on file      Preoperative Review of    reviewed - controlled substances reflected in medication list.        Patient Active Problem List    Diagnosis Date Noted    Penile erosion 01/03/2025     Priority: Medium    Syncope, unspecified syncope type 10/01/2024     Priority: Medium    Anemia, unspecified type 10/01/2024     Priority: Medium    Urinary tract infection associated with indwelling urethral catheter, initial encounter 10/01/2024     Priority: Medium    Sepsis, due to unspecified organism, unspecified whether acute organ dysfunction present (H) 10/01/2024     Priority: Medium    Medication induced coagulopathy 10/01/2024     Priority: Medium    Urinary tract infection associated with indwelling urethral catheter 10/01/2024     Priority: Medium    Leukocytosis, unspecified type 10/01/2024     Priority: Medium    Acute pulmonary embolism, unspecified pulmonary embolism type, unspecified whether acute cor pulmonale present (H) 09/12/2024     Priority: Medium    Severe malnutrition 04/30/2024     Priority: Medium    Gastrointestinal hemorrhage with melena 04/30/2024     Priority: Medium    Hypoalbuminemia 04/30/2024     Priority: Medium    Lactic acidosis 04/30/2024     Priority: Medium    Enlarged  prostate 04/30/2024     Priority: Medium    Coagulopathy 04/30/2024     Priority: Medium    Sacral pressure sore 04/30/2024     Priority: Medium    Abrasion of left elbow 04/30/2024     Priority: Medium    Chronic systolic congestive heart failure (H) 04/27/2024     Priority: Medium    Urothelial carcinoma of bladder (H) 04/27/2024     Priority: Medium    Pneumonia of right lower lobe due to infectious organism 04/26/2024     Priority: Medium    Septic shock (H) 04/26/2024     Priority: Medium    Closed fracture of right hip with routine healing 04/16/2024     Priority: Medium    Pain 04/16/2024     Priority: Medium    Drug-induced constipation 04/16/2024     Priority: Medium    Acute blood loss anemia 04/16/2024     Priority: Medium    Postprocedural stricture of anterior urethra 03/21/2024     Priority: Medium    Acute bilateral back pain, unspecified back location 03/19/2024     Priority: Medium    SIRS (systemic inflammatory response syndrome) (H) 02/01/2024     Priority: Medium    Calculus of gallbladder without cholecystitis without obstruction 02/01/2024     Priority: Medium    Infrarenal abdominal aortic aneurysm (AAA) without rupture 02/01/2024     Priority: Medium    Fistula of sigmoid colon 02/01/2024     Priority: Medium    Aspiration into airway, initial encounter 02/01/2024     Priority: Medium    Gross hematuria 02/01/2024     Priority: Medium    Diverticulitis 01/31/2024     Priority: Medium    Pulmonary nodule - concerning for cancer but patient declining further work up 01/31/2024     Priority: Medium    Mass of urinary bladder 01/31/2024     Priority: Medium    Abnormal urinalysis 01/31/2024     Priority: Medium    Other chest pain 01/31/2024     Priority: Medium    Systolic congestive heart failure, unspecified HF chronicity (H) 01/09/2024     Priority: Medium    Systolic congestive heart failure (H) 01/09/2024     Priority: Medium    Coronary artery disease involving native coronary artery of  native heart without angina pectoris 12/05/2023     Priority: Medium    NSTEMI (non-ST elevated myocardial infarction) (H) 11/27/2023     Priority: Medium    COVID-19 08/13/2023     Priority: Medium    Benign prostatic hyperplasia, unspecified whether lower urinary tract symptoms present 11/01/2021     Priority: Medium    Benign prostatic hyperplasia 11/01/2021     Priority: Medium    Fatigue, unspecified type 11/29/2019     Priority: Medium    Memory loss 11/29/2019     Priority: Medium    Tubulovillous adenoma of rectum 4/9/24 07/26/2016     Priority: Medium    Sigmoid diverticulitis 07/12/2016     Priority: Medium    History of CVA (cerebrovascular accident) - old left internal capsule lacunar infarct on CT 7/2016 07/12/2016     Priority: Medium    Multilevel degenerative disc disease 09/21/2015     Priority: Medium    Elevated prostate specific antigen (PSA) 09/11/2015     Priority: Medium    Elevated PSA 08/27/2012     Priority: Medium    Loss of weight 08/27/2012     Priority: Medium    Third degree heart block (H) - s/p pacemaker 08/10/2012     Priority: Medium    Hypertension goal BP (blood pressure) < 140/90 08/10/2012     Priority: Medium    Other thalassemia 07/27/2012     Priority: Medium     Diagnosis updated by automated process. Provider to review and confirm.      Hyperlipidemia LDL goal <130 07/27/2012     Priority: Medium    Heart block 07/27/2012     Priority: Medium    Cardiac abnormality 07/27/2012     Priority: Medium    Complete heart block (H) 07/27/2012     Priority: Medium    Thalassemia 07/27/2012     Priority: Medium     Formatting of this note might be different from the original.   Formatting of this note might be different from the original.   Diagnosis updated by automated process. Provider to review and confirm.      Painful respiration 03/31/2004     Priority: Medium    Personal history of tobacco use, presenting hazards to health 03/31/2004     Priority: Medium      Past Medical  History:   Diagnosis Date    Alpha thalassemia     AV block 07/2012    pacemaker at [facility]    CAD (coronary artery disease)     Chronic systolic heart failure (H)     COPD (chronic obstructive pulmonary disease) (H)     Diverticulitis     History of CVA (cerebrovascular accident)     Injury, other and unspecified, elbow, forearm, and wrist 1970s    Broke right wrist    Urothelial carcinoma (H)      Past Surgical History:   Procedure Laterality Date    COLONOSCOPY      COLONOSCOPY  12/10/2012    CYSTOSCOPY, LITHOLAPAXY, COMBINED N/A 11/10/2021    Procedure: Cystoscopy, Bladder Stone Removal;  Surgeon: Guanaco Stewart MD;  Location: [facility] OR    IMPLANT PACEMAKER      LASER KTP TRANSURETHRAL RESECTION (TUR) PROSTATE N/A 11/10/2021    Procedure: TRANSURETHRAL RESECTION (TUR) PROSTATE, USING KTP LASER;  Surgeon: Guanaco Stewart MD;  Location: [facility] OR     Current Outpatient Medications   Medication Sig Dispense Refill    apixaban ANTICOAGULANT (ELIQUIS ANTICOAGULANT) 5 MG tablet Take 1 tablet (5 mg) by mouth 2 times daily. 60 tablet 0    BETA BLOCKER NOT PRESCRIBED (INTENTIONAL) Please choose reason not prescribed from choices below.      calcium carbonate-vitamin D (CALTRATE) 600-10 MG-MCG per tablet Take 1 tablet by mouth 2 times daily      ferrous sulfate (FEROSUL) 325 (65 Fe) MG tablet Take 1 tablet (325 mg) by mouth daily (with breakfast). 90 tablet 0    gabapentin (NEURONTIN) 300 MG capsule TAKE ONE CAPSULE BY MOUTH THREE TIMES A DAY 90 capsule 0    HYDROmorphone (DILAUDID) 2 MG tablet Take 0.5-1 tablets (1-2 mg) by mouth every 6 hours as needed for severe pain. 30 tablet 0    ipratropium - albuterol 0.5 mg/2.5 mg/3 mL (DUONEB) 0.5-2.5 (3) MG/3ML neb solution INHALE 1 VIAL IN NEBULIZER EVERY 6 HOURS AS NEEDED FOR SHORTNESS OF BREATH, WHEEZING OR COUGH 360 mL 2    Multiple Vitamin (MULTIVITAMIN ADULT PO) Take 1 tablet by mouth daily      nitroFURantoin macrocrystal (MACRODANTIN) 50 MG capsule HOLD while on the  "Ciprofloxacin antibiotic but restart the day after you have completed Cipro and restart 1 tablet daily dosing. 90 capsule 0    order for DME Equipment being ordered: Nebulizer hose 1 Tube 11    oxyBUTYnin (DITROPAN) 5 MG tablet Take 1 tablet (5 mg) by mouth 3 times daily as needed for bladder spasms. 90 tablet 1    tamsulosin (FLOMAX) 0.4 MG capsule Take 1 capsule (0.4 mg) by mouth at bedtime 90 capsule 3    vitamin C (ASCORBIC ACID) 500 MG tablet Take 500 mg by mouth daily      ACE/ARB/ARNI NOT PRESCRIBED (INTENTIONAL) Please choose reason not prescribed from choices below.      bisacodyl (DULCOLAX) 10 MG suppository Place 1 suppository (10 mg) rectally daily as needed for constipation. (Patient not taking: Reported on 2025) 10 suppository 0    Lidocaine HCl Urethral/Mucosal 2 % external gel Place 10 mLs into the urethra every 28 days. (Patient not taking: Reported on 2025) 20 mL 5       Allergies   Allergen Reactions    Flagyl [Metronidazole] GI Disturbance        Social History     Tobacco Use    Smoking status: Former     Current packs/day: 0.00     Types: Cigarettes     Start date: 1962     Quit date: 2012     Years since quittin.4    Smokeless tobacco: Never    Tobacco comments:     Quit in July   Substance Use Topics    Alcohol use: No     Alcohol/week: 0.0 standard drinks of alcohol     No family history on file.  History   Drug Use No             Review of Systems  Constitutional, HEENT, cardiovascular, pulmonary, GI, , musculoskeletal, neuro, skin, endocrine and psych systems are negative, except as otherwise noted.    Objective    Pulse 112   Temp 100.3  F (37.9  C) (Temporal)   Resp 20   SpO2 95%    Estimated body mass index is 18.94 kg/m  as calculated from the following:    Height as of 24: 1.778 m (5' 10\").    Weight as of an earlier encounter on 25: 59.9 kg (132 lb).  Physical Exam  GENERAL: alert and no distress  EYES: Eyes grossly normal to inspection, PERRL " and conjunctivae and sclerae normal  HENT: nose and mouth without ulcers or lesions  NECK: no adenopathy, no asymmetry, masses, or scars  RESP: lungs clear to auscultation - no rales, rhonchi or wheezes  CV: regular rate and rhythm,  no peripheral edema  ABDOMEN: soft, suprapubic tenderness, no hepatosplenomegaly, no masses and bowel sounds normal  MS: no gross musculoskeletal defects noted, no edema    Recent Labs   Lab Test 01/03/25  1100 12/30/24  1600 11/09/24  1558 10/01/24  1036 08/10/24  0749 05/01/24  0537 05/01/24  0536   HGB 7.7* 6.1* 6.7*   < > 8.3*   < >  --     335 262   < > 397   < >  --    INR  --   --   --   --  1.59*  --  1.43*   NA  --  136 135   < > 134*   < > 136   POTASSIUM  --  4.3 4.0   < > 4.2   < > 3.0*   CR  --  0.78 0.67   < > 0.73   < > 0.65*    < > = values in this interval not displayed.        Diagnostics       Revised Cardiac Risk Index (RCRI)  The patient has the following serious cardiovascular risks for perioperative complications:   - Coronary Artery Disease (MI, positive stress test, angina, Qs on EKG) = 1 point   - Cerebrovascular Disease (TIA or CVA) = 1 point     RCRI Interpretation: 2 points: Class III (moderate risk - 6.6% complication rate)              Signed Electronically by: Neville Degroot MD  A copy of this evaluation report is provided to the requesting physician.

## 2025-01-22 ENCOUNTER — APPOINTMENT (OUTPATIENT)
Dept: GENERAL RADIOLOGY | Facility: CLINIC | Age: 80
End: 2025-01-22
Attending: FAMILY MEDICINE
Payer: COMMERCIAL

## 2025-01-22 ENCOUNTER — APPOINTMENT (OUTPATIENT)
Dept: CT IMAGING | Facility: CLINIC | Age: 80
End: 2025-01-22
Attending: FAMILY MEDICINE
Payer: COMMERCIAL

## 2025-01-22 PROBLEM — R10.30 LOWER ABDOMINAL PAIN: Status: ACTIVE | Noted: 2025-01-22

## 2025-01-22 PROBLEM — I50.22 CHRONIC SYSTOLIC CONGESTIVE HEART FAILURE (H): Chronic | Status: ACTIVE | Noted: 2024-04-27

## 2025-01-22 PROBLEM — C67.9 UROTHELIAL CARCINOMA OF BLADDER (H): Chronic | Status: ACTIVE | Noted: 2024-04-27

## 2025-01-22 PROBLEM — G89.29 CHRONIC PAIN: Status: ACTIVE | Noted: 2024-04-16

## 2025-01-22 PROBLEM — R91.1 PULMONARY NODULE: Status: ACTIVE | Noted: 2024-01-31

## 2025-01-22 PROBLEM — I25.10 CORONARY ARTERY DISEASE INVOLVING NATIVE CORONARY ARTERY OF NATIVE HEART WITHOUT ANGINA PECTORIS: Chronic | Status: ACTIVE | Noted: 2023-12-05

## 2025-01-22 PROBLEM — I71.43 INFRARENAL ABDOMINAL AORTIC ANEURYSM (AAA) WITHOUT RUPTURE: Status: ACTIVE | Noted: 2024-02-01

## 2025-01-22 PROBLEM — Z86.711 HISTORY OF PULMONARY EMBOLISM: Status: ACTIVE | Noted: 2025-01-22

## 2025-01-22 LAB
ANION GAP SERPL CALCULATED.3IONS-SCNC: 6 MMOL/L (ref 7–15)
BLD PROD TYP BPU: NORMAL
BLOOD COMPONENT TYPE: NORMAL
BUN SERPL-MCNC: 17.3 MG/DL (ref 8–23)
CALCIUM SERPL-MCNC: 7.6 MG/DL (ref 8.8–10.4)
CHLORIDE SERPL-SCNC: 109 MMOL/L (ref 98–107)
CODING SYSTEM: NORMAL
CREAT SERPL-MCNC: 0.65 MG/DL (ref 0.67–1.17)
CROSSMATCH: NORMAL
EGFRCR SERPLBLD CKD-EPI 2021: >90 ML/MIN/1.73M2
ELLIPTOCYTES BLD QL SMEAR: SLIGHT
ERYTHROCYTE [DISTWIDTH] IN BLOOD BY AUTOMATED COUNT: 29 % (ref 10–15)
FRAGMENTS BLD QL SMEAR: SLIGHT
GLUCOSE SERPL-MCNC: 92 MG/DL (ref 70–99)
HCO3 SERPL-SCNC: 22 MMOL/L (ref 22–29)
HCT VFR BLD AUTO: 21.6 % (ref 40–53)
HGB BLD-MCNC: 6.1 G/DL (ref 13.3–17.7)
HGB BLD-MCNC: 7.9 G/DL (ref 13.3–17.7)
ISSUE DATE AND TIME: NORMAL
MCH RBC QN AUTO: 17 PG (ref 26.5–33)
MCHC RBC AUTO-ENTMCNC: 28.2 G/DL (ref 31.5–36.5)
MCV RBC AUTO: 60 FL (ref 78–100)
PLAT MORPH BLD: ABNORMAL
PLATELET # BLD AUTO: 206 10E3/UL (ref 150–450)
POTASSIUM SERPL-SCNC: 4.1 MMOL/L (ref 3.4–5.3)
RBC # BLD AUTO: 3.58 10E6/UL (ref 4.4–5.9)
RBC MORPH BLD: ABNORMAL
SODIUM SERPL-SCNC: 137 MMOL/L (ref 135–145)
UNIT ABO/RH: NORMAL
UNIT NUMBER: NORMAL
UNIT STATUS: NORMAL
UNIT TYPE ISBT: 600
WBC # BLD AUTO: 9 10E3/UL (ref 4–11)

## 2025-01-22 PROCEDURE — 36415 COLL VENOUS BLD VENIPUNCTURE: CPT | Performed by: FAMILY MEDICINE

## 2025-01-22 PROCEDURE — 258N000003 HC RX IP 258 OP 636: Performed by: FAMILY MEDICINE

## 2025-01-22 PROCEDURE — 82310 ASSAY OF CALCIUM: CPT | Performed by: EMERGENCY MEDICINE

## 2025-01-22 PROCEDURE — 250N000011 HC RX IP 250 OP 636: Performed by: FAMILY MEDICINE

## 2025-01-22 PROCEDURE — 82374 ASSAY BLOOD CARBON DIOXIDE: CPT | Performed by: EMERGENCY MEDICINE

## 2025-01-22 PROCEDURE — P9016 RBC LEUKOCYTES REDUCED: HCPCS | Performed by: INTERNAL MEDICINE

## 2025-01-22 PROCEDURE — 0001U RBC DNA HEA 35 AG 11 BLD GRP: CPT | Performed by: FAMILY MEDICINE

## 2025-01-22 PROCEDURE — 250N000009 HC RX 250: Performed by: FAMILY MEDICINE

## 2025-01-22 PROCEDURE — 36415 COLL VENOUS BLD VENIPUNCTURE: CPT | Performed by: EMERGENCY MEDICINE

## 2025-01-22 PROCEDURE — 250N000011 HC RX IP 250 OP 636: Performed by: EMERGENCY MEDICINE

## 2025-01-22 PROCEDURE — 85018 HEMOGLOBIN: CPT | Performed by: FAMILY MEDICINE

## 2025-01-22 PROCEDURE — 74177 CT ABD & PELVIS W/CONTRAST: CPT

## 2025-01-22 PROCEDURE — 120N000001 HC R&B MED SURG/OB

## 2025-01-22 PROCEDURE — 99418 PROLNG IP/OBS E/M EA 15 MIN: CPT | Performed by: FAMILY MEDICINE

## 2025-01-22 PROCEDURE — 250N000013 HC RX MED GY IP 250 OP 250 PS 637: Performed by: EMERGENCY MEDICINE

## 2025-01-22 PROCEDURE — 258N000003 HC RX IP 258 OP 636: Performed by: INTERNAL MEDICINE

## 2025-01-22 PROCEDURE — 74019 RADEX ABDOMEN 2 VIEWS: CPT

## 2025-01-22 PROCEDURE — 85048 AUTOMATED LEUKOCYTE COUNT: CPT | Performed by: EMERGENCY MEDICINE

## 2025-01-22 PROCEDURE — 85014 HEMATOCRIT: CPT | Performed by: EMERGENCY MEDICINE

## 2025-01-22 PROCEDURE — 99233 SBSQ HOSP IP/OBS HIGH 50: CPT | Performed by: FAMILY MEDICINE

## 2025-01-22 PROCEDURE — 80048 BASIC METABOLIC PNL TOTAL CA: CPT | Performed by: EMERGENCY MEDICINE

## 2025-01-22 PROCEDURE — 258N000003 HC RX IP 258 OP 636: Performed by: EMERGENCY MEDICINE

## 2025-01-22 RX ORDER — DIPHENHYDRAMINE HYDROCHLORIDE 50 MG/ML
25 INJECTION INTRAMUSCULAR; INTRAVENOUS
Status: DISCONTINUED | OUTPATIENT
Start: 2025-01-22 | End: 2025-01-28 | Stop reason: HOSPADM

## 2025-01-22 RX ORDER — ALBUTEROL SULFATE 0.83 MG/ML
2.5 SOLUTION RESPIRATORY (INHALATION)
Status: DISCONTINUED | OUTPATIENT
Start: 2025-01-22 | End: 2025-01-28 | Stop reason: HOSPADM

## 2025-01-22 RX ORDER — MEPERIDINE HYDROCHLORIDE 25 MG/ML
25 INJECTION INTRAMUSCULAR; INTRAVENOUS; SUBCUTANEOUS
Status: DISCONTINUED | OUTPATIENT
Start: 2025-01-22 | End: 2025-01-28 | Stop reason: HOSPADM

## 2025-01-22 RX ORDER — ALBUTEROL SULFATE 90 UG/1
1-2 INHALANT RESPIRATORY (INHALATION)
Status: DISCONTINUED | OUTPATIENT
Start: 2025-01-22 | End: 2025-01-28 | Stop reason: HOSPADM

## 2025-01-22 RX ORDER — METHYLPREDNISOLONE SODIUM SUCCINATE 40 MG/ML
40 INJECTION INTRAMUSCULAR; INTRAVENOUS
Status: DISCONTINUED | OUTPATIENT
Start: 2025-01-22 | End: 2025-01-28 | Stop reason: HOSPADM

## 2025-01-22 RX ORDER — IOPAMIDOL 755 MG/ML
500 INJECTION, SOLUTION INTRAVASCULAR ONCE
Status: COMPLETED | OUTPATIENT
Start: 2025-01-22 | End: 2025-01-22

## 2025-01-22 RX ORDER — DIPHENHYDRAMINE HYDROCHLORIDE 50 MG/ML
50 INJECTION INTRAMUSCULAR; INTRAVENOUS
Status: DISCONTINUED | OUTPATIENT
Start: 2025-01-22 | End: 2025-01-28 | Stop reason: HOSPADM

## 2025-01-22 RX ADMIN — SODIUM CHLORIDE 500 ML: 9 INJECTION, SOLUTION INTRAVENOUS at 05:06

## 2025-01-22 RX ADMIN — IOPAMIDOL 70 ML: 755 INJECTION, SOLUTION INTRAVENOUS at 18:45

## 2025-01-22 RX ADMIN — IRON SUCROSE 300 MG: 20 INJECTION, SOLUTION INTRAVENOUS at 19:53

## 2025-01-22 RX ADMIN — OXYCODONE HYDROCHLORIDE 5 MG: 5 TABLET ORAL at 00:57

## 2025-01-22 RX ADMIN — CIPROFLOXACIN 400 MG: 2 INJECTION, SOLUTION INTRAVENOUS at 13:24

## 2025-01-22 RX ADMIN — TAMSULOSIN HYDROCHLORIDE 0.4 MG: 0.4 CAPSULE ORAL at 20:03

## 2025-01-22 RX ADMIN — SODIUM CHLORIDE: 9 INJECTION, SOLUTION INTRAVENOUS at 17:29

## 2025-01-22 RX ADMIN — CIPROFLOXACIN 400 MG: 2 INJECTION, SOLUTION INTRAVENOUS at 23:06

## 2025-01-22 RX ADMIN — GABAPENTIN 300 MG: 300 CAPSULE ORAL at 20:03

## 2025-01-22 RX ADMIN — ACETAMINOPHEN 650 MG: 325 TABLET, FILM COATED ORAL at 00:17

## 2025-01-22 RX ADMIN — SENNOSIDES AND DOCUSATE SODIUM 2 TABLET: 50; 8.6 TABLET ORAL at 00:14

## 2025-01-22 RX ADMIN — SODIUM CHLORIDE 60 ML: 9 INJECTION, SOLUTION INTRAVENOUS at 18:45

## 2025-01-22 RX ADMIN — FERROUS SULFATE TAB 325 MG (65 MG ELEMENTAL FE) 325 MG: 325 (65 FE) TAB at 08:58

## 2025-01-22 RX ADMIN — GABAPENTIN 300 MG: 300 CAPSULE ORAL at 08:58

## 2025-01-22 RX ADMIN — Medication 2 TABLET: at 08:58

## 2025-01-22 RX ADMIN — ACETAMINOPHEN 650 MG: 325 TABLET, FILM COATED ORAL at 23:03

## 2025-01-22 RX ADMIN — OXYCODONE HYDROCHLORIDE 5 MG: 5 TABLET ORAL at 09:10

## 2025-01-22 RX ADMIN — GABAPENTIN 300 MG: 300 CAPSULE ORAL at 13:38

## 2025-01-22 RX ADMIN — APIXABAN 5 MG: 5 TABLET, FILM COATED ORAL at 08:58

## 2025-01-22 RX ADMIN — APIXABAN 5 MG: 5 TABLET, FILM COATED ORAL at 20:03

## 2025-01-22 RX ADMIN — OXYCODONE HYDROCHLORIDE 5 MG: 5 TABLET ORAL at 23:03

## 2025-01-22 RX ADMIN — Medication 25 MCG: at 08:58

## 2025-01-22 RX ADMIN — SODIUM CHLORIDE: 9 INJECTION, SOLUTION INTRAVENOUS at 06:20

## 2025-01-22 ASSESSMENT — ACTIVITIES OF DAILY LIVING (ADL)
ADLS_ACUITY_SCORE: 62
ADLS_ACUITY_SCORE: 62
ADLS_ACUITY_SCORE: 60
ADLS_ACUITY_SCORE: 62
ADLS_ACUITY_SCORE: 58
ADLS_ACUITY_SCORE: 62
ADLS_ACUITY_SCORE: 58
ADLS_ACUITY_SCORE: 62
ADLS_ACUITY_SCORE: 60
ADLS_ACUITY_SCORE: 58
ADLS_ACUITY_SCORE: 60
ADLS_ACUITY_SCORE: 59
ADLS_ACUITY_SCORE: 58
ADLS_ACUITY_SCORE: 58
ADLS_ACUITY_SCORE: 62
ADLS_ACUITY_SCORE: 59
ADLS_ACUITY_SCORE: 60
ADLS_ACUITY_SCORE: 59
ADLS_ACUITY_SCORE: 58
ADLS_ACUITY_SCORE: 62
ADLS_ACUITY_SCORE: 58
ADLS_ACUITY_SCORE: 60
ADLS_ACUITY_SCORE: 58

## 2025-01-22 NOTE — PROGRESS NOTES
"CLINICAL NUTRITION SERVICES - ASSESSMENT NOTE    RECOMMENDATIONS FOR MDs/PROVIDERS TO ORDER:  None at this time    Malnutrition Status:    Unable to assess    Registered Dietitian Interventions:  Medical food supplement therapy send trial Ensure Enlive @SUP which will offer pt 350kcal and 20g protein /d.    Future/Additional Recommendations:  F/U PO, supplement favor     REASON FOR ASSESSMENT  MST 2 (reported: 2-13# wt loss [1], decreased appetite [1])    SUBJECTIVE INFORMATION  Remote RD unable to reach pt today via phone on two attempts (11:54 no answer, and 13:44 MD on room w/pt)    NUTRITION HISTORY  Unable to assess at this time.  Mention in chart of pt being a hospice pt.    CURRENT NUTRITION ORDERS  Diet: Regular    CURRENT INTAKE/TOLERANCE  No POs charted a time of note writing    LABS  Nutrition-relevant labs: Reviewed    MEDICATIONS  Nutrition-relevant medications: Reviewed    ANTHROPOMETRICS  Height  177.8 cm  Weight  65.5 kg  IBW  73.00 kg  %IBW  90   BMI  20.72 (Normal BMI)  Dosing wt 65.5 kg Based on: Actual wt     Weight History:   01/21/25 : 65.6 kg (144 lb 10 oz)  12/30/24 : 62.6 kg (138 lb)  11/09/24 : 63 kg (139 lb)  10/01/24 : 62.3 kg (137 lb 5.6 oz)  08/15/24 : 64.4 kg (142 lb)  08/10/24 : 70.3 kg (155 lb)  07/11/24 : 67.1 kg (148 lb)  05/07/24 : 75.8 kg (167 lb)  05/02/24 : 75.8 kg (167 lb 1.7 oz) = 13.4 <1y, not \"significant\"  04/22/24 : 69.9 kg (154 lb)      ASSESSED NUTRITION NEEDS  Estimated Energy Needs: 1935 kcals/day ([place] Jeor*1.4)  Justification: Maintenance  Estimated Protein Needs: 92 grams protein/day ( kg*1.4 )  Justification: Maintenance  Estimated Fluid Needs: 1900 mL/day (1 mL/kcal)  Justification: Maintenance    SYSTEM FINDINGS    Skin/wounds: WDL  GI symptoms: c/o bilateral lower abdomen pain, denies nausea (/RN note 1/21)    MALNUTRITION  % Intake: Unable to assess  % Weight Loss: Weight loss does not meet criteria   Subcutaneous Fat Loss: Unable to assess  Muscle Loss: " Unable to assess  Fluid Accumulation/Edema: None noted  Malnutrition Diagnosis: Unable to determine due to remote RD cannot perform physical exam  Malnutrition Present on Admission: Unable to assess    NUTRITION DIAGNOSIS  No nutrition diagnosis at this time    INTERVENTIONS  Medical food supplement therapy send trial Ensure Enlive @SUP which will offer pt 350kcal and 20g protein /d.    Goals  Patient to consume % of nutritionally adequate meal trays TID, or the equivalent with supplements/snacks.     Monitoring/Evaluation  Progress toward goals will be monitored and evaluated per policy.

## 2025-01-22 NOTE — PROGRESS NOTES
I was contacted by the blood bank been regarded transfusion orders on this patient.  My evaluation notes he is markedly iron deficient with ferritin of 8 and percent saturation of 5.  The calculated iron deficit is approximately 1800 mg.  Replacement using IV iron is suggested and may be done daily at a 300 mg dose while the patient is in the hospita with completion as an outpatient.  Please call if questions.    Michelle Guerrero  [phone]

## 2025-01-22 NOTE — PLAN OF CARE
Goal Outcome Evaluation:      Plan of Care Reviewed With: patient    Overall Patient Progress: no changeOverall Patient Progress: no change    Outcome Evaluation: Pt A/Ox4 with intermittent confusion. Hypotensive, other VSS. Pt was on 2L NC when brought up to the floor and weaned to RA with oxygen above 90%. On arrival pt's HR was between 140's-150's not sustained. Hr ranging from 65-90's. Tele shows SR at beginning of shift and then Paced throughout the night. 2 Boluses 1000 ml each were given last evening. Pt has a murrell with inadequate urine output. MD was notified of this, and ordered another 500 ml bolus. Pt is due to have his murrell changed; in ED the nurse changed the bag but was told by wife not to change murrell as this is difficult due to the insert. Pt is scheduled for a suprapubic catheter on 02/07. Pt c/o of lower abdomen pain, given oxycodone 5 mg x2 and tylenol x1. Pt states he is constipated which could be contributing to the pain, given senna. Had a small BM. NS infusing at 100 ml/hr. Critical result this AM; hgb is 6.1. Orders for a blood transufion and got consent. Pt has a history of Thalassemia and needs fresh blood. Lab told writer they have called [facility] for the blood and should arrive in 2-4 hours.

## 2025-01-22 NOTE — PLAN OF CARE
Goal Outcome Evaluation:      Plan of Care Reviewed With: patient    Overall Patient Progress: no change    Outcome Evaluation: Pt. A&O x4. VSS on RA. Pt having lower abdominal pain contnuous, bowels active, denies nausea, poor appetite today due to abdominal pain, pain med given 1 time today. Pt had smear BM, blanchable redness to coccyx Mepilex applied. Abdominal x-ray done this afternoon awaiting results. Hgb 6.1 this AM, 1 unit of blood given per orders, no adverse reaction. Hernandez remains inplace with adequate output. IV fluids infusing at 100/hr. IV ABX given per MAR. Pt. weight shifting and turning well, refused to get out of bed this shift. V-paced on telemetry.

## 2025-01-22 NOTE — PROGRESS NOTES
Patient continues to complain of severe lower abdominal pain.  Abdominal X-ray shows multiple gas filled loops of small bowel and colon without obstruction but concern for possible ileus.  There is large stool in the ascending colon and rectosigmoid colon but no obstruction.    Given severity of pain and very different treatment plans for ileus vs obstipation, will proceed with a CT scan abdomen/pelvis with contrast for further evaluation.       Electronically Signed:  Elizabeth Quigley MD

## 2025-01-22 NOTE — PROGRESS NOTES
Formerly [facility]    Medicine Progress Note - Hospitalist Service    Date of Admission:  1/21/2025    Assessment & Plan   Patient is a 79-year-old gentleman with past medical history of bladder cancer status postresection with chronic indwelling Hernandez catheter, COPD,'s previous PEs, pulmonary nodule concerning for primary lung malignancy but patient declining further workup, chronic lower back pain with opioid dependence, complete heart block status post pacemaker, previous systolic CHF but normal EF on most recent echocardiogram, previous CVA, CAD with last PCI in November 2023, and infrarenal AAA,  thalassemia, recurrent anemia requiring frequent blood transfusions who presents with near syncopal episode and discovery of orthostatic hypotension with concern for possible bladder infection given lower abdominal pain and previous history of CAUDI infections and sepsis.  Patient has been fluid resuscitated and started on IV ciprofloxacin with resolution of dizziness/lightheadedness and improvement of blood pressures but ongoing severe lower abdominal pain more concerning for intestinal etiology then UTI.  Will continue IV ciprofloxacin but proceed with abdominal x-ray for further evaluation of lower abdominal discomfort.    Active Problems:    Lower abdominal pain    Assessment: Patient reports severe lower abdominal pain which is worse in his suprapubic region but also radiates to the right and lower quadrants.  He reports the pain has been going on for several days and he feels like he is unable to have a bowel movement, even though he attempts to do so.  He had been having loose stools in the recent past but today feels as though he is unable to even pass flatulence.  3 bowel movements are documented however patient reports all of them were smears and very unlike him.  The pain is sharp and cramping in nature and worsens whenever he tries to eat or drink anything.  He does have a history of  "constipation but states that he has been taking his MiraLAX faithfully and reports this is \"not okay and not normal for me.\"      Plan:   -Proceed with abdominal x-ray for evaluation of gas and stool pattern with consideration of CT scan if x-ray is nondiagnostic.  -continue with IV Cipro for possible UTI for now but if there is concern for interabdominal infection would consider adding Flagyl.  -Monitor closely for signs and symptoms of SIRS development as patient is high risk for significant complication and comorbidity.      Urinary tract infection associated with indwelling urethral catheter, initial encounter - possible    Assessment: There was concern for possible urinary tract infection associated with patient's indwelling catheter as he does have history of sepsis caused by CAUDI infection.  Sample was taken off of current Hernandez catheter but with new tubing as patient has significant narrowing of his urethra and requires urology Hernandez exchange which is not available at this facility.  Patient has been afebrile without  other signs or symptoms of sepsis developing.  He has been started on IV ciprofloxacin with urine culture pending.      Plan:   -Continue IV ciprofloxacin for now and await urine culture results as well as workup for lower abdominal pain as above.      Orthostatic hypotension    Assessment: Patient was found to have significant orthostatic hypotension which may have contributed to near syncopal episode at time of presentation.  Patient is not on any blood pressure lowering medications however he is confirmed to have  bladder cancer and is strongly suspected to have a primary lung cancer as well which likely is contributing in a patient that does not want intervention or treatment of cancerous etiology.  Patient has received 2 and half liters fluid bolusing along with ongoing IV fluid resuscitation overnight and feels clinically improved from a dizziness perspective but is unable to get up and " move much due to ongoing severe lower abdominal pain as above     Plan:   -Decrease IV fluids to 50 mL/h overnight and monitor for blood pressure and vital stability  -Perform repeat orthostatic blood pressure testing tomorrow with further discussion of goals of care and treatment options if orthostatic hypotension is still present.      Near syncope    Assessment: Patient had near syncopal episode in the clinic setting prior to being directed to the emergency department, thought likely secondary to orthostatic hypotension.  Patient has been placed on telemetry without significant cardiac arrhythmia noted and pacemaker functioning appropriately.  Patient denies any dizziness or lightheadedness today however has not been able to ambulate much secondary to severe lower abdominal pain    Plan:   -Will continue with IV fluids as above and repeat orthostatic blood pressures tomorrow      Anemia - chronic blood loss vs malignancy    Other thalassemia    Assessment: Patient has thalassemia at baseline but in the recent year has had recurrent anemia which was thought secondary to either chronic slow blood loss versus malignancy.  Recent workup also shows significant iron deficiency and patient has been started on oral iron.  Hemoglobin this morning is 6.1 following fluid resuscitation overnight and patient will be getting 1 unit packed red blood cells today.      Plan:   -proceed with 1 unit packed red blood cells and recheck hemoglobin this evening  Proceed with IV Venofer 300 milligrams every 24 hours while hospitalized as per Dr. Guerrero recommendations with PO iron at time of discharge   -patient continues to decline further evaluation into etiology       Urothelial carcinoma of bladder (H)    Assessment: Patient had a tumor located on the anterior bladder wall that demonstrated high-grade superficial invasive urothelial carcinoma and is now status post resection with chronic Hernandez catheter placement.  Patient has declined  further treatment with BCG therapy or other interventions.  He is set up to have a suprapubic catheter placement on 2/7/2025 given his ongoing difficulty with Hernandez catheter placement    Plan:   -Anticipate outpatient follow-up with urology as previously planned and anticipated suprapubic catheter placement in February as previously scheduled      Pulmonary nodule - concerning for cancer but patient declining further work up    Assessment: patient has a pulmonary nodule found in 1/2024 which was very concerning for primary lung malignancy but patient has declined further evaluation or follow up and continues to not desire further intervention or even repeat imaging performed at this time.     Plan:   -continue supportive cares during this stay       Chronic pain    Assessment: patient has chronic lower back pain and takes gabapentin and prn dilaudid for this.  Having acute severe lower abdominal pain currently and dilaudid is not working well enough to help with this acute pain    Plan:   -continue gabapentin 300 mg TID  -continue with home Dilaudid dose for now as work up for etiology of acute pain is occurring       Coronary artery disease involving native coronary artery of native heart without angina pectoris    Chronic systolic congestive heart failure (H)    Hypertension goal BP (blood pressure) < 140/90    Assessment: patient has history of CAD with PCI last in November 2023 and most recent echo from 7/2024 shows normalization of EF with ongoing early diastolic dysfunction.  Patient has not had any symptoms concerning for angina and reports his symptoms are overall stable.  Is on Eliquis for anticoagulation but has not been able to tolerate most cardiac medications due to hypotension.      Plan:   -will continue with Eliquis  -monitor for cardiac stability  -will continue with IV fluids given patient's hypotension yesterday and ongoing inadequate PO intake but will decrease to 50 mL/hr and monitor closely for  any concern of volume overload.       Third degree heart block (H) - s/p pacemaker    Assessment: patient has pacemaker which is functioning appropriately     Plan:   -continue routine monitoring      History of pulmonary embolism    Assessment: patient continues on Eliquis without signs of bleeding    Plan:   -continue Eliquis as per home regimen and monitor for respiratory stability       History of CVA (cerebrovascular accident) - old left internal capsule lacunar infarct on CT 7/2016    Assessment: previous history     Plan:   -continue Eliquis and supportive cares       Infrarenal abdominal aortic aneurysm (AAA) without rupture    Assessment: noted, stable    Plan:   -no acute intervention needed  -ongoing outpatient monitoring if desired by the patient           Diet: Combination Diet Regular Diet Adult  Snacks/Supplements Adult: Ensure Enlive; With Meals    DVT Prophylaxis: DOAC  Hernandez Catheter: PRESENT, indication: Other (Comment) (chronic)  Lines: None     Cardiac Monitoring: ACTIVE order. Indication: Syncope- low cardiac risk (24 hours)  Code Status: Full Code      Clinically Significant Risk Factors Present on Admission          # Hyperchloremia: Highest Cl = 109 mmol/L in last 2 days, will monitor as appropriate      # Hypocalcemia: Lowest Ca = 7.6 mg/dL in last 2 days, will monitor and replace as appropriate     # Hypoalbuminemia: Lowest albumin = 3.4 g/dL at 1/21/2025 11:48 AM, will monitor as appropriate  # Drug Induced Coagulation Defect: home medication list includes an anticoagulant medication    # Hypertension: Noted on problem list  # Chronic heart failure with preserved ejection fraction: heart failure noted on problem list and last echo with EF >50%     # Anemia: based on hgb <11           # Financial/Environmental Concerns:           Social Drivers of Health    Tobacco Use: Medium Risk (1/21/2025)    Patient History     Smoking Tobacco Use: Former     Smokeless Tobacco Use: Never   Social  Connections: Unknown (8/19/2024)    Social Connection and Isolation Panel [NHANES]     Marital Status:           Disposition Plan     Medically Ready for Discharge: Anticipated in 2-4 Days             Elizabeth Quigley MD  Hospitalist Service  [facility]  Securely message with [employer] (more info)  Text page via [facility] Paging/Directory   ______________________________________________________________________    Interval History   Patient has remained vitally stable with blood pressures improving, pulse continuing to show paced rhythm.  Patient  was on 2 L nasal cannula oxygen for a few hours yesterday however was satting in the upper 90s to 100 range with that and has been weaned off all O2 supplementation since last evening with saturations still in the low to mid 90s.  Patient complains of severe lower abdominal pain that has not improved with his oral home Dilaudid and a sensation of obstruction.  He reports smears of stool but he is not passing flatus or having bowel movements the way he normally does.  He has been unable to eat or drink secondary to worsening abdominal pain every time he tries.  No additional new nursing concerns.    Physical Exam   Vital Signs: Temp: 99.3  F (37.4  C) Temp src: Oral BP: 121/71 Pulse: 67   Resp: 18 SpO2: 93 % O2 Device: None (Room air) Oxygen Delivery: 1 LPM  Weight: 144 lbs 9.95 oz    Constitutional: awake, alert, cooperative, patient appears uncomfortable at rest and distracted by this discomfort, and appears stated age  Respiratory: No increased work of breathing, good air exchange, clear to auscultation bilaterally, no crackles or wheezing  Cardiovascular: Regular rate and rhythm  GI: Bowel sounds are hypoactive, patient's abdomen is mildly distended but soft on palpation.  He does have significant tenderness on palpation of the right and left lower quadrants and suprapubic region but no involuntary guarding or rebound tenderness  noted.  There is very mild tenderness on palpation of the periumbilical region as well as upper quadrants  Skin: no rashes  Musculoskeletal: no lower extremity pitting edema present  Neurologic: Awake, alert, oriented to name, place and overall to situation    Medical Decision Making       67 MINUTES SPENT BY ME on the date of service doing chart review, history, exam, documentation & further activities per the note.      Data     I have personally reviewed the following data over the past 24 hrs:    9.0  \   6.1 (LL)   / 206     137 109 (H) 17.3 /  92   4.1 22 0.65 (L) \       Imaging results reviewed over the past 24 hrs:   Recent Results (from the past 24 hours)   X-ray Abdomen flat decub port    Narrative    EXAM: XR ABDOMEN PORT 2 VIEWS  LOCATION: [facility], [facility]  DATE: 1/22/2025    INDICATION: Severe lower abdominal pain, diarrhea in recent days but now not able to pass flatus, evaluate bowel pattern and possible etiology.    COMPARISON: CT dated 12/30/2024.      Impression    IMPRESSION: Multiple gas-filled loops of small bowel and colon. No evidence for obstruction. This could reflect an ileus. Large round, likely gallstone is seen in the right upper quadrant. Large amount of stool through the ascending and rectosigmoid   colon. No free air. Right hip bipolar hemiarthroplasty hardware is noted. Degenerative changes are noted through the spine.

## 2025-01-22 NOTE — PROGRESS NOTES
S-(situation): Patient registered to inpatient. Patient arrived to room 266 via cart from ED    B-(background): Hypotension, UTI     A-(assessment): Pt A/Ox4. VSS on 2L of O2. Pt c/o bilateral lower abdomen pain. Denies nausea. Denies light-headedness or dizziness. LS: clear. Hernandez patent    R-(recommendations): Orders and observation goals reviewed with patient    Nursing Observation criteria listed below was met:    Skin issues/needs documented:NA  Isolation needs addressed and Signage up: NA  Fall Prevention: Education given and documented: Yes  Education Assessment documented:Yes  Admission Education Documented: Yes  New medication patient education completed and documented (Possible Side Effects of Common Medications handout): Yes  OBS video/handout Reviewed & Documented: Yes  Allergies Reviewed: Yes  Medication Reconciliation Complete: Yes  Home medications if not able to send immediately home with family stored here: NA  Reminder note placed in discharge instructions of home meds: NA  Patient has discharge needs (If yes, please explain): Yes  Patient discharge preferences addressed and charted on white board:  Yes  Provider notified that patient has arrived to the unit: Yes

## 2025-01-23 VITALS
HEART RATE: 89 BPM | BODY MASS INDEX: 20.75 KG/M2 | RESPIRATION RATE: 18 BRPM | WEIGHT: 144.62 LBS | SYSTOLIC BLOOD PRESSURE: 140 MMHG | TEMPERATURE: 97.3 F | DIASTOLIC BLOOD PRESSURE: 83 MMHG | OXYGEN SATURATION: 92 %

## 2025-01-23 LAB
ANION GAP SERPL CALCULATED.3IONS-SCNC: 10 MMOL/L (ref 7–15)
BACTERIA BLD CULT: NORMAL
BACTERIA BLD CULT: NORMAL
BACTERIA UR CULT: ABNORMAL
BACTERIA UR CULT: ABNORMAL
BUN SERPL-MCNC: 13.4 MG/DL (ref 8–23)
CALCIUM SERPL-MCNC: 8.4 MG/DL (ref 8.8–10.4)
CHLORIDE SERPL-SCNC: 109 MMOL/L (ref 98–107)
CREAT SERPL-MCNC: 0.6 MG/DL (ref 0.67–1.17)
EGFRCR SERPLBLD CKD-EPI 2021: >90 ML/MIN/1.73M2
ELLIPTOCYTES BLD QL SMEAR: SLIGHT
ERYTHROCYTE [DISTWIDTH] IN BLOOD BY AUTOMATED COUNT: 31.1 % (ref 10–15)
FRAGMENTS BLD QL SMEAR: SLIGHT
GLUCOSE SERPL-MCNC: 104 MG/DL (ref 70–99)
HCO3 SERPL-SCNC: 22 MMOL/L (ref 22–29)
HCT VFR BLD AUTO: 29.9 % (ref 40–53)
HGB BLD-MCNC: 8.5 G/DL (ref 13.3–17.7)
MCH RBC QN AUTO: 18 PG (ref 26.5–33)
MCHC RBC AUTO-ENTMCNC: 28.4 G/DL (ref 31.5–36.5)
MCV RBC AUTO: 63 FL (ref 78–100)
PLAT MORPH BLD: ABNORMAL
PLATELET # BLD AUTO: 239 10E3/UL (ref 150–450)
POTASSIUM SERPL-SCNC: 4 MMOL/L (ref 3.4–5.3)
RBC # BLD AUTO: 4.73 10E6/UL (ref 4.4–5.9)
RBC MORPH BLD: ABNORMAL
SODIUM SERPL-SCNC: 141 MMOL/L (ref 135–145)
SPHEROCYTES BLD QL SMEAR: SLIGHT
WBC # BLD AUTO: 13.3 10E3/UL (ref 4–11)

## 2025-01-23 PROCEDURE — 99233 SBSQ HOSP IP/OBS HIGH 50: CPT | Performed by: FAMILY MEDICINE

## 2025-01-23 PROCEDURE — 80048 BASIC METABOLIC PNL TOTAL CA: CPT | Performed by: FAMILY MEDICINE

## 2025-01-23 PROCEDURE — 120N000001 HC R&B MED SURG/OB

## 2025-01-23 PROCEDURE — 250N000011 HC RX IP 250 OP 636: Performed by: FAMILY MEDICINE

## 2025-01-23 PROCEDURE — 258N000003 HC RX IP 258 OP 636: Performed by: FAMILY MEDICINE

## 2025-01-23 PROCEDURE — 250N000011 HC RX IP 250 OP 636: Performed by: EMERGENCY MEDICINE

## 2025-01-23 PROCEDURE — 36415 COLL VENOUS BLD VENIPUNCTURE: CPT | Performed by: FAMILY MEDICINE

## 2025-01-23 PROCEDURE — 250N000011 HC RX IP 250 OP 636: Mod: JZ | Performed by: HOSPITALIST

## 2025-01-23 PROCEDURE — 82565 ASSAY OF CREATININE: CPT | Performed by: FAMILY MEDICINE

## 2025-01-23 PROCEDURE — 250N000013 HC RX MED GY IP 250 OP 250 PS 637: Performed by: FAMILY MEDICINE

## 2025-01-23 PROCEDURE — 85027 COMPLETE CBC AUTOMATED: CPT | Performed by: FAMILY MEDICINE

## 2025-01-23 PROCEDURE — 250N000013 HC RX MED GY IP 250 OP 250 PS 637: Performed by: EMERGENCY MEDICINE

## 2025-01-23 RX ORDER — AMOXICILLIN 250 MG
1 CAPSULE ORAL 2 TIMES DAILY
Status: DISCONTINUED | OUTPATIENT
Start: 2025-01-23 | End: 2025-01-28 | Stop reason: HOSPADM

## 2025-01-23 RX ORDER — POLYETHYLENE GLYCOL 3350 17 G/17G
17 POWDER, FOR SOLUTION ORAL 2 TIMES DAILY
Status: DISCONTINUED | OUTPATIENT
Start: 2025-01-23 | End: 2025-01-28 | Stop reason: HOSPADM

## 2025-01-23 RX ORDER — DIPHENHYDRAMINE HCL 25 MG
25 CAPSULE ORAL
Status: DISCONTINUED | OUTPATIENT
Start: 2025-01-23 | End: 2025-01-28 | Stop reason: HOSPADM

## 2025-01-23 RX ORDER — METRONIDAZOLE 500 MG/100ML
500 INJECTION, SOLUTION INTRAVENOUS EVERY 8 HOURS
Status: DISCONTINUED | OUTPATIENT
Start: 2025-01-23 | End: 2025-01-24

## 2025-01-23 RX ORDER — SODIUM PHOSPHATE,MONO-DIBASIC 19G-7G/118
1 ENEMA (ML) RECTAL
Status: DISCONTINUED | OUTPATIENT
Start: 2025-01-23 | End: 2025-01-28 | Stop reason: HOSPADM

## 2025-01-23 RX ORDER — BISACODYL 10 MG
10 SUPPOSITORY, RECTAL RECTAL ONCE
Status: COMPLETED | OUTPATIENT
Start: 2025-01-23 | End: 2025-01-23

## 2025-01-23 RX ADMIN — OXYBUTYNIN CHLORIDE 5 MG: 5 TABLET ORAL at 13:55

## 2025-01-23 RX ADMIN — SODIUM CHLORIDE: 9 INJECTION, SOLUTION INTRAVENOUS at 17:21

## 2025-01-23 RX ADMIN — OXYCODONE HYDROCHLORIDE 5 MG: 5 TABLET ORAL at 05:40

## 2025-01-23 RX ADMIN — SENNOSIDES AND DOCUSATE SODIUM 1 TABLET: 50; 8.6 TABLET ORAL at 20:33

## 2025-01-23 RX ADMIN — Medication 2 TABLET: at 08:10

## 2025-01-23 RX ADMIN — OXYCODONE HYDROCHLORIDE 5 MG: 5 TABLET ORAL at 23:16

## 2025-01-23 RX ADMIN — ACETAMINOPHEN 650 MG: 325 TABLET, FILM COATED ORAL at 08:08

## 2025-01-23 RX ADMIN — CIPROFLOXACIN 400 MG: 2 INJECTION, SOLUTION INTRAVENOUS at 23:11

## 2025-01-23 RX ADMIN — Medication 25 MCG: at 08:10

## 2025-01-23 RX ADMIN — GABAPENTIN 300 MG: 300 CAPSULE ORAL at 08:10

## 2025-01-23 RX ADMIN — GABAPENTIN 300 MG: 300 CAPSULE ORAL at 20:34

## 2025-01-23 RX ADMIN — Medication 3 MG: at 20:34

## 2025-01-23 RX ADMIN — SENNOSIDES AND DOCUSATE SODIUM 1 TABLET: 50; 8.6 TABLET ORAL at 08:11

## 2025-01-23 RX ADMIN — GABAPENTIN 300 MG: 300 CAPSULE ORAL at 13:43

## 2025-01-23 RX ADMIN — METRONIDAZOLE 500 MG: 500 INJECTION, SOLUTION INTRAVENOUS at 00:25

## 2025-01-23 RX ADMIN — TAMSULOSIN HYDROCHLORIDE 0.4 MG: 0.4 CAPSULE ORAL at 20:33

## 2025-01-23 RX ADMIN — HYDROMORPHONE HYDROCHLORIDE 2 MG: 2 TABLET ORAL at 08:09

## 2025-01-23 RX ADMIN — POLYETHYLENE GLYCOL 3350 17 G: 17 POWDER, FOR SOLUTION ORAL at 11:13

## 2025-01-23 RX ADMIN — BISACODYL 10 MG: 10 SUPPOSITORY RECTAL at 11:13

## 2025-01-23 RX ADMIN — FERROUS SULFATE TAB 325 MG (65 MG ELEMENTAL FE) 325 MG: 325 (65 FE) TAB at 08:11

## 2025-01-23 RX ADMIN — POLYETHYLENE GLYCOL 3350 17 G: 17 POWDER, FOR SOLUTION ORAL at 20:33

## 2025-01-23 RX ADMIN — APIXABAN 5 MG: 5 TABLET, FILM COATED ORAL at 08:11

## 2025-01-23 RX ADMIN — CIPROFLOXACIN 400 MG: 2 INJECTION, SOLUTION INTRAVENOUS at 11:49

## 2025-01-23 RX ADMIN — IRON SUCROSE 300 MG: 20 INJECTION, SOLUTION INTRAVENOUS at 20:34

## 2025-01-23 RX ADMIN — METRONIDAZOLE 500 MG: 500 INJECTION, SOLUTION INTRAVENOUS at 08:12

## 2025-01-23 RX ADMIN — METRONIDAZOLE 500 MG: 500 INJECTION, SOLUTION INTRAVENOUS at 16:11

## 2025-01-23 RX ADMIN — SENNOSIDES AND DOCUSATE SODIUM 2 TABLET: 50; 8.6 TABLET ORAL at 02:17

## 2025-01-23 RX ADMIN — APIXABAN 5 MG: 5 TABLET, FILM COATED ORAL at 20:34

## 2025-01-23 ASSESSMENT — ACTIVITIES OF DAILY LIVING (ADL)
ADLS_ACUITY_SCORE: 62
DEPENDENT_IADLS:: CLEANING;COOKING;LAUNDRY;SHOPPING;MEAL PREPARATION;MEDICATION MANAGEMENT;MONEY MANAGEMENT;TRANSPORTATION;INCONTINENCE
ADLS_ACUITY_SCORE: 59
ADLS_ACUITY_SCORE: 59
ADLS_ACUITY_SCORE: 62
ADLS_ACUITY_SCORE: 62
ADLS_ACUITY_SCORE: 61
ADLS_ACUITY_SCORE: 62
ADLS_ACUITY_SCORE: 62
ADLS_ACUITY_SCORE: 59
ADLS_ACUITY_SCORE: 62
ADLS_ACUITY_SCORE: 60
ADLS_ACUITY_SCORE: 62
ADLS_ACUITY_SCORE: 59
ADLS_ACUITY_SCORE: 62
ADLS_ACUITY_SCORE: 62
ADLS_ACUITY_SCORE: 60
ADLS_ACUITY_SCORE: 59
ADLS_ACUITY_SCORE: 60
ADLS_ACUITY_SCORE: 62
ADLS_ACUITY_SCORE: 62
ADLS_ACUITY_SCORE: 59
ADLS_ACUITY_SCORE: 60
ADLS_ACUITY_SCORE: 59

## 2025-01-23 NOTE — PLAN OF CARE
Goal Outcome Evaluation:      Plan of Care Reviewed With: patient    Overall Patient Progress: improvingOverall Patient Progress: improving    Outcome Evaluation: Pt A/Ox4. Elevated BP, otherwise VSS on RA. Pt continues to have lower abdomen pain, given Oxycodone and Tylenol with some relief. CT abdomen done last evening, started on Flagyl. Due to the constipation, given Senna and Prune juice. No BM during this shift. NS infusing at 50 ml/hr. Scheduled Ciprofloxacin given. Hgb last evening at 7.1, MD aware. Hernandez patent.

## 2025-01-23 NOTE — PROGRESS NOTES
[facility]    Medicine Progress Note - Hospitalist Service    Date of Admission:  1/21/2025    Assessment & Plan    Patient is a 79-year-old gentleman with past medical history of bladder cancer status postresection with chronic indwelling Hernandez catheter, COPD,'s previous PEs, pulmonary nodule concerning for primary lung malignancy but patient declining further workup, chronic lower back pain with opioid dependence, complete heart block status post pacemaker, previous systolic CHF but normal EF on most recent echocardiogram, previous CVA, CAD with last PCI in November 2023, and infrarenal AAA,  thalassemia, recurrent anemia requiring frequent blood transfusions who presents with near syncopal episode and discovery of orthostatic hypotension with concern for possible bladder infection given lower abdominal pain and previous history of CAUDI infections and sepsis.  Patient has been fluid resuscitated and started on IV ciprofloxacin with resolution of dizziness/lightheadedness and improvement of blood pressures but ongoing severe lower abdominal pain more concerning for intestinal etiology then UTI.  Will continue IV ciprofloxacin but proceed with abdominal x-ray for further evaluation of lower abdominal discomfort.    Active Problems:    Lower abdominal pain    Fecal impaction with concern for stercoral colitis    Assessment: Patient reports severe lower abdominal pain which is worse in his suprapubic region but also radiates to the right and lower quadrants.  He reports the pain has been going on for several days and he feels like he is unable to have a bowel movement, even though he attempts to do so.  He had been having loose stools in the recent past but today feels as though he is unable to even pass flatulence.  3 bowel movements are documented however patient reports all of them were smears and very unlike him.  X-ray showed large stool burden but concern for possible ileus.  CT  scan shows fecal impaction with concern for stercoral colitis at the rectosigmoid junction.  Patient was on Cipro and Flagyl was added overnight but no bowel regimen started.      Plan:   -continue with Cipro and Flagyl for antibiotic coverage  -Start miralax BID, scheduled Senna S BID with prn dosing available for ongoing constipation, and prn milk of magnesia as third line oral agent  -will give suppository x1 now with prn enema if no results  -discussed with patient and wife the importance of keeping stools soft and regular - discussed that diarrhea may be a sign of recurrent impacting in the future and stopping stool medications will only worsen the issue.   -Monitor for improvement of patient's pain/symptoms with plan as above       Urinary tract infection associated with indwelling urethral catheter, initial encounter - possible    Assessment: There was concern for possible urinary tract infection associated with patient's indwelling catheter as he does have history of sepsis caused by CAUDI infection.  Sample was taken off of current Hernandez catheter but with new tubing as patient has significant narrowing of his urethra and requires urology Hernandez exchange which is not available at this facility.  Patient has been afebrile without  other signs or symptoms of sepsis developing.  He has been started on IV ciprofloxacin with urine culture pending.      Plan:   -Continue IV ciprofloxacin for now and await urine culture results as well as workup for lower abdominal pain as above.      Orthostatic hypotension    Assessment: Patient was found to have significant orthostatic hypotension which may have contributed to near syncopal episode at time of presentation.  Patient is not on any blood pressure lowering medications however he is confirmed to have  bladder cancer and is strongly suspected to have a primary lung cancer as well which likely is contributing in a patient that does not want intervention or treatment of  cancerous etiology.  Patient has received 2 and half liters fluid bolusing along with ongoing IV fluid resuscitation overnight and feels clinically improved from a dizziness perspective but is unable to get up and move much due to ongoing severe lower abdominal pain as above     Plan:   -Continue IV fluids at 50 mL/h until PO intake improves  -Perform repeat orthostatic blood pressure testing tomorrow with further discussion of goals of care and treatment options if orthostatic hypotension is still present.      Near syncope    Assessment: Patient had near syncopal episode in the clinic setting prior to being directed to the emergency department, thought likely secondary to orthostatic hypotension.  Patient has been placed on telemetry without significant cardiac arrhythmia noted and pacemaker functioning appropriately.  Patient denies any dizziness or lightheadedness today however has not been able to ambulate much secondary to severe lower abdominal pain    Plan:   -Will continue with IV fluids as above and repeat orthostatic blood pressures tomorrow      Anemia - chronic blood loss vs malignancy    Other thalassemia    Assessment: Patient has thalassemia at baseline but in the recent year has had recurrent anemia which was thought secondary to either chronic slow blood loss versus malignancy.  Recent workup also shows significant iron deficiency and patient has been started on oral iron.  Hemoglobin was 6.1 following fluid resuscitation and patient received 1 unit PRBC on 1/22 with hemoglobin improving to 8.5    Plan:   -continue to monitor for hemoglobin stability during this stay   -Proceed with IV Venofer 300 milligrams every 24 hours while hospitalized as per Dr. Guerrero recommendations with PO iron at time of discharge   -patient continues to decline further evaluation into etiology       Acute respiratory failure - considered and ruled out    Assessment:  Patient had transient need for 2L oxygen in the ED  after oxygen saturation of 89% x1 but 2L increased sats into the upper 90s-100% range and patient was weaned off without recurrent hypoxemia noted.  Respiratory failure is no longer suspected    Plan:  -monitor for ongoing respiratory stability       Urothelial carcinoma of bladder (H)    Assessment: Patient had a tumor located on the anterior bladder wall that demonstrated high-grade superficial invasive urothelial carcinoma and is now status post resection with chronic Hernandez catheter placement.  Patient has declined further treatment with BCG therapy or other interventions.  He is set up to have a suprapubic catheter placement on 2/7/2025 given his ongoing difficulty with Hernandez catheter placement    Plan:   -Anticipate outpatient follow-up with urology as previously planned and anticipated suprapubic catheter placement in February as previously scheduled      Pulmonary nodule - concerning for cancer but patient declining further work up    Assessment: patient has a pulmonary nodule found in 1/2024 which was very concerning for primary lung malignancy but patient has declined further evaluation or follow up and continues to not desire further intervention or even repeat imaging performed at this time.     Plan:   -continue supportive cares during this stay       Chronic pain    Assessment: patient has chronic lower back pain and takes gabapentin and prn dilaudid for this.  Having acute severe lower abdominal pain currently as above    Plan:   -continue gabapentin 300 mg TID  -continue with home Dilaudid dose for now       Coronary artery disease involving native coronary artery of native heart without angina pectoris    Chronic systolic congestive heart failure (H)    Hypertension goal BP (blood pressure) < 140/90    Assessment: patient has history of CAD with PCI last in November 2023 and most recent echo from 7/2024 shows normalization of EF with ongoing early diastolic dysfunction.  Patient has not had any  symptoms concerning for angina and reports his symptoms are overall stable.  Is on Eliquis for anticoagulation but has not been able to tolerate most cardiac medications due to hypotension.      Plan:   -will continue with Eliquis  -monitor for cardiac stability  -will continue with IV fluids given patient's hypotension yesterday and ongoing inadequate PO intake - continue 50 mL/hr and monitor closely for any concern of volume overload.   -daily weight added to treatment plan      Third degree heart block (H) - s/p pacemaker    Assessment: patient has pacemaker which is functioning appropriately     Plan:   -continue routine monitoring      History of pulmonary embolism    Assessment: patient continues on Eliquis without signs of bleeding    Plan:   -continue Eliquis as per home regimen and monitor for respiratory stability       History of CVA (cerebrovascular accident) - old left internal capsule lacunar infarct on CT 7/2016    Assessment: previous history     Plan:   -continue Eliquis and supportive cares       Infrarenal abdominal aortic aneurysm (AAA) without rupture    Assessment: noted, stable    Plan:   -no acute intervention needed  -ongoing outpatient monitoring if desired by the patient           Diet: Combination Diet Regular Diet Adult  Snacks/Supplements Adult: Ensure Enlive; With Meals    DVT Prophylaxis: DOAC  Hernandez Catheter: PRESENT, indication: Other (Comment) (chronic)  Lines: None     Cardiac Monitoring: None  Code Status: Full Code      Clinically Significant Risk Factors          # Hyperchloremia: Highest Cl = 109 mmol/L in last 2 days, will monitor as appropriate      # Hypocalcemia: Lowest Ca = 7.6 mg/dL in last 2 days, will monitor and replace as appropriate     # Hypoalbuminemia: Lowest albumin = 3.4 g/dL at 1/21/2025 11:48 AM, will monitor as appropriate     # Hypertension: Noted on problem list  # Chronic heart failure with preserved ejection fraction: heart failure noted on problem list  and last echo with EF >50%               # Financial/Environmental Concerns:           Social Drivers of Health    Tobacco Use: Medium Risk (1/21/2025)    Patient History     Smoking Tobacco Use: Former     Smokeless Tobacco Use: Never   Social Connections: Unknown (8/19/2024)    Social Connection and Isolation Panel [NHANES]     Marital Status:           Disposition Plan     Medically Ready for Discharge: Anticipated in 2-4 Days             Elizabeth Quigley MD  Hospitalist Service  [facility]  Securely message with [employer] (more info)  Text page via [email or web address] Paging/Directory   ______________________________________________________________________    Interval History   Patient continues to have notable lower abdominal pain this morning without new symptoms or change in pain quality.  Still unable to each much due to worsening pain when he tires.  Other vitals overall stable and reassuring.  Urinary output is improving. Patient tolerated first dose of IV Venofer without complications.  He is very hopeful for improvement in the next 1-2 days as his infections improve.  No new nursing concerns     Physical Exam   Vital Signs: Temp: 98.8  F (37.1  C) Temp src: Oral BP: (!) 161/91 Pulse: 81   Resp: 18 SpO2: 92 % O2 Device: None (Room air)    Weight: 144 lbs 9.95 oz    Constitutional: awake, alert, cooperative, no apparent distress and appears significantly more comfortable than during exam yesterday, and appears stated age  Respiratory: No increased work of breathing, good air exchange, clear to auscultation bilaterally, no crackles or wheezing  Cardiovascular: RRR  GI: bowel sounds are present but hypoactive, abdomen distended but soft.  Ongoing tenderness most notable in suprapubic region and bilateral lower quadrants with less tenderness noted in upper abdomen today.  Still no guarding or rebound tenderness noted   Skin: no redness, warmth, or swelling and no  rashes  Musculoskeletal: no lower extremity pitting edema present  Neurologic: Awake, alert, oriented to name, place and situation     Medical Decision Making       53 MINUTES SPENT BY ME on the date of service doing chart review, history, exam, documentation & further activities per the note.      Data     I have personally reviewed the following data over the past 24 hrs:    13.3 (H)  \   8.5 (L)   / 239     141 109 (H) 13.4 /  104 (H)   4.0 22 0.60 (L) \

## 2025-01-23 NOTE — CONSULTS
Care Management Initial Consult    General Information  Assessment completed with: Patient, Spouse or significant other, Spoke with mary anne Soto, over the phone  Type of CM/SW Visit: Initial Assessment    Primary Care Provider verified and updated as needed: Yes   Readmission within the last 30 days: no previous admission in last 30 days      Reason for Consult: other (see comments), discharge planning (elevated risk for readmission)  Advance Care Planning: Advance Care Planning Reviewed: present on chart          Communication Assessment  Patient's communication style: spoken language (English or Bilingual)    Hearing Difficulty or Deaf: no   Wear Glasses or Blind: yes    Cognitive  Cognitive/Neuro/Behavioral: WDL  Level of Consciousness: alert  Arousal Level: opens eyes spontaneously  Orientation: oriented x 4  Mood/Behavior: anxious, cooperative  Best Language: 0 - No aphasia  Speech: clear, spontaneous    Living Environment:   People in home: spouse  Mary Anne Soto  Current living Arrangements: house      Able to return to prior arrangements: yes       Family/Social Support:  Care provided by: spouse/significant other, self  Provides care for: no one, unable/limited ability to care for self  Marital Status:   Support system: Wife, Children  Brittany       Description of Support System: Supportive, Involved    Support Assessment: Adequate family and caregiver support, Adequate social supports    Current Resources:   Patient receiving home care services: No        Community Resources: Hospice (Was with [facility], prior to hospital admission.  He has been discharged. They will re-start services upon discharge.)  Equipment currently used at home: wheelchair, manual  Supplies currently used at home: Oxygen Tubing/Supplies, Chux, Incontinence Supplies    Employment/Financial:  Employment Status: retired        Financial Concerns: none           Does the patient's insurance plan have a 3 day qualifying hospital  stay waiver?  Yes     Which insurance plan 3 day waiver is available? Alternative insurance waiver    Will the waiver be used for post-acute placement? No    Lifestyle & Psychosocial Needs:  Social Drivers of Health     Food Insecurity: Low Risk  (1/21/2025)    Food Insecurity     Within the past 12 months, did you worry that your food would run out before you got money to buy more?: No     Within the past 12 months, did the food you bought just not last and you didn t have money to get more?: No   Depression: Not at risk (1/3/2025)    PHQ-2     PHQ-2 Score: 0   Housing Stability: Low Risk  (1/21/2025)    Housing Stability     Do you have housing? : Yes     Are you worried about losing your housing?: No   Tobacco Use: Medium Risk (1/21/2025)    Patient History     Smoking Tobacco Use: Former     Smokeless Tobacco Use: Never     Passive Exposure: Not on file   Financial Resource Strain: Low Risk  (1/21/2025)    Financial Resource Strain     Within the past 12 months, have you or your family members you live with been unable to get utilities (heat, electricity) when it was really needed?: No   Alcohol Use: Not on file   Transportation Needs: Low Risk  (1/21/2025)    Transportation Needs     Within the past 12 months, has lack of transportation kept you from medical appointments, getting your medicines, non-medical meetings or appointments, work, or from getting things that you need?: No   Physical Activity: Not on file   Interpersonal Safety: Low Risk  (1/21/2025)    Interpersonal Safety     Do you feel physically and emotionally safe where you currently live?: Yes     Within the past 12 months, have you been hit, slapped, kicked or otherwise physically hurt by someone?: No     Within the past 12 months, have you been humiliated or emotionally abused in other ways by your partner or ex-partner?: No   Stress: Not on file   Social Connections: Unknown (8/19/2024)    Social Connection and Isolation Panel [NHANES]      Frequency of Communication with Friends and Family: Not on file     Frequency of Social Gatherings with Friends and Family: Not on file     Attends Oriental orthodox Services: Not on file     Active Member of Clubs or Organizations: Not on file     Attends Club or Organization Meetings: Not on file     Marital Status:    Health Literacy: Not on file       Functional Status:  Prior to admission patient needed assistance:   Dependent ADLs:: Ambulation-walker, Bathing, Dressing, Grooming, Incontinence, Transfers, Wheelchair-with assist, Toileting  Dependent IADLs:: Cleaning, Cooking, Laundry, Shopping, Meal Preparation, Medication Management, Money Management, Transportation, Incontinence  Assesssment of Functional Status: At functional baseline    Mental Health Status:  Mental Health Status: No Current Concerns       Chemical Dependency Status:  Chemical Dependency Status: No Current Concerns             Values/Beliefs:  Spiritual, Cultural Beliefs, Oriental orthodox Practices, Values that affect care: no               Discussed  Partnership in Safe Discharge Planning  document with patient/family: No    Additional Information:  Care Management consulted due to elevated risk for readmission and need for discharge planning.      Writer met with patient at the bedside.  Writer introduced self and role of Care Management.  Reviewed demographic information and some information above.  Patient couldn't remember the agency that has staff coming out to help.      Per Patient he lives in his garage which was made into a living space with a kitchen and a bathroom. Patient's wife Brittany lives at home with patient and per patient she gets him up at 7:00am every morning to get him dressed and ready for the day and spends the whole day with him and then goes back into the house for sleeping. Per patient his wife assists with all ADLS and IADLs.       Patient not wanting to visit very long and requested that writer call and talk with his  "wife, Brittany.  When talking about discharge planning, patient stated, \"Don't worry about it.\"  Discussed that patient doesn't want to be \"fussed\" over.      Writer called and spoke with wife, Brittany. Writer provided supportive listening, as Brittany voiced her frustrations regarding \"some doctors\" (mostly in the ED) \"not listening\" about patient's bowel concerns, etc.  Writer asked if she would like [facility] Patient Relations phone number and she stated, \"I'm not even going to bother. They won't listen anyway.\"      Brittany verified that she is patient's primary caregiver.  She stated she \"got tired of him having diarrhea for 1.5 years and placed him on a \"DALIA\" diet, and this helped\".      Brittany stated she is going to try to come in to the hospital today \"if my car starts\" and \"I would like to request to talk to the doctor today.\"     Brittany stated that patient had [facility] (Phone: [phone] Fax: [phone]) services, prior to his admission to the hospital on 1/21/25.  Brittany stated, \"They told me that they can re-start his services once he leaves the hospital.\"      Writer has called and spoken with the [facility] liaison, Anai.  She confirmed that patient had been on service with them, up until his admission on 1/21/25 and that they plan to re-start services at discharge.  Writer has sent a referral to [facility].  Anai stated that most likely patient's DME equipment has been picked up from the home, so they most likely will need to re-deliver equipment on day of discharge.      Next Steps: Care Management will continue to follow and assist with discharge planning.      Naila Escobar, [facility]   [phone]      "

## 2025-01-23 NOTE — PROGRESS NOTES
CT A/P salient for fecal impaction, possible developing stercoral colitis     PLAN  - cont w/ cipro; add flagyl  - digital disimpaction in AM

## 2025-01-23 NOTE — PLAN OF CARE
Goal Outcome Evaluation:      Plan of Care Reviewed With: patient, spouse    Overall Patient Progress: no changeOverall Patient Progress: no change    Outcome Evaluation: Had two small soft brown incontinent stools after receiving Senna, Miralax, and Dulcolox suppository. Continues to rate pain 10/10, but appears more comfortable. Gave Dilaudid and Tylenol without relief earlier in shift and then gave Oxybutnin this afternoon without any change in pain. Heat applied now. Refused breakfast and lunch. Refused to get up in chair. Refused to try walking stating he had not walked since his hip surgery in April. Urine in murrell bag is tea-colored. IV fluids and abx continue. Bed alarm on. Wife at bedside this afternoon.

## 2025-01-23 NOTE — PLAN OF CARE
Goal Outcome Evaluation:      Plan of Care Reviewed With: patient    Overall Patient Progress: improvingOverall Patient Progress: improving    Outcome Evaluation: T pump has helped to decrease abdominal pain and has helped patient to rest. Active bowel sounds, but no more stools today. BP was 167/97, and he again refused orthostatic BP's. IV fluids and abx continue. Bed alarm on.

## 2025-01-23 NOTE — PLAN OF CARE
Goal Outcome Evaluation:      Plan of Care Reviewed With: patient    Overall Patient Progress: no changeOverall Patient Progress: no change    Outcome Evaluation: 4 hour note- Alert and oriented x 4, irritated does not want to go down for CT. Vitals stable. Reports pain, IV infusing at 50 mL/hr. CT contrast given over two hours. Tele shows V- paced. Chronic murrell in place.    /71 (BP Location: Left arm)   Pulse 69   Temp 98.5  F (36.9  C) (Oral)   Resp 18   Wt 65.6 kg (144 lb 10 oz)   SpO2 94%   BMI 20.75 kg/m

## 2025-01-24 LAB
ACANTHOCYTES BLD QL SMEAR: SLIGHT
ANION GAP SERPL CALCULATED.3IONS-SCNC: 7 MMOL/L (ref 7–15)
BUN SERPL-MCNC: 11.8 MG/DL (ref 8–23)
CALCIUM SERPL-MCNC: 8.4 MG/DL (ref 8.8–10.4)
CHLORIDE SERPL-SCNC: 107 MMOL/L (ref 98–107)
CREAT SERPL-MCNC: 0.58 MG/DL (ref 0.67–1.17)
EGFRCR SERPLBLD CKD-EPI 2021: >90 ML/MIN/1.73M2
ELLIPTOCYTES BLD QL SMEAR: SLIGHT
ERYTHROCYTE [DISTWIDTH] IN BLOOD BY AUTOMATED COUNT: 30.7 % (ref 10–15)
FRAGMENTS BLD QL SMEAR: SLIGHT
GLUCOSE SERPL-MCNC: 86 MG/DL (ref 70–99)
HCO3 SERPL-SCNC: 23 MMOL/L (ref 22–29)
HCT VFR BLD AUTO: 29.5 % (ref 40–53)
HGB BLD-MCNC: 8.4 G/DL (ref 13.3–17.7)
MCH RBC QN AUTO: 18 PG (ref 26.5–33)
MCHC RBC AUTO-ENTMCNC: 28.5 G/DL (ref 31.5–36.5)
MCV RBC AUTO: 63 FL (ref 78–100)
PLAT MORPH BLD: ABNORMAL
PLATELET # BLD AUTO: 253 10E3/UL (ref 150–450)
POLYCHROMASIA BLD QL SMEAR: SLIGHT
POTASSIUM SERPL-SCNC: 4.3 MMOL/L (ref 3.4–5.3)
RBC # BLD AUTO: 4.67 10E6/UL (ref 4.4–5.9)
RBC MORPH BLD: ABNORMAL
SODIUM SERPL-SCNC: 137 MMOL/L (ref 135–145)
SPHEROCYTES BLD QL SMEAR: SLIGHT
TARGETS BLD QL SMEAR: SLIGHT
WBC # BLD AUTO: 15 10E3/UL (ref 4–11)

## 2025-01-24 PROCEDURE — 250N000011 HC RX IP 250 OP 636: Mod: JZ | Performed by: HOSPITALIST

## 2025-01-24 PROCEDURE — 36415 COLL VENOUS BLD VENIPUNCTURE: CPT | Performed by: FAMILY MEDICINE

## 2025-01-24 PROCEDURE — 82565 ASSAY OF CREATININE: CPT | Performed by: FAMILY MEDICINE

## 2025-01-24 PROCEDURE — 258N000003 HC RX IP 258 OP 636: Performed by: FAMILY MEDICINE

## 2025-01-24 PROCEDURE — 84295 ASSAY OF SERUM SODIUM: CPT | Performed by: FAMILY MEDICINE

## 2025-01-24 PROCEDURE — 99233 SBSQ HOSP IP/OBS HIGH 50: CPT | Performed by: FAMILY MEDICINE

## 2025-01-24 PROCEDURE — 80051 ELECTROLYTE PANEL: CPT | Performed by: FAMILY MEDICINE

## 2025-01-24 PROCEDURE — 250N000013 HC RX MED GY IP 250 OP 250 PS 637: Performed by: EMERGENCY MEDICINE

## 2025-01-24 PROCEDURE — 120N000001 HC R&B MED SURG/OB

## 2025-01-24 PROCEDURE — 250N000011 HC RX IP 250 OP 636: Performed by: FAMILY MEDICINE

## 2025-01-24 PROCEDURE — 85018 HEMOGLOBIN: CPT | Performed by: FAMILY MEDICINE

## 2025-01-24 PROCEDURE — 250N000013 HC RX MED GY IP 250 OP 250 PS 637: Performed by: FAMILY MEDICINE

## 2025-01-24 RX ORDER — PIPERACILLIN SODIUM, TAZOBACTAM SODIUM 3; .375 G/15ML; G/15ML
3.38 INJECTION, POWDER, LYOPHILIZED, FOR SOLUTION INTRAVENOUS EVERY 6 HOURS
Status: DISCONTINUED | OUTPATIENT
Start: 2025-01-24 | End: 2025-01-28 | Stop reason: HOSPADM

## 2025-01-24 RX ADMIN — Medication 25 MCG: at 10:04

## 2025-01-24 RX ADMIN — METRONIDAZOLE 500 MG: 500 INJECTION, SOLUTION INTRAVENOUS at 00:30

## 2025-01-24 RX ADMIN — APIXABAN 5 MG: 5 TABLET, FILM COATED ORAL at 10:04

## 2025-01-24 RX ADMIN — FERROUS SULFATE TAB 325 MG (65 MG ELEMENTAL FE) 325 MG: 325 (65 FE) TAB at 10:05

## 2025-01-24 RX ADMIN — GABAPENTIN 300 MG: 300 CAPSULE ORAL at 21:06

## 2025-01-24 RX ADMIN — TAMSULOSIN HYDROCHLORIDE 0.4 MG: 0.4 CAPSULE ORAL at 21:06

## 2025-01-24 RX ADMIN — PIPERACILLIN AND TAZOBACTAM 3.38 G: 3; .375 INJECTION, POWDER, FOR SOLUTION INTRAVENOUS at 11:51

## 2025-01-24 RX ADMIN — APIXABAN 5 MG: 5 TABLET, FILM COATED ORAL at 21:06

## 2025-01-24 RX ADMIN — METRONIDAZOLE 500 MG: 500 INJECTION, SOLUTION INTRAVENOUS at 10:05

## 2025-01-24 RX ADMIN — PIPERACILLIN AND TAZOBACTAM 3.38 G: 3; .375 INJECTION, POWDER, FOR SOLUTION INTRAVENOUS at 17:52

## 2025-01-24 RX ADMIN — SENNOSIDES AND DOCUSATE SODIUM 1 TABLET: 50; 8.6 TABLET ORAL at 10:04

## 2025-01-24 RX ADMIN — Medication 3 MG: at 21:06

## 2025-01-24 RX ADMIN — GABAPENTIN 300 MG: 300 CAPSULE ORAL at 10:03

## 2025-01-24 RX ADMIN — SODIUM CHLORIDE: 9 INJECTION, SOLUTION INTRAVENOUS at 11:53

## 2025-01-24 RX ADMIN — OXYCODONE HYDROCHLORIDE 5 MG: 5 TABLET ORAL at 10:04

## 2025-01-24 RX ADMIN — POLYETHYLENE GLYCOL 3350 17 G: 17 POWDER, FOR SOLUTION ORAL at 10:03

## 2025-01-24 RX ADMIN — PIPERACILLIN AND TAZOBACTAM 3.38 G: 3; .375 INJECTION, POWDER, FOR SOLUTION INTRAVENOUS at 23:34

## 2025-01-24 RX ADMIN — Medication 2 TABLET: at 10:04

## 2025-01-24 ASSESSMENT — ACTIVITIES OF DAILY LIVING (ADL)
ADLS_ACUITY_SCORE: 64
ADLS_ACUITY_SCORE: 62
ADLS_ACUITY_SCORE: 64
ADLS_ACUITY_SCORE: 64
ADLS_ACUITY_SCORE: 68
ADLS_ACUITY_SCORE: 62
ADLS_ACUITY_SCORE: 64
ADLS_ACUITY_SCORE: 64
ADLS_ACUITY_SCORE: 68
ADLS_ACUITY_SCORE: 68
ADLS_ACUITY_SCORE: 62
ADLS_ACUITY_SCORE: 62
ADLS_ACUITY_SCORE: 68
ADLS_ACUITY_SCORE: 64
ADLS_ACUITY_SCORE: 62
ADLS_ACUITY_SCORE: 68
ADLS_ACUITY_SCORE: 62
ADLS_ACUITY_SCORE: 62

## 2025-01-24 NOTE — PROGRESS NOTES
[facility] [place]    Medicine Progress Note - Hospitalist Service    Date of Admission:  1/21/2025    Assessment & Plan   Patient is a 79-year-old gentleman with past medical history of bladder cancer status postresection with chronic indwelling Hernandez catheter, COPD,'s previous PEs, pulmonary nodule concerning for primary lung malignancy but patient declining further workup, chronic lower back pain with opioid dependence, complete heart block status post pacemaker, previous systolic CHF but normal EF on most recent echocardiogram, previous CVA, CAD with last PCI in November 2023, and infrarenal AAA,  thalassemia, recurrent anemia requiring frequent blood transfusions who presents with near syncopal episode and discovery of orthostatic hypotension with concern for possible bladder infection given lower abdominal pain and previous history of CAUDI infections and sepsis.  Patient has been fluid resuscitated and started on IV ciprofloxacin with resolution of dizziness/lightheadedness and improvement of blood pressures but ongoing severe lower abdominal pain more concerning for intestinal etiology and CT scan showed concern for fecal impaction with stercoral colitis.  Bowel regimen has been started and patient now stooling well and lower abdominal pain is lessening.  UC has grown out pseudomonas and sensitivities have now returned and is resistant to flouroquinolones and patient will be transitioned to IV Zosyn.  Attempted to discuss with patient complication of new resistance to flouroquinolones and possible need for IV antibiotics at discharge, however that cannot be done in hospice (which patient was planning on restarting at time of discharge).  Will continue conversation with patient and family going forward.       Active Problems:    Lower abdominal pain    Assessment:  patient had severe lower abdominal pain and could be secondary to UTI vs fecal impaction with stercolitis.  Symptoms are  now starting to improve and given that UTI has not been being effectively treated, initial pain likely secondary to intestinal causes.      Plan:    -continue treatment of UTI and fecal impaction/stercoral colitis as below       Fecal impaction with concern for stercoral colitis    Assessment: Patient reports severe lower abdominal pain which is worse in his suprapubic region but also radiates to the right and lower quadrants.  He reports the pain has been going on for several days and he feels like he is unable to have a bowel movement, even though he attempts to do so.  He had been having loose stools in the recent past but today feels as though he is unable to even pass flatulence.  3 bowel movements are documented however patient reports all of them were smears and very unlike him.  X-ray showed large stool burden but concern for possible ileus.  CT scan shows fecal impaction with concern for stercoral colitis at the rectosigmoid junction.  Patient was on Cipro and Flagyl was added overnight and bowel regimen started with patient having large amount of stool output in the past 24 hours and patient feeling much better.      Plan:   -transition to Zosyn given UC results, and Zosyn would cover both Pseudomonas and intraabdominal infection source   -Continue miralax BID, scheduled Senna S BID with prn dosing available for ongoing constipation, and prn milk of magnesia as third line oral agent  -discussed with patient and wife the importance of keeping stools soft and regular - discussed that diarrhea may be a sign of recurrent impacting in the future and stopping stool medications will only worsen the issue.   -Monitor for improvement of patient's pain/symptoms with plan as above       Urinary tract infection associated with indwelling urethral catheter, initial encounter - possible    Assessment: There was concern for possible urinary tract infection associated with patient's indwelling catheter as he does have  history of sepsis caused by CAUDI infection.  Sample was taken off of current Hernandez catheter but with new tubing as patient has significant narrowing of his urethra and requires urology Hernandez exchange which is not available at this facility.  Patient has been afebrile without  other signs or symptoms of sepsis developing however he has been having worsening leukocytosis and urine is changing with increased odor and worsening appearance.  UC is now showing pseudomonas but is resistant to Cipro/Levaquin.      Plan:   -change to Zosyn for coverage of pseudomonas and intraabdominal source as above       Urinary retention    Assessment:  patient was having increased urinary retention with bladder scan over 200 this morning.  Hernandez has been flushed and patient has gotten up moving with urine output improving and random bladder scans now much lower    Plan:    -monitor for ongoing resolution       Orthostatic hypotension    Assessment: Patient was found to have significant orthostatic hypotension which may have contributed to near syncopal episode at time of presentation.  Patient is not on any blood pressure lowering medications however he is confirmed to have  bladder cancer and is strongly suspected to have a primary lung cancer as well which likely is contributing in a patient that does not want intervention or treatment of cancerous etiology.  Patient has received 2 and half liters fluid bolusing along with ongoing IV fluid resuscitation overnight and feels clinically improved from a dizziness perspective and orthostatic blood pressure check this morning shows now drop    Plan:   -Continue IV fluids at 50 mL/h until PO intake improves  -Monitor for ongoing resolution of orthostatic drop       Near syncope    Assessment: Patient had near syncopal episode in the clinic setting prior to being directed to the emergency department, thought likely secondary to orthostatic hypotension.  Patient has been placed on telemetry  without significant cardiac arrhythmia noted and pacemaker functioning appropriately.  Patient denies any dizziness or lightheadedness today however has not been able to ambulate much secondary to severe lower abdominal pain    Plan:   -Will continue with IV fluids as above   -monitor for any recurrent symptoms       Anemia - chronic blood loss vs malignancy    Other thalassemia    Assessment: Patient has thalassemia at baseline but in the recent year has had recurrent anemia which was thought secondary to either chronic slow blood loss versus malignancy.  Recent workup also shows significant iron deficiency and patient has been started on oral iron.  Hemoglobin was 6.1 following fluid resuscitation and patient received 1 unit PRBC on 1/22 with hemoglobin improving to 8.5 and has been stable     Plan:   -continue to monitor for hemoglobin stability during this stay   -Proceed with IV Venofer 300 milligrams every 24 hours while hospitalized as per Dr. Guerrero recommendations with PO iron at time of discharge   -patient continues to decline further evaluation into etiology       Acute respiratory failure - considered and ruled out    Assessment:  Patient had transient need for 2L oxygen in the ED after oxygen saturation of 89% x1 but 2L increased sats into the upper 90s-100% range and patient was weaned off without recurrent hypoxemia noted.  Respiratory failure is no longer suspected    Plan:  -monitor for ongoing respiratory stability       Urothelial carcinoma of bladder (H)    Assessment: Patient had a tumor located on the anterior bladder wall that demonstrated high-grade superficial invasive urothelial carcinoma and is now status post resection with chronic Hernandez catheter placement.  Patient has declined further treatment with BCG therapy or other interventions.  He is set up to have a suprapubic catheter placement on 2/7/2025 given his ongoing difficulty with Hernandez catheter placement    Plan:   -Anticipate  outpatient follow-up with urology as previously planned and anticipated suprapubic catheter placement in February as previously scheduled      Pulmonary nodule - concerning for cancer but patient declining further work up    Assessment: patient has a pulmonary nodule found in 1/2024 which was very concerning for primary lung malignancy but patient has declined further evaluation or follow up and continues to not desire further intervention or even repeat imaging performed at this time.     Plan:   -continue supportive cares during this stay       Chronic pain    Assessment: patient has chronic lower back pain and takes gabapentin and prn dilaudid for this.  Having acute severe lower abdominal pain currently as above    Plan:   -continue gabapentin 300 mg TID  -continue with home Dilaudid dose for now       Coronary artery disease involving native coronary artery of native heart without angina pectoris    Chronic systolic congestive heart failure (H)    Hypertension goal BP (blood pressure) < 140/90    Assessment: patient has history of CAD with PCI last in November 2023 and most recent echo from 7/2024 shows normalization of EF with ongoing early diastolic dysfunction.  Patient has not had any symptoms concerning for angina and reports his symptoms are overall stable.  Is on Eliquis for anticoagulation but has not been able to tolerate most cardiac medications due to hypotension.      Plan:   -will continue with Eliquis  -monitor for cardiac stability  -will continue with IV fluids given patient's hypotension yesterday and ongoing inadequate PO intake - continue 50 mL/hr and monitor closely for any concern of volume overload.   -daily weight added to treatment plan      Third degree heart block (H) - s/p pacemaker    Assessment: patient has pacemaker which is functioning appropriately     Plan:   -continue routine monitoring      History of pulmonary embolism    Assessment: patient continues on Eliquis without signs  of bleeding    Plan:   -continue Eliquis as per home regimen and monitor for respiratory stability       History of CVA (cerebrovascular accident) - old left internal capsule lacunar infarct on CT 7/2016    Assessment: previous history     Plan:   -continue Eliquis and supportive cares       Infrarenal abdominal aortic aneurysm (AAA) without rupture    Assessment: noted, stable    Plan:   -no acute intervention needed  -ongoing outpatient monitoring if desired by the patient           Diet: Combination Diet Regular Diet Adult  Snacks/Supplements Adult: Ensure Enlive; With Meals    DVT Prophylaxis: DOAC  Hernandez Catheter: PRESENT, indication: Other (Comment) (chronic)  Lines: None     Cardiac Monitoring: None  Code Status: Full Code      Clinically Significant Risk Factors          # Hyperchloremia: Highest Cl = 109 mmol/L in last 2 days, will monitor as appropriate          # Hypoalbuminemia: Lowest albumin = 3.4 g/dL at 1/21/2025 11:48 AM, will monitor as appropriate     # Hypertension: Noted on problem list  # Chronic heart failure with preserved ejection fraction: heart failure noted on problem list and last echo with EF >50%               # Financial/Environmental Concerns: none         Social Drivers of Health    Tobacco Use: Medium Risk (1/21/2025)    Patient History     Smoking Tobacco Use: Former     Smokeless Tobacco Use: Never   Social Connections: Unknown (8/19/2024)    Social Connection and Isolation Panel [NHANES]     Marital Status:           Disposition Plan     Medically Ready for Discharge: Anticipated in 2-4 Days         Elizabeth Quigley MD  Hospitalist Service  [facility]  Securely message with [email or web address] (more info)  Text page via [employer] Paging/Directory   ______________________________________________________________________    Interval History   Patient has been vitally stable, continuing to have ongoing some lower abdominal pain but patient reports it  is much better today than yesterday.  Patient did have increased pain earlier correlating with increased retention but urine now improving.  Appetite was still poor yesterday but patient feels he will eat better today because his pain is improved.      Physical Exam   Vital Signs: Temp: 97.3  F (36.3  C) Temp src: Oral BP: 140/83 Pulse: 89   Resp: 18 SpO2: 92 % O2 Device: None (Room air)    Weight: 147 lbs 4.8 oz    Constitutional: awake, alert, cooperative, no apparent distress, and appears stated age  Respiratory: No increased work of breathing, good air exchange, clear to auscultation bilaterally, no crackles or wheezing  Cardiovascular: regular rate and rhythm  GI: bowel sounds present and improved from yesterday, less distention and very minimal pain on palpation   Skin: no redness, warmth, or swelling and no rashes  Musculoskeletal: no lower extremity pitting edema present  Neurologic: Awake, alert, oriented to name, place and situation     Medical Decision Making       56 MINUTES SPENT BY ME on the date of service doing chart review, history, exam, documentation & further activities per the note.      Data     I have personally reviewed the following data over the past 24 hrs:    15.0 (H)  \   8.4 (L)   / 253     137 107 11.8 /  86   4.3 23 0.58 (L) \

## 2025-01-24 NOTE — PLAN OF CARE
Goal Outcome Evaluation:      Plan of Care Reviewed With: patient    Overall Patient Progress: improvingOverall Patient Progress: improving    Outcome Evaluation: Pt A/Ox4. VSS on RA. Per patient continued pain 10/10 in severity, given oxycodone with mild pain relief. Abdomen is firm and tender. 4 moderate to large BM's during this shift. NS infusing at 50 ml/hr. Inadequate urine output, with 350 ml output this shift. Pt did have leaking at catheter site this morning. Bladder scan at 272.

## 2025-01-25 ENCOUNTER — ENROLLMENT (OUTPATIENT)
Dept: HOME HEALTH SERVICES | Facility: HOME HEALTH | Age: 80
End: 2025-01-25
Payer: COMMERCIAL

## 2025-01-25 LAB
ANION GAP SERPL CALCULATED.3IONS-SCNC: 6 MMOL/L (ref 7–15)
BUN SERPL-MCNC: 11.8 MG/DL (ref 8–23)
CALCIUM SERPL-MCNC: 8 MG/DL (ref 8.8–10.4)
CHLORIDE SERPL-SCNC: 108 MMOL/L (ref 98–107)
CREAT SERPL-MCNC: 0.74 MG/DL (ref 0.67–1.17)
EGFRCR SERPLBLD CKD-EPI 2021: >90 ML/MIN/1.73M2
ELLIPTOCYTES BLD QL SMEAR: SLIGHT
ERYTHROCYTE [DISTWIDTH] IN BLOOD BY AUTOMATED COUNT: 30.8 % (ref 10–15)
FRAGMENTS BLD QL SMEAR: SLIGHT
GLUCOSE SERPL-MCNC: 89 MG/DL (ref 70–99)
HCO3 SERPL-SCNC: 24 MMOL/L (ref 22–29)
HCT VFR BLD AUTO: 27.8 % (ref 40–53)
HGB BLD-MCNC: 8 G/DL (ref 13.3–17.7)
MCH RBC QN AUTO: 18.2 PG (ref 26.5–33)
MCHC RBC AUTO-ENTMCNC: 28.8 G/DL (ref 31.5–36.5)
MCV RBC AUTO: 63 FL (ref 78–100)
PLAT MORPH BLD: ABNORMAL
PLATELET # BLD AUTO: 264 10E3/UL (ref 150–450)
POTASSIUM SERPL-SCNC: 4 MMOL/L (ref 3.4–5.3)
RBC # BLD AUTO: 4.39 10E6/UL (ref 4.4–5.9)
RBC MORPH BLD: ABNORMAL
SODIUM SERPL-SCNC: 138 MMOL/L (ref 135–145)
TARGETS BLD QL SMEAR: SLIGHT
WBC # BLD AUTO: 11.1 10E3/UL (ref 4–11)

## 2025-01-25 PROCEDURE — 99233 SBSQ HOSP IP/OBS HIGH 50: CPT | Performed by: FAMILY MEDICINE

## 2025-01-25 PROCEDURE — 85048 AUTOMATED LEUKOCYTE COUNT: CPT | Performed by: FAMILY MEDICINE

## 2025-01-25 PROCEDURE — 250N000009 HC RX 250: Performed by: EMERGENCY MEDICINE

## 2025-01-25 PROCEDURE — 36415 COLL VENOUS BLD VENIPUNCTURE: CPT | Performed by: FAMILY MEDICINE

## 2025-01-25 PROCEDURE — 258N000003 HC RX IP 258 OP 636: Performed by: FAMILY MEDICINE

## 2025-01-25 PROCEDURE — 82565 ASSAY OF CREATININE: CPT | Performed by: FAMILY MEDICINE

## 2025-01-25 PROCEDURE — 250N000013 HC RX MED GY IP 250 OP 250 PS 637: Performed by: EMERGENCY MEDICINE

## 2025-01-25 PROCEDURE — 120N000001 HC R&B MED SURG/OB

## 2025-01-25 PROCEDURE — 250N000011 HC RX IP 250 OP 636: Performed by: FAMILY MEDICINE

## 2025-01-25 PROCEDURE — 250N000013 HC RX MED GY IP 250 OP 250 PS 637: Performed by: FAMILY MEDICINE

## 2025-01-25 PROCEDURE — 80048 BASIC METABOLIC PNL TOTAL CA: CPT | Performed by: FAMILY MEDICINE

## 2025-01-25 PROCEDURE — 85014 HEMATOCRIT: CPT | Performed by: FAMILY MEDICINE

## 2025-01-25 RX ADMIN — OXYCODONE HYDROCHLORIDE 5 MG: 5 TABLET ORAL at 18:10

## 2025-01-25 RX ADMIN — IPRATROPIUM BROMIDE AND ALBUTEROL SULFATE 3 ML: .5; 3 SOLUTION RESPIRATORY (INHALATION) at 10:01

## 2025-01-25 RX ADMIN — PIPERACILLIN AND TAZOBACTAM 3.38 G: 3; .375 INJECTION, POWDER, FOR SOLUTION INTRAVENOUS at 12:48

## 2025-01-25 RX ADMIN — SODIUM CHLORIDE 250 ML: 9 INJECTION, SOLUTION INTRAVENOUS at 15:56

## 2025-01-25 RX ADMIN — POLYETHYLENE GLYCOL 3350 17 G: 17 POWDER, FOR SOLUTION ORAL at 21:49

## 2025-01-25 RX ADMIN — SODIUM CHLORIDE: 9 INJECTION, SOLUTION INTRAVENOUS at 09:28

## 2025-01-25 RX ADMIN — GABAPENTIN 300 MG: 300 CAPSULE ORAL at 21:48

## 2025-01-25 RX ADMIN — ACETAMINOPHEN 650 MG: 325 TABLET, FILM COATED ORAL at 18:10

## 2025-01-25 RX ADMIN — IPRATROPIUM BROMIDE AND ALBUTEROL SULFATE 3 ML: .5; 3 SOLUTION RESPIRATORY (INHALATION) at 13:50

## 2025-01-25 RX ADMIN — TAMSULOSIN HYDROCHLORIDE 0.4 MG: 0.4 CAPSULE ORAL at 21:48

## 2025-01-25 RX ADMIN — PIPERACILLIN AND TAZOBACTAM 3.38 G: 3; .375 INJECTION, POWDER, FOR SOLUTION INTRAVENOUS at 23:15

## 2025-01-25 RX ADMIN — GABAPENTIN 300 MG: 300 CAPSULE ORAL at 13:50

## 2025-01-25 RX ADMIN — SENNOSIDES AND DOCUSATE SODIUM 1 TABLET: 50; 8.6 TABLET ORAL at 09:19

## 2025-01-25 RX ADMIN — PIPERACILLIN AND TAZOBACTAM 3.38 G: 3; .375 INJECTION, POWDER, FOR SOLUTION INTRAVENOUS at 18:04

## 2025-01-25 RX ADMIN — APIXABAN 5 MG: 5 TABLET, FILM COATED ORAL at 21:48

## 2025-01-25 RX ADMIN — SENNOSIDES AND DOCUSATE SODIUM 1 TABLET: 50; 8.6 TABLET ORAL at 21:48

## 2025-01-25 RX ADMIN — PIPERACILLIN AND TAZOBACTAM 3.38 G: 3; .375 INJECTION, POWDER, FOR SOLUTION INTRAVENOUS at 05:09

## 2025-01-25 RX ADMIN — APIXABAN 5 MG: 5 TABLET, FILM COATED ORAL at 09:18

## 2025-01-25 RX ADMIN — Medication 25 MCG: at 09:19

## 2025-01-25 RX ADMIN — GABAPENTIN 300 MG: 300 CAPSULE ORAL at 09:19

## 2025-01-25 RX ADMIN — FERROUS SULFATE TAB 325 MG (65 MG ELEMENTAL FE) 325 MG: 325 (65 FE) TAB at 09:18

## 2025-01-25 RX ADMIN — Medication 2 TABLET: at 09:18

## 2025-01-25 RX ADMIN — Medication 3 MG: at 21:48

## 2025-01-25 RX ADMIN — POLYETHYLENE GLYCOL 3350 17 G: 17 POWDER, FOR SOLUTION ORAL at 09:18

## 2025-01-25 ASSESSMENT — ACTIVITIES OF DAILY LIVING (ADL)
ADLS_ACUITY_SCORE: 67
ADLS_ACUITY_SCORE: 68
ADLS_ACUITY_SCORE: 67
ADLS_ACUITY_SCORE: 62
ADLS_ACUITY_SCORE: 64
ADLS_ACUITY_SCORE: 68
ADLS_ACUITY_SCORE: 64
ADLS_ACUITY_SCORE: 64
ADLS_ACUITY_SCORE: 62
ADLS_ACUITY_SCORE: 64
ADLS_ACUITY_SCORE: 64
ADLS_ACUITY_SCORE: 67
ADLS_ACUITY_SCORE: 68
ADLS_ACUITY_SCORE: 64
ADLS_ACUITY_SCORE: 67
ADLS_ACUITY_SCORE: 68
ADLS_ACUITY_SCORE: 64
ADLS_ACUITY_SCORE: 68
ADLS_ACUITY_SCORE: 64
ADLS_ACUITY_SCORE: 68

## 2025-01-25 NOTE — PROGRESS NOTES
Care Management Follow Up    Length of Stay (days): 4    Expected Discharge Date: 01/27/2025     Concerns to be Addressed: discharge planning     Patient plan of care discussed at interdisciplinary rounds: Yes    Anticipated Discharge Disposition: Home, RE-Enstate [facility]     Anticipated Discharge Services: Transportation Services, [facility]    Anticipated Discharge DME: Bed, Oxygen, Wheelchair, Commode    Patient/family educated on Medicare website which has current facility and service quality ratings: Yes  Education Provided on the Discharge Plan:    Patient/Family in Agreement with the Plan: yes    Referrals Placed by CM/TABBY: Internal Clinic Care Coordination, Hospice, Home Infusion   Private pay costs discussed: Not applicable    Discussed  Partnership in Safe Discharge Planning  document with patient/family: Yes      Additional Information:  Update receive during IDT rounds.   Informed Pt is not medically stable for discharge at this time.     CM spoke with Anai- Liaison with [facility]     Confirmed Pt's DME remains present at their home. [facility] was able to stop the . Plan is to keep I place so Pt can re-enroll back on to hospice at time of discharge.   Anai will remain available and requested to be updated once a discharge date is known who hospice can be available to enroll the patient back onto their program.     Anai Cornelius: Care Transition Coordinator  Office # [phone]    Cell # [phone]    Fax # [phone]      Upon MD request, MATI sent a referral to [facility] to check insurance benefits for possible need for IV antibiotics     --CM to update once benefits are know. Will then discuss with care team how the Wife and Patient plan to move forward.         Home with Home Infusion vs Hospice     Next Steps: MTAI to follow         ROCIO Fam  [facility] [phone]   [facility] [place][note 2]  [phone]

## 2025-01-25 NOTE — PLAN OF CARE
Goal Outcome Evaluation:      Plan of Care Reviewed With: patient    Overall Patient Progress: improvingOverall Patient Progress: improving    Outcome Evaluation: A&O x 4. VSS, afebrile. Voiding via chronic murrell, cath care completed. Bowel incontinence x 2, large loose stool. Denies pain. Bladder scan 107, 181, 63. Continues IV zosyn. IVF @ 50ml/hr.

## 2025-01-25 NOTE — PLAN OF CARE
Goal Outcome Evaluation:       A&O x 4. Lung sounds wheezy bilaterally, pt had intercostal retractions and requested supplemental O2. 1/2 L via NC provided and pt expressed relief. Expiratory wheezes noted. PRN duoneb administered x2 on writer's shift. Pt reported it was effective. Able to stop supplemental O2. Decrease in wheezing noted. Pt able to reposition self independently in bed. Pt reported 5/10 pain in abdomen, but scheduled gabapentin was effective. Pt refusing to get into chair for meals. Hernandez patent and draining without issue.

## 2025-01-25 NOTE — PLAN OF CARE
"Goal Outcome Evaluation:      Plan of Care Reviewed With: patient    Overall Patient Progress: improvingOverall Patient Progress: improving    Outcome Evaluation: pt A/Ox4, VSS. afebrile. lungs diminished posterior, loose congested cough noted with stimulation. up to chair x1hr this AM for breakfast, with much encouragement, refused for lunch and supper. pt c/o'd of getting enough sleep,  \" I want to go home to rest\". has had urgency/loose,soft BM. Hernandez output of 1200ml dark cloudy aravind urine.      "

## 2025-01-25 NOTE — PROGRESS NOTES
[facility]    Medicine Progress Note - Hospitalist Service    Date of Admission:  1/21/2025    Assessment & Plan     Patient is a 79-year-old gentleman with past medical history of bladder cancer status postresection with chronic indwelling Hernandez catheter, COPD,'s previous PEs, pulmonary nodule concerning for primary lung malignancy but patient declining further workup, chronic lower back pain with opioid dependence, complete heart block status post pacemaker, previous systolic CHF but normal EF on most recent echocardiogram, previous CVA, CAD with last PCI in November 2023, and infrarenal AAA,  thalassemia, recurrent anemia requiring frequent blood transfusions who presents with near syncopal episode and discovery of orthostatic hypotension with concern for possible bladder infection given lower abdominal pain and previous history of CAUDI infections and sepsis.  Patient has been fluid resuscitated and started on IV ciprofloxacin with resolution of dizziness/lightheadedness and improvement of blood pressures but ongoing severe lower abdominal pain more concerning for intestinal etiology and CT scan showed concern for fecal impaction with stercoral colitis.  Bowel regimen has been started and patient now stooling well and lower abdominal pain is lessening.  UC has grown out pseudomonas and sensitivities have now returned and is resistant to flouroquinolones and patient will be transitioned to IV Zosyn.  Attempted to discuss with patient complication of new resistance to flouroquinolones and possible need for IV antibiotics at discharge, however that cannot be done in hospice (which patient was planning on restarting at time of discharge).  Today, patient reports he is willing to continue the IV antibiotics until course completion if he can get it at home, otherwise he thinks he wants to discharge home with resumption of hospice however wife is in disagreement and wants him to go to a  TCU if he needs to.  Will continue conversation with patient and family going forward but  will start evaluating home IV antibiotics coverage.      Active Problems:    Lower abdominal pain    Assessment:  patient had severe lower abdominal pain and could be secondary to UTI vs fecal impaction with stercolitis.  Symptoms are now starting to improve and given that UTI has not been being effectively treated, initial pain likely secondary to intestinal causes.      Plan:    -continue treatment of UTI and fecal impaction/stercoral colitis as below       Fecal impaction with concern for stercoral colitis    Assessment: Patient reports severe lower abdominal pain which is worse in his suprapubic region but also radiates to the right and lower quadrants.  He reports the pain has been going on for several days and he feels like he is unable to have a bowel movement, even though he attempts to do so.  He had been having loose stools in the recent past but today feels as though he is unable to even pass flatulence.  3 bowel movements are documented however patient reports all of them were smears and very unlike him.  X-ray showed large stool burden but concern for possible ileus.  CT scan shows fecal impaction with concern for stercoral colitis at the rectosigmoid junction.  Patient was on Cipro and Flagyl was added overnight and bowel regimen started with patient having large amount of stool output in the past 24 hours and patient feeling much better.      Plan:   -transition to Zosyn given UC results, and Zosyn would cover both Pseudomonas and intraabdominal infection source   -Continue miralax BID, scheduled Senna S BID with prn dosing available for ongoing constipation, and prn milk of magnesia as third line oral agent  -discussed with patient and wife the importance of keeping stools soft and regular - discussed that diarrhea may be a sign of recurrent impacting in the future and stopping stool medications  will only worsen the issue.   -Monitor for improvement of patient's pain/symptoms with plan as above       Urinary tract infection associated with indwelling urethral catheter, initial encounter - possible    Assessment: There was concern for possible urinary tract infection associated with patient's indwelling catheter as he does have history of sepsis caused by CAUDI infection.  Sample was taken off of current Hernandez catheter but with new tubing as patient has significant narrowing of his urethra and requires urology Hernandez exchange which is not available at this facility.  Patient has been afebrile without  other signs or symptoms of sepsis developing however he has been having worsening leukocytosis and urine is changing with increased odor and worsening appearance and on 1/24 sensitivities returned and pseudomonas was resistant to Cipro and patient was changed to Zosyn with WBC starting to improve and urine starting to clear.     Plan:   -Continue Zosyn for coverage of pseudomonas and intraabdominal source as above       Urinary retention    Assessment:  patient was having increased urinary retention with bladder scan on 1/23 - Hernandez was flushed and patient has gotten up moving with urine output improving and random bladder scans now much lower    Plan:    -monitor for ongoing resolution       Orthostatic hypotension    Assessment: Patient was found to have significant orthostatic hypotension which may have contributed to near syncopal episode at time of presentation.  Patient is not on any blood pressure lowering medications however he is confirmed to have  bladder cancer and is strongly suspected to have a primary lung cancer as well which likely is contributing in a patient that does not want intervention or treatment of cancerous etiology.  Patient has received 2 and half liters fluid bolusing along with ongoing IV fluid resuscitation overnight and feels clinically improved from a dizziness perspective and  orthostatic blood pressure check this morning shows no drop    Plan:   -Continue IV fluids at 50 mL/h until PO intake improves  -Monitor for ongoing resolution of orthostatic drop       Near syncope    Assessment: Patient had near syncopal episode in the clinic setting prior to being directed to the emergency department, thought likely secondary to orthostatic hypotension.  Patient has been placed on telemetry without significant cardiac arrhythmia noted and pacemaker functioning appropriately.  Patient denies any dizziness or lightheadedness today however has not been able to ambulate much secondary to severe lower abdominal pain    Plan:   -Will continue with IV fluids as above   -monitor for any recurrent symptoms       Anemia - chronic blood loss vs malignancy    Other thalassemia    Assessment: Patient has thalassemia at baseline but in the recent year has had recurrent anemia which was thought secondary to either chronic slow blood loss versus malignancy.  Recent workup also shows significant iron deficiency and patient has been started on oral iron.  Hemoglobin was 6.1 following fluid resuscitation and patient received 1 unit PRBC on 1/22 with hemoglobin improving to 8.5 and has been overall stable in the low to mid 8 range     Plan:   -continue to monitor for hemoglobin stability during this stay   -Proceed with IV Venofer 300 milligrams every 24 hours while hospitalized as per Dr. Guerrero recommendations with PO iron at time of discharge   -patient continues to decline further evaluation into etiology       Acute respiratory failure - considered and ruled out    Assessment:  Patient had transient need for 2L oxygen in the ED after oxygen saturation of 89% x1 but 2L increased sats into the upper 90s-100% range and patient was weaned off without recurrent hypoxemia noted.  Respiratory failure is no longer suspected    Plan:  -monitor for ongoing respiratory stability       Urothelial carcinoma of bladder (H)     Assessment: Patient had a tumor located on the anterior bladder wall that demonstrated high-grade superficial invasive urothelial carcinoma and is now status post resection with chronic Hernandez catheter placement.  Patient has declined further treatment with BCG therapy or other interventions.  He is set up to have a suprapubic catheter placement on 2/7/2025 given his ongoing difficulty with Hernandez catheter placement    Plan:   -Anticipate outpatient follow-up with urology as previously planned and anticipated suprapubic catheter placement in February as previously scheduled      Pulmonary nodule - concerning for cancer but patient declining further work up    Assessment: patient has a pulmonary nodule found in 1/2024 which was very concerning for primary lung malignancy but patient has declined further evaluation or follow up and continues to not desire further intervention or even repeat imaging performed at this time.     Plan:   -continue supportive cares during this stay       Chronic pain    Assessment: patient has chronic lower back pain and takes gabapentin and prn dilaudid for this.  Having acute severe lower abdominal pain currently as above    Plan:   -continue gabapentin 300 mg TID  -continue with home Dilaudid dose for now       Coronary artery disease involving native coronary artery of native heart without angina pectoris    Chronic systolic congestive heart failure (H)    Hypertension goal BP (blood pressure) < 140/90    Assessment: patient has history of CAD with PCI last in November 2023 and most recent echo from 7/2024 shows normalization of EF with ongoing early diastolic dysfunction.  Patient has not had any symptoms concerning for angina and reports his symptoms are overall stable.  Is on Eliquis for anticoagulation but has not been able to tolerate most cardiac medications due to hypotension.      Plan:   -will continue with Eliquis  -monitor for cardiac stability  -will continue with IV fluids  given patient's hypotension yesterday and ongoing inadequate PO intake - continue 50 mL/hr and monitor closely for any concern of volume overload.   -daily weight added to treatment plan      Third degree heart block (H) - s/p pacemaker    Assessment: patient has pacemaker which is functioning appropriately     Plan:   -continue routine monitoring      History of pulmonary embolism    Assessment: patient continues on Eliquis without signs of bleeding    Plan:   -continue Eliquis as per home regimen and monitor for respiratory stability       History of CVA (cerebrovascular accident) - old left internal capsule lacunar infarct on CT 7/2016    Assessment: previous history     Plan:   -continue Eliquis and supportive cares       Infrarenal abdominal aortic aneurysm (AAA) without rupture    Assessment: noted, stable    Plan:   -no acute intervention needed  -ongoing outpatient monitoring if desired by the patient           Diet: Combination Diet Regular Diet Adult  Snacks/Supplements Adult: Ensure Enlive; With Meals    DVT Prophylaxis: DOAC  Hernandez Catheter: PRESENT, indication: Other (Comment) (chronic)  Lines: None     Cardiac Monitoring: None  Code Status: Full Code      Clinically Significant Risk Factors          # Hyperchloremia: Highest Cl = 108 mmol/L in last 2 days, will monitor as appropriate          # Hypoalbuminemia: Lowest albumin = 3.4 g/dL at 1/21/2025 11:48 AM, will monitor as appropriate     # Hypertension: Noted on problem list  # Chronic heart failure with preserved ejection fraction: heart failure noted on problem list and last echo with EF >50%               # Financial/Environmental Concerns: none         Social Drivers of Health    Tobacco Use: Medium Risk (1/21/2025)    Patient History     Smoking Tobacco Use: Former     Smokeless Tobacco Use: Never   Social Connections: Unknown (8/19/2024)    Social Connection and Isolation Panel [NHANES]     Marital Status:           Disposition Plan      Medically Ready for Discharge: Anticipated in 2-4 Days             Elizabeth Quigley MD  Hospitalist Service  [facility]  Securely message with [employer] (more info)  Text page via [employer] Paging/Directory   ______________________________________________________________________    Interval History   Patient reports overall improved with lower abdominal pain improving but still present.  Feels discouraged and wanting to go home.  He denies any new symptoms.  Vitals stable    Physical Exam   Vital Signs: Temp: 99  F (37.2  C) Temp src: Oral BP: 126/64 Pulse: 68   Resp: 18 SpO2: 93 % O2 Device: None (Room air)    Weight: 144 lbs 2.89 oz    Constitutional: awake, alert, cooperative, no apparent distress, and appears stated age  Respiratory: No increased work of breathing, good air exchange, clear to auscultation bilaterally, no crackles or wheezing  Cardiovascular: paced rhythm  GI: bowel sounds present, abdomen soft and non-tender  Musculoskeletal: no lower extremity pitting edema present  Neurologic: Awake, alert, oriented to name, place and overall to situation     Medical Decision Making       62 MINUTES SPENT BY ME on the date of service doing chart review, history, exam, documentation & further activities per the note.      Data     I have personally reviewed the following data over the past 24 hrs:    11.1 (H)  \   8.0 (L)   / 264     138 108 (H) 11.8 /  89   4.0 24 0.74 \

## 2025-01-26 LAB
ACANTHOCYTES BLD QL SMEAR: SLIGHT
ANION GAP SERPL CALCULATED.3IONS-SCNC: 7 MMOL/L (ref 7–15)
BITE CELLS BLD QL SMEAR: SLIGHT
BUN SERPL-MCNC: 14.6 MG/DL (ref 8–23)
CALCIUM SERPL-MCNC: 7.8 MG/DL (ref 8.8–10.4)
CHLORIDE SERPL-SCNC: 109 MMOL/L (ref 98–107)
CREAT SERPL-MCNC: 0.7 MG/DL (ref 0.67–1.17)
EGFRCR SERPLBLD CKD-EPI 2021: >90 ML/MIN/1.73M2
ELLIPTOCYTES BLD QL SMEAR: SLIGHT
ERYTHROCYTE [DISTWIDTH] IN BLOOD BY AUTOMATED COUNT: 30.7 % (ref 10–15)
FRAGMENTS BLD QL SMEAR: SLIGHT
GLUCOSE SERPL-MCNC: 113 MG/DL (ref 70–99)
HCO3 SERPL-SCNC: 24 MMOL/L (ref 22–29)
HCT VFR BLD AUTO: 24.6 % (ref 40–53)
HGB BLD-MCNC: 7.2 G/DL (ref 13.3–17.7)
HGB BLD-MCNC: 7.8 G/DL (ref 13.3–17.7)
MCH RBC QN AUTO: 18.4 PG (ref 26.5–33)
MCHC RBC AUTO-ENTMCNC: 29.3 G/DL (ref 31.5–36.5)
MCV RBC AUTO: 63 FL (ref 78–100)
PLAT MORPH BLD: ABNORMAL
PLATELET # BLD AUTO: 227 10E3/UL (ref 150–450)
POLYCHROMASIA BLD QL SMEAR: SLIGHT
POTASSIUM SERPL-SCNC: 3.9 MMOL/L (ref 3.4–5.3)
RBC # BLD AUTO: 3.92 10E6/UL (ref 4.4–5.9)
RBC MORPH BLD: ABNORMAL
SODIUM SERPL-SCNC: 140 MMOL/L (ref 135–145)
TARGETS BLD QL SMEAR: SLIGHT
WBC # BLD AUTO: 11.6 10E3/UL (ref 4–11)

## 2025-01-26 PROCEDURE — 82565 ASSAY OF CREATININE: CPT | Performed by: FAMILY MEDICINE

## 2025-01-26 PROCEDURE — 85018 HEMOGLOBIN: CPT | Performed by: FAMILY MEDICINE

## 2025-01-26 PROCEDURE — 36415 COLL VENOUS BLD VENIPUNCTURE: CPT | Performed by: FAMILY MEDICINE

## 2025-01-26 PROCEDURE — 99233 SBSQ HOSP IP/OBS HIGH 50: CPT | Performed by: FAMILY MEDICINE

## 2025-01-26 PROCEDURE — 80048 BASIC METABOLIC PNL TOTAL CA: CPT | Performed by: FAMILY MEDICINE

## 2025-01-26 PROCEDURE — 120N000001 HC R&B MED SURG/OB

## 2025-01-26 PROCEDURE — 250N000013 HC RX MED GY IP 250 OP 250 PS 637: Performed by: EMERGENCY MEDICINE

## 2025-01-26 PROCEDURE — 84132 ASSAY OF SERUM POTASSIUM: CPT | Performed by: FAMILY MEDICINE

## 2025-01-26 PROCEDURE — 250N000013 HC RX MED GY IP 250 OP 250 PS 637: Performed by: FAMILY MEDICINE

## 2025-01-26 PROCEDURE — 250N000009 HC RX 250: Performed by: EMERGENCY MEDICINE

## 2025-01-26 PROCEDURE — 250N000011 HC RX IP 250 OP 636: Performed by: FAMILY MEDICINE

## 2025-01-26 RX ADMIN — GABAPENTIN 300 MG: 300 CAPSULE ORAL at 21:07

## 2025-01-26 RX ADMIN — APIXABAN 5 MG: 5 TABLET, FILM COATED ORAL at 21:06

## 2025-01-26 RX ADMIN — FERROUS SULFATE TAB 325 MG (65 MG ELEMENTAL FE) 325 MG: 325 (65 FE) TAB at 09:10

## 2025-01-26 RX ADMIN — PIPERACILLIN AND TAZOBACTAM 3.38 G: 3; .375 INJECTION, POWDER, FOR SOLUTION INTRAVENOUS at 12:05

## 2025-01-26 RX ADMIN — GABAPENTIN 300 MG: 300 CAPSULE ORAL at 09:10

## 2025-01-26 RX ADMIN — GABAPENTIN 300 MG: 300 CAPSULE ORAL at 14:30

## 2025-01-26 RX ADMIN — Medication 3 MG: at 21:07

## 2025-01-26 RX ADMIN — TAMSULOSIN HYDROCHLORIDE 0.4 MG: 0.4 CAPSULE ORAL at 21:07

## 2025-01-26 RX ADMIN — IPRATROPIUM BROMIDE AND ALBUTEROL SULFATE 3 ML: .5; 3 SOLUTION RESPIRATORY (INHALATION) at 16:34

## 2025-01-26 RX ADMIN — Medication 25 MCG: at 09:10

## 2025-01-26 RX ADMIN — PIPERACILLIN AND TAZOBACTAM 3.38 G: 3; .375 INJECTION, POWDER, FOR SOLUTION INTRAVENOUS at 23:55

## 2025-01-26 RX ADMIN — APIXABAN 5 MG: 5 TABLET, FILM COATED ORAL at 09:10

## 2025-01-26 RX ADMIN — PIPERACILLIN AND TAZOBACTAM 3.38 G: 3; .375 INJECTION, POWDER, FOR SOLUTION INTRAVENOUS at 18:07

## 2025-01-26 RX ADMIN — Medication 2 TABLET: at 09:10

## 2025-01-26 RX ADMIN — OXYCODONE HYDROCHLORIDE 5 MG: 5 TABLET ORAL at 10:20

## 2025-01-26 RX ADMIN — PIPERACILLIN AND TAZOBACTAM 3.38 G: 3; .375 INJECTION, POWDER, FOR SOLUTION INTRAVENOUS at 05:39

## 2025-01-26 ASSESSMENT — ACTIVITIES OF DAILY LIVING (ADL)
ADLS_ACUITY_SCORE: 69
ADLS_ACUITY_SCORE: 68
ADLS_ACUITY_SCORE: 68
ADLS_ACUITY_SCORE: 69
ADLS_ACUITY_SCORE: 68
ADLS_ACUITY_SCORE: 69
ADLS_ACUITY_SCORE: 68
ADLS_ACUITY_SCORE: 69
ADLS_ACUITY_SCORE: 68
ADLS_ACUITY_SCORE: 68
ADLS_ACUITY_SCORE: 64
ADLS_ACUITY_SCORE: 68
ADLS_ACUITY_SCORE: 69
ADLS_ACUITY_SCORE: 68
ADLS_ACUITY_SCORE: 69

## 2025-01-26 NOTE — PROGRESS NOTES
[facility]    Medicine Progress Note - Hospitalist Service    Date of Admission:  1/21/2025    Assessment & Plan     Patient is a 79-year-old gentleman normally on home hospice with past medical history of bladder cancer status postresection with chronic indwelling Murrell catheter, COPD,'s previous PEs, pulmonary nodule concerning for primary lung malignancy but patient declining further workup, chronic lower back pain with opioid dependence, complete heart block status post pacemaker, previous systolic CHF but normal EF on most recent echocardiogram, previous CVA, CAD with last PCI in November 2023, and infrarenal AAA,  thalassemia, recurrent anemia requiring frequent blood transfusions who presents with near syncopal episode and discovery of orthostatic hypotension with concern for possible bladder infection given lower abdominal pain and previous history of CAUDI infections and sepsis.  Patient was fluid resuscitated and started on IV ciprofloxacin with resolution of dizziness/lightheadedness and improvement of blood pressures but ongoing severe lower abdominal pain more concerning for intestinal etiology and CT scan showed concern for fecal impaction with stercoral colitis.  Bowel regimen has been started and patient now stooling well and lower abdominal pain is lessening however patient had worsening leukocytosis and urine appearance with foul oder and thick - clogged murrell and required irritation to resolve.  UC has grown out pseudomonas and sensitivities returned and is resistant to flouroquinolones and patient was transitioned to Zosyn on 1/24 with WBC now improving, urine appearance much improved and no further retention noted.  Attempted to discuss with patient and his wife the complication of new resistance to flouroquinolones and possible need for IV antibiotics at discharge, however that cannot be done in hospice (which patient was planning on restarting at time of  discharge).  Currently, patient reports he is willing to continue the IV antibiotics until course completion if he can get it at home, otherwise he thinks he wants to discharge home with resumption of hospice without further antibiotics however wife is in disagreement and wants him to go to a TCU if he needs to complete antibiotic course.  Tele-ID consultation has been placed to assist in antibiotic choice and duration.   will start evaluating home IV antibiotics coverage.      Active Problems:    Lower abdominal pain    Assessment:  patient had severe lower abdominal pain and could be secondary to UTI vs fecal impaction with stercolitis vs combination of both factors.  Improved greatly overall but still present, no further bladder spasms, no severe cramping and symptoms well controlled with oral dilaudid regimen     Plan:    -continue treatment of UTI and fecal impaction/stercoral colitis as below       Fecal impaction with concern for stercoral colitis    Assessment: Patient reported severe lower abdominal pain which is worse in his suprapubic region but also radiated to the right and lower quadrants.  X-ray showed large stool burden but concern for possible ileus.  CT scan shows fecal impaction with concern for stercoral colitis at the rectosigmoid junction.  Patient was on Cipro and Flagyl and bowel regimen started with patient having large amount of stool output in the past few days and patient feeling much better.  Cipro and Flagyl stopped and patient transitioned to Zosyn on 1/24/24 for pseudomonas coverage as well as intraabdominal coverage    Plan:   -Continue Zosyn but virtual ID consult placed  -Continue miralax BID, scheduled Senna S BID with prn dosing available for ongoing constipation, and prn milk of magnesia as third line oral agent  -discussed with patient and wife the importance of keeping stools soft and regular - discussed that diarrhea may be a sign of recurrent impacting in the  future and stopping stool medications will only worsen the issue.   -Monitor for improvement of patient's pain/symptoms with plan as above       Urinary tract infection associated with indwelling urethral catheter, initial encounter - possible    Assessment: There was concern for possible urinary tract infection associated with patient's indwelling catheter as he does have history of sepsis caused by CAUDI infection.  Sample was taken off of current Hernandez catheter but with new tubing as patient has significant narrowing of his urethra and requires urologist for Hernandez exchange, which is not available at this facility.  Patient has been afebrile without  other signs or symptoms of sepsis developing however he has been having worsening leukocytosis and urine was changing with increased odor and worsening appearance despite initial Cipro administration and on 1/24 sensitivities returned and pseudomonas was resistant to Cipro and patient was changed to Zosyn with WBC starting to improve and urine starting to clear.     Plan:   -Continue Zosyn for coverage of pseudomonas and intraabdominal source as above   -Virtual ID consult as above      Urinary retention    Assessment:  patient was having increased urinary retention with bladder scan on 1/23 - Hernandez was flushed and patient has gotten up moving with urine output improving and following transition to Zosyn urine has become more clear and bladder scans are unremarkable.      Plan:    -monitor for ongoing resolution       Orthostatic hypotension    Assessment: Patient was found to have significant orthostatic hypotension which may have contributed to near syncopal episode at time of presentation.  Patient is not on any blood pressure lowering medications however he is confirmed to have  bladder cancer and is strongly suspected to have a primary lung cancer as well which likely is contributing in a patient that does not want intervention or treatment of cancerous etiology.   Patient  received 2 and half liters fluid bolusing along with ongoing IV fluid resuscitation overnight and felt clinically improved from a dizziness perspective and orthostatic blood pressure check this morning shows no drop.  On 1/25 patient had recurrent dizziness with blood pressure 85/44 on check with sitting and 250 mL bolus given with improvement in blood pressures once more seen.      Plan:   -saline lock IF fluids and monitor for adequate PO intake  -Monitor for ongoing resolution of orthostatic drop - will recheck orthostatic blood pressures and pulse today       Near syncope    Assessment: Patient had near syncopal episode in the clinic setting prior to being directed to the emergency department, thought likely secondary to orthostatic hypotension.  Patient has been placed on telemetry without significant cardiac arrhythmia noted and pacemaker functioning appropriately.  Patient denies any dizziness or lightheadedness today after fluid bolus again yesterday as above     Plan:   -IV saline locked  -monitor for any recurrent symptoms       Anemia - chronic blood loss vs malignancy    Other thalassemia    Assessment: Patient has thalassemia at baseline but in the recent year has had recurrent anemia which was thought secondary to either chronic slow blood loss versus malignancy.  Recent workup also shows significant iron deficiency and patient has been started on oral iron.  Hemoglobin was 6.1 following fluid resuscitation and patient received 1 unit PRBC on 1/22 with hemoglobin improving to 8.5 but now stabilized in the upper 7-lower 8 range in the past few days      Plan:   -continue to monitor for hemoglobin stability during this stay   -Proceed with IV Venofer 300 milligrams every 24 hours while hospitalized as per Dr. Guerrero recommendations with PO iron at time of discharge   -patient continues to decline further evaluation into etiology       Acute respiratory failure - considered and ruled out     Assessment:  Patient had transient need for 2L oxygen in the ED after oxygen saturation of 89% x1 but 2L increased sats into the upper 90s-100% range and patient was weaned off without recurrent hypoxemia noted.  Respiratory failure is no longer suspected    Plan:  -monitor for ongoing respiratory stability       Urothelial carcinoma of bladder (H)    Assessment: Patient had a tumor located on the anterior bladder wall that demonstrated high-grade superficial invasive urothelial carcinoma and is now status post resection with chronic Hernandez catheter placement.  Patient has declined further treatment with BCG therapy or other interventions.  He is set up to have a suprapubic catheter placement on 2/7/2025 given his ongoing difficulty with Hernandez catheter placement    Plan:   -Anticipate outpatient follow-up with urology as previously planned and anticipated suprapubic catheter placement in February as previously scheduled      Pulmonary nodule - concerning for cancer but patient declining further work up    Assessment: patient has a pulmonary nodule found in 1/2024 which was very concerning for primary lung malignancy but patient has declined further evaluation or follow up and continues to not desire further intervention or even repeat imaging performed at this time.     Plan:   -continue supportive cares during this stay       Chronic pain    Assessment: patient has chronic lower back pain and takes gabapentin and prn dilaudid for this.  Having acute severe lower abdominal pain currently as above    Plan:   -continue gabapentin 300 mg TID  -continue with home Dilaudid dose for now       Coronary artery disease involving native coronary artery of native heart without angina pectoris    Chronic systolic congestive heart failure (H)    Hypertension goal BP (blood pressure) < 140/90    Assessment: patient has history of CAD with PCI last in November 2023 and most recent echo from 7/2024 shows normalization of EF with  ongoing early diastolic dysfunction.  Patient has not had any symptoms concerning for angina and reports his symptoms are overall stable.  Is on Eliquis for anticoagulation but has not been able to tolerate most cardiac medications due to hypotension.      Plan:   -will continue with Eliquis  -monitor for cardiac stability  -daily weight added to treatment plan      Third degree heart block (H) - s/p pacemaker    Assessment: patient has pacemaker which is functioning appropriately     Plan:   -continue routine monitoring      History of pulmonary embolism    Assessment: patient continues on Eliquis without signs of bleeding    Plan:   -continue Eliquis as per home regimen and monitor for respiratory stability       History of CVA (cerebrovascular accident) - old left internal capsule lacunar infarct on CT 7/2016    Assessment: previous history     Plan:   -continue Eliquis and supportive cares       Infrarenal abdominal aortic aneurysm (AAA) without rupture    Assessment: noted, stable    Plan:   -no acute intervention needed  -ongoing outpatient monitoring if desired by the patient           Diet: Combination Diet Regular Diet Adult  Snacks/Supplements Adult: Ensure Enlive; With Meals    DVT Prophylaxis: DOAC  Hernandez Catheter: PRESENT, indication: Other (Comment) (Chronic)  Lines: None     Cardiac Monitoring: None  Code Status: Full Code      Clinically Significant Risk Factors          # Hyperchloremia: Highest Cl = 109 mmol/L in last 2 days, will monitor as appropriate          # Hypoalbuminemia: Lowest albumin = 3.4 g/dL at 1/21/2025 11:48 AM, will monitor as appropriate     # Hypertension: Noted on problem list  # Chronic heart failure with preserved ejection fraction: heart failure noted on problem list and last echo with EF >50%               # Financial/Environmental Concerns: none         Social Drivers of Health    Tobacco Use: Medium Risk (1/21/2025)    Patient History     Smoking Tobacco Use: Former      "Smokeless Tobacco Use: Never   Social Connections: Unknown (8/19/2024)    Social Connection and Isolation Panel [NHANES]     Marital Status:           Disposition Plan     Medically Ready for Discharge: Anticipated in 2-4 Days             Elizabeth Quigley MD  Hospitalist Service  [facility]  Securely message with [employer] (more info)  Text page via [employer] Paging/Directory   ______________________________________________________________________    Interval History   Patient reports pain is still present in the lower abdomen but much more tolerable - is a 5/10 at worse and symptoms improved with home Dilaudid dosing.  No further urinary retention, urine appears clear today.  Blood pressures improved after fluid bolus yesterday and are normal to HTN today.  No further dizziness since IV bolus.  Patient states he is getting eager to go home and is getting tired of \"the fuss\" being made about him but he is willing to stay and see ID and consider IV antibiotics as long as they can be given at home.      Physical Exam   Vital Signs: Temp: 98.7  F (37.1  C) Temp src: Oral BP: 120/57 Pulse: 67   Resp: 18 SpO2: 97 % O2 Device: Nasal cannula Oxygen Delivery: 1/2 LPM  Weight: 147 lbs .75 oz    Constitutional: awake, alert, cooperative, no apparent distress, and appears stated age.  Oriented to person, place and overall to situation and patient is able to articulate his wishes clearly   Respiratory: No increased work of breathing, good air exchange, clear to auscultation bilaterally, no crackles or wheezing  Cardiovascular: paced rhythm  GI: bowel sounds present, abdomen soft, ongoing mild tenderness in entire lower abdomen but no guarding or rebound tenderness     Medical Decision Making       52 MINUTES SPENT BY ME on the date of service doing chart review, history, exam, documentation & further activities per the note.      Data     I have personally reviewed the following data " over the past 24 hrs:    11.6 (H)  \   7.8 (L)   / 227     140 109 (H) 14.6 /  113 (H)   3.9 24 0.70 \

## 2025-01-26 NOTE — PLAN OF CARE
Goal Outcome Evaluation:                    [id number]: A&Ox4. LS cleared up after duonebs given earlier today. Pain managed with tylenol and oxycodone. Up in chair this evening. Good appetite, encouraging PO fluid intake. Soft Bps, SBP low 90s, asymptomatic. 250 mL bolus given, BP responded well, SBP 110s. IVF @ 50 mL/hr

## 2025-01-27 LAB
ANION GAP SERPL CALCULATED.3IONS-SCNC: 7 MMOL/L (ref 7–15)
BACTERIA BLD CULT: NO GROWTH
BACTERIA BLD CULT: NO GROWTH
BITE CELLS BLD QL SMEAR: SLIGHT
BUN SERPL-MCNC: 10 MG/DL (ref 8–23)
CALCIUM SERPL-MCNC: 8.2 MG/DL (ref 8.8–10.4)
CHLORIDE SERPL-SCNC: 108 MMOL/L (ref 98–107)
CREAT SERPL-MCNC: 0.66 MG/DL (ref 0.67–1.17)
EGFRCR SERPLBLD CKD-EPI 2021: >90 ML/MIN/1.73M2
ELLIPTOCYTES BLD QL SMEAR: SLIGHT
ERYTHROCYTE [DISTWIDTH] IN BLOOD BY AUTOMATED COUNT: 31.2 % (ref 10–15)
FRAGMENTS BLD QL SMEAR: SLIGHT
GLUCOSE SERPL-MCNC: 91 MG/DL (ref 70–99)
HCO3 SERPL-SCNC: 26 MMOL/L (ref 22–29)
HCT VFR BLD AUTO: 27.5 % (ref 40–53)
HGB BLD-MCNC: 7.9 G/DL (ref 13.3–17.7)
MCH RBC QN AUTO: 18.2 PG (ref 26.5–33)
MCHC RBC AUTO-ENTMCNC: 28.7 G/DL (ref 31.5–36.5)
MCV RBC AUTO: 64 FL (ref 78–100)
PLAT MORPH BLD: ABNORMAL
PLATELET # BLD AUTO: 254 10E3/UL (ref 150–450)
POLYCHROMASIA BLD QL SMEAR: SLIGHT
POTASSIUM SERPL-SCNC: 4.2 MMOL/L (ref 3.4–5.3)
RBC # BLD AUTO: 4.33 10E6/UL (ref 4.4–5.9)
RBC MORPH BLD: ABNORMAL
SCANNED LAB RESULT: NORMAL
SODIUM SERPL-SCNC: 141 MMOL/L (ref 135–145)
SPHEROCYTES BLD QL SMEAR: SLIGHT
TARGETS BLD QL SMEAR: SLIGHT
WBC # BLD AUTO: 11.8 10E3/UL (ref 4–11)

## 2025-01-27 PROCEDURE — G0426 INPT/ED TELECONSULT50: HCPCS | Performed by: INTERNAL MEDICINE

## 2025-01-27 PROCEDURE — 250N000011 HC RX IP 250 OP 636: Performed by: FAMILY MEDICINE

## 2025-01-27 PROCEDURE — 250N000013 HC RX MED GY IP 250 OP 250 PS 637: Performed by: EMERGENCY MEDICINE

## 2025-01-27 PROCEDURE — 80048 BASIC METABOLIC PNL TOTAL CA: CPT | Performed by: FAMILY MEDICINE

## 2025-01-27 PROCEDURE — 85018 HEMOGLOBIN: CPT | Performed by: FAMILY MEDICINE

## 2025-01-27 PROCEDURE — 250N000013 HC RX MED GY IP 250 OP 250 PS 637: Performed by: FAMILY MEDICINE

## 2025-01-27 PROCEDURE — 82565 ASSAY OF CREATININE: CPT | Performed by: FAMILY MEDICINE

## 2025-01-27 PROCEDURE — 36415 COLL VENOUS BLD VENIPUNCTURE: CPT | Performed by: FAMILY MEDICINE

## 2025-01-27 PROCEDURE — 99233 SBSQ HOSP IP/OBS HIGH 50: CPT | Performed by: STUDENT IN AN ORGANIZED HEALTH CARE EDUCATION/TRAINING PROGRAM

## 2025-01-27 PROCEDURE — 120N000001 HC R&B MED SURG/OB

## 2025-01-27 RX ADMIN — PIPERACILLIN AND TAZOBACTAM 3.38 G: 3; .375 INJECTION, POWDER, FOR SOLUTION INTRAVENOUS at 17:07

## 2025-01-27 RX ADMIN — FERROUS SULFATE TAB 325 MG (65 MG ELEMENTAL FE) 325 MG: 325 (65 FE) TAB at 08:35

## 2025-01-27 RX ADMIN — ACETAMINOPHEN 650 MG: 325 TABLET, FILM COATED ORAL at 20:24

## 2025-01-27 RX ADMIN — TAMSULOSIN HYDROCHLORIDE 0.4 MG: 0.4 CAPSULE ORAL at 20:24

## 2025-01-27 RX ADMIN — GABAPENTIN 300 MG: 300 CAPSULE ORAL at 08:35

## 2025-01-27 RX ADMIN — SENNOSIDES AND DOCUSATE SODIUM 1 TABLET: 50; 8.6 TABLET ORAL at 20:23

## 2025-01-27 RX ADMIN — Medication 2 TABLET: at 08:33

## 2025-01-27 RX ADMIN — APIXABAN 5 MG: 5 TABLET, FILM COATED ORAL at 08:34

## 2025-01-27 RX ADMIN — PIPERACILLIN AND TAZOBACTAM 3.38 G: 3; .375 INJECTION, POWDER, FOR SOLUTION INTRAVENOUS at 11:29

## 2025-01-27 RX ADMIN — APIXABAN 5 MG: 5 TABLET, FILM COATED ORAL at 20:23

## 2025-01-27 RX ADMIN — PIPERACILLIN AND TAZOBACTAM 3.38 G: 3; .375 INJECTION, POWDER, FOR SOLUTION INTRAVENOUS at 05:19

## 2025-01-27 RX ADMIN — GABAPENTIN 300 MG: 300 CAPSULE ORAL at 14:16

## 2025-01-27 RX ADMIN — Medication 25 MCG: at 08:34

## 2025-01-27 RX ADMIN — POLYETHYLENE GLYCOL 3350 17 G: 17 POWDER, FOR SOLUTION ORAL at 20:23

## 2025-01-27 RX ADMIN — GABAPENTIN 300 MG: 300 CAPSULE ORAL at 20:24

## 2025-01-27 RX ADMIN — Medication 3 MG: at 20:24

## 2025-01-27 ASSESSMENT — ACTIVITIES OF DAILY LIVING (ADL)
ADLS_ACUITY_SCORE: 68
ADLS_ACUITY_SCORE: 68
ADLS_ACUITY_SCORE: 69
ADLS_ACUITY_SCORE: 68
ADLS_ACUITY_SCORE: 69
ADLS_ACUITY_SCORE: 69
ADLS_ACUITY_SCORE: 68
ADLS_ACUITY_SCORE: 69
ADLS_ACUITY_SCORE: 68
ADLS_ACUITY_SCORE: 68
ADLS_ACUITY_SCORE: 69
ADLS_ACUITY_SCORE: 69
ADLS_ACUITY_SCORE: 68

## 2025-01-27 NOTE — PROGRESS NOTES
Care Management Follow Up    Length of Stay (days): 6    Expected Discharge Date: 01/28/2025     Concerns to be Addressed: discharge planning       Patient plan of care discussed at interdisciplinary rounds: Yes    Anticipated Discharge Disposition: Home, Hospice VS IV antibiotic plan      Anticipated Discharge Services: Transportation Services, Other (see comment) ([facility]), possible IV antibiotic plan    Anticipated Discharge DME: Bed, Oxygen, Wheelchair, Commode    Patient/family educated on Medicare website which has current facility and service quality ratings: no    Education Provided on the Discharge Plan:  TBD     Patient/Family in Agreement with the Plan: yes    Referrals Placed by CM/SW: Internal Clinic Care Coordination, Hospice, Home Infusion     Private pay costs discussed:  Will need to discuss private pay home infusion costs if pt interested. Has no home  IV ABX coverage under his Medicare.     Discussed  Partnership in Safe Discharge Planning  document with patient/family: No     Handoff Completed: Yes, MEDARDO PCP: Internal handoff referral completed    Additional Information:  Per physician, this afternoon, patient can discharge to home tomorrow after his last antibiotic infusion dose (Per bedside nurse, nurse plans to start this last dose at 5pm tomorrow and patient would be done at 5:30).    Writer has visited with patient about discharge planning.  Patient had understood he only had one dose tomorrow, but writer explained he continues to have the 3 doses per day and tomorrow is the last day.  Patient asked about home infusion. Writer explained he does not have any coverage for home infusion, so he would have to pay for it.  Patient in agreement with staying until his last infusion tomorrow, then plans to go home and resume his [facility] services, which he had prior to admission.       has sent communication to the [facility] (Phone: [phone] Fax: [phone])  "liaisonAnai.      Patient reported he is calling his wife and telling her the plan.  Patient plans for wife or family to transport. Bedside nurse stated she thinks patient can get in/out of a car.      Writer has called and spoken with wife. She had the time of discharge wrong (from the patient). She thought family could come at 2:30 tomorrow.  Discussed patient will be done with his last infusion tomorrow at 5:30pm.  Wife states that ex- son in law, Jake, will transport tomorrow.  Then Brittany stated she doesn't know if she wants [facility] to re-start services, stating \"they never did anything, I did everything.  I will talk to my daughter tonight and decide by tomorrow.\"     Writer has called the nurse line #[phone] at  Home Infusion to cancel the referral.      Next Steps: Care Management will continue to follow and assist with discharge planning.      Naila Escobar ELIA  [facility]   [phone]      "

## 2025-01-27 NOTE — PROGRESS NOTES
4 Hour Shift Note:    Vitally Stable. IV SL. Hernandez emptied with good UOP. Lungs are coarse with congested cough. Still receiving IV Zosyn, ID consult tomorrow.

## 2025-01-27 NOTE — PLAN OF CARE
Goal Outcome Evaluation:       Pt is A&Ox4; VSS on RA. Up w/ A1-2 GB/walker. PRN Oxycodone given X1. PRN Neb given x1. LS wheezes and coarse. Hernandez patent w/ adequate output. IV SL. IV Zosyn given. Encouraging oral intake.    /54 (BP Location: Right arm)   Pulse 70   Temp 99.2  F (37.3  C) (Oral)   Resp 22   Wt 66.7 kg (147 lb 0.8 oz)   SpO2 92%   BMI 21.10 kg/m  \

## 2025-01-27 NOTE — PROGRESS NOTES
[facility]    Medicine Progress Note - Hospitalist Service    Date of Admission:  1/21/2025    Assessment & Plan   Patient is a 79-year-old gentleman normally on home hospice with past medical history of bladder cancer status postresection with chronic indwelling Murrell catheter, COPD,'s previous PEs, pulmonary nodule concerning for primary lung malignancy but patient declining further workup, chronic lower back pain with opioid dependence, complete heart block status post pacemaker, previous systolic CHF but normal EF on most recent echocardiogram, previous CVA, CAD with last PCI in November 2023, and infrarenal AAA,  thalassemia, recurrent anemia requiring frequent blood transfusions who presents with near syncopal episode and discovery of orthostatic hypotension with concern for possible bladder infection given lower abdominal pain and previous history of CAUDI infections and sepsis.  Patient was fluid resuscitated and started on IV ciprofloxacin with resolution of dizziness/lightheadedness and improvement of blood pressures but ongoing severe lower abdominal pain more concerning for intestinal etiology and CT scan showed concern for fecal impaction with stercoral colitis.  Bowel regimen has been started and patient now stooling well and lower abdominal pain is lessening however patient had worsening leukocytosis and urine appearance with foul oder and thick - clogged murrell and required irritation to resolve.  UC has grown out pseudomonas and sensitivities returned and is resistant to flouroquinolones and patient was transitioned to Zosyn on 1/24 with WBC now improving, urine appearance much improved and no further retention noted.  Tele-ID consultation done, appreciate recommendations    Active Problems:    Lower abdominal pain    Assessment:  patient had severe lower abdominal pain and could be secondary to UTI vs fecal impaction with stercolitis vs combination of both factors.   Improved greatly overall but still present, no further bladder spasms, no severe cramping and symptoms well controlled with oral dilaudid regimen     Plan:    -continue treatment of UTI and fecal impaction/stercoral colitis as below       Fecal impaction with concern for stercoral colitis    Assessment: Patient reported severe lower abdominal pain which is worse in his suprapubic region but also radiated to the right and lower quadrants.  X-ray showed large stool burden but concern for possible ileus.  CT scan shows fecal impaction with concern for stercoral colitis at the rectosigmoid junction.  Patient was on Cipro and Flagyl and bowel regimen started with patient having large amount of stool output in the past few days and patient feeling much better.  Cipro and Flagyl stopped and patient transitioned to Zosyn on 1/24/24 for pseudomonas coverage as well as intraabdominal coverage    Plan:   -Continue miralax BID, scheduled Senna S BID with prn dosing available for ongoing constipation, and prn milk of magnesia as third line oral agent  -discussed with patient and wife the importance of keeping stools soft and regular - discussed that diarrhea may be a sign of recurrent impacting in the future and stopping stool medications will only worsen the issue.   -As per RN, patient has not been taking the stool softeners stools have been soft  -Will need to discuss this with patient prior to discharge      Urinary tract infection associated with indwelling urethral catheter, initial encounter - possible    Assessment: There was concern for possible urinary tract infection associated with patient's indwelling catheter as he does have history of sepsis caused by CAUDI infection.  Sample was taken off of current Hernandez catheter but with new tubing as patient has significant narrowing of his urethra and requires urologist for Hernandez exchange, which is not available at this facility.  Patient has been afebrile without  other  signs or symptoms of sepsis developing however he has been having worsening leukocytosis and urine was changing with increased odor and worsening appearance despite initial Cipro administration and on 1/24 sensitivities returned and pseudomonas was resistant to Cipro and patient was changed to Zosyn with WBC starting to improve and urine starting to clear.     Plan:   -Appreciate ID recommendations  -Today is day 4 of IV Zosyn, patient is wanting to be discharged back home, 5 days of antibiotics should be enough of a treatment course, patient is willing to stay 1 more day to receive the full 5 days      Urinary retention    Assessment:  patient was having increased urinary retention with bladder scan on 1/23 - Hernandez was flushed and patient has gotten up moving with urine output improving and following transition to Zosyn urine has become more clear and bladder scans are unremarkable.      Plan:    -monitor for ongoing resolution       Orthostatic hypotension    Assessment: Patient was found to have significant orthostatic hypotension which may have contributed to near syncopal episode at time of presentation.  Patient is not on any blood pressure lowering medications however he is confirmed to have  bladder cancer and is strongly suspected to have a primary lung cancer as well which likely is contributing in a patient that does not want intervention or treatment of cancerous etiology.  Patient  received 2 and half liters fluid bolusing along with ongoing IV fluid resuscitation overnight and felt clinically improved from a dizziness perspective and orthostatic blood pressure check this morning shows no drop.  On 1/25 patient had recurrent dizziness with blood pressure 85/44 on check with sitting and 250 mL bolus given with improvement in blood pressures once more seen.      Plan:   -saline lock IF fluids and monitor for adequate PO intake  -Monitor for ongoing resolution of orthostatic drop - will recheck orthostatic  blood pressures and pulse today       Near syncope    Assessment: Patient had near syncopal episode in the clinic setting prior to being directed to the emergency department, thought likely secondary to orthostatic hypotension.  Patient has been placed on telemetry without significant cardiac arrhythmia noted and pacemaker functioning appropriately.  Patient denies any dizziness or lightheadedness today after fluid bolus again yesterday as above     Plan:   -IV saline locked  -monitor for any recurrent symptoms       Anemia - chronic blood loss vs malignancy    Other thalassemia    Assessment: Patient has thalassemia at baseline but in the recent year has had recurrent anemia which was thought secondary to either chronic slow blood loss versus malignancy.  Recent workup also shows significant iron deficiency and patient has been started on oral iron.  Hemoglobin was 6.1 following fluid resuscitation and patient received 1 unit PRBC on 1/22 with hemoglobin improving to 8.5 but now stabilized in the upper 7-lower 8 range in the past few days      Plan:   -continue to monitor for hemoglobin stability during this stay   -Proceed with IV Venofer 300 milligrams every 24 hours while hospitalized as per Dr. Guerrero recommendations with PO iron at time of discharge   -patient continues to decline further evaluation into etiology       Acute respiratory failure - considered and ruled out    Assessment:  Patient had transient need for 2L oxygen in the ED after oxygen saturation of 89% x1 but 2L increased sats into the upper 90s-100% range and patient was weaned off without recurrent hypoxemia noted.  Respiratory failure is no longer suspected    Plan:  -monitor for ongoing respiratory stability       Urothelial carcinoma of bladder (H)    Assessment: Patient had a tumor located on the anterior bladder wall that demonstrated high-grade superficial invasive urothelial carcinoma and is now status post resection with chronic Hernandez  catheter placement.  Patient has declined further treatment with BCG therapy or other interventions.  He is set up to have a suprapubic catheter placement on 2/7/2025 given his ongoing difficulty with Hernandez catheter placement    Plan:   -Anticipate outpatient follow-up with urology as previously planned and anticipated suprapubic catheter placement in February as previously scheduled      Pulmonary nodule - concerning for cancer but patient declining further work up    Assessment: patient has a pulmonary nodule found in 1/2024 which was very concerning for primary lung malignancy but patient has declined further evaluation or follow up and continues to not desire further intervention or even repeat imaging performed at this time.     Plan:   -continue supportive cares during this stay       Chronic pain    Assessment: patient has chronic lower back pain and takes gabapentin and prn dilaudid for this.  Having acute severe lower abdominal pain currently as above    Plan:   -continue gabapentin 300 mg TID  -continue with home Dilaudid dose for now       Coronary artery disease involving native coronary artery of native heart without angina pectoris    Chronic systolic congestive heart failure (H)    Hypertension goal BP (blood pressure) < 140/90    Assessment: patient has history of CAD with PCI last in November 2023 and most recent echo from 7/2024 shows normalization of EF with ongoing early diastolic dysfunction.  Patient has not had any symptoms concerning for angina and reports his symptoms are overall stable.  Is on Eliquis for anticoagulation but has not been able to tolerate most cardiac medications due to hypotension.      Plan:   -will continue with Eliquis  -monitor for cardiac stability  -daily weight added to treatment plan      Third degree heart block (H) - s/p pacemaker    Assessment: patient has pacemaker which is functioning appropriately     Plan:   -continue routine monitoring      History of  pulmonary embolism    Assessment: patient continues on Eliquis without signs of bleeding    Plan:   -continue Eliquis as per home regimen and monitor for respiratory stability       History of CVA (cerebrovascular accident) - old left internal capsule lacunar infarct on CT 7/2016    Assessment: previous history     Plan:   -continue Eliquis and supportive cares       Infrarenal abdominal aortic aneurysm (AAA) without rupture    Assessment: noted, stable    Plan:   -no acute intervention needed  -ongoing outpatient monitoring if desired by the patient           Diet: Combination Diet Regular Diet Adult  Snacks/Supplements Adult: Ensure Enlive; With Meals    DVT Prophylaxis: DOAC  Hernandez Catheter: PRESENT, indication: Other (Comment) (Chronic)  Lines: None     Cardiac Monitoring: None  Code Status: Full Code      Clinically Significant Risk Factors          # Hyperchloremia: Highest Cl = 109 mmol/L in last 2 days, will monitor as appropriate          # Hypoalbuminemia: Lowest albumin = 3.4 g/dL at 1/21/2025 11:48 AM, will monitor as appropriate     # Hypertension: Noted on problem list  # Chronic heart failure with preserved ejection fraction: heart failure noted on problem list and last echo with EF >50%               # Financial/Environmental Concerns: none         Social Drivers of Health    Tobacco Use: Medium Risk (1/21/2025)    Patient History     Smoking Tobacco Use: Former     Smokeless Tobacco Use: Never   Social Connections: Unknown (8/19/2024)    Social Connection and Isolation Panel [NHANES]     Marital Status:           Disposition Plan     Medically Ready for Discharge: Anticipated Tomorrow             Jessica Topete MD  Hospitalist Service  [facility]  Securely message with [facility] (more info)  Text page via [employer] Paging/Directory   ______________________________________________________________________    Interval History     Patient was seen this morning and  then again later in the day with family present.  No further urinary retention  Patient is willing to stay another 24 hours for more IV antibiotics to complete close to 5-day course    Physical Exam   Vital Signs: Temp: 98.2  F (36.8  C) Temp src: Oral BP: (!) 148/72 Pulse: 62   Resp: 20 SpO2: 98 % O2 Device: None (Room air)    Weight: 146 lbs 3.2 oz    Constitutional: Awake, alert, cooperative  Respiratory: No increased work of breathing  Cardiovascular paced rhythm  GI: Bowel sounds present, soft    Medical Decision Making       50 MINUTES SPENT BY ME on the date of service doing chart review, history, exam, documentation & further activities per the note.      Data   ------------------------- PAST 24 HR DATA REVIEWED -----------------------------------------------    I have personally reviewed the following data over the past 24 hrs:    11.8 (H)  \   7.9 (L)   / 254     141 108 (H) 10.0 /  91   4.2 26 0.66 (L) \       Imaging results reviewed over the past 24 hrs:   No results found for this or any previous visit (from the past 24 hours).

## 2025-01-27 NOTE — PLAN OF CARE
Goal Outcome Evaluation:       A&O. VSS on 1L O2 NC for comfort. Chronic murrell catheter in place with good output. Tolerating oral intake. Denies pain overnight. Tele-ID consult today. Ambulating with A2.

## 2025-01-27 NOTE — PLAN OF CARE
Goal Outcome Evaluation:    VSS on RA this shift. LS coarse- diminished. Hernandez intact with good urine output, no urinary retention noted. Bladder scanned for 32. BM incontinence x1. Tele-ID consult done today. Getting IV zosyn for abx. Pt up in chair only for breakfast, adamantly decline for lunch and supper despite education and encouragement.

## 2025-01-27 NOTE — CONSULTS
Telemedicine Visit - General ID Service: Consult Note      Patient:  Khang Bailon, Date of birth 1945, Medical record number [id number]  Date of Visit:  January 27, 2025         Assessment and Recommendations:   ID Problem List:  Urinary obstruction   Chronic indwelling Hernandez  Complex urinary tract infection   Fecal impaction   Mild colonic inflammation at rectosigmoid junction     Recommendations:  Complete 5-7 days of pip/tazo for Pseudomonas CAUTI (Dates: 1/24-1/28)  Discussed with Mr. Bailon and his family about the 5 vs 7 day antibiotic duration (see below). They are in favor of competing the shorter course at this time, but will continue to discuss together and with their hospitalist team.   No need for treatment of serocolitis, as this was likely reactive inflammation in the setting of fecal impaction   Agree with placement of supraputic catheter per urology as previously planned  Note that if he develops sepsis 2/2 UTI in the future, most prudent course of action would be admission with IV cefepime or pip/tazo, since his PsA is resistant to cipro. However, decisions regarding that would have to be made with goals of care in mind.     Discussion:  #Pseudomonas aeruginosa cathter-related urinary tract infection 2/2 obstructed Hernandez   Low-grade fevers, leukocytosis, and foul smelling urine occurred in setting of obstructed catheter. Catheter flushed and now draining urine with resolution of symptoms. Growing >100,000 CFU Pseudomonas aeruginosa (R to cipro). I think the main issue was his obstructed catheter, which has now been taken care of. Also agree that it is reasonable to do short course of antibiotics to ensure no ongoing infection. There is good data on that 7 days is adequate for treatment of complex UTIs. There are a few small studies where 5 days did not show different outcomes than 7 days of antibiotics. I discussed this with Mr. Bailon, his son, and grandson. Mr. Bailon is eager to  leave the hospital ASAP.  We discussed that there is evidence behind 5 days of antibiotics, although this is not considered as standard as 7 days of antibiotics. They agreed that 5 days sounds reasonable, given his desire to leave and return to hospice. I think this is very reasonable.     #Mild colonic inflammation at rectosigmoid junction  I personally reviewed the CT A/P with radiology, who agreed that the rectosigmoid inflammation is very mild and could be reactive 2/2 large stool burden. Given his rapid improvement with bowel movement, this is consistent with reactive inflammation and he doesn't need treatment for colitis.     Thank you for this consult. ID will sign off at this time. Please do not hesitate to call us with additional questions or concerns.     Elizabeth Ortiz MD  [phone] OR Shlomo MARSH spent at least 82 minutes on the day of this encounter on chart review, patient interview/exam, and note preparation. This visit was performed remotely as a telemedicine encounter using the [employer] platform.        History of Present Illness:   Khang Bailon is a 80 yo with history of bladder cancer (s/p resection with chronic indwelling Hernandez), COPD, PE, pulmonary nodule (?primary lung malignancy but has declined furthe work-up), complete heart block s/p pacmaker, CVA, CAD s/p PCI (11/2023), infrarenal AAA, thalassemia, and reucrrent anemia requiring frequent transfusion. He is on home hospice at baseline, but decided to present to the hospital on 1/21 with a syncopal event. Work-up revealed hypotension with concern for sepsis related to UTI as a source. We are being consulted for further management of his syncopal event/sepsis.     Presented on 1/21 to preop appointment for upcoming suprapubic catheter placement with c/o dizziness. Was found to be slumped over in his chair and minimally responsive.   Temperature to 100.3 and tachycardic to 112.   Went to ED, where noted to have leukocytosis  "to 17.5. PCT WNL.   Tested negative for influenza and SARS-CoV-2  Urine culture taken from murrell (not able to exchange 2/2 difficult exchange and no urology available at hospital)  Given fluids and admitted    Upon admission, noted to have severe lower abdominal pain and constipation x several days  AXR showed large stool in colon (1/22) with CT A/P (1/22) showing some inflammation in the cecum c/f early stecoral colitis.   Urinary retention noted on US (1/23). Murrell flushed with significant improvement in output and bladder scan negative.   Given miralax/stool softeners with increased stool and improvement in symptoms (1/25)    Today feeling \"great\" and wants to \"get out of here\". Only compliant is R hip pain, which is chronic for him. No fevers/chills/abdominal pain.          Review of Systems:     General: No change in weight, sleep or appetite.  Normal energy.  No fever or chills, no excessive fatigue or daytime drowsiness  Eyes: Negative for vision changes or eye problemsar   ENT: No problems with ears, nose or throat.  No difficulty swallowing.  Resp: No coughing, wheezing or shortness of breath, denies apnea  CV: No chest pains or palpitations  GI: No nausea, vomiting,  heartburn, abdominal pain, diarrhea, constipation or change in bowel habits  : No urinary frequency or dysuria, bladder or kidney problems  Musculoskeletal: R hip pain, which is chronic for him   Neurologic: No headaches, numbness, tingling, weakness, problems with balance or coordination  Skin: Denies rashes or lesions         Current Antimicrobials     Current:  Pip/tazo: 1/24-current     Prior:  Ciprofloxacin: 1/21-1/23  Metronidazole: 1/23-1/24       Past Medical History:     Past Medical History:   Diagnosis Date    Alpha thalassemia     AV block 07/2012    pacemaker at [facility]    CAD (coronary artery disease)     Chronic systolic heart failure (H)     COPD (chronic obstructive pulmonary disease) (H)     Diverticulitis     History of " CVA (cerebrovascular accident)     Injury, other and unspecified, elbow, forearm, and wrist 1970s    Broke right wrist    Urothelial carcinoma (H)      Past Surgical History:   Procedure Laterality Date    COLONOSCOPY      COLONOSCOPY  12/10/2012    CYSTOSCOPY, LITHOLAPAXY, COMBINED N/A 11/10/2021    Procedure: Cystoscopy, Bladder Stone Removal;  Surgeon: Guanaco Stewart MD;  Location: [facility] OR    IMPLANT PACEMAKER      LASER KTP TRANSURETHRAL RESECTION (TUR) PROSTATE N/A 11/10/2021    Procedure: TRANSURETHRAL RESECTION (TUR) PROSTATE, USING KTP LASER;  Surgeon: Guanaco Stewart MD;  Location: [facility] OR          Family History:   No family history on file.       Social History:     Social History     Socioeconomic History    Marital status:      Spouse name: Nikki    Number of children: 3    Years of education: Not on file    Highest education level: Not on file   Occupational History     Employer: SELF   Tobacco Use    Smoking status: Former     Current packs/day: 0.00     Types: Cigarettes     Start date: 1962     Quit date: 2012     Years since quittin.5    Smokeless tobacco: Never    Tobacco comments:     Quit in July   Vaping Use    Vaping status: Never Used   Substance and Sexual Activity    Alcohol use: No     Alcohol/week: 0.0 standard drinks of alcohol    Drug use: No    Sexual activity: Not Currently   Other Topics Concern    Parent/sibling w/ CABG, MI or angioplasty before 65F 55M? No   Social History Narrative    Not on file     Social Drivers of Health     Financial Resource Strain: Low Risk  (2025)    Financial Resource Strain     Within the past 12 months, have you or your family members you live with been unable to get utilities (heat, electricity) when it was really needed?: No   Food Insecurity: Low Risk  (2025)    Food Insecurity     Within the past 12 months, did you worry that your food would run out before you got money to buy more?: No     Within the past 12  months, did the food you bought just not last and you didn t have money to get more?: No   Transportation Needs: Low Risk  (1/21/2025)    Transportation Needs     Within the past 12 months, has lack of transportation kept you from medical appointments, getting your medicines, non-medical meetings or appointments, work, or from getting things that you need?: No   Physical Activity: Not on file   Stress: Not on file   Social Connections: Unknown (8/19/2024)    Social Connection and Isolation Panel [NHANES]     Frequency of Communication with Friends and Family: Not on file     Frequency of Social Gatherings with Friends and Family: Not on file     Attends Zoroastrianism Services: Not on file     Active Member of Clubs or Organizations: Not on file     Attends Club or Organization Meetings: Not on file     Marital Status:    Interpersonal Safety: Low Risk  (1/21/2025)    Interpersonal Safety     Do you feel physically and emotionally safe where you currently live?: Yes     Within the past 12 months, have you been hit, slapped, kicked or otherwise physically hurt by someone?: No     Within the past 12 months, have you been humiliated or emotionally abused in other ways by your partner or ex-partner?: No   Housing Stability: Low Risk  (1/21/2025)    Housing Stability     Do you have housing? : Yes     Are you worried about losing your housing?: No            Physical Exam:   Physical exam performed via the [employer] cart with bedside nursing assistance.    Ranges for vital signs:  Temp:  [98.2  F (36.8  C)-99.4  F (37.4  C)] 98.2  F (36.8  C)  Pulse:  [62-70] 62  Resp:  [20-22] 20  BP: (112-148)/(54-72) 148/72  SpO2:  [92 %-98 %] 98 %    Intake/Output Summary (Last 24 hours) at 1/27/2025 0842  Last data filed at 1/27/2025 0614  Gross per 24 hour   Intake 590 ml   Output 2700 ml   Net -2110 ml     Exam:  GENERAL:  well-developed, well-nourished, sitting in bed in no acute distress.   ENT:  Head is  "normocephalic, atraumatic. Oropharynx is moist without exudates or ulcers.  EYES:  Eyes have anicteric sclerae.    NECK:  Supple.  LUNGS:  Clear to auscultation.  CARDIOVASCULAR:  Regular rate and rhythm with no murmurs, gallops or rubs.  ABDOMEN:  Normal bowel sounds, soft, nontender.  EXT: Extremities warm and without edema.  SKIN:  No acute rashes.  Line is in place without any surrounding erythema.  NEUROLOGIC:  Grossly nonfocal.         Laboratory Data:   Reviewed.  Pertinent for:    Laboratory Data:   No results found for: \"ACD4\"    Microbiology:  Culture   Date Value Ref Range Status   01/21/2025 No Growth  Final   01/21/2025 No Growth  Final   01/21/2025 >100,000 CFU/mL Pseudomonas aeruginosa (A)  Final   01/21/2025 >100,000 CFU/mL Pseudomonas aeruginosa (A)  Final   11/09/2024 >100,000 CFU/mL Pseudomonas aeruginosa (A)  Final   11/09/2024 >100,000 CFU/mL Pseudomonas aeruginosa (A)  Final   11/09/2024 (A)  Final    50,000-100,000 CFU/mL Pseudomonas aeruginosa, mucoid strain   11/09/2024 No Growth  Final   10/02/2024 10,000-50,000 CFU/mL Mixture of Urogenital Kami  Final   10/01/2024 >100,000 CFU/mL Mixture of Urogenital Kami  Final   10/01/2024 No Growth  Final   08/07/2024 <10,000 CFU/mL Urogenital kami  Final   07/11/2024 10,000-50,000 CFU/mL Mixture of Urogenital Kami  Final   07/11/2024 No Growth  Final   07/11/2024 No Growth  Final   07/02/2024 >100,000 CFU/mL Staphylococcus epidermidis (A)  Final   04/26/2024 No Growth  Final   04/26/2024 No Growth  Final   04/24/2024 <10,000 CFU/mL Mixture of Urogenital Kami  Final   02/27/2024 No Growth  Final   01/31/2024 10,000-50,000 CFU/mL Klebsiella pneumoniae (A)  Final   01/31/2024 No Growth  Final   01/31/2024 No Growth  Final   08/13/2023 No Growth  Final   08/13/2023 No Growth  Final   09/30/2021 >100,000 CFU/mL Enterococcus faecalis (A)  Final       Last check of C difficile  No results found for: \"CDBPCT\"    Virology:  N/A    No results found for: " "\"H6RES\"    No results found for: \"EBVDN\", \"EBRES\", \"EBVSP\", \"EBVPC\", \"EBVPCR\"    BK viral loads No lab results found.    Inflammatory Markers    Recent Labs   Lab Test 06/27/21  1941 11/26/19  1350   SED  --  7   CRP 11.8*  --        Immune Globulin Studies   No lab results found.    Metabolic Studies       Recent Labs   Lab Test 01/27/25  0519 01/26/25  0532 01/25/25  0648 01/24/25  0538 01/23/25  0538 01/22/25  0533 01/21/25  1635 01/21/25  1148 10/02/24  0521 10/01/24  1227 10/01/24  1042 10/01/24  1036    140 138 137 141 137  --  138   < >  --   --  136   POTASSIUM 4.2 3.9 4.0 4.3 4.0 4.1  --  4.3   < >  --   --  4.0   CHLORIDE 108* 109* 108* 107 109* 109*  --  104   < >  --   --  100   CO2 26 24 24 23 22 22  --  26   < >  --   --  27   ANIONGAP 7 7 6* 7 10 6*  --  8   < >  --   --  9   BUN 10.0 14.6 11.8 11.8 13.4 17.3  --  18.8   < >  --   --  14.3   CR 0.66* 0.70 0.74 0.58* 0.60* 0.65*  --  0.73   < >  --   --  0.74   GFRESTIMATED >90 >90 >90 >90 >90 >90  --  >90   < >  --   --  >90   GLC 91 113* 89 86 104* 92  --  114*   < >  --   --  119*   RENU 8.2* 7.8* 8.0* 8.4* 8.4* 7.6*  --  9.1   < >  --   --  9.0   MAG  --   --   --   --   --   --   --  2.0  --   --   --  2.0   LACT  --   --   --   --   --   --  1.4  --   --  1.4   < >  --     < > = values in this interval not displayed.        Hepatic Studies    Recent Labs   Lab Test 01/21/25  1148 11/09/24  1558 10/01/24  1036 08/10/24  0749 05/07/24  1047 04/26/24  1633   BILITOTAL 0.5 0.3 0.4 0.4 0.8 1.0   ALKPHOS 65 65 71 78 96 102   ALBUMIN 3.4* 3.1* 3.0* 2.9* 3.0* 3.4*   AST 11 10 10 10 19 26   ALT 12 9 10 9 11 16       Pancreatitis testing    Recent Labs   Lab Test 10/01/24  1036 12/15/23  1428 04/24/23  1308 07/24/18  1544   LIPASE 21  --  57  --    TRIG  --  218*  --  149       Hematology Studies      Recent Labs   Lab Test 01/27/25  0519 01/26/25  1154 01/26/25  0532 01/25/25  0648 01/24/25  0538 01/23/25  0538 01/22/25  1953 01/22/25  0533 " "10/20/21  1203 10/01/21  1139 09/30/21  1238 06/27/21  1941 11/26/19  1350 05/15/19  1833 08/02/17  1354   WBC 11.8*  --  11.6* 11.1* 15.0* 13.3*  --  9.0   < > 29.7*   < > 14.1*  --  8.0 7.7   ANEU  --   --   --   --   --   --   --   --   --  24.9*  --  11.8*  --  5.8 5.7   ALYM  --   --   --   --   --   --   --   --   --  2.1  --  0.9  --  1.2 1.3   CURT  --   --   --   --   --   --   --   --   --  2.7*  --  1.2  --  0.7 0.6   AEOS  --   --   --   --   --   --   --   --   --  0.0  --  0.1  --   --  0.1   HGB 7.9* 7.8* 7.2* 8.0* 8.4* 8.5*   < > 6.1*   < > 13.5   < > 12.3*   < > 12.1* 12.7*   HCT 27.5*  --  24.6* 27.8* 29.5* 29.9*  --  21.6*   < > 43.0   < > 40.1  --  39.6* 40.1     --  227 264 253 239  --  206   < > 285   < > 182  --  206 187    < > = values in this interval not displayed.       Arterial Blood Gas Testing    Recent Labs   Lab Test 08/10/24  0749 01/31/24  1348 08/13/23  1401   O2PER 40 21 36        Urine Studies     Recent Labs   Lab Test 01/21/25  1324 11/09/24  1630 10/01/24  1145 08/07/24  1658 07/11/24  1752   URINEPH 6.0 7.0 6.0 6.0 5.0   NITRITE Positive* Positive* Negative Negative Negative   LEUKEST Large* Large* Large* Moderate* Moderate*   WBCU >182* >182* 90* 19* >182*       Vancomycin Levels     No lab results found.    Invalid input(s): \"VANCO\"    Tobramycin levels     No lab results found.    Gentamicin levels    No lab results found.    Tacrolimus levels    Invalid input(s): \"TACROLIMUS\", \"TAC\", \"TACR\"      Latest Ref Rng & Units 1/27/2025     5:19 AM 1/26/2025     5:32 AM 1/25/2025     6:48 AM 1/24/2025     5:38 AM 1/23/2025     5:38 AM   Transplant Immunosuppression Labs   Creat 0.67 - 1.17 mg/dL 0.66  0.70  0.74  0.58  0.60    Urea Nitrogen 8.0 - 23.0 mg/dL 10.0  14.6  11.8  11.8  13.4    WBC 4.0 - 11.0 10e3/uL 11.8  11.6  11.1  15.0  13.3        Cyclosporine levels    Invalid input(s): \"CYCLOSPORINE\", \"CYC\"    Mycophenolate levels    Invalid input(s): \"MYPA\", " "\"MYP\"    Sirolimus levels    Invalid input(s): \"SIROLIMUS\", \"SIR\", \"RAPA\"    CSF testing    No lab results found.         Imaging:   CT Abdomen pelvis w contrast*    Result Date: 1/22/2025  EXAM: CT ABDOMEN PELVIS W CONTRAST LOCATION: [facility] [place] DATE: 1/22/2025 INDICATION: Severe lower abdominal pain. COMPARISON: 12/30/2024. TECHNIQUE: CT scan of the abdomen and pelvis was performed following injection of IV contrast. Multiplanar reformats were obtained. Dose reduction techniques were used. CONTRAST: 70 mL Isovue-370. FINDINGS: LOWER CHEST: Severe pulmonary emphysema. Increased patchy opacity is associated with emphysematous changes in the right middle lobe, which appears infectious/inflammatory. Mild to moderate dependent atelectatic change and moderate diffuse bronchial wall thickening. Scattered areas of mild mucous plugging within the periphery of the right lower lobe. Small bilateral pleural effusions. Small to moderate-sized pericardial effusion. Dense calcifications of the mitral annulus. Severe calcification of coronary arteries. Calcifications of the aortic valve. Cardiac leads are partially imaged. Lipomatous hypertrophy of interatrial septum. HEPATOBILIARY: Liver enhances normally and is normal in size. Cholelithiasis. No intrahepatic or intrahepatic biliary ductal dilatation. PANCREAS: Enhances normally. No peripancreatic inflammatory fat stranding. SPLEEN: Enhances normally. Normal size. Subcentimeter low-attenuation lesion of the anterior spleen, series 2 image 49, likely a benign cyst or hemangioma/hamartoma; no further follow up recommended. ADRENAL GLANDS: Normal. KIDNEYS: Both kidneys enhance symmetrically, without hydronephrosis. Benign appearing renal cyst(s), in addition to other low attenuation renal lesion(s), which are too small to characterize, though statistically likely to represent simple cysts; no further follow up recommended. Tiny nonobstructing left " renal stone. Diffusely thick-walled urinary bladder, likely secondary to decompression by the balloon tip of a Hernandez catheter. PELVIC ORGANS: Mildly enlarged prostate gland. BOWEL: Large rectal stool ball, with mild wall thickening and extraluminal fat stranding occurring near the rectosigmoid junction, which may reflect a developing stercoral colitis. Mild-to-moderate upstream colonic stool burden. Normal appendix. Colonic diverticulosis, without evidence of acute diverticulitis. Mild wall thickening of the proximal sigmoid colon, series 3 image 130, may be related to chronic diverticular disease and incomplete distention, though colonoscopy follow-up is warranted, if not recently performed. No intraperitoneal free fluid or free air. LYMPH NODES: No suspicious abdominal or pelvic lymphadenopathy. VASCULATURE: Unchanged 4.2 cm saccular aneurysm of the infrarenal abdominal aorta. Unchanged smaller saccular aneurysm occurring at the left common iliac artery origin. Additional tiny saccular aneurysm involving the proximal right common iliac artery, also unchanged. Findings are superimposed on a background of severe atheromatous disease. MUSCULOSKELETAL: Multiple old right rib fracture deformities. Unchanged, subacute to chronic and nondisplaced fracture deformity of the right inferior pubic ramus. Right hip arthroplasty. OTHER: Mild diffuse body wall edema.     IMPRESSION: 1.  Fecal impaction, with possible developing stercoral colitis at the rectosigmoid junction. 2.  Mild wall thickening of the proximal sigmoid colon, which may be attributable to chronic diverticular disease and incomplete distention. However, colonoscopy follow-up is warranted, if not recently performed. 3.  Cholelithiasis. 4.  Small bilateral pleural effusions. 5.  Small to moderate-sized pericardial effusion. 6.  Severe pulmonary emphysema, with increased patchy opacity associated with emphysematous changes in the right middle lobe, which appears  infectious/inflammatory. 7.  Unchanged 4.2 cm saccular aneurysm of the infrarenal abdominal aorta.     X-ray Abdomen flat decub port    Result Date: 1/22/2025  EXAM: XR ABDOMEN PORT 2 VIEWS LOCATION: [facility] DATE: 1/22/2025 INDICATION: Severe lower abdominal pain, diarrhea in recent days but now not able to pass flatus, evaluate bowel pattern and possible etiology. COMPARISON: CT dated 12/30/2024.     IMPRESSION: Multiple gas-filled loops of small bowel and colon. No evidence for obstruction. This could reflect an ileus. Large round, likely gallstone is seen in the right upper quadrant. Large amount of stool through the ascending and rectosigmoid colon. No free air. Right hip bipolar hemiarthroplasty hardware is noted. Degenerative changes are noted through the spine.    XR Chest Port 1 View    Result Date: 1/21/2025  EXAM: XR CHEST PORT 1 VIEW LOCATION: [facility] DATE: 1/21/2025 INDICATION: Cough. COMPARISON: None.     IMPRESSION: Left lower lobe atelectasis and/or infiltrate similar to previous. Right lung grossly clear.         Video-Visit Details    Type of service:  Video Telemedicine Visit   Video Start Time: 2:24 PM  Video End Time: 2:45 PM  Originating Location (pt. Location): [facility]  Distant Location (provider location):  Offsite  Platform used for Video Visit: [employer]

## 2025-01-28 VITALS
HEART RATE: 75 BPM | TEMPERATURE: 99.5 F | DIASTOLIC BLOOD PRESSURE: 80 MMHG | RESPIRATION RATE: 20 BRPM | SYSTOLIC BLOOD PRESSURE: 148 MMHG | OXYGEN SATURATION: 92 % | BODY MASS INDEX: 21 KG/M2 | WEIGHT: 146.39 LBS

## 2025-01-28 PROCEDURE — 99239 HOSP IP/OBS DSCHRG MGMT >30: CPT | Performed by: STUDENT IN AN ORGANIZED HEALTH CARE EDUCATION/TRAINING PROGRAM

## 2025-01-28 PROCEDURE — 250N000011 HC RX IP 250 OP 636: Performed by: FAMILY MEDICINE

## 2025-01-28 PROCEDURE — 250N000013 HC RX MED GY IP 250 OP 250 PS 637: Performed by: EMERGENCY MEDICINE

## 2025-01-28 PROCEDURE — 250N000013 HC RX MED GY IP 250 OP 250 PS 637: Performed by: FAMILY MEDICINE

## 2025-01-28 RX ORDER — NITROFURANTOIN MACROCRYSTALS 50 MG/1
50 CAPSULE ORAL DAILY
Qty: 30 CAPSULE | Refills: 0 | Status: SHIPPED | OUTPATIENT
Start: 2025-01-29 | End: 2025-02-05

## 2025-01-28 RX ADMIN — PIPERACILLIN AND TAZOBACTAM 3.38 G: 3; .375 INJECTION, POWDER, FOR SOLUTION INTRAVENOUS at 16:16

## 2025-01-28 RX ADMIN — PIPERACILLIN AND TAZOBACTAM 3.38 G: 3; .375 INJECTION, POWDER, FOR SOLUTION INTRAVENOUS at 11:37

## 2025-01-28 RX ADMIN — PIPERACILLIN AND TAZOBACTAM 3.38 G: 3; .375 INJECTION, POWDER, FOR SOLUTION INTRAVENOUS at 06:38

## 2025-01-28 RX ADMIN — GABAPENTIN 300 MG: 300 CAPSULE ORAL at 08:15

## 2025-01-28 RX ADMIN — Medication 25 MCG: at 08:16

## 2025-01-28 RX ADMIN — SENNOSIDES AND DOCUSATE SODIUM 1 TABLET: 50; 8.6 TABLET ORAL at 08:15

## 2025-01-28 RX ADMIN — APIXABAN 5 MG: 5 TABLET, FILM COATED ORAL at 08:16

## 2025-01-28 RX ADMIN — PIPERACILLIN AND TAZOBACTAM 3.38 G: 3; .375 INJECTION, POWDER, FOR SOLUTION INTRAVENOUS at 00:43

## 2025-01-28 RX ADMIN — FERROUS SULFATE TAB 325 MG (65 MG ELEMENTAL FE) 325 MG: 325 (65 FE) TAB at 08:16

## 2025-01-28 RX ADMIN — Medication 2 TABLET: at 08:15

## 2025-01-28 RX ADMIN — GABAPENTIN 300 MG: 300 CAPSULE ORAL at 14:05

## 2025-01-28 ASSESSMENT — ACTIVITIES OF DAILY LIVING (ADL)
ADLS_ACUITY_SCORE: 68

## 2025-01-28 NOTE — PROGRESS NOTES
"Care Management Discharge Note    Discharge Date: 01/28/2025       Discharge Disposition: Home, Hospice    Discharge Services: Transportation Services, Other (see comment) ([facility])    Discharge DME: Bed, Oxygen, Wheelchair, Commode    Discharge Transportation: agency, family or friend will provide    Private pay costs discussed: Not applicable    Does the patient's insurance plan have a 3 day qualifying hospital stay waiver?  Yes     Which insurance plan 3 day waiver is available? Alternative insurance waiver    Will the waiver be used for post-acute placement? No     Patient/family educated on Medicare website which has current facility and service quality ratings: no    Education Provided on the Discharge Plan:  yes     Persons Notified of Discharge Plans: Patient, wife, daughter, [facility]    Patient/Family in Agreement with the Plan: yes    Handoff Referral Completed: Yes, [facility] PCP: Internal handoff referral completed    Additional Information:  Per physician, patient will be ready for discharge after his last IV ABX dose this evening.      Nurse plans to give dose at 5pm and it runs 30 minutes, so patient will be ready for discharge after 5:30.      Writer spoke with wife this morning over the phone.  She stated she did not get a chance to talk with her daughter last night about the hospice.  Discussed options of re-starting [facility] (Phone: [phone] Fax: [phone]).  Discussed that they still have the bed mattress and oxygen there at the home.  Discussed referral to another agency, as wife stated, \"[facility] doesn't do anything.  I do it all!\"  Discussed option of home health care if not wanting hospice. Wife became quite upset and decided to end the call.    Writer spoke with patient in the patient room this morning and offered the same options as explained to wife, above.  Patient stated he does not know what to do.  Discussed what Medicare covers for hospice and for home " "care, neither is 24/7 care or twice a day care or even daily care.  Patient stated, \"My wife can't keep doing this.  Maybe I should go to a home.\" Writer encouraged patient to talk with his wife about this.  Then patient stated he doesn't want to have to pay for it, and plans to go home.  Patient undecided about what to do for services.  Writer asked patient if writer can call and talk with his daughter, May, as she had spoken with patient's nurse yesterday.  Patient agreed that writer can call and talk with his daughter, May.    Writer called and spoke with daughter, May.  She stated that she had told (wife- Brittany) to just re-start [facility] for now, \"but they are not doing what they are supposed to be doing and helping care for my dad.  I have printed out information from the state [facility] that says what the hospice agencies are supposed to be doing. I will report them if they don't do their job.\"  Writer provided supportive listening for a while longer and then was able to review the final discharge plan: Return to home, transport around 5:30 by family (Jake) and he will bring portable oxygen tank from the home for the transport, and plan to re-start [facility].  Referral was sent.  Hospice orders to be placed.      Writer has sent communication to Anai #[phone], the [facility] liaison, regarding patient's final discharge plan. She had indicated yesterday that they could do start of care this evening.      Naila Escobar [facility]   [phone]      "

## 2025-01-28 NOTE — PROGRESS NOTES
S-(situation): Patient discharged to home via family vehicle with wife and ELI.     B-(background): UTI     A-(assessment): VSS on RA. Murrell intact with good uo, cath wipes done. Bladder scanned for 45. Pt up SBA GBW. Zosyn completed.     R-(recommendations): Discharge instructions reviewed with patient and wife. Listed belongings gathered and returned to patient. yes             New Medications- pt has been educated about purpose and side effects: Yes    Vaccines  Influenza status verified at discharge:  Yes    Intentionally Retained Items (examples: Drains, Wound packing, ureteral stents, murrell, PICC line or IV)  Patient was sent home with chronic murrell left in place.   Follow up appointment made for removal:  NA, pt on chronic murrell . Urology appt done.    MISC  Prescriptions if needed, hard copies sent with patient  Yes  Home medications returned to patient: Yes  Medication Bin checked and emptied on discharge Yes  Patient reports post-discharge pain management plan is effective: Yes

## 2025-01-28 NOTE — PLAN OF CARE
Goal Outcome Evaluation:    Vss. Pt uses 1L nc O2 per home routine. Lungs are diminished, occasional nonproductive cough. Chronic pain to R hip, declined tylenol. Hernandez catheter with adequate UOP, see I and O. Bladder scan done in middle of night=15mL. Pt denied any bladder spasm or pain. IV Zosyn as scheduled. Two incontinent stools overnight. Pt anticipating possible discharge to home this evening.

## 2025-01-28 NOTE — PROGRESS NOTES
4 Hour Shift Note: Pt A&Ox4. 1L O2 applied via NC for comfort while sleeping, pt reports this is his baseline, SpO2 98%. Prn tylenol given for hip/leg pain. Bladder scan 23cc.

## 2025-01-28 NOTE — DISCHARGE SUMMARY
"[facility]  Hospitalist Discharge Summary      Date of Admission:  1/21/2025  Date of Discharge:  1/28/2025  Discharging Provider: Jessica Topete MD  Discharge Service: Hospitalist Service    Discharge Diagnoses   ***    Clinically Significant Risk Factors          Follow-ups Needed After Discharge   Follow-up Appointments       Follow-up and recommended labs and tests       [facility]            {Additional follow-up instructions/to-do's for PCP    :***}    Unresulted Labs Ordered in the Past 30 Days of this Admission       No orders found from 12/22/2024 to 1/22/2025.        These results will be followed up by ***    Discharge Disposition   {Discharge to:8705117::\"Discharged to home\"}  Condition at discharge: {CONDITION:702354::\"Stable\"}    Hospital Course   {OPTIONAL -- use to select A&P section   :727641}    Consultations This Hospital Stay   CARE MANAGEMENT / SOCIAL WORK IP CONSULT  INFECTIOUS DISEASES IP CONSULT    Code Status   Full Code    Time Spent on this Encounter   {How much time did you spend on discharge?:45407419}       Jessica Topete MD  [facility] MEDICAL SURGICAL  [address] [place] [address]  Phone: [phone]  ______________________________________________________________________    Physical Exam   Vital Signs: Temp: 99.5  F (37.5  C) Temp src: Oral BP: (!) 148/80 Pulse: 75   Resp: 20 SpO2: 92 % O2 Device: None (Room air) Oxygen Delivery: 1 LPM  Weight: 146 lbs 6.17 oz  {Recommend personal SmartPhrase or Notewriter for exam (OPTIONAL)   :880748}       Primary Care Physician   Neville Degroot    Discharge Orders      Primary Care - Care Coordination Referral      Hospice Referral      Reason for your hospital stay    Urinary tract infection associated with indwelling urethral catheter     Follow-up and recommended labs and tests     [facility] be [facility]     Activity    " Your activity upon discharge: activity as tolerated     Diet    Follow this diet upon discharge:       Snacks/Supplements Adult: Ensure Enlive; With Meals      Combination Diet Regular Diet Adult       Significant Results and Procedures   {Data for Discharge Summary:064879}    Discharge Medications   Current Discharge Medication List        CONTINUE these medications which have CHANGED    Details   nitroFURantoin macrocrystal (MACRODANTIN) 50 MG capsule Take 1 capsule (50 mg) by mouth daily.  Qty: 30 capsule, Refills: 0    Associated Diagnoses: Recurrent UTI           CONTINUE these medications which have NOT CHANGED    Details   !! ACE/ARB/ARNI NOT PRESCRIBED (INTENTIONAL) Please choose reason not prescribed from choices below.      apixaban ANTICOAGULANT (ELIQUIS ANTICOAGULANT) 5 MG tablet Take 1 tablet (5 mg) by mouth 2 times daily.  Qty: 60 tablet, Refills: 0    Associated Diagnoses: Other acute pulmonary embolism, unspecified whether acute cor pulmonale present (H)      !! BETA BLOCKER NOT PRESCRIBED (INTENTIONAL) Please choose reason not prescribed from choices below.      calcium carbonate-vitamin D (CALTRATE) 600-10 MG-MCG per tablet Take 2 tablets by mouth daily.      cholecalciferol (VITAMIN D3) 25 mcg (1000 units) capsule Take 1 capsule by mouth daily.      ferrous sulfate (FEROSUL) 325 (65 Fe) MG tablet Take 1 tablet (325 mg) by mouth daily (with breakfast).  Qty: 90 tablet, Refills: 0    Associated Diagnoses: Anemia, unspecified type      gabapentin (NEURONTIN) 300 MG capsule TAKE ONE CAPSULE BY MOUTH THREE TIMES A DAY  Qty: 90 capsule, Refills: 0    Associated Diagnoses: Urothelial carcinoma of bladder (H)      HYDROmorphone (DILAUDID) 2 MG tablet Take 0.5-1 tablets (1-2 mg) by mouth every 6 hours as needed for severe pain.  Qty: 30 tablet, Refills: 0    Associated Diagnoses: Pain      ipratropium - albuterol 0.5 mg/2.5 mg/3 mL (DUONEB) 0.5-2.5 (3) MG/3ML neb solution INHALE 1 VIAL IN NEBULIZER EVERY 6  HOURS AS NEEDED FOR SHORTNESS OF BREATH, WHEEZING OR COUGH  Qty: 360 mL, Refills: 2    Associated Diagnoses: Chronic obstructive pulmonary disease, unspecified COPD type (H)      Lidocaine HCl Urethral/Mucosal 2 % external gel Place 10 mLs into the urethra every 28 days.  Qty: 20 mL, Refills: 5    Associated Diagnoses: Bladder spasm      loperamide (IMODIUM) 2 MG capsule Take 2 mg by mouth as needed for diarrhea.      Multiple Vitamin (MULTIVITAMIN ADULT PO) Take 1 tablet by mouth daily      order for DME Equipment being ordered: Nebulizer hose  Qty: 1 Tube, Refills: 11    Associated Diagnoses: Chronic obstructive pulmonary disease, unspecified COPD type (H)      oxyBUTYnin (DITROPAN) 5 MG tablet Take 1 tablet (5 mg) by mouth 3 times daily as needed for bladder spasms.  Qty: 90 tablet, Refills: 1    Associated Diagnoses: Bladder spasm      oxyCODONE (OXY-IR) 5 MG capsule Take 5 mg by mouth every 4 hours as needed for pain.      tamsulosin (FLOMAX) 0.4 MG capsule Take 1 capsule (0.4 mg) by mouth at bedtime  Qty: 90 capsule, Refills: 3    Associated Diagnoses: Benign prostatic hyperplasia, unspecified whether lower urinary tract symptoms present      vitamin C (ASCORBIC ACID) 500 MG tablet Take 500 mg by mouth daily       !! - Potential duplicate medications found. Please discuss with provider.        Allergies   Allergies   Allergen Reactions    Flagyl [Metronidazole] GI Disturbance      placement.  Patient has declined further treatment with BCG therapy or other interventions.  He is set up to have a suprapubic catheter placement on 2/7/2025 given his ongoing difficulty with Hernandez catheter placement  -Anticipate outpatient follow-up with urology as previously planned and anticipated suprapubic catheter placement in February as previously scheduled     Refilled nitrofurantoin at discharge     Patient discharge home and hospice        Consultations This Hospital Stay   CARE MANAGEMENT / SOCIAL WORK IP CONSULT  INFECTIOUS DISEASES IP CONSULT    Code Status   Full Code    Time Spent on this Encounter   I, Jessica Topete MD, personally saw the patient today and spent greater than 30 minutes discharging this patient.       Jessica Topete MD  [address] MEDICAL SURGICAL  [address] [place] [address]  Phone: [phone]  ______________________________________________________________________    Physical Exam   Vital Signs: Temp: 99.5  F (37.5  C) Temp src: Oral BP: (!) 148/80 Pulse: 75   Resp: 20 SpO2: 92 % O2 Device: None (Room air) Oxygen Delivery: 1 LPM  Weight: 146 lbs 6.17 oz    Constitutional: Awake, alert, cooperative  Respiratory: No increased work of breathing  Cardiovascular paced rhythm  GI: Bowel sounds present, soft       Primary Care Physician   Neville Degroot    Discharge Orders      Primary Care - Care Coordination Referral      Hospice Referral      Reason for your hospital stay    Urinary tract infection associated with indwelling urethral catheter     Follow-up and recommended labs and tests     Will be followed by [facility] hospice     Activity    Your activity upon discharge: activity as tolerated     Diet    Follow this diet upon discharge:       Snacks/Supplements Adult: Ensure Enlive; With Meals      Combination Diet Regular Diet Adult       Significant Results and Procedures   Results for orders placed or performed during the hospital  encounter of 01/21/25   XR Chest Port 1 View    Narrative    EXAM: XR CHEST PORT 1 VIEW  LOCATION: [facility]  DATE: 1/21/2025    INDICATION: Cough.  COMPARISON: None.      Impression    IMPRESSION: Left lower lobe atelectasis and/or infiltrate similar to previous. Right lung grossly clear.   X-ray Abdomen flat decub port    Narrative    EXAM: XR ABDOMEN PORT 2 VIEWS  LOCATION: [facility]  DATE: 1/22/2025    INDICATION: Severe lower abdominal pain, diarrhea in recent days but now not able to pass flatus, evaluate bowel pattern and possible etiology.    COMPARISON: CT dated 12/30/2024.      Impression    IMPRESSION: Multiple gas-filled loops of small bowel and colon. No evidence for obstruction. This could reflect an ileus. Large round, likely gallstone is seen in the right upper quadrant. Large amount of stool through the ascending and rectosigmoid   colon. No free air. Right hip bipolar hemiarthroplasty hardware is noted. Degenerative changes are noted through the spine.   CT Abdomen pelvis w contrast*    Narrative    EXAM: CT ABDOMEN PELVIS W CONTRAST  LOCATION: [facility]  DATE: 1/22/2025    INDICATION: Severe lower abdominal pain.  COMPARISON: 12/30/2024.  TECHNIQUE: CT scan of the abdomen and pelvis was performed following injection of IV contrast. Multiplanar reformats were obtained. Dose reduction techniques were used.  CONTRAST: 70 mL Isovue-370.    FINDINGS:    LOWER CHEST: Severe pulmonary emphysema. Increased patchy opacity is associated with emphysematous changes in the right middle lobe, which appears infectious/inflammatory. Mild to moderate dependent atelectatic change and moderate diffuse bronchial wall   thickening. Scattered areas of mild mucous plugging within the periphery of the right lower lobe. Small bilateral pleural effusions. Small to moderate-sized pericardial effusion. Dense calcifications of  the mitral annulus. Severe calcification of   coronary arteries. Calcifications of the aortic valve. Cardiac leads are partially imaged. Lipomatous hypertrophy of interatrial septum.    HEPATOBILIARY: Liver enhances normally and is normal in size.    Cholelithiasis.    No intrahepatic or intrahepatic biliary ductal dilatation.    PANCREAS: Enhances normally. No peripancreatic inflammatory fat stranding.    SPLEEN: Enhances normally. Normal size. Subcentimeter low-attenuation lesion of the anterior spleen, series 2 image 49, likely a benign cyst or hemangioma/hamartoma; no further follow up recommended.    ADRENAL GLANDS: Normal.    KIDNEYS: Both kidneys enhance symmetrically, without hydronephrosis. Benign appearing renal cyst(s), in addition to other low attenuation renal lesion(s), which are too small to characterize, though statistically likely to represent simple cysts; no   further follow up recommended.    Tiny nonobstructing left renal stone.    Diffusely thick-walled urinary bladder, likely secondary to decompression by the balloon tip of a Hernandez catheter.    PELVIC ORGANS: Mildly enlarged prostate gland.    BOWEL: Large rectal stool ball, with mild wall thickening and extraluminal fat stranding occurring near the rectosigmoid junction, which may reflect a developing stercoral colitis. Mild-to-moderate upstream colonic stool burden. Normal appendix. Colonic   diverticulosis, without evidence of acute diverticulitis. Mild wall thickening of the proximal sigmoid colon, series 3 image 130, may be related to chronic diverticular disease and incomplete distention, though colonoscopy follow-up is warranted, if not   recently performed.    No intraperitoneal free fluid or free air.    LYMPH NODES: No suspicious abdominal or pelvic lymphadenopathy.    VASCULATURE: Unchanged 4.2 cm saccular aneurysm of the infrarenal abdominal aorta. Unchanged smaller saccular aneurysm occurring at the left common iliac artery  origin. Additional tiny saccular aneurysm involving the proximal right common iliac artery,   also unchanged. Findings are superimposed on a background of severe atheromatous disease.    MUSCULOSKELETAL: Multiple old right rib fracture deformities. Unchanged, subacute to chronic and nondisplaced fracture deformity of the right inferior pubic ramus. Right hip arthroplasty.    OTHER: Mild diffuse body wall edema.      Impression    IMPRESSION:   1.  Fecal impaction, with possible developing stercoral colitis at the rectosigmoid junction.  2.  Mild wall thickening of the proximal sigmoid colon, which may be attributable to chronic diverticular disease and incomplete distention. However, colonoscopy follow-up is warranted, if not recently performed.  3.  Cholelithiasis.  4.  Small bilateral pleural effusions.  5.  Small to moderate-sized pericardial effusion.  6.  Severe pulmonary emphysema, with increased patchy opacity associated with emphysematous changes in the right middle lobe, which appears infectious/inflammatory.  7.  Unchanged 4.2 cm saccular aneurysm of the infrarenal abdominal aorta.           Discharge Medications   Current Discharge Medication List        CONTINUE these medications which have CHANGED    Details   nitroFURantoin macrocrystal (MACRODANTIN) 50 MG capsule Take 1 capsule (50 mg) by mouth daily.  Qty: 30 capsule, Refills: 0    Associated Diagnoses: Recurrent UTI           CONTINUE these medications which have NOT CHANGED    Details   !! ACE/ARB/ARNI NOT PRESCRIBED (INTENTIONAL) Please choose reason not prescribed from choices below.      apixaban ANTICOAGULANT (ELIQUIS ANTICOAGULANT) 5 MG tablet Take 1 tablet (5 mg) by mouth 2 times daily.  Qty: 60 tablet, Refills: 0    Associated Diagnoses: Other acute pulmonary embolism, unspecified whether acute cor pulmonale present (H)      !! BETA BLOCKER NOT PRESCRIBED (INTENTIONAL) Please choose reason not prescribed from choices below.      calcium  carbonate-vitamin D (CALTRATE) 600-10 MG-MCG per tablet Take 2 tablets by mouth daily.      cholecalciferol (VITAMIN D3) 25 mcg (1000 units) capsule Take 1 capsule by mouth daily.      ferrous sulfate (FEROSUL) 325 (65 Fe) MG tablet Take 1 tablet (325 mg) by mouth daily (with breakfast).  Qty: 90 tablet, Refills: 0    Associated Diagnoses: Anemia, unspecified type      gabapentin (NEURONTIN) 300 MG capsule TAKE ONE CAPSULE BY MOUTH THREE TIMES A DAY  Qty: 90 capsule, Refills: 0    Associated Diagnoses: Urothelial carcinoma of bladder (H)      HYDROmorphone (DILAUDID) 2 MG tablet Take 0.5-1 tablets (1-2 mg) by mouth every 6 hours as needed for severe pain.  Qty: 30 tablet, Refills: 0    Associated Diagnoses: Pain      ipratropium - albuterol 0.5 mg/2.5 mg/3 mL (DUONEB) 0.5-2.5 (3) MG/3ML neb solution INHALE 1 VIAL IN NEBULIZER EVERY 6 HOURS AS NEEDED FOR SHORTNESS OF BREATH, WHEEZING OR COUGH  Qty: 360 mL, Refills: 2    Associated Diagnoses: Chronic obstructive pulmonary disease, unspecified COPD type (H)      Lidocaine HCl Urethral/Mucosal 2 % external gel Place 10 mLs into the urethra every 28 days.  Qty: 20 mL, Refills: 5    Associated Diagnoses: Bladder spasm      loperamide (IMODIUM) 2 MG capsule Take 2 mg by mouth as needed for diarrhea.      Multiple Vitamin (MULTIVITAMIN ADULT PO) Take 1 tablet by mouth daily      order for DME Equipment being ordered: Nebulizer hose  Qty: 1 Tube, Refills: 11    Associated Diagnoses: Chronic obstructive pulmonary disease, unspecified COPD type (H)      oxyBUTYnin (DITROPAN) 5 MG tablet Take 1 tablet (5 mg) by mouth 3 times daily as needed for bladder spasms.  Qty: 90 tablet, Refills: 1    Associated Diagnoses: Bladder spasm      oxyCODONE (OXY-IR) 5 MG capsule Take 5 mg by mouth every 4 hours as needed for pain.      tamsulosin (FLOMAX) 0.4 MG capsule Take 1 capsule (0.4 mg) by mouth at bedtime  Qty: 90 capsule, Refills: 3    Associated Diagnoses: Benign prostatic  hyperplasia, unspecified whether lower urinary tract symptoms present      vitamin C (ASCORBIC ACID) 500 MG tablet Take 500 mg by mouth daily       !! - Potential duplicate medications found. Please discuss with provider.        Allergies   Allergies   Allergen Reactions    Flagyl [Metronidazole] GI Disturbance

## 2025-01-29 ENCOUNTER — PATIENT OUTREACH (OUTPATIENT)
Dept: CARE COORDINATION | Facility: CLINIC | Age: 80
End: 2025-01-29
Payer: COMMERCIAL

## 2025-01-29 DIAGNOSIS — Z09 HOSPITAL DISCHARGE FOLLOW-UP: ICD-10-CM

## 2025-01-29 NOTE — PROGRESS NOTES
Clinic Care Coordination Contact  Care Coordination Clinician Chart Review    Situation: Patient chart reviewed by care coordinator.    Background: Clinic Care Coordination Referral received from inpatient care team for transition handoff communication following hospital admission.    Assessment: Upon chart review, patient is not a candidate for Primary Care Clinic Care Coordination enrollment due to reason stated below:  Patient enrolled into hospice.    Plan/Recommendations: Clinic Care Coordination Referral/order cancelled. RN/SW CC will perform no further monitoring/outreaches at this time and will remain available as needed. If new needs arise, a new Care Coordination Referral may be placed.    Molly Lombardi [facility]  Primary Care Clinic Care Coordination  Tele: [phone]

## 2025-01-30 ENCOUNTER — TELEPHONE (OUTPATIENT)
Dept: FAMILY MEDICINE | Facility: CLINIC | Age: 80
End: 2025-01-30
Payer: COMMERCIAL

## 2025-01-30 NOTE — TELEPHONE ENCOUNTER
MTM referral from: Transitions of Care (recent hospital discharge, TCU discharge, or ED visit)    MTM referral outreach attempt #1 on January 30, 2025 at 12:34 PM      Outcome: Patient is not interested at this time because pt is on Hospice     Use [employer] part d MAP for the carrier/Plan on the flowsheet      Lianet Villalta CMA  MTM

## 2025-02-05 ENCOUNTER — MYC MEDICAL ADVICE (OUTPATIENT)
Dept: FAMILY MEDICINE | Facility: CLINIC | Age: 80
End: 2025-02-05

## 2025-02-05 ENCOUNTER — OFFICE VISIT (OUTPATIENT)
Dept: FAMILY MEDICINE | Facility: CLINIC | Age: 80
End: 2025-02-05
Payer: COMMERCIAL

## 2025-02-05 VITALS
TEMPERATURE: 98.5 F | RESPIRATION RATE: 20 BRPM | SYSTOLIC BLOOD PRESSURE: 136 MMHG | DIASTOLIC BLOOD PRESSURE: 60 MMHG | HEART RATE: 80 BPM | OXYGEN SATURATION: 97 %

## 2025-02-05 DIAGNOSIS — T83.511D URINARY TRACT INFECTION ASSOCIATED WITH INDWELLING URETHRAL CATHETER, SUBSEQUENT ENCOUNTER: ICD-10-CM

## 2025-02-05 DIAGNOSIS — I95.1 ORTHOSTATIC HYPOTENSION: ICD-10-CM

## 2025-02-05 DIAGNOSIS — Z01.818 PREOP GENERAL PHYSICAL EXAM: ICD-10-CM

## 2025-02-05 DIAGNOSIS — I44.2 THIRD DEGREE HEART BLOCK (H): ICD-10-CM

## 2025-02-05 DIAGNOSIS — Z86.711 HISTORY OF PULMONARY EMBOLISM: ICD-10-CM

## 2025-02-05 DIAGNOSIS — K52.9 COLITIS: ICD-10-CM

## 2025-02-05 DIAGNOSIS — I25.10 CORONARY ARTERY DISEASE INVOLVING NATIVE CORONARY ARTERY OF NATIVE HEART WITHOUT ANGINA PECTORIS: ICD-10-CM

## 2025-02-05 DIAGNOSIS — N40.0 BENIGN PROSTATIC HYPERPLASIA, UNSPECIFIED WHETHER LOWER URINARY TRACT SYMPTOMS PRESENT: ICD-10-CM

## 2025-02-05 DIAGNOSIS — E43 SEVERE MALNUTRITION: ICD-10-CM

## 2025-02-05 DIAGNOSIS — D56.9 THALASSEMIA, UNSPECIFIED TYPE: ICD-10-CM

## 2025-02-05 DIAGNOSIS — R91.1 PULMONARY NODULE: ICD-10-CM

## 2025-02-05 DIAGNOSIS — D64.9 ANEMIA, UNSPECIFIED TYPE: ICD-10-CM

## 2025-02-05 DIAGNOSIS — R33.9 URINARY RETENTION: ICD-10-CM

## 2025-02-05 DIAGNOSIS — I50.22 CHRONIC SYSTOLIC CONGESTIVE HEART FAILURE (H): Chronic | ICD-10-CM

## 2025-02-05 DIAGNOSIS — E78.5 HYPERLIPIDEMIA LDL GOAL <130: ICD-10-CM

## 2025-02-05 DIAGNOSIS — K59.00 CONSTIPATION, UNSPECIFIED CONSTIPATION TYPE: ICD-10-CM

## 2025-02-05 DIAGNOSIS — N39.0 RECURRENT UTI: ICD-10-CM

## 2025-02-05 DIAGNOSIS — Z09 HOSPITAL DISCHARGE FOLLOW-UP: Primary | ICD-10-CM

## 2025-02-05 DIAGNOSIS — N39.0 URINARY TRACT INFECTION ASSOCIATED WITH INDWELLING URETHRAL CATHETER, SUBSEQUENT ENCOUNTER: ICD-10-CM

## 2025-02-05 DIAGNOSIS — C67.9 UROTHELIAL CARCINOMA OF BLADDER (H): Chronic | ICD-10-CM

## 2025-02-05 DIAGNOSIS — N48.89 PENILE EROSION: ICD-10-CM

## 2025-02-05 DIAGNOSIS — K59.03 DRUG-INDUCED CONSTIPATION: ICD-10-CM

## 2025-02-05 PROBLEM — J44.0 ACUTE BRONCHITIS WITH COPD (H): Status: RESOLVED | Noted: 2025-01-21 | Resolved: 2025-02-05

## 2025-02-05 PROBLEM — J20.9 ACUTE BRONCHITIS WITH COPD (H): Status: RESOLVED | Noted: 2025-01-21 | Resolved: 2025-02-05

## 2025-02-05 LAB
ALBUMIN SERPL BCG-MCNC: 3.3 G/DL (ref 3.5–5.2)
ALP SERPL-CCNC: 52 U/L (ref 40–150)
ALT SERPL W P-5'-P-CCNC: 10 U/L (ref 0–70)
ANION GAP SERPL CALCULATED.3IONS-SCNC: 7 MMOL/L (ref 7–15)
AST SERPL W P-5'-P-CCNC: 12 U/L (ref 0–45)
BILIRUB SERPL-MCNC: 0.4 MG/DL
BUN SERPL-MCNC: 14.9 MG/DL (ref 8–23)
CALCIUM SERPL-MCNC: 8.5 MG/DL (ref 8.8–10.4)
CHLORIDE SERPL-SCNC: 104 MMOL/L (ref 98–107)
CREAT SERPL-MCNC: 0.63 MG/DL (ref 0.67–1.17)
EGFRCR SERPLBLD CKD-EPI 2021: >90 ML/MIN/1.73M2
ELLIPTOCYTES BLD QL SMEAR: SLIGHT
ERYTHROCYTE [DISTWIDTH] IN BLOOD BY AUTOMATED COUNT: 28.7 % (ref 10–15)
FRAGMENTS BLD QL SMEAR: SLIGHT
GLUCOSE SERPL-MCNC: 102 MG/DL (ref 70–99)
HCO3 SERPL-SCNC: 27 MMOL/L (ref 22–29)
HCT VFR BLD AUTO: 32.4 % (ref 40–53)
HGB BLD-MCNC: 9.3 G/DL (ref 13.3–17.7)
MCH RBC QN AUTO: 18 PG (ref 26.5–33)
MCHC RBC AUTO-ENTMCNC: 28.7 G/DL (ref 31.5–36.5)
MCV RBC AUTO: 63 FL (ref 78–100)
PLAT MORPH BLD: ABNORMAL
PLATELET # BLD AUTO: 256 10E3/UL (ref 150–450)
POLYCHROMASIA BLD QL SMEAR: SLIGHT
POTASSIUM SERPL-SCNC: 4.1 MMOL/L (ref 3.4–5.3)
PROT SERPL-MCNC: 6.1 G/DL (ref 6.4–8.3)
RBC # BLD AUTO: 5.16 10E6/UL (ref 4.4–5.9)
RBC MORPH BLD: ABNORMAL
SODIUM SERPL-SCNC: 138 MMOL/L (ref 135–145)
VARIANT LYMPHS BLD QL SMEAR: PRESENT
WBC # BLD AUTO: 10.8 10E3/UL (ref 4–11)

## 2025-02-05 PROCEDURE — 99214 OFFICE O/P EST MOD 30 MIN: CPT | Performed by: STUDENT IN AN ORGANIZED HEALTH CARE EDUCATION/TRAINING PROGRAM

## 2025-02-05 PROCEDURE — 36415 COLL VENOUS BLD VENIPUNCTURE: CPT | Performed by: STUDENT IN AN ORGANIZED HEALTH CARE EDUCATION/TRAINING PROGRAM

## 2025-02-05 PROCEDURE — 80053 COMPREHEN METABOLIC PANEL: CPT | Performed by: STUDENT IN AN ORGANIZED HEALTH CARE EDUCATION/TRAINING PROGRAM

## 2025-02-05 PROCEDURE — 85027 COMPLETE CBC AUTOMATED: CPT | Mod: GZ | Performed by: STUDENT IN AN ORGANIZED HEALTH CARE EDUCATION/TRAINING PROGRAM

## 2025-02-05 RX ORDER — NITROFURANTOIN MACROCRYSTALS 50 MG/1
50 CAPSULE ORAL DAILY
Qty: 30 CAPSULE | Refills: 0 | Status: SHIPPED | OUTPATIENT
Start: 2025-02-05

## 2025-02-05 RX ORDER — SENNA AND DOCUSATE SODIUM 50; 8.6 MG/1; MG/1
1 TABLET, FILM COATED ORAL
Qty: 60 TABLET | Refills: 1 | Status: SHIPPED | OUTPATIENT
Start: 2025-02-05

## 2025-02-05 RX ORDER — POLYETHYLENE GLYCOL 3350 17 G/17G
1 POWDER, FOR SOLUTION ORAL 2 TIMES DAILY
Qty: 510 G | Refills: 1 | Status: SHIPPED | OUTPATIENT
Start: 2025-02-05

## 2025-02-05 ASSESSMENT — PAIN SCALES - GENERAL: PAINLEVEL_OUTOF10: NO PAIN (0)

## 2025-02-05 NOTE — PROGRESS NOTES
"  Assessment & Plan   Problem List Items Addressed This Visit          Circulatory    Coronary artery disease involving native coronary artery of native heart without angina pectoris - Primary (Chronic)     Other Visit Diagnoses       Need for shingles vaccine        Need for Tdap vaccination        Need for prophylactic vaccination against hepatitis A        Need for vaccination against respiratory syncytial virus                    MED REC REQUIRED{TIP  Click the link below to document or use med rec list, use list to pull in response :[id number]}  Post Medication Reconciliation Status:  Discharge medications reconciled and changed, see notes/orders        Mikaela Quiñonez is a 79 year old, presenting for the following health issues:  Pre-Op Exam      2/5/2025     2:46 PM   Additional Questions   Roomed by Samantha LEDEZMA   Accompanied by Brittany, wife, Jake, son in law     [facility]         Hospital Follow-up Visit:    Hospital/Nursing Home/ Rehab Facility: [facility]  Date of Admission: 1/21/2025  Date of Discharge: 1/26/2025  Reason(s) for Admission: syncope, UTI  Was the patient in the ICU or did the patient experience delirium during hospitalization?  No  Do you have any other stressors you would like to discuss with your provider? Health Concerns    Problems taking medications regularly:  None  Medication changes since discharge: None  Problems adhering to non-medication therapy:  None    Summary of hospitalization:  {:227838}  Diagnostic Tests/Treatments reviewed.  Follow up needed: {:399926:}  Other Healthcare Providers Involved in Patient s Care:         {those currently involved after discharge:[id number]::\"None\"}  Update since discharge: {:863378} {TIP  Include information from family/caregivers, SNF, Care Coordination :447237}        Plan of care communicated with {:745352}     {Reference  Coding guidelines- Moderate Complexity F2F/Video within 7 - 14 days of discharge [id number], High Complexity " F2F/Video within 7 days [id number] or [id number] days [id number] :497720}          Review of Systems  Constitutional, HEENT, cardiovascular, pulmonary, GI, , musculoskeletal, neuro, skin, endocrine and psych systems are negative, except as otherwise noted.      Objective    /60   Pulse 80   Temp 98.5  F (36.9  C) (Temporal)   Resp 20   SpO2 97%   There is no height or weight on file to calculate BMI.  Physical Exam   GENERAL: alert and no distress  EYES: Eyes grossly normal to inspection, PERRL and conjunctivae and sclerae normal  HENT: ear canals and TM's normal, nose and mouth without ulcers or lesions  NECK: no adenopathy, no asymmetry, masses, or scars  RESP: lungs clear to auscultation - no rales, rhonchi or wheezes  CV: regular rate and rhythm, normal S1 S2, no S3 or S4, no murmur, click or rub, no peripheral edema  ABDOMEN: soft, nontender, no hepatosplenomegaly, no masses and bowel sounds normal  MS: no gross musculoskeletal defects noted, no edema  SKIN: no suspicious lesions or rashes  NEURO: Normal strength and tone, mentation intact and speech normal  PSYCH: mentation appears normal, affect normal/bright            Signed Electronically by: Neville Degroot MD  {Email feedback regarding this note to [email or web address]   :467469}

## 2025-02-05 NOTE — PROGRESS NOTES
Preoperative Evaluation  [address] [address]  Phone: [phone]  Fax: [phone]  Primary Provider: Neville Degroot MD  Pre-op Performing Provider: Neville Degroot MD  Feb 5, 2025   {Provider  Link to PREOP SmartSet  REQUIRED to apply standard patient instructions and medication directions to the AVS :310097}  {ROOMER review and update patient entered surgical information if needed :414413}        2/5/2025   Surgical Information   What procedure is being done? Cystoscopy, percutaneous suprapubic tube placement   [place] [facility]   Who is doing the procedure / surgery? Dr. Thompson   Date of surgery / procedure: 2/7/2025   Time of surgery / procedure: to be determined   Where do you plan to recover after surgery? at home with family     Fax number for surgical facility:     Assessment & Plan     The proposed surgical procedure is considered INTERMEDIATE risk.    Problem List Items Addressed This Visit          Respiratory    Pulmonary nodule - concerning for cancer but patient declining further work up       Digestive    Drug-induced constipation    Relevant Medications    SENNA-docusate sodium (SENNA S) 8.6-50 MG tablet    polyethylene glycol (MIRALAX) 17 GM/Dose powder    Severe malnutrition       Endocrine    Hyperlipidemia LDL goal <130       Circulatory    Coronary artery disease involving native coronary artery of native heart without angina pectoris (Chronic)    Relevant Orders    Adult Cardiology Eval  Referral    Chronic systolic congestive heart failure (H) (Chronic)    Third degree heart block (H) - s/p pacemaker    Relevant Orders    Adult Cardiology Eval  Referral    Orthostatic hypotension       Urinary    Urothelial carcinoma of bladder (H) (Chronic)    Benign prostatic hyperplasia    Urinary tract infection associated with indwelling  urethral catheter    Relevant Medications    nitroFURantoin macrocrystal (MACRODANTIN) 50 MG capsule    Penile erosion    Urinary retention       Hematologic    Thalassemia    Anemia, unspecified type       Other    History of pulmonary embolism     Other Visit Diagnoses       Hospital discharge follow-up    -  Primary    Relevant Orders    Comprehensive metabolic panel (BMP + Alb, Alk Phos, ALT, AST, Total. Bili, TP) (Completed)    CBC with platelets (Completed)    RBC and Platelet Morphology (Completed)    Preop general physical exam        Relevant Orders    Adult Cardiology Eval  Referral    Recurrent UTI        Relevant Medications    nitroFURantoin macrocrystal (MACRODANTIN) 50 MG capsule    Constipation, unspecified constipation type        Relevant Medications    SENNA-docusate sodium (SENNA S) 8.6-50 MG tablet    polyethylene glycol (MIRALAX) 17 GM/Dose powder    Colitis                  MED REC REQUIRED{TIP  Click the link below to document or use med rec list, use list to pull in response :066479}  Post Medication Reconciliation Status:  Discharge medications reconciled and changed, see notes/orders   Implanted Device   - Type of device: Pacemaker Patient advised to bring device information on day of surgery.      Risks and Recommendations  The patient has the following additional risks and recommendations for perioperative complications:   - History of urinary retention  Cardiovascular:   - Cardiology consulted   Pulmonary:    - Incentive spirometry post-op  Anemia/Bleeding/Clotting:    - Significant bleeding history   - History of DVT or PE, consider DVT prevention postoperatively   - Anemia and requires treatment prior to surgery. Transfusion and he did receive iron. Stable currently  Infection:    - Continues on macrobid now. Recent concern for colitis and hospitalization      Recommendation  Patient referred to cardiology for evaluation before surgery. I also reviewed     Mikaela Quiñonez  is a 79 year old, presenting for the following:  Pre-Op Exam       MED REC REQUIRED{TIP  Click the link below to document or use med rec list, use list to pull in response :037150}  Post Medication Reconciliation Status:  Discharge medications reconciled and changed, see notes/orders        Mikaela Quiñonez is a 79 year old, presenting for the following health issues:  Pre-Op Exam      2/5/2025     2:46 PM   Additional Questions   Roomed by Samantha LEDEZMA   Accompanied by Brittany, wife, Jake, son in law     [facility]         Hospital Follow-up Visit:    Hospital/Nursing Home/ Rehab Facility: [facility]  Date of Admission: 1/21/2025  Date of Discharge: 1/26/2025  Reason(s) for Admission: syncope, UTI  Was the patient in the ICU or did the patient experience delirium during hospitalization?  No  Do you have any other stressors you would like to discuss with your provider? Health Concerns    Problems taking medications regularly:  None  Medication changes since discharge: None  Problems adhering to non-medication therapy:  None    Summary of hospitalization:  [facility] discharge summary reviewed  Diagnostic Tests/Treatments reviewed.  Follow up needed: cardiology, urology  Other Healthcare Providers Involved in Patient s Care:         Hospice and Surgical follow-up appointment - Urology   Update since discharge: improved. {TIP  Include information from family/caregivers, SNF, Care Coordination :552172}    Patient seen today for follow-up of hospitalization and actually evaluation of clearance for surgery for suprapubic catheter.  This was due to penile ulceration with chronic indwelling catheter and recurrent UTI.  Patient on chronic Macrobid now due to recurrent infections.  He did see urology in December who discussed potential placement if cancer not involved anteriorly and apparently they did schedule procedure.  Patient with syncopal and unresponsive event during previous preop  which led to 1 week hospitalization.  Patient with fecal impaction and concern for stercoral colitis.  He did have disimpaction and bowel regiment with decent output with home regimen to start but wife and him have not been doing that now.  Having output and no longer having watery stools and his wife is actually been giving him Imodium because of previous watery stools likely seeping around impaction.  They do note some diet changes have been helpful.  He has had some gravelly output but wife feeling like these are not formed regular stool.  He has not had any further black stools.  He notes breathing to be very stable without chest pain or palpitations.  Has not had as significant of a lightheaded episodes as he was having previous to hospitalization.  He did get IV iron in the hospital as well as transfusion and has been doing oral iron as well.  He has not followed up with cardiologist for some time.  Taking medications as prescribed but was holding blood thinner today with preparation for surgery on Friday.  Still some suprapubic discomfort but overall pain is better. Hx of coronary stend with pacemaker and previous effusion noted.     Plan of care communicated with patient and family     {Reference  Coding guidelines- Moderate Complexity F2F/Video within 7 - 14 days of discharge [id number], High Complexity F2F/Video within 7 days [id number] or [id number] days [id number] :562501}        2/5/2025     2:46 PM   Additional Questions   Roomed by Samantha LEDEZMA   Accompanied by Brittany, wife, Jake, son in law     HPI related to upcoming procedure: Cystoscopy, percutaneous suprapubic tube         2/5/2025   Pre-Op Questionnaire   Have you ever had a heart attack or stroke? No   Have you ever had surgery on your heart or blood vessels, such as a stent placement, a coronary artery bypass, or surgery on an artery in your head, neck, heart, or legs? (!) YES stents   Do you have chest pain with activity? No   Do you have a history of  heart failure? No   Do you currently have a cold, bronchitis or symptoms of other infection? No   Do you have a cough, shortness of breath, or wheezing? (!) YES cough   Do you or anyone in your family have previous history of blood clots? (!) YES Khang ( patient)   Do you or does anyone in your family have a serious bleeding problem such as prolonged bleeding following surgeries or cuts? No   Have you ever had problems with anemia or been told to take iron pills? (!) YES anemia, iron tablets   Have you had any abnormal blood loss such as black, tarry or bloody stools? (!) YES black tarry stools   Have you ever had a blood transfusion? (!) YES   Have you ever had a transfusion reaction? No   Are you willing to have a blood transfusion if it is medically needed before, during, or after your surgery? Yes   Have you or any of your relatives ever had problems with anesthesia? No   Do you have sleep apnea, excessive snoring or daytime drowsiness? No   Do you have any artifical heart valves or other implanted medical devices like a pacemaker, defibrillator, or continuous glucose monitor? (!) YES   What type of device do you have? pacemaker   Name of the clinic that manages your device [facility]   Do you have artificial joints? No   Are you allergic to latex? No     Health Care Directive  Patient has a Health Care Directive on file      Preoperative Review of    reviewed - controlled substances reflected in medication list.  {Review MNPMP for all patients per [employer] MNPMP Profile:223727}    Status of Chronic Conditions:  {Chronic Problem Details :124672}    Patient Active Problem List    Diagnosis Date Noted    Urinary retention 02/05/2025     Priority: Medium    History of pulmonary embolism 01/22/2025     Priority: Medium    Lower abdominal pain 01/22/2025     Priority: Medium    Orthostatic hypotension 01/21/2025     Priority: Medium    Near syncope 01/21/2025     Priority: Medium    Penile erosion 01/03/2025      Priority: Medium    Syncope, unspecified syncope type 10/01/2024     Priority: Medium    Anemia, unspecified type 10/01/2024     Priority: Medium    Urinary tract infection associated with indwelling urethral catheter, initial encounter - possible 10/01/2024     Priority: Medium    Sepsis, due to unspecified organism, unspecified whether acute organ dysfunction present (H) 10/01/2024     Priority: Medium    Medication induced coagulopathy 10/01/2024     Priority: Medium    Urinary tract infection associated with indwelling urethral catheter 10/01/2024     Priority: Medium    Leukocytosis, unspecified type 10/01/2024     Priority: Medium    Acute pulmonary embolism, unspecified pulmonary embolism type, unspecified whether acute cor pulmonale present (H) 09/12/2024     Priority: Medium    Severe malnutrition 04/30/2024     Priority: Medium    Gastrointestinal hemorrhage with melena 04/30/2024     Priority: Medium    Hypoalbuminemia 04/30/2024     Priority: Medium    Lactic acidosis 04/30/2024     Priority: Medium    Enlarged prostate 04/30/2024     Priority: Medium    Coagulopathy 04/30/2024     Priority: Medium    Sacral pressure sore 04/30/2024     Priority: Medium    Abrasion of left elbow 04/30/2024     Priority: Medium    Chronic systolic congestive heart failure (H) 04/27/2024     Priority: Medium    Urothelial carcinoma of bladder (H) 04/27/2024     Priority: Medium    Pneumonia of right lower lobe due to infectious organism 04/26/2024     Priority: Medium    Septic shock (H) 04/26/2024     Priority: Medium    Closed fracture of right hip with routine healing 04/16/2024     Priority: Medium    Chronic pain 04/16/2024     Priority: Medium    Drug-induced constipation 04/16/2024     Priority: Medium    Acute blood loss anemia 04/16/2024     Priority: Medium    Postprocedural stricture of anterior urethra 03/21/2024     Priority: Medium    Acute bilateral back pain, unspecified back location 03/19/2024      Priority: Medium    SIRS (systemic inflammatory response syndrome) (H) 02/01/2024     Priority: Medium    Calculus of gallbladder without cholecystitis without obstruction 02/01/2024     Priority: Medium    Infrarenal abdominal aortic aneurysm (AAA) without rupture 02/01/2024     Priority: Medium    Fistula of sigmoid colon 02/01/2024     Priority: Medium    Aspiration into airway, initial encounter 02/01/2024     Priority: Medium    Gross hematuria 02/01/2024     Priority: Medium    Diverticulitis 01/31/2024     Priority: Medium    Pulmonary nodule - concerning for cancer but patient declining further work up 01/31/2024     Priority: Medium    Mass of urinary bladder 01/31/2024     Priority: Medium    Abnormal urinalysis 01/31/2024     Priority: Medium    Other chest pain 01/31/2024     Priority: Medium    Systolic congestive heart failure, unspecified HF chronicity (H) 01/09/2024     Priority: Medium    Systolic congestive heart failure (H) 01/09/2024     Priority: Medium    Coronary artery disease involving native coronary artery of native heart without angina pectoris 12/05/2023     Priority: Medium    NSTEMI (non-ST elevated myocardial infarction) (H) 11/27/2023     Priority: Medium    COVID-19 08/13/2023     Priority: Medium    Benign prostatic hyperplasia, unspecified whether lower urinary tract symptoms present 11/01/2021     Priority: Medium    Benign prostatic hyperplasia 11/01/2021     Priority: Medium    Fatigue, unspecified type 11/29/2019     Priority: Medium    Memory loss 11/29/2019     Priority: Medium    Tubulovillous adenoma of rectum 4/9/24 07/26/2016     Priority: Medium    Sigmoid diverticulitis 07/12/2016     Priority: Medium    History of CVA (cerebrovascular accident) - old left internal capsule lacunar infarct on CT 7/2016 07/12/2016     Priority: Medium    Multilevel degenerative disc disease 09/21/2015     Priority: Medium    Elevated prostate specific antigen (PSA) 09/11/2015      Priority: Medium    Elevated PSA 08/27/2012     Priority: Medium    Loss of weight 08/27/2012     Priority: Medium    Third degree heart block (H) - s/p pacemaker 08/10/2012     Priority: Medium    Hypertension goal BP (blood pressure) < 140/90 08/10/2012     Priority: Medium    Other thalassemia 07/27/2012     Priority: Medium     Diagnosis updated by automated process. Provider to review and confirm.      Hyperlipidemia LDL goal <130 07/27/2012     Priority: Medium    Heart block 07/27/2012     Priority: Medium    Cardiac abnormality 07/27/2012     Priority: Medium    Complete heart block (H) 07/27/2012     Priority: Medium    Thalassemia 07/27/2012     Priority: Medium     Formatting of this note might be different from the original.   Formatting of this note might be different from the original.   Diagnosis updated by automated process. Provider to review and confirm.      Painful respiration 03/31/2004     Priority: Medium    Personal history of tobacco use, presenting hazards to health 03/31/2004     Priority: Medium      Past Medical History:   Diagnosis Date    Alpha thalassemia     AV block 07/2012    pacemaker at [facility]    CAD (coronary artery disease)     Chronic systolic heart failure (H)     COPD (chronic obstructive pulmonary disease) (H)     Diverticulitis     History of CVA (cerebrovascular accident)     Injury, other and unspecified, elbow, forearm, and wrist 1970s    Broke right wrist    Urothelial carcinoma (H)      Past Surgical History:   Procedure Laterality Date    COLONOSCOPY      COLONOSCOPY  12/10/2012    CYSTOSCOPY, LITHOLAPAXY, COMBINED N/A 11/10/2021    Procedure: Cystoscopy, Bladder Stone Removal;  Surgeon: Guanaco Stewart MD;  Location:  OR    IMPLANT PACEMAKER      LASER KTP TRANSURETHRAL RESECTION (TUR) PROSTATE N/A 11/10/2021    Procedure: TRANSURETHRAL RESECTION (TUR) PROSTATE, USING KTP LASER;  Surgeon: Guanaco Stewart MD;  Location: [facility] OR     Current Outpatient  Medications   Medication Sig Dispense Refill    ACE/ARB/ARNI NOT PRESCRIBED (INTENTIONAL) Please choose reason not prescribed from choices below.      BETA BLOCKER NOT PRESCRIBED (INTENTIONAL) Please choose reason not prescribed from choices below.      ferrous sulfate (FEROSUL) 325 (65 Fe) MG tablet Take 1 tablet (325 mg) by mouth daily (with breakfast). 90 tablet 0    gabapentin (NEURONTIN) 300 MG capsule TAKE ONE CAPSULE BY MOUTH THREE TIMES A DAY 90 capsule 0    ipratropium - albuterol 0.5 mg/2.5 mg/3 mL (DUONEB) 0.5-2.5 (3) MG/3ML neb solution INHALE 1 VIAL IN NEBULIZER EVERY 6 HOURS AS NEEDED FOR SHORTNESS OF BREATH, WHEEZING OR COUGH 360 mL 2    nitroFURantoin macrocrystal (MACRODANTIN) 50 MG capsule Take 1 capsule (50 mg) by mouth daily. 30 capsule 0    order for DME Equipment being ordered: Nebulizer hose 1 Tube 11    oxyBUTYnin (DITROPAN) 5 MG tablet Take 1 tablet (5 mg) by mouth 3 times daily as needed for bladder spasms. 90 tablet 1    polyethylene glycol (MIRALAX) 17 GM/Dose powder Take 17 g (1 Capful) by mouth 2 times daily. 510 g 1    SENNA-docusate sodium (SENNA S) 8.6-50 MG tablet Take 1 tablet by mouth nightly as needed (constipation). 60 tablet 1    tamsulosin (FLOMAX) 0.4 MG capsule Take 1 capsule (0.4 mg) by mouth at bedtime 90 capsule 3    apixaban ANTICOAGULANT (ELIQUIS ANTICOAGULANT) 5 MG tablet Take 1 tablet (5 mg) by mouth 2 times daily. (Patient not taking: Reported on 2/5/2025) 60 tablet 0    calcium carbonate-vitamin D (CALTRATE) 600-10 MG-MCG per tablet Take 2 tablets by mouth daily. (Patient not taking: Reported on 2/5/2025)      cholecalciferol (VITAMIN D3) 25 mcg (1000 units) capsule Take 1 capsule by mouth daily. (Patient not taking: Reported on 2/5/2025)      HYDROmorphone (DILAUDID) 2 MG tablet Take 0.5-1 tablets (1-2 mg) by mouth every 6 hours as needed for severe pain. (Patient not taking: Reported on 2/5/2025) 30 tablet 0    Lidocaine HCl Urethral/Mucosal 2 % external gel Place  "10 mLs into the urethra every 28 days. (Patient not taking: Reported on 2025) 20 mL 5    Multiple Vitamin (MULTIVITAMIN ADULT PO) Take 1 tablet by mouth daily (Patient not taking: Reported on 2025)      vitamin C (ASCORBIC ACID) 500 MG tablet Take 500 mg by mouth daily (Patient not taking: Reported on 2025)         Allergies   Allergen Reactions    Flagyl [Metronidazole] GI Disturbance        Social History     Tobacco Use    Smoking status: Former     Current packs/day: 0.00     Types: Cigarettes     Start date: 1962     Quit date: 2012     Years since quittin.5    Smokeless tobacco: Never    Tobacco comments:     Quit in July   Substance Use Topics    Alcohol use: No     Alcohol/week: 0.0 standard drinks of alcohol     No family history on file.  History   Drug Use No             Review of Systems  Constitutional, HEENT, cardiovascular, pulmonary, GI, , musculoskeletal, neuro, skin, endocrine and psych systems are negative, except as otherwise noted.    Objective    /60   Pulse 80   Temp 98.5  F (36.9  C) (Temporal)   Resp 20   SpO2 97%    Estimated body mass index is 21 kg/m  as calculated from the following:    Height as of 24: 1.778 m (5' 10\").    Weight as of 25: 66.4 kg (146 lb 6.2 oz).  Physical Exam  GENERAL: alert and no distress  EYES: Eyes grossly normal to inspection, PERRL and conjunctivae and sclerae normal  HENT: ear canals and TM's normal, nose and mouth without ulcers or lesions  NECK: no adenopathy, no asymmetry, masses, or scars  RESP: lungs clear to auscultation with mild lower lobe rhonchi  CV: regular rate and rhythm, distant heart sounds without murmur, no peripheral edema  ABDOMEN: soft, suprapubic and left lower quadrant tenderness, no hepatosplenomegaly, no masses and bowel sounds normal, catheter bag in place  MS: no gross musculoskeletal defects noted, no edema  SKIN: no suspicious lesions or rashes  NEURO:  mentation intact and speech " normal  PSYCH: mentation appears normal, affect normal/bright    Recent Labs   Lab Test 01/27/25  0519 01/26/25  1154 01/26/25  0532 10/01/24  1036 08/10/24  0749 05/01/24  0537 05/01/24  0536   HGB 7.9* 7.8* 7.2*   < > 8.3*   < >  --      --  227   < > 397   < >  --    INR  --   --   --   --  1.59*  --  1.43*     --  140   < > 134*   < > 136   POTASSIUM 4.2  --  3.9   < > 4.2   < > 3.0*   CR 0.66*  --  0.70   < > 0.73   < > 0.65*    < > = values in this interval not displayed.        Diagnostics  Labs pending at this time.  Results will be reviewed when available.   No EKG this visit, completed in the last 90 days.    Revised Cardiac Risk Index (RCRI)  The patient has the following serious cardiovascular risks for perioperative complications:   - Coronary Artery Disease (MI, positive stress test, angina, Qs on EKG) = 1 point   - Congestive Heart Failure (pulmonary edema, PND, s3 mor, CXR with pulmonary congestion, basilar rales) = 1 point   - Cerebrovascular Disease (TIA or CVA) = 1 point     RCRI Interpretation: 3 points: Class IV (high risk - >11% complication rate)    Estimated Functional Capacity: CANNOT perform 4 METS without symptoms  {Reference  ACC/AHA Stress Testing Guidelines:234205}         Signed Electronically by: Neville Degroot MD  A copy of this evaluation report is provided to the requesting physician.    {Provider Resources  Preop [facility] Guidelines  Revised Cardiac Risk Index :127669}   {Email feedback regarding this note to [email or web address]   :423829}

## 2025-02-06 NOTE — TELEPHONE ENCOUNTER
Wen from [place] [facility] called today. I faxed over the EKG, Labs and the pre-op to her at Fax-[phone]    Nena

## 2025-02-15 DIAGNOSIS — I26.99 OTHER ACUTE PULMONARY EMBOLISM, UNSPECIFIED WHETHER ACUTE COR PULMONALE PRESENT (H): ICD-10-CM

## 2025-02-19 RX ORDER — APIXABAN 5 MG/1
5 TABLET, FILM COATED ORAL 2 TIMES DAILY
Qty: 60 TABLET | Refills: 0 | Status: SHIPPED | OUTPATIENT
Start: 2025-02-19

## 2025-04-21 ENCOUNTER — HOSPITAL ENCOUNTER (EMERGENCY)
Facility: CLINIC | Age: 80
Discharge: STILL ADMITTED OR ADMINISTRATIVE | End: 2025-04-21
Payer: COMMERCIAL

## 2025-05-06 ENCOUNTER — OFFICE VISIT (OUTPATIENT)
Dept: FAMILY MEDICINE | Facility: CLINIC | Age: 80
End: 2025-05-06
Payer: COMMERCIAL

## 2025-05-06 VITALS
SYSTOLIC BLOOD PRESSURE: 128 MMHG | DIASTOLIC BLOOD PRESSURE: 64 MMHG | HEART RATE: 68 BPM | TEMPERATURE: 99.5 F | OXYGEN SATURATION: 92 % | RESPIRATION RATE: 18 BRPM

## 2025-05-06 DIAGNOSIS — Z23 NEED FOR VACCINATION: ICD-10-CM

## 2025-05-06 DIAGNOSIS — Z86.73 HISTORY OF CVA (CEREBROVASCULAR ACCIDENT): ICD-10-CM

## 2025-05-06 DIAGNOSIS — N48.89 PENILE EROSION: ICD-10-CM

## 2025-05-06 DIAGNOSIS — Z13.6 SCREENING FOR CARDIOVASCULAR CONDITION: ICD-10-CM

## 2025-05-06 DIAGNOSIS — J18.9 PNEUMONIA OF LOWER LOBE DUE TO INFECTIOUS ORGANISM, UNSPECIFIED LATERALITY: ICD-10-CM

## 2025-05-06 DIAGNOSIS — N39.0 RECURRENT UTI: ICD-10-CM

## 2025-05-06 DIAGNOSIS — K59.03 DRUG-INDUCED CONSTIPATION: ICD-10-CM

## 2025-05-06 DIAGNOSIS — Z86.711 HISTORY OF PULMONARY EMBOLISM: ICD-10-CM

## 2025-05-06 DIAGNOSIS — C67.9 UROTHELIAL CARCINOMA OF BLADDER (H): ICD-10-CM

## 2025-05-06 DIAGNOSIS — D12.8 TUBULOVILLOUS ADENOMA OF RECTUM: ICD-10-CM

## 2025-05-06 DIAGNOSIS — D56.9 THALASSEMIA, UNSPECIFIED TYPE: ICD-10-CM

## 2025-05-06 DIAGNOSIS — R91.1 PULMONARY NODULE: ICD-10-CM

## 2025-05-06 DIAGNOSIS — Z09 HOSPITAL DISCHARGE FOLLOW-UP: Primary | ICD-10-CM

## 2025-05-06 DIAGNOSIS — I25.10 CORONARY ARTERY DISEASE INVOLVING NATIVE CORONARY ARTERY OF NATIVE HEART WITHOUT ANGINA PECTORIS: ICD-10-CM

## 2025-05-06 DIAGNOSIS — J44.9 MODERATE COPD (CHRONIC OBSTRUCTIVE PULMONARY DISEASE) (H): ICD-10-CM

## 2025-05-06 DIAGNOSIS — I10 HYPERTENSION GOAL BP (BLOOD PRESSURE) < 140/90: ICD-10-CM

## 2025-05-06 PROCEDURE — 3078F DIAST BP <80 MM HG: CPT | Performed by: STUDENT IN AN ORGANIZED HEALTH CARE EDUCATION/TRAINING PROGRAM

## 2025-05-06 PROCEDURE — 1126F AMNT PAIN NOTED NONE PRSNT: CPT | Performed by: STUDENT IN AN ORGANIZED HEALTH CARE EDUCATION/TRAINING PROGRAM

## 2025-05-06 PROCEDURE — 3074F SYST BP LT 130 MM HG: CPT | Performed by: STUDENT IN AN ORGANIZED HEALTH CARE EDUCATION/TRAINING PROGRAM

## 2025-05-06 PROCEDURE — G2211 COMPLEX E/M VISIT ADD ON: HCPCS | Performed by: STUDENT IN AN ORGANIZED HEALTH CARE EDUCATION/TRAINING PROGRAM

## 2025-05-06 PROCEDURE — 99213 OFFICE O/P EST LOW 20 MIN: CPT | Performed by: STUDENT IN AN ORGANIZED HEALTH CARE EDUCATION/TRAINING PROGRAM

## 2025-05-06 ASSESSMENT — PAIN SCALES - GENERAL: PAINLEVEL_OUTOF10: NO PAIN (0)

## 2025-05-06 NOTE — PROGRESS NOTES
Assessment & Plan   Problem List Items Addressed This Visit          Respiratory    Pulmonary nodule - concerning for cancer but patient declining further work up       Digestive    Tubulovillous adenoma of rectum 4/9/24    Drug-induced constipation       Circulatory    Hypertension goal BP (blood pressure) < 140/90 (Chronic)    Coronary artery disease involving native coronary artery of native heart without angina pectoris (Chronic)       Urinary    Urothelial carcinoma of bladder (H) (Chronic)    Penile erosion       Hematologic    Thalassemia       Other    History of CVA (cerebrovascular accident) - old left internal capsule lacunar infarct on CT 7/2016    History of pulmonary embolism     Other Visit Diagnoses         Hospital discharge follow-up    -  Primary      Need for vaccination          Screening for cardiovascular condition          Pneumonia of lower lobe due to infectious organism, unspecified laterality          Recurrent UTI          Moderate COPD (chronic obstructive pulmonary disease) (H)               Continues to have normal output now.  No residual concerns for pneumonia and we did discuss repeat x-ray with mild pleural effusion noted previously which Khang is not wanting today. Impaction multifactorial with his chronic pain medications as well as limited activity and dietary changes. He does seem to be doing good now with intake. Fluid status euvolemic. Limit narcotics as much as possible to avoid future impactions. Continues on nitrofurantoin from urology. Wishes regarding his cancer and presumed cancer dx have not changed as far as treatment or further workup. Remains on hospice.     The longitudinal plan of care for the diagnosis(es)/condition(s) as documented were addressed during this visit. Due to the added complexity in care, I will continue to support Khang in the subsequent management and with ongoing continuity of care.       MED REC REQUIRED  Post Medication Reconciliation  Status:  Discharge medications reconciled and changed, see notes/orders    Subjective   Khang is a 80 year old, presenting for the following health issues:  Hospital F/U        5/6/2025     3:07 PM   Additional Questions   Roomed by allyson Singh   Accompanied by Brittany, wife         5/6/2025   Declines Weight   Did patient decline having their weight taken? Yes         5/6/2025     3:07 PM   Patient Reported Additional Medications   Patient reports taking the following new medications bisacodyl 10mg     HPI        Hospital Follow-up Visit:    Hospital/Nursing Home/IP Rehab Facility: [facility]  Date of Admission: 4/21/2025  Date of Discharge: 4/25/2025  Reason(s) for Admission: pneumonia, constipation  Was the patient in the ICU or did the patient experience delirium during hospitalization?  No  Do you have any other stressors you would like to discuss with your provider? No    Problems taking medications regularly:  None  Medication changes since discharge: None  Problems adhering to non-medication therapy:  None    Summary of hospitalization:  CareEverywhere information obtained and reviewed  Diagnostic Tests/Treatments reviewed.  Follow up needed:   Other Healthcare Providers Involved in Patient s Care:         Homecare  Update since discharge: improved.     Patient here with wife today after discharge.  Feeling close to his baseline at this time.  Continues to have stool output.  Pain manageable and they do have methadone at home through hospice care. Dilaudid as needed rare. Continues on macrobid and completed other antibiotics upon discharge. Appetite doing well since discharge.     Plan of care communicated with patient and family                 Review of Systems  Constitutional, neuro, ENT, endocrine, pulmonary, cardiac, gastrointestinal, genitourinary, musculoskeletal, integument and psychiatric systems are negative, except as otherwise noted.      Objective    /64   Pulse 68   Temp 99.5  F  (37.5  C) (Temporal)   Resp 18   SpO2 92%   There is no height or weight on file to calculate BMI.  Physical Exam   GENERAL: alert and no distress  EYES: Eyes grossly normal to inspection, PERRL and conjunctivae and sclerae normal  HENT: nose and mouth without ulcers or lesions  NECK: no adenopathy, no asymmetry, masses, or scars  RESP: lungs clear to auscultation with distant breath sounds  CV: regular rate and rhythm, no peripheral edema  ABDOMEN: soft, nontender, no hepatosplenomegaly, no masses and bowel sounds normal  MS: no gross musculoskeletal defects noted, no edema  SKIN: no suspicious lesions or rashes  NEURO: mentation intact and speech normal  PSYCH: mentation appears normal, affect normal/bright          Signed Electronically by: Neville Degroot MD

## 2025-06-14 ENCOUNTER — HEALTH MAINTENANCE LETTER (OUTPATIENT)
Age: 80
End: 2025-06-14

## 2025-07-15 NOTE — PROGRESS NOTES
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 for RUE tremor.    Briefly, onset of RUE action/postural tremor in /2025. No progressive worsening. No tremor at rest. Also with history of hyposmia, RUE rigidity compared to L. Physical examination significant for RUE cogwheel rigidity, decreased RLE stride length, decreased RUE arm swing while ambulating, RUE dysdiadochokinesia.     Impression: Given RUE cogwheel rigidity, dysdiadochokinesia, decreased R arm swing and R leg stride length, overall c/f primary parkinson's disease. Considered also possible tremors 2/2 thyroid or renal dysfunction. No resting tremor noted though. RUE tremor tends to occur with postural hold and with action.     Plan:  Discussed and evaluated with neurology attending, Dr. Sosa  Sinemet trial - 1 tablet TID about 30 min prior to breakfast, lunch, dinner.  MRI Brain w/wo contrast to r/o structural pathology  TIEN scan ordered, w/ NM consultation visit  Sleep study to evaluate for REM sleep behavior d/o. Also reportedly c/f sleep apnea per other provider, who provided him with referral to sleep medicine team.  Follow up in 3 months or sooner if needed.    Orders:    Ambulatory Referral to Sleep Medicine; Future    NM brain tien scan w/Rx; Future    carbidopa-levodopa (Sinemet)  mg per tablet; Take 1 tablet by mouth 3 (three) times a day    MRI brain with and without contrast; Future    NM consultation for tien scan; Future    Muscle twitching  As above.   Orders:    Virtual Visit Details    Type of service:  Video Visit   Originating Location (pt. Location): Home    Distant Location (provider location):  On-site  Platform used for Video Visit: [facility]    Palliative Care Outpatient Clinic      Patient ID/Chief Complaint: Khang Bailon 79 year old male who is presenting to the palliative medicine clinic today for a palliative care follow-up after hospitalization secondary to care planning/symptom management.   The patient's primary care provider is:  Neville Degroot       Impression:   79-year-old male with recurrent hospitalizations due to multiple medical problems and debility.  He is nonambulatory.  He often refuses recommended plan of care (TCU placement, wound care) and unfortunately continues to decline.  Recently his appetite has picked up and patient and family are hopeful to work with home health care and therapy to hopefully improve strength.     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    I initially met Khang and his family when he was hospitalized at [facility] in April 2024.  I was consulted due to reports of poorly controlled pain, but primarily because he consistently refused recommended plan of care.     Symptoms/recommendations/discussion:  4/30/24: Khang is able to state year, location, and hip fracture side correctly.  He is under the impression that he does not have a history of bladder cancer.  He states that he will follow-up with urology.  He is agreeable to home health care with PT/OT.  Multiple members present   MRI brain with and without contrast; Future    REM sleep behavior disorder  As above.  Orders:    Ambulatory Referral to Sleep Medicine; Future      Patient Instructions   Contact [facility] Scheduling at [phone] or [phone](toll-free) (Hours: Monday to Friday: 7:30 am to 5:30 pm, Saturday: 8 am to 12 pm).    Ordered MRI brain  Ordered TIEN scan. Will need consultation appointment with radiologist prior to test  Ordered sleep study. Please also make an appointment with sleep medicine team  Ordered Sinemet 1 tablet three times a day.  -Often recommended to take Sinemet on an empty stomach or with a low-protein meal to maximize absorption. High protein diets can interfere with the absorption of Sinemet, especially close to the time of taking Sinemet. Some providers recommend avoiding any high-protein meals 30 min to 1 hour before and after taking Sinemet.  -Consider taking Sinemet at least 30 minutes before a meal or 1-2 hours after a meal to minimize protein interference. Can also consider consuming higher-protein foods, like red meat, in the evening to avoid interference with daytime doses of Sinemet.    Reach out to our office any questions or concerns. Please let me know in about 1 week how you are doing on Sinemet.        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    Current medications include Atorvastatin 10mg daily, Diltiazem 180mg qd, Lisinopril 40mg qd, Methimazole 15mg BID, Spironolactone 25mg daily.   Follows with endocrinology team, LOV 6/30/25. Increased methimazole from 10mg qd to 15mg qd.    INTERVAL HISTORY  Presents today with wife and granddaughter  Onset of RUE tremor (elbow to finger tips) around  confirm that they can take care of him at home.  He expressed understanding importance of participating with PT/OT.  He states he is agreeable to recommended outpatient follow-up appointments.  He confirms he wants to come to the hospital again if needed.  He confirms desire for full code.  Family stated that they had no questions or concerns about current plan of care.  Healthcare directive completed during April hospitalization.  His daughter May is his agent.  He is able to participate in decision-making.  6/7/2024: Virtual follow-up completed today with patient, grandson, daughter May (present during latter half of visit), and patient's wife.  All feel that he is eating better and hope for improvement in strength from working with home health care and physical therapy.  He assures me he is going to follow-up with urology for bladder cancer.  We discussed strong recommendation that he allow home health care providers to assess him and participate in recommended plan of care.  I tried to emphasize that he will only continue to become more and more debilitated if he does not try to follow recommended restorative measures.  He complains of pain in his feet, likely related to pressure ulcer on his heel that he has not allowed home health care nurse to examine.  He states he will allow for examination of wound.  Continue gabapentin 300 mg 3 times daily, dose could be increased in the future as complaints are primarily neuropathic in nature.  Palliative care follow-up in 4 to 6 weeks.        How to get a hold of us:  For non-urgent matters, MyChart works best.    For more urgent matters, or if you prefer not to use MyChart, call our clinic nurse coordinator Sonia Estrada RN at [phone] or [phone]    We have an on-call number for evenings and weekends. Please call this only if you are having uncontrolled symptoms or serious side effects from your medicines: [phone].     For refills, please give  Jan/Feb 2025. Sudden onset, no known trigger. Did initially feel tingling, about 1/10 in severity, and thought it was a pinched nerve but even when tingling sensation subsided, continued to have RUE tremor.  Tremor present with postural hold and with action, though does not overly bother pt and does not interfere with ADLs.  No tremor noted at rest. Denied hypophonia, falls, gait dysfunction, constipation. Though wife has noticed RUE does not move as well as L, not as fluid.  Has not progressed in the past few months.  Does have chronic thyroid and renal dysfunction, meets with endocrinology and nephrology.  Drinks alcohol about 1-2 x a month. Notices no improvement.   Does have chronic neck pain, but no acute worsening in the past year.  +Anosmia. +Dream reactment, kicks around while dreaming.   No recent medication change prior to onset of sxs. Did recently switch from Amlodipine to Diltiazem in April 2025, but noted no change in sx.     Lab Results   Component Value Date    CREATININE 2.26 (H) 03/11/2025    CREATININE 2.3 (H) 03/11/2025    CREATININE 2.01 (H) 07/23/2024    CREATININE 2.32 (H) 04/29/2024    CREATININE 2.15 (H) 03/04/2024 6/20/25: TSH 0.05 low, free T3 3.3 WNL, free T4 1.3 WNL.    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   Respiratory:  Negative for shortness of breath.    Cardiovascular:  Negative for chest pain.   Gastrointestinal:  Negative for abdominal pain.   Musculoskeletal:  Negative for gait problem.   Neurological:  Positive for tremors. Negative for dizziness, seizures, syncope, facial asymmetry, speech difficulty, weakness, light-headedness, numbness and headaches.   All  us a week (5 working days) notice. We don't always have providers available everyday to do refills. If you call the day you run out of your medicine, we may not be able to refill it in time, so call 5 days in advance        History:  History gathered today from: patient, family/loved ones, medical chart, outside records including Care Everywhere, health care directive/s      PE: There were no vitals taken for this visit.   Wt Readings from Last 3 Encounters:   05/07/24 75.8 kg (167 lb)   05/02/24 75.8 kg (167 lb 1.7 oz)   04/22/24 69.9 kg (154 lb)       Gen alert, chronically ill-appearing  Head NCAT.  Eyes anicteric without injection  Face symmetric, eyes conjugate  Lungs unlabored, no cough, speaking full sentences  Skin no rashes or lesions evident on face/neck  Neuro Face symmetric, eyes conjugate; speech fluent.  Neuropsych exam normal including affect, sensorium, gross memory, thought processes, and fund of knowledge.         Data reviewed:  I reviewed electrolytes, BUN/creatinine, liver profile, hemoglobin and hematocrit, platelet count, and most recent imaging  Discharge summary from recent hospitalization in [facility]     database reviewed: 6/7        45 minutes spent on the date of the encounter doing chart review, history and exam, patient education & counseling, documentation and other activities as noted above.        Thank you for involving us in the patient's care.   VALENTINE Srivastava, [facility]      "other systems reviewed and are negative.       Medical History Reviewed by provider this encounter:     .  Past Medical History   Past Medical History[1]  Past Surgical History[2]  Family History[3]   reports that he has quit smoking. He has never used smokeless tobacco. He reports that he does not currently use alcohol. He reports that he does not use drugs.  Current Outpatient Medications   Medication Instructions    atorvastatin (LIPITOR) 10 mg, Daily    carbidopa-levodopa (Sinemet)  mg per tablet 1 tablet, Oral, 3 times daily    diltiazem (CARDIZEM CD) 180 mg, Oral, Daily    lisinopril (ZESTRIL) 40 mg, Daily    methimazole (TAPAZOLE) 15 mg, Oral, 2 times daily    spironolactone (ALDACTONE) 25 mg, Oral, Daily   Allergies[4]   Medications Ordered Prior to Encounter[5]   Social History[6]     Objective   /70 (BP Location: Right arm, Patient Position: Sitting, Cuff Size: Standard)   Pulse 67   Ht 5' 4\" (1.626 m)   Wt 84.8 kg (187 lb)   SpO2 95%   BMI 32.10 kg/m²     Physical Exam   Vitals reviewed.   Constitutional:    Not in acute distress. Normal appearance. Not ill-appearing, toxic-appearing or diaphoretic.   HENT:   Normocephalic and atraumatic.  External ear normal b/l. Nose normal. No congestion or rhinorrhea. Mucous membranes are moist.  Oropharynx is clear. No oropharyngeal exudate or posterior oropharyngeal erythema.   Eyes:    No scleral icterus.  No discharge b/l.  Conjunctivae normal.   Cardiovascular: no clear edema  Pulmonary:  No respiratory distress.   Musculoskeletal: no gross deformities  Skin:    Skin is not pale.   Psychiatric:      Mood normal. Affect congruent    Neurologic Exam   MENTAL STATUS: AAOx3. Follows simple/complex commands. Affect normal w/ congruent mood.    LANGUAGE: Speech clear, spontaneous, and fluent. No aphasia. No dysarthria - normal volume and intonation.    CRANIAL NERVES:  CN II: Visual acuity grossly intact. No visual field deficit. PERRLA.   CN III, IV, " VI: EOM's intact w/o nystagmus, gaze palsy, or preference.   CN V: Normal masseter bulk. V1-V3 sensation intact and symmetric bilaterally.   CN VII: Face movement symmetric. Smile, eyebrow raise, eyelid bury symmetric. Nasolabial folds and palpebral folds symmetric.   CN VIII: Hearing grossly intact bilaterally.   CN IX-X: No dysarthria. Palate elevates symmetrically. Uvula midline.   CN XI: Shoulder shrug symmetric.   CN XII: Tongue midline. No deviation, atrophy, or fasciculations.    MOTOR: Normal muscle bulk. +RUE cogwheel rigidity. No pronator drift or orbiting. Mild tremor with postural hold and with FNF, no resting tremor noted. Formal strength testing as follows:  Upper extremity:   Shoulder abduction Elbow flexion Elbow extension Wrist flexion Wrist extension  strength   Right 5/5 5/5 5/5 5/5 5/5 5/5   Left 5/5 5/5 5/5 5/5 5/5 5/5     Lower extremity:   Hip flexion Knee flexion Knee extension Ankle dorsiflexion Ankle plantarflexion   Right 5/5 5/5 5/5 5/5 5/5   Left 5/5 5/5 5/5 5/5 5/5     SENSORY:  Light touch: Intact and symmetric throughout.    REFLEXES: Negative Glabellar test.    COORDINATION: Finger-to-nose w/ eyes open intact bilaterally w/o dysmetria or ataxia. Mild RUE tremor with FNF. No truncal ataxia. Negative fall test. Dysdiadochokinesia present in RUE compared to L with significantly decreased amplitude and speed.     GAIT: No shuffling noted. Decreased stride length and arm swing with RLE and RUE, respectively. No tremor while ambulating. No enbloc turning.       Radiology Results Review : No pertinent imaging studies reviewed.    Administrative Statements   I have spent a total time of 45 minutes in caring for this patient on the day of the visit/encounter including Diagnostic results, Prognosis, Risks and benefits of tx options, Instructions for management, Patient and family education, Importance of tx compliance, Risk factor reductions, Impressions, Counseling / Coordination of care,  Documenting in the medical record, Reviewing/placing orders in the medical record (including tests, medications, and/or procedures), Obtaining or reviewing history  , and Communicating with other healthcare professionals .       [1]   Past Medical History:  Diagnosis Date    Chronic kidney disease     Hypertension     Kidney stone    [2]   Past Surgical History:  Procedure Laterality Date    IR BIOPSY KIDNEY RANDOM  4/14/2025    TOE SURGERY Left 1982    Big toe   [3]   Family History  Problem Relation Name Age of Onset    Hypertension Mother Patricia     Kidney disease Mother Patricia     Diabetes unspecified Mother Patricia         Past Aug 2005 kidney failure    Heart disease Father Chris     Diabetes unspecified Father Chris         Past 6/1967 heart complications    Heart disease Brother Chris Moncada     Diabetes unspecified Brother Chris Moncada         2015 had heart attack put 2 stents in   [4]   Allergies  Allergen Reactions    Penicillins Hives   [5]   Current Outpatient Medications on File Prior to Visit   Medication Sig Dispense Refill    atorvastatin (LIPITOR) 10 mg tablet Take 10 mg by mouth in the morning.      diltiazem (CARDIZEM CD) 180 mg 24 hr capsule Take 1 capsule (180 mg total) by mouth daily 90 capsule 3    lisinopril (ZESTRIL) 40 mg tablet Take 40 mg by mouth in the morning.      methimazole (TAPAZOLE) 5 mg tablet Take 3 tablets (15 mg total) by mouth 2 (two) times a day 540 tablet 2    spironolactone (ALDACTONE) 25 mg tablet TAKE 1 TABLET (25 MG TOTAL) BY MOUTH DAILY. 90 tablet 1     No current facility-administered medications on file prior to visit.   [6]   Social History  Tobacco Use    Smoking status: Former    Smokeless tobacco: Never   Vaping Use    Vaping status: Never Used   Substance and Sexual Activity    Alcohol use: Not Currently     Comment: Socially    Drug use: Never    Sexual activity: Yes     Partners: Female

## 2025-07-20 NOTE — PROGRESS NOTES
Problem: ABCDS Injury Assessment  Goal: Absence of physical injury  Outcome: Progressing     Problem: Discharge Planning  Goal: Discharge to home or other facility with appropriate resources  Outcome: Progressing  Flowsheets (Taken 7/19/2025 2030)  Discharge to home or other facility with appropriate resources: Identify barriers to discharge with patient and caregiver     Problem: Safety - Adult  Goal: Free from fall injury  Outcome: Progressing     Problem: Pain  Goal: Verbalizes/displays adequate comfort level or baseline comfort level  Outcome: Progressing  Flowsheets (Taken 7/19/2025 2030)  Verbalizes/displays adequate comfort level or baseline comfort level:   Encourage patient to monitor pain and request assistance   Assess pain using appropriate pain scale   Implement non-pharmacological measures as appropriate and evaluate response      Patient has been assessed for Home Oxygen needs. Oxygen readings:    *Pulse oximetry (SpO2) = 86% on room air at rest while awake.    *SpO2 improved to 92% on 2 liters/minute at rest.

## (undated) DEVICE — TUBING SUCTION 12"X1/4" N612

## (undated) DEVICE — DRAPE GYN/UROLOGY FLUID POUCH TUR 29455

## (undated) DEVICE — LUBRICATING JELLY 2OZ LJT2/04-8917

## (undated) DEVICE — PACK BASIC SET-UP 9101

## (undated) DEVICE — SOL NACL 0.9% IRRIG 3000ML BAG 2B7477

## (undated) DEVICE — BAG URINARY DRAIN 4000ML LF 153509

## (undated) DEVICE — BASIN SET MINOR DISP

## (undated) DEVICE — CATH FOLEY 3WAY 22FR 30ML LUBRICATH LATEX 0167L22

## (undated) DEVICE — DRSG GAUZE 4X4" TRAY

## (undated) DEVICE — GOWN XLG DISP 9545

## (undated) DEVICE — TUBING IRR TUR Y TYPE 2C4041

## (undated) DEVICE — EVACUATOR BLADDER UROVAC LATEX M0067301250

## (undated) DEVICE — SYR 30ML LL W/O NDL 302832

## (undated) DEVICE — SUCTION MANIFOLD NEPTUNE 2 SYS 4 PORT 0702-020-000

## (undated) DEVICE — GLOVE PROTEXIS W/NEU-THERA 7.0  2D73TE70

## (undated) DEVICE — DRAPE UNDER BUTTOCK 8483

## (undated) RX ORDER — FENTANYL CITRATE 50 UG/ML
INJECTION, SOLUTION INTRAMUSCULAR; INTRAVENOUS
Status: DISPENSED
Start: 2021-11-10

## (undated) RX ORDER — ATROPA BELLADONNA AND OPIUM 16.2; 3 MG/1; MG/1
SUPPOSITORY RECTAL
Status: DISPENSED
Start: 2021-11-10